# Patient Record
Sex: MALE | Race: WHITE | Employment: PART TIME | URBAN - METROPOLITAN AREA
[De-identification: names, ages, dates, MRNs, and addresses within clinical notes are randomized per-mention and may not be internally consistent; named-entity substitution may affect disease eponyms.]

---

## 2017-03-13 RX ORDER — SILDENAFIL 100 MG/1
100 TABLET, FILM COATED ORAL PRN
Qty: 3 TABLET | Refills: 6 | Status: SHIPPED | OUTPATIENT
Start: 2017-03-13 | End: 2017-09-26 | Stop reason: SDUPTHER

## 2017-09-08 ENCOUNTER — TELEPHONE (OUTPATIENT)
Dept: UROLOGY | Age: 67
End: 2017-09-08

## 2017-09-08 DIAGNOSIS — N40.1 BENIGN NON-NODULAR PROSTATIC HYPERPLASIA WITH LOWER URINARY TRACT SYMPTOMS: Primary | ICD-10-CM

## 2017-09-08 RX ORDER — SILDENAFIL 100 MG/1
100 TABLET, FILM COATED ORAL PRN
Qty: 6 TABLET | Refills: 6 | Status: ON HOLD | OUTPATIENT
Start: 2017-09-08 | End: 2019-02-01 | Stop reason: HOSPADM

## 2017-09-21 DIAGNOSIS — N40.1 BENIGN NON-NODULAR PROSTATIC HYPERPLASIA WITH LOWER URINARY TRACT SYMPTOMS: ICD-10-CM

## 2017-09-21 LAB — PROSTATE SPECIFIC ANTIGEN: 0.78 NG/ML (ref 0–5.4)

## 2017-09-26 ENCOUNTER — OFFICE VISIT (OUTPATIENT)
Dept: UROLOGY | Age: 67
End: 2017-09-26

## 2017-09-26 VITALS
BODY MASS INDEX: 30.35 KG/M2 | HEART RATE: 62 BPM | SYSTOLIC BLOOD PRESSURE: 128 MMHG | HEIGHT: 70 IN | WEIGHT: 212 LBS | DIASTOLIC BLOOD PRESSURE: 78 MMHG

## 2017-09-26 DIAGNOSIS — N13.8 BPH WITH OBSTRUCTION/LOWER URINARY TRACT SYMPTOMS: ICD-10-CM

## 2017-09-26 DIAGNOSIS — N40.1 BPH WITH OBSTRUCTION/LOWER URINARY TRACT SYMPTOMS: ICD-10-CM

## 2017-09-26 DIAGNOSIS — N52.9 ERECTILE DYSFUNCTION, UNSPECIFIED ERECTILE DYSFUNCTION TYPE: Primary | ICD-10-CM

## 2017-09-26 PROCEDURE — G8417 CALC BMI ABV UP PARAM F/U: HCPCS | Performed by: UROLOGY

## 2017-09-26 PROCEDURE — 1123F ACP DISCUSS/DSCN MKR DOCD: CPT | Performed by: UROLOGY

## 2017-09-26 PROCEDURE — 99213 OFFICE O/P EST LOW 20 MIN: CPT | Performed by: UROLOGY

## 2017-09-26 PROCEDURE — 4040F PNEUMOC VAC/ADMIN/RCVD: CPT | Performed by: UROLOGY

## 2017-09-26 PROCEDURE — 4004F PT TOBACCO SCREEN RCVD TLK: CPT | Performed by: UROLOGY

## 2017-09-26 PROCEDURE — G8427 DOCREV CUR MEDS BY ELIG CLIN: HCPCS | Performed by: UROLOGY

## 2017-09-26 PROCEDURE — 3017F COLORECTAL CA SCREEN DOC REV: CPT | Performed by: UROLOGY

## 2017-09-26 RX ORDER — PRAVASTATIN SODIUM 40 MG
40 TABLET ORAL DAILY
Status: ON HOLD | COMMUNITY
End: 2019-02-01 | Stop reason: HOSPADM

## 2017-09-26 ASSESSMENT — ENCOUNTER SYMPTOMS: SHORTNESS OF BREATH: 0

## 2018-03-23 ENCOUNTER — OFFICE VISIT (OUTPATIENT)
Dept: UROLOGY | Age: 68
End: 2018-03-23
Payer: MEDICARE

## 2018-03-23 VITALS
WEIGHT: 218 LBS | SYSTOLIC BLOOD PRESSURE: 110 MMHG | DIASTOLIC BLOOD PRESSURE: 70 MMHG | BODY MASS INDEX: 31.21 KG/M2 | HEART RATE: 61 BPM | HEIGHT: 70 IN

## 2018-03-23 DIAGNOSIS — N52.9 ERECTILE DYSFUNCTION, UNSPECIFIED ERECTILE DYSFUNCTION TYPE: ICD-10-CM

## 2018-03-23 DIAGNOSIS — N48.89 PENILE IRRITATION: Primary | ICD-10-CM

## 2018-03-23 LAB
BILIRUBIN, POC: NORMAL
BLOOD URINE, POC: NORMAL
CLARITY, POC: CLEAR
COLOR, POC: YELLOW
GLUCOSE URINE, POC: NORMAL
KETONES, POC: NORMAL
LEUKOCYTE EST, POC: NORMAL
NITRITE, POC: NORMAL
PH, POC: 5
PROTEIN, POC: NORMAL
SPECIFIC GRAVITY, POC: 1.02
UROBILINOGEN, POC: 0.2

## 2018-03-23 PROCEDURE — 81003 URINALYSIS AUTO W/O SCOPE: CPT | Performed by: UROLOGY

## 2018-03-23 PROCEDURE — 99213 OFFICE O/P EST LOW 20 MIN: CPT | Performed by: UROLOGY

## 2018-03-23 ASSESSMENT — PATIENT HEALTH QUESTIONNAIRE - PHQ9
1. LITTLE INTEREST OR PLEASURE IN DOING THINGS: 0
SUM OF ALL RESPONSES TO PHQ QUESTIONS 1-9: 1
2. FEELING DOWN, DEPRESSED OR HOPELESS: 1
SUM OF ALL RESPONSES TO PHQ9 QUESTIONS 1 & 2: 1

## 2018-03-23 NOTE — PROGRESS NOTES
Subjective:      Patient ID: Hanna Adair is a 76 y.o. male. HPI  This is a 77 yo male with h/o HTN, CAD/MI x 2/Stents, DM, Depression/Anxiety, ED/Viagra 100 mg and BPH w/LUTs back in follow-up. Since last seen on 9/26/17, he feels that the Viagra is not working well and is only getting a partial erectiion. Trial of Cialis was not helpful. He used Levitra yrs ago and this seemed to help but he has trouble with the cost. He tried a KRANTHI and this did not work well. He is interested in IPP. He also reports a 1-2 day h/o penile meatal itching that resolved with the use of topical hydrogen peroxide and increased fluids. He has no hematuria or dysuria or pain.        Past Medical History:   Diagnosis Date    Erectile dysfunction     Hyperlipidemia     Hypertension      Past Surgical History:   Procedure Laterality Date    COLONOSCOPY      CORONARY ANGIOPLASTY WITH STENT PLACEMENT      4 total    THROAT SURGERY      esophagocolostomy    TONSILLECTOMY      UPPER GASTROINTESTINAL ENDOSCOPY       Social History     Social History    Marital status:      Spouse name: N/A    Number of children: N/A    Years of education: N/A     Social History Main Topics    Smoking status: Current Every Day Smoker     Types: Cigarettes    Smokeless tobacco: Never Used    Alcohol use No    Drug use: No    Sexual activity: Not Asked     Other Topics Concern    None     Social History Narrative    None     Family History   Problem Relation Age of Onset    Heart Attack Father      Current Outpatient Prescriptions   Medication Sig Dispense Refill    VENLAFAXINE HCL ER PO Take by mouth      pravastatin (PRAVACHOL) 40 MG tablet Take 40 mg by mouth daily      sildenafil (VIAGRA) 100 MG tablet Take 1 tablet by mouth as needed for Erectile Dysfunction 6 tablet 6    Cholecalciferol (VITAMIN D) 2000 UNITS CAPS capsule Take by mouth daily      ramipril (ALTACE) 5 MG capsule       metFORMIN (GLUCOPHAGE) 500 MG tablet      

## 2019-01-29 ENCOUNTER — HOSPITAL ENCOUNTER (EMERGENCY)
Age: 69
Discharge: ANOTHER HOSPITAL | End: 2019-01-29
Attending: EMERGENCY MEDICINE
Payer: MEDICARE

## 2019-01-29 ENCOUNTER — HOSPITAL ENCOUNTER (INPATIENT)
Age: 69
LOS: 4 days | Discharge: HOME | DRG: 871 | End: 2019-02-02
Attending: INTERNAL MEDICINE | Admitting: INTERNAL MEDICINE
Payer: MEDICARE

## 2019-01-29 ENCOUNTER — APPOINTMENT (OUTPATIENT)
Dept: GENERAL RADIOLOGY | Age: 69
End: 2019-01-29
Payer: MEDICARE

## 2019-01-29 VITALS
DIASTOLIC BLOOD PRESSURE: 50 MMHG | OXYGEN SATURATION: 95 % | HEART RATE: 83 BPM | HEIGHT: 70 IN | SYSTOLIC BLOOD PRESSURE: 81 MMHG | WEIGHT: 218 LBS | RESPIRATION RATE: 14 BRPM | TEMPERATURE: 97.7 F | BODY MASS INDEX: 31.21 KG/M2

## 2019-01-29 DIAGNOSIS — A41.9 SEPTICEMIA (HCC): ICD-10-CM

## 2019-01-29 DIAGNOSIS — J18.9 PNEUMONIA DUE TO ORGANISM: Primary | ICD-10-CM

## 2019-01-29 DIAGNOSIS — J18.9 PNEUMONIA OF RIGHT UPPER LOBE DUE TO INFECTIOUS ORGANISM: ICD-10-CM

## 2019-01-29 DIAGNOSIS — R09.1 PLEURISY: Primary | ICD-10-CM

## 2019-01-29 DIAGNOSIS — R06.09 DOE (DYSPNEA ON EXERTION): ICD-10-CM

## 2019-01-29 LAB
ALBUMIN SERPL-MCNC: 3.9 G/DL (ref 3.9–4.9)
ALP BLD-CCNC: 68 U/L (ref 35–104)
ALT SERPL-CCNC: 19 U/L (ref 0–41)
ANION GAP SERPL CALCULATED.3IONS-SCNC: 20 MEQ/L (ref 7–13)
AST SERPL-CCNC: 16 U/L (ref 0–40)
BANDED NEUTROPHILS RELATIVE PERCENT: 36 %
BASOPHILS ABSOLUTE: 0 K/UL (ref 0–0.2)
BASOPHILS RELATIVE PERCENT: 0 %
BILIRUB SERPL-MCNC: 1.2 MG/DL (ref 0–1.2)
BUN BLDV-MCNC: 34 MG/DL (ref 8–23)
CALCIUM SERPL-MCNC: 9.6 MG/DL (ref 8.6–10.2)
CHLORIDE BLD-SCNC: 92 MEQ/L (ref 98–107)
CO2: 21 MEQ/L (ref 22–29)
CREAT SERPL-MCNC: 1.93 MG/DL (ref 0.7–1.2)
EOSINOPHILS ABSOLUTE: 0 K/UL (ref 0–0.7)
EOSINOPHILS RELATIVE PERCENT: 0 %
GFR AFRICAN AMERICAN: 42
GFR NON-AFRICAN AMERICAN: 34.7
GLOBULIN: 3.8 G/DL (ref 2.3–3.5)
GLUCOSE BLD-MCNC: 203 MG/DL (ref 74–109)
HCT VFR BLD CALC: 48 % (ref 42–52)
HEMOGLOBIN: 16.6 G/DL (ref 14–18)
LACTIC ACID: 4.4 MMOL/L (ref 0.5–2.2)
LYMPHOCYTES ABSOLUTE: 1.5 K/UL (ref 1–4.8)
LYMPHOCYTES RELATIVE PERCENT: 6 %
MCH RBC QN AUTO: 30.4 PG (ref 27–31.3)
MCHC RBC AUTO-ENTMCNC: 34.7 % (ref 33–37)
MCV RBC AUTO: 87.7 FL (ref 80–100)
METAMYELOCYTES RELATIVE PERCENT: 2 %
MONOCYTES ABSOLUTE: 0 K/UL (ref 0.2–0.8)
MONOCYTES RELATIVE PERCENT: 0 %
NEUTROPHILS ABSOLUTE: 24.1 K/UL (ref 1.4–6.5)
NEUTROPHILS RELATIVE PERCENT: 56 %
PDW BLD-RTO: 14.9 % (ref 11.5–14.5)
PLATELET # BLD: 145 K/UL (ref 130–400)
PLATELET SLIDE REVIEW: ADEQUATE
POTASSIUM SERPL-SCNC: 4.9 MEQ/L (ref 3.5–5.1)
RAPID INFLUENZA  B AGN: NEGATIVE
RAPID INFLUENZA A AGN: NEGATIVE
RBC # BLD: 5.47 M/UL (ref 4.7–6.1)
RBC # BLD: NORMAL 10*6/UL
SODIUM BLD-SCNC: 133 MEQ/L (ref 132–144)
TOTAL PROTEIN: 7.7 G/DL (ref 6.4–8.1)
TOXIC GRANULATION: ABNORMAL
WBC # BLD: 25.6 K/UL (ref 4.8–10.8)

## 2019-01-29 PROCEDURE — 99284 EMERGENCY DEPT VISIT MOD MDM: CPT

## 2019-01-29 PROCEDURE — 2580000003 HC RX 258: Performed by: EMERGENCY MEDICINE

## 2019-01-29 PROCEDURE — 83605 ASSAY OF LACTIC ACID: CPT

## 2019-01-29 PROCEDURE — 87804 INFLUENZA ASSAY W/OPTIC: CPT

## 2019-01-29 PROCEDURE — 36415 COLL VENOUS BLD VENIPUNCTURE: CPT

## 2019-01-29 PROCEDURE — 96375 TX/PRO/DX INJ NEW DRUG ADDON: CPT

## 2019-01-29 PROCEDURE — 6360000002 HC RX W HCPCS: Performed by: EMERGENCY MEDICINE

## 2019-01-29 PROCEDURE — 71046 X-RAY EXAM CHEST 2 VIEWS: CPT

## 2019-01-29 PROCEDURE — 87040 BLOOD CULTURE FOR BACTERIA: CPT

## 2019-01-29 PROCEDURE — 80053 COMPREHEN METABOLIC PANEL: CPT

## 2019-01-29 PROCEDURE — 2580000003 HC RX 258: Performed by: PHYSICIAN ASSISTANT

## 2019-01-29 PROCEDURE — 85025 COMPLETE CBC W/AUTO DIFF WBC: CPT

## 2019-01-29 PROCEDURE — 1210000000 HC MED SURG R&B

## 2019-01-29 PROCEDURE — 96368 THER/DIAG CONCURRENT INF: CPT

## 2019-01-29 PROCEDURE — 96365 THER/PROPH/DIAG IV INF INIT: CPT

## 2019-01-29 RX ORDER — 0.9 % SODIUM CHLORIDE 0.9 %
30 INTRAVENOUS SOLUTION INTRAVENOUS ONCE
Status: COMPLETED | OUTPATIENT
Start: 2019-01-29 | End: 2019-01-29

## 2019-01-29 RX ORDER — SODIUM CHLORIDE 9 MG/ML
INJECTION, SOLUTION INTRAVENOUS CONTINUOUS
Status: DISCONTINUED | OUTPATIENT
Start: 2019-01-29 | End: 2019-01-31

## 2019-01-29 RX ORDER — SODIUM CHLORIDE 0.9 % (FLUSH) 0.9 %
10 SYRINGE (ML) INJECTION PRN
Status: DISCONTINUED | OUTPATIENT
Start: 2019-01-29 | End: 2019-02-02 | Stop reason: HOSPADM

## 2019-01-29 RX ORDER — MORPHINE SULFATE 4 MG/ML
4 INJECTION, SOLUTION INTRAMUSCULAR; INTRAVENOUS
Status: DISCONTINUED | OUTPATIENT
Start: 2019-01-29 | End: 2019-01-29 | Stop reason: HOSPADM

## 2019-01-29 RX ORDER — LIDOCAINE 4 G/G
1 PATCH TOPICAL DAILY
Status: DISCONTINUED | OUTPATIENT
Start: 2019-01-30 | End: 2019-01-30

## 2019-01-29 RX ORDER — SODIUM CHLORIDE 0.9 % (FLUSH) 0.9 %
10 SYRINGE (ML) INJECTION EVERY 12 HOURS SCHEDULED
Status: DISCONTINUED | OUTPATIENT
Start: 2019-01-29 | End: 2019-02-02 | Stop reason: HOSPADM

## 2019-01-29 RX ORDER — IPRATROPIUM BROMIDE AND ALBUTEROL SULFATE 2.5; .5 MG/3ML; MG/3ML
1 SOLUTION RESPIRATORY (INHALATION)
Status: DISCONTINUED | OUTPATIENT
Start: 2019-01-30 | End: 2019-01-30

## 2019-01-29 RX ORDER — 0.9 % SODIUM CHLORIDE 0.9 %
500 INTRAVENOUS SOLUTION INTRAVENOUS ONCE
Status: COMPLETED | OUTPATIENT
Start: 2019-01-29 | End: 2019-01-29

## 2019-01-29 RX ORDER — LOVASTATIN 20 MG/1
20 TABLET ORAL NIGHTLY
COMMUNITY

## 2019-01-29 RX ORDER — METOPROLOL SUCCINATE 50 MG/1
50 TABLET, EXTENDED RELEASE ORAL NIGHTLY
Status: DISCONTINUED | OUTPATIENT
Start: 2019-01-29 | End: 2019-02-02 | Stop reason: HOSPADM

## 2019-01-29 RX ORDER — ASPIRIN 81 MG/1
81 TABLET, CHEWABLE ORAL DAILY
Status: DISCONTINUED | OUTPATIENT
Start: 2019-01-30 | End: 2019-02-02 | Stop reason: HOSPADM

## 2019-01-29 RX ORDER — ONDANSETRON 2 MG/ML
4 INJECTION INTRAMUSCULAR; INTRAVENOUS ONCE
Status: COMPLETED | OUTPATIENT
Start: 2019-01-29 | End: 2019-01-29

## 2019-01-29 RX ADMIN — DEXTROSE MONOHYDRATE 1 G: 50 INJECTION, SOLUTION INTRAVENOUS at 19:20

## 2019-01-29 RX ADMIN — SODIUM CHLORIDE: 9 INJECTION, SOLUTION INTRAVENOUS at 23:07

## 2019-01-29 RX ADMIN — ONDANSETRON 4 MG: 2 INJECTION INTRAMUSCULAR; INTRAVENOUS at 18:53

## 2019-01-29 RX ADMIN — SODIUM CHLORIDE 500 ML: 9 INJECTION, SOLUTION INTRAVENOUS at 18:53

## 2019-01-29 RX ADMIN — AZITHROMYCIN MONOHYDRATE 500 MG: 500 INJECTION, POWDER, LYOPHILIZED, FOR SOLUTION INTRAVENOUS at 19:20

## 2019-01-29 RX ADMIN — MORPHINE SULFATE 4 MG: 4 INJECTION INTRAVENOUS at 18:53

## 2019-01-29 RX ADMIN — Medication 10 ML: at 23:07

## 2019-01-29 RX ADMIN — SODIUM CHLORIDE 2967 ML: 9 INJECTION, SOLUTION INTRAVENOUS at 20:14

## 2019-01-29 ASSESSMENT — PAIN DESCRIPTION - ORIENTATION
ORIENTATION: RIGHT
ORIENTATION: RIGHT

## 2019-01-29 ASSESSMENT — PAIN SCALES - GENERAL
PAINLEVEL_OUTOF10: 1
PAINLEVEL_OUTOF10: 10
PAINLEVEL_OUTOF10: 6

## 2019-01-29 ASSESSMENT — ENCOUNTER SYMPTOMS
ABDOMINAL PAIN: 0
EYES NEGATIVE: 1
VOMITING: 0
TROUBLE SWALLOWING: 0
WHEEZING: 0
RHINORRHEA: 0
ALLERGIC/IMMUNOLOGIC NEGATIVE: 1
COUGH: 1
SHORTNESS OF BREATH: 0
NAUSEA: 0

## 2019-01-29 ASSESSMENT — PAIN DESCRIPTION - FREQUENCY
FREQUENCY: INTERMITTENT
FREQUENCY: INTERMITTENT

## 2019-01-29 ASSESSMENT — PAIN DESCRIPTION - PAIN TYPE: TYPE: ACUTE PAIN

## 2019-01-29 ASSESSMENT — PAIN DESCRIPTION - LOCATION: LOCATION: RIB CAGE

## 2019-01-29 ASSESSMENT — PAIN DESCRIPTION - ONSET: ONSET: SUDDEN

## 2019-01-29 ASSESSMENT — PAIN DESCRIPTION - DESCRIPTORS
DESCRIPTORS: SHARP
DESCRIPTORS: ACHING

## 2019-01-30 LAB
ANION GAP SERPL CALCULATED.3IONS-SCNC: 14 MEQ/L (ref 7–13)
BASOPHILS ABSOLUTE: 0 K/UL (ref 0–0.2)
BASOPHILS RELATIVE PERCENT: 0.2 %
BUN BLDV-MCNC: 38 MG/DL (ref 8–23)
CALCIUM SERPL-MCNC: 8.3 MG/DL (ref 8.6–10.2)
CHLORIDE BLD-SCNC: 104 MEQ/L (ref 98–107)
CO2: 19 MEQ/L (ref 22–29)
CREAT SERPL-MCNC: 1.32 MG/DL (ref 0.7–1.2)
EOSINOPHILS ABSOLUTE: 0 K/UL (ref 0–0.7)
EOSINOPHILS RELATIVE PERCENT: 0.1 %
GFR AFRICAN AMERICAN: >60
GFR NON-AFRICAN AMERICAN: 53.8
GLUCOSE BLD-MCNC: 128 MG/DL (ref 60–115)
GLUCOSE BLD-MCNC: 133 MG/DL (ref 60–115)
GLUCOSE BLD-MCNC: 140 MG/DL (ref 60–115)
GLUCOSE BLD-MCNC: 96 MG/DL (ref 60–115)
GLUCOSE BLD-MCNC: 97 MG/DL (ref 74–109)
GLUCOSE BLD-MCNC: 98 MG/DL (ref 60–115)
HCT VFR BLD CALC: 38.5 % (ref 42–52)
HEMOGLOBIN: 13.6 G/DL (ref 14–18)
LACTIC ACID, SEPSIS: 1.8 MMOL/L (ref 0.5–1.9)
LACTIC ACID, SEPSIS: 3.8 MMOL/L (ref 0.5–1.9)
LACTIC ACID: 2.5 MMOL/L (ref 0.5–2.2)
LYMPHOCYTES ABSOLUTE: 1.4 K/UL (ref 1–4.8)
LYMPHOCYTES RELATIVE PERCENT: 6.1 %
MCH RBC QN AUTO: 31.3 PG (ref 27–31.3)
MCHC RBC AUTO-ENTMCNC: 35.2 % (ref 33–37)
MCV RBC AUTO: 89 FL (ref 80–100)
MONOCYTES ABSOLUTE: 1.1 K/UL (ref 0.2–0.8)
MONOCYTES RELATIVE PERCENT: 5.1 %
NEUTROPHILS ABSOLUTE: 19.7 K/UL (ref 1.4–6.5)
NEUTROPHILS RELATIVE PERCENT: 88.5 %
PDW BLD-RTO: 15 % (ref 11.5–14.5)
PERFORMED ON: ABNORMAL
PERFORMED ON: NORMAL
PERFORMED ON: NORMAL
PLATELET # BLD: 115 K/UL (ref 130–400)
POTASSIUM REFLEX MAGNESIUM: 3.9 MEQ/L (ref 3.5–5.1)
RBC # BLD: 4.33 M/UL (ref 4.7–6.1)
SODIUM BLD-SCNC: 137 MEQ/L (ref 132–144)
WBC # BLD: 22.3 K/UL (ref 4.8–10.8)

## 2019-01-30 PROCEDURE — 94640 AIRWAY INHALATION TREATMENT: CPT

## 2019-01-30 PROCEDURE — 94667 MNPJ CHEST WALL 1ST: CPT

## 2019-01-30 PROCEDURE — 83605 ASSAY OF LACTIC ACID: CPT

## 2019-01-30 PROCEDURE — 94664 DEMO&/EVAL PT USE INHALER: CPT

## 2019-01-30 PROCEDURE — 6370000000 HC RX 637 (ALT 250 FOR IP): Performed by: INTERNAL MEDICINE

## 2019-01-30 PROCEDURE — 94761 N-INVAS EAR/PLS OXIMETRY MLT: CPT

## 2019-01-30 PROCEDURE — 1210000000 HC MED SURG R&B

## 2019-01-30 PROCEDURE — 6360000002 HC RX W HCPCS: Performed by: INTERNAL MEDICINE

## 2019-01-30 PROCEDURE — 2580000003 HC RX 258: Performed by: PHYSICIAN ASSISTANT

## 2019-01-30 PROCEDURE — 85025 COMPLETE CBC W/AUTO DIFF WBC: CPT

## 2019-01-30 PROCEDURE — 99222 1ST HOSP IP/OBS MODERATE 55: CPT | Performed by: INTERNAL MEDICINE

## 2019-01-30 PROCEDURE — 36415 COLL VENOUS BLD VENIPUNCTURE: CPT

## 2019-01-30 PROCEDURE — 80048 BASIC METABOLIC PNL TOTAL CA: CPT

## 2019-01-30 PROCEDURE — 6360000002 HC RX W HCPCS: Performed by: PHYSICIAN ASSISTANT

## 2019-01-30 RX ORDER — DEXTROSE MONOHYDRATE 25 G/50ML
12.5 INJECTION, SOLUTION INTRAVENOUS PRN
Status: DISCONTINUED | OUTPATIENT
Start: 2019-01-30 | End: 2019-02-02 | Stop reason: HOSPADM

## 2019-01-30 RX ORDER — KETOROLAC TROMETHAMINE 15 MG/ML
15 INJECTION, SOLUTION INTRAMUSCULAR; INTRAVENOUS EVERY 6 HOURS PRN
Status: DISCONTINUED | OUTPATIENT
Start: 2019-01-30 | End: 2019-02-02 | Stop reason: HOSPADM

## 2019-01-30 RX ORDER — ALBUTEROL SULFATE 2.5 MG/3ML
2.5 SOLUTION RESPIRATORY (INHALATION)
Status: DISCONTINUED | OUTPATIENT
Start: 2019-01-30 | End: 2019-02-02 | Stop reason: HOSPADM

## 2019-01-30 RX ORDER — IPRATROPIUM BROMIDE AND ALBUTEROL SULFATE 2.5; .5 MG/3ML; MG/3ML
1 SOLUTION RESPIRATORY (INHALATION) 4 TIMES DAILY
Status: DISCONTINUED | OUTPATIENT
Start: 2019-01-30 | End: 2019-01-31

## 2019-01-30 RX ORDER — DEXTROSE MONOHYDRATE 50 MG/ML
100 INJECTION, SOLUTION INTRAVENOUS PRN
Status: DISCONTINUED | OUTPATIENT
Start: 2019-01-30 | End: 2019-02-02 | Stop reason: HOSPADM

## 2019-01-30 RX ORDER — IPRATROPIUM BROMIDE AND ALBUTEROL SULFATE 2.5; .5 MG/3ML; MG/3ML
1 SOLUTION RESPIRATORY (INHALATION) EVERY 4 HOURS PRN
Status: DISCONTINUED | OUTPATIENT
Start: 2019-01-30 | End: 2019-01-30

## 2019-01-30 RX ORDER — LIDOCAINE 4 G/G
1 PATCH TOPICAL DAILY
Status: DISCONTINUED | OUTPATIENT
Start: 2019-01-30 | End: 2019-02-02 | Stop reason: HOSPADM

## 2019-01-30 RX ORDER — NICOTINE POLACRILEX 4 MG
15 LOZENGE BUCCAL PRN
Status: DISCONTINUED | OUTPATIENT
Start: 2019-01-30 | End: 2019-02-02 | Stop reason: HOSPADM

## 2019-01-30 RX ADMIN — HYDROMORPHONE HYDROCHLORIDE 1 MG: 1 INJECTION, SOLUTION INTRAMUSCULAR; INTRAVENOUS; SUBCUTANEOUS at 23:50

## 2019-01-30 RX ADMIN — IPRATROPIUM BROMIDE AND ALBUTEROL SULFATE 1 AMPULE: .5; 3 SOLUTION RESPIRATORY (INHALATION) at 21:48

## 2019-01-30 RX ADMIN — METOPROLOL SUCCINATE 50 MG: 50 TABLET, EXTENDED RELEASE ORAL at 19:48

## 2019-01-30 RX ADMIN — CEFTRIAXONE SODIUM 1 G: 1 INJECTION, POWDER, FOR SOLUTION INTRAMUSCULAR; INTRAVENOUS at 19:48

## 2019-01-30 RX ADMIN — HYDROMORPHONE HYDROCHLORIDE 1 MG: 1 INJECTION, SOLUTION INTRAMUSCULAR; INTRAVENOUS; SUBCUTANEOUS at 19:49

## 2019-01-30 RX ADMIN — Medication 10 ML: at 08:23

## 2019-01-30 RX ADMIN — Medication 10 ML: at 19:47

## 2019-01-30 RX ADMIN — KETOROLAC TROMETHAMINE 15 MG: 15 INJECTION, SOLUTION INTRAMUSCULAR; INTRAVENOUS at 12:48

## 2019-01-30 RX ADMIN — IPRATROPIUM BROMIDE AND ALBUTEROL SULFATE 1 AMPULE: .5; 3 SOLUTION RESPIRATORY (INHALATION) at 14:08

## 2019-01-30 RX ADMIN — ASPIRIN 81 MG 81 MG: 81 TABLET ORAL at 08:23

## 2019-01-30 RX ADMIN — SODIUM CHLORIDE: 9 INJECTION, SOLUTION INTRAVENOUS at 23:47

## 2019-01-30 RX ADMIN — ENOXAPARIN SODIUM 40 MG: 40 INJECTION SUBCUTANEOUS at 08:23

## 2019-01-30 RX ADMIN — AZITHROMYCIN MONOHYDRATE 500 MG: 500 INJECTION, POWDER, LYOPHILIZED, FOR SOLUTION INTRAVENOUS at 18:06

## 2019-01-30 RX ADMIN — SODIUM CHLORIDE: 9 INJECTION, SOLUTION INTRAVENOUS at 07:25

## 2019-01-30 RX ADMIN — SODIUM CHLORIDE: 9 INJECTION, SOLUTION INTRAVENOUS at 15:55

## 2019-01-30 ASSESSMENT — PAIN DESCRIPTION - LOCATION: LOCATION: RIB CAGE

## 2019-01-30 ASSESSMENT — PAIN SCALES - GENERAL
PAINLEVEL_OUTOF10: 6
PAINLEVEL_OUTOF10: 7
PAINLEVEL_OUTOF10: 5

## 2019-01-30 ASSESSMENT — PAIN DESCRIPTION - PAIN TYPE: TYPE: ACUTE PAIN

## 2019-01-30 ASSESSMENT — PAIN DESCRIPTION - ORIENTATION: ORIENTATION: RIGHT

## 2019-01-31 ENCOUNTER — APPOINTMENT (OUTPATIENT)
Dept: GENERAL RADIOLOGY | Age: 69
DRG: 871 | End: 2019-01-31
Attending: INTERNAL MEDICINE
Payer: MEDICARE

## 2019-01-31 LAB
ANION GAP SERPL CALCULATED.3IONS-SCNC: 15 MEQ/L (ref 7–13)
BASOPHILS ABSOLUTE: 0 K/UL (ref 0–0.2)
BASOPHILS RELATIVE PERCENT: 0.3 %
BUN BLDV-MCNC: 24 MG/DL (ref 8–23)
CALCIUM SERPL-MCNC: 8.2 MG/DL (ref 8.6–10.2)
CHLORIDE BLD-SCNC: 100 MEQ/L (ref 98–107)
CO2: 20 MEQ/L (ref 22–29)
CREAT SERPL-MCNC: 1.05 MG/DL (ref 0.7–1.2)
EOSINOPHILS ABSOLUTE: 0.1 K/UL (ref 0–0.7)
EOSINOPHILS RELATIVE PERCENT: 0.8 %
GFR AFRICAN AMERICAN: >60
GFR NON-AFRICAN AMERICAN: >60
GLUCOSE BLD-MCNC: 126 MG/DL (ref 60–115)
GLUCOSE BLD-MCNC: 132 MG/DL (ref 74–109)
GLUCOSE BLD-MCNC: 147 MG/DL (ref 60–115)
GLUCOSE BLD-MCNC: 224 MG/DL (ref 60–115)
GLUCOSE BLD-MCNC: 243 MG/DL (ref 60–115)
HCT VFR BLD CALC: 36 % (ref 42–52)
HEMOGLOBIN: 12.7 G/DL (ref 14–18)
LACTIC ACID: 4.7 MMOL/L (ref 0.5–2.2)
LYMPHOCYTES ABSOLUTE: 2 K/UL (ref 1–4.8)
LYMPHOCYTES RELATIVE PERCENT: 12.1 %
MCH RBC QN AUTO: 31.8 PG (ref 27–31.3)
MCHC RBC AUTO-ENTMCNC: 35.4 % (ref 33–37)
MCV RBC AUTO: 90 FL (ref 80–100)
MONOCYTES ABSOLUTE: 0.9 K/UL (ref 0.2–0.8)
MONOCYTES RELATIVE PERCENT: 5.1 %
NEUTROPHILS ABSOLUTE: 13.9 K/UL (ref 1.4–6.5)
NEUTROPHILS RELATIVE PERCENT: 81.7 %
PDW BLD-RTO: 15.4 % (ref 11.5–14.5)
PERFORMED ON: ABNORMAL
PLATELET # BLD: 126 K/UL (ref 130–400)
POTASSIUM REFLEX MAGNESIUM: 3.7 MEQ/L (ref 3.5–5.1)
RBC # BLD: 4 M/UL (ref 4.7–6.1)
SODIUM BLD-SCNC: 135 MEQ/L (ref 132–144)
WBC # BLD: 17 K/UL (ref 4.8–10.8)

## 2019-01-31 PROCEDURE — 2580000003 HC RX 258: Performed by: INTERNAL MEDICINE

## 2019-01-31 PROCEDURE — 6370000000 HC RX 637 (ALT 250 FOR IP): Performed by: INTERNAL MEDICINE

## 2019-01-31 PROCEDURE — 1210000000 HC MED SURG R&B

## 2019-01-31 PROCEDURE — 83605 ASSAY OF LACTIC ACID: CPT

## 2019-01-31 PROCEDURE — 6360000002 HC RX W HCPCS: Performed by: PHYSICIAN ASSISTANT

## 2019-01-31 PROCEDURE — 6360000002 HC RX W HCPCS: Performed by: INTERNAL MEDICINE

## 2019-01-31 PROCEDURE — 71046 X-RAY EXAM CHEST 2 VIEWS: CPT

## 2019-01-31 PROCEDURE — 94640 AIRWAY INHALATION TREATMENT: CPT

## 2019-01-31 PROCEDURE — 2580000003 HC RX 258: Performed by: PHYSICIAN ASSISTANT

## 2019-01-31 PROCEDURE — 85025 COMPLETE CBC W/AUTO DIFF WBC: CPT

## 2019-01-31 PROCEDURE — 99232 SBSQ HOSP IP/OBS MODERATE 35: CPT | Performed by: INTERNAL MEDICINE

## 2019-01-31 PROCEDURE — 36415 COLL VENOUS BLD VENIPUNCTURE: CPT

## 2019-01-31 PROCEDURE — 80048 BASIC METABOLIC PNL TOTAL CA: CPT

## 2019-01-31 RX ORDER — VENLAFAXINE HYDROCHLORIDE 150 MG/1
150 CAPSULE, EXTENDED RELEASE ORAL
Status: DISCONTINUED | OUTPATIENT
Start: 2019-01-31 | End: 2019-02-02 | Stop reason: HOSPADM

## 2019-01-31 RX ORDER — ALBUTEROL SULFATE 2.5 MG/3ML
2.5 SOLUTION RESPIRATORY (INHALATION) 3 TIMES DAILY
Status: DISCONTINUED | OUTPATIENT
Start: 2019-01-31 | End: 2019-02-02 | Stop reason: HOSPADM

## 2019-01-31 RX ORDER — SODIUM CHLORIDE 9 MG/ML
INJECTION, SOLUTION INTRAVENOUS CONTINUOUS
Status: DISCONTINUED | OUTPATIENT
Start: 2019-01-31 | End: 2019-02-01

## 2019-01-31 RX ORDER — OXYCODONE HYDROCHLORIDE AND ACETAMINOPHEN 5; 325 MG/1; MG/1
1 TABLET ORAL EVERY 4 HOURS PRN
Status: DISCONTINUED | OUTPATIENT
Start: 2019-01-31 | End: 2019-02-02 | Stop reason: HOSPADM

## 2019-01-31 RX ORDER — NAPROXEN 250 MG/1
500 TABLET ORAL DAILY
Status: DISCONTINUED | OUTPATIENT
Start: 2019-01-31 | End: 2019-02-02 | Stop reason: HOSPADM

## 2019-01-31 RX ADMIN — VENLAFAXINE HYDROCHLORIDE 150 MG: 150 CAPSULE, EXTENDED RELEASE ORAL at 10:48

## 2019-01-31 RX ADMIN — ASPIRIN 81 MG 81 MG: 81 TABLET ORAL at 09:20

## 2019-01-31 RX ADMIN — ENOXAPARIN SODIUM 40 MG: 40 INJECTION SUBCUTANEOUS at 09:20

## 2019-01-31 RX ADMIN — Medication 10 ML: at 20:58

## 2019-01-31 RX ADMIN — INSULIN LISPRO 2 UNITS: 100 INJECTION, SOLUTION INTRAVENOUS; SUBCUTANEOUS at 17:19

## 2019-01-31 RX ADMIN — METOPROLOL SUCCINATE 50 MG: 50 TABLET, EXTENDED RELEASE ORAL at 20:57

## 2019-01-31 RX ADMIN — OXYCODONE AND ACETAMINOPHEN 1 TABLET: 5; 325 TABLET ORAL at 21:46

## 2019-01-31 RX ADMIN — NAPROXEN 500 MG: 250 TABLET ORAL at 10:48

## 2019-01-31 RX ADMIN — CEFTRIAXONE SODIUM 1 G: 1 INJECTION, POWDER, FOR SOLUTION INTRAMUSCULAR; INTRAVENOUS at 20:59

## 2019-01-31 RX ADMIN — OXYCODONE AND ACETAMINOPHEN 1 TABLET: 5; 325 TABLET ORAL at 17:21

## 2019-01-31 RX ADMIN — ALBUTEROL SULFATE 2.5 MG: 2.5 SOLUTION RESPIRATORY (INHALATION) at 19:11

## 2019-01-31 RX ADMIN — OXYCODONE AND ACETAMINOPHEN 1 TABLET: 5; 325 TABLET ORAL at 10:51

## 2019-01-31 RX ADMIN — AZITHROMYCIN MONOHYDRATE 500 MG: 500 INJECTION, POWDER, LYOPHILIZED, FOR SOLUTION INTRAVENOUS at 18:50

## 2019-01-31 RX ADMIN — ALBUTEROL SULFATE 2.5 MG: 2.5 SOLUTION RESPIRATORY (INHALATION) at 10:49

## 2019-01-31 RX ADMIN — SODIUM CHLORIDE: 9 INJECTION, SOLUTION INTRAVENOUS at 12:27

## 2019-01-31 RX ADMIN — INSULIN LISPRO 4 UNITS: 100 INJECTION, SOLUTION INTRAVENOUS; SUBCUTANEOUS at 12:03

## 2019-01-31 RX ADMIN — IPRATROPIUM BROMIDE AND ALBUTEROL SULFATE 1 AMPULE: .5; 3 SOLUTION RESPIRATORY (INHALATION) at 07:06

## 2019-01-31 ASSESSMENT — PAIN SCALES - GENERAL
PAINLEVEL_OUTOF10: 6
PAINLEVEL_OUTOF10: 6
PAINLEVEL_OUTOF10: 7
PAINLEVEL_OUTOF10: 6
PAINLEVEL_OUTOF10: 0

## 2019-02-01 ENCOUNTER — APPOINTMENT (OUTPATIENT)
Dept: GENERAL RADIOLOGY | Age: 69
DRG: 871 | End: 2019-02-01
Attending: INTERNAL MEDICINE
Payer: MEDICARE

## 2019-02-01 LAB
BASOPHILS ABSOLUTE: 0 K/UL (ref 0–0.2)
BASOPHILS RELATIVE PERCENT: 0.5 %
EOSINOPHILS ABSOLUTE: 0.1 K/UL (ref 0–0.7)
EOSINOPHILS RELATIVE PERCENT: 1.3 %
GLUCOSE BLD-MCNC: 119 MG/DL (ref 60–115)
GLUCOSE BLD-MCNC: 147 MG/DL (ref 60–115)
GLUCOSE BLD-MCNC: 156 MG/DL (ref 60–115)
GLUCOSE BLD-MCNC: 203 MG/DL (ref 60–115)
HCT VFR BLD CALC: 36.5 % (ref 42–52)
HEMOGLOBIN: 12.8 G/DL (ref 14–18)
LACTIC ACID: 1.2 MMOL/L (ref 0.5–2.2)
LYMPHOCYTES ABSOLUTE: 1.5 K/UL (ref 1–4.8)
LYMPHOCYTES RELATIVE PERCENT: 15.8 %
MCH RBC QN AUTO: 31.2 PG (ref 27–31.3)
MCHC RBC AUTO-ENTMCNC: 35 % (ref 33–37)
MCV RBC AUTO: 89.1 FL (ref 80–100)
MONOCYTES ABSOLUTE: 0.6 K/UL (ref 0.2–0.8)
MONOCYTES RELATIVE PERCENT: 5.9 %
NEUTROPHILS ABSOLUTE: 7.5 K/UL (ref 1.4–6.5)
NEUTROPHILS RELATIVE PERCENT: 76.5 %
PDW BLD-RTO: 15.1 % (ref 11.5–14.5)
PERFORMED ON: ABNORMAL
PLATELET # BLD: 127 K/UL (ref 130–400)
RBC # BLD: 4.09 M/UL (ref 4.7–6.1)
WBC # BLD: 9.8 K/UL (ref 4.8–10.8)

## 2019-02-01 PROCEDURE — 94640 AIRWAY INHALATION TREATMENT: CPT

## 2019-02-01 PROCEDURE — 94668 MNPJ CHEST WALL SBSQ: CPT

## 2019-02-01 PROCEDURE — 85025 COMPLETE CBC W/AUTO DIFF WBC: CPT

## 2019-02-01 PROCEDURE — 6370000000 HC RX 637 (ALT 250 FOR IP): Performed by: INTERNAL MEDICINE

## 2019-02-01 PROCEDURE — 6360000002 HC RX W HCPCS: Performed by: INTERNAL MEDICINE

## 2019-02-01 PROCEDURE — 99232 SBSQ HOSP IP/OBS MODERATE 35: CPT | Performed by: INTERNAL MEDICINE

## 2019-02-01 PROCEDURE — 2580000003 HC RX 258: Performed by: PHYSICIAN ASSISTANT

## 2019-02-01 PROCEDURE — 71046 X-RAY EXAM CHEST 2 VIEWS: CPT

## 2019-02-01 PROCEDURE — 94760 N-INVAS EAR/PLS OXIMETRY 1: CPT

## 2019-02-01 PROCEDURE — 83605 ASSAY OF LACTIC ACID: CPT

## 2019-02-01 PROCEDURE — 1210000000 HC MED SURG R&B

## 2019-02-01 PROCEDURE — 6360000002 HC RX W HCPCS: Performed by: PHYSICIAN ASSISTANT

## 2019-02-01 PROCEDURE — 36415 COLL VENOUS BLD VENIPUNCTURE: CPT

## 2019-02-01 PROCEDURE — 2580000003 HC RX 258: Performed by: INTERNAL MEDICINE

## 2019-02-01 RX ORDER — AZITHROMYCIN 500 MG/1
500 TABLET, FILM COATED ORAL DAILY
Qty: 1 PACKET | Refills: 0 | Status: SHIPPED | OUTPATIENT
Start: 2019-02-01 | End: 2019-02-04

## 2019-02-01 RX ORDER — GUAIFENESIN 600 MG/1
600 TABLET, EXTENDED RELEASE ORAL 2 TIMES DAILY
Qty: 30 TABLET | Refills: 0 | Status: SHIPPED | OUTPATIENT
Start: 2019-02-01 | End: 2019-02-16

## 2019-02-01 RX ORDER — CEFDINIR 300 MG/1
300 CAPSULE ORAL 2 TIMES DAILY
Qty: 14 CAPSULE | Refills: 0 | Status: SHIPPED | OUTPATIENT
Start: 2019-02-01 | End: 2019-02-02 | Stop reason: HOSPADM

## 2019-02-01 RX ORDER — OXYCODONE HYDROCHLORIDE AND ACETAMINOPHEN 5; 325 MG/1; MG/1
1 TABLET ORAL 3 TIMES DAILY PRN
Qty: 10 TABLET | Refills: 0 | Status: SHIPPED | OUTPATIENT
Start: 2019-02-01 | End: 2019-02-04

## 2019-02-01 RX ADMIN — ASPIRIN 81 MG 81 MG: 81 TABLET ORAL at 07:42

## 2019-02-01 RX ADMIN — AZITHROMYCIN MONOHYDRATE 500 MG: 500 INJECTION, POWDER, LYOPHILIZED, FOR SOLUTION INTRAVENOUS at 17:47

## 2019-02-01 RX ADMIN — Medication 10 ML: at 07:43

## 2019-02-01 RX ADMIN — OXYCODONE AND ACETAMINOPHEN 1 TABLET: 5; 325 TABLET ORAL at 07:42

## 2019-02-01 RX ADMIN — ALBUTEROL SULFATE 2.5 MG: 2.5 SOLUTION RESPIRATORY (INHALATION) at 07:27

## 2019-02-01 RX ADMIN — ENOXAPARIN SODIUM 40 MG: 40 INJECTION SUBCUTANEOUS at 07:42

## 2019-02-01 RX ADMIN — Medication 10 ML: at 22:19

## 2019-02-01 RX ADMIN — ALBUTEROL SULFATE 2.5 MG: 2.5 SOLUTION RESPIRATORY (INHALATION) at 19:12

## 2019-02-01 RX ADMIN — OXYCODONE AND ACETAMINOPHEN 1 TABLET: 5; 325 TABLET ORAL at 01:42

## 2019-02-01 RX ADMIN — INSULIN LISPRO 4 UNITS: 100 INJECTION, SOLUTION INTRAVENOUS; SUBCUTANEOUS at 13:02

## 2019-02-01 RX ADMIN — OXYCODONE AND ACETAMINOPHEN 1 TABLET: 5; 325 TABLET ORAL at 22:09

## 2019-02-01 RX ADMIN — METOPROLOL SUCCINATE 50 MG: 50 TABLET, EXTENDED RELEASE ORAL at 22:09

## 2019-02-01 RX ADMIN — INSULIN LISPRO 2 UNITS: 100 INJECTION, SOLUTION INTRAVENOUS; SUBCUTANEOUS at 09:13

## 2019-02-01 RX ADMIN — VENLAFAXINE HYDROCHLORIDE 150 MG: 150 CAPSULE, EXTENDED RELEASE ORAL at 07:42

## 2019-02-01 RX ADMIN — SODIUM CHLORIDE: 9 INJECTION, SOLUTION INTRAVENOUS at 00:37

## 2019-02-01 RX ADMIN — OXYCODONE AND ACETAMINOPHEN 1 TABLET: 5; 325 TABLET ORAL at 17:40

## 2019-02-01 RX ADMIN — CEFTRIAXONE SODIUM 1 G: 1 INJECTION, POWDER, FOR SOLUTION INTRAMUSCULAR; INTRAVENOUS at 22:10

## 2019-02-01 RX ADMIN — ALBUTEROL SULFATE 2.5 MG: 2.5 SOLUTION RESPIRATORY (INHALATION) at 11:31

## 2019-02-01 RX ADMIN — NAPROXEN 500 MG: 250 TABLET ORAL at 07:42

## 2019-02-01 ASSESSMENT — PAIN SCALES - GENERAL
PAINLEVEL_OUTOF10: 7
PAINLEVEL_OUTOF10: 8
PAINLEVEL_OUTOF10: 5
PAINLEVEL_OUTOF10: 8
PAINLEVEL_OUTOF10: 5

## 2019-02-01 ASSESSMENT — PAIN DESCRIPTION - PAIN TYPE: TYPE: ACUTE PAIN

## 2019-02-01 ASSESSMENT — PAIN DESCRIPTION - LOCATION: LOCATION: RIB CAGE

## 2019-02-01 ASSESSMENT — PAIN DESCRIPTION - FREQUENCY: FREQUENCY: INTERMITTENT

## 2019-02-01 ASSESSMENT — PAIN DESCRIPTION - DESCRIPTORS: DESCRIPTORS: POUNDING

## 2019-02-02 VITALS
HEART RATE: 73 BPM | TEMPERATURE: 98.2 F | SYSTOLIC BLOOD PRESSURE: 132 MMHG | DIASTOLIC BLOOD PRESSURE: 80 MMHG | RESPIRATION RATE: 16 BRPM | HEIGHT: 70 IN | BODY MASS INDEX: 30.64 KG/M2 | OXYGEN SATURATION: 95 % | WEIGHT: 214 LBS

## 2019-02-02 PROBLEM — J18.9 PNEUMONIA DUE TO INFECTIOUS ORGANISM: Status: ACTIVE | Noted: 2019-02-02

## 2019-02-02 LAB
ANISOCYTOSIS: ABNORMAL
BANDED NEUTROPHILS RELATIVE PERCENT: 2 %
BASOPHILS ABSOLUTE: 0.1 K/UL (ref 0–0.2)
BASOPHILS RELATIVE PERCENT: 1 %
EOSINOPHILS ABSOLUTE: 0.3 K/UL (ref 0–0.7)
EOSINOPHILS RELATIVE PERCENT: 3 %
GLUCOSE BLD-MCNC: 136 MG/DL (ref 60–115)
GLUCOSE BLD-MCNC: 238 MG/DL (ref 60–115)
HCT VFR BLD CALC: 38.4 % (ref 42–52)
HEMOGLOBIN: 13.3 G/DL (ref 14–18)
LACTIC ACID: 1.6 MMOL/L (ref 0.5–2.2)
LYMPHOCYTES ABSOLUTE: 1.4 K/UL (ref 1–4.8)
LYMPHOCYTES RELATIVE PERCENT: 14 %
MCH RBC QN AUTO: 30.8 PG (ref 27–31.3)
MCHC RBC AUTO-ENTMCNC: 34.8 % (ref 33–37)
MCV RBC AUTO: 88.6 FL (ref 80–100)
MONOCYTES ABSOLUTE: 0.6 K/UL (ref 0.2–0.8)
MONOCYTES RELATIVE PERCENT: 5.6 %
NEUTROPHILS ABSOLUTE: 7.7 K/UL (ref 1.4–6.5)
NEUTROPHILS RELATIVE PERCENT: 74 %
PDW BLD-RTO: 15.1 % (ref 11.5–14.5)
PERFORMED ON: ABNORMAL
PERFORMED ON: ABNORMAL
PLATELET # BLD: 147 K/UL (ref 130–400)
PLATELET SLIDE REVIEW: ADEQUATE
PROMYELOCYTES PERCENT: 1 %
RBC # BLD: 4.33 M/UL (ref 4.7–6.1)
SLIDE REVIEW: ABNORMAL
WBC # BLD: 10 K/UL (ref 4.8–10.8)

## 2019-02-02 PROCEDURE — 85025 COMPLETE CBC W/AUTO DIFF WBC: CPT

## 2019-02-02 PROCEDURE — 94640 AIRWAY INHALATION TREATMENT: CPT

## 2019-02-02 PROCEDURE — 6370000000 HC RX 637 (ALT 250 FOR IP): Performed by: INTERNAL MEDICINE

## 2019-02-02 PROCEDURE — 99232 SBSQ HOSP IP/OBS MODERATE 35: CPT | Performed by: INTERNAL MEDICINE

## 2019-02-02 PROCEDURE — 6360000002 HC RX W HCPCS: Performed by: INTERNAL MEDICINE

## 2019-02-02 PROCEDURE — 6360000002 HC RX W HCPCS: Performed by: PHYSICIAN ASSISTANT

## 2019-02-02 PROCEDURE — 83605 ASSAY OF LACTIC ACID: CPT

## 2019-02-02 PROCEDURE — 36415 COLL VENOUS BLD VENIPUNCTURE: CPT

## 2019-02-02 RX ORDER — AZITHROMYCIN 250 MG/1
250 TABLET, FILM COATED ORAL DAILY
Status: DISCONTINUED | OUTPATIENT
Start: 2019-02-02 | End: 2019-02-02 | Stop reason: HOSPADM

## 2019-02-02 RX ORDER — CEFUROXIME AXETIL 500 MG/1
500 TABLET ORAL EVERY 12 HOURS SCHEDULED
Qty: 10 TABLET | Refills: 0 | Status: SHIPPED | OUTPATIENT
Start: 2019-02-02 | End: 2019-02-07

## 2019-02-02 RX ORDER — CEFUROXIME AXETIL 500 MG/1
500 TABLET ORAL EVERY 12 HOURS SCHEDULED
Qty: 10 TABLET | Refills: 0 | Status: SHIPPED | OUTPATIENT
Start: 2019-02-02 | End: 2019-02-02

## 2019-02-02 RX ORDER — CEFUROXIME AXETIL 250 MG/1
500 TABLET ORAL EVERY 12 HOURS SCHEDULED
Status: DISCONTINUED | OUTPATIENT
Start: 2019-02-02 | End: 2019-02-02 | Stop reason: HOSPADM

## 2019-02-02 RX ADMIN — ENOXAPARIN SODIUM 40 MG: 40 INJECTION SUBCUTANEOUS at 08:15

## 2019-02-02 RX ADMIN — OXYCODONE AND ACETAMINOPHEN 1 TABLET: 5; 325 TABLET ORAL at 08:15

## 2019-02-02 RX ADMIN — OXYCODONE AND ACETAMINOPHEN 1 TABLET: 5; 325 TABLET ORAL at 12:14

## 2019-02-02 RX ADMIN — AZITHROMYCIN 250 MG: 250 TABLET, FILM COATED ORAL at 12:12

## 2019-02-02 RX ADMIN — ALBUTEROL SULFATE 2.5 MG: 2.5 SOLUTION RESPIRATORY (INHALATION) at 06:55

## 2019-02-02 RX ADMIN — ASPIRIN 81 MG 81 MG: 81 TABLET ORAL at 08:14

## 2019-02-02 RX ADMIN — NAPROXEN 500 MG: 250 TABLET ORAL at 08:14

## 2019-02-02 RX ADMIN — ALBUTEROL SULFATE 2.5 MG: 2.5 SOLUTION RESPIRATORY (INHALATION) at 13:22

## 2019-02-02 RX ADMIN — INSULIN LISPRO 4 UNITS: 100 INJECTION, SOLUTION INTRAVENOUS; SUBCUTANEOUS at 12:12

## 2019-02-02 RX ADMIN — VENLAFAXINE HYDROCHLORIDE 150 MG: 150 CAPSULE, EXTENDED RELEASE ORAL at 08:14

## 2019-02-02 ASSESSMENT — PAIN SCALES - GENERAL
PAINLEVEL_OUTOF10: 7
PAINLEVEL_OUTOF10: 7

## 2019-02-03 LAB
BLOOD CULTURE, ROUTINE: NORMAL
CULTURE, BLOOD 2: NORMAL

## 2019-02-04 LAB
BLOOD CULTURE, ROUTINE: NORMAL
CULTURE, BLOOD 2: NORMAL

## 2019-02-15 ENCOUNTER — OFFICE VISIT (OUTPATIENT)
Dept: PULMONOLOGY | Age: 69
End: 2019-02-15
Payer: MEDICARE

## 2019-02-15 VITALS
DIASTOLIC BLOOD PRESSURE: 68 MMHG | SYSTOLIC BLOOD PRESSURE: 120 MMHG | WEIGHT: 220 LBS | OXYGEN SATURATION: 94 % | BODY MASS INDEX: 31.5 KG/M2 | HEART RATE: 83 BPM | TEMPERATURE: 97.7 F | RESPIRATION RATE: 16 BRPM | HEIGHT: 70 IN

## 2019-02-15 DIAGNOSIS — J18.9 PNEUMONIA OF RIGHT LUNG DUE TO INFECTIOUS ORGANISM, UNSPECIFIED PART OF LUNG: Primary | ICD-10-CM

## 2019-02-15 DIAGNOSIS — R07.1 CHEST PAIN ON BREATHING: ICD-10-CM

## 2019-02-15 DIAGNOSIS — Z72.0 TOBACCO ABUSE: ICD-10-CM

## 2019-02-15 PROCEDURE — 4040F PNEUMOC VAC/ADMIN/RCVD: CPT | Performed by: INTERNAL MEDICINE

## 2019-02-15 PROCEDURE — 3017F COLORECTAL CA SCREEN DOC REV: CPT | Performed by: INTERNAL MEDICINE

## 2019-02-15 PROCEDURE — G8417 CALC BMI ABV UP PARAM F/U: HCPCS | Performed by: INTERNAL MEDICINE

## 2019-02-15 PROCEDURE — 99214 OFFICE O/P EST MOD 30 MIN: CPT | Performed by: INTERNAL MEDICINE

## 2019-02-15 PROCEDURE — 1101F PT FALLS ASSESS-DOCD LE1/YR: CPT | Performed by: INTERNAL MEDICINE

## 2019-02-15 PROCEDURE — 1123F ACP DISCUSS/DSCN MKR DOCD: CPT | Performed by: INTERNAL MEDICINE

## 2019-02-15 PROCEDURE — G8484 FLU IMMUNIZE NO ADMIN: HCPCS | Performed by: INTERNAL MEDICINE

## 2019-02-15 PROCEDURE — 1111F DSCHRG MED/CURRENT MED MERGE: CPT | Performed by: INTERNAL MEDICINE

## 2019-02-15 PROCEDURE — 4004F PT TOBACCO SCREEN RCVD TLK: CPT | Performed by: INTERNAL MEDICINE

## 2019-02-15 PROCEDURE — G8427 DOCREV CUR MEDS BY ELIG CLIN: HCPCS | Performed by: INTERNAL MEDICINE

## 2019-02-15 RX ORDER — LEVOFLOXACIN 500 MG/1
TABLET, FILM COATED ORAL
COMMUNITY
Start: 2019-02-11 | End: 2019-03-11 | Stop reason: ALTCHOICE

## 2019-02-15 RX ORDER — VENLAFAXINE HYDROCHLORIDE 150 MG/1
CAPSULE, EXTENDED RELEASE ORAL
COMMUNITY
Start: 2019-02-14

## 2019-02-15 ASSESSMENT — ENCOUNTER SYMPTOMS
SORE THROAT: 0
VOICE CHANGE: 0
RHINORRHEA: 0
SHORTNESS OF BREATH: 1
VOMITING: 0
WHEEZING: 0
ABDOMINAL PAIN: 0
NAUSEA: 0
CHEST TIGHTNESS: 0
COUGH: 0
DIARRHEA: 0
EYE ITCHING: 0

## 2019-02-25 ENCOUNTER — HOSPITAL ENCOUNTER (OUTPATIENT)
Dept: CT IMAGING | Age: 69
Discharge: HOME | End: 2019-02-27
Payer: MEDICARE

## 2019-02-25 DIAGNOSIS — J18.9 PNEUMONIA OF RIGHT LUNG DUE TO INFECTIOUS ORGANISM, UNSPECIFIED PART OF LUNG: ICD-10-CM

## 2019-02-25 PROCEDURE — 6360000004 HC RX CONTRAST MEDICATION: Performed by: INTERNAL MEDICINE

## 2019-02-25 PROCEDURE — 71260 CT THORAX DX C+: CPT

## 2019-02-25 RX ADMIN — IOPAMIDOL 100 ML: 755 INJECTION, SOLUTION INTRAVENOUS at 10:41

## 2019-03-01 ENCOUNTER — OFFICE VISIT (OUTPATIENT)
Dept: PULMONOLOGY | Age: 69
End: 2019-03-01
Payer: MEDICARE

## 2019-03-01 ENCOUNTER — TELEPHONE (OUTPATIENT)
Dept: UROLOGY | Age: 69
End: 2019-03-01

## 2019-03-01 VITALS
OXYGEN SATURATION: 96 % | SYSTOLIC BLOOD PRESSURE: 120 MMHG | DIASTOLIC BLOOD PRESSURE: 70 MMHG | HEART RATE: 63 BPM | RESPIRATION RATE: 16 BRPM | BODY MASS INDEX: 31.5 KG/M2 | HEIGHT: 70 IN | TEMPERATURE: 96.3 F | WEIGHT: 220 LBS

## 2019-03-01 DIAGNOSIS — N40.1 BPH WITH OBSTRUCTION/LOWER URINARY TRACT SYMPTOMS: Primary | ICD-10-CM

## 2019-03-01 DIAGNOSIS — N13.8 BPH WITH OBSTRUCTION/LOWER URINARY TRACT SYMPTOMS: Primary | ICD-10-CM

## 2019-03-01 DIAGNOSIS — J18.9 PNEUMONIA OF RIGHT UPPER LOBE DUE TO INFECTIOUS ORGANISM: Primary | ICD-10-CM

## 2019-03-01 DIAGNOSIS — E66.9 OBESITY (BMI 30-39.9): ICD-10-CM

## 2019-03-01 DIAGNOSIS — R93.89 ABNORMAL CT OF THE CHEST: ICD-10-CM

## 2019-03-01 PROCEDURE — G8427 DOCREV CUR MEDS BY ELIG CLIN: HCPCS | Performed by: INTERNAL MEDICINE

## 2019-03-01 PROCEDURE — G8417 CALC BMI ABV UP PARAM F/U: HCPCS | Performed by: INTERNAL MEDICINE

## 2019-03-01 PROCEDURE — 1111F DSCHRG MED/CURRENT MED MERGE: CPT | Performed by: INTERNAL MEDICINE

## 2019-03-01 PROCEDURE — G8484 FLU IMMUNIZE NO ADMIN: HCPCS | Performed by: INTERNAL MEDICINE

## 2019-03-01 PROCEDURE — 99214 OFFICE O/P EST MOD 30 MIN: CPT | Performed by: INTERNAL MEDICINE

## 2019-03-01 PROCEDURE — 3017F COLORECTAL CA SCREEN DOC REV: CPT | Performed by: INTERNAL MEDICINE

## 2019-03-01 PROCEDURE — 1123F ACP DISCUSS/DSCN MKR DOCD: CPT | Performed by: INTERNAL MEDICINE

## 2019-03-01 PROCEDURE — 4004F PT TOBACCO SCREEN RCVD TLK: CPT | Performed by: INTERNAL MEDICINE

## 2019-03-01 PROCEDURE — 1101F PT FALLS ASSESS-DOCD LE1/YR: CPT | Performed by: INTERNAL MEDICINE

## 2019-03-01 PROCEDURE — 4040F PNEUMOC VAC/ADMIN/RCVD: CPT | Performed by: INTERNAL MEDICINE

## 2019-03-01 ASSESSMENT — ENCOUNTER SYMPTOMS
DIARRHEA: 0
RHINORRHEA: 0
VOICE CHANGE: 0
ABDOMINAL PAIN: 0
NAUSEA: 0
COUGH: 1
CHEST TIGHTNESS: 0
EYE ITCHING: 0
SHORTNESS OF BREATH: 0
VOMITING: 0
WHEEZING: 0
SORE THROAT: 0

## 2019-03-11 ENCOUNTER — OFFICE VISIT (OUTPATIENT)
Dept: UROLOGY | Age: 69
End: 2019-03-11
Payer: MEDICARE

## 2019-03-11 VITALS
BODY MASS INDEX: 31.07 KG/M2 | HEART RATE: 80 BPM | HEIGHT: 70 IN | DIASTOLIC BLOOD PRESSURE: 72 MMHG | WEIGHT: 217 LBS | SYSTOLIC BLOOD PRESSURE: 124 MMHG

## 2019-03-11 DIAGNOSIS — N28.1 RENAL CYST, ACQUIRED, LEFT: Primary | ICD-10-CM

## 2019-03-11 PROCEDURE — 4040F PNEUMOC VAC/ADMIN/RCVD: CPT | Performed by: UROLOGY

## 2019-03-11 PROCEDURE — 99213 OFFICE O/P EST LOW 20 MIN: CPT | Performed by: UROLOGY

## 2019-03-11 PROCEDURE — G8427 DOCREV CUR MEDS BY ELIG CLIN: HCPCS | Performed by: UROLOGY

## 2019-03-11 PROCEDURE — G8417 CALC BMI ABV UP PARAM F/U: HCPCS | Performed by: UROLOGY

## 2019-03-11 PROCEDURE — 3017F COLORECTAL CA SCREEN DOC REV: CPT | Performed by: UROLOGY

## 2019-03-11 PROCEDURE — 4004F PT TOBACCO SCREEN RCVD TLK: CPT | Performed by: UROLOGY

## 2019-03-11 PROCEDURE — 1123F ACP DISCUSS/DSCN MKR DOCD: CPT | Performed by: UROLOGY

## 2019-03-11 PROCEDURE — G8484 FLU IMMUNIZE NO ADMIN: HCPCS | Performed by: UROLOGY

## 2019-03-11 PROCEDURE — 1101F PT FALLS ASSESS-DOCD LE1/YR: CPT | Performed by: UROLOGY

## 2019-03-11 ASSESSMENT — ENCOUNTER SYMPTOMS
ABDOMINAL PAIN: 0
ABDOMINAL DISTENTION: 0

## 2019-03-19 ENCOUNTER — HOSPITAL ENCOUNTER (OUTPATIENT)
Dept: ULTRASOUND IMAGING | Age: 69
Discharge: HOME | End: 2019-03-21
Payer: MEDICARE

## 2019-03-19 DIAGNOSIS — N28.1 RENAL CYST, ACQUIRED, LEFT: ICD-10-CM

## 2019-03-19 PROCEDURE — 76775 US EXAM ABDO BACK WALL LIM: CPT

## 2019-03-26 ENCOUNTER — OFFICE VISIT (OUTPATIENT)
Dept: UROLOGY | Age: 69
End: 2019-03-26
Payer: MEDICARE

## 2019-03-26 VITALS
SYSTOLIC BLOOD PRESSURE: 134 MMHG | WEIGHT: 218 LBS | BODY MASS INDEX: 31.21 KG/M2 | HEART RATE: 68 BPM | HEIGHT: 70 IN | DIASTOLIC BLOOD PRESSURE: 88 MMHG

## 2019-03-26 DIAGNOSIS — N28.1 RENAL CYST, ACQUIRED, LEFT: Primary | ICD-10-CM

## 2019-03-26 DIAGNOSIS — N40.1 BPH WITH OBSTRUCTION/LOWER URINARY TRACT SYMPTOMS: ICD-10-CM

## 2019-03-26 DIAGNOSIS — N13.8 BPH WITH OBSTRUCTION/LOWER URINARY TRACT SYMPTOMS: ICD-10-CM

## 2019-03-26 PROCEDURE — 4004F PT TOBACCO SCREEN RCVD TLK: CPT | Performed by: UROLOGY

## 2019-03-26 PROCEDURE — 1101F PT FALLS ASSESS-DOCD LE1/YR: CPT | Performed by: UROLOGY

## 2019-03-26 PROCEDURE — G8484 FLU IMMUNIZE NO ADMIN: HCPCS | Performed by: UROLOGY

## 2019-03-26 PROCEDURE — 4040F PNEUMOC VAC/ADMIN/RCVD: CPT | Performed by: UROLOGY

## 2019-03-26 PROCEDURE — 3017F COLORECTAL CA SCREEN DOC REV: CPT | Performed by: UROLOGY

## 2019-03-26 PROCEDURE — 51741 ELECTRO-UROFLOWMETRY FIRST: CPT | Performed by: UROLOGY

## 2019-03-26 PROCEDURE — G8427 DOCREV CUR MEDS BY ELIG CLIN: HCPCS | Performed by: UROLOGY

## 2019-03-26 PROCEDURE — 1123F ACP DISCUSS/DSCN MKR DOCD: CPT | Performed by: UROLOGY

## 2019-03-26 PROCEDURE — 51798 US URINE CAPACITY MEASURE: CPT | Performed by: UROLOGY

## 2019-03-26 PROCEDURE — 99213 OFFICE O/P EST LOW 20 MIN: CPT | Performed by: UROLOGY

## 2019-03-26 PROCEDURE — G8417 CALC BMI ABV UP PARAM F/U: HCPCS | Performed by: UROLOGY

## 2019-03-26 ASSESSMENT — ENCOUNTER SYMPTOMS
SHORTNESS OF BREATH: 0
ABDOMINAL DISTENTION: 0
ABDOMINAL PAIN: 0

## 2019-04-16 ENCOUNTER — HOSPITAL ENCOUNTER (OUTPATIENT)
Dept: CT IMAGING | Age: 69
Discharge: HOME | End: 2019-04-18
Payer: MEDICARE

## 2019-04-16 DIAGNOSIS — R93.89 ABNORMAL CT OF THE CHEST: ICD-10-CM

## 2019-04-16 PROCEDURE — 71260 CT THORAX DX C+: CPT

## 2019-04-16 PROCEDURE — 6360000004 HC RX CONTRAST MEDICATION: Performed by: INTERNAL MEDICINE

## 2019-04-16 RX ADMIN — IOPAMIDOL 100 ML: 755 INJECTION, SOLUTION INTRAVENOUS at 09:34

## 2019-04-22 LAB — SEDIMENTATION RATE, ERYTHROCYTE: 7 MM/H (ref 0–15)

## 2019-05-02 ENCOUNTER — OFFICE VISIT (OUTPATIENT)
Dept: PULMONOLOGY | Age: 69
End: 2019-05-02
Payer: COMMERCIAL

## 2019-05-02 VITALS
WEIGHT: 218 LBS | OXYGEN SATURATION: 98 % | RESPIRATION RATE: 16 BRPM | HEIGHT: 70 IN | SYSTOLIC BLOOD PRESSURE: 126 MMHG | BODY MASS INDEX: 31.21 KG/M2 | TEMPERATURE: 98.6 F | DIASTOLIC BLOOD PRESSURE: 74 MMHG | HEART RATE: 95 BPM

## 2019-05-02 DIAGNOSIS — E66.9 OBESITY (BMI 30-39.9): ICD-10-CM

## 2019-05-02 DIAGNOSIS — R93.89 ABNORMAL CT OF THE CHEST: ICD-10-CM

## 2019-05-02 DIAGNOSIS — Z72.0 TOBACCO ABUSE: ICD-10-CM

## 2019-05-02 DIAGNOSIS — J18.9 PNEUMONIA OF RIGHT UPPER LOBE DUE TO INFECTIOUS ORGANISM: Primary | ICD-10-CM

## 2019-05-02 PROCEDURE — 4004F PT TOBACCO SCREEN RCVD TLK: CPT | Performed by: INTERNAL MEDICINE

## 2019-05-02 PROCEDURE — G8417 CALC BMI ABV UP PARAM F/U: HCPCS | Performed by: INTERNAL MEDICINE

## 2019-05-02 PROCEDURE — G8427 DOCREV CUR MEDS BY ELIG CLIN: HCPCS | Performed by: INTERNAL MEDICINE

## 2019-05-02 PROCEDURE — 4040F PNEUMOC VAC/ADMIN/RCVD: CPT | Performed by: INTERNAL MEDICINE

## 2019-05-02 PROCEDURE — 1123F ACP DISCUSS/DSCN MKR DOCD: CPT | Performed by: INTERNAL MEDICINE

## 2019-05-02 PROCEDURE — 99214 OFFICE O/P EST MOD 30 MIN: CPT | Performed by: INTERNAL MEDICINE

## 2019-05-02 PROCEDURE — 3017F COLORECTAL CA SCREEN DOC REV: CPT | Performed by: INTERNAL MEDICINE

## 2019-05-02 RX ORDER — CHLORTHALIDONE 25 MG/1
TABLET ORAL
Refills: 3 | COMMUNITY
Start: 2019-04-15

## 2019-05-02 NOTE — PROGRESS NOTES
Subjective:     Jayce Hamlin is a 71 y.o. male who complains today of:     Chief Complaint   Patient presents with    Follow-up     two month f/u for CT results. HPI  He had CT chest done for f/u for pneumonia. he is working , no new problems. He has occassional cough with yellow mucus. C/o shortness of breath with exertion mostly climbing stairs. Occasional Wheezing. No Hemoptysis. No Chest tightness. No Chest pain with radiation  or pleuritic pain. No Fever or chills. No Rhinorrhea and postnasal drip. Smoking 1 and 1/2 ppd.      Allergies:  Tetracyclines & related and Lipitor [atorvastatin]  Past Medical History:   Diagnosis Date    Erectile dysfunction     Hyperlipidemia     Hypertension      Past Surgical History:   Procedure Laterality Date    COLONOSCOPY      CORONARY ANGIOPLASTY WITH STENT PLACEMENT      4 total    THROAT SURGERY      esophagocolostomy    TONSILLECTOMY      UPPER GASTROINTESTINAL ENDOSCOPY       Family History   Problem Relation Age of Onset    Heart Attack Father      Social History     Socioeconomic History    Marital status:      Spouse name: Not on file    Number of children: Not on file    Years of education: Not on file    Highest education level: Not on file   Occupational History    Not on file   Social Needs    Financial resource strain: Not on file    Food insecurity:     Worry: Not on file     Inability: Not on file    Transportation needs:     Medical: Not on file     Non-medical: Not on file   Tobacco Use    Smoking status: Light Tobacco Smoker     Packs/day: 1.00     Years: 52.00     Pack years: 52.00     Types: Cigarettes     Start date: 2/15/1967    Smokeless tobacco: Never Used   Substance and Sexual Activity    Alcohol use: No    Drug use: No    Sexual activity: Yes     Partners: Female   Lifestyle    Physical activity:     Days per week: Not on file     Minutes per session: Not on file    Stress: Not on file Relationships    Social connections:     Talks on phone: Not on file     Gets together: Not on file     Attends Sikhism service: Not on file     Active member of club or organization: Not on file     Attends meetings of clubs or organizations: Not on file     Relationship status: Not on file    Intimate partner violence:     Fear of current or ex partner: Not on file     Emotionally abused: Not on file     Physically abused: Not on file     Forced sexual activity: Not on file   Other Topics Concern    Not on file   Social History Narrative    Not on file         Review of Systems   Constitutional: Negative for chills, diaphoresis, fatigue and fever. HENT: Negative for congestion, mouth sores, nosebleeds, postnasal drip, rhinorrhea, sneezing, sore throat and voice change. Eyes: Negative for itching and visual disturbance. Respiratory: Negative for cough, chest tightness, shortness of breath and wheezing. Cardiovascular: Negative. Negative for chest pain, palpitations and leg swelling. Gastrointestinal: Negative for abdominal pain, diarrhea, nausea and vomiting. Genitourinary: Negative for difficulty urinating and hematuria. Musculoskeletal: Negative for arthralgias, joint swelling and myalgias. Skin: Negative for rash. Allergic/Immunologic: Negative for environmental allergies. Neurological: Negative for dizziness, tremors, weakness and headaches. Psychiatric/Behavioral: Negative for behavioral problems and sleep disturbance. Objective:     Vitals:    05/02/19 1526   BP: 126/74   Pulse: 95   Resp: 16   Temp: 98.6 °F (37 °C)   TempSrc: Oral   SpO2: 98%   Weight: 218 lb (98.9 kg)   Height: 5' 10\" (1.778 m)         Physical Exam   Constitutional: He is oriented to person, place, and time. He appears well-developed and well-nourished. HENT:   Head: Normocephalic and atraumatic.    Nose: Nose normal.   Mouth/Throat: Oropharynx is clear and moist.   Eyes: Pupils are equal, round, and reactive to light. Conjunctivae and EOM are normal.   Neck: No JVD present. No tracheal deviation present. No thyromegaly present. Cardiovascular: Normal rate and regular rhythm. Exam reveals no gallop and no friction rub. No murmur heard. Pulmonary/Chest: Effort normal and breath sounds normal. No respiratory distress. He has no wheezes. He has no rales. He exhibits no tenderness. Abdominal: He exhibits no distension. Musculoskeletal: Normal range of motion. Lymphadenopathy:     He has no cervical adenopathy. Neurological: He is alert and oriented to person, place, and time. No cranial nerve deficit. Coordination abnormal.   Skin: Skin is warm and dry. No rash noted. Psychiatric: He has a normal mood and affect. His behavior is normal.       Current Outpatient Medications   Medication Sig Dispense Refill    chlorthalidone (HYGROTON) 25 MG tablet   3    VENTOLIN  (90 Base) MCG/ACT inhaler       venlafaxine (EFFEXOR XR) 150 MG extended release capsule       lovastatin (MEVACOR) 20 MG tablet Take 20 mg by mouth nightly      Cholecalciferol (VITAMIN D) 2000 UNITS CAPS capsule Take by mouth daily      ramipril (ALTACE) 5 MG capsule 5 mg daily       metFORMIN (GLUCOPHAGE) 500 MG tablet Take 1,000 mg by mouth 2 times daily       DEPO-TESTOSTERONE 200 MG/ML injection Inject 400 mg into the muscle once. Takes every 3 weeks, due in 1 week per patient. Namita Eagle metoprolol (TOPROL-XL) 50 MG XL tablet Take 50 mg by mouth nightly       aspirin 81 MG tablet Take 81 mg by mouth daily.  Naproxen Sodium (ALEVE) 220 MG CAPS Take 500 mg by mouth daily        No current facility-administered medications for this visit. Results for orders placed during the hospital encounter of 01/29/19   XR CHEST STANDARD (2 VW)    Narrative XR CHEST (2 VW): 2/1/2019    CLINICAL HISTORY:  Pneumonia . COMPARISON: 1/31/2019.     Upright PA and lateral radiographs of the chest were obtained. FINDINGS:    Infiltrate/consolidation predominantly within the inferior two thirds of the right upper lobe and mild infiltrate within the left lung bases not significantly changed. There are no other worsening infiltrates, significant pleural effusion, cardiomegaly, vascular congestion, pneumothorax, or displaced fractures identified. Impression RIGHT UPPER LOBE INFILTRATE/CONSOLIDATION AND MILD LEFT LOWER LOBE INFILTRATE, MOST LIKELY BRONCHOPNEUMONIA. FOLLOW-UP AFTER TREATMENT TO DOCUMENT RESOLUTION WILL BE IMPORTANT. XR CHEST STANDARD (2 VW)    Narrative EXAMINATION: XR CHEST (2 VW)     CLINICAL HISTORY:  follow-up right upper lobe pneumonia     COMPARISONS: None     FINDINGS:    Two views of the chest are submitted. The cardiac silhouette is of normal size configuration. The mediastinum is unremarkable. Pulmonary vascular unremarkable. Right sided trachea. Persistent, worsening right upper lobe infiltrate/consolidation with air bronchograms. Developing area of atelectasis, patchy groundglass infiltrate left lower lobe with trace left pleural effusion. .    No Pneumothoraces. Impression PERSISTENT, WORSENING RIGHT UPPER LOBE INFILTRATE/CONSOLIDATION. DEVELOPING AREA OF ATELECTASIS, PATCHY GROUNDGLASS INFILTRATE LEFT LOWER LOBE WITH TRACE LEFT PLEURAL EFFUSION     Results for orders placed during the hospital encounter of 04/16/19   CT CHEST W CONTRAST    Narrative CT OF THE CHEST WITH CONTRAST    HISTORY: Abnormal CT of the chest    COMPARISON: February 25, 2019    . TECHNIQUE: CT of the chest with contrast was performed and axial images were obtained from the thoracic inlet to the lung bases. 2-D and 3-D reconstructions were also obtained. Findings: The consolidation in the right upper lobe adjacent to the fissure shows partial resolution with no air bronchograms visualized. explained to the patient. Different ways to help him quit smoking were discussed today. He started smoking 1 pack per day. 4. Obesity (BMI 30-39. 9)  Patient patient is advised try to lose weight. obesity related risk explained to the patient ,  Current weight:  218 lb (98.9 kg) Lbs. BMI:  Body mass index is 31.28 kg/m². Return in about 4 months (around 9/2/2019) for Ct chest f/u, pft result.       Princess Sabrina MD

## 2019-05-05 ASSESSMENT — ENCOUNTER SYMPTOMS
EYE ITCHING: 0
RHINORRHEA: 0
SHORTNESS OF BREATH: 0
VOICE CHANGE: 0
DIARRHEA: 0
NAUSEA: 0
COUGH: 0
VOMITING: 0
CHEST TIGHTNESS: 0
SORE THROAT: 0
ABDOMINAL PAIN: 0
WHEEZING: 0

## 2019-09-19 ENCOUNTER — HOSPITAL ENCOUNTER (OUTPATIENT)
Dept: ULTRASOUND IMAGING | Age: 69
Discharge: HOME | End: 2019-09-21
Payer: MEDICARE

## 2019-09-19 DIAGNOSIS — N28.1 RENAL CYST, ACQUIRED, LEFT: ICD-10-CM

## 2019-09-19 PROCEDURE — 76775 US EXAM ABDO BACK WALL LIM: CPT

## 2019-11-11 ENCOUNTER — OFFICE VISIT (OUTPATIENT)
Dept: NEUROLOGY | Age: 69
End: 2019-11-11
Payer: MEDICARE

## 2019-11-11 VITALS
SYSTOLIC BLOOD PRESSURE: 118 MMHG | WEIGHT: 226 LBS | HEIGHT: 70 IN | HEART RATE: 72 BPM | BODY MASS INDEX: 32.35 KG/M2 | DIASTOLIC BLOOD PRESSURE: 69 MMHG

## 2019-11-11 DIAGNOSIS — G25.3 MYOCLONUS: Primary | ICD-10-CM

## 2019-11-11 DIAGNOSIS — G56.02 CARPAL TUNNEL SYNDROME OF LEFT WRIST: ICD-10-CM

## 2019-11-11 DIAGNOSIS — G47.31 PRIMARY CENTRAL SLEEP APNEA: ICD-10-CM

## 2019-11-11 PROCEDURE — 99205 OFFICE O/P NEW HI 60 MIN: CPT | Performed by: PSYCHIATRY & NEUROLOGY

## 2019-11-11 RX ORDER — LEVETIRACETAM 500 MG/1
500 TABLET ORAL NIGHTLY
Qty: 30 TABLET | Refills: 1 | Status: SHIPPED | OUTPATIENT
Start: 2019-11-11 | End: 2020-12-14

## 2019-11-11 RX ORDER — HYDROXYZINE HYDROCHLORIDE 25 MG/1
25 TABLET, FILM COATED ORAL NIGHTLY PRN
COMMUNITY
End: 2020-04-28

## 2019-11-11 ASSESSMENT — ENCOUNTER SYMPTOMS
VOMITING: 0
CHOKING: 0
NAUSEA: 0
TROUBLE SWALLOWING: 0
SHORTNESS OF BREATH: 0
BACK PAIN: 0
PHOTOPHOBIA: 0

## 2020-02-28 ENCOUNTER — HOSPITAL ENCOUNTER (OUTPATIENT)
Dept: MRI IMAGING | Age: 70
Discharge: HOME | End: 2020-03-01
Payer: MEDICARE

## 2020-02-28 PROCEDURE — 70551 MRI BRAIN STEM W/O DYE: CPT

## 2020-04-28 ENCOUNTER — VIRTUAL VISIT (OUTPATIENT)
Dept: NEUROLOGY | Age: 70
End: 2020-04-28
Payer: MEDICARE

## 2020-04-28 VITALS
WEIGHT: 220 LBS | DIASTOLIC BLOOD PRESSURE: 76 MMHG | HEART RATE: 72 BPM | TEMPERATURE: 97.3 F | SYSTOLIC BLOOD PRESSURE: 128 MMHG | BODY MASS INDEX: 31.57 KG/M2

## 2020-04-28 PROBLEM — J44.9 CHRONIC OBSTRUCTIVE PULMONARY DISEASE, UNSPECIFIED (HCC): Status: ACTIVE | Noted: 2018-03-21

## 2020-04-28 PROBLEM — E78.5 HYPERLIPIDEMIA, UNSPECIFIED: Status: ACTIVE | Noted: 2019-01-16

## 2020-04-28 PROBLEM — E11.8 DM (DIABETES MELLITUS) WITH COMPLICATIONS (HCC): Status: ACTIVE | Noted: 2019-03-05

## 2020-04-28 PROBLEM — E55.9 VITAMIN D DEFICIENCY: Status: ACTIVE | Noted: 2019-03-05

## 2020-04-28 PROBLEM — E34.9 ENDOCRINE DISORDER, UNSPECIFIED: Status: ACTIVE | Noted: 2019-01-16

## 2020-04-28 PROBLEM — R09.1 PLEURISY: Status: ACTIVE | Noted: 2019-02-08

## 2020-04-28 PROBLEM — Z13.29 ENCOUNTER FOR SCREENING FOR OTHER SUSPECTED ENDOCRINE DISORDER: Status: ACTIVE | Noted: 2018-03-21

## 2020-04-28 PROBLEM — N52.9 MALE ERECTILE DYSFUNCTION, UNSPECIFIED: Status: ACTIVE | Noted: 2018-06-01

## 2020-04-28 PROBLEM — J18.1 LOBAR PNEUMONIA, UNSPECIFIED ORGANISM (HCC): Status: ACTIVE | Noted: 2019-02-02

## 2020-04-28 PROBLEM — G44.59 OTHER COMPLICATED HEADACHE SYNDROME: Status: ACTIVE | Noted: 2020-04-28

## 2020-04-28 PROBLEM — I10 ESSENTIAL (PRIMARY) HYPERTENSION: Status: ACTIVE | Noted: 2018-03-21

## 2020-04-28 PROBLEM — G25.3 MYOCLONUS: Status: ACTIVE | Noted: 2020-04-28

## 2020-04-28 PROBLEM — G56.03 BILATERAL CARPAL TUNNEL SYNDROME: Status: ACTIVE | Noted: 2020-04-28

## 2020-04-28 PROBLEM — Z12.5 ENCOUNTER FOR SCREENING FOR MALIGNANT NEOPLASM OF PROSTATE: Status: ACTIVE | Noted: 2019-03-05

## 2020-04-28 PROBLEM — G47.31 PRIMARY CENTRAL SLEEP APNEA: Status: ACTIVE | Noted: 2020-04-28

## 2020-04-28 PROCEDURE — 99214 OFFICE O/P EST MOD 30 MIN: CPT | Performed by: PSYCHIATRY & NEUROLOGY

## 2020-04-28 RX ORDER — LANCETS 30 GAUGE
EACH MISCELLANEOUS
COMMUNITY
Start: 2020-03-23

## 2020-04-28 RX ORDER — MELOXICAM 7.5 MG/1
TABLET ORAL
COMMUNITY
Start: 2020-03-03

## 2020-04-28 RX ORDER — GLIPIZIDE 5 MG/1
TABLET, FILM COATED, EXTENDED RELEASE ORAL
COMMUNITY
Start: 2020-03-17 | End: 2020-12-14 | Stop reason: ALTCHOICE

## 2020-04-28 RX ORDER — BLOOD-GLUCOSE METER
EACH MISCELLANEOUS
COMMUNITY
Start: 2020-03-23

## 2020-04-28 RX ORDER — OMEPRAZOLE 40 MG/1
CAPSULE, DELAYED RELEASE ORAL
COMMUNITY
Start: 2020-03-16

## 2020-04-28 ASSESSMENT — ENCOUNTER SYMPTOMS
BACK PAIN: 0
VOMITING: 0
PHOTOPHOBIA: 0
SHORTNESS OF BREATH: 0
CHOKING: 0
NAUSEA: 0
TROUBLE SWALLOWING: 0

## 2020-04-28 NOTE — PROGRESS NOTES
HENT: Negative for ear pain, tinnitus and trouble swallowing. Eyes: Negative for photophobia and visual disturbance. Respiratory: Negative for choking and shortness of breath. Cardiovascular: Negative for chest pain and palpitations. Gastrointestinal: Negative for nausea and vomiting. Musculoskeletal: Negative for back pain, gait problem, joint swelling, myalgias, neck pain and neck stiffness. Neurological: Positive for numbness and headaches. Negative for dizziness, tremors, seizures, syncope, facial asymmetry, speech difficulty, weakness and light-headedness. Psychiatric/Behavioral: Negative for behavioral problems, confusion, hallucinations and sleep disturbance. Objective:   /76   Pulse 72   Temp 97.3 °F (36.3 °C)   Wt 220 lb (99.8 kg)   BMI 31.57 kg/m²     Physical Exam not done    Mri Brain Wo Contrast    Result Date: 2020  Patient MRN: [id number] : 1950 Age:  79 years Gender: Male Order Date: 2020 9:28 AM. Exam: MRI BRAIN WO CONTRAST Number of Views: 388 Indication:  Myoclonus Comparison: None. Findings: No evidence of restricted diffusion or acute/subacute cerebrovascular infarction/accident. Mild cerebral volume loss/atrophy. . Pituitary gland and sellar/suprasellar regions are unremarkable. No Chiari malformation. . Hippocampal formations are symmetric in signal intensity and morphology bilaterally. No intrinsic noncontrast T1 shortening. Mucosal disease left maxillary sinus. Optic globes and orbital contents unremarkable. The remainder of the mastoid air cells and paranasal sinuses unremarkable. Normal expected appearance of the visualized T2 flow voids. Abnormal T2 FLAIR hyperintense foci bilateral frontal and parietal lobe subcortical white matter, centrum semiovale and periventricular regions. Subcortical white matter abnormalities occipital lobes bilaterally. The visualized T2 flow voids. No intraparenchymal hemorrhage on susceptibility weighted imaging. truly feel that these are related to patient's underlying sleep apnea. We shortly treated this with the [address] and patient does not have any symptoms this was only at night. He discontinued the medication. We will wait to see if this further exacerbates. He now complains of headaches which are notable after sleeping and awakening in the morning or after daytime naps. These are truly associated sleep apnea as hypercarbia does occur. He has an old machine at home for CPAP and may try to use this or see Dr. Sandee Francisco of her new machine. His symptoms are truly all related to sleep apnea which we have discussed. He will keep an eye on the Keppra and myoclonus. I recommended that he pursue the EEG and the EMG. He had some numbness in the left arm which is recurrent and causing sleep issues as well. Able pursue this further and we will review in 4 months. Plan:      Orders Placed This Encounter   Procedures    EEG awake and drowsy     Standing Status:   Future     Standing Expiration Date:   6/27/2020     Order Specific Question:   Reason for exam:     Answer:   seizures    EMG     Standing Status:   Future     Standing Expiration Date:   4/28/2021     Order Specific Question:   Which body part? Answer:   upper/  left cts     No orders of the defined types were placed in this encounter. No follow-ups on file.       Sandra Diallo MD

## 2020-04-30 ENCOUNTER — TELEPHONE (OUTPATIENT)
Dept: NEUROLOGY | Age: 70
End: 2020-04-30

## 2020-05-28 PROBLEM — Z12.5 ENCOUNTER FOR SCREENING FOR MALIGNANT NEOPLASM OF PROSTATE: Status: RESOLVED | Noted: 2019-03-05 | Resolved: 2020-05-28

## 2020-05-28 PROBLEM — Z13.29 ENCOUNTER FOR SCREENING FOR OTHER SUSPECTED ENDOCRINE DISORDER: Status: RESOLVED | Noted: 2018-03-21 | Resolved: 2020-05-28

## 2020-08-19 ENCOUNTER — HOSPITAL ENCOUNTER (OUTPATIENT)
Dept: NEUROLOGY | Age: 70
Discharge: HOME | End: 2020-08-19
Payer: MEDICARE

## 2020-08-19 PROCEDURE — 95886 MUSC TEST DONE W/N TEST COMP: CPT | Performed by: PSYCHIATRY & NEUROLOGY

## 2020-08-19 PROCEDURE — 95913 NRV CNDJ TEST 13/> STUDIES: CPT | Performed by: PSYCHIATRY & NEUROLOGY

## 2020-08-19 PROCEDURE — 95886 MUSC TEST DONE W/N TEST COMP: CPT

## 2020-08-19 PROCEDURE — 95911 NRV CNDJ TEST 9-10 STUDIES: CPT

## 2020-08-19 NOTE — PROCEDURES
[address]                      [address], [address] [facility]                             ELECTROMYOGRAM REPORT    PATIENT NAME: Galilea Rascon                      :        1950  MED REC NO:   [id number]                            ROOM:  ACCOUNT NO:   [de-identified]                           ADMIT DATE: 2020  PROVIDER:     Armida Gibbons MD    DATE OF EM2020    Neurophysiological studies are requested for the patient's underlying  numbness of his hands. MOTOR NERVE CONDUCTION STUDIES:  Motor nerve conduction study of the  right median nerve shows mildly prolonged latencies, low amplitudes, and  normal conduction velocity. Motor nerve conduction study of the left  median nerve shows prolonged latencies, normal amplitudes, and decrease  conduction velocity. Motor nerve conduction study of the right ulnar  nerve shows normal peak latencies, normal amplitudes, and decreased  conduction velocity seen throughout. Left arm motor nerve conduction  study shows normal amplitudes, latencies, and decreased conduction  velocity seen throughout. F-responses are normal.    SENSORY NERVE CONDUCTION STUDIES:  Sensory nerve conduction study of the  right median nerve recorded in digit 2 shows prolonged latencies, low  amplitudes, and decreased conduction velocity. Further mid palm  stimulation was obtained to confirm findings, shows prolonged latencies,  low amplitudes, and decreased conduction velocity. The left median  digital examination shows prolonged latencies, low amplitudes, and  decreased conduction velocity. The left median mid palm stimulation  shows significantly prolonged latencies, normal amplitudes, and  decreased conduction velocity. The right ulnar digital examination  shows normal peak latencies, normal amplitudes, and normal conduction  velocity. The left ulnar digital examination shows normal amplitudes,  latencies, and conduction velocity. The right ulnar mixed orthodromic  study shows normal peak latencies, normal amplitudes, and normal  conduction velocity. The left ulnar mixed orthodromic study shows  prolonged peak latencies, normal amplitudes, and decreased velocity. Radial sensory nerve conduction studies are normal.  Needle electrode  examination of the above sampled muscles are normal except for decreased  activated units seen in the abductor pollicis brevis muscles bilaterally  _____ isolated decreased units in the extensor digitorum brevis muscle. This is on the left. IMPRESSION:  1. 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 2.  Moderate left median nerve neuropathy of the wrist suggesting carpal  tunnel syndrome. 3.  Nonlocalized bilateral ulnar neuropathies, which are mild. 4.  Isolated C7 radicular changes notable on the left. The patient  reports that he has lost other muscles in the hands, though I am not  finding any denervation. If clinically indicated, a cervical spine MRI  may be considered after a clinical examination. This test is personally performed and interpreted by me. Multiple  sensory nerve conduction studies are evaluated to rule out an artifact  or temperature differences.         Leyda Abbott MD    D: 08/19/2020 10:57:28       T: 08/19/2020 11:32:43     [email or web address]  Job#: [id number]     Doc#: [id number]    CC:

## 2020-09-01 ENCOUNTER — OFFICE VISIT (OUTPATIENT)
Dept: NEUROLOGY | Age: 70
End: 2020-09-01
Payer: MEDICARE

## 2020-09-01 VITALS — DIASTOLIC BLOOD PRESSURE: 66 MMHG | SYSTOLIC BLOOD PRESSURE: 104 MMHG

## 2020-09-01 PROBLEM — M54.12 CERVICAL RADICULOPATHY: Status: ACTIVE | Noted: 2020-09-01

## 2020-09-01 PROBLEM — R63.4 WEIGHT LOSS: Status: ACTIVE | Noted: 2020-09-01

## 2020-09-01 PROBLEM — M54.2 CERVICALGIA: Status: ACTIVE | Noted: 2020-09-01

## 2020-09-01 PROCEDURE — G8427 DOCREV CUR MEDS BY ELIG CLIN: HCPCS | Performed by: PSYCHIATRY & NEUROLOGY

## 2020-09-01 PROCEDURE — 3017F COLORECTAL CA SCREEN DOC REV: CPT | Performed by: PSYCHIATRY & NEUROLOGY

## 2020-09-01 PROCEDURE — 99214 OFFICE O/P EST MOD 30 MIN: CPT | Performed by: PSYCHIATRY & NEUROLOGY

## 2020-09-01 PROCEDURE — 4040F PNEUMOC VAC/ADMIN/RCVD: CPT | Performed by: PSYCHIATRY & NEUROLOGY

## 2020-09-01 PROCEDURE — 4004F PT TOBACCO SCREEN RCVD TLK: CPT | Performed by: PSYCHIATRY & NEUROLOGY

## 2020-09-01 PROCEDURE — G8417 CALC BMI ABV UP PARAM F/U: HCPCS | Performed by: PSYCHIATRY & NEUROLOGY

## 2020-09-01 PROCEDURE — 1123F ACP DISCUSS/DSCN MKR DOCD: CPT | Performed by: PSYCHIATRY & NEUROLOGY

## 2020-09-01 RX ORDER — IPRATROPIUM BROMIDE 21 UG/1
SPRAY, METERED NASAL
COMMUNITY
Start: 2020-06-15

## 2020-09-01 ASSESSMENT — ENCOUNTER SYMPTOMS
CHOKING: 0
COLOR CHANGE: 0
NAUSEA: 0
SHORTNESS OF BREATH: 0
VOMITING: 0
TROUBLE SWALLOWING: 0
BACK PAIN: 0
PHOTOPHOBIA: 0

## 2020-09-01 NOTE — PROGRESS NOTES
Subjective:      Patient ID: Umair Trejo is a 79 y.o. male who presents today for:  Chief Complaint   Patient presents with    Follow-up     Pt states taht he is doing pretty well. he states that he is at times still getting muscle fatigue and he gets alot of muscle twitching on his chest around his heart area. Muscle cramping is happening and it mostly happens in the inner thigh, stomach, hands, and some other random spots he states. HPI 77-year-old right-handed gentleman with a history of typical symptoms which quite do not fit into any type and temporal category patient has numbness in the left arm and tingling. Patient is not using his CPAP machine. Myoclonic jerks or hypnic myoclonus we tried him on Keppra and did not help him and he discontinued medication. We are awaiting to see if there is any further exacerbations. Some of this may related to underlying sleep issues and sleep apnea with hypercarbia is not using his CPAP machine. Further recommended an EMG and an EEG. MG shows mild to moderate right. No neuropathy there is a syncopal tunnel syndrome patient also has nonlocalized ulnar neuropathies with a session of isolated C7 radicular findings he had at further recommended an MRI of the cervical spine and there for the examination today. His EEG has not been done. 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 Pt  is no longer on Keppra.     Past Medical History:   Diagnosis Date    Erectile dysfunction     Hyperlipidemia     Hypertension      Past Surgical History:   Procedure Laterality Date    COLONOSCOPY      CORONARY ANGIOPLASTY WITH STENT PLACEMENT      4 total    PENILE PROSTHESIS N/A     THROAT SURGERY      esophagocolostomy    TONSILLECTOMY      UPPER GASTROINTESTINAL ENDOSCOPY       Social History     Socioeconomic History    Marital status:      Spouse name: Not on file  Number of children: Not on file    Years of education: Not on file    Highest education level: Not on file   Occupational History    Not on file   Social Needs    Financial resource strain: Not on file    Food insecurity     Worry: Not on file     Inability: Not on file    Transportation needs     Medical: Not on file     Non-medical: Not on file   Tobacco Use    Smoking status: Light Tobacco Smoker     Packs/day: 1.00     Years: 52.00     Pack years: 52.00     Types: Cigarettes     Start date: 2/15/1967    Smokeless tobacco: Never Used   Substance and Sexual Activity    Alcohol use: No    Drug use: No    Sexual activity: Yes     Partners: Female   Lifestyle    Physical activity     Days per week: Not on file     Minutes per session: Not on file    Stress: Not on file   Relationships    Social connections     Talks on phone: Not on file     Gets together: Not on file     Attends Mormonism service: Not on file     Active member of club or organization: Not on file     Attends meetings of clubs or organizations: Not on file     Relationship status: Not on file    Intimate partner violence     Fear of current or ex partner: Not on file     Emotionally abused: Not on file     Physically abused: Not on file     Forced sexual activity: Not on file   Other Topics Concern    Not on file   Social History Narrative    Not on file     Family History   Problem Relation Age of Onset    Heart Attack Father      Allergies   Allergen Reactions    Tetracyclines & Related Other (See Comments)     Water blister on tongue and penis    Lipitor [Atorvastatin] Other (See Comments)     Cramping all over the body.  Sore shoulders       Current Outpatient Medications   Medication Sig Dispense Refill    meloxicam (MOBIC) 7.5 MG tablet       glipiZIDE (GLUCOTROL XL) 5 MG extended release tablet TAKE 1 & 1 2 (ONE & ONE HALF) TABLETS BY MOUTH TWICE DAILY      omeprazole (PRILOSEC) 40 MG delayed release capsule TAKE 1 CAPSULE BY MOUTH ONCE DAILY      Lancets (ONETOUCH DELICA PLUS [id number]) MISC USE TO CHECK GLUCOSE TWICE DAILY      Blood Glucose Monitoring Suppl (ONE TOUCH ULTRA 2) w/Device KIT USE TO CHECK GLUCOSE TWICE DAILY      ONE TOUCH ULTRA TEST strip USE 1 STRIP TO CHECK GLUCOSE TWICE DAILY      levETIRAcetam (KEPPRA) 500 MG tablet Take 1 tablet by mouth nightly 30 tablet 1    chlorthalidone (HYGROTON) 25 MG tablet   3    VENTOLIN  (90 Base) MCG/ACT inhaler       venlafaxine (EFFEXOR XR) 150 MG extended release capsule       lovastatin (MEVACOR) 20 MG tablet Take 20 mg by mouth nightly      ramipril (ALTACE) 5 MG capsule 5 mg daily       metFORMIN (GLUCOPHAGE) 500 MG tablet Take 1,000 mg by mouth 2 times daily       DEPO-TESTOSTERONE 200 MG/ML injection Inject 400 mg into the muscle once. Takes every 3 weeks, due in 1 week per patient. Monster Samuels metoprolol (TOPROL-XL) 50 MG XL tablet Take 50 mg by mouth nightly       aspirin 81 MG tablet Take 81 mg by mouth daily.  ipratropium (ATROVENT) 0.03 % nasal spray USE 1 SPRAY(S) IN EACH NOSTRIL TWICE DAILY       No current facility-administered medications for this visit. Review of Systems   Constitutional: Negative for fever. HENT: Negative for ear pain, tinnitus and trouble swallowing. Eyes: Negative for photophobia and visual disturbance. Respiratory: Negative for choking and shortness of breath. Cardiovascular: Negative for chest pain and palpitations. Gastrointestinal: Negative for nausea and vomiting. Musculoskeletal: Negative for back pain, gait problem, joint swelling, myalgias, neck pain and neck stiffness. Skin: Negative for color change. Allergic/Immunologic: Negative for food allergies. Neurological: Negative for dizziness, tremors, seizures, syncope, facial asymmetry, speech difficulty, weakness, light-headedness, numbness and headaches.    Psychiatric/Behavioral: Negative for behavioral problems, confusion, hallucinations and sleep disturbance. Objective:   /66 (Site: Left Upper Arm, Position: Sitting, Cuff Size: Large Adult)     Physical Exam  Vitals signs reviewed. Eyes:      Pupils: Pupils are equal, round, and reactive to light. Neck:      Musculoskeletal: Normal range of motion. Cardiovascular:      Rate and Rhythm: Normal rate and regular rhythm. Heart sounds: No murmur. Pulmonary:      Effort: Pulmonary effort is normal.      Breath sounds: Normal breath sounds. Abdominal:      General: Bowel sounds are normal.   Musculoskeletal: Normal range of motion. Skin:     General: Skin is warm. Neurological:      Mental Status: He is alert and oriented to person, place, and time. Cranial Nerves: No cranial nerve deficit. Sensory: No sensory deficit. Motor: No abnormal muscle tone. Coordination: Coordination normal.      Deep Tendon Reflexes: Reflexes are normal and symmetric. Babinski sign absent on the right side. Babinski sign absent on the left side. Psychiatric:         Mood and Affect: Mood normal.       Patient has wasting of the biceps muscle and triceps muscle on the left arm which we had noted during his EMG nuclear stress study and there appears to be some C7 radicular changes as well    No results found.     Lab Results   Component Value Date    WBC 10.0 02/02/2019    RBC 4.33 02/02/2019    RBC 4.97 11/30/2011    HGB 13.3 02/02/2019    HCT 38.4 02/02/2019    MCV 88.6 02/02/2019    MCH 30.8 02/02/2019    MCHC 34.8 02/02/2019    RDW 15.1 02/02/2019     02/02/2019    MPV 8.1 12/27/2012     Lab Results   Component Value Date     01/31/2019    K 3.7 01/31/2019     01/31/2019    CO2 20 01/31/2019    BUN 24 01/31/2019    CREATININE 1.05 01/31/2019    GFRAA >60.0 01/31/2019    LABGLOM >60.0 01/31/2019    GLUCOSE 132 01/31/2019    GLUCOSE 178 11/30/2011    PROT 7.7 01/29/2019    LABALBU 3.9 01/29/2019    LABALBU 4.5 11/30/2011    CALCIUM 8.2 01/31/2019    BILITOT 1.2 01/29/2019    ALKPHOS 68 01/29/2019    AST 16 01/29/2019    ALT 19 01/29/2019     Lab Results   Component Value Date    PROTIME 11.0 12/27/2012    INR 1.1 12/27/2012     No results found for: TSH, [id number], FOLATE, FERRITIN, IRON, TIBC, PTRFSAT, TSH, FREET4  No results found for: TRIG, HDL, LDLCALC, LDLDIRECT, LABVLDL  No results found for: LABAMPH, BARBSCNU, LABBENZ, CANNAB, COCAINESCRN, LABMETH, OPIATESCREENURINE, PHENCYCLIDINESCREENURINE, PPXUR, ETOH  No results found for: LITHIUM, DILFRTOT, VALPROATE    Assessment:       Diagnosis Orders   1. Myoclonus     2. Cervicalgia  MRI CERVICAL SPINE WO CONTRAST   3. Weight loss  TSH with Reflex    Vitamin B12 & Folate    CBC Auto Differential    Hepatic Function Panel   4. Bilateral carpal tunnel syndrome     5. Primary central sleep apnea     6. Cervical radiculopathy     Myoclonus with possibility of segmental myoclonus or myoclonus associated with underlying sleep apnea syndrome. We tried him on Keppra to which he had not responded and he discontinued this. We had not seen him in between as this was a virtual visit though we did obtain an EMG nerve conduction study which shows mild to moderate bilateral median nerve neuropathies at the wrists and carpal tunnel syndrome with also nonlocalized ulnar neuropathies. Of note patient has C7 radicular changes with loss of muscle mass in the left arm including the biceps muscle. This warrants a cervical spine evaluation and I recommend a cervical spine MRI. Patient also is losing weight will arrange for his thyroid function test and other blood test to make sure he is not developed any other medical issues with this. The myoclonus has not been bothersome as it used to and therefore we would not recommend any intervention for now this could be segmental myoclonus from the spine as well. We will follow him after the MRI of the cervical spine.       Plan:      Orders Placed This Encounter Procedures    MRI CERVICAL SPINE WO CONTRAST     Standing Status:   Future     Standing Expiration Date:   9/1/2021     Order Specific Question:   Reason for exam:     Answer:   abnormal emg    TSH with Reflex     Standing Status:   Future     Standing Expiration Date:   9/1/2021    Vitamin B12 & Folate     Standing Status:   Future     Standing Expiration Date:   9/1/2021    CBC Auto Differential     Standing Status:   Future     Standing Expiration Date:   9/1/2021    Hepatic Function Panel     Standing Status:   Future     Standing Expiration Date:   9/1/2021     No orders of the defined types were placed in this encounter. No follow-ups on file.       Pearl Mckenzie MD

## 2020-12-14 ENCOUNTER — OFFICE VISIT (OUTPATIENT)
Dept: NEUROLOGY | Age: 70
End: 2020-12-14
Payer: MEDICARE

## 2020-12-14 VITALS
BODY MASS INDEX: 30.72 KG/M2 | SYSTOLIC BLOOD PRESSURE: 129 MMHG | HEART RATE: 67 BPM | DIASTOLIC BLOOD PRESSURE: 75 MMHG | WEIGHT: 214.1 LBS

## 2020-12-14 PROCEDURE — G8427 DOCREV CUR MEDS BY ELIG CLIN: HCPCS | Performed by: PSYCHIATRY & NEUROLOGY

## 2020-12-14 PROCEDURE — 99214 OFFICE O/P EST MOD 30 MIN: CPT | Performed by: PSYCHIATRY & NEUROLOGY

## 2020-12-14 PROCEDURE — 4040F PNEUMOC VAC/ADMIN/RCVD: CPT | Performed by: PSYCHIATRY & NEUROLOGY

## 2020-12-14 PROCEDURE — 4004F PT TOBACCO SCREEN RCVD TLK: CPT | Performed by: PSYCHIATRY & NEUROLOGY

## 2020-12-14 PROCEDURE — G8484 FLU IMMUNIZE NO ADMIN: HCPCS | Performed by: PSYCHIATRY & NEUROLOGY

## 2020-12-14 PROCEDURE — 3017F COLORECTAL CA SCREEN DOC REV: CPT | Performed by: PSYCHIATRY & NEUROLOGY

## 2020-12-14 PROCEDURE — G8417 CALC BMI ABV UP PARAM F/U: HCPCS | Performed by: PSYCHIATRY & NEUROLOGY

## 2020-12-14 PROCEDURE — 1123F ACP DISCUSS/DSCN MKR DOCD: CPT | Performed by: PSYCHIATRY & NEUROLOGY

## 2020-12-14 ASSESSMENT — ENCOUNTER SYMPTOMS
VOMITING: 0
TROUBLE SWALLOWING: 0
COLOR CHANGE: 0
SHORTNESS OF BREATH: 0
CHOKING: 0
BACK PAIN: 0
PHOTOPHOBIA: 0
NAUSEA: 0

## 2020-12-14 NOTE — PROGRESS NOTES
Subjective:      Patient ID: Neda Ahumada is a 79 y.o. male who presents today for:  Chief Complaint   Patient presents with    Follow-up     Patient states things are about the same, he feels pretty good.  Other     He states he will be getting an MRI done of his cervical spine on wednsday. HPI 70-year-old right-handed gentleman with a known history of rectal radiculopathy with segmental myoclonus in carpal tunnel syndrome. His last virtual appointment patient was complaining of numbness in the left arm and tingling. Patient also has sleep apnea is on CPAP machine and has myoclonus. Patient has been on Keppra for the myoclonus which is helped. He had an EMG is history of mild to moderate right median nerve neuropathy with recent syncopal tunnel syndrome as well as on the left. He had some C7 radiculopathy and therefore had recommended an MRI of the cervical spine which is pending. The patient is not taking Keppra only took 1 prescription is myoclonus is not as bad for now.   Has any symptoms except for numbness in his hands  Past Medical History:   Diagnosis Date    Erectile dysfunction     Hyperlipidemia     Hypertension      Past Surgical History:   Procedure Laterality Date    COLONOSCOPY      CORONARY ANGIOPLASTY WITH STENT PLACEMENT      4 total    PENILE PROSTHESIS N/A     THROAT SURGERY      esophagocolostomy    TONSILLECTOMY      UPPER GASTROINTESTINAL ENDOSCOPY       Social History     Socioeconomic History    Marital status:      Spouse name: Not on file    Number of children: Not on file    Years of education: Not on file    Highest education level: Not on file   Occupational History    Not on file   Social Needs    Financial resource strain: Not on file    Food insecurity     Worry: Not on file     Inability: Not on file    Transportation needs     Medical: Not on file     Non-medical: Not on file   Tobacco Use    Smoking status: Light Tobacco Smoker Packs/day: 1.00     Years: 52.00     Pack years: 52.00     Types: Cigarettes     Start date: 2/15/1967    Smokeless tobacco: Never Used   Substance and Sexual Activity    Alcohol use: No    Drug use: No    Sexual activity: Yes     Partners: Female   Lifestyle    Physical activity     Days per week: Not on file     Minutes per session: Not on file    Stress: Not on file   Relationships    Social connections     Talks on phone: Not on file     Gets together: Not on file     Attends Gnosticism service: Not on file     Active member of club or organization: Not on file     Attends meetings of clubs or organizations: Not on file     Relationship status: Not on file    Intimate partner violence     Fear of current or ex partner: Not on file     Emotionally abused: Not on file     Physically abused: Not on file     Forced sexual activity: Not on file   Other Topics Concern    Not on file   Social History Narrative    Not on file     Family History   Problem Relation Age of Onset    Heart Attack Father      Allergies   Allergen Reactions    Tetracyclines & Related Other (See Comments)     Water blister on tongue and penis    Lipitor [Atorvastatin] Other (See Comments)     Cramping all over the body.  Sore shoulders       Current Outpatient Medications   Medication Sig Dispense Refill    vitamin D (CHOLECALCIFEROL) 25 MCG (1000 UT) TABS tablet Take 1,000 Units by mouth 2 times daily      empagliflozin (JARDIANCE) 10 MG tablet Take 10 mg by mouth daily      ipratropium (ATROVENT) 0.03 % nasal spray USE 1 SPRAY(S) IN EACH NOSTRIL TWICE DAILY      meloxicam (MOBIC) 7.5 MG tablet       omeprazole (PRILOSEC) 40 MG delayed release capsule TAKE 1 CAPSULE BY MOUTH ONCE DAILY      Lancets (ONETOUCH DELICA PLUS [id number]) MISC USE TO CHECK GLUCOSE TWICE DAILY      Blood Glucose Monitoring Suppl (ONE TOUCH ULTRA 2) w/Device KIT USE TO CHECK GLUCOSE TWICE DAILY  ONE TOUCH ULTRA TEST strip USE 1 STRIP TO CHECK GLUCOSE TWICE DAILY      chlorthalidone (HYGROTON) 25 MG tablet   3    VENTOLIN  (90 Base) MCG/ACT inhaler       lovastatin (MEVACOR) 20 MG tablet Take 20 mg by mouth nightly      ramipril (ALTACE) 5 MG capsule 5 mg daily       metFORMIN (GLUCOPHAGE) 500 MG tablet Take 1,000 mg by mouth 2 times daily       DEPO-TESTOSTERONE 200 MG/ML injection Inject 400 mg into the muscle once. Takes every 3 weeks, due in 1 week per patient. Raynold Glad metoprolol (TOPROL-XL) 50 MG XL tablet Take 50 mg by mouth nightly       aspirin 81 MG tablet Take 81 mg by mouth daily.  venlafaxine (EFFEXOR XR) 150 MG extended release capsule        No current facility-administered medications for this visit. Review of Systems   Constitutional: Negative for fever. HENT: Negative for ear pain, tinnitus and trouble swallowing. Eyes: Negative for photophobia and visual disturbance. Respiratory: Negative for choking and shortness of breath. Cardiovascular: Negative for chest pain and palpitations. Gastrointestinal: Negative for nausea and vomiting. Musculoskeletal: Negative for back pain, gait problem, joint swelling, myalgias, neck pain and neck stiffness. Skin: Negative for color change. Allergic/Immunologic: Negative for food allergies. Neurological: Positive for numbness. Negative for dizziness, tremors, seizures, syncope, facial asymmetry, speech difficulty, weakness, light-headedness and headaches. Psychiatric/Behavioral: Negative for behavioral problems, confusion, hallucinations and sleep disturbance. Objective:   /75 (Site: Right Upper Arm, Position: Sitting, Cuff Size: Medium Adult)   Pulse 67   Wt 214 lb 1.6 oz (97.1 kg)   BMI 30.72 kg/m²     Physical Exam  Vitals signs reviewed. Eyes:      Pupils: Pupils are equal, round, and reactive to light. Neck:      Musculoskeletal: Normal range of motion.    Cardiovascular: Rate and Rhythm: Normal rate and regular rhythm. Heart sounds: No murmur. Pulmonary:      Effort: Pulmonary effort is normal.      Breath sounds: Normal breath sounds. Abdominal:      General: Bowel sounds are normal.   Musculoskeletal: Normal range of motion. Skin:     General: Skin is warm. Neurological:      Mental Status: He is alert and oriented to person, place, and time. Cranial Nerves: No cranial nerve deficit. Sensory: No sensory deficit. Motor: No abnormal muscle tone. Coordination: Coordination normal.      Deep Tendon Reflexes: Reflexes are normal and symmetric. Babinski sign absent on the right side. Babinski sign absent on the left side. Psychiatric:         Mood and Affect: Mood normal.       Pt  is very hyperreflexic  No results found.     Lab Results   Component Value Date    WBC 10.0 02/02/2019    RBC 4.33 02/02/2019    RBC 4.97 11/30/2011    HGB 13.3 02/02/2019    HCT 38.4 02/02/2019    MCV 88.6 02/02/2019    MCH 30.8 02/02/2019    MCHC 34.8 02/02/2019    RDW 15.1 02/02/2019     02/02/2019    MPV 8.1 12/27/2012     Lab Results   Component Value Date     01/31/2019    K 3.7 01/31/2019     01/31/2019    CO2 20 01/31/2019    BUN 24 01/31/2019    CREATININE 1.05 01/31/2019    GFRAA >60.0 01/31/2019    LABGLOM >60.0 01/31/2019    GLUCOSE 132 01/31/2019    GLUCOSE 178 11/30/2011    PROT 7.7 01/29/2019    LABALBU 3.9 01/29/2019    LABALBU 4.5 11/30/2011    CALCIUM 8.2 01/31/2019    BILITOT 1.2 01/29/2019    ALKPHOS 68 01/29/2019    AST 16 01/29/2019    ALT 19 01/29/2019     Lab Results   Component Value Date    PROTIME 11.0 12/27/2012    INR 1.1 12/27/2012     No results found for: TSH, [id number], FOLATE, FERRITIN, IRON, TIBC, PTRFSAT, TSH, FREET4  No results found for: TRIG, HDL, LDLCALC, LDLDIRECT, LABVLDL  No results found for: LABAMPH, BARBSCNU, LABBENZ, CANNAB, COCAINESCRN, LABMETH, OPIATESCREENURINE, PHENCYCLIDINESCREENURINE, PPXUR, ETOH No results found for: LITHIUM, DILFRTOT, VALPROATE    Assessment:       Diagnosis Orders   1. Cervical radiculopathy     2. Bilateral carpal tunnel syndrome     3. Myoclonus     4. Cervicalgia     79-year-old right-handed gentleman with a history of numbness and tingling the left arm. Patient also had some jerking with this and we are evaluating for myoclonus. Patient had occasional jerking we tried him on Keppra and he took 1 prescription is feeling better. He was seen here 14 years ago for evaluation of aneurysm which he did not have. Since laceration patient was seen for an EMG and suggestive of some cervical radiculopathy we recommend MRI of the cervical spine which is pending for next week. His upper extremity symptoms are from carpal tunnel syndrome he has mild to moderate carpal tunnel symptoms bilaterally. Patient had myoclonic jerks which responded to Keppra is not having more jerks he has sleep apnea which may be related to the jerking. This may be segmental nocturnal myoclonus. MRI of the cervical spine is for the same as well. Findings were discussed and we will follow him after the MRI. Plan:      No orders of the defined types were placed in this encounter. No orders of the defined types were placed in this encounter. Return in about 4 months (around 4/14/2021).       Kiesha Aquino MD

## 2020-12-15 ENCOUNTER — OFFICE VISIT (OUTPATIENT)
Dept: UROLOGY | Age: 70
End: 2020-12-15
Payer: MEDICARE

## 2020-12-15 VITALS
BODY MASS INDEX: 30.06 KG/M2 | HEART RATE: 70 BPM | HEIGHT: 70 IN | SYSTOLIC BLOOD PRESSURE: 128 MMHG | DIASTOLIC BLOOD PRESSURE: 70 MMHG | WEIGHT: 210 LBS

## 2020-12-15 PROCEDURE — 99213 OFFICE O/P EST LOW 20 MIN: CPT | Performed by: UROLOGY

## 2020-12-15 PROCEDURE — 3017F COLORECTAL CA SCREEN DOC REV: CPT | Performed by: UROLOGY

## 2020-12-15 PROCEDURE — G8484 FLU IMMUNIZE NO ADMIN: HCPCS | Performed by: UROLOGY

## 2020-12-15 PROCEDURE — 4040F PNEUMOC VAC/ADMIN/RCVD: CPT | Performed by: UROLOGY

## 2020-12-15 PROCEDURE — 1123F ACP DISCUSS/DSCN MKR DOCD: CPT | Performed by: UROLOGY

## 2020-12-15 PROCEDURE — 4004F PT TOBACCO SCREEN RCVD TLK: CPT | Performed by: UROLOGY

## 2020-12-15 PROCEDURE — G8427 DOCREV CUR MEDS BY ELIG CLIN: HCPCS | Performed by: UROLOGY

## 2020-12-15 PROCEDURE — 81003 URINALYSIS AUTO W/O SCOPE: CPT | Performed by: UROLOGY

## 2020-12-15 PROCEDURE — G8417 CALC BMI ABV UP PARAM F/U: HCPCS | Performed by: UROLOGY

## 2020-12-15 ASSESSMENT — ENCOUNTER SYMPTOMS: ABDOMINAL PAIN: 0

## 2020-12-15 NOTE — PROGRESS NOTES
Subjective:      Patient ID: Maegan Wyatt is a 79 y.o. male. HPI  This is a 72 yo male with h/o HTN, CAD/MI x 2/Stents, DM, and depression,  ED/IPP and BPH w/LUTs back in follow-up. He was last seen on 3/26/29 and now has more frequency and urgency with NF 3-4. He has no hematuria or dysuria or pain. He has a good flow and feels the bladder empties. He does report that his BS have been under poor control recently. He tried Flomax in past and did not feel it helped with his symptoms. He has no other new complaints but wonders if he could have Herpes and will see his PCP about this issue.        Past Medical History:   Diagnosis Date    Erectile dysfunction     Hyperlipidemia     Hypertension      Past Surgical History:   Procedure Laterality Date    COLONOSCOPY      CORONARY ANGIOPLASTY WITH STENT PLACEMENT      4 total    PENILE PROSTHESIS N/A     THROAT SURGERY      esophagocolostomy    TONSILLECTOMY      UPPER GASTROINTESTINAL ENDOSCOPY       Social History     Socioeconomic History    Marital status:      Spouse name: None    Number of children: None    Years of education: None    Highest education level: None   Occupational History    None   Social Needs    Financial resource strain: None    Food insecurity     Worry: None     Inability: None    Transportation needs     Medical: None     Non-medical: None   Tobacco Use    Smoking status: Light Tobacco Smoker     Packs/day: 1.00     Years: 52.00     Pack years: 52.00     Types: Cigarettes     Start date: 2/15/1967    Smokeless tobacco: Never Used   Substance and Sexual Activity    Alcohol use: No    Drug use: No    Sexual activity: Yes     Partners: Female   Lifestyle    Physical activity     Days per week: None     Minutes per session: None    Stress: None   Relationships    Social connections     Talks on phone: None     Gets together: None     Attends Spiritism service: None     Active member of club or organization: None Gastrointestinal: Negative for abdominal pain. Genitourinary: Positive for frequency and urgency. Negative for dysuria, enuresis, flank pain and hematuria. Objective:   Physical Exam  Constitutional:       Appearance: Normal appearance. Neurological:      Mental Status: He is alert. Psychiatric:         Mood and Affect: Mood normal.       12/15/2020  3:33 PM - Isiah Greene, CMA    Component Collected Lab   Color, UA 12/15/2020  3:32 PM Unknown   yellow    Clarity, UA 12/15/2020  3:32 PM Unknown   clear    Glucose, UA POC 12/15/2020  3:32 PM Unknown   >=1000 mg/dL    Bilirubin, UA 12/15/2020  3:32 PM Unknown   neg    Ketones, UA 12/15/2020  3:32 PM Unknown   neg    Spec Grav, UA 12/15/2020  3:32 PM Unknown   1.020    Blood, UA POC 12/15/2020  3:32 PM Unknown   neg    pH, UA 12/15/2020  3:32 PM Unknown   5.0    Protein, UA POC 12/15/2020  3:32 PM Unknown   neg    Urobilinogen, UA 12/15/2020  3:32 PM Unknown   0.2    Leukocytes, UA 12/15/2020  3:32 PM Unknown   neg    Nitrite, UA 12/15/2020  3:32 PM Unknown   neg    Lab and Collection    POCT Urinalysis No Micro (Auto) - 12/15/2020      US RETROPERITONEAL LIMITED  Status: Final result   Order Providers    Authorizing Encounter Kush Camacho MD [address] [facility] [address] MD          Signed by    Signed Date/Time Phone Pager   Jeffery rGayson  [phone]    Reading Providers    Read Date Phone Pager   Jeffery Grayson  [phone]    All Reviewers List    Keyshawn Nichole MD on 9/19/2019 14:34   Radiation Dose Estimates    No radiation information found for this patient   Narrative   EXAMINATION: ULTRASOUND RETROPERITONEAL LIMITED (KIDNEYS)       CLINICAL HISTORY: N28.1 Renal cyst, acquired, left ICD10       COMPARISONS:  3/19/2019       TECHNIQUE: Transabdominal sector gray scale sonography.  Sonographic imaging was performed by a registered sonographer and the images are submitted for interpretation.       FINDINGS:       The right kidney measures 4.3 x 6.1 x 11.5cm. The left kidney measures 5.7 x 5.9 x 11.9cm.         There is no hydronephrosis. Juris Herminia is a right renal upper pole 1.2 cm cyst. There is left renal midpole 2.6 cm cyst with a single thin septation.       There is no significant change from prior exam.            Impression   BILATERAL RENAL CYSTS.             PSA   Date Value Ref Range Status   09/21/2017 0.78 0.00 - 5.40 ng/mL Final     Diagnostic Psa   Date Value Ref Range Status   10/13/2014 0.75 0.00 - 5.40 ng/mL Final       Assessment: This is a 72 yo male with h/o HTN, CAD/MI x 2/Stents, DM, Depression/Anxiety, ED/IPP and BPH w/LUTs, .ainly IVS, and may be related to glycosuria and I recommend he sees his PCP about this issue. I recommend a PSA and then U/S and Flow/PVR as he may benefit from an anti-cholinergic trial.         Plan:      1.  F/U 1-3 weeks for Flow/PVR, PSA and Renal U/S        Bill Juarez MD

## 2020-12-16 ENCOUNTER — HOSPITAL ENCOUNTER (OUTPATIENT)
Dept: MRI IMAGING | Age: 70
Discharge: HOME | End: 2020-12-18
Payer: MEDICARE

## 2020-12-16 PROCEDURE — 72141 MRI NECK SPINE W/O DYE: CPT

## 2020-12-25 DIAGNOSIS — M54.12 CERVICAL RADICULOPATHY: Primary | ICD-10-CM

## 2020-12-28 ENCOUNTER — HOSPITAL ENCOUNTER (OUTPATIENT)
Dept: ULTRASOUND IMAGING | Age: 70
Discharge: HOME | End: 2020-12-30
Payer: MEDICARE

## 2020-12-28 PROCEDURE — 76770 US EXAM ABDO BACK WALL COMP: CPT

## 2020-12-29 ENCOUNTER — TELEPHONE (OUTPATIENT)
Dept: NEUROLOGY | Age: 70
End: 2020-12-29

## 2020-12-29 NOTE — TELEPHONE ENCOUNTER
Called patient's home phone and cell phone with no answer.   Message left advising him to call office to go over results

## 2020-12-29 NOTE — TELEPHONE ENCOUNTER
----- Message from Garo Cunningham MD sent at 12/25/2020  2:44 PM EST -----  Patient cervical spine MRI was done recently after I had seen him in the office and has moderate cervical canal stenosis.   I referred him to neurosurgery Dr. Bowen Muñoz

## 2020-12-30 ENCOUNTER — HOSPITAL ENCOUNTER (OUTPATIENT)
Dept: LAB | Age: 70
Discharge: HOME | End: 2020-12-30
Payer: MEDICARE

## 2020-12-30 PROCEDURE — 36415 COLL VENOUS BLD VENIPUNCTURE: CPT

## 2020-12-30 PROCEDURE — 84153 ASSAY OF PSA TOTAL: CPT

## 2020-12-31 LAB — PROSTATE SPECIFIC ANTIGEN: 2.15 NG/ML (ref 0–6.22)

## 2021-01-04 ENCOUNTER — OFFICE VISIT (OUTPATIENT)
Dept: UROLOGY | Age: 71
End: 2021-01-04
Payer: MEDICARE

## 2021-01-04 VITALS
SYSTOLIC BLOOD PRESSURE: 138 MMHG | DIASTOLIC BLOOD PRESSURE: 80 MMHG | BODY MASS INDEX: 30.06 KG/M2 | HEART RATE: 81 BPM | HEIGHT: 70 IN | WEIGHT: 210 LBS

## 2021-01-04 DIAGNOSIS — N13.8 BPH WITH OBSTRUCTION/LOWER URINARY TRACT SYMPTOMS: ICD-10-CM

## 2021-01-04 DIAGNOSIS — N40.1 BPH WITH OBSTRUCTION/LOWER URINARY TRACT SYMPTOMS: ICD-10-CM

## 2021-01-04 DIAGNOSIS — N28.1 RENAL CYST, ACQUIRED, LEFT: Primary | ICD-10-CM

## 2021-01-04 PROCEDURE — G8427 DOCREV CUR MEDS BY ELIG CLIN: HCPCS | Performed by: UROLOGY

## 2021-01-04 PROCEDURE — 4004F PT TOBACCO SCREEN RCVD TLK: CPT | Performed by: UROLOGY

## 2021-01-04 PROCEDURE — G8484 FLU IMMUNIZE NO ADMIN: HCPCS | Performed by: UROLOGY

## 2021-01-04 PROCEDURE — 99213 OFFICE O/P EST LOW 20 MIN: CPT | Performed by: UROLOGY

## 2021-01-04 PROCEDURE — 51741 ELECTRO-UROFLOWMETRY FIRST: CPT | Performed by: UROLOGY

## 2021-01-04 PROCEDURE — 1123F ACP DISCUSS/DSCN MKR DOCD: CPT | Performed by: UROLOGY

## 2021-01-04 PROCEDURE — G8417 CALC BMI ABV UP PARAM F/U: HCPCS | Performed by: UROLOGY

## 2021-01-04 PROCEDURE — 3017F COLORECTAL CA SCREEN DOC REV: CPT | Performed by: UROLOGY

## 2021-01-04 PROCEDURE — 51798 US URINE CAPACITY MEASURE: CPT | Performed by: UROLOGY

## 2021-01-04 PROCEDURE — 4040F PNEUMOC VAC/ADMIN/RCVD: CPT | Performed by: UROLOGY

## 2021-01-04 ASSESSMENT — ENCOUNTER SYMPTOMS
ABDOMINAL DISTENTION: 0
ABDOMINAL PAIN: 0

## 2021-01-04 NOTE — PROGRESS NOTES
Subjective:      Patient ID: Giuliana Gil is a 79 y.o. male. HPI  This is a 72 yo male with h/o HTN, CAD/MI x 2/Stents, DM, Depression and Anxiety, ED/IPP and BPH w/LUTs back in follow-up. Since last seen on 12/15/20, he still has some frequency and urgency and feels it is related to starting his new DM medication Jardiance. He has no hematuria or dysuria or pain and a good flow. I reviewed the interval PSA and Renal U/S results in detail today.          Past Medical History:   Diagnosis Date    Erectile dysfunction     Hyperlipidemia     Hypertension      Past Surgical History:   Procedure Laterality Date    COLONOSCOPY      CORONARY ANGIOPLASTY WITH STENT PLACEMENT      4 total    PENILE PROSTHESIS N/A     THROAT SURGERY      esophagocolostomy    TONSILLECTOMY      UPPER GASTROINTESTINAL ENDOSCOPY       Social History     Socioeconomic History    Marital status:      Spouse name: None    Number of children: None    Years of education: None    Highest education level: None   Occupational History    None   Social Needs    Financial resource strain: None    Food insecurity     Worry: None     Inability: None    Transportation needs     Medical: None     Non-medical: None   Tobacco Use    Smoking status: Light Tobacco Smoker     Packs/day: 1.00     Years: 52.00     Pack years: 52.00     Types: Cigarettes     Start date: 2/15/1967    Smokeless tobacco: Never Used   Substance and Sexual Activity    Alcohol use: No    Drug use: No    Sexual activity: Yes     Partners: Female   Lifestyle    Physical activity     Days per week: None     Minutes per session: None    Stress: None   Relationships    Social connections     Talks on phone: None     Gets together: None     Attends Holiness service: None     Active member of club or organization: None     Attends meetings of clubs or organizations: None     Relationship status: None    Intimate partner violence Fear of current or ex partner: None     Emotionally abused: None     Physically abused: None     Forced sexual activity: None   Other Topics Concern    None   Social History Narrative    None     Family History   Problem Relation Age of Onset    Heart Attack Father      Current Outpatient Medications   Medication Sig Dispense Refill    vitamin D (CHOLECALCIFEROL) 25 MCG (1000 UT) TABS tablet Take 1,000 Units by mouth 2 times daily      empagliflozin (JARDIANCE) 10 MG tablet Take 10 mg by mouth daily      ipratropium (ATROVENT) 0.03 % nasal spray USE 1 SPRAY(S) IN EACH NOSTRIL TWICE DAILY      meloxicam (MOBIC) 7.5 MG tablet       omeprazole (PRILOSEC) 40 MG delayed release capsule TAKE 1 CAPSULE BY MOUTH ONCE DAILY      Lancets (ONETOUCH DELICA PLUS [id number]) MISC USE TO CHECK GLUCOSE TWICE DAILY      Blood Glucose Monitoring Suppl (ONE TOUCH ULTRA 2) w/Device KIT USE TO CHECK GLUCOSE TWICE DAILY      ONE TOUCH ULTRA TEST strip USE 1 STRIP TO CHECK GLUCOSE TWICE DAILY      chlorthalidone (HYGROTON) 25 MG tablet   3    VENTOLIN  (90 Base) MCG/ACT inhaler       venlafaxine (EFFEXOR XR) 150 MG extended release capsule       lovastatin (MEVACOR) 20 MG tablet Take 20 mg by mouth nightly      ramipril (ALTACE) 5 MG capsule 5 mg daily       metFORMIN (GLUCOPHAGE) 500 MG tablet Take 1,000 mg by mouth 2 times daily       DEPO-TESTOSTERONE 200 MG/ML injection Inject 400 mg into the muscle once. Takes every 3 weeks, due in 1 week per patient. Valdene Presser metoprolol (TOPROL-XL) 50 MG XL tablet Take 50 mg by mouth nightly       aspirin 81 MG tablet Take 81 mg by mouth daily. No current facility-administered medications for this visit. Tetracyclines & related and Lipitor [atorvastatin]  reviewed    Review of Systems   Constitutional: Negative for unexpected weight change. Gastrointestinal: Negative for abdominal distention and abdominal pain. The bladder wall shows no focal parenchymal adenitis. Prevoid volume 157 mL   Postvoid residual 23 mL           Impression   1. SIMPLE RENAL CYST SUPERIOR POLE RIGHT KIDNEY. UNCHANGED. 2. RENAL CYST WITH THIN SEPTATION LEFT KIDNEY. UNCHANGED. CONTINUED FOLLOW-UP WITH REPEAT ULTRASOUND IN 6-12 MONTHS             PSA   Date Value Ref Range Status   12/30/2020 2.15 0.00 - 6.22 ng/mL Final   09/21/2017 0.78 0.00 - 5.40 ng/mL Final     Diagnostic Psa   Date Value Ref Range Status   10/13/2014 0.75 0.00 - 5.40 ng/mL Final     Uroflow: 21 ml/sec, vol 264 cc  post void residual by U/S: 55 cc    Assessment: This is a 72 yo male with h/o HTN, CAD/MI x 2/Stents, DM, Depression and Anxiety, ED/IPP and BPH w/LUTs mainly frequency that he feels is related to use of new DM medication and will discuss changing this with his PCP soon. He has an excellent Flow and is emptying the bladder well. He has a normal PSA and Renal U/S shows a likely benign renal cyst with thin septation. This will be followed for any change. He is not currently interested in an Anti-cholinergic trial which is reasonable. Plan:      1.  F/U 1 yr for PSA and Renal U/S prior        Ayaka Juarez MD

## 2021-01-19 PROBLEM — F17.200 SMOKER: Status: ACTIVE | Noted: 2021-01-19

## 2021-01-19 PROBLEM — M47.812 CERVICAL SPONDYLOSIS WITHOUT MYELOPATHY: Status: ACTIVE | Noted: 2021-01-19

## 2021-04-12 ENCOUNTER — OFFICE VISIT (OUTPATIENT)
Dept: NEUROLOGY | Age: 71
End: 2021-04-12
Payer: MEDICARE

## 2021-04-12 VITALS
HEART RATE: 70 BPM | DIASTOLIC BLOOD PRESSURE: 70 MMHG | WEIGHT: 222 LBS | BODY MASS INDEX: 31.85 KG/M2 | SYSTOLIC BLOOD PRESSURE: 130 MMHG

## 2021-04-12 DIAGNOSIS — G25.3 MYOCLONUS: ICD-10-CM

## 2021-04-12 DIAGNOSIS — G47.31 PRIMARY CENTRAL SLEEP APNEA: ICD-10-CM

## 2021-04-12 DIAGNOSIS — M54.12 CERVICAL RADICULOPATHY: Primary | ICD-10-CM

## 2021-04-12 DIAGNOSIS — G56.03 BILATERAL CARPAL TUNNEL SYNDROME: ICD-10-CM

## 2021-04-12 DIAGNOSIS — G47.10 HYPERSOMNOLENCE: ICD-10-CM

## 2021-04-12 PROCEDURE — G8417 CALC BMI ABV UP PARAM F/U: HCPCS | Performed by: PSYCHIATRY & NEUROLOGY

## 2021-04-12 PROCEDURE — G8427 DOCREV CUR MEDS BY ELIG CLIN: HCPCS | Performed by: PSYCHIATRY & NEUROLOGY

## 2021-04-12 PROCEDURE — 99214 OFFICE O/P EST MOD 30 MIN: CPT | Performed by: PSYCHIATRY & NEUROLOGY

## 2021-04-12 PROCEDURE — 4004F PT TOBACCO SCREEN RCVD TLK: CPT | Performed by: PSYCHIATRY & NEUROLOGY

## 2021-04-12 PROCEDURE — 3017F COLORECTAL CA SCREEN DOC REV: CPT | Performed by: PSYCHIATRY & NEUROLOGY

## 2021-04-12 PROCEDURE — 4040F PNEUMOC VAC/ADMIN/RCVD: CPT | Performed by: PSYCHIATRY & NEUROLOGY

## 2021-04-12 PROCEDURE — 1123F ACP DISCUSS/DSCN MKR DOCD: CPT | Performed by: PSYCHIATRY & NEUROLOGY

## 2021-04-12 RX ORDER — TESTOSTERONE CYPIONATE 200 MG/ML
INJECTION INTRAMUSCULAR
COMMUNITY
Start: 2021-02-21 | End: 2022-01-27

## 2021-04-12 RX ORDER — TIZANIDINE 4 MG/1
4 TABLET ORAL NIGHTLY PRN
Qty: 30 TABLET | Refills: 0 | Status: SHIPPED | OUTPATIENT
Start: 2021-04-12

## 2021-04-12 ASSESSMENT — ENCOUNTER SYMPTOMS
NAUSEA: 0
CHOKING: 0
TROUBLE SWALLOWING: 0
BACK PAIN: 0
COLOR CHANGE: 0
PHOTOPHOBIA: 0
VOMITING: 0
SHORTNESS OF BREATH: 0

## 2021-04-12 NOTE — PROGRESS NOTES
Subjective:      Patient ID: Caterina Killian is a 70 y.o. male who presents today for:  Chief Complaint   Patient presents with    Follow-up     Pt states that the tinggiling in the left side of the head ahs been back now for a few months it is not all the time that it is occuring. If he lays down on the right side it will start tinggeling all the way down the neck. He seen Dr. Beck Santoro and he is not going to have surgery due to it not being needed tabitha states problem can be treated with steroid or injections. HPI 75-year-old right-handed female history of bilateral carpal tunnel syndrome cervical radiculopathy. Patient had some numbness in the left arm and tingling. He has sleep apnea and uses CPAP machine he has myoclonus quite responsive to Keppra. 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 Beck Santoro  in and no surgical interventions recommended a trial of traction was recommended with possibility of injections in the future  She reports that his myoclonus was very minimal and therefore he actually discontinued the [address]. 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 Patient does awaken drowsy in the mornings and may harbor underlying narcolepsy from the sleep apnea.     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)      tiZANidine (ZANAFLEX) 4 MG tablet Take 1 tablet by mouth nightly as needed (neck pain) 30 tablet 0    vitamin D (CHOLECALCIFEROL) 25 MCG (1000 UT) TABS tablet Take 1,000 Units by mouth 2 times daily      ipratropium (ATROVENT) 0.03 % nasal spray USE 1 SPRAY(S) IN EACH NOSTRIL TWICE DAILY      meloxicam (MOBIC) 7.5 MG tablet       omeprazole (PRILOSEC) 40 MG delayed release capsule TAKE 1 CAPSULE BY MOUTH ONCE DAILY      Lancets (ONETOUCH DELICA PLUS [id number]) MISC USE TO CHECK GLUCOSE TWICE DAILY      Blood Glucose Monitoring Suppl (ONE TOUCH ULTRA 2) w/Device KIT USE TO CHECK GLUCOSE TWICE DAILY      ONE TOUCH ULTRA TEST strip USE 1 STRIP TO CHECK GLUCOSE TWICE DAILY      chlorthalidone (HYGROTON) 25 MG tablet   3    VENTOLIN  (90 Base) MCG/ACT inhaler       venlafaxine (EFFEXOR XR) 150 MG extended release capsule       lovastatin (MEVACOR) 20 MG tablet Take 20 mg by mouth nightly      ramipril (ALTACE) 5 MG capsule 5 mg daily       metFORMIN (GLUCOPHAGE) 500 MG tablet Take 1,000 mg by mouth 2 times daily       DEPO-TESTOSTERONE 200 MG/ML injection Inject 400 mg into the muscle once. Takes every 3 weeks, due in 1 week per patient. Lizamaeleanor Vargas metoprolol (TOPROL-XL) 50 MG XL tablet Take 50 mg by mouth nightly       aspirin 81 MG tablet Take 81 mg by mouth daily.  empagliflozin (JARDIANCE) 10 MG tablet Take 10 mg by mouth daily       No current facility-administered medications for this visit. Review of Systems   Constitutional: Negative for fever. HENT: Negative for ear pain, tinnitus and trouble swallowing. Eyes: Negative for photophobia and visual disturbance. Respiratory: Negative for choking and shortness of breath. Cardiovascular: Negative for chest pain and palpitations. Gastrointestinal: Negative for nausea and vomiting. Musculoskeletal: Positive for neck pain and neck stiffness.  Negative for back pain, gait problem, joint swelling and myalgias. Skin: Negative for color change. Allergic/Immunologic: Negative for food allergies. Neurological: Positive for numbness. Negative for dizziness, tremors, seizures, syncope, facial asymmetry, speech difficulty, weakness, light-headedness and headaches. Psychiatric/Behavioral: Negative for behavioral problems, confusion, hallucinations and sleep disturbance. Objective:   /70 (Site: Right Upper Arm, Position: Sitting, Cuff Size: Medium Adult)   Pulse 70   Wt 222 lb (100.7 kg)   BMI 31.85 kg/m²     Physical Exam  Vitals signs reviewed. Eyes:      Pupils: Pupils are equal, round, and reactive to light. Neck:      Musculoskeletal: Normal range of motion. Cardiovascular:      Rate and Rhythm: Normal rate and regular rhythm. Heart sounds: No murmur. Pulmonary:      Effort: Pulmonary effort is normal.      Breath sounds: Normal breath sounds. Abdominal:      General: Bowel sounds are normal.   Musculoskeletal: Normal range of motion. Skin:     General: Skin is warm. Neurological:      Mental Status: He is alert and oriented to person, place, and time. Cranial Nerves: No cranial nerve deficit. Sensory: No sensory deficit. Motor: No abnormal muscle tone. Coordination: Coordination normal.      Deep Tendon Reflexes: Reflexes are normal and symmetric. Babinski sign absent on the right side. Babinski sign absent on the left side. Psychiatric:         Mood and Affect: Mood normal.       Patient has restriction of neck movement in extension flexion lateral rotation but no myelopathic signs are noted  No results found.     Lab Results   Component Value Date    WBC 10.0 02/02/2019    RBC 4.33 02/02/2019    RBC 4.97 11/30/2011    HGB 13.3 02/02/2019    HCT 38.4 02/02/2019    MCV 88.6 02/02/2019    MCH 30.8 02/02/2019    MCHC 34.8 02/02/2019    RDW 15.1 02/02/2019     02/02/2019    MPV 8.1 12/27/2012     Lab Zanaflex for now and watch him and reexamine him in a few months. Again we have discussed the findings and the expectations. Neurosurgical evaluations were noted      Plan:      No orders of the defined types were placed in this encounter. Orders Placed This Encounter   Medications    tiZANidine (ZANAFLEX) 4 MG tablet     Sig: Take 1 tablet by mouth nightly as needed (neck pain)     Dispense:  30 tablet     Refill:  0       No follow-ups on file.       Gaby Gonzales MD

## 2022-01-04 DIAGNOSIS — N28.1 ACQUIRED CYST OF KIDNEY: Primary | ICD-10-CM

## 2022-01-12 ENCOUNTER — HOSPITAL ENCOUNTER (OUTPATIENT)
Dept: ULTRASOUND IMAGING | Age: 72
Discharge: HOME | End: 2022-01-14
Payer: MEDICARE

## 2022-01-12 ENCOUNTER — HOSPITAL ENCOUNTER (OUTPATIENT)
Dept: LAB | Age: 72
Discharge: HOME | End: 2022-01-12
Payer: MEDICARE

## 2022-01-12 DIAGNOSIS — N28.1 ACQUIRED CYST OF KIDNEY: ICD-10-CM

## 2022-01-12 PROCEDURE — 76775 US EXAM ABDO BACK WALL LIM: CPT

## 2022-01-25 ENCOUNTER — HOSPITAL ENCOUNTER (OUTPATIENT)
Dept: LAB | Age: 72
Discharge: HOME | End: 2022-01-25
Payer: MEDICARE

## 2022-01-25 DIAGNOSIS — N40.1 BPH WITH OBSTRUCTION/LOWER URINARY TRACT SYMPTOMS: ICD-10-CM

## 2022-01-25 DIAGNOSIS — N40.1 BPH WITH OBSTRUCTION/LOWER URINARY TRACT SYMPTOMS: Primary | ICD-10-CM

## 2022-01-25 DIAGNOSIS — N13.8 BPH WITH OBSTRUCTION/LOWER URINARY TRACT SYMPTOMS: Primary | ICD-10-CM

## 2022-01-25 DIAGNOSIS — N13.8 BPH WITH OBSTRUCTION/LOWER URINARY TRACT SYMPTOMS: ICD-10-CM

## 2022-01-25 LAB — PROSTATE SPECIFIC ANTIGEN: 0.62 NG/ML (ref 0–4)

## 2022-01-25 PROCEDURE — 36415 COLL VENOUS BLD VENIPUNCTURE: CPT

## 2022-01-25 PROCEDURE — 84153 ASSAY OF PSA TOTAL: CPT

## 2022-01-27 ENCOUNTER — OFFICE VISIT (OUTPATIENT)
Dept: UROLOGY | Age: 72
End: 2022-01-27
Payer: MEDICARE

## 2022-01-27 VITALS
SYSTOLIC BLOOD PRESSURE: 132 MMHG | BODY MASS INDEX: 30.35 KG/M2 | HEART RATE: 66 BPM | HEIGHT: 70 IN | DIASTOLIC BLOOD PRESSURE: 84 MMHG | WEIGHT: 212 LBS

## 2022-01-27 DIAGNOSIS — N40.1 BPH WITH OBSTRUCTION/LOWER URINARY TRACT SYMPTOMS: Primary | ICD-10-CM

## 2022-01-27 DIAGNOSIS — N13.8 BPH WITH OBSTRUCTION/LOWER URINARY TRACT SYMPTOMS: Primary | ICD-10-CM

## 2022-01-27 PROCEDURE — G8417 CALC BMI ABV UP PARAM F/U: HCPCS | Performed by: UROLOGY

## 2022-01-27 PROCEDURE — 99213 OFFICE O/P EST LOW 20 MIN: CPT | Performed by: UROLOGY

## 2022-01-27 PROCEDURE — G8484 FLU IMMUNIZE NO ADMIN: HCPCS | Performed by: UROLOGY

## 2022-01-27 PROCEDURE — 4040F PNEUMOC VAC/ADMIN/RCVD: CPT | Performed by: UROLOGY

## 2022-01-27 PROCEDURE — G8427 DOCREV CUR MEDS BY ELIG CLIN: HCPCS | Performed by: UROLOGY

## 2022-01-27 PROCEDURE — 1123F ACP DISCUSS/DSCN MKR DOCD: CPT | Performed by: UROLOGY

## 2022-01-27 PROCEDURE — 3017F COLORECTAL CA SCREEN DOC REV: CPT | Performed by: UROLOGY

## 2022-01-27 PROCEDURE — 4004F PT TOBACCO SCREEN RCVD TLK: CPT | Performed by: UROLOGY

## 2022-01-27 RX ORDER — INSULIN GLARGINE 100 [IU]/ML
INJECTION, SOLUTION SUBCUTANEOUS
COMMUNITY
Start: 2022-01-10

## 2022-01-27 ASSESSMENT — ENCOUNTER SYMPTOMS
SHORTNESS OF BREATH: 0
ABDOMINAL DISTENTION: 0
ABDOMINAL PAIN: 0

## 2022-01-27 NOTE — PROGRESS NOTES
Subjective:      Patient ID: Lyndsey Rebolledo is a 70 y.o. male    HPI  This is a 74 yo male with h/o HTN, CAD/MI x 2/Stents, DM, Depression and Anxiety, ED/IPP and BPH w/LUTs back in follow-up. Since last seen on 1/4/21, he still has some frequency and urgency but these have improved. He gets infrequent and mild UI. He has no hematuria or dysuria or pain and a good flow. I reviewed the interval PSA and Renal U/S results in detail today. he has no new medical or surgical problems. Past Medical History:   Diagnosis Date    Erectile dysfunction     Hyperlipidemia     Hypertension      Past Surgical History:   Procedure Laterality Date    COLONOSCOPY      CORONARY ANGIOPLASTY WITH STENT PLACEMENT      4 total    PENILE PROSTHESIS N/A     THROAT SURGERY      esophagocolostomy    TONSILLECTOMY      UPPER GASTROINTESTINAL ENDOSCOPY       Social History     Socioeconomic History    Marital status:      Spouse name: None    Number of children: None    Years of education: None    Highest education level: None   Occupational History    None   Tobacco Use    Smoking status: Current Every Day Smoker     Packs/day: 1.00     Years: 52.00     Pack years: 52.00     Types: Cigarettes     Start date: 2/15/1967    Smokeless tobacco: Never Used   Substance and Sexual Activity    Alcohol use: No    Drug use: No    Sexual activity: Yes     Partners: Female   Other Topics Concern    None   Social History Narrative    None     Social Determinants of Health     Financial Resource Strain:     Difficulty of Paying Living Expenses: Not on file   Food Insecurity:     Worried About Running Out of Food in the Last Year: Not on file    Heidi of Food in the Last Year: Not on file   Transportation Needs:     Lack of Transportation (Medical): Not on file    Lack of Transportation (Non-Medical):  Not on file   Physical Activity:     Days of Exercise per Week: Not on file    Minutes of Exercise per Session: Not on file   Stress:     Feeling of Stress : Not on file   Social Connections:     Frequency of Communication with Friends and Family: Not on file    Frequency of Social Gatherings with Friends and Family: Not on file    Attends Restorationism Services: Not on file    Active Member of Clubs or Organizations: Not on file    Attends Club or Organization Meetings: Not on file    Marital Status: Not on file   Intimate Partner Violence:     Fear of Current or Ex-Partner: Not on file    Emotionally Abused: Not on file    Physically Abused: Not on file    Sexually Abused: Not on file   Housing Stability:     Unable to Pay for Housing in the Last Year: Not on file    Number of Places Lived in the Last Year: Not on file    Unstable Housing in the Last Year: Not on file     Family History   Problem Relation Age of Onset    Heart Attack Father      Current Outpatient Medications   Medication Sig Dispense Refill    LANTUS SOLOSTAR 100 UNIT/ML injection pen INJECT 25 UNITS SUBCUTANEOUSLY AT BEDTIME      tiZANidine (ZANAFLEX) 4 MG tablet Take 1 tablet by mouth nightly as needed (neck pain) 30 tablet 0    vitamin D (CHOLECALCIFEROL) 25 MCG (1000 UT) TABS tablet Take 1,000 Units by mouth 2 times daily      ipratropium (ATROVENT) 0.03 % nasal spray USE 1 SPRAY(S) IN EACH NOSTRIL TWICE DAILY      meloxicam (MOBIC) 7.5 MG tablet       omeprazole (PRILOSEC) 40 MG delayed release capsule TAKE 1 CAPSULE BY MOUTH ONCE DAILY      Lancets (ONETOUCH DELICA PLUS [id number]) MISC USE TO CHECK GLUCOSE TWICE DAILY      Blood Glucose Monitoring Suppl (ONE TOUCH ULTRA 2) w/Device KIT USE TO CHECK GLUCOSE TWICE DAILY      ONE TOUCH ULTRA TEST strip USE 1 STRIP TO CHECK GLUCOSE TWICE DAILY      chlorthalidone (HYGROTON) 25 MG tablet   3    VENTOLIN  (90 Base) MCG/ACT inhaler       venlafaxine (EFFEXOR XR) 150 MG extended release capsule       lovastatin (MEVACOR) 20 MG tablet Take 20 mg by mouth nightly      ramipril (ALTACE) 5 MG capsule 5 mg daily       metFORMIN (GLUCOPHAGE) 500 MG tablet Take 1,000 mg by mouth 2 times daily       metoprolol (TOPROL-XL) 50 MG XL tablet Take 50 mg by mouth nightly       aspirin 81 MG tablet Take 81 mg by mouth daily. No current facility-administered medications for this visit. Tetracyclines & related and Lipitor [atorvastatin]  reviewed      Review of Systems   Constitutional: Negative for unexpected weight change. Respiratory: Negative for shortness of breath. Cardiovascular: Negative for chest pain. Gastrointestinal: Negative for abdominal distention and abdominal pain. Genitourinary: Negative for dysuria, flank pain and hematuria. Objective:   Physical Exam  Abdominal:      General: There is no distension. Palpations: Abdomen is soft. Tenderness: There is no abdominal tenderness. There is no guarding. Neurological:      Mental Status: He is alert.    Psychiatric:         Mood and Affect: Mood normal.            US RETROPERITONEAL LIMITED    Status: Final result     Order Providers    Authorizing Encounter Billing   Hank Fabian MD [facility] ULTRAS[facility] ROOM 1 Linnette Bangura DO   Replaced: US RETROPERITONEAL COMPLETE            Signed by    Signed Date/Time Phone Pager   Ivana Barr 1/12/2022 12:40 [phone]      Reading Providers    Read Date Phone Pager   Mable Goldberg Jan 12, 2022 12:40 [phone]      All Reviewers List    Hank Fabian MD on 1/12/2022 13:11     US RETROPERITONEAL LIMITED: Patient Communication    Add Comments  Add Notifications        Routing History    Priority Sent On From To Message Type    1/12/2022 12:43 PM Harsha, Chpo Incoming Radiant Results From Noah Cotton MD Results     Radiation Dose Estimates    No radiation information found for this patient  Narrative       COMPARISON: December 28, 2020       HISTORY: N28.1 Acquired cyst of kidney ICD10       TECHNIQUE: US RETROPERITONEAL LIMITED       FINDINGS:   The right kidney measures  11.5 x 3.9 x 5.2 cm. The left kidney measures 10.9 x 5.9 x 5.5 cm.  The corticomedullary junctions are preserved. In the superior pole of the right kidney there is a 1.1 x 1.0 x 0.9 cm cystic structure. A cystic structure is    seen in the midpole of the left kidney that contains a septation. It measures 2.2 x 2.1 x 2.6 cm. No renal calculi, perinephric fluid or hydronephrosis is seen.               Impression   The examination is stable. PSA   Date Value Ref Range Status   01/25/2022 0.62 0.00 - 4.00 ng/mL Final   12/30/2020 2.15 0.00 - 6.22 ng/mL Final   09/21/2017 0.78 0.00 - 5.40 ng/mL Final     Diagnostic Psa   Date Value Ref Range Status   10/13/2014 0.75 0.00 - 5.40 ng/mL Final         Assessment: This is a 72 yo male with h/o HTN, CAD/MI x 2/Stents, DM, Depression and Anxiety, ED/IPP and BPH w/LUTs mainly frequency/urgency that remains not that bothersome and improved since last seen. He is not interested in a BPH medication trial which was discussed. He has a normal PSA and Renal U/S shows a likely benign renal cyst with thin septation that remains stable. This will be followed for any change. Plan:      1.  F/U 1 yr for PSA and Renal U/S (pt choice)        Padmini Gore MD

## 2022-12-09 ENCOUNTER — HOSPITAL ENCOUNTER (OUTPATIENT)
Dept: ULTRASOUND IMAGING | Age: 72
End: 2022-12-09
Payer: MEDICARE

## 2022-12-09 DIAGNOSIS — N40.1 BPH WITH OBSTRUCTION/LOWER URINARY TRACT SYMPTOMS: ICD-10-CM

## 2022-12-09 DIAGNOSIS — N13.8 BPH WITH OBSTRUCTION/LOWER URINARY TRACT SYMPTOMS: ICD-10-CM

## 2022-12-09 PROCEDURE — 76770 US EXAM ABDO BACK WALL COMP: CPT

## 2023-01-17 LAB
LV EF: 63 %
LVEF MODALITY: NORMAL

## 2023-01-26 ENCOUNTER — HOSPITAL ENCOUNTER (OUTPATIENT)
Dept: LAB | Age: 73
Discharge: HOME | End: 2023-01-26
Payer: MEDICARE

## 2023-01-26 DIAGNOSIS — N13.8 BPH WITH OBSTRUCTION/LOWER URINARY TRACT SYMPTOMS: ICD-10-CM

## 2023-01-26 DIAGNOSIS — N40.1 BPH WITH OBSTRUCTION/LOWER URINARY TRACT SYMPTOMS: ICD-10-CM

## 2023-01-26 LAB — PROSTATE SPECIFIC ANTIGEN: 0.61 NG/ML (ref 0–4)

## 2023-01-26 PROCEDURE — 84153 ASSAY OF PSA TOTAL: CPT

## 2023-01-26 PROCEDURE — 36415 COLL VENOUS BLD VENIPUNCTURE: CPT

## 2023-02-01 ENCOUNTER — OFFICE VISIT (OUTPATIENT)
Dept: UROLOGY | Age: 73
End: 2023-02-01
Payer: MEDICARE

## 2023-02-01 VITALS
HEART RATE: 65 BPM | BODY MASS INDEX: 30.21 KG/M2 | DIASTOLIC BLOOD PRESSURE: 86 MMHG | WEIGHT: 211 LBS | HEIGHT: 70 IN | SYSTOLIC BLOOD PRESSURE: 138 MMHG

## 2023-02-01 DIAGNOSIS — N40.1 BPH WITH OBSTRUCTION/LOWER URINARY TRACT SYMPTOMS: Primary | ICD-10-CM

## 2023-02-01 DIAGNOSIS — N13.8 BPH WITH OBSTRUCTION/LOWER URINARY TRACT SYMPTOMS: Primary | ICD-10-CM

## 2023-02-01 DIAGNOSIS — N28.1 RENAL CYSTS, ACQUIRED, BILATERAL: ICD-10-CM

## 2023-02-01 PROCEDURE — G8427 DOCREV CUR MEDS BY ELIG CLIN: HCPCS | Performed by: UROLOGY

## 2023-02-01 PROCEDURE — 99213 OFFICE O/P EST LOW 20 MIN: CPT | Performed by: UROLOGY

## 2023-02-01 PROCEDURE — 1123F ACP DISCUSS/DSCN MKR DOCD: CPT | Performed by: UROLOGY

## 2023-02-01 PROCEDURE — G8484 FLU IMMUNIZE NO ADMIN: HCPCS | Performed by: UROLOGY

## 2023-02-01 PROCEDURE — 3079F DIAST BP 80-89 MM HG: CPT | Performed by: UROLOGY

## 2023-02-01 PROCEDURE — 3075F SYST BP GE 130 - 139MM HG: CPT | Performed by: UROLOGY

## 2023-02-01 PROCEDURE — 3017F COLORECTAL CA SCREEN DOC REV: CPT | Performed by: UROLOGY

## 2023-02-01 PROCEDURE — 4004F PT TOBACCO SCREEN RCVD TLK: CPT | Performed by: UROLOGY

## 2023-02-01 PROCEDURE — G8417 CALC BMI ABV UP PARAM F/U: HCPCS | Performed by: UROLOGY

## 2023-02-01 ASSESSMENT — ENCOUNTER SYMPTOMS
ABDOMINAL PAIN: 0
ABDOMINAL DISTENTION: 0

## 2023-02-01 NOTE — PROGRESS NOTES
Subjective:      Patient ID: Niels Lantigua is a 67 y.o. male    HPI This is a 68 yo male with h/o HTN, CAD/MI x 2/Stents, DM, Depression and Anxiety, ED/IPP and BPH w/LUTs back in follow-up. Since last seen on 1/27/22, he still has occasional frequency and urgency He has no hematuria or dysuria or pain and a good flow. I reviewed the interval PSA and Renal U/S results in detail today. He has no new medical or surgical problems.      Past Medical History:   Diagnosis Date    Erectile dysfunction     Hyperlipidemia     Hypertension      Past Surgical History:   Procedure Laterality Date    COLONOSCOPY      CORONARY ANGIOPLASTY WITH STENT PLACEMENT      4 total    PENILE PROSTHESIS IMPLANT N/A     THROAT SURGERY      esophagocolostomy    TONSILLECTOMY      UPPER GASTROINTESTINAL ENDOSCOPY       Social History     Socioeconomic History    Marital status:    Tobacco Use    Smoking status: Every Day     Packs/day: 1.00     Years: 52.00     Pack years: 52.00     Types: Cigarettes     Start date: 2/15/1967    Smokeless tobacco: Never   Substance and Sexual Activity    Alcohol use: No    Drug use: No    Sexual activity: Yes     Partners: Female     Family History   Problem Relation Age of Onset    Heart Attack Father      Current Outpatient Medications   Medication Sig Dispense Refill    LANTUS SOLOSTAR 100 UNIT/ML injection pen INJECT 25 UNITS SUBCUTANEOUSLY AT BEDTIME      tiZANidine (ZANAFLEX) 4 MG tablet Take 1 tablet by mouth nightly as needed (neck pain) 30 tablet 0    vitamin D (CHOLECALCIFEROL) 25 MCG (1000 UT) TABS tablet Take 1,000 Units by mouth 2 times daily      ipratropium (ATROVENT) 0.03 % nasal spray USE 1 SPRAY(S) IN EACH NOSTRIL TWICE DAILY      meloxicam (MOBIC) 7.5 MG tablet       omeprazole (PRILOSEC) 40 MG delayed release capsule TAKE 1 CAPSULE BY MOUTH ONCE DAILY      Lancets (ONETOUCH DELICA PLUS [id number]) MISC USE TO CHECK GLUCOSE TWICE DAILY      Blood Glucose Monitoring Suppl (ONE TOUCH ULTRA 2) w/Device KIT USE TO CHECK GLUCOSE TWICE DAILY      ONE TOUCH ULTRA TEST strip USE 1 STRIP TO CHECK GLUCOSE TWICE DAILY      chlorthalidone (HYGROTON) 25 MG tablet   3    VENTOLIN  (90 Base) MCG/ACT inhaler       venlafaxine (EFFEXOR XR) 150 MG extended release capsule       lovastatin (MEVACOR) 20 MG tablet Take 20 mg by mouth nightly      ramipril (ALTACE) 5 MG capsule 5 mg daily       metFORMIN (GLUCOPHAGE) 500 MG tablet Take 1,000 mg by mouth 2 times daily       metoprolol (TOPROL-XL) 50 MG XL tablet Take 50 mg by mouth nightly       aspirin 81 MG tablet Take 81 mg by mouth daily. No current facility-administered medications for this visit. Tetracyclines & related and Lipitor [atorvastatin]  reviewed      Review of Systems   Constitutional:  Negative for unexpected weight change. Gastrointestinal:  Negative for abdominal distention and abdominal pain. Genitourinary:  Negative for difficulty urinating, dysuria, enuresis, flank pain and hematuria. Objective:   Physical Exam  Constitutional:       Appearance: Normal appearance. Genitourinary:     Prostate: Enlarged. Not tender and no nodules present. Rectum: No external hemorrhoid. Neurological:      Mental Status: He is alert.         US RETROPERITONEAL COMPLETE [[id number]]  Status: Final result     Order Providers    Authorizing Encounter Billing   Consuelo Schuler MD [address] [place] [address] Harriet Gutierrez MD            Signed by    Signed Date/Time Phone Pager   Griselda Drought 12/09/2022 10:05 [phone]      Reading Providers    Read Date Phone Pager   Griselda Drought [place] Dec 9, 2022 10:05 [phone]      All Reviewers List    Consuelo Schuler MD on 12/9/2022 11:38       US RETROPERITONEAL COMPLETE: Patient Communication     Add Comments   Add Notifications        Routing History    Priority Sent On From To Message Type    12/9/2022 10:07 AM Harsha, Alexia Incoming Radiant Results From [employer] Paulina Anna MD Results     Radiation Dose Estimates    No radiation information found for this patient  Narrative   EXAMINATION:   RETROPERITONEAL ULTRASOUND OF THE KIDNEYS AND URINARY BLADDER       12/9/2022       COMPARISON:   None       HISTORY:   ORDERING SYSTEM PROVIDED HISTORY: BPH with obstruction/lower urinary tract   symptoms, BPH with obstruction/lower urinary tract symptoms   TECHNOLOGIST PROVIDED HISTORY:   This procedure can be scheduled via [employer]. Access your [employer] account by   visiting [email or web address]. What reading provider will be dictating this exam?->CRC       FINDINGS:       Kidneys:       Right kidney measures 11.1 x 5.0 x 6.1 cm. Left kidney measures 11.2 x 4.2 x   5.3 cm. Color flow without anomaly bilaterally. 1.1 x 0.8 x 1.1 cm cyst,   right upper pole cortex. 2.5 x 2.3 x 2.5 cm cyst, left mid pole cortex,   having thin central septation. Kidneys demonstrate normal cortical echogenicity. No evidence of   hydronephrosis or intrarenal stones. Bladder:       Urinary bladder measures 5.0 x 5.4 x 4.1 cm, is smoothly marginated, and well   defined. Right and left ureteral jets demonstrated on color flow. Prevoid   urinary bladder volume 77.5 mL, postvoid volume 20.8 mL. Impression   Simple right renal cyst.       Bosniak type 2 left renal cyst.       20.8 mL postvoid residual urinary bladder. PSA   Date Value Ref Range Status   01/26/2023 0.61 0.00 - 4.00 ng/mL Final   01/25/2022 0.62 0.00 - 4.00 ng/mL Final   12/30/2020 2.15 0.00 - 6.22 ng/mL Final   09/21/2017 0.78 0.00 - 5.40 ng/mL Final     [facility]   Date Value Ref Range Status   10/13/2014 0.75 0.00 - 5.40 ng/mL Final       Assessment:       This is a 66 yo male with h/o HTN, CAD/MI x 2/Stents, DM, Depression and Anxiety, ED/IPP and BPH w/LUTs with occasional frequency/urgency that remains not that bothersome and he remains not interested in a BPH medication trial. He has a normal PSA and Renal U/S shows bilateral benign renal cysts and one with a thin septation that remains stable (Bosniak 2). I recommend no further F/U for these cysts and he agrees.         Plan:      F/U 1 yr for PSA        Paulina Anna MD

## 2023-04-18 LAB
ALANINE AMINOTRANSFERASE (SGPT) (U/L) IN SER/PLAS: 26 U/L (ref 10–52)
ALBUMIN (G/DL) IN SER/PLAS: 4.3 G/DL (ref 3.4–5)
ALKALINE PHOSPHATASE (U/L) IN SER/PLAS: 60 U/L (ref 33–136)
ANION GAP IN SER/PLAS: 11 MMOL/L (ref 10–20)
ASPARTATE AMINOTRANSFERASE (SGOT) (U/L) IN SER/PLAS: 19 U/L (ref 9–39)
BILIRUBIN TOTAL (MG/DL) IN SER/PLAS: 0.5 MG/DL (ref 0–1.2)
CALCIUM (MG/DL) IN SER/PLAS: 9.6 MG/DL (ref 8.6–10.3)
CARBON DIOXIDE, TOTAL (MMOL/L) IN SER/PLAS: 28 MMOL/L (ref 21–32)
CHLORIDE (MMOL/L) IN SER/PLAS: 98 MMOL/L (ref 98–107)
CHOLESTEROL (MG/DL) IN SER/PLAS: 202 MG/DL (ref 0–199)
CHOLESTEROL IN HDL (MG/DL) IN SER/PLAS: 29.5 MG/DL
CHOLESTEROL/HDL RATIO: 6.8
CHOLESTEROL/HDL RATIO: 6.8
CHOLESTEROL: 202 MG/DL (ref 0–199)
CREATININE (MG/DL) IN SER/PLAS: 1.03 MG/DL (ref 0.5–1.3)
GFR MALE: 77 ML/MIN/1.73M2
GLUCOSE (MG/DL) IN SER/PLAS: 150 MG/DL (ref 74–99)
HDLC SERPL-MCNC: 29.5 MG/DL
LDL CHOLESTEROL: 118 MG/DL (ref 0–99)
LDL: 118 MG/DL (ref 0–99)
NON HDL CHOLESTEROL: 173 MG/DL
NON-HDL CHOLESTEROL: 173 MG/DL
POTASSIUM (MMOL/L) IN SER/PLAS: 4.2 MMOL/L (ref 3.5–5.3)
PROTEIN TOTAL: 7.1 G/DL (ref 6.4–8.2)
SODIUM (MMOL/L) IN SER/PLAS: 133 MMOL/L (ref 136–145)
TRIGL SERPL-MCNC: 273 MG/DL (ref 0–149)
TRIGLYCERIDE (MG/DL) IN SER/PLAS: 273 MG/DL (ref 0–149)
UREA NITROGEN (MG/DL) IN SER/PLAS: 23 MG/DL (ref 6–23)
VLDL: 55 MG/DL (ref 0–40)
VLDLC SERPL CALC-MCNC: 55 MG/DL (ref 0–40)

## 2023-08-10 LAB
ALANINE AMINOTRANSFERASE (SGPT) (U/L) IN SER/PLAS: 20 U/L (ref 10–52)
ALBUMIN (G/DL) IN SER/PLAS: 4.4 G/DL (ref 3.4–5)
ALKALINE PHOSPHATASE (U/L) IN SER/PLAS: 65 U/L (ref 33–136)
ANION GAP IN SER/PLAS: 12 MMOL/L (ref 10–20)
ASPARTATE AMINOTRANSFERASE (SGOT) (U/L) IN SER/PLAS: 16 U/L (ref 9–39)
BILIRUBIN TOTAL (MG/DL) IN SER/PLAS: 0.4 MG/DL (ref 0–1.2)
CALCIDIOL (25 OH VITAMIN D3) (NG/ML) IN SER/PLAS: 31 NG/ML
CALCIUM (MG/DL) IN SER/PLAS: 9.9 MG/DL (ref 8.6–10.3)
CARBON DIOXIDE, TOTAL (MMOL/L) IN SER/PLAS: 28 MMOL/L (ref 21–32)
CHLORIDE (MMOL/L) IN SER/PLAS: 100 MMOL/L (ref 98–107)
CHOLESTEROL (MG/DL) IN SER/PLAS: 166 MG/DL (ref 0–199)
CHOLESTEROL IN HDL (MG/DL) IN SER/PLAS: 26.9 MG/DL
CHOLESTEROL/HDL RATIO: 6.2
CREATININE (MG/DL) IN SER/PLAS: 1 MG/DL (ref 0.5–1.3)
ERYTHROCYTE DISTRIBUTION WIDTH (RATIO) BY AUTOMATED COUNT: 13.1 % (ref 11.5–14.5)
ERYTHROCYTE MEAN CORPUSCULAR HEMOGLOBIN CONCENTRATION (G/DL) BY AUTOMATED: 33.2 G/DL (ref 32–36)
ERYTHROCYTE MEAN CORPUSCULAR VOLUME (FL) BY AUTOMATED COUNT: 86 FL (ref 80–100)
ERYTHROCYTES (10*6/UL) IN BLOOD BY AUTOMATED COUNT: 4.89 X10E12/L (ref 4.5–5.9)
ESTIMATED AVERAGE GLUCOSE FOR HBA1C: 206 MG/DL
GFR MALE: 79 ML/MIN/1.73M2
GLUCOSE (MG/DL) IN SER/PLAS: 157 MG/DL (ref 74–99)
HEMATOCRIT (%) IN BLOOD BY AUTOMATED COUNT: 42.2 % (ref 41–52)
HEMOGLOBIN (G/DL) IN BLOOD: 14 G/DL (ref 13.5–17.5)
HEMOGLOBIN A1C/HEMOGLOBIN TOTAL IN BLOOD: 8.8 %
LDL: 73 MG/DL (ref 0–99)
LEUKOCYTES (10*3/UL) IN BLOOD BY AUTOMATED COUNT: 7.4 X10E9/L (ref 4.4–11.3)
NON HDL CHOLESTEROL: 139 MG/DL
PLATELETS (10*3/UL) IN BLOOD AUTOMATED COUNT: 204 X10E9/L (ref 150–450)
POTASSIUM (MMOL/L) IN SER/PLAS: 4.1 MMOL/L (ref 3.5–5.3)
PROSTATE SPECIFIC ANTIGEN,SCREEN: 0.69 NG/ML (ref 0–4)
PROTEIN TOTAL: 7.6 G/DL (ref 6.4–8.2)
SODIUM (MMOL/L) IN SER/PLAS: 136 MMOL/L (ref 136–145)
THYROTROPIN (MIU/L) IN SER/PLAS BY DETECTION LIMIT <= 0.05 MIU/L: 2.38 MIU/L (ref 0.44–3.98)
TRIGLYCERIDE (MG/DL) IN SER/PLAS: 332 MG/DL (ref 0–149)
UREA NITROGEN (MG/DL) IN SER/PLAS: 13 MG/DL (ref 6–23)
VLDL: 66 MG/DL (ref 0–40)

## 2023-08-24 LAB
C REACTIVE PROTEIN (MG/L) IN SER/PLAS BY HIGH SENSIT: 5.4 MG/L
C-REACTIVE PROTEIN, HIGH SENSITIVITY: 5.4 MG/L

## 2023-10-08 ENCOUNTER — HOSPITAL ENCOUNTER (OUTPATIENT)
Facility: HOSPITAL | Age: 73
Setting detail: OBSERVATION
Discharge: HOME | End: 2023-10-10
Attending: EMERGENCY MEDICINE | Admitting: INTERNAL MEDICINE
Payer: MEDICARE

## 2023-10-08 ENCOUNTER — APPOINTMENT (OUTPATIENT)
Dept: CARDIOLOGY | Facility: HOSPITAL | Age: 73
End: 2023-10-08
Payer: MEDICARE

## 2023-10-08 ENCOUNTER — APPOINTMENT (OUTPATIENT)
Dept: RADIOLOGY | Facility: HOSPITAL | Age: 73
End: 2023-10-08
Payer: MEDICARE

## 2023-10-08 DIAGNOSIS — J41.0 SIMPLE CHRONIC BRONCHITIS (MULTI): ICD-10-CM

## 2023-10-08 DIAGNOSIS — R00.1 BRADYCARDIA: ICD-10-CM

## 2023-10-08 DIAGNOSIS — I44.1 2ND DEGREE AV BLOCK: ICD-10-CM

## 2023-10-08 DIAGNOSIS — J18.9 PNEUMONIA OF BOTH LUNGS DUE TO INFECTIOUS ORGANISM, UNSPECIFIED PART OF LUNG: Primary | ICD-10-CM

## 2023-10-08 LAB
ALBUMIN SERPL BCP-MCNC: 3.8 G/DL (ref 3.4–5)
ALP SERPL-CCNC: 65 U/L (ref 33–136)
ALT SERPL W P-5'-P-CCNC: 21 U/L (ref 10–52)
ANION GAP SERPL CALC-SCNC: 14 MMOL/L (ref 10–20)
AST SERPL W P-5'-P-CCNC: 16 U/L (ref 9–39)
BASOPHILS # BLD AUTO: 0.04 X10*3/UL (ref 0–0.1)
BASOPHILS NFR BLD AUTO: 0.5 %
BILIRUB SERPL-MCNC: 0.5 MG/DL (ref 0–1.2)
BUN SERPL-MCNC: 26 MG/DL (ref 6–23)
CALCIUM SERPL-MCNC: 8.9 MG/DL (ref 8.6–10.3)
CARDIAC TROPONIN I PNL SERPL HS: 9 NG/L (ref 0–20)
CHLORIDE SERPL-SCNC: 99 MMOL/L (ref 98–107)
CO2 SERPL-SCNC: 22 MMOL/L (ref 21–32)
CREAT SERPL-MCNC: 1.22 MG/DL (ref 0.5–1.3)
D DIMER PPP FEU-MCNC: 450 NG/ML FEU
EOSINOPHIL # BLD AUTO: 0.23 X10*3/UL (ref 0–0.4)
EOSINOPHIL NFR BLD AUTO: 2.7 %
ERYTHROCYTE [DISTWIDTH] IN BLOOD BY AUTOMATED COUNT: 13.1 % (ref 11.5–14.5)
FLUAV RNA RESP QL NAA+PROBE: NOT DETECTED
FLUBV RNA RESP QL NAA+PROBE: NOT DETECTED
GFR SERPL CREATININE-BSD FRML MDRD: 63 ML/MIN/1.73M*2
GLUCOSE BLD MANUAL STRIP-MCNC: 376 MG/DL (ref 74–99)
GLUCOSE SERPL-MCNC: 167 MG/DL (ref 74–99)
HCT VFR BLD AUTO: 37.4 % (ref 41–52)
HGB BLD-MCNC: 13.1 G/DL (ref 13.5–17.5)
IMM GRANULOCYTES # BLD AUTO: 0.04 X10*3/UL (ref 0–0.5)
IMM GRANULOCYTES NFR BLD AUTO: 0.5 % (ref 0–0.9)
LACTATE SERPL-SCNC: 2 MMOL/L (ref 0.4–2)
LYMPHOCYTES # BLD AUTO: 3.16 X10*3/UL (ref 0.8–3)
LYMPHOCYTES NFR BLD AUTO: 36.5 %
MCH RBC QN AUTO: 28.8 PG (ref 26–34)
MCHC RBC AUTO-ENTMCNC: 35 G/DL (ref 32–36)
MCV RBC AUTO: 82 FL (ref 80–100)
MONOCYTES # BLD AUTO: 0.68 X10*3/UL (ref 0.05–0.8)
MONOCYTES NFR BLD AUTO: 7.9 %
NEUTROPHILS # BLD AUTO: 4.51 X10*3/UL (ref 1.6–5.5)
NEUTROPHILS NFR BLD AUTO: 51.9 %
NRBC BLD-RTO: 0 /100 WBCS (ref 0–0)
PLATELET # BLD AUTO: 164 X10*3/UL (ref 150–450)
PMV BLD AUTO: 9.5 FL (ref 7.5–11.5)
POTASSIUM SERPL-SCNC: 4.1 MMOL/L (ref 3.5–5.3)
PROT SERPL-MCNC: 7.1 G/DL (ref 6.4–8.2)
RBC # BLD AUTO: 4.55 X10*6/UL (ref 4.5–5.9)
SARS-COV-2 RNA RESP QL NAA+PROBE: NOT DETECTED
SODIUM SERPL-SCNC: 131 MMOL/L (ref 136–145)
WBC # BLD AUTO: 8.7 X10*3/UL (ref 4.4–11.3)

## 2023-10-08 PROCEDURE — 2500000002 HC RX 250 W HCPCS SELF ADMINISTERED DRUGS (ALT 637 FOR MEDICARE OP, ALT 636 FOR OP/ED): Performed by: INTERNAL MEDICINE

## 2023-10-08 PROCEDURE — 80053 COMPREHEN METABOLIC PANEL: CPT | Performed by: PHYSICIAN ASSISTANT

## 2023-10-08 PROCEDURE — 2500000001 HC RX 250 WO HCPCS SELF ADMINISTERED DRUGS (ALT 637 FOR MEDICARE OP): Performed by: INTERNAL MEDICINE

## 2023-10-08 PROCEDURE — 93005 ELECTROCARDIOGRAM TRACING: CPT

## 2023-10-08 PROCEDURE — 83605 ASSAY OF LACTIC ACID: CPT | Performed by: PHYSICIAN ASSISTANT

## 2023-10-08 PROCEDURE — 36415 COLL VENOUS BLD VENIPUNCTURE: CPT | Performed by: PHYSICIAN ASSISTANT

## 2023-10-08 PROCEDURE — 85379 FIBRIN DEGRADATION QUANT: CPT | Performed by: PHYSICIAN ASSISTANT

## 2023-10-08 PROCEDURE — 71046 X-RAY EXAM CHEST 2 VIEWS: CPT | Mod: FY

## 2023-10-08 PROCEDURE — 87636 SARSCOV2 & INF A&B AMP PRB: CPT | Performed by: PHYSICIAN ASSISTANT

## 2023-10-08 PROCEDURE — 71046 X-RAY EXAM CHEST 2 VIEWS: CPT | Performed by: RADIOLOGY

## 2023-10-08 PROCEDURE — 85025 COMPLETE CBC W/AUTO DIFF WBC: CPT | Performed by: PHYSICIAN ASSISTANT

## 2023-10-08 PROCEDURE — G0378 HOSPITAL OBSERVATION PER HR: HCPCS

## 2023-10-08 PROCEDURE — 99285 EMERGENCY DEPT VISIT HI MDM: CPT | Performed by: EMERGENCY MEDICINE

## 2023-10-08 PROCEDURE — 96368 THER/DIAG CONCURRENT INF: CPT | Mod: 59

## 2023-10-08 PROCEDURE — 84484 ASSAY OF TROPONIN QUANT: CPT | Performed by: PHYSICIAN ASSISTANT

## 2023-10-08 PROCEDURE — 82947 ASSAY GLUCOSE BLOOD QUANT: CPT | Mod: 59

## 2023-10-08 PROCEDURE — 2500000004 HC RX 250 GENERAL PHARMACY W/ HCPCS (ALT 636 FOR OP/ED): Performed by: PHYSICIAN ASSISTANT

## 2023-10-08 PROCEDURE — 96365 THER/PROPH/DIAG IV INF INIT: CPT | Mod: 59

## 2023-10-08 RX ORDER — METFORMIN HYDROCHLORIDE 500 MG/1
1000 TABLET ORAL
Status: DISCONTINUED | OUTPATIENT
Start: 2023-10-09 | End: 2023-10-10 | Stop reason: HOSPADM

## 2023-10-08 RX ORDER — CEFTRIAXONE 1 G/50ML
1 INJECTION, SOLUTION INTRAVENOUS EVERY 24 HOURS
Status: DISCONTINUED | OUTPATIENT
Start: 2023-10-09 | End: 2023-10-10 | Stop reason: HOSPADM

## 2023-10-08 RX ORDER — INSULIN LISPRO 100 [IU]/ML
0-5 INJECTION, SOLUTION INTRAVENOUS; SUBCUTANEOUS
Status: DISCONTINUED | OUTPATIENT
Start: 2023-10-09 | End: 2023-10-09 | Stop reason: SDUPTHER

## 2023-10-08 RX ORDER — AMLODIPINE BESYLATE 5 MG/1
5 TABLET ORAL DAILY
Status: DISCONTINUED | OUTPATIENT
Start: 2023-10-09 | End: 2023-10-09

## 2023-10-08 RX ORDER — LISINOPRIL 10 MG/1
10 TABLET ORAL DAILY
Status: DISCONTINUED | OUTPATIENT
Start: 2023-10-09 | End: 2023-10-10 | Stop reason: HOSPADM

## 2023-10-08 RX ORDER — ASPIRIN 81 MG/1
81 TABLET ORAL DAILY
COMMUNITY

## 2023-10-08 RX ORDER — RAMIPRIL 5 MG/1
5 CAPSULE ORAL 2 TIMES DAILY
COMMUNITY
End: 2023-11-07 | Stop reason: SDUPTHER

## 2023-10-08 RX ORDER — METOPROLOL SUCCINATE 50 MG/1
50 TABLET, EXTENDED RELEASE ORAL DAILY
Status: DISCONTINUED | OUTPATIENT
Start: 2023-10-09 | End: 2023-10-09

## 2023-10-08 RX ORDER — IPRATROPIUM BROMIDE 21 UG/1
2 SPRAY, METERED NASAL 2 TIMES DAILY
COMMUNITY
End: 2023-10-18

## 2023-10-08 RX ORDER — INSULIN GLARGINE 100 [IU]/ML
30 INJECTION, SOLUTION SUBCUTANEOUS NIGHTLY
Status: DISCONTINUED | OUTPATIENT
Start: 2023-10-08 | End: 2023-10-09

## 2023-10-08 RX ORDER — HYDROCODONE BITARTRATE AND HOMATROPINE METHYLBROMIDE ORAL SOLUTION 5; 1.5 MG/5ML; MG/5ML
5 LIQUID ORAL EVERY 4 HOURS PRN
Status: DISCONTINUED | OUTPATIENT
Start: 2023-10-08 | End: 2023-10-10 | Stop reason: HOSPADM

## 2023-10-08 RX ORDER — CEFTRIAXONE 1 G/50ML
1 INJECTION, SOLUTION INTRAVENOUS ONCE
Status: COMPLETED | OUTPATIENT
Start: 2023-10-08 | End: 2023-10-08

## 2023-10-08 RX ORDER — CEFTRIAXONE 1 G/50ML
1 INJECTION, SOLUTION INTRAVENOUS ONCE
Status: DISCONTINUED | OUTPATIENT
Start: 2023-10-08 | End: 2023-10-08

## 2023-10-08 RX ORDER — CHLORTHALIDONE 25 MG/1
25 TABLET ORAL
COMMUNITY
End: 2023-11-07 | Stop reason: SDUPTHER

## 2023-10-08 RX ORDER — POLYETHYLENE GLYCOL 3350 17 G/17G
17 POWDER, FOR SOLUTION ORAL DAILY PRN
Status: DISCONTINUED | OUTPATIENT
Start: 2023-10-08 | End: 2023-10-10 | Stop reason: HOSPADM

## 2023-10-08 RX ORDER — ALUMINUM HYDROXIDE, MAGNESIUM HYDROXIDE, AND SIMETHICONE 1200; 120; 1200 MG/30ML; MG/30ML; MG/30ML
30 SUSPENSION ORAL 4 TIMES DAILY PRN
Status: DISCONTINUED | OUTPATIENT
Start: 2023-10-08 | End: 2023-10-10 | Stop reason: HOSPADM

## 2023-10-08 RX ORDER — HYDROXYZINE HYDROCHLORIDE 25 MG/1
25 TABLET, FILM COATED ORAL 2 TIMES DAILY
Status: DISCONTINUED | OUTPATIENT
Start: 2023-10-08 | End: 2023-10-10 | Stop reason: HOSPADM

## 2023-10-08 RX ORDER — ALBUTEROL SULFATE 0.83 MG/ML
2.5 SOLUTION RESPIRATORY (INHALATION) EVERY 6 HOURS PRN
Status: DISCONTINUED | OUTPATIENT
Start: 2023-10-08 | End: 2023-10-10 | Stop reason: HOSPADM

## 2023-10-08 RX ORDER — DEXTROSE MONOHYDRATE 100 MG/ML
0.3 INJECTION, SOLUTION INTRAVENOUS ONCE AS NEEDED
Status: DISCONTINUED | OUTPATIENT
Start: 2023-10-08 | End: 2023-10-10 | Stop reason: HOSPADM

## 2023-10-08 RX ORDER — ACETAMINOPHEN 325 MG/1
650 TABLET ORAL EVERY 4 HOURS PRN
Status: DISCONTINUED | OUTPATIENT
Start: 2023-10-08 | End: 2023-10-10 | Stop reason: HOSPADM

## 2023-10-08 RX ORDER — PANTOPRAZOLE SODIUM 40 MG/1
40 TABLET, DELAYED RELEASE ORAL
Status: DISCONTINUED | OUTPATIENT
Start: 2023-10-09 | End: 2023-10-10 | Stop reason: HOSPADM

## 2023-10-08 RX ORDER — METOPROLOL SUCCINATE 50 MG/1
50 TABLET, EXTENDED RELEASE ORAL NIGHTLY
COMMUNITY
End: 2023-10-13 | Stop reason: WASHOUT

## 2023-10-08 RX ORDER — NAPROXEN SODIUM 220 MG/1
81 TABLET, FILM COATED ORAL DAILY
Status: DISCONTINUED | OUTPATIENT
Start: 2023-10-09 | End: 2023-10-10 | Stop reason: HOSPADM

## 2023-10-08 RX ORDER — CHLORTHALIDONE 25 MG/1
25 TABLET ORAL DAILY
Status: DISCONTINUED | OUTPATIENT
Start: 2023-10-09 | End: 2023-10-10 | Stop reason: HOSPADM

## 2023-10-08 RX ORDER — HYDROXYZINE HYDROCHLORIDE 25 MG/1
25 TABLET, FILM COATED ORAL 2 TIMES DAILY
COMMUNITY

## 2023-10-08 RX ORDER — OXYCODONE HYDROCHLORIDE 5 MG/1
TABLET ORAL
COMMUNITY
End: 2023-10-13 | Stop reason: WASHOUT

## 2023-10-08 RX ORDER — EZETIMIBE 10 MG/1
10 TABLET ORAL DAILY
COMMUNITY
End: 2023-11-07 | Stop reason: SDUPTHER

## 2023-10-08 RX ORDER — VENLAFAXINE HYDROCHLORIDE 150 MG/1
150 CAPSULE, EXTENDED RELEASE ORAL
Status: DISCONTINUED | OUTPATIENT
Start: 2023-10-09 | End: 2023-10-10 | Stop reason: HOSPADM

## 2023-10-08 RX ORDER — METFORMIN HYDROCHLORIDE EXTENDED-RELEASE TABLETS 500 MG/1
1000 TABLET, FILM COATED, EXTENDED RELEASE ORAL
COMMUNITY

## 2023-10-08 RX ORDER — IBUPROFEN 400 MG/1
400 TABLET ORAL EVERY 6 HOURS PRN
Status: DISCONTINUED | OUTPATIENT
Start: 2023-10-08 | End: 2023-10-10 | Stop reason: HOSPADM

## 2023-10-08 RX ORDER — NITROGLYCERIN 0.4 MG/1
0.4 TABLET SUBLINGUAL EVERY 5 MIN PRN
Status: DISCONTINUED | OUTPATIENT
Start: 2023-10-08 | End: 2023-10-10 | Stop reason: HOSPADM

## 2023-10-08 RX ORDER — OMEPRAZOLE 40 MG/1
40 CAPSULE, DELAYED RELEASE ORAL
COMMUNITY

## 2023-10-08 RX ORDER — DEXTROSE 50 % IN WATER (D50W) INTRAVENOUS SYRINGE
25
Status: DISCONTINUED | OUTPATIENT
Start: 2023-10-08 | End: 2023-10-10 | Stop reason: HOSPADM

## 2023-10-08 RX ADMIN — CEFTRIAXONE SODIUM 1 G: 1 INJECTION, SOLUTION INTRAVENOUS at 17:54

## 2023-10-08 RX ADMIN — INSULIN GLARGINE 30 UNITS: 100 INJECTION, SOLUTION SUBCUTANEOUS at 21:07

## 2023-10-08 RX ADMIN — ACETAMINOPHEN 650 MG: 325 TABLET ORAL at 23:30

## 2023-10-08 RX ADMIN — HYDROXYZINE HYDROCHLORIDE 25 MG: 25 TABLET ORAL at 23:30

## 2023-10-08 RX ADMIN — AZITHROMYCIN 500 MG: 500 INJECTION, POWDER, LYOPHILIZED, FOR SOLUTION INTRAVENOUS at 17:25

## 2023-10-08 SDOH — SOCIAL STABILITY: SOCIAL INSECURITY

## 2023-10-08 SDOH — HEALTH STABILITY: MENTAL HEALTH

## 2023-10-08 SDOH — ECONOMIC STABILITY: INCOME INSECURITY: IN THE LAST 12 MONTHS, WAS THERE A TIME WHEN YOU WERE NOT ABLE TO PAY THE MORTGAGE OR RENT ON TIME?: NO

## 2023-10-08 SDOH — SOCIAL STABILITY: SOCIAL NETWORK

## 2023-10-08 SDOH — SOCIAL STABILITY: SOCIAL INSECURITY: WITHIN THE LAST YEAR, HAVE YOU BEEN AFRAID OF YOUR PARTNER OR EX-PARTNER?: NO

## 2023-10-08 SDOH — SOCIAL STABILITY: SOCIAL INSECURITY
WITHIN THE LAST YEAR, HAVE YOU BEEN KICKED, HIT, SLAPPED, OR OTHERWISE PHYSICALLY HURT BY YOUR PARTNER OR EX-PARTNER?: NO

## 2023-10-08 SDOH — ECONOMIC STABILITY: TRANSPORTATION INSECURITY
IN THE PAST 12 MONTHS, HAS LACK OF TRANSPORTATION KEPT YOU FROM MEETINGS, WORK, OR FROM GETTING THINGS NEEDED FOR DAILY LIVING?: NO

## 2023-10-08 SDOH — SOCIAL STABILITY: SOCIAL NETWORK
DO YOU BELONG TO ANY CLUBS OR ORGANIZATIONS SUCH AS CHURCH GROUPS UNIONS, FRATERNAL OR ATHLETIC GROUPS, OR SCHOOL GROUPS?: YES

## 2023-10-08 SDOH — ECONOMIC STABILITY: FOOD INSECURITY: WITHIN THE PAST 12 MONTHS, YOU WORRIED THAT YOUR FOOD WOULD RUN OUT BEFORE YOU GOT MONEY TO BUY MORE.: NEVER TRUE

## 2023-10-08 SDOH — SOCIAL STABILITY: SOCIAL INSECURITY
WITHIN THE LAST YEAR, HAVE TO BEEN RAPED OR FORCED TO HAVE ANY KIND OF SEXUAL ACTIVITY BY YOUR PARTNER OR EX-PARTNER?: NO

## 2023-10-08 SDOH — SOCIAL STABILITY: SOCIAL INSECURITY: ABUSE: ADULT

## 2023-10-08 SDOH — HEALTH STABILITY: PHYSICAL HEALTH

## 2023-10-08 SDOH — SOCIAL STABILITY: SOCIAL INSECURITY: ARE THERE ANY APPARENT SIGNS OF INJURIES/BEHAVIORS THAT COULD BE RELATED TO ABUSE/NEGLECT?: NO

## 2023-10-08 SDOH — SOCIAL STABILITY: SOCIAL NETWORK: ARE YOU MARRIED, WIDOWED, DIVORCED, SEPARATED, NEVER MARRIED, OR LIVING WITH A PARTNER?: MARRIED

## 2023-10-08 SDOH — SOCIAL STABILITY: SOCIAL INSECURITY: ARE YOU OR HAVE YOU BEEN THREATENED OR ABUSED PHYSICALLY, EMOTIONALLY, OR SEXUALLY BY ANYONE?: NO

## 2023-10-08 SDOH — ECONOMIC STABILITY: FOOD INSECURITY: WITHIN THE PAST 12 MONTHS, THE FOOD YOU BOUGHT JUST DIDN'T LAST AND YOU DIDN'T HAVE MONEY TO GET MORE.: NEVER TRUE

## 2023-10-08 SDOH — SOCIAL STABILITY: SOCIAL INSECURITY: DOES ANYONE TRY TO KEEP YOU FROM HAVING/CONTACTING OTHER FRIENDS OR DOING THINGS OUTSIDE YOUR HOME?: NO

## 2023-10-08 SDOH — SOCIAL STABILITY: SOCIAL INSECURITY: HAS ANYONE EVER THREATENED TO HURT YOUR FAMILY OR YOUR PETS?: NO

## 2023-10-08 SDOH — ECONOMIC STABILITY: INCOME INSECURITY: HOW HARD IS IT FOR YOU TO PAY FOR THE VERY BASICS LIKE FOOD, HOUSING, MEDICAL CARE, AND HEATING?: SOMEWHAT HARD

## 2023-10-08 SDOH — SOCIAL STABILITY: SOCIAL INSECURITY: WITHIN THE LAST YEAR, HAVE YOU BEEN HUMILIATED OR EMOTIONALLY ABUSED IN OTHER WAYS BY YOUR PARTNER OR EX-PARTNER?: NO

## 2023-10-08 SDOH — ECONOMIC STABILITY: INCOME INSECURITY: IN THE PAST 12 MONTHS, HAS THE ELECTRIC, GAS, OIL, OR WATER COMPANY THREATENED TO SHUT OFF SERVICE IN YOUR HOME?: NO

## 2023-10-08 SDOH — SOCIAL STABILITY: SOCIAL INSECURITY: DO YOU FEEL UNSAFE GOING BACK TO THE PLACE WHERE YOU ARE LIVING?: NO

## 2023-10-08 SDOH — SOCIAL STABILITY: SOCIAL INSECURITY: HAVE YOU HAD THOUGHTS OF HARMING ANYONE ELSE?: NO

## 2023-10-08 SDOH — ECONOMIC STABILITY: HOUSING INSECURITY
IN THE LAST 12 MONTHS, WAS THERE A TIME WHEN YOU DID NOT HAVE A STEADY PLACE TO SLEEP OR SLEPT IN A SHELTER (INCLUDING NOW)?: NO

## 2023-10-08 SDOH — ECONOMIC STABILITY: HOUSING INSECURITY: IN THE LAST 12 MONTHS, HOW MANY PLACES HAVE YOU LIVED?: 1

## 2023-10-08 SDOH — SOCIAL STABILITY: SOCIAL INSECURITY: DO YOU FEEL ANYONE HAS EXPLOITED OR TAKEN ADVANTAGE OF YOU FINANCIALLY OR OF YOUR PERSONAL PROPERTY?: NO

## 2023-10-08 SDOH — ECONOMIC STABILITY: TRANSPORTATION INSECURITY
IN THE PAST 12 MONTHS, HAS THE LACK OF TRANSPORTATION KEPT YOU FROM MEDICAL APPOINTMENTS OR FROM GETTING MEDICATIONS?: NO

## 2023-10-08 ASSESSMENT — LIFESTYLE VARIABLES
AUDIT-C TOTAL SCORE: 0
SKIP TO QUESTIONS 9-10: 1
HAVE YOU EVER FELT YOU SHOULD CUT DOWN ON YOUR DRINKING: NO
HOW MANY STANDARD DRINKS CONTAINING ALCOHOL DO YOU HAVE ON A TYPICAL DAY: PATIENT DOES NOT DRINK
EVER HAD A DRINK FIRST THING IN THE MORNING TO STEADY YOUR NERVES TO GET RID OF A HANGOVER: NO
HOW OFTEN DO YOU HAVE 6 OR MORE DRINKS ON ONE OCCASION: NEVER
AUDIT-C TOTAL SCORE: 0
SUBSTANCE_ABUSE_PAST_12_MONTHS: NO
HOW OFTEN DO YOU HAVE A DRINK CONTAINING ALCOHOL: NEVER
PRESCIPTION_ABUSE_PAST_12_MONTHS: NO
HAVE PEOPLE ANNOYED YOU BY CRITICIZING YOUR DRINKING: NO
EVER FELT BAD OR GUILTY ABOUT YOUR DRINKING: NO

## 2023-10-08 ASSESSMENT — ACTIVITIES OF DAILY LIVING (ADL)
ADEQUATE_TO_COMPLETE_ADL: YES
HEARING - LEFT EAR: FUNCTIONAL
GROOMING: INDEPENDENT
BATHING: INDEPENDENT
TOILETING: INDEPENDENT
WALKS IN HOME: INDEPENDENT
PATIENT'S MEMORY ADEQUATE TO SAFELY COMPLETE DAILY ACTIVITIES?: YES
FEEDING YOURSELF: INDEPENDENT
JUDGMENT_ADEQUATE_SAFELY_COMPLETE_DAILY_ACTIVITIES: YES
HEARING - RIGHT EAR: FUNCTIONAL
DRESSING YOURSELF: INDEPENDENT

## 2023-10-08 ASSESSMENT — COGNITIVE AND FUNCTIONAL STATUS - GENERAL
PATIENT BASELINE BEDBOUND: NO
MOBILITY SCORE: 24
DAILY ACTIVITIY SCORE: 24

## 2023-10-08 ASSESSMENT — COLUMBIA-SUICIDE SEVERITY RATING SCALE - C-SSRS
6. HAVE YOU EVER DONE ANYTHING, STARTED TO DO ANYTHING, OR PREPARED TO DO ANYTHING TO END YOUR LIFE?: NO
1. IN THE PAST MONTH, HAVE YOU WISHED YOU WERE DEAD OR WISHED YOU COULD GO TO SLEEP AND NOT WAKE UP?: NO
2. HAVE YOU ACTUALLY HAD ANY THOUGHTS OF KILLING YOURSELF?: NO

## 2023-10-08 ASSESSMENT — PAIN SCALES - GENERAL
PAINLEVEL_OUTOF10: 7
PAINLEVEL_OUTOF10: 8
PAINLEVEL_OUTOF10: 8

## 2023-10-08 ASSESSMENT — PAIN DESCRIPTION - PAIN TYPE: TYPE: ACUTE PAIN

## 2023-10-08 ASSESSMENT — PATIENT HEALTH QUESTIONNAIRE - PHQ9
2. FEELING DOWN, DEPRESSED OR HOPELESS: NOT AT ALL
SUM OF ALL RESPONSES TO PHQ9 QUESTIONS 1 & 2: 0
1. LITTLE INTEREST OR PLEASURE IN DOING THINGS: NOT AT ALL

## 2023-10-08 ASSESSMENT — PAIN - FUNCTIONAL ASSESSMENT
PAIN_FUNCTIONAL_ASSESSMENT: 0-10
PAIN_FUNCTIONAL_ASSESSMENT: 0-10

## 2023-10-08 ASSESSMENT — PAIN DESCRIPTION - DESCRIPTORS: DESCRIPTORS: ACHING

## 2023-10-08 ASSESSMENT — PAIN DESCRIPTION - PROGRESSION: CLINICAL_PROGRESSION: GRADUALLY IMPROVING

## 2023-10-08 ASSESSMENT — PAIN DESCRIPTION - ORIENTATION: ORIENTATION: RIGHT;LEFT

## 2023-10-08 ASSESSMENT — PAIN DESCRIPTION - ONSET: ONSET: PROGRESSIVE

## 2023-10-08 ASSESSMENT — PAIN DESCRIPTION - LOCATION: LOCATION: RIB CAGE

## 2023-10-08 ASSESSMENT — PAIN DESCRIPTION - FREQUENCY: FREQUENCY: INTERMITTENT

## 2023-10-08 NOTE — ED PROVIDER NOTES
HPI   Chief Complaint   Patient presents with    Cough       73-year-old male with a history of hypertension, diabetes, CAD, COPD currently on baby aspirin presenting to the ED today with a cough for the past 1 week.  Patient states that he flew to [place] 1 week ago for a gambling tournament.  While he was there he developed a dry cough.  The cough has been persistent over the past week.  He states he feels a little short of breath and now his ribs are hurting from coughing.  He also has had runny nose and sinus congestion.  Initially he also had a headache but the symptoms resolved.  He denies any current headache and denies dizziness, fevers or neck pain.  He has sinus congestion and runny nose as well as a dry cough but denies sore throat or ear pain.  He states the front of his ribs on the lower portion hurts on both sides of his chest and he feels a little short of breath.  He denies any chest pressure, back pain and denies hemoptysis.  He denies any nausea or vomiting or abdominal pain and states his appetite has been normal.  He denies diarrhea or constipation, dark or bloody stools.  He denies leg pain or swelling or history of DVT or PE.  He is a current everyday smoker.  No further complaints at this time.      History provided by:  Patient                      No data recorded                Patient History   Past Medical History:   Diagnosis Date    Acute myocardial infarction, unspecified (CMS/formerly [facility]) 01/24/2018    Heart attack    Acute myocardial infarction, unspecified (CMS/formerly [facility]) 01/24/2018    Heart attack    Chronic obstructive pulmonary disease, unspecified (CMS/formerly [facility]) 01/24/2018    COPD, mild    Chronic obstructive pulmonary disease, unspecified (CMS/formerly [facility]) 01/24/2018    COPD, mild    Coronary angioplasty status     Post PTCA    Encounter for immunization 11/04/2022    Encounter for immunization    Encounter for screening for malignant neoplasm of colon 08/25/2016    Encounter for colonoscopy due to  history of colonic polyp    Encounter for screening for malignant neoplasm of skin 04/14/2022    Skin cancer screening    Nicotine dependence, cigarettes, uncomplicated 09/12/2022    Cigarette smoker    Obesity, unspecified 05/25/2022    Class 1 obesity with body mass index (BMI) of 31.0 to 31.9 in adult    Old myocardial infarction 01/24/2018    History of myocardial infarction    Other acute postprocedural pain     Post-operative pain    Pain in left knee     Left knee pain, unspecified chronicity    Pain in right hip 11/04/2022    Hip pain, bilateral    Personal history of other diseases of the digestive system 01/24/2018    History of gastroesophageal reflux (GERD)    Personal history of other diseases of the musculoskeletal system and connective tissue 08/25/2016    History of low back pain    Personal history of other diseases of the respiratory system     History of sinusitis    Personal history of other endocrine, nutritional and metabolic disease 08/25/2016    History of hyperlipidemia    Personal history of other mental and behavioral disorders 04/14/2022    History of depression    Personal history of other specified conditions 12/08/2016    History of diarrhea    Personal history of other specified conditions     History of prolonged Q-T interval on ECG    Personal history of other specified conditions 06/20/2018    History of nocturia    Separation of muscle (nontraumatic), other site 05/14/2018    Rectus diastasis    Sprain of medial collateral ligament of left knee, initial encounter 08/28/2017    Sprain of medial collateral ligament of left knee, initial encounter    Sprain of medial collateral ligament of left knee, subsequent encounter 10/12/2017    Sprain of medial collateral ligament of left knee, subsequent encounter    Type 2 diabetes mellitus without complications (CMS/[facility]) 01/24/2018    Diabetes mellitus    Type 2 diabetes mellitus without complications (CMS/[facility]) 01/24/2018    Diabetes  mellitus    Umbilical hernia without obstruction or gangrene 05/14/2018    Umbilical hernia without obstruction and without gangrene     Past Surgical History:   Procedure Laterality Date    COLONOSCOPY  10/06/2016    Colonoscopy    CORONARY ANGIOPLASTY WITH STENT PLACEMENT  08/25/2016    Cath Stent Placement    OTHER SURGICAL HISTORY  05/25/2022    Oral surgery    TONSILLECTOMY  08/25/2016    Tonsillectomy     No family history on file.  Social History     Tobacco Use    Smoking status: Not on file    Smokeless tobacco: Not on file   Substance Use Topics    Alcohol use: Not on file    Drug use: Not on file       Physical Exam   ED Triage Vitals [10/08/23 1229]   Temp Heart Rate Resp BP   36 °C (96.8 °F) 75 18 109/75      SpO2 Temp Source Heart Rate Source Patient Position   98 % Oral -- --      BP Location FiO2 (%)     -- --       Physical Exam  Constitutional:       General: He is not in acute distress.     Appearance: Normal appearance.   HENT:      Mouth/Throat:      Mouth: Mucous membranes are moist.      Pharynx: No posterior oropharyngeal erythema.   Eyes:      Extraocular Movements: Extraocular movements intact.      Conjunctiva/sclera: Conjunctivae normal.      Pupils: Pupils are equal, round, and reactive to light.   Cardiovascular:      Rate and Rhythm: Normal rate and regular rhythm.   Pulmonary:      Effort: Pulmonary effort is normal.      Comments: Breath sounds diminished bilaterally with mild diffuse expiratory wheeze.  No rhonchi or rales.  Musculoskeletal:      Cervical back: Normal range of motion and neck supple.      Right lower leg: No edema.      Left lower leg: No edema.      Comments: Normal gait and strength tone, chest wall nontender, atraumatic.  No calf tenderness or swelling bilaterally.   Skin:     General: Skin is warm and dry.   Neurological:      Mental Status: He is alert and oriented to person, place, and time. Mental status is at baseline.         ED Course & MDM   Diagnoses as  of 10/08/23 1801   Pneumonia of both lungs due to infectious organism, unspecified part of lung   2nd degree AV block       Medical Decision Making  73-year-old male with history of HTN, DM, CAD, COPD, current everyday smoker presenting to the ED today with a dry cough for the past 1 week.  His symptoms initially also included headache, sinus congestion and runny nose.  He traveled to [place] 1 week ago by plane.  He no longer has headache, still endorses sinus congestion and a dry cough.  Now his ribs feel sore from coughing and he is mildly short of breath.  He has no further associated complaints and he arrives afebrile with stable vital signs.  He is resting comfortably on exam, speaking in full sentences without signs of distress.  On my exam heart RRR, breath sounds are diminished bilaterally with very mild diffuse expiratory wheeze but no rhonchi or rales.  He has no peripheral edema or calf tenderness and his exam is otherwise within normal limits.  COVID and flu test are ordered as well as EKG, chest x-ray and laboratory studies.    ECG per my interpretation is sinus bradycardia at 51 bpm with second-degree AV block which does appear new for the patient.  On old EKGs I am able to see the patient has had a first-degree AV block, he does have a Mobitz 1.  We will place him on a cardiac monitor.  D-dimer is negative for 15.  Troponin is 9.  CBC with stable H&H and no leukocytosis.  CMP with glucose of 167, sodium of 131 and BUN of 26.  COVID and flu test are negative and labs at this time are otherwise within normal limits.  Lactic is 3.0.  X-ray today shows patchy bibasilar infiltrates.    I did discuss the case with my attending, IV Rocephin and Zithromax were ordered for the patient and he is feeling improved.  He is not hypoxic but will receive treatment for his pneumonia today as he is endorsing a cough for the past 1 week.  Due to the patient's new second-degree AV block, bradycardia and his blood  pressures being on the lower end we do feel he would benefit from admission at this time and I discussed the case with Dr. Reese who accepts the patient at this time.    Diagnosis: Pneumonia  Second-degree AV block      Labs Reviewed   CBC WITH AUTO DIFFERENTIAL - Abnormal       Result Value    WBC 8.7      nRBC 0.0      RBC 4.55      Hemoglobin 13.1 (*)     Hematocrit 37.4 (*)     MCV 82      MCH 28.8      MCHC 35.0      RDW 13.1      Platelets 164      MPV 9.5      Neutrophils % 51.9      Immature Granulocytes %, Automated 0.5      Lymphocytes % 36.5      Monocytes % 7.9      Eosinophils % 2.7      Basophils % 0.5      Neutrophils Absolute 4.51      Immature Granulocytes Absolute, Automated 0.04      Lymphocytes Absolute 3.16 (*)     Monocytes Absolute 0.68      Eosinophils Absolute 0.23      Basophils Absolute 0.04     COMPREHENSIVE METABOLIC PANEL - Abnormal    Glucose 167 (*)     Sodium 131 (*)     Potassium 4.1      Chloride 99      Bicarbonate 22      Anion Gap 14      Urea Nitrogen 26 (*)     Creatinine 1.22      eGFR 63      Calcium 8.9      Albumin 3.8      Alkaline Phosphatase 65      Total Protein 7.1      AST 16      Bilirubin, Total 0.5      ALT 21     INFLUENZA A AND B PCR - Normal    Flu A Result Not Detected      Flu B Result Not Detected      Narrative:     This assay is an in vitro diagnostic multiplex nucleic acid amplification test for the detection and discrimination of Influenza A & B from nasopharyngeal specimens, and has been validated for use at [facility]. Negative results do not preclude Influenza A/B infections, and should not be used as the sole basis for diagnosis, treatment, or other management decisions. If Influenza A/B and RSV PCR results are negative, testing for Parainfluenza virus, Adenovirus and Metapneumovirus is routinely performed for [facility] pediatric oncology and intensive care inpatients, and is available on other patients by placing an add-on request.    SARS-COV-2 PCR, SYMPTOMATIC - Normal    Coronavirus 2019, PCR Not Detected      Narrative:     This assay has received FDA Emergency Use Authorization (EUA) and is only authorized for the duration of time that circumstances exist to justify the authorization of the emergency use of in vitro diagnostic tests for the detection of SARS-CoV-2 virus and/or diagnosis of COVID-19 infection under section 564(b)(1) of the Act, 21 U.S.C. 360bbb-3(b)(1). This assay is an in vitro diagnostic nucleic acid amplification test for the qualitative detection of SARS-CoV-2 from nasopharyngeal specimens and has been validated for use at [facility]. Negative results do not preclude COVID-19 infections and should not be used as the sole basis for diagnosis, treatment, or other management decisions.     LACTATE - Normal    Lactate 2.0      Narrative:     Venipuncture immediately after or during the administration of Metamizole may lead to falsely low results. Testing should be performed immediately  prior to Metamizole dosing.   TROPONIN I, HIGH SENSITIVITY - Normal    Troponin I, High Sensitivity 9      Narrative:     Less than 99th percentile of normal range cutoff-  Female and children under 18 years old <14 ng/L; Male <21 ng/L: Negative  Repeat testing should be performed if clinically indicated.     Female and children under 18 years old 14-50 ng/L; Male 21-50 ng/L:  Consistent with possible cardiac damage and possible increased clinical   risk. Serial measurements may help to assess extent of myocardial damage.     >50 ng/L: Consistent with cardiac damage, increased clinical risk and  myocardial infarction. Serial measurements may help assess extent of   myocardial damage.      NOTE: Children less than 1 year old may have higher baseline troponin   levels and results should be interpreted in conjunction with the overall   clinical context.     NOTE: Troponin I testing is performed using a different    testing methodology at [facility] than at other   [facility]. Direct result comparisons should only   be made within the same method.   D-DIMER, VTE EXCLUSION - Normal    D-Dimer, Quantitative VTE Exclusion 450      Narrative:     The VTE Exclusion D-Dimer assay is reported in ng/mL Fibrinogen Equivalent Units (FEU).    Per 's instructions for use, a value of less than 500 ng/mL (FEU) may help to exclude DVT or PE in outpatients when the assay is used with a clinical pretest probability assessment.([facility] must utilize and document eCalc 'Wells Score Deep Vein Thrombosis Risk' for DVT exclusion only. Emergency Department should utilize  Guidelines for Emergency Department Use of the VTE Exclusion D-Dimer and Clinical Pretest probability assessment model for DVT or PE exclusion.)       XR chest 2 views   Final Result   Patchy bibasilar infiltrates.             Signed by: Dyllan James 10/8/2023 1:12 PM   Dictation workstation:   [id number]          Procedure  Procedures     Franchesca Gross PA-C  10/08/23 1604

## 2023-10-09 PROBLEM — Z79.4 TYPE 2 DIABETES MELLITUS WITH HYPOGLYCEMIA, WITH LONG-TERM CURRENT USE OF INSULIN (MULTI): Status: ACTIVE | Noted: 2023-10-09

## 2023-10-09 PROBLEM — J44.9 CHRONIC OBSTRUCTIVE PULMONARY DISEASE, UNSPECIFIED (MULTI): Status: ACTIVE | Noted: 2018-03-21

## 2023-10-09 PROBLEM — F17.200 CURRENT EVERY DAY SMOKER: Status: ACTIVE | Noted: 2023-10-09

## 2023-10-09 PROBLEM — I10 PRIMARY HYPERTENSION: Status: ACTIVE | Noted: 2023-10-09

## 2023-10-09 PROBLEM — E11.69 HYPERLIPIDEMIA ASSOCIATED WITH TYPE 2 DIABETES MELLITUS (MULTI): Status: ACTIVE | Noted: 2023-10-09

## 2023-10-09 PROBLEM — E11.649 TYPE 2 DIABETES MELLITUS WITH HYPOGLYCEMIA, WITH LONG-TERM CURRENT USE OF INSULIN (MULTI): Status: ACTIVE | Noted: 2023-10-09

## 2023-10-09 PROBLEM — R09.1 PLEURITIS: Status: ACTIVE | Noted: 2023-10-09

## 2023-10-09 PROBLEM — E78.5 HYPERLIPIDEMIA ASSOCIATED WITH TYPE 2 DIABETES MELLITUS (MULTI): Status: ACTIVE | Noted: 2023-10-09

## 2023-10-09 PROBLEM — K21.9 GERD (GASTROESOPHAGEAL REFLUX DISEASE): Status: ACTIVE | Noted: 2023-10-09

## 2023-10-09 LAB
GLUCOSE BLD MANUAL STRIP-MCNC: 275 MG/DL (ref 74–99)
GLUCOSE BLD MANUAL STRIP-MCNC: 286 MG/DL (ref 74–99)
GLUCOSE BLD MANUAL STRIP-MCNC: 332 MG/DL (ref 74–99)
GLUCOSE BLD MANUAL STRIP-MCNC: 446 MG/DL (ref 74–99)

## 2023-10-09 PROCEDURE — 82947 ASSAY GLUCOSE BLOOD QUANT: CPT

## 2023-10-09 PROCEDURE — 96376 TX/PRO/DX INJ SAME DRUG ADON: CPT

## 2023-10-09 PROCEDURE — 99223 1ST HOSP IP/OBS HIGH 75: CPT | Performed by: INTERNAL MEDICINE

## 2023-10-09 PROCEDURE — 82947 ASSAY GLUCOSE BLOOD QUANT: CPT | Mod: 91

## 2023-10-09 PROCEDURE — 96372 THER/PROPH/DIAG INJ SC/IM: CPT | Performed by: INTERNAL MEDICINE

## 2023-10-09 PROCEDURE — 96366 THER/PROPH/DIAG IV INF ADDON: CPT

## 2023-10-09 PROCEDURE — G0378 HOSPITAL OBSERVATION PER HR: HCPCS

## 2023-10-09 PROCEDURE — 2500000002 HC RX 250 W HCPCS SELF ADMINISTERED DRUGS (ALT 637 FOR MEDICARE OP, ALT 636 FOR OP/ED): Performed by: INTERNAL MEDICINE

## 2023-10-09 PROCEDURE — 2500000004 HC RX 250 GENERAL PHARMACY W/ HCPCS (ALT 636 FOR OP/ED): Performed by: INTERNAL MEDICINE

## 2023-10-09 PROCEDURE — 2500000001 HC RX 250 WO HCPCS SELF ADMINISTERED DRUGS (ALT 637 FOR MEDICARE OP): Performed by: INTERNAL MEDICINE

## 2023-10-09 PROCEDURE — 96375 TX/PRO/DX INJ NEW DRUG ADDON: CPT

## 2023-10-09 RX ORDER — INSULIN GLARGINE 100 [IU]/ML
35 INJECTION, SOLUTION SUBCUTANEOUS 2 TIMES DAILY
Status: DISCONTINUED | OUTPATIENT
Start: 2023-10-09 | End: 2023-10-09

## 2023-10-09 RX ORDER — ACETAMINOPHEN 160 MG/5ML
650 SOLUTION ORAL EVERY 4 HOURS PRN
Status: CANCELLED | OUTPATIENT
Start: 2023-10-09

## 2023-10-09 RX ORDER — INSULIN GLARGINE 100 [IU]/ML
32 INJECTION, SOLUTION SUBCUTANEOUS EVERY MORNING
Status: DISCONTINUED | OUTPATIENT
Start: 2023-10-10 | End: 2023-10-10 | Stop reason: HOSPADM

## 2023-10-09 RX ORDER — ACETAMINOPHEN 650 MG/1
650 SUPPOSITORY RECTAL EVERY 4 HOURS PRN
Status: CANCELLED | OUTPATIENT
Start: 2023-10-09

## 2023-10-09 RX ORDER — INSULIN GLARGINE 100 [IU]/ML
35 INJECTION, SOLUTION SUBCUTANEOUS NIGHTLY
Status: DISCONTINUED | OUTPATIENT
Start: 2023-10-09 | End: 2023-10-10 | Stop reason: HOSPADM

## 2023-10-09 RX ORDER — ONDANSETRON 4 MG/1
4 TABLET, FILM COATED ORAL
Status: CANCELLED | OUTPATIENT
Start: 2023-10-09

## 2023-10-09 RX ORDER — AMLODIPINE BESYLATE 5 MG/1
10 TABLET ORAL DAILY
Status: DISCONTINUED | OUTPATIENT
Start: 2023-10-10 | End: 2023-10-10 | Stop reason: HOSPADM

## 2023-10-09 RX ORDER — ACETAMINOPHEN 325 MG/1
650 TABLET ORAL EVERY 4 HOURS PRN
Status: CANCELLED | OUTPATIENT
Start: 2023-10-09

## 2023-10-09 RX ORDER — KETOROLAC TROMETHAMINE 30 MG/ML
15 INJECTION, SOLUTION INTRAMUSCULAR; INTRAVENOUS EVERY 8 HOURS PRN
Status: DISCONTINUED | OUTPATIENT
Start: 2023-10-09 | End: 2023-10-10 | Stop reason: HOSPADM

## 2023-10-09 RX ADMIN — KETOROLAC TROMETHAMINE 15 MG: 30 INJECTION, SOLUTION INTRAMUSCULAR at 12:45

## 2023-10-09 RX ADMIN — METHYLPREDNISOLONE SODIUM SUCCINATE 40 MG: 40 INJECTION, POWDER, FOR SOLUTION INTRAMUSCULAR; INTRAVENOUS at 12:45

## 2023-10-09 RX ADMIN — VENLAFAXINE HYDROCHLORIDE 150 MG: 150 CAPSULE, EXTENDED RELEASE ORAL at 08:22

## 2023-10-09 RX ADMIN — ASPIRIN 81 MG: 81 TABLET, CHEWABLE ORAL at 08:20

## 2023-10-09 RX ADMIN — CEFTRIAXONE SODIUM 1 G: 1 INJECTION, SOLUTION INTRAVENOUS at 17:32

## 2023-10-09 RX ADMIN — INSULIN LISPRO 2 UNITS: 100 INJECTION, SOLUTION INTRAVENOUS; SUBCUTANEOUS at 11:16

## 2023-10-09 RX ADMIN — LISINOPRIL 10 MG: 10 TABLET ORAL at 08:22

## 2023-10-09 RX ADMIN — AZITHROMYCIN MONOHYDRATE 500 MG: 500 INJECTION, POWDER, LYOPHILIZED, FOR SOLUTION INTRAVENOUS at 18:27

## 2023-10-09 RX ADMIN — HYDROXYZINE HYDROCHLORIDE 25 MG: 25 TABLET ORAL at 08:22

## 2023-10-09 RX ADMIN — HYDROXYZINE HYDROCHLORIDE 25 MG: 25 TABLET ORAL at 20:15

## 2023-10-09 RX ADMIN — CHLORTHALIDONE 25 MG: 25 TABLET ORAL at 08:22

## 2023-10-09 RX ADMIN — METFORMIN HYDROCHLORIDE 1000 MG: 500 TABLET, FILM COATED ORAL at 17:22

## 2023-10-09 RX ADMIN — PANTOPRAZOLE SODIUM 40 MG: 40 TABLET, DELAYED RELEASE ORAL at 06:39

## 2023-10-09 RX ADMIN — METFORMIN HYDROCHLORIDE 1000 MG: 500 TABLET, FILM COATED ORAL at 08:20

## 2023-10-09 RX ADMIN — HYDROCODONE BITARTRATE AND HOMATROPINE METHYLBROMIDE 5 ML: 5; 1.5 SOLUTION ORAL at 08:20

## 2023-10-09 RX ADMIN — INSULIN HUMAN 16 UNITS: 100 INJECTION, SOLUTION PARENTERAL at 17:22

## 2023-10-09 RX ADMIN — INSULIN GLARGINE 35 UNITS: 100 INJECTION, SOLUTION SUBCUTANEOUS at 20:17

## 2023-10-09 RX ADMIN — INSULIN LISPRO 3 UNITS: 100 INJECTION, SOLUTION INTRAVENOUS; SUBCUTANEOUS at 06:39

## 2023-10-09 RX ADMIN — METHYLPREDNISOLONE SODIUM SUCCINATE 40 MG: 40 INJECTION, POWDER, FOR SOLUTION INTRAMUSCULAR; INTRAVENOUS at 17:22

## 2023-10-09 RX ADMIN — AMLODIPINE BESYLATE 5 MG: 5 TABLET ORAL at 08:22

## 2023-10-09 ASSESSMENT — PAIN - FUNCTIONAL ASSESSMENT
PAIN_FUNCTIONAL_ASSESSMENT: 0-10

## 2023-10-09 ASSESSMENT — PAIN SCALES - GENERAL
PAINLEVEL_OUTOF10: 7
PAINLEVEL_OUTOF10: 2
PAINLEVEL_OUTOF10: 0 - NO PAIN

## 2023-10-09 NOTE — CONSULTS
Inpatient consult to Cardiology  Consult performed by: CELIA Holden-CNP  Consult ordered by: Shanika Reese MD  Reason for consult: new Mobitz type II Wenke block on EKG      Cardiology Consult Note      Date:   10/9/2023  Patient name:  Feliciano Worthington  Date of admission:  10/8/2023 12:34 PM  MRN:   [id number]  YOB: 1950  Time of Consult:  10:51 AM    Consulting Cardiologist: CELIA Forbes, CNP  Primary Cardiologist:  Dr. Roberto Haskins     Referring Provider: Dr. Reese    CARDIOLOGIST NOTE/CONSULT  Patient seen and examined and plan personally developed.  Agree with entries as per Ysabel Bright RN, cnp,     73-year-old male well-known to me with known coronary disease, hypertension, hyperlipidemia and continued tobacco abuse.  Recently traveling developed pneumonia as described below.  With antibiotic therapy is noted a bit more productive cough but overall feels better.  He has had no angina symptoms.  No claudication.  No lower extremity edema.    Exam: Lungs still show expiratory rhonchi he is moving air well at this time no active wheezing.  Cardiac exam is a regular rhythm and rate soft S4 no S3 abdominal exam is soft extremities show no edema    ECG: Without acute changes however there is evidence of type I second-degree AV block.  There is no evidence of type II second-degree AV block on any ECG or telemetry since he has been hospitalized.    Impression  1.  Pneumonia per the medicine service    2.  Type I second-degree AV block: Discontinue beta-blocker at this time.  He has normal LV function does not absolutely need beta-blocker it had been utilized in part however for remote angina.  Therefore we will discontinue the beta-blocker in favor of increasing amlodipine.    3.  Coronary artery disease: No evidence of ischemia or angina thus far    4.  Disposition: As long as he does not develop angina without beta-blocker and heart block does not progress which Is most  unlikely could probably discharge in next 24 to 48 hours and needs outpatient follow-up arranged with me as well as outpatient 24-hour Holter monitor prior to that office visit    Roberto Haskins MD            Admission Diagnosis:     Community acquired pneumonia of both lower lobes      History of Present Illness:      Feliciano Worthington is a 73 y.o.  male patient who is being at the request of Dr. Reese for inpatient consultation of arrhythmia. He was admitted on 10/8/2023.  Presmariam presented to [facility] emergency department on 10/8/2023 with chief complaint of coughing and fatigue that started approximately a week ago.  Patient states that he was out of town gambling in [place] and developed a cough which has been persistent over the week with little sputum production.  He denies fevers or chills.  He denies anginal chest pain.  He states that due to his cough his ribs are hurting and that he is somewhat short of breath due to his cough.  States that he has not not even been able to smoke for the last several days as he is not able to take a drag of his cigarette.  He is noted to be a longtime smoker.  He is diagnosed with pneumonia and placed on IV Rocephin and Zithromax.  Electrocardiogram was performed on 10/8/2023 that showed sinus bradycardia with second-degree type I block which is new for patient.  Previous EKG showed sinus rhythm with first-degree AV block, therefore patient was placed on telemetry unit and consult with [facility] heart cardiology has been placed for further evaluation of arrhythmia.  Patient follows with Dr. Haskins for cardiology care.    Patient denies anginal symptoms or use of sublingual nitroglycerin.  Has not had any nausea, vomiting, constipation or diarrhea.  States that he has been very fatigued and lying in bed since he got home from [place] middle of last week due to poor energy due to his cough.  He has had no presyncope or  syncope.  Does describe some orthostasis that when he stands up or pivots too quickly feels transiently lightheaded.  He has had not had any lower extremity edema.  Does admit that he occasionally forgets to take his medications every day but overall feels that he is mostly compliant.    Patient has past medical history of coronary artery disease.  He has remote coronary stents to the circumflex artery in 2007 and right coronary artery in 2011.  December 2022 myocardial perfusion imaging showed no ischemia with preserved LV systolic function.  Other past medical history  Hyperlipidemia  Diabetes  Hypertension  Obesity  Tobacco dependence  COPD  Depression  Chronic kidney disease        Allergies:     Allergies   Allergen Reactions    Tetracyclines Hives, Other, Unknown, Rash and Shortness of breath     Water blisters in mouth and genital region.    Water blister on tongue and penis    Atorvastatin Unknown     Cramping all over the body. Sore shoulders    Empagliflozin Unknown    Pravastatin Unknown    Rosuvastatin Unknown    Simvastatin Unknown    Statins-Hmg-Coa Reductase Inhibitors Unknown         Past Medical History:     Past Medical History:   Diagnosis Date    Acute myocardial infarction, unspecified (CMS/[facility]) 01/24/2018    Heart attack    Acute myocardial infarction, unspecified (CMS/[facility]) 01/24/2018    Heart attack    Chronic obstructive pulmonary disease, unspecified (CMS/[facility]) 01/24/2018    COPD, mild    Chronic obstructive pulmonary disease, unspecified (CMS/HCC) 01/24/2018    COPD, mild    Coronary angioplasty status     Post PTCA    Encounter for immunization 11/04/2022    Encounter for immunization    Encounter for screening for malignant neoplasm of colon 08/25/2016    Encounter for colonoscopy due to history of colonic polyp    Encounter for screening for malignant neoplasm of skin 04/14/2022    Skin cancer screening    Nicotine dependence, cigarettes, uncomplicated 09/12/2022    Cigarette smoker     Obesity, unspecified 05/25/2022    Class 1 obesity with body mass index (BMI) of 31.0 to 31.9 in adult    Old myocardial infarction 01/24/2018    History of myocardial infarction    Other acute postprocedural pain     Post-operative pain    Pain in left knee     Left knee pain, unspecified chronicity    Pain in right hip 11/04/2022    Hip pain, bilateral    Personal history of other diseases of the digestive system 01/24/2018    History of gastroesophageal reflux (GERD)    Personal history of other diseases of the musculoskeletal system and connective tissue 08/25/2016    History of low back pain    Personal history of other diseases of the respiratory system     History of sinusitis    Personal history of other endocrine, nutritional and metabolic disease 08/25/2016    History of hyperlipidemia    Personal history of other mental and behavioral disorders 04/14/2022    History of depression    Personal history of other specified conditions 12/08/2016    History of diarrhea    Personal history of other specified conditions     History of prolonged Q-T interval on ECG    Personal history of other specified conditions 06/20/2018    History of nocturia    Separation of muscle (nontraumatic), other site 05/14/2018    Rectus diastasis    Sprain of medial collateral ligament of left knee, initial encounter 08/28/2017    Sprain of medial collateral ligament of left knee, initial encounter    Sprain of medial collateral ligament of left knee, subsequent encounter 10/12/2017    Sprain of medial collateral ligament of left knee, subsequent encounter    Type 2 diabetes mellitus without complications (CMS/[facility]) 01/24/2018    Diabetes mellitus    Type 2 diabetes mellitus without complications (CMS/[facility]) 01/24/2018    Diabetes mellitus    Umbilical hernia without obstruction or gangrene 05/14/2018    Umbilical hernia without obstruction and without gangrene       Past Surgical History:     Past Surgical History:   Procedure  Laterality Date    COLONOSCOPY  10/06/2016    Colonoscopy    CORONARY ANGIOPLASTY WITH STENT PLACEMENT  2016    Cath Stent Placement    OTHER SURGICAL HISTORY  2022    Oral surgery    TONSILLECTOMY  2016    Tonsillectomy       Family History:     Positive for coronary artery disease    Social History:     Social History     Tobacco Use    Smoking status: Former     Packs/day: 1.50     Years: 50.00     Additional pack years: 0.00     Total pack years: 75.00     Types: Cigarettes     Quit date: 10/2/2023     Years since quittin.0    Smokeless tobacco: Never   Substance Use Topics    Drug use: Never   Patient states has not consumed alcohol or drugs for over 30 years    CURRENT MEDICATIONS    amLODIPine, 5 mg, oral, Daily  aspirin, 81 mg, oral, Daily  azithromycin, 500 mg, intravenous, q24h  cefTRIAXone, 1 g, intravenous, q24h  chlorthalidone, 25 mg, oral, Daily  hydrOXYzine HCL, 25 mg, oral, BID  insulin glargine, 30 Units, subcutaneous, Nightly  insulin lispro, 0-5 Units, subcutaneous, TID with meals  lisinopril, 10 mg, oral, Daily  metFORMIN, 1,000 mg, oral, BID with meals  metoprolol succinate XL, 50 mg, oral, Daily  pantoprazole, 40 mg, oral, Daily before breakfast  venlafaxine XR, 150 mg, oral, Daily with breakfast         Current Outpatient Medications   Medication Instructions    acetaminophen (TYLENOL 8 HOUR) 650 mg, oral, Every 8 hours PRN, Do not crush, chew, or split.    aspirin 81 mg, oral, Daily    chlorthalidone (HYGROTON) 25 mg, oral, Every 48 hours, Every  other day     ezetimibe (ZETIA) 10 mg, oral, Daily    hydrOXYzine HCL (ATARAX) 25 mg, oral, 2 times daily    ipratropium (Atrovent) 21 mcg (0.03 %) nasal spray 2 sprays, Each Nostril, 2 times daily    lovastatin (ALTOPREV) 40 mg, oral, Nightly, Do not crush, chew, or split.    metFORMIN (OSM) (FORTAMET) 500 mg, oral, 2 times daily with meals, Do not crush, chew, or split.    metoprolol succinate XL (TOPROL-XL) 50 mg, oral,  "Nightly, Do not crush or chew.    omeprazole (PRILOSEC) 40 mg, oral, Daily before breakfast, Do not crush or chew.    oxyCODONE (Roxicodone) 5 mg immediate release tablet oral    ramipril (ALTACE) 5 mg, oral, 2 times daily        Review of Systems:      12 point review of systems was obtained in detail and is negative other than that detailed above.    Vital Signs:     Vitals:    10/08/23 2128 10/08/23 2207 10/09/23 0236 10/09/23 0818   BP: 126/64 126/64 107/54 109/61   BP Location: Right arm Right arm  Right arm   Patient Position: Lying Lying  Lying   Pulse: 50 50 (!) 48 57   Resp: 18 18  18   Temp: 36.1 °C (97 °F) 36.1 °C (97 °F) 36.1 °C (97 °F) 36.7 °C (98.1 °F)   TempSrc: Temporal Temporal  Temporal   SpO2: 96% 96% 93% 95%   Weight: 97.4 kg (214 lb 11.7 oz)      Height: 1.778 m (5' 10\")          Intake/Output Summary (Last 24 hours) at 10/9/2023 1051  Last data filed at 10/9/2023 0834  Gross per 24 hour   Intake 400 ml   Output --   Net 400 ml       Wt Readings from Last 4 Encounters:   10/08/23 97.4 kg (214 lb 11.7 oz)   04/19/23 96.2 kg (212 lb)   02/08/23 98 kg (216 lb)   02/03/23 101 kg (222 lb)       Physical Examination:     GENERAL APPEARANCE: Well developed, well nourished, in no acute distress.  CHEST: Symmetric and non-tender.  INTEGUMENT: Skin warm and dry, without gross excoriationis or lesions.  HEENT: No gross abnormalities of conjunctiva, teeth, gums, oral mucosa  NECK: Supple, no JVD, no bruit. Thyroid not palpable. Carotid upstrokes normal.  NEURO/PSHCY: Alert and oriented x3; appropriate behavior and responses and responses, grossly normal cerebellar function with normal balance and coordination  LUNGS: Clear to auscultation bilaterally; normal respiratory effort.  HEART: Bradycardic rate and rhythm regular with no evident murmur; no gallop appreciated. There are no rubs, clicks or heaves. PMI nondisplaced.  ABDOMEN: Soft, nontender, no palpable hepatosplenomegaly, no mases, no bruits. " "Abdominal aorta not noted to be enlarged.  MUSCULOSKELETAL: Normal range of motion   EXTREMITIES: Warm with good color, no clubbing or cyanois. There is no edema noted.  PERIPHERAL VASCULAR: 2+ radial pulses bilateral.  Feet warm to touch bilateral.      Lab:     CBC:   Lab Results   Component Value Date    WBC 8.7 10/08/2023    RBC 4.55 10/08/2023    HGB 13.1 (L) 10/08/2023    HCT 37.4 (L) 10/08/2023     10/08/2023        CMP:    Lab Results   Component Value Date     (L) 10/08/2023    K 4.1 10/08/2023    CL 99 10/08/2023    CO2 22 10/08/2023    BUN 26 (H) 10/08/2023    CREATININE 1.22 10/08/2023    GLUCOSE 167 (H) 10/08/2023    CALCIUM 8.9 10/08/2023       Magnesium:    No results found for: \"MG\"    Lipid Profile:    No results found for: \"CHLPL\", \"TRIG\", \"HDL\", \"LDLCALC\", \"LDLDIRECT\"    TSH:    No results found for: \"TSH\"    BNP:   No results found for: \"BNP\"     PT/INR:    No results found for: \"PROTIME\", \"INR\"    HgBA1c:    Lab Results   Component Value Date    HGBA1C 8.8 (A) 08/10/2023       BMP:  Lab Results   Component Value Date     (L) 10/08/2023    K 4.1 10/08/2023    CL 99 10/08/2023    CO2 22 10/08/2023    BUN 26 (H) 10/08/2023    CREATININE 1.22 10/08/2023       CBC:  Lab Results   Component Value Date    WBC 8.7 10/08/2023    RBC 4.55 10/08/2023    HGB 13.1 (L) 10/08/2023    HCT 37.4 (L) 10/08/2023    MCV 82 10/08/2023    MCH 28.8 10/08/2023    MCHC 35.0 10/08/2023    RDW 13.1 10/08/2023     10/08/2023    MPV 9.5 10/08/2023       Cardiac Enzymes:    Lab Results   Component Value Date    TROPHS 9 10/08/2023       Hepatic Function Panel:    Lab Results   Component Value Date    ALKPHOS 65 10/08/2023    ALT 21 10/08/2023    AST 16 10/08/2023    PROT 7.1 10/08/2023    BILITOT 0.5 10/08/2023       Diagnostic Studies:     XR chest 2 views    Result Date: 10/8/2023  Interpreted By:  Dyllan James, STUDY: XR CHEST 2 VIEWS;  10/8/2023 1:10 pm   INDICATION: Signs/Symptoms:cough.   " COMPARISON: 07/27/2021   ACCESSION NUMBER(S): [id number]   ORDERING CLINICIAN: JULI BAIRES   FINDINGS: Patchy bibasilar opacities. No apparent pleural effusion. Unchanged appearance of the cardiomediastinal silhouette. Aortic atherosclerosis with tortuosity. No pulmonary vascular congestion.       Patchy bibasilar infiltrates.     Signed by: Dyllan James 10/8/2023 1:12 PM Dictation workstation:   [id number]      Radiology:     XR chest 2 views   Final Result   Patchy bibasilar infiltrates.             Signed by: Dyllan James 10/8/2023 1:12 PM   Dictation workstation:   [id number]          Problem List:     Patient Active Problem List   Diagnosis    Community acquired pneumonia of both lower lobes     Assessment:   Sinus bradycardia with second-degree type I heart block, Toprol-XL dose held this morning  Community-acquired pneumonia being managed by hospitalist  Coronary artery disease with remote stenting, no current angina  COPD with chronic tobacco use, no smoking for several days prior to admission        Plan:     Patient was seen and examined in conjunction with Dr. Haskins  Continue to monitor on telemetry  Ysabel Bright CNP    Electronically signed by KHADIJAH Holden, on 10/9/2023 at 10:51 AM

## 2023-10-09 NOTE — H&P
CHIEF COMPLAINT:  Cough and chest pain    HISTORY OF PRESENT ILLNESS:  This is a 73-year-old gentleman with history of hypertension, hyperlipidemia, poorly controlled diabetes, coronary artery disease, COPD who continues to smoke came to the emergency department with a history of dry cough.  Patient is back from a gambling trip in [place] where he was exposed to a lot of smoking, dust etc.  Sometimes he has very harsh cough that brings chest pain.  His cough sounds very chesty but he is not in shortness of breath.  Patient denies any blood in cough.  He denies any fever or chills.  He does not have calf swelling or tenderness.  In the emergency department his vital signs are stable.  Lab result did not show any leukocytosis.  EKG was normal sinus rhythm blood sugar was high.  Patient had a chest x-ray did show patchy bibasilar infiltrates.  Patient was given IV ceftriaxone and azithromycin.  He was sent to the floor for further management.    REVIEW OF SYSTEMS:  Denies fever or chills.  No dizziness, syncope, or seizures.  No headaches. No shortness of breath.  No nausea, vomiting, or diarrhea.  No rash or abnormal bleeding.  Denies ankle swelling, muscle aches.  No depression or anxiety symptoms.  Remainder of review of systems is negative and also as per HPI.     PHYSICAL EXAM :   HEENT:  Atraumatic, PERRL.  Conjunctivae clear.   Moist nasal mucous membranes. oropharynx non erythematous,   Neck:  Supple without thyromegaly or lymphadenopathy.  Lungs:  Clear to auscultation without rales, rhonchi, or rub.  Heart:  RRR, S1, S2, without M.  Abdomen:  Soft, non tender, no organ enlargement, bruit. Bowel sounds present . No CVA tenderness.  Extremities:  No edema. No calf swelling or tenderness.    Skin:  No rash, ecchymosis or erythema.    Past Medical History:   Diagnosis Date    Acute myocardial infarction, unspecified (CMS/HCC) 01/24/2018    Heart attack    Acute myocardial infarction, unspecified (CMS/HCC)  01/24/2018    Heart attack    Chronic obstructive pulmonary disease, unspecified (CMS/HCC) 01/24/2018    COPD, mild    Chronic obstructive pulmonary disease, unspecified (CMS/[facility]) 01/24/2018    COPD, mild    Coronary angioplasty status     Post PTCA    Encounter for immunization 11/04/2022    Encounter for immunization    Encounter for screening for malignant neoplasm of colon 08/25/2016    Encounter for colonoscopy due to history of colonic polyp    Encounter for screening for malignant neoplasm of skin 04/14/2022    Skin cancer screening    Nicotine dependence, cigarettes, uncomplicated 09/12/2022    Cigarette smoker    Obesity, unspecified 05/25/2022    Class 1 obesity with body mass index (BMI) of 31.0 to 31.9 in adult    Old myocardial infarction 01/24/2018    History of myocardial infarction    Other acute postprocedural pain     Post-operative pain    Pain in left knee     Left knee pain, unspecified chronicity    Pain in right hip 11/04/2022    Hip pain, bilateral    Personal history of other diseases of the digestive system 01/24/2018    History of gastroesophageal reflux (GERD)    Personal history of other diseases of the musculoskeletal system and connective tissue 08/25/2016    History of low back pain    Personal history of other diseases of the respiratory system     History of sinusitis    Personal history of other endocrine, nutritional and metabolic disease 08/25/2016    History of hyperlipidemia    Personal history of other mental and behavioral disorders 04/14/2022    History of depression    Personal history of other specified conditions 12/08/2016    History of diarrhea    Personal history of other specified conditions     History of prolonged Q-T interval on ECG    Personal history of other specified conditions 06/20/2018    History of nocturia    Separation of muscle (nontraumatic), other site 05/14/2018    Rectus diastasis    Sprain of medial collateral ligament of left knee, initial  encounter 2017    Sprain of medial collateral ligament of left knee, initial encounter    Sprain of medial collateral ligament of left knee, subsequent encounter 10/12/2017    Sprain of medial collateral ligament of left knee, subsequent encounter    Type 2 diabetes mellitus without complications (CMS/[facility]) 2018    Diabetes mellitus    Type 2 diabetes mellitus without complications (CMS/[facility]) 2018    Diabetes mellitus    Umbilical hernia without obstruction or gangrene 2018    Umbilical hernia without obstruction and without gangrene     Social History     Socioeconomic History    Marital status:      Spouse name: Not on file    Number of children: Not on file    Years of education: Not on file    Highest education level: Not on file   Occupational History    Not on file   Tobacco Use    Smoking status: Former     Packs/day: 1.50     Years: 50.00     Additional pack years: 0.00     Total pack years: 75.00     Types: Cigarettes     Quit date: 10/2/2023     Years since quittin.0    Smokeless tobacco: Never   Substance and Sexual Activity    Alcohol use: Not on file    Drug use: Never    Sexual activity: Not on file   Other Topics Concern    Not on file   Social History Narrative    Not on file     Social Determinants of Health     Financial Resource Strain: Medium Risk (10/8/2023)    Overall Financial Resource Strain (CARDIA)     Difficulty of Paying Living Expenses: Somewhat hard   Food Insecurity: No Food Insecurity (10/8/2023)    Hunger Vital Sign     Worried About Running Out of Food in the Last Year: Never true     Ran Out of Food in the Last Year: Never true   Transportation Needs: No Transportation Needs (10/8/2023)    PRAPARE - Transportation     Lack of Transportation (Medical): No     Lack of Transportation (Non-Medical): No   Physical Activity: Not on file   Stress: Not on file   Social Connections: Unknown (10/8/2023)    Social Connection and Isolation Panel [NHANES]      Frequency of Communication with Friends and Family: Not on file     Frequency of Social Gatherings with Friends and Family: Not on file     Attends Orthodoxy Services: Not on file     Active Member of Clubs or Organizations: Yes     Attends Club or Organization Meetings: Not on file     Marital Status:    Intimate Partner Violence: Not At Risk (10/8/2023)    Humiliation, Afraid, Rape, and Kick questionnaire     Fear of Current or Ex-Partner: No     Emotionally Abused: No     Physically Abused: No     Sexually Abused: No   Housing Stability: Low Risk  (10/8/2023)    Housing Stability Vital Sign     Unable to Pay for Housing in the Last Year: No     Number of Places Lived in the Last Year: 1     Unstable Housing in the Last Year: No           Current Facility-Administered Medications:     acetaminophen (Tylenol) tablet 650 mg, 650 mg, oral, q4h PRN, Shanika Reese MD, 650 mg at 10/08/23 2330    albuterol 2.5 mg /3 mL (0.083 %) nebulizer solution 2.5 mg, 2.5 mg, nebulization, q6h PRN, Shanika Reese MD    alum-mag hydroxide-simeth (Mylanta) 200-200-20 mg/5 mL oral suspension 30 mL, 30 mL, oral, 4x daily PRN, Shanika Reese MD    amLODIPine (Norvasc) tablet 5 mg, 5 mg, oral, Daily, Shanika Reese MD, 5 mg at 10/09/23 0822    aspirin chewable tablet 81 mg, 81 mg, oral, Daily, Shanika Reese MD, 81 mg at 10/09/23 0820    azithromycin (Zithromax) in dextrose 5 % in water (D5W) 250 mL  mg, 500 mg, intravenous, q24h, Shanika Reese MD    cefTRIAXone (Rocephin) IVPB 1 g, 1 g, intravenous, q24h, Shanika Reese MD    chlorthalidone (Hygroton) tablet 25 mg, 25 mg, oral, Daily, Shanika Reese MD, 25 mg at 10/09/23 0822    dextrose 10 % in water (D10W) infusion, 0.3 g/kg/hr, intravenous, Once PRN, Shanika Reese MD    dextrose 50 % injection 25 g, 25 g, intravenous, q15 min PRN, Shanika Reese MD    glucagon (Glucagen) injection 1 mg, 1 mg, intramuscular, q15 min PRN, Shanika Reese MD    hydrocodone-homatropine (Hycodan) 5-1.5 mg/5 mL syrup 5 mL, 5  mL, oral, q4h PRN, Shanika Reese MD, 5 mL at 10/09/23 0820    hydrOXYzine HCL (Atarax) tablet 25 mg, 25 mg, oral, BID, Shanika Reese MD, 25 mg at 10/09/23 0822    ibuprofen tablet 400 mg, 400 mg, oral, q6h PRN, Shanika Reese MD    [START ON 10/10/2023] insulin glargine (Lantus) injection 32 Units, 32 Units, subcutaneous, q AM, Shanika Reese MD    insulin glargine (Lantus) injection 35 Units, 35 Units, subcutaneous, Nightly, Shanika Reese MD    insulin regular (HumuLIN R) injection 0-20 Units, 0-20 Units, subcutaneous, TID with meals, Genaro Keith MD    ketorolac (Toradol) injection 15 mg, 15 mg, intravenous, q8h PRN, Genaro Keith MD, 15 mg at 10/09/23 1245    lisinopril tablet 10 mg, 10 mg, oral, Daily, Shanika Reese MD, 10 mg at 10/09/23 0822    metFORMIN (Glucophage) tablet 1,000 mg, 1,000 mg, oral, BID with meals, Shanika Reese MD, 1,000 mg at 10/09/23 0820    methylPREDNISolone sod succinate (PF) (SOLU-Medrol) 40 mg/mL injection 40 mg, 40 mg, intravenous, q6h, Genaro Keith MD, 40 mg at 10/09/23 1245    metoprolol succinate XL (Toprol-XL) 24 hr tablet 50 mg, 50 mg, oral, Daily, Shanika Reese MD    nitroglycerin (Nitrostat) SL tablet 0.4 mg, 0.4 mg, sublingual, q5 min PRN, Shanika Reese MD    pantoprazole (ProtoNix) EC tablet 40 mg, 40 mg, oral, Daily before breakfast, Shanika Reese MD, 40 mg at 10/09/23 0639    polyethylene glycol (Glycolax, Miralax) packet 17 g, 17 g, oral, Daily PRN, Shanika Reese MD    venlafaxine XR (Effexor-XR) 24 hr capsule 150 mg, 150 mg, oral, Daily with breakfast, Shanika Reese MD, 150 mg at 10/09/23 0822       ASSESSMENT : HOSPITAL PROBLEM LIST:   1.  Community-acquired pneumonia  2.  Pleuritis  3.  Diabetes mellitus  4.  Hypertension  5.  Hyperlipidemia  6.  Gastroesophageal reflux disorder    PLAN:  Patient be admitted to medical telemetry for close monitoring of cardiorespiratory and neuro status.  I will start him on IV ceftriaxone and azithromycin.  Aggressive pulmonary toileting.  If need  aerosolized neb treatment.  For pleuritic chest pain will be using Toradol.  Home medications will be resumed.  His diabetes will be managed with Lantus, basal insulin along with sliding scale correcting regular insulin.  Patient also will be given IV steroid for few dosage.  DVT prophylaxis be done with heparin.  GI prophylaxis be done with PPI.  CODE STATUS full.  Total time spent on this admission encounter was about 47 minutes.

## 2023-10-10 VITALS
TEMPERATURE: 97.5 F | RESPIRATION RATE: 18 BRPM | HEIGHT: 70 IN | SYSTOLIC BLOOD PRESSURE: 123 MMHG | OXYGEN SATURATION: 93 % | HEART RATE: 76 BPM | WEIGHT: 214.73 LBS | BODY MASS INDEX: 30.74 KG/M2 | DIASTOLIC BLOOD PRESSURE: 65 MMHG

## 2023-10-10 LAB
GLUCOSE BLD MANUAL STRIP-MCNC: 296 MG/DL (ref 74–99)
GLUCOSE BLD MANUAL STRIP-MCNC: 329 MG/DL (ref 74–99)
GLUCOSE BLD MANUAL STRIP-MCNC: 355 MG/DL (ref 74–99)

## 2023-10-10 PROCEDURE — 2500000002 HC RX 250 W HCPCS SELF ADMINISTERED DRUGS (ALT 637 FOR MEDICARE OP, ALT 636 FOR OP/ED): Performed by: INTERNAL MEDICINE

## 2023-10-10 PROCEDURE — 2500000004 HC RX 250 GENERAL PHARMACY W/ HCPCS (ALT 636 FOR OP/ED): Performed by: INTERNAL MEDICINE

## 2023-10-10 PROCEDURE — 96372 THER/PROPH/DIAG INJ SC/IM: CPT | Performed by: INTERNAL MEDICINE

## 2023-10-10 PROCEDURE — 96366 THER/PROPH/DIAG IV INF ADDON: CPT

## 2023-10-10 PROCEDURE — G0378 HOSPITAL OBSERVATION PER HR: HCPCS

## 2023-10-10 PROCEDURE — 96376 TX/PRO/DX INJ SAME DRUG ADON: CPT

## 2023-10-10 PROCEDURE — 99238 HOSP IP/OBS DSCHRG MGMT 30/<: CPT | Performed by: INTERNAL MEDICINE

## 2023-10-10 PROCEDURE — 2500000001 HC RX 250 WO HCPCS SELF ADMINISTERED DRUGS (ALT 637 FOR MEDICARE OP): Performed by: INTERNAL MEDICINE

## 2023-10-10 PROCEDURE — 82947 ASSAY GLUCOSE BLOOD QUANT: CPT

## 2023-10-10 PROCEDURE — 99232 SBSQ HOSP IP/OBS MODERATE 35: CPT | Performed by: INTERNAL MEDICINE

## 2023-10-10 RX ORDER — METHYLPREDNISOLONE 4 MG/1
TABLET ORAL
Qty: 21 TABLET | Refills: 0 | Status: SHIPPED | OUTPATIENT
Start: 2023-10-10 | End: 2023-10-17

## 2023-10-10 RX ORDER — AMOXICILLIN AND CLAVULANATE POTASSIUM 875; 125 MG/1; MG/1
875 TABLET, FILM COATED ORAL 2 TIMES DAILY
Qty: 14 TABLET | Refills: 0 | Status: SHIPPED | OUTPATIENT
Start: 2023-10-10 | End: 2023-10-17

## 2023-10-10 RX ADMIN — HYDROCODONE BITARTRATE AND HOMATROPINE METHYLBROMIDE 5 ML: 5; 1.5 SOLUTION ORAL at 14:51

## 2023-10-10 RX ADMIN — ASPIRIN 81 MG: 81 TABLET, CHEWABLE ORAL at 08:10

## 2023-10-10 RX ADMIN — AZITHROMYCIN MONOHYDRATE 500 MG: 500 INJECTION, POWDER, LYOPHILIZED, FOR SOLUTION INTRAVENOUS at 12:40

## 2023-10-10 RX ADMIN — CEFTRIAXONE SODIUM 1 G: 1 INJECTION, SOLUTION INTRAVENOUS at 11:58

## 2023-10-10 RX ADMIN — INSULIN HUMAN 16 UNITS: 100 INJECTION, SOLUTION PARENTERAL at 12:01

## 2023-10-10 RX ADMIN — CHLORTHALIDONE 25 MG: 25 TABLET ORAL at 08:10

## 2023-10-10 RX ADMIN — METHYLPREDNISOLONE SODIUM SUCCINATE 40 MG: 40 INJECTION, POWDER, FOR SOLUTION INTRAMUSCULAR; INTRAVENOUS at 01:10

## 2023-10-10 RX ADMIN — AMLODIPINE BESYLATE 10 MG: 5 TABLET ORAL at 08:10

## 2023-10-10 RX ADMIN — INSULIN GLARGINE 32 UNITS: 100 INJECTION, SOLUTION SUBCUTANEOUS at 08:19

## 2023-10-10 RX ADMIN — METHYLPREDNISOLONE SODIUM SUCCINATE 40 MG: 40 INJECTION, POWDER, FOR SOLUTION INTRAMUSCULAR; INTRAVENOUS at 12:03

## 2023-10-10 RX ADMIN — METFORMIN HYDROCHLORIDE 1000 MG: 500 TABLET, FILM COATED ORAL at 08:10

## 2023-10-10 RX ADMIN — HYDROXYZINE HYDROCHLORIDE 25 MG: 25 TABLET ORAL at 08:10

## 2023-10-10 RX ADMIN — PANTOPRAZOLE SODIUM 40 MG: 40 TABLET, DELAYED RELEASE ORAL at 06:11

## 2023-10-10 RX ADMIN — VENLAFAXINE HYDROCHLORIDE 150 MG: 150 CAPSULE, EXTENDED RELEASE ORAL at 08:10

## 2023-10-10 RX ADMIN — METHYLPREDNISOLONE SODIUM SUCCINATE 40 MG: 40 INJECTION, POWDER, FOR SOLUTION INTRAMUSCULAR; INTRAVENOUS at 06:11

## 2023-10-10 RX ADMIN — LISINOPRIL 10 MG: 10 TABLET ORAL at 08:11

## 2023-10-10 RX ADMIN — KETOROLAC TROMETHAMINE 15 MG: 30 INJECTION, SOLUTION INTRAMUSCULAR at 14:50

## 2023-10-10 RX ADMIN — HYDROCODONE BITARTRATE AND HOMATROPINE METHYLBROMIDE 5 ML: 5; 1.5 SOLUTION ORAL at 08:10

## 2023-10-10 RX ADMIN — INSULIN HUMAN 12 UNITS: 100 INJECTION, SOLUTION PARENTERAL at 17:21

## 2023-10-10 RX ADMIN — INSULIN HUMAN 20 UNITS: 100 INJECTION, SOLUTION PARENTERAL at 07:36

## 2023-10-10 ASSESSMENT — COGNITIVE AND FUNCTIONAL STATUS - GENERAL
MOBILITY SCORE: 24
DAILY ACTIVITIY SCORE: 24
DAILY ACTIVITIY SCORE: 24
MOBILITY SCORE: 24

## 2023-10-10 ASSESSMENT — PAIN SCALES - GENERAL
PAINLEVEL_OUTOF10: 0 - NO PAIN
PAINLEVEL_OUTOF10: 8

## 2023-10-10 ASSESSMENT — PAIN SCALES - PAIN ASSESSMENT IN ADVANCED DEMENTIA (PAINAD)
BODYLANGUAGE: RELAXED
BREATHING: NORMAL
FACIALEXPRESSION: SMILING OR INEXPRESSIVE
CONSOLABILITY: NO NEED TO CONSOLE
TOTALSCORE: 0

## 2023-10-10 ASSESSMENT — PAIN SCALES - WONG BAKER: WONGBAKER_NUMERICALRESPONSE: HURTS WHOLE LOT

## 2023-10-10 ASSESSMENT — PAIN - FUNCTIONAL ASSESSMENT: PAIN_FUNCTIONAL_ASSESSMENT: 0-10

## 2023-10-10 NOTE — PROGRESS NOTES
Cardiology Progress Note  Patient: Feliciano Worthington  Unit/Bed: 918/918-A  YOB: 1950  MRN: [id number]  Acct: [id number]   Admitting Diagnosis: 2nd degree AV block [I44.1]  Pneumonia of both lungs due to infectious organism, unspecified part of lung [J18.9]  Community acquired pneumonia of both lower lobes [J18.9]  Date:  10/8/2023  Hospital Day: 0  Attending: Genaro Keith MD   Rounding RORY/Cardiologist:  Fredrick Davidson MD, Dr. Fredrick Davidson    Primary Cardiologist: Dr. Roberto Haskins       Complaint:  Chief Complaint   Patient presents with    Cough        SUBJECTIVE    Patient feeling better today.  He is able to take deeper breaths.  Has occasional cough at times productive.  No nausea, vomiting, chest pain, palpitations, lightheadedness or dizziness.  Review of telemetry shows improved heart rate after being off of beta-blocker for 24 hours with heart rates 50s to 80s.       VITALS   Visit Vitals  /70 (BP Location: Left arm, Patient Position: Sitting)   Pulse 81   Temp 36.4 °C (97.5 °F) (Temporal)   Resp 18        I/O:    Intake/Output Summary (Last 24 hours) at 10/10/2023 1517  Last data filed at 10/10/2023 1340  Gross per 24 hour   Intake 1261 ml   Output --   Net 1261 ml        Allergies:  Allergies   Allergen Reactions    Tetracyclines Hives, Other, Unknown, Rash and Shortness of breath     Water blisters in mouth and genital region.    Water blister on tongue and penis    Atorvastatin Unknown     Cramping all over the body. Sore shoulders    Empagliflozin Unknown    Pravastatin Unknown    Rosuvastatin Unknown    Simvastatin Unknown    Statins-Hmg-Coa Reductase Inhibitors Unknown        PHYSICAL EXAM   Physical Exam  Neurological: Alert and oriented x3, moves all extremities equally, speech clear  Constitutional:well-developed male lying flat in bed in no acute distress, pleasant and cooperative  Cardiac: regular rate and rhythm, no murmur or rub  Chest/lungs: respirations even  and nonlabored, breath sounds diminished throughout, no rales or wheeze Abdomen: soft, nontender, positive bowel sounds  Extremities: no lower extremity edema    LABS   CBC:   Results from last 7 days   Lab Units 10/08/23  1415   WBC AUTO x10*3/uL 8.7   RBC AUTO x10*6/uL 4.55   HEMOGLOBIN g/dL 13.1*   HEMATOCRIT % 37.4*   MCV fL 82   MCH pg 28.8   MCHC g/dL 35.0   RDW % 13.1   PLATELETS AUTO x10*3/uL 164   MPV fL 9.5     CMP:    Results from last 7 days   Lab Units 10/08/23  1415   SODIUM mmol/L 131*   POTASSIUM mmol/L 4.1   CHLORIDE mmol/L 99   CO2 mmol/L 22   BUN mg/dL 26*   CREATININE mg/dL 1.22   GLUCOSE mg/dL 167*   PROTEIN TOTAL g/dL 7.1   CALCIUM mg/dL 8.9   BILIRUBIN TOTAL mg/dL 0.5   ALK PHOS U/L 65   AST U/L 16   ALT U/L 21     BMP:    Results from last 7 days   Lab Units 10/08/23  1415   SODIUM mmol/L 131*   POTASSIUM mmol/L 4.1   CHLORIDE mmol/L 99   CO2 mmol/L 22   BUN mg/dL 26*   CREATININE mg/dL 1.22   CALCIUM mg/dL 8.9   GLUCOSE mg/dL 167*     Magnesium:    Troponin:    Results from last 7 days   Lab Units 10/08/23  1415   TROPHS ng/L 9     BNP:     Lipid Panel:           Current Facility-Administered Medications:     acetaminophen (Tylenol) tablet 650 mg, 650 mg, oral, q4h PRN, Shanika Reese MD, 650 mg at 10/08/23 2330    albuterol 2.5 mg /3 mL (0.083 %) nebulizer solution 2.5 mg, 2.5 mg, nebulization, q6h PRN, Shanika Reese MD    alum-mag hydroxide-simeth (Mylanta) 200-200-20 mg/5 mL oral suspension 30 mL, 30 mL, oral, 4x daily PRN, Shanika Reese MD    amLODIPine (Norvasc) tablet 10 mg, 10 mg, oral, Daily, Roberto Haskins MD, 10 mg at 10/10/23 0810    aspirin chewable tablet 81 mg, 81 mg, oral, Daily, Shanika Reese MD, 81 mg at 10/10/23 0810    azithromycin (Zithromax) in dextrose 5 % in water (D5W) 250 mL  mg, 500 mg, intravenous, q24h, Shanika Reese MD, Stopped at 10/10/23 1340    cefTRIAXone (Rocephin) IVPB 1 g, 1 g, intravenous, q24h, Shanika Reese MD, Stopped at 10/10/23 1228    chlorthalidone  (Hygroton) tablet 25 mg, 25 mg, oral, Daily, Shanika Reese MD, 25 mg at 10/10/23 0810    dextrose 10 % in water (D10W) infusion, 0.3 g/kg/hr, intravenous, Once PRN, Shanika Reese MD    dextrose 50 % injection 25 g, 25 g, intravenous, q15 min PRN, Shanika Reese MD    glucagon (Glucagen) injection 1 mg, 1 mg, intramuscular, q15 min PRN, Shanika Reese MD    hydrocodone-homatropine (Hycodan) 5-1.5 mg/5 mL syrup 5 mL, 5 mL, oral, q4h PRN, Shanika Reese MD, 5 mL at 10/10/23 1451    hydrOXYzine HCL (Atarax) tablet 25 mg, 25 mg, oral, BID, Shanika Reese MD, 25 mg at 10/10/23 0810    ibuprofen tablet 400 mg, 400 mg, oral, q6h PRN, Shanika Reese MD    insulin glargine (Lantus) injection 32 Units, 32 Units, subcutaneous, q AM, Shanika Reese MD, 32 Units at 10/10/23 0819    insulin glargine (Lantus) injection 35 Units, 35 Units, subcutaneous, Nightly, Shanika Reese MD, 35 Units at 10/09/23 2017    insulin regular (HumuLIN R) injection 0-20 Units, 0-20 Units, subcutaneous, TID with meals, Genaro Keith MD, 16 Units at 10/10/23 1201    ketorolac (Toradol) injection 15 mg, 15 mg, intravenous, q8h PRN, Genaro Keith MD, 15 mg at 10/10/23 1450    lisinopril tablet 10 mg, 10 mg, oral, Daily, Shanika Reese MD, 10 mg at 10/10/23 0811    metFORMIN (Glucophage) tablet 1,000 mg, 1,000 mg, oral, BID with meals, Shanika Reese MD, 1,000 mg at 10/10/23 0810    methylPREDNISolone sod succinate (PF) (SOLU-Medrol) 40 mg/mL injection 40 mg, 40 mg, intravenous, q6h, Genaro Keith MD, 40 mg at 10/10/23 1203    nitroglycerin (Nitrostat) SL tablet 0.4 mg, 0.4 mg, sublingual, q5 min PRN, Shanika Reese MD    pantoprazole (ProtoNix) EC tablet 40 mg, 40 mg, oral, Daily before breakfast, Shanika Reese MD, 40 mg at 10/10/23 0611    polyethylene glycol (Glycolax, Miralax) packet 17 g, 17 g, oral, Daily PRN, Shanika Reese MD    venlafaxine XR (Effexor-XR) 24 hr capsule 150 mg, 150 mg, oral, Daily with breakfast, Shanika Reese MD, 150 mg at 10/10/23 0810    XR chest 2  views    Result Date: 10/8/2023  Interpreted By:  Dyllan James, STUDY: XR CHEST 2 VIEWS;  10/8/2023 1:10 pm   INDICATION: Signs/Symptoms:cough.   COMPARISON: 07/27/2021   ACCESSION NUMBER(S): [id number]   ORDERING CLINICIAN: JULI BAIRES   FINDINGS: Patchy bibasilar opacities. No apparent pleural effusion. Unchanged appearance of the cardiomediastinal silhouette. Aortic atherosclerosis with tortuosity. No pulmonary vascular congestion.       Patchy bibasilar infiltrates.     Signed by: Dyllan James 10/8/2023 1:12 PM Dictation workstation:   [id number]       Telemetry Monitoring: Sinus bradycardia to sinus rhythm heart rates 50s to 80s    Assessment       Plan:  10-9-2023 per Dr Haskins cardiology consult recommendations:  Patient seen and examined and plan personally developed.  Agree with entries as per Ysabel Bright RN, cnp,      73-year-old male well-known to me with known coronary disease, hypertension, hyperlipidemia and continued tobacco abuse.  Recently traveling developed pneumonia as described below.  With antibiotic therapy is noted a bit more productive cough but overall feels better.  He has had no angina symptoms.  No claudication.  No lower extremity edema.     Exam: Lungs still show expiratory rhonchi he is moving air well at this time no active wheezing.  Cardiac exam is a regular rhythm and rate soft S4 no S3 abdominal exam is soft extremities show no edema     ECG: Without acute changes however there is evidence of type I second-degree AV block.  There is no evidence of type II second-degree AV block on any ECG or telemetry since he has been hospitalized.     Impression  1.  Pneumonia per the medicine service     2.  Type I second-degree AV block: Discontinue beta-blocker at this time.  He has normal LV function does not absolutely need beta-blocker it had been utilized in part however for remote angina.  Therefore we will discontinue the beta-blocker in favor of increasing amlodipine.     3.   Coronary artery disease: No evidence of ischemia or angina thus far     4.  Disposition: As long as he does not develop angina without beta-blocker and heart block does not progress which Is most unlikely could probably discharge in next 24 to 48 hours and needs outpatient follow-up arranged with me as well as outpatient 24-hour Holter monitor prior to that office visit     Roberto Haskins MD    10/10/2023:  Impression as noted above.  Plan: Cardiac status stable.  No chest pain.  Heart rates improved off beta-blocker.  Patient feels that his breathing has improved since admission.  Cardiology plan of care was discussed with patient.  He is to remain off his metoprolol/Toprol-XL, have 24-hour Holter monitor and follow-up with Dr. Haskins in the near future, office was instructed to arrange appointments.  Patient may be discharged today from cardiac perspective.  Ysabel Bright CNP       Electronically signed by Fredrick Davidson MD on 10/10/2023 at 3:17 PM    I have personally interviewed and examined the patient.   I have personally and independently reviewed labs and diagnostic testing.  I have personally verified the elements of the history and physical listed above and changes, if any, are noted.   I have personally reviewed the assessment and plan as documented by WILLIAM Estrella CNP and concur.  He feels better and wants to go home.    He is without any specific cardiovascular complaints.   He does not have any angina pectoris or CHF symptoms.  His examination is remarkable for regular cardiac rhythm in the 50's.  Scattered ronchi.   His blood pressures are well controlled.    Toprol XL was discontinued.     He will followup with Dr. Haskins as scheduled.  Outpatient holter monitor scheduled.   OK to discharge home.

## 2023-10-10 NOTE — CARE PLAN
The patient's goals for the shift include      The clinical goals for the shift include Pt will report relief of pain with the use of PRN pain medications by end of shift

## 2023-10-11 ENCOUNTER — PATIENT OUTREACH (OUTPATIENT)
Dept: PRIMARY CARE | Facility: CLINIC | Age: 73
End: 2023-10-11
Payer: MEDICARE

## 2023-10-11 DIAGNOSIS — I44.1 2ND DEGREE AV BLOCK: ICD-10-CM

## 2023-10-11 DIAGNOSIS — J18.9 PNEUMONIA OF BOTH LUNGS DUE TO INFECTIOUS ORGANISM, UNSPECIFIED PART OF LUNG: ICD-10-CM

## 2023-10-11 NOTE — PROGRESS NOTES
Discharge Facility:  [facility]    Discharge Diagnosis:  2nd degree AV block [I44.1]  Pneumonia of both lungs due to infectious organism, unspecified part of lung [J18.9]    Admission Date:10/8/23  Discharge Date: 10/10/23    PCP Appointment Date:  10/13/2023 9:15 AM    PRIMARY CARE HOSP DISCHARGE   Contreras Ledbetter MD     Specialist Appointment Date:   CV Ultrasound Vascular  Thursday Oct 12, 2023 8:45 AM  [facility]    HOLTER  Monday Oct 23, 2023 10:15 AM  [facility]  [phone]    Cardiology Established Patient Visit with Roberto Haskins MD  Tuesday Nov 7, 2023 8:15 AM  [facility]  [phone]    Hospital Encounter and Summary: Linked  See discharge assessment below for further details  I introduced myself and the TCM program to Feliciano Worthington. I gave my contact information for any further questions.  Engagement  Call Start Time: 0910 (10/11/2023  9:16 AM)    Medications  Medications reviewed with patient/caregiver?: Yes (10/11/2023  9:16 AM)  Is the patient having any side effects they believe may be caused by any medication additions or changes?: No (10/11/2023  9:16 AM)  Does the patient have all medications ordered at discharge?: Yes (10/11/2023  9:16 AM)  Prescription Comments: START taking these medications : amoxicillin-pot clavulanate 875-125 mg tablet;   Take 1 tablet (875 mg) by mouth 2 times a day for 7 days.- methylPREDNISolone 4 mg tablets; Take   as directed on package. STOP- beta blocker (10/11/2023  9:16 AM)  Is the patient taking all medications as directed (includes completed medication regime)?: Yes (10/11/2023  9:16 AM)  Care Management Interventions: Provided patient education (10/11/2023  9:16 AM)    Appointments  Does the patient have a primary care provider?: Yes (Contreras Ledbetter MD) (10/11/2023  9:16 AM)  Care Management Interventions: Verified appointment date/time/provider (made appt  for patient 10/13/23 - patient aware) (10/11/2023  9:16 AM)  Has the patient kept scheduled appointments due by today?: Yes (10/11/2023  9:16 AM)  Care Management Interventions: (S) Advised to schedule with specialist (pt calling Dr Haskins office to clearify. He thought he was to have holter monitor put on today or tomorrow but discharge papers and EPIC have scheduled for 10/23/23) (10/11/2023  9:16 AM)    Self Management  Has home health visited the patient within 72 hours of discharge?: Not applicable (10/11/2023  9:16 AM)    Patient Teaching  Does the patient have access to their discharge instructions?: Yes (10/11/2023  9:16 AM)  Care Management Interventions: Reviewed instructions with patient; Educated on MyChart (10/11/2023  9:16 AM)  What is the patient's perception of their health status since discharge?: Improving (10/11/2023  9:16 AM)  Is the patient/caregiver able to teach back the hierarchy of who to call/visit for symptoms/problems? PCP, Specialist, Home Health nurse, Urgent Care, ED, 911: Yes (10/11/2023  9:16 AM)    Wrap Up  Call End Time: 0921 (10/11/2023  9:16 AM)

## 2023-10-11 NOTE — DISCHARGE SUMMARY
Discharge Diagnosis  Community acquired pneumonia of both lower lobes    Issues Requiring Follow-Up  Patient has a follow-up appointment with primary care physician in 7 days from the day of discharge.  Patient will also go to cardiology for Holter monitor.        Hospital Course   Patient was admitted for cough, congestion he was diagnosed with community-acquired pneumonia.  He is a heavy smoker.  He was treated under telemetry monitoring.  Patient was given IV ceftriaxone and Rocephin.  He was also receiving IV Solu-Medrol.  In the emergency department he was found to have type I heart block.  For which she was evaluated by cardiology.  He did not need any procedure.  He will have a Holter monitor through his cardiology office.    On the day of discharge he was stable.  Patient finished 3 doses of IV antibiotics.  On discharge she was given Augmentin and also Medrol Dosepak.  Patient will have follow-up appointment with primary care physician in 7 days from the day of discharge.  Pertinent physical exam    Pertinent Physical Exam At Time of Discharge  Physical Exam  Vitals and nursing note reviewed.   Constitutional:       Appearance: Normal appearance.   HENT:      Head: Normocephalic.      Right Ear: Tympanic membrane normal.      Left Ear: Tympanic membrane normal.      Nose: Nose normal.      Mouth/Throat:      Mouth: Mucous membranes are moist.   Cardiovascular:      Rate and Rhythm: Normal rate and regular rhythm.      Pulses: Normal pulses.      Heart sounds: No murmur heard.  Pulmonary:      Effort: No respiratory distress.      Breath sounds: Normal breath sounds.   Abdominal:      Palpations: Abdomen is soft.   Musculoskeletal:      Cervical back: Neck supple.      Right lower leg: No edema.      Left lower leg: No edema.   Skin:     General: Skin is warm.      Findings: No rash.   Neurological:      General: No focal deficit present.      Mental Status: He is alert and oriented to person, place, and  time.   Psychiatric:         Mood and Affect: Mood normal.         Home Medications     Medication List      START taking these medications     amoxicillin-pot clavulanate 875-125 mg tablet; Commonly known as:   Augmentin; Take 1 tablet (875 mg) by mouth 2 times a day for 7 days.   methylPREDNISolone 4 mg tablets; Commonly known as: Medrol Dospak; Take   as directed on package.     CONTINUE taking these medications     acetaminophen 650 mg ER tablet; Commonly known as: Tylenol 8 HOUR   aspirin 81 mg EC tablet   chlorthalidone 25 mg tablet; Commonly known as: Hygroton   ezetimibe 10 mg tablet; Commonly known as: Zetia   hydrOXYzine HCL 25 mg tablet; Commonly known as: Atarax   ipratropium 21 mcg (0.03 %) nasal spray; Commonly known as: Atrovent   lovastatin 40 mg 24 hr tablet; Commonly known as: Altoprev   metFORMIN (OSM) 500 mg 24 hr tablet; Commonly known as: Fortamet   metoprolol succinate XL 50 mg 24 hr tablet; Commonly known as: Toprol-XL   omeprazole 40 mg DR capsule; Commonly known as: PriLOSEC   oxyCODONE 5 mg immediate release tablet; Commonly known as: Roxicodone   ramipril 5 mg capsule; Commonly known as: Altace       Outpatient Follow-Up  Future Appointments   Date Time Provider Department Center   10/12/2023  8:45 AM MARBIN [id number] ECHO/VASC 3 [id number]   10/23/2023 10:15 AM MARBIN [id number] ECG/HOLTER RESOURCE [id number] [facility]   11/7/2023  8:15 AM Roberto Haskins MD [id number] [facility]   11/20/2023 12:15 PM Contreras Ledbetter MD [id number] [place]   2/14/2024  8:30 AM Roberto Haskins MD [id number] [facility]       Genaro Keith MD

## 2023-10-13 ENCOUNTER — OFFICE VISIT (OUTPATIENT)
Dept: PRIMARY CARE | Facility: CLINIC | Age: 73
End: 2023-10-13
Payer: MEDICARE

## 2023-10-13 VITALS
HEIGHT: 70 IN | HEART RATE: 85 BPM | OXYGEN SATURATION: 98 % | SYSTOLIC BLOOD PRESSURE: 134 MMHG | DIASTOLIC BLOOD PRESSURE: 84 MMHG | WEIGHT: 205.6 LBS | BODY MASS INDEX: 29.43 KG/M2 | TEMPERATURE: 97.6 F

## 2023-10-13 DIAGNOSIS — E11.69 HYPERLIPIDEMIA ASSOCIATED WITH TYPE 2 DIABETES MELLITUS (MULTI): ICD-10-CM

## 2023-10-13 DIAGNOSIS — R91.8 MULTIPLE LUNG NODULES ON CT: ICD-10-CM

## 2023-10-13 DIAGNOSIS — E78.5 HYPERLIPIDEMIA ASSOCIATED WITH TYPE 2 DIABETES MELLITUS (MULTI): ICD-10-CM

## 2023-10-13 DIAGNOSIS — I10 PRIMARY HYPERTENSION: Primary | ICD-10-CM

## 2023-10-13 DIAGNOSIS — E11.9 TYPE 2 DIABETES MELLITUS WITHOUT COMPLICATION, WITHOUT LONG-TERM CURRENT USE OF INSULIN (MULTI): ICD-10-CM

## 2023-10-13 DIAGNOSIS — E10.51: ICD-10-CM

## 2023-10-13 DIAGNOSIS — Z09 HOSPITAL DISCHARGE FOLLOW-UP: ICD-10-CM

## 2023-10-13 PROBLEM — H26.9 CATARACT, BILATERAL: Status: ACTIVE | Noted: 2023-10-13

## 2023-10-13 PROBLEM — E66.811 CLASS 1 OBESITY WITH BODY MASS INDEX (BMI) OF 30.0 TO 30.9 IN ADULT: Status: RESOLVED | Noted: 2023-10-13 | Resolved: 2023-10-13

## 2023-10-13 PROBLEM — E55.9 VITAMIN D DEFICIENCY: Status: ACTIVE | Noted: 2019-03-05

## 2023-10-13 PROBLEM — M19.012 ARTHRITIS OF LEFT ACROMIOCLAVICULAR JOINT: Status: ACTIVE | Noted: 2023-10-13

## 2023-10-13 PROBLEM — Z91.119 NONCOMPLIANCE WITH DIETARY RESTRICTION: Status: ACTIVE | Noted: 2023-10-13

## 2023-10-13 PROBLEM — E66.9 CLASS 1 OBESITY WITH BODY MASS INDEX (BMI) OF 30.0 TO 30.9 IN ADULT: Status: RESOLVED | Noted: 2023-10-13 | Resolved: 2023-10-13

## 2023-10-13 PROBLEM — F17.200 SMOKER: Status: ACTIVE | Noted: 2021-01-19

## 2023-10-13 PROBLEM — M48.02 SPINAL STENOSIS OF CERVICAL REGION: Status: ACTIVE | Noted: 2023-10-13

## 2023-10-13 PROBLEM — H25.13 NUCLEAR SCLEROTIC CATARACT OF BOTH EYES: Status: ACTIVE | Noted: 2023-10-13

## 2023-10-13 PROBLEM — M47.892 OTHER SPONDYLOSIS, CERVICAL REGION: Status: ACTIVE | Noted: 2023-10-13

## 2023-10-13 PROBLEM — N52.9 ERECTILE DYSFUNCTION: Status: ACTIVE | Noted: 2018-06-01

## 2023-10-13 PROBLEM — E34.9 TESTOSTERONE DEFICIENCY: Status: ACTIVE | Noted: 2023-10-13

## 2023-10-13 PROBLEM — E66.9 CLASS 1 OBESITY WITH BODY MASS INDEX (BMI) OF 30.0 TO 30.9 IN ADULT: Status: ACTIVE | Noted: 2023-10-13

## 2023-10-13 PROBLEM — M16.9 DEGENERATIVE JOINT DISEASE (DJD) OF HIP: Status: ACTIVE | Noted: 2023-10-13

## 2023-10-13 PROBLEM — H18.413 ARCUS SENILIS, BILATERAL: Status: ACTIVE | Noted: 2023-10-13

## 2023-10-13 PROBLEM — K44.9 HIATAL HERNIA WITH GERD: Status: ACTIVE | Noted: 2023-10-13

## 2023-10-13 PROBLEM — G47.30 SLEEP APNEA: Status: ACTIVE | Noted: 2023-10-13

## 2023-10-13 PROBLEM — F32.5 DEPRESSION, MAJOR, IN REMISSION (CMS-HCC): Status: ACTIVE | Noted: 2023-10-13

## 2023-10-13 PROBLEM — Z79.4 TYPE 2 DIABETES MELLITUS WITH HYPOGLYCEMIA, WITH LONG-TERM CURRENT USE OF INSULIN (MULTI): Status: RESOLVED | Noted: 2023-10-09 | Resolved: 2023-10-13

## 2023-10-13 PROBLEM — E66.811 CLASS 1 OBESITY WITH BODY MASS INDEX (BMI) OF 30.0 TO 30.9 IN ADULT: Status: ACTIVE | Noted: 2023-10-13

## 2023-10-13 PROBLEM — I25.119 ATHEROSCLEROTIC HEART DISEASE OF NATIVE CORONARY ARTERY WITH ANGINA PECTORIS (CMS-HCC): Status: ACTIVE | Noted: 2023-10-13

## 2023-10-13 PROBLEM — M16.0 OSTEOARTHRITIS OF HIPS, BILATERAL: Status: ACTIVE | Noted: 2023-10-13

## 2023-10-13 PROBLEM — I87.2 CHRONIC VENOUS STASIS DERMATITIS: Status: ACTIVE | Noted: 2023-10-13

## 2023-10-13 PROBLEM — E11.649 TYPE 2 DIABETES MELLITUS WITH HYPOGLYCEMIA, WITH LONG-TERM CURRENT USE OF INSULIN (MULTI): Status: RESOLVED | Noted: 2023-10-09 | Resolved: 2023-10-13

## 2023-10-13 PROBLEM — R09.89 BRUIT OF RIGHT CAROTID ARTERY: Status: ACTIVE | Noted: 2023-10-13

## 2023-10-13 PROBLEM — K42.9 PERIUMBILICAL HERNIA: Status: ACTIVE | Noted: 2023-10-13

## 2023-10-13 PROBLEM — R09.1 PLEURITIS: Status: RESOLVED | Noted: 2023-10-09 | Resolved: 2023-10-13

## 2023-10-13 PROBLEM — H52.203 ASTIGMATISM OF BOTH EYES: Status: ACTIVE | Noted: 2023-10-13

## 2023-10-13 PROBLEM — K21.9 HIATAL HERNIA WITH GERD: Status: ACTIVE | Noted: 2023-10-13

## 2023-10-13 PROCEDURE — 3075F SYST BP GE 130 - 139MM HG: CPT | Performed by: INTERNAL MEDICINE

## 2023-10-13 PROCEDURE — 4010F ACE/ARB THERAPY RXD/TAKEN: CPT | Performed by: INTERNAL MEDICINE

## 2023-10-13 PROCEDURE — 3079F DIAST BP 80-89 MM HG: CPT | Performed by: INTERNAL MEDICINE

## 2023-10-13 PROCEDURE — 3052F HG A1C>EQUAL 8.0%<EQUAL 9.0%: CPT | Performed by: INTERNAL MEDICINE

## 2023-10-13 PROCEDURE — 99496 TRANSJ CARE MGMT HIGH F2F 7D: CPT | Performed by: INTERNAL MEDICINE

## 2023-10-13 PROCEDURE — 1159F MED LIST DOCD IN RCRD: CPT | Performed by: INTERNAL MEDICINE

## 2023-10-13 PROCEDURE — 1160F RVW MEDS BY RX/DR IN RCRD: CPT | Performed by: INTERNAL MEDICINE

## 2023-10-13 PROCEDURE — 1125F AMNT PAIN NOTED PAIN PRSNT: CPT | Performed by: INTERNAL MEDICINE

## 2023-10-13 RX ORDER — INSULIN GLARGINE 100 [IU]/ML
32 INJECTION, SOLUTION SUBCUTANEOUS EVERY MORNING
COMMUNITY

## 2023-10-13 RX ORDER — INSULIN GLARGINE 100 [IU]/ML
35 INJECTION, SOLUTION SUBCUTANEOUS NIGHTLY
COMMUNITY
Start: 2022-01-10 | End: 2023-10-31 | Stop reason: WASHOUT

## 2023-10-13 ASSESSMENT — PATIENT HEALTH QUESTIONNAIRE - PHQ9
2. FEELING DOWN, DEPRESSED OR HOPELESS: NOT AT ALL
1. LITTLE INTEREST OR PLEASURE IN DOING THINGS: NOT AT ALL
SUM OF ALL RESPONSES TO PHQ9 QUESTIONS 1 AND 2: 0

## 2023-10-13 ASSESSMENT — ENCOUNTER SYMPTOMS
LOSS OF SENSATION IN FEET: 0
OCCASIONAL FEELINGS OF UNSTEADINESS: 1
DEPRESSION: 0

## 2023-10-13 NOTE — PATIENT INSTRUCTIONS
How can I help Feliciano do this?  ---------------------------------------------  -BE PATIENT WITH Feliciano, remember it may take 10 times before they start to like new food. So, start with small bites and just keep trying.  -Serve at least one vegetable or fruit at every meal. Even try two. Remember, portions do not have to be as big as you think.  -Encourage eating fruits and vegetables instead of drinking them..it's a better way to get fiber and vitamins..so limit the amount of juice to 1/2 cup per day for children 1-6 years and one cup per day for children 7-18 years of age. Try using 1/2 part water and 1/2 part juice.    Spend less than two hours per day watching television and other screen media. Screen media includes video games, movies and computer use for entertainment.    How can I help Feliciano do this?  -Turn off the TV at dinner. Dinner is the best time to hang out with your kids and just talk, learn about their day, and tell them about your day. Your kids have a lot to learn from you and dinner is a great time to share.

## 2023-10-13 NOTE — PROGRESS NOTES
"Subjective   Patient ID: Feliciano Worthington is a 73 y.o. male who presents for Hospital Follow-up ([facility], 10/8-10/10/2023 acquired acute pneumonia of both lower lobes 2nd degree AV block. Patient has a Cardio follow-up Dr. Haskins in November. ) and Weight Loss (Patient advises that he is losing weight unintentionally. Patient advises that its been going on for the past couple months. Patient was at 214 today's weight 205.6).    HPI   73-year-old male with a past medical history of diabetes hypertension hyperlipidemia coronary artery disease COPD interstitial lung disease due to his smoking he quit smoking he was in the hospital recently with pneumonia here for discharge follow-up feeling much better  Review of Systems  REVIEW OF SYSTEMS:  General:  Denies significant weight changes, fever, chills or weakness.  SKIN: Denies any rash or change in moles.  HEENT:  No vision or hearing changes. No headache. No vertigo, No tinnitus.   GI:  No loss of appetite. No change in bowel habit. No abdominal pain. No blood in stool.  GUR: No dysuria. No hematoma, No fever. No incontinence.  Respiratory:  No cough or shortness of breath.  CNS:  No memory or mood changes. No gait disturbance. No focal weakness. No tremors. No tingling or number of extremities.   ENDO: No cold intolerance. No fatigue.    Objective   /84 (BP Location: Right arm, Patient Position: Sitting, BP Cuff Size: Adult)   Pulse 85   Temp 36.4 °C (97.6 °F)   Ht 1.778 m (5' 10\")   Wt 93.3 kg (205 lb 9.6 oz)   SpO2 98% Comment: RA  BMI 29.50 kg/m²     Physical Exam  PHYSICAL EXAM LONG:  Vitals:  Per [employer].  General Appearance:  Normal-built, well-nourished  with no apparent distress.  Skin:  Normal turgor.  No rash.  Head:  Normocephalic, atraumatic.  Eyes:  Pupils are equal, round, and reactive to light and accommodation.  Extraocular movements are intact.  No pallor of conjunctivae.  Mouth has moist oral mucosa.  Pharynx appears normal.  No " erythema.  Nose:  Nasal mucosa normal.  Turbinates are within normal limits.  Ears:  Bilateral auditory ear canals are normal.  Bilateral tympanic membranes are normal and visible.  Neck:  Supple.  No JVD.  No carotid bruit.  No thyromegaly. No cervical lymphadenopathy.   Chest:  Bilaterally good air entry and bilaterally clear to auscultation.  No wheezing.  No crackles.  Heart:  Regular rate and rhythm.  S1, S2 positive.  No murmur.  Abdomen:  Soft and nontender.  Bowel sounds are positive.  No organomegaly.  Extremities:  Bilaterally no pedal pitting edema.  Bilaterally 2+ dorsalis pedis pulses.  Neuro Exam:  Cranial nerves from II to XII intact.  No facial droop.  Tongue at midline.  Facial sensation intact to light touch and pain sensation.  Motor strength 5/5 in upper and lower extremities.  Sensation is grossly intact to light touch and pain sensation.  Deep tendon reflexes are bilaterally symmetric in upper and lower extremities and within normal limits, 2+.  No cerebellar signs.  Finger-to-nose intact.      Hospital records reviewed A1c 8.8 chest x-ray shows bilateral basilar atelectasis or infiltrate  Assessment/Plan        Hospital discharge follow-up for pneumonia stable interstitial lung disease stable quit smoking COPD stable    Diabetes hyperlipidemia stable strongly advised lifestyle modification diet exercise lose weight I will see him in December with repeat blood work    Coronary artery disease peripheral vascular disease stable follow-up with cardiology    Multiple lung nodules we will do annual low-dose CT which is stable now

## 2023-10-17 ENCOUNTER — APPOINTMENT (OUTPATIENT)
Dept: RADIOLOGY | Facility: HOSPITAL | Age: 73
End: 2023-10-17
Payer: MEDICARE

## 2023-10-17 ENCOUNTER — HOSPITAL ENCOUNTER (EMERGENCY)
Facility: HOSPITAL | Age: 73
Discharge: HOME | End: 2023-10-18
Attending: EMERGENCY MEDICINE
Payer: MEDICARE

## 2023-10-17 VITALS
RESPIRATION RATE: 22 BRPM | SYSTOLIC BLOOD PRESSURE: 126 MMHG | OXYGEN SATURATION: 98 % | TEMPERATURE: 95.9 F | HEART RATE: 77 BPM | BODY MASS INDEX: 28.49 KG/M2 | DIASTOLIC BLOOD PRESSURE: 66 MMHG | WEIGHT: 199 LBS | HEIGHT: 70 IN

## 2023-10-17 DIAGNOSIS — R06.02 SHORTNESS OF BREATH: ICD-10-CM

## 2023-10-17 DIAGNOSIS — E78.5 HYPERLIPIDEMIA ASSOCIATED WITH TYPE 2 DIABETES MELLITUS (MULTI): ICD-10-CM

## 2023-10-17 DIAGNOSIS — J44.9 CHRONIC OBSTRUCTIVE PULMONARY DISEASE, UNSPECIFIED COPD TYPE (MULTI): ICD-10-CM

## 2023-10-17 DIAGNOSIS — M94.0 COSTOCHONDRITIS: ICD-10-CM

## 2023-10-17 DIAGNOSIS — R91.8 LUNG NODULES: ICD-10-CM

## 2023-10-17 DIAGNOSIS — E11.69 HYPERLIPIDEMIA ASSOCIATED WITH TYPE 2 DIABETES MELLITUS (MULTI): ICD-10-CM

## 2023-10-17 DIAGNOSIS — E11.9 TYPE 2 DIABETES MELLITUS WITHOUT COMPLICATION, WITHOUT LONG-TERM CURRENT USE OF INSULIN (MULTI): ICD-10-CM

## 2023-10-17 DIAGNOSIS — E87.1 HYPONATREMIA: ICD-10-CM

## 2023-10-17 DIAGNOSIS — R07.9 CHEST PAIN, UNSPECIFIED TYPE: Primary | ICD-10-CM

## 2023-10-17 LAB
ALBUMIN SERPL BCP-MCNC: 3.9 G/DL (ref 3.4–5)
ALP SERPL-CCNC: 66 U/L (ref 33–136)
ALT SERPL W P-5'-P-CCNC: 23 U/L (ref 10–52)
ANION GAP SERPL CALC-SCNC: 15 MMOL/L (ref 10–20)
AST SERPL W P-5'-P-CCNC: 18 U/L (ref 9–39)
BASOPHILS # BLD AUTO: 0.06 X10*3/UL (ref 0–0.1)
BASOPHILS NFR BLD AUTO: 0.5 %
BILIRUB SERPL-MCNC: 0.6 MG/DL (ref 0–1.2)
BNP SERPL-MCNC: 10 PG/ML (ref 0–99)
BUN SERPL-MCNC: 29 MG/DL (ref 6–23)
CALCIUM SERPL-MCNC: 9.2 MG/DL (ref 8.6–10.3)
CARDIAC TROPONIN I PNL SERPL HS: 11 NG/L (ref 0–20)
CARDIAC TROPONIN I PNL SERPL HS: 12 NG/L (ref 0–20)
CHLORIDE SERPL-SCNC: 91 MMOL/L (ref 98–107)
CO2 SERPL-SCNC: 25 MMOL/L (ref 21–32)
CREAT SERPL-MCNC: 1.16 MG/DL (ref 0.5–1.3)
EOSINOPHIL # BLD AUTO: 0.17 X10*3/UL (ref 0–0.4)
EOSINOPHIL NFR BLD AUTO: 1.3 %
ERYTHROCYTE [DISTWIDTH] IN BLOOD BY AUTOMATED COUNT: 12.9 % (ref 11.5–14.5)
GFR SERPL CREATININE-BSD FRML MDRD: 67 ML/MIN/1.73M*2
GLUCOSE SERPL-MCNC: 281 MG/DL (ref 74–99)
HCT VFR BLD AUTO: 41.7 % (ref 41–52)
HGB BLD-MCNC: 14.8 G/DL (ref 13.5–17.5)
IMM GRANULOCYTES # BLD AUTO: 0.16 X10*3/UL (ref 0–0.5)
IMM GRANULOCYTES NFR BLD AUTO: 1.2 % (ref 0–0.9)
LIPASE SERPL-CCNC: 10 U/L (ref 9–82)
LYMPHOCYTES # BLD AUTO: 2.95 X10*3/UL (ref 0.8–3)
LYMPHOCYTES NFR BLD AUTO: 22.3 %
MAGNESIUM SERPL-MCNC: 1.59 MG/DL (ref 1.6–2.4)
MCH RBC QN AUTO: 28.7 PG (ref 26–34)
MCHC RBC AUTO-ENTMCNC: 35.5 G/DL (ref 32–36)
MCV RBC AUTO: 81 FL (ref 80–100)
MONOCYTES # BLD AUTO: 0.9 X10*3/UL (ref 0.05–0.8)
MONOCYTES NFR BLD AUTO: 6.8 %
NEUTROPHILS # BLD AUTO: 8.98 X10*3/UL (ref 1.6–5.5)
NEUTROPHILS NFR BLD AUTO: 67.9 %
NRBC BLD-RTO: 0 /100 WBCS (ref 0–0)
PLATELET # BLD AUTO: 213 X10*3/UL (ref 150–450)
PMV BLD AUTO: 9.5 FL (ref 7.5–11.5)
POTASSIUM SERPL-SCNC: 3.8 MMOL/L (ref 3.5–5.3)
PROT SERPL-MCNC: 7.2 G/DL (ref 6.4–8.2)
RBC # BLD AUTO: 5.16 X10*6/UL (ref 4.5–5.9)
SARS-COV-2 RNA RESP QL NAA+PROBE: NOT DETECTED
SODIUM SERPL-SCNC: 127 MMOL/L (ref 136–145)
WBC # BLD AUTO: 13.2 X10*3/UL (ref 4.4–11.3)

## 2023-10-17 PROCEDURE — 36415 COLL VENOUS BLD VENIPUNCTURE: CPT | Performed by: EMERGENCY MEDICINE

## 2023-10-17 PROCEDURE — 71275 CT ANGIOGRAPHY CHEST: CPT | Mod: MG

## 2023-10-17 PROCEDURE — 2500000002 HC RX 250 W HCPCS SELF ADMINISTERED DRUGS (ALT 637 FOR MEDICARE OP, ALT 636 FOR OP/ED): Performed by: EMERGENCY MEDICINE

## 2023-10-17 PROCEDURE — 71045 X-RAY EXAM CHEST 1 VIEW: CPT | Performed by: RADIOLOGY

## 2023-10-17 PROCEDURE — 83690 ASSAY OF LIPASE: CPT | Performed by: EMERGENCY MEDICINE

## 2023-10-17 PROCEDURE — 85025 COMPLETE CBC W/AUTO DIFF WBC: CPT | Performed by: EMERGENCY MEDICINE

## 2023-10-17 PROCEDURE — 84484 ASSAY OF TROPONIN QUANT: CPT | Performed by: EMERGENCY MEDICINE

## 2023-10-17 PROCEDURE — 99284 EMERGENCY DEPT VISIT MOD MDM: CPT | Mod: CS,25 | Performed by: EMERGENCY MEDICINE

## 2023-10-17 PROCEDURE — 96374 THER/PROPH/DIAG INJ IV PUSH: CPT | Mod: XU | Performed by: EMERGENCY MEDICINE

## 2023-10-17 PROCEDURE — 87635 SARS-COV-2 COVID-19 AMP PRB: CPT | Performed by: EMERGENCY MEDICINE

## 2023-10-17 PROCEDURE — 83735 ASSAY OF MAGNESIUM: CPT | Performed by: EMERGENCY MEDICINE

## 2023-10-17 PROCEDURE — 83880 ASSAY OF NATRIURETIC PEPTIDE: CPT | Performed by: EMERGENCY MEDICINE

## 2023-10-17 PROCEDURE — 84484 ASSAY OF TROPONIN QUANT: CPT | Mod: 91 | Performed by: EMERGENCY MEDICINE

## 2023-10-17 PROCEDURE — 94640 AIRWAY INHALATION TREATMENT: CPT

## 2023-10-17 PROCEDURE — 71045 X-RAY EXAM CHEST 1 VIEW: CPT

## 2023-10-17 PROCEDURE — 96375 TX/PRO/DX INJ NEW DRUG ADDON: CPT | Mod: XU | Performed by: EMERGENCY MEDICINE

## 2023-10-17 PROCEDURE — 2550000001 HC RX 255 CONTRASTS: Performed by: EMERGENCY MEDICINE

## 2023-10-17 PROCEDURE — 2500000004 HC RX 250 GENERAL PHARMACY W/ HCPCS (ALT 636 FOR OP/ED): Performed by: EMERGENCY MEDICINE

## 2023-10-17 PROCEDURE — 80053 COMPREHEN METABOLIC PANEL: CPT | Performed by: EMERGENCY MEDICINE

## 2023-10-17 PROCEDURE — 71275 CT ANGIOGRAPHY CHEST: CPT | Performed by: RADIOLOGY

## 2023-10-17 RX ORDER — IPRATROPIUM BROMIDE AND ALBUTEROL SULFATE 2.5; .5 MG/3ML; MG/3ML
SOLUTION RESPIRATORY (INHALATION)
Status: DISCONTINUED
Start: 2023-10-17 | End: 2023-10-18 | Stop reason: HOSPADM

## 2023-10-17 RX ORDER — LIDOCAINE 50 MG/G
1 PATCH TOPICAL DAILY
Qty: 5 PATCH | Refills: 0 | Status: SHIPPED | OUTPATIENT
Start: 2023-10-17 | End: 2024-05-03 | Stop reason: ALTCHOICE

## 2023-10-17 RX ORDER — IPRATROPIUM BROMIDE AND ALBUTEROL SULFATE 2.5; .5 MG/3ML; MG/3ML
3 SOLUTION RESPIRATORY (INHALATION)
Status: DISCONTINUED | OUTPATIENT
Start: 2023-10-17 | End: 2023-10-17

## 2023-10-17 RX ORDER — ONDANSETRON HYDROCHLORIDE 2 MG/ML
4 INJECTION, SOLUTION INTRAVENOUS ONCE
Status: COMPLETED | OUTPATIENT
Start: 2023-10-17 | End: 2023-10-17

## 2023-10-17 RX ORDER — IPRATROPIUM BROMIDE AND ALBUTEROL SULFATE 2.5; .5 MG/3ML; MG/3ML
3 SOLUTION RESPIRATORY (INHALATION) ONCE
Status: COMPLETED | OUTPATIENT
Start: 2023-10-17 | End: 2023-10-17

## 2023-10-17 RX ORDER — KETOROLAC TROMETHAMINE 30 MG/ML
15 INJECTION, SOLUTION INTRAMUSCULAR; INTRAVENOUS ONCE
Status: COMPLETED | OUTPATIENT
Start: 2023-10-17 | End: 2023-10-17

## 2023-10-17 RX ORDER — MORPHINE SULFATE 2 MG/ML
2 INJECTION, SOLUTION INTRAMUSCULAR; INTRAVENOUS ONCE
Status: COMPLETED | OUTPATIENT
Start: 2023-10-17 | End: 2023-10-17

## 2023-10-17 RX ORDER — NAPROXEN SODIUM 550 MG/1
550 TABLET ORAL
Qty: 14 TABLET | Refills: 0 | Status: SHIPPED | OUTPATIENT
Start: 2023-10-17 | End: 2023-10-24

## 2023-10-17 RX ADMIN — IPRATROPIUM BROMIDE AND ALBUTEROL SULFATE 3 ML: 2.5; .5 SOLUTION RESPIRATORY (INHALATION) at 18:02

## 2023-10-17 RX ADMIN — KETOROLAC TROMETHAMINE 15 MG: 30 INJECTION, SOLUTION INTRAMUSCULAR; INTRAVENOUS at 18:30

## 2023-10-17 RX ADMIN — SODIUM CHLORIDE 500 ML: 9 INJECTION, SOLUTION INTRAVENOUS at 22:00

## 2023-10-17 RX ADMIN — MORPHINE SULFATE 2 MG: 2 INJECTION, SOLUTION INTRAMUSCULAR; INTRAVENOUS at 22:20

## 2023-10-17 RX ADMIN — IOHEXOL 75 ML: 350 INJECTION, SOLUTION INTRAVENOUS at 19:48

## 2023-10-17 RX ADMIN — ONDANSETRON 4 MG: 2 INJECTION INTRAMUSCULAR; INTRAVENOUS at 18:30

## 2023-10-17 ASSESSMENT — LIFESTYLE VARIABLES
REASON UNABLE TO ASSESS: NO
HAVE YOU EVER FELT YOU SHOULD CUT DOWN ON YOUR DRINKING: NO
EVER HAD A DRINK FIRST THING IN THE MORNING TO STEADY YOUR NERVES TO GET RID OF A HANGOVER: NO
HAVE PEOPLE ANNOYED YOU BY CRITICIZING YOUR DRINKING: NO
EVER FELT BAD OR GUILTY ABOUT YOUR DRINKING: NO

## 2023-10-17 ASSESSMENT — PAIN - FUNCTIONAL ASSESSMENT: PAIN_FUNCTIONAL_ASSESSMENT: 0-10

## 2023-10-17 ASSESSMENT — PAIN SCALES - GENERAL: PAINLEVEL_OUTOF10: 6

## 2023-10-17 ASSESSMENT — PAIN DESCRIPTION - PAIN TYPE: TYPE: ACUTE PAIN

## 2023-10-17 NOTE — ED PROVIDER NOTES
73-year-old male presents emergency department with chief complaint of lower chest discomfort that he describes as pleurisy, shortness of breath, and productive cough.  Patient reports that he was seen here a week or so ago and prescribed Augmentin as well as methylprednisone and he would have completed both of these medications today.  He states that his symptoms are not improving and therefore he does come here for further evaluation.  He denies fevers.  He reports that the cough is productive in nature and causes him chest discomfort.  He reports persistent shortness of breath.  He denies abdominal pain.  Admits to nausea but no vomiting.  No dysuria, diarrhea, constipation, black or bloody stools. Chart review shows significant past medical diabetes CAD, COPD, hypertension, hyperlipidemia, depression, GERD, osteoarthritis, peripheral vascular disease, tobacco abuse, and sleep apnea.  Patient was admitted from 10/8/2023 until 10/10/2023 for community-acquired pneumonia.      History provided by:  Patient   used: No           ------------------------------------------------------------------------------------------------------------------------------------------    VS: As documented in the triage note and EMR flowsheet from this visit were reviewed.    Review of Systems  Constitutional: no fever, chills reports malaise  Eyes: no redness, discharge, pain  HENT: no sore throat, nose bleeds, admits to congestion  Cardiovascular: Reports diffuse lower chest pain worse with coughing and deep breath no leg edema, palpitations  Respiratory: Admits to shortness of breath  Productive cough no wheezing  GI: no nausea, diarrhea, pain, vomiting, constipation, BRBPR, melena  : no dysuria, frequency, hematuria  Musculoskeletal: no neck pain, stiffness,  no joint deformity, swelling reports chest wall pain  Skin: no rash, erythema, wounds  Neurological: no headache, dizziness, lightheadedness, weakness,  numbness, or tingling  Psychiatric: no suicidal thoughts, confusion, agitation  Metabolic: no polyuria or polydipsia  Hematologic: no increased bleeding or bruising  Immunology: No immunocompromise state    [facility]  Nursing notes reviewed and confirmed by me.  Chart review performed including medications, allergies, and medical, surgical, and family history  Visit Vitals  /66 (BP Location: Left arm, Patient Position: Lying)   Pulse 77   Temp 35.5 °C (95.9 °F)   Resp 22     Physical Exam   Past Medical History:   Diagnosis Date    Acute myocardial infarction, unspecified (CMS/[facility]) 01/24/2018    Heart attack    Acute myocardial infarction, unspecified (CMS/[facility]) 01/24/2018    Heart attack    Chronic obstructive pulmonary disease, unspecified (CMS/[facility]) 01/24/2018    COPD, mild    Chronic obstructive pulmonary disease, unspecified (CMS/[facility]) 01/24/2018    COPD, mild    Coronary angioplasty status     Post PTCA    Encounter for immunization 11/04/2022    Encounter for immunization    Encounter for screening for malignant neoplasm of colon 08/25/2016    Encounter for colonoscopy due to history of colonic polyp    Encounter for screening for malignant neoplasm of skin 04/14/2022    Skin cancer screening    Nicotine dependence, cigarettes, uncomplicated 09/12/2022    Cigarette smoker    Obesity, unspecified 05/25/2022    Class 1 obesity with body mass index (BMI) of 31.0 to 31.9 in adult    Old myocardial infarction 01/24/2018    History of myocardial infarction    Other acute postprocedural pain     Post-operative pain    Pain in left knee     Left knee pain, unspecified chronicity    Pain in right hip 11/04/2022    Hip pain, bilateral    Personal history of other diseases of the digestive system 01/24/2018    History of gastroesophageal reflux (GERD)    Personal history of other diseases of the musculoskeletal system and connective tissue 08/25/2016    History of low back pain    Personal history of other diseases of  the respiratory system     History of sinusitis    Personal history of other endocrine, nutritional and metabolic disease 2016    History of hyperlipidemia    Personal history of other mental and behavioral disorders 2022    History of depression    Personal history of other specified conditions 2016    History of diarrhea    Personal history of other specified conditions     History of prolonged Q-T interval on ECG    Personal history of other specified conditions 2018    History of nocturia    Separation of muscle (nontraumatic), other site 2018    Rectus diastasis    Sprain of medial collateral ligament of left knee, initial encounter 2017    Sprain of medial collateral ligament of left knee, initial encounter    Sprain of medial collateral ligament of left knee, subsequent encounter 10/12/2017    Sprain of medial collateral ligament of left knee, subsequent encounter    Type 2 diabetes mellitus without complications (CMS/[facility]) 2018    Diabetes mellitus    Type 2 diabetes mellitus without complications (CMS/[facility]) 2018    Diabetes mellitus    Umbilical hernia without obstruction or gangrene 2018    Umbilical hernia without obstruction and without gangrene      Past Surgical History:   Procedure Laterality Date    COLONOSCOPY  10/06/2016    Colonoscopy    CORONARY ANGIOPLASTY WITH STENT PLACEMENT  2016    Cath Stent Placement    OTHER SURGICAL HISTORY  2022    Oral surgery    TONSILLECTOMY  2016    Tonsillectomy      Social History     Socioeconomic History    Marital status:      Spouse name: None    Number of children: None    Years of education: None    Highest education level: None   Occupational History    None   Tobacco Use    Smoking status: Former     Packs/day: 1.50     Years: 50.00     Additional pack years: 0.00     Total pack years: 75.00     Types: Cigarettes     Quit date: 10/2/2023     Years since quittin.0    Smokeless  tobacco: Never   Substance and Sexual Activity    Alcohol use: Not Currently    Drug use: Never    Sexual activity: None   Other Topics Concern    None   Social History Narrative    None     Social Determinants of Health     Financial Resource Strain: Medium Risk (10/8/2023)    Overall Financial Resource Strain (CARDIA)     Difficulty of Paying Living Expenses: Somewhat hard   Food Insecurity: No Food Insecurity (10/8/2023)    Hunger Vital Sign     Worried About Running Out of Food in the Last Year: Never true     Ran Out of Food in the Last Year: Never true   Transportation Needs: No Transportation Needs (10/8/2023)    PRAPARE - Transportation     Lack of Transportation (Medical): No     Lack of Transportation (Non-Medical): No   Physical Activity: Not on file   Stress: Not on file   Social Connections: Unknown (10/8/2023)    Social Connection and Isolation Panel [NHANES]     Frequency of Communication with Friends and Family: Not on file     Frequency of Social Gatherings with Friends and Family: Not on file     Attends Rastafarian Services: Not on file     Active Member of Clubs or Organizations: Yes     Attends Club or Organization Meetings: Not on file     Marital Status:    Intimate Partner Violence: Not At Risk (10/8/2023)    Humiliation, Afraid, Rape, and Kick questionnaire     Fear of Current or Ex-Partner: No     Emotionally Abused: No     Physically Abused: No     Sexually Abused: No   Housing Stability: Low Risk  (10/8/2023)    Housing Stability Vital Sign     Unable to Pay for Housing in the Last Year: No     Number of Places Lived in the Last Year: 1     Unstable Housing in the Last Year: No      ------------------------------------------------------------------------------------------------------------------------------------------  XR chest 1 view   Final Result   No evidence for acute cardiopulmonary process.                  Signed by: Shania Wilburn 10/17/2023 8:36 PM   Dictation workstation:    [id number]      CT angio chest for pulmonary embolism   Final Result   1. No significant interval changes since previous exam COPD changes   and pleural base honeycombing suggestive of fibrosis.        2. Diffuse circumferential mucosal wall thickening of the esophageal   wall, nonspecific in etiology. Findings may be related to reflux,   however, other inflammatory, infectious or infiltrative process can   not be excluded. Correlation with history of systemic disease such as   SLE recommended.        3. Stable multiple nonspecific subcentimeter lung nodules as above   particularly within the right upper lobe. Six-month follow-up CT   recommended.        4. No consolidations, sizeable effusions or pneumothorax.        5. Fatty infiltration of the liver. No focal liver lesions. Recommend   correlation with liver function tests.        6. Low-attenuation lesion involving the left kidney. Correlation with   ultrasound findings recommended.        7.  Right lower lobe mucous plugs and diffuse bronchiectasis.        8. Additional detailed findings as above.        MACRO:   None.             Signed by: Shania Wilburn 10/17/2023 8:33 PM   Dictation workstation:   [id number]         Labs Reviewed   MAGNESIUM - Abnormal       Result Value    Magnesium 1.59 (*)    COMPREHENSIVE METABOLIC PANEL - Abnormal    Glucose 281 (*)     Sodium 127 (*)     Potassium 3.8      Chloride 91 (*)     Bicarbonate 25      Anion Gap 15      Urea Nitrogen 29 (*)     Creatinine 1.16      eGFR 67      Calcium 9.2      Albumin 3.9      Alkaline Phosphatase 66      Total Protein 7.2      AST 18      Bilirubin, Total 0.6      ALT 23     CBC WITH AUTO DIFFERENTIAL - Abnormal    WBC 13.2 (*)     nRBC 0.0      RBC 5.16      Hemoglobin 14.8      Hematocrit 41.7      MCV 81      MCH 28.7      MCHC 35.5      RDW 12.9      Platelets 213      MPV 9.5      Neutrophils % 67.9      Immature Granulocytes %, Automated 1.2 (*)     Lymphocytes % 22.3       Monocytes % 6.8      Eosinophils % 1.3      Basophils % 0.5      Neutrophils Absolute 8.98 (*)     Immature Granulocytes Absolute, Automated 0.16      Lymphocytes Absolute 2.95      Monocytes Absolute 0.90 (*)     Eosinophils Absolute 0.17      Basophils Absolute 0.06     B-TYPE NATRIURETIC PEPTIDE - Normal    BNP 10      Narrative:        <100 pg/mL - Heart failure unlikely  100-299 pg/mL - Intermediate probability of acute heart                  failure exacerbation. Correlate with clinical                  context and patient history.    >=300 pg/mL - Heart Failure likely. Correlate with clinical                  context and patient history.    BNP testing is performed using different testing methodology at [facility] than at [facility]. Direct result comparisons should only be made within the same method.      LIPASE - Normal    Lipase 10      Narrative:     Venipuncture immediately after or during the administration of Metamizole may lead to falsely low results. Testing should be performed immediately prior to Metamizole dosing.   SARS-COV-2 PCR, SYMPTOMATIC - Normal    Coronavirus 2019, PCR Not Detected      Narrative:     This assay has received FDA Emergency Use Authorization (EUA) and is only authorized for the duration of time that circumstances exist to justify the authorization of the emergency use of in vitro diagnostic tests for the detection of SARS-CoV-2 virus and/or diagnosis of COVID-19 infection under section 564(b)(1) of the Act, 21 U.S.C. 360bbb-3(b)(1). This assay is an in vitro diagnostic nucleic acid amplification test for the qualitative detection of SARS-CoV-2 from nasopharyngeal specimens and has been validated for use at [facility]. Negative results do not preclude COVID-19 infections and should not be used as the sole basis for diagnosis, treatment, or other management decisions.     SERIAL TROPONIN-INITIAL - Normal    Troponin I, High  Sensitivity 11      Narrative:     Less than 99th percentile of normal range cutoff-  Female and children under 18 years old <14 ng/L; Male <21 ng/L: Negative  Repeat testing should be performed if clinically indicated.     Female and children under 18 years old 14-50 ng/L; Male 21-50 ng/L:  Consistent with possible cardiac damage and possible increased clinical   risk. Serial measurements may help to assess extent of myocardial damage.     >50 ng/L: Consistent with cardiac damage, increased clinical risk and  myocardial infarction. Serial measurements may help assess extent of   myocardial damage.      NOTE: Children less than 1 year old may have higher baseline troponin   levels and results should be interpreted in conjunction with the overall   clinical context.     NOTE: Troponin I testing is performed using a different   testing methodology at [facility] than at other   [facility]. Direct result comparisons should only   be made within the same method.   SERIAL TROPONIN, 1 HOUR - Normal    Troponin I, High Sensitivity 12      Narrative:     Less than 99th percentile of normal range cutoff-  Female and children under 18 years old <14 ng/L; Male <21 ng/L: Negative  Repeat testing should be performed if clinically indicated.     Female and children under 18 years old 14-50 ng/L; Male 21-50 ng/L:  Consistent with possible cardiac damage and possible increased clinical   risk. Serial measurements may help to assess extent of myocardial damage.     >50 ng/L: Consistent with cardiac damage, increased clinical risk and  myocardial infarction. Serial measurements may help assess extent of   myocardial damage.      NOTE: Children less than 1 year old may have higher baseline troponin   levels and results should be interpreted in conjunction with the overall   clinical context.     NOTE: Troponin I testing is performed using a different   testing methodology at [facility] than at other    system hospitals. Direct result comparisons should only   be made within the same method.   TROPONIN SERIES- (INITIAL, 1 HR)    Narrative:     The following orders were created for panel order Troponin I Series, High Sensitivity (0, 1 HR).  Procedure                               Abnormality         Status                     ---------                               -----------         ------                     Troponin I, High Sensiti...[[id number]]  Normal              Final result               Troponin, High Sensitivi...[[id number]]  Normal              Final result                 Please view results for these tests on the individual orders.        Medical Decision Making  EKG interpreted by ED physician: Sinus rhythm with first-degree AV block rate of 86.  NH prolonged at 278 consistent with first-degree AV block.  QRS and QTc within normal range.  No significant ST elevations or depressions.  Q waves present in inferior leads may represent previous infarct.  Poor R wave progression.  Left axis deviation.    Repeat EKG interpreted by ED physician: Sinus rhythm with first-degree AV block rate of 80.  NH prolonged at 338.  QRS and QTc within normal range.  No significant ST elevations or depressions.  No significant Q waves.  Good R wave progression.  Left axis deviation.    73-year-old male presents to emergency department with chief complaints of chest discomfort worse with deep breaths and coughing.  He also reports shortness of breath.  Chart review and discussion with the patient shows he is recently completed treatment for community-acquired pneumonia along with COPD.  Patient was admitted to the hospital with similar symptoms and he confirms the symptoms are similar.  On my exam he is afebrile and nontoxic.  He is not in any respiratory distress and is adequate oxygen saturation on room air.  Patient is given aerosol treatments and states this is not significantly changed his symptoms.  He does not have  any significant wheezing and I have low suspicion for acute exacerbation of his COPD at this time.  COVID testing is negative.  Lipase within normal range I do not suspect pancreatitis.  CBC shows slight leukocytosis and no significant anemia.  I suspect this is due to his recent steroids and patient findings concerning for sepsis.  Cardiac enzymes x2 and EKG showed no acute PCI.  Patient does have heart score of 4 and I discussed this with him and offered him admission if he does not feel he can obtain close outpatient follow-up with cardiology.  Patient does not wish to be admitted as he has had recent cardiac evaluation on his last admission and he has follow-up with his cardiologist this coming week.  Magnesium is just slightly low though nonspecific.  BNP is not significantly elevated patient does not have findings of fluid overload.  CMP shows hyperglycemia and hyponatremia.  Patient given gentle IV fluid bolus.  Advised patient of his hyponatremia.  Chest x-ray did not show acute cardiopulmonary process such as pneumonia, pleural effusion, or pulmonary edema CT PE study obtained given patient's persistent chest pain and recent hospitalization does not show pulmonary embolus.  It does shows findings consistent with fibrosis/COPD nonspecific nodule which have been present on previous imaging patient also appreciated to have some mucous plugging and bronchiectasis.  I advised patient on these chronic findings.  Patient was ambulated in the department to maintain adequate oxygen saturation on room air.  He states that he felt well ambulating and is comfortable going home.  He was treated symptomatically with Zofran, Toradol, and morphine.  On reevaluation he does report improvement of his chest pain.  He was given Anaprox and Lidoderm patches for further relief at home.  Advised follow-up with cardiology and we discussed reasons to return to the ED.       Diagnoses as of 10/17/23 6885   Shortness of breath    Hyponatremia   Chest pain, unspecified type   Costochondritis   Lung nodules      1. Chest pain, unspecified type  naproxen sodium (Anaprox) 550 mg tablet    lidocaine (Lidoderm) 5 % patch      2. Shortness of breath        3. Hyponatremia        4. Costochondritis  naproxen sodium (Anaprox) 550 mg tablet    lidocaine (Lidoderm) 5 % patch      5. Lung nodules           Procedures     This note was dictated using dragon software and may contain errors related to dictation interpretation errors.      Isidoro Way, DO  10/17/23 0801

## 2023-10-18 DIAGNOSIS — E11.9 TYPE 2 DIABETES MELLITUS WITHOUT COMPLICATION, WITHOUT LONG-TERM CURRENT USE OF INSULIN (MULTI): Primary | ICD-10-CM

## 2023-10-18 RX ORDER — BLOOD GLUCOSE CONTROL HIGH,LOW
1 EACH MISCELLANEOUS DAILY PRN
Qty: 1 EACH | Refills: 1 | Status: SHIPPED | OUTPATIENT
Start: 2023-10-18

## 2023-10-18 RX ORDER — IPRATROPIUM BROMIDE 21 UG/1
2 SPRAY, METERED NASAL 2 TIMES DAILY
Qty: 40 ML | Refills: 1 | Status: SHIPPED | OUTPATIENT
Start: 2023-10-18 | End: 2024-05-07 | Stop reason: ALTCHOICE

## 2023-10-18 RX ORDER — BLOOD SUGAR DIAGNOSTIC
STRIP MISCELLANEOUS
Qty: 200 STRIP | Refills: 0 | Status: SHIPPED | OUTPATIENT
Start: 2023-10-18

## 2023-10-18 RX ORDER — LANCETS
EACH MISCELLANEOUS
Qty: 200 EACH | Refills: 0 | Status: SHIPPED | OUTPATIENT
Start: 2023-10-18

## 2023-10-18 RX ORDER — ISOPROPYL ALCOHOL 70 ML/100ML
SWAB TOPICAL
Qty: 200 EACH | Refills: 1 | Status: SHIPPED | OUTPATIENT
Start: 2023-10-18

## 2023-10-18 RX ORDER — DEXTROSE 4 G
TABLET,CHEWABLE ORAL
Qty: 1 EACH | Refills: 0 | Status: SHIPPED | OUTPATIENT
Start: 2023-10-18

## 2023-10-18 NOTE — DISCHARGE INSTRUCTIONS
Follow up with your primary care doctor and cardiology. Return to the ER if symptoms worsen or change. We discussed possible admission for further work up of your pain verse close outpatient follow up with cardiology, but you have chosen to follow up closely outpatient. If you change your mind at anytime you can return to the ER for further evaluation. Take medications as prescribed.

## 2023-10-23 ENCOUNTER — ANCILLARY PROCEDURE (OUTPATIENT)
Dept: CARDIOLOGY | Facility: CLINIC | Age: 73
End: 2023-10-23
Payer: MEDICARE

## 2023-10-23 DIAGNOSIS — I44.1 2ND DEGREE AV BLOCK: ICD-10-CM

## 2023-10-23 DIAGNOSIS — R00.1 BRADYCARDIA: ICD-10-CM

## 2023-10-23 PROCEDURE — 93224 XTRNL ECG REC UP TO 48 HRS: CPT | Performed by: INTERNAL MEDICINE

## 2023-10-24 ENCOUNTER — TELEPHONE (OUTPATIENT)
Dept: PRIMARY CARE | Facility: CLINIC | Age: 73
End: 2023-10-24

## 2023-10-24 ENCOUNTER — OFFICE VISIT (OUTPATIENT)
Dept: PRIMARY CARE | Facility: CLINIC | Age: 73
End: 2023-10-24
Payer: MEDICARE

## 2023-10-24 ENCOUNTER — HOSPITAL ENCOUNTER (OUTPATIENT)
Dept: VASCULAR MEDICINE | Facility: HOSPITAL | Age: 73
Discharge: HOME | End: 2023-10-24
Payer: MEDICARE

## 2023-10-24 VITALS
OXYGEN SATURATION: 100 % | SYSTOLIC BLOOD PRESSURE: 112 MMHG | BODY MASS INDEX: 30.09 KG/M2 | DIASTOLIC BLOOD PRESSURE: 64 MMHG | WEIGHT: 210.2 LBS | TEMPERATURE: 97.6 F | HEART RATE: 81 BPM | HEIGHT: 70 IN

## 2023-10-24 DIAGNOSIS — M79.602 LEFT ARM PAIN: Primary | ICD-10-CM

## 2023-10-24 DIAGNOSIS — I82.621 ACUTE DEEP VEIN THROMBOSIS (DVT) OF BRACHIAL VEIN OF RIGHT UPPER EXTREMITY (MULTI): ICD-10-CM

## 2023-10-24 DIAGNOSIS — M79.601 PAIN IN RIGHT ARM: ICD-10-CM

## 2023-10-24 DIAGNOSIS — M79.602 LEFT ARM PAIN: ICD-10-CM

## 2023-10-24 DIAGNOSIS — I82.611 SUPERFICIAL VENOUS THROMBOSIS OF ARM, RIGHT: ICD-10-CM

## 2023-10-24 PROBLEM — Z09 HOSPITAL DISCHARGE FOLLOW-UP: Status: RESOLVED | Noted: 2023-10-13 | Resolved: 2023-10-24

## 2023-10-24 PROCEDURE — 3052F HG A1C>EQUAL 8.0%<EQUAL 9.0%: CPT | Performed by: INTERNAL MEDICINE

## 2023-10-24 PROCEDURE — 3078F DIAST BP <80 MM HG: CPT | Performed by: INTERNAL MEDICINE

## 2023-10-24 PROCEDURE — 1125F AMNT PAIN NOTED PAIN PRSNT: CPT | Performed by: INTERNAL MEDICINE

## 2023-10-24 PROCEDURE — 4010F ACE/ARB THERAPY RXD/TAKEN: CPT | Performed by: INTERNAL MEDICINE

## 2023-10-24 PROCEDURE — 3008F BODY MASS INDEX DOCD: CPT | Performed by: INTERNAL MEDICINE

## 2023-10-24 PROCEDURE — 1160F RVW MEDS BY RX/DR IN RCRD: CPT | Performed by: INTERNAL MEDICINE

## 2023-10-24 PROCEDURE — 3074F SYST BP LT 130 MM HG: CPT | Performed by: INTERNAL MEDICINE

## 2023-10-24 PROCEDURE — 99214 OFFICE O/P EST MOD 30 MIN: CPT | Performed by: INTERNAL MEDICINE

## 2023-10-24 PROCEDURE — 1159F MED LIST DOCD IN RCRD: CPT | Performed by: INTERNAL MEDICINE

## 2023-10-24 PROCEDURE — 93971 EXTREMITY STUDY: CPT

## 2023-10-24 PROCEDURE — 93971 EXTREMITY STUDY: CPT | Performed by: INTERNAL MEDICINE

## 2023-10-24 ASSESSMENT — ENCOUNTER SYMPTOMS
OCCASIONAL FEELINGS OF UNSTEADINESS: 1
DEPRESSION: 0
LOSS OF SENSATION IN FEET: 0

## 2023-10-24 ASSESSMENT — PATIENT HEALTH QUESTIONNAIRE - PHQ9
2. FEELING DOWN, DEPRESSED OR HOPELESS: NOT AT ALL
SUM OF ALL RESPONSES TO PHQ9 QUESTIONS 1 AND 2: 0
1. LITTLE INTEREST OR PLEASURE IN DOING THINGS: NOT AT ALL

## 2023-10-24 NOTE — TELEPHONE ENCOUNTER
Rec'd a phone call from  ultrasound advising that patient has a acute DVT of the brachial vein and superficial vein thrombosis of the Basilic  vein.    Per Dr. Lieberman start patient on Eliquis 5mg BID and follow up on Friday in office.     Rx will be sent to local pharmacy in chart.

## 2023-10-24 NOTE — PROGRESS NOTES
"Subjective   Patient ID: Feliciano Worthington is a 73 y.o. male who presents for Thrombophilia (Patient is in office today for a possible blood clot in his R arm where his IV site was. Patient advises that he had an IV placed about a week ago and was removed same day. Patient states that the area is painful and there is a knot in the area where the IV was placed.).    HPI   73-year-old male with a past medical history of multiple medical problems complaining of right elbow pain and tenderness he thinks he has blood clot but he did not have any IV at that  Review of Systems  Pain and swelling of the right forearm  Objective   /64 (BP Location: Left arm, Patient Position: Sitting, BP Cuff Size: Large adult)   Pulse 81   Temp 36.4 °C (97.6 °F)   Ht 1.778 m (5' 10\")   Wt 95.3 kg (210 lb 3.2 oz)   SpO2 100% Comment: RA  BMI 30.16 kg/m²     Physical Exam  OBJECTIVE:  Vital Signs:  Per TouchWorks.  Alert, oriented x3, not in distress.  Neck:  Supple.  No JVD.  Respiratory System:  Diminished breath sounds.  No wheezing and no rales.  Cardiovascular:  S1 and S2 positive.  No murmur.  Regular rate and rhythm.  Abdomen:  Soft.  Bowel sounds positive.  Liver and spleen not palpable.  Extremities:  Peripheral pulses present.  No edema.  Right elbow there is a vein mobility with tenderness and felt little swollen  Assessment/Plan        Right upper extremity pain and swelling to rule out DVT I ordered stat venous duplex I will call him with the results  "

## 2023-10-26 ENCOUNTER — PATIENT OUTREACH (OUTPATIENT)
Dept: PRIMARY CARE | Facility: CLINIC | Age: 73
End: 2023-10-26
Payer: MEDICARE

## 2023-10-26 NOTE — PROGRESS NOTES
Unable to reach patient for call back after patient's follow up appointment with PCP on 10/13/23, 10/24/23 and encouraged to keep appt for tomorrow to follow up on DVT .   LVM with call back number for patient to call if needed to assist with any questions or concerns patient may have.

## 2023-10-27 ENCOUNTER — TELEPHONE (OUTPATIENT)
Dept: PRIMARY CARE | Facility: CLINIC | Age: 73
End: 2023-10-27

## 2023-10-27 ENCOUNTER — APPOINTMENT (OUTPATIENT)
Dept: PRIMARY CARE | Facility: CLINIC | Age: 73
End: 2023-10-27
Payer: MEDICARE

## 2023-10-30 ENCOUNTER — CLINICAL SUPPORT (OUTPATIENT)
Dept: CARDIOLOGY | Facility: CLINIC | Age: 73
End: 2023-10-30
Payer: MEDICARE

## 2023-10-30 DIAGNOSIS — R09.89 OTHER SPECIFIED SYMPTOMS AND SIGNS INVOLVING THE CIRCULATORY AND RESPIRATORY SYSTEMS: ICD-10-CM

## 2023-10-30 PROCEDURE — 93880 EXTRACRANIAL BILAT STUDY: CPT

## 2023-10-30 PROCEDURE — 93880 EXTRACRANIAL BILAT STUDY: CPT | Performed by: INTERNAL MEDICINE

## 2023-10-31 ENCOUNTER — OFFICE VISIT (OUTPATIENT)
Dept: PRIMARY CARE | Facility: CLINIC | Age: 73
End: 2023-10-31
Payer: MEDICARE

## 2023-10-31 VITALS
WEIGHT: 216 LBS | HEART RATE: 84 BPM | HEIGHT: 70 IN | TEMPERATURE: 97.5 F | BODY MASS INDEX: 30.92 KG/M2 | DIASTOLIC BLOOD PRESSURE: 84 MMHG | SYSTOLIC BLOOD PRESSURE: 116 MMHG

## 2023-10-31 DIAGNOSIS — Z23 ENCOUNTER FOR IMMUNIZATION: Primary | ICD-10-CM

## 2023-10-31 DIAGNOSIS — I82.621 ACUTE EMBOLISM AND THROMBOSIS OF DEEP VEIN OF RIGHT UPPER EXTREMITY (MULTI): ICD-10-CM

## 2023-10-31 PROCEDURE — 3079F DIAST BP 80-89 MM HG: CPT | Performed by: INTERNAL MEDICINE

## 2023-10-31 PROCEDURE — G0008 ADMIN INFLUENZA VIRUS VAC: HCPCS | Performed by: INTERNAL MEDICINE

## 2023-10-31 PROCEDURE — 99213 OFFICE O/P EST LOW 20 MIN: CPT | Performed by: INTERNAL MEDICINE

## 2023-10-31 PROCEDURE — 1160F RVW MEDS BY RX/DR IN RCRD: CPT | Performed by: INTERNAL MEDICINE

## 2023-10-31 PROCEDURE — 4010F ACE/ARB THERAPY RXD/TAKEN: CPT | Performed by: INTERNAL MEDICINE

## 2023-10-31 PROCEDURE — 1159F MED LIST DOCD IN RCRD: CPT | Performed by: INTERNAL MEDICINE

## 2023-10-31 PROCEDURE — 1125F AMNT PAIN NOTED PAIN PRSNT: CPT | Performed by: INTERNAL MEDICINE

## 2023-10-31 PROCEDURE — 3052F HG A1C>EQUAL 8.0%<EQUAL 9.0%: CPT | Performed by: INTERNAL MEDICINE

## 2023-10-31 PROCEDURE — 90662 IIV NO PRSV INCREASED AG IM: CPT | Performed by: INTERNAL MEDICINE

## 2023-10-31 PROCEDURE — 3008F BODY MASS INDEX DOCD: CPT | Performed by: INTERNAL MEDICINE

## 2023-10-31 PROCEDURE — 3074F SYST BP LT 130 MM HG: CPT | Performed by: INTERNAL MEDICINE

## 2023-10-31 ASSESSMENT — ENCOUNTER SYMPTOMS
OCCASIONAL FEELINGS OF UNSTEADINESS: 1
DEPRESSION: 0
LOSS OF SENSATION IN FEET: 0

## 2023-10-31 ASSESSMENT — PATIENT HEALTH QUESTIONNAIRE - PHQ9
SUM OF ALL RESPONSES TO PHQ9 QUESTIONS 1 AND 2: 0
1. LITTLE INTEREST OR PLEASURE IN DOING THINGS: NOT AT ALL
2. FEELING DOWN, DEPRESSED OR HOPELESS: NOT AT ALL

## 2023-10-31 NOTE — PROGRESS NOTES
"Subjective   Patient ID: Feliciano Worthington is a 73 y.o. male who presents for Med Refill (Med mgmt).    HPI   73-year-old male with a past medical history of multiple medical problem was recently in the hospital had IV placed in the right cubital fossa after that he was complaining of pain and swelling in lumps and I ordered ultrasound showed DVT of right cubital fossa started him on blood thinner here for follow-up feels better but does still have a lot of pain  Review of Systems  REVIEW OF SYSTEMS:  General:  Denies significant weight changes, fever, chills or weakness.  SKIN: Denies any rash or change in moles.  HEENT:  No vision or hearing changes. No headache. No vertigo, No tinnitus.   GI:  No loss of appetite. No change in bowel habit. No abdominal pain. No blood in stool.  GUR: No dysuria. No hematoma, No fever. No incontinence.  Respiratory:  No cough or shortness of breath.  CNS:  No memory or mood changes. No gait disturbance. No focal weakness. No tremors. No tingling or number of extremities.   ENDO: No cold intolerance. No fatigue.    Objective   /84 (BP Location: Right arm, Patient Position: Sitting, BP Cuff Size: Adult)   Pulse 84   Temp 36.4 °C (97.5 °F) (Tympanic)   Ht 1.778 m (5' 10\")   Wt 98 kg (216 lb)   BMI 30.99 kg/m²     Physical Exam  OBJECTIVE:  Vital Signs:  Per TouchWorks.  Alert, oriented x3, not in distress.  Neck:  Supple.  No JVD.  Respiratory System:  Diminished breath sounds.  No wheezing and no rales.  Cardiovascular:  S1 and S2 positive.  No murmur.  Regular rate and rhythm.  Abdomen:  Soft.  Bowel sounds positive.  Liver and spleen not palpable.  Extremities:  Peripheral pulses present.  No edema.  Right cubital fossa the blood clots looks smaller      Assessment/Plan        Right cubital fossa DVT and DVT advised to continue blood thinner for a month I will see him back in 1 month I will repeat ultrasound most likely disease due to IV placement    We gave him flu shot " today

## 2023-11-06 ENCOUNTER — TELEMEDICINE (OUTPATIENT)
Dept: PHARMACY | Facility: HOSPITAL | Age: 73
End: 2023-11-06
Payer: MEDICARE

## 2023-11-06 DIAGNOSIS — E11.9 TYPE 2 DIABETES MELLITUS WITHOUT COMPLICATION, WITHOUT LONG-TERM CURRENT USE OF INSULIN (MULTI): ICD-10-CM

## 2023-11-06 RX ORDER — VENLAFAXINE HYDROCHLORIDE 150 MG/1
150 CAPSULE, EXTENDED RELEASE ORAL DAILY
COMMUNITY

## 2023-11-06 RX ORDER — AMLODIPINE BESYLATE 5 MG/1
5 TABLET ORAL DAILY
COMMUNITY
End: 2023-11-07 | Stop reason: SDUPTHER

## 2023-11-06 NOTE — PROGRESS NOTES
Pharmacy Post-Discharge Visit  Feliciano Worthington is a 73 y.o. male was referred to Clinical Pharmacy Team to complete a post-discharge medication optimization and monitoring visit.  The patient was referred for their Diabetes.    Admission Date: 10/8/23  Discharge Date: 10/10/23    Referring Provider: Contreras Ledbetter MD    Subjective   Allergies   Allergen Reactions    Tetracyclines Hives, Other, Unknown, Rash and Shortness of breath     Water blisters in mouth and genital region.    Water blister on tongue and penis    Atorvastatin Unknown     Cramping all over the body. Sore shoulders    Empagliflozin Unknown    Pravastatin Unknown    Rosuvastatin Unknown    Simvastatin Unknown    Statins-Hmg-Coa Reductase Inhibitors Unknown       [id number] - [place], [place] - [address]  [address]  [facility] [address]  Phone: [phone] Fax: [phone]    [facility] - [facility] [address]  [address] [address]  Phone: [phone] Fax: [phone]      Social History     Social History Narrative    Not on file        Notable Medication changes following discharge:  Start: N/A  Stop: Metoprolol  Change: N/A    Diabetes  He presents for his initial diabetic visit. He has type 2 diabetes mellitus. Current diabetic treatment includes oral agent (monotherapy) and insulin injections (Patient is on Metformin and Lantus). His overall blood glucose range is >200 mg/dl. An ACE inhibitor/angiotensin II receptor blocker is being taken.         Review of Systems    Medication System Management:  Affordability/Accessibility: Eliquis is expensive but patient does not qualify for [facility]  Adherence/Organization: None   Adverse Effects: None    Objective     There were no vitals taken for this visit.     LAB  Lab Results   Component Value Date    BILITOT 0.6 10/17/2023    CALCIUM 9.2 10/17/2023    CO2 25 10/17/2023    CL 91 (L) 10/17/2023    CREATININE 1.16  10/17/2023    GLUCOSE 281 (H) 10/17/2023    ALKPHOS 66 10/17/2023    K 3.8 10/17/2023    PROT 7.2 10/17/2023     (L) 10/17/2023    AST 18 10/17/2023    ALT 23 10/17/2023    BUN 29 (H) 10/17/2023    ANIONGAP 15 10/17/2023    MG 1.59 (L) 10/17/2023    ALBUMIN 3.9 10/17/2023    LIPASE 10 10/17/2023    GFRMALE 79 08/10/2023     Lab Results   Component Value Date    TRIG 332 (H) 08/10/2023    CHOL 166 08/10/2023    HDL 26.9 (A) 08/10/2023     Lab Results   Component Value Date    HGBA1C 8.8 (A) 08/10/2023         Current Outpatient Medications on File Prior to Visit   Medication Sig Dispense Refill    acetaminophen (Tylenol 8 HOUR) 650 mg ER tablet Take 1 tablet (650 mg) by mouth every 8 hours if needed for mild pain (1 - 3). Do not crush, chew, or split.      amLODIPine (Norvasc) 5 mg tablet Take 1 tablet (5 mg) by mouth once daily.      apixaban (Eliquis) 5 mg tablet Take 1 tablet (5 mg) by mouth 2 times a day. 180 tablet 0    aspirin 81 mg EC tablet Take 1 tablet (81 mg) by mouth once daily.      chlorthalidone (Hygroton) 25 mg tablet Take 1 tablet (25 mg) by mouth every other day. Every  other day      ezetimibe (Zetia) 10 mg tablet Take 1 tablet (10 mg) by mouth once daily.      hydrOXYzine HCL (Atarax) 25 mg tablet Take 1 tablet (25 mg) by mouth 2 times a day.      insulin glargine (Lantus U-100 Insulin) 100 unit/mL injection Inject 32 Units under the skin once daily in the morning. Take as directed per insulin instructions. Along with the 35 units in the PM      ipratropium (Atrovent) 21 mcg (0.03 %) nasal spray Administer 2 sprays into each nostril 2 times a day. (Patient taking differently: Administer 2 sprays into each nostril once daily.) 40 mL 1    lidocaine (Lidoderm) 5 % patch Place 1 patch over 12 hours on the skin once daily. Remove & discard patch within 12 hours or as directed by MD. (Patient taking differently: Place 1 patch on the skin once daily as needed. Remove & discard patch within 12 hours  or as directed by MD.) 5 patch 0    lovastatin (Altoprev) 40 mg 24 hr tablet Take 1 tablet (40 mg) by mouth once daily at bedtime. Do not crush, chew, or split.      metFORMIN, OSM, (Fortamet) 500 mg 24 hr tablet Take 2 tablets (1,000 mg) by mouth 2 times a day with meals. Do not crush, chew, or split.      omeprazole (PriLOSEC) 40 mg DR capsule Take 1 capsule (40 mg) by mouth once daily in the morning. Take before meals. Do not crush or chew.      ramipril (Altace) 5 mg capsule Take 1 capsule (5 mg) by mouth 2 times a day.      venlafaxine XR (Effexor-XR) 150 mg 24 hr capsule Take 1 capsule (150 mg) by mouth once daily. Do not crush or chew.      alcohol swabs (BD Alcohol Swabs) pads, medicated Use twice daily while checking sugar. 200 each 1    blood glucose control high,low (Accu-Chek Guide L1-L2 Ctrl Sol) solution 1 bottle once daily as needed (use when need to un controls on glucose meter). 1 each 1    blood sugar diagnostic (Accu-Chek Guide test strips) strip Test twice daily 200 strip 0    blood-glucose meter (Accu-Chek Guide Glucose Meter) misc Test twice daily 1 each 0    lancets (Accu-Chek Softclix Lancets) misc Test twice daily 200 each 0    [DISCONTINUED] Lantus Solostar U-100 Insulin 100 unit/mL (3 mL) pen Inject 35 Units under the skin once daily at bedtime.       No current facility-administered medications on file prior to visit.      HISTORICAL PHARMACOTHERAPY  -Jardiance - stopped because patient did not feel good on the medication.      DRUG INTERATIONS  - None    Assessment/Plan   Problem List Items Addressed This Visit       Type 2 diabetes mellitus without complication, without long-term current use of insulin (CMS/[facility])     Patient is currently uncontrolled with A1C of 8.8%.  Patient is taking Metformin 500 mg BID and Lantus 32  units in the morning and 35 units in the evening.  Patient only test blood sugars when he feels they are getting low.  Recently his blood sugar levels are around 250  but has had reading as low as 96.  Discussed getting patient set up with a CGM such as a freestyle naheed 2 and patient was agreeable.  At this time patient would like to wait until PCP appointment on 11/20 to update A1C and make any changes to Diabetes medication.       PLAN:   - Continue Lantus and Metformin as prescribed   - Continue to test blood sugar.  [facility] will send prescription to DME company for Freestyle Naheed sensors to be mailed to patient.    - Due to patient's intolerance to Statin therapy and uncontrolled cholesterol levels, patient might benefit from Repatha injection (copay would be $150 for a months supply).  Patient will discuss with Cardiologist at upcoming appointment.        Follow up: 3 weeks after PCP appointment per patient request.              Continue all meds under the continuation of care with the referring provider and clinical pharmacy team.    Makenzie Medina, PharmD     Verbal consent to manage patient's drug therapy was obtained from the patient. They were informed they may decline to participate or withdraw from participation in pharmacy services at any time.

## 2023-11-06 NOTE — ASSESSMENT & PLAN NOTE
Patient is currently uncontrolled with A1C of 8.8%.  Patient is taking Metformin 500 mg BID and Lantus 32  units in the morning and 35 units in the evening.  Patient only test blood sugars when he feels they are getting low.  Recently his blood sugar levels are around 250 but has had reading as low as 96.  Discussed getting patient set up with a CGM such as a freestyle naheed 2 and patient was agreeable.  At this time patient would like to wait until PCP appointment on 11/20 to update A1C and make any changes to Diabetes medication.       PLAN:   - Continue Lantus and Metformin as prescribed   - Continue to test blood sugar.  [facility] will send prescription to DME company for Freestyle Naheed sensors to be mailed to patient.    - Due to patient's intolerance to Statin therapy and uncontrolled cholesterol levels, patient might benefit from Repatha injection (copay would be $150 for a months supply).  Patient will discuss with Cardiologist at upcoming appointment.        Follow up: 3 weeks after PCP appointment per patient request.

## 2023-11-07 ENCOUNTER — OFFICE VISIT (OUTPATIENT)
Dept: CARDIOLOGY | Facility: CLINIC | Age: 73
End: 2023-11-07
Payer: MEDICARE

## 2023-11-07 VITALS
WEIGHT: 209 LBS | SYSTOLIC BLOOD PRESSURE: 115 MMHG | HEART RATE: 81 BPM | DIASTOLIC BLOOD PRESSURE: 65 MMHG | BODY MASS INDEX: 29.92 KG/M2 | HEIGHT: 70 IN

## 2023-11-07 DIAGNOSIS — I25.10 CORONARY ARTERY DISEASE INVOLVING NATIVE CORONARY ARTERY OF NATIVE HEART WITHOUT ANGINA PECTORIS: Primary | ICD-10-CM

## 2023-11-07 DIAGNOSIS — I10 PRIMARY HYPERTENSION: ICD-10-CM

## 2023-11-07 DIAGNOSIS — E78.2 MIXED HYPERLIPIDEMIA: ICD-10-CM

## 2023-11-07 DIAGNOSIS — I44.1 MOBITZ TYPE 1 SECOND DEGREE AV BLOCK: ICD-10-CM

## 2023-11-07 DIAGNOSIS — I82.621 ACUTE DEEP VEIN THROMBOSIS (DVT) OF BRACHIAL VEIN OF RIGHT UPPER EXTREMITY (MULTI): ICD-10-CM

## 2023-11-07 DIAGNOSIS — R06.02 SHORTNESS OF BREATH: ICD-10-CM

## 2023-11-07 DIAGNOSIS — E87.1 HYPONATREMIA: ICD-10-CM

## 2023-11-07 PROBLEM — E87.0 HYPERNATREMIA: Status: ACTIVE | Noted: 2023-11-07

## 2023-11-07 PROCEDURE — 4004F PT TOBACCO SCREEN RCVD TLK: CPT | Performed by: INTERNAL MEDICINE

## 2023-11-07 PROCEDURE — 1159F MED LIST DOCD IN RCRD: CPT | Performed by: INTERNAL MEDICINE

## 2023-11-07 PROCEDURE — 1125F AMNT PAIN NOTED PAIN PRSNT: CPT | Performed by: INTERNAL MEDICINE

## 2023-11-07 PROCEDURE — 4010F ACE/ARB THERAPY RXD/TAKEN: CPT | Performed by: INTERNAL MEDICINE

## 2023-11-07 PROCEDURE — 3078F DIAST BP <80 MM HG: CPT | Performed by: INTERNAL MEDICINE

## 2023-11-07 PROCEDURE — 99215 OFFICE O/P EST HI 40 MIN: CPT | Performed by: INTERNAL MEDICINE

## 2023-11-07 PROCEDURE — 1160F RVW MEDS BY RX/DR IN RCRD: CPT | Performed by: INTERNAL MEDICINE

## 2023-11-07 PROCEDURE — 3008F BODY MASS INDEX DOCD: CPT | Performed by: INTERNAL MEDICINE

## 2023-11-07 PROCEDURE — 3052F HG A1C>EQUAL 8.0%<EQUAL 9.0%: CPT | Performed by: INTERNAL MEDICINE

## 2023-11-07 PROCEDURE — 3074F SYST BP LT 130 MM HG: CPT | Performed by: INTERNAL MEDICINE

## 2023-11-07 RX ORDER — CHLORTHALIDONE 25 MG/1
25 TABLET ORAL
Qty: 45 TABLET | Refills: 2 | Status: SHIPPED | OUTPATIENT
Start: 2023-11-07 | End: 2024-05-07 | Stop reason: DRUGHIGH

## 2023-11-07 RX ORDER — EZETIMIBE 10 MG/1
10 TABLET ORAL DAILY
Qty: 90 TABLET | Refills: 2 | Status: SHIPPED | OUTPATIENT
Start: 2023-11-07

## 2023-11-07 RX ORDER — RAMIPRIL 5 MG/1
5 CAPSULE ORAL 2 TIMES DAILY
Qty: 180 CAPSULE | Refills: 2 | Status: SHIPPED | OUTPATIENT
Start: 2023-11-07

## 2023-11-07 RX ORDER — AMLODIPINE BESYLATE 5 MG/1
5 TABLET ORAL DAILY
Qty: 90 TABLET | Refills: 2 | Status: SHIPPED | OUTPATIENT
Start: 2023-11-07

## 2023-11-07 RX ORDER — ALIROCUMAB 75 MG/ML
INJECTION, SOLUTION SUBCUTANEOUS
Qty: 6 PEN | Refills: 2 | Status: SHIPPED | OUTPATIENT
Start: 2023-11-07 | End: 2023-11-20 | Stop reason: WASHOUT

## 2023-11-07 RX ORDER — VENLAFAXINE HYDROCHLORIDE 150 MG/1
150 CAPSULE, EXTENDED RELEASE ORAL DAILY
Qty: 90 CAPSULE | Refills: 2 | Status: CANCELLED | OUTPATIENT
Start: 2023-11-07

## 2023-11-07 NOTE — PATIENT INSTRUCTIONS
BMP to be done 2 weeks  Lipid to be done 3 months  Start Praluent 75 mg 1 injection every 2 weeks  Stop smoking

## 2023-11-07 NOTE — PROGRESS NOTES
Patient:  Feliciano Worthington  YOB: 1950  MRN: [id number]       Impression/Plan:       Coronary artery disease involving native coronary artery of native heart without angina pectoris-no angina stress test within the last 18 months no inducible ischemia.  Continue current cardiac medications    Shortness of breath-at this time primarily COPD continue to decrease tobacco abuse which he has been trying to do.  Would benefit from handicap parking given his limitations from dyspnea  -     Follow Up In Cardiology; Future  -     Disability Placard    Hyponatremia-may well have been related to his pneumonia in the hospital.  Does need to be repeated as an outpatient.  If remains problematic may need to switch off of thiazide diuretic    Acute deep vein thrombosis (DVT) of brachial vein of right upper extremity (CMS/HCC)  -     Likely related to venous access in the hospital agree with short course of full anticoagulation    Mixed hyperlipidemia  -     ezetimibe (Zetia) 10 mg tablet; Take 1 tablet (10 mg) by mouth once daily.  -     lovastatin (Altoprev) 40 mg 24 hr tablet; Take 1 tablet (40 mg) by mouth once daily at bedtime. Do not crush, chew, or split.  -     alirocumab (Praluent Pen) 75 mg/mL pen injector; Inject once subcutaneously every 2 weeks  -     Lipid Panel; Future    Primary hypertension-controlled without adverse effect of current medical regimen  -     amLODIPine (Norvasc) 5 mg tablet; Take 1 tablet (5 mg) by mouth once daily.  -     ramipril (Altace) 5 mg capsule; Take 1 capsule (5 mg) by mouth 2 times a day.  -     chlorthalidone (Hygroton) 25 mg tablet; Take 1 tablet (25 mg) by mouth every other day. Every  other day  -     Basic Metabolic Panel; Future  -     Follow Up In Cardiology; Future    Mobitz type 1 second degree AV block-no evidence of advanced block.  Somewhat interestingly as heart rate increases frequency of type I secondary block decreases.  Nonetheless with occasional pause and  would avoid negative chronotropic agents.  No indication for pacemaker support at this time.      Chief Complaint/Active Symptoms:       Feliciano Worthington is a 73 y.o. male who presents with known coronary disease having had circumflex stent 2007 and RCA stent 2011, hyperlipidemia, diabetes and hypertension. In December 2022 myocardial perfusion imaging showed no ischemia with preserved LVEF.    I had seen him most recently 8/23/2023 at which time he was feeling well except for an occasional pulse or shortness of breath related to his COPD and tobacco abuse.  Blood pressure was well controlled.    He was hospitalized at [facility] 10/8/2023 through 10/10/2023 for community-acquired pneumonia.  Treated with IV antibiotics and Solu-Medrol.  Also noted to have type I second-degree AV block.  Beta-blocker was discontinued at that time.  Amlodipine increased for blood pressure control.  No evidence of coronary disease progression at that time.  BNP had been normal troponins had been normal.    10/10/2023 Holter monitor first-degree AV block and type I second-degree AV block with less evidence of secondary block his heart rate increased.  No VT or A-fib or SVT.  Average heart rate 80.  1.7-second pause was longest in the setting of type I second-degree block.    10/17/2023 in emergency department with shortness of breath CT scan of the chest esophageal thickening nonspecific.  COPD changes.  Multiple nodules with 6 months follow-up recommended.  Bronchiectasis.  Laboratory studies with sodium 127 glucose 281 creatinine 1.16 normal liver function slight white count elevation of 13 normal hemoglobin BNP 10 negative for COVID.  Troponins negative felt to have perhaps some pleurisy discharged home.    On 10/24/2023 had venous duplex at [facility] for right upper extremity swelling and discomfort.  He was found to have a right brachial DVT and right basilic superficial venous thrombus.    Anticoagulant begun by primary  care    Still with shortness of breath related to his COPD and tobacco abuse.  About the same as previously it was much worse when he had pneumonia of course.  Still difficult for him to walk any significant distance secondary to his dyspnea which is primarily COPD but also related to vascular disease and arthritis.    He has had no dizziness, lightheadedness or syncope.    Chart and prior hospitalizations reviewed in detail               Review of Systems: Unremarkable except as noted above    Meds     Current Outpatient Medications   Medication Instructions    acetaminophen (TYLENOL 8 HOUR) 650 mg, oral, Every 8 hours PRN, Do not crush, chew, or split.    alcohol swabs (BD Alcohol Swabs) pads, medicated Use twice daily while checking sugar.    alirocumab (Praluent Pen) 75 mg/mL pen injector Inject once subcutaneously every 2 weeks    amLODIPine (NORVASC) 5 mg, oral, Daily    apixaban (ELIQUIS) 5 mg, oral, 2 times daily    aspirin 81 mg, oral, Daily    blood glucose control high,low (Accu-Chek Guide L1-L2 Ctrl Sol) solution 1 bottle, miscellaneous, Daily PRN    blood sugar diagnostic (Accu-Chek Guide test strips) strip Test twice daily    blood-glucose meter (Accu-Chek Guide Glucose Meter) misc Test twice daily    chlorthalidone (HYGROTON) 25 mg, oral, Every 48 hours, Every  other day     ezetimibe (ZETIA) 10 mg, oral, Daily    hydrOXYzine HCL (ATARAX) 25 mg, oral, 2 times daily    insulin glargine (LANTUS U-100 INSULIN) 32 Units, subcutaneous, Every morning, Take as directed per insulin instructions. Along with the 35 units in the PM    ipratropium (Atrovent) 21 mcg (0.03 %) nasal spray 2 sprays, Each Nostril, 2 times daily    lancets (Accu-Chek Softclix Lancets) misc Test twice daily    lidocaine (Lidoderm) 5 % patch 1 patch, transdermal, Daily, Remove & discard patch within 12 hours or as directed by MD.    lovastatin (ALTOPREV) 40 mg, oral, Nightly, Do not crush, chew, or split.    metFORMIN (OSM) (FORTAMET)  1,000 mg, oral, 2 times daily with meals, Do not crush, chew, or split.    omeprazole (PRILOSEC) 40 mg, oral, Daily before breakfast, Do not crush or chew.    ramipril (ALTACE) 5 mg, oral, 2 times daily    venlafaxine XR (EFFEXOR-XR) 150 mg, oral, Daily, Do not crush or chew.        Allergies     Allergies   Allergen Reactions    Tetracyclines Hives, Other, Unknown, Rash and Shortness of breath     Water blisters in mouth and genital region.    Water blister on tongue and penis    Atorvastatin Unknown     Cramping all over the body. Sore shoulders    Empagliflozin Unknown    Pravastatin Unknown    Rosuvastatin Unknown    Simvastatin Unknown    Statins-Hmg-Coa Reductase Inhibitors Unknown         Annotated Problems     Specialty Problems          Cardiology Problems    Smoker    Current every day smoker    Hyperlipidemia associated with type 2 diabetes mellitus (CMS/[facility])    Primary hypertension    Bruit of right carotid artery    Chronic venous stasis dermatitis    Acute embolism and thrombosis of deep vein of right upper extremity (CMS/HCC)    Acute deep vein thrombosis (DVT) of brachial vein of right upper extremity (CMS/[facility])    Mixed hyperlipidemia    CAD (coronary artery disease)      · 12/27/2022 Lexiscan perfusion: Normal perfusion EF 63%  2007 CX stent   7/21/11 PCI mid-distal RCA w/TREVOR             Problem List     Patient Active Problem List    Diagnosis Date Noted    CAD (coronary artery disease) 11/08/2023    Mixed hyperlipidemia 11/07/2023    Hypernatremia 11/07/2023    Shortness of breath 11/07/2023    Acute deep vein thrombosis (DVT) of brachial vein of right upper extremity (CMS/HCC) 11/07/2023    Acute embolism and thrombosis of deep vein of right upper extremity (CMS/HCC) 10/31/2023    Encounter for immunization 10/31/2023    Arcus senilis, bilateral 10/13/2023    Astigmatism of both eyes 10/13/2023    Cataract, bilateral 10/13/2023    Chronic venous stasis dermatitis 10/13/2023    Depression, major,  "in remission (CMS/[facility]) 10/13/2023    Hiatal hernia with GERD 10/13/2023    Multiple lung nodules on CT 10/13/2023    Nuclear sclerotic cataract of both eyes 10/13/2023    Osteoarthritis of hips, bilateral 10/13/2023    Other spondylosis, cervical region 10/13/2023    Periumbilical hernia 10/13/2023    Testosterone deficiency 10/13/2023    Sleep apnea 10/13/2023    Noncompliance with dietary restriction 10/13/2023    Arthritis of left acromioclavicular joint 10/13/2023    Bruit of right carotid artery 10/13/2023    Degenerative joint disease (DJD) of hip 10/13/2023    Peripheral vascular disease in type 1 diabetes mellitus (CMS/[facility]) 10/13/2023    Spinal stenosis of cervical region 10/13/2023    Type 2 diabetes mellitus without complication, without long-term current use of insulin (CMS/HCC) 10/13/2023    BMI 29.0-29.9,adult 10/13/2023    Primary hypertension 10/09/2023    Current every day smoker 10/09/2023    GERD (gastroesophageal reflux disease) 10/09/2023    Hyperlipidemia associated with type 2 diabetes mellitus (CMS/[facility]) 10/09/2023    Community acquired pneumonia of both lower lobes 10/08/2023    Smoker 01/19/2021    Vitamin D deficiency 03/05/2019    Erectile dysfunction 06/01/2018    Chronic obstructive pulmonary disease, unspecified (CMS/[facility]) 03/21/2018       Objective:     Vitals:    11/07/23 0832   BP: 115/65   BP Location: Right arm   Patient Position: Sitting   Pulse: 81   Weight: 94.8 kg (209 lb)   Height: 1.778 m (5' 10\")      Wt Readings from Last 4 Encounters:   11/07/23 94.8 kg (209 lb)   10/31/23 98 kg (216 lb)   10/24/23 95.3 kg (210 lb 3.2 oz)   10/17/23 90.3 kg (199 lb)           LAB:     Lab Results   Component Value Date    WBC 13.2 (H) 10/17/2023    HGB 14.8 10/17/2023    HCT 41.7 10/17/2023     10/17/2023    CHOL 166 08/10/2023    TRIG 332 (H) 08/10/2023    HDL 26.9 (A) 08/10/2023    ALT 23 10/17/2023    AST 18 10/17/2023     (L) 10/17/2023    K 3.8 10/17/2023    CL 91 (L) " 10/17/2023    CREATININE 1.16 10/17/2023    BUN 29 (H) 10/17/2023    CO2 25 10/17/2023    TSH 2.38 08/10/2023    HGBA1C 8.8 (A) 08/10/2023       Diagnostic Studies:     Upper extremity venous duplex right    Result Date: 10/25/2023             [place] [address] Phone [phone], Fax [phone]  Vascular Lab Report  VASC US UPPER EXTREMITY VENOUS DUPLEX RIGHT Patient Name:      JAIME Cormier Physician:  [id number] Shahnaz Hudson MD Study Date:        10/24/2023           Ordering Provider:  [id number] CONTRERAS LEDBETTER MRN/PID:           [id number]             Fellow: Accession#:        [id number]         Technologist:       Bartolo Hill RVT Date of Birth/Age: 1950 / 73 years Technologist 2: Gender:            M                    Encounter#:         [id number] Admission Status:  Outpatient           Location Performed: [facility]  Diagnosis/ICD: Pain in right arm-M79.601 Indication:    Limb pain.  **CRITICAL RESULT** Critical Result: Right brachial DVT and right basilic SVT Notification called to Contreras Ledbetter MD on 10/24/2023 at 1:44:46 PM by [place] Hill RVT.  CONCLUSIONS: Right Upper Venous: There is acute occlusive deep vein thrombosis visualized in the brachial and acute occlusive superficial venous thrombosis visualized in the basilic veins. Negative for acute thrombus in the remaining visualized vessels. Double brachial system is noted. Left Upper Venous: The subclavian vein demonstrates a normal spontaneous and phasic flow.  Imaging & Doppler Findings:  Right               Compressible         Thrombus                Flow Internal Jugular        Yes                None           Spontaneous/Phasic Subclavian              Yes                None           Spontaneous/Phasic Subclavian  Proximal     Yes                None           Spontaneous/Phasic Subclavian Mid          Yes                None           Spontaneous/Phasic Subclavian Distal       Yes                None           Spontaneous/Phasic Axillary                Yes                None           Spontaneous/Phasic Brachial                 No          Acute occlusive Cephalic                Yes                None Basilic                  No      None and Acute occlusive  Left              Flow Subclavian Spontaneous/Phasic  [id number] Shahnaz Hudson MD Electronically signed by [id number] Shahnaz Hudson MD on 10/25/2023 at 9:20:53 AM  ** Final **         Radiology:     No orders to display       Physical Exam     General Appearance: alert and oriented to person, place and time, in no acute distress  Cardiovascular: normal rate, regular rhythm, normal S1 and S2, no murmurs, rubs, clicks, or gallops,  no JVD  Pulmonary/Chest: clear to auscultation bilaterally- no wheezes, rales or rhonchi, normal air movement, no respiratory distress occasional rhonchi clears with cough  Abdomen: soft, non-tender, non-distended, normal bowel sounds, no masses   Extremities: no cyanosis, clubbing or edema  Skin: warm and dry, no rash or erythema  Eyes: EOMI  Neck: supple and non-tender without mass, no thyromegaly   Neurological: alert, oriented, normal speech, no focal findings or movement disorder noted  Vascular: 2+ pulses does have carotid bruit.            Scribe Attestation  By signing my name below, I, Haley Morales LPN , Scribe   attest that this documentation has been prepared under the direction and in the presence of Roberto Haskins MD.

## 2023-11-08 PROBLEM — I25.119 ATHEROSCLEROTIC HEART DISEASE OF NATIVE CORONARY ARTERY WITH ANGINA PECTORIS (CMS-HCC): Status: RESOLVED | Noted: 2023-10-13 | Resolved: 2023-11-08

## 2023-11-08 PROBLEM — I25.10 CORONARY ARTERY DISEASE INVOLVING NATIVE CORONARY ARTERY OF NATIVE HEART WITHOUT ANGINA PECTORIS: Status: RESOLVED | Noted: 2023-11-07 | Resolved: 2023-11-08

## 2023-11-08 PROBLEM — I25.10 CAD (CORONARY ARTERY DISEASE): Status: ACTIVE | Noted: 2023-11-08

## 2023-11-08 PROBLEM — I44.1 MOBITZ TYPE 1 SECOND DEGREE AV BLOCK: Status: ACTIVE | Noted: 2023-11-08

## 2023-11-10 NOTE — PROGRESS NOTES
CHECK GLUCOSE TWICE DAILY      ONE TOUCH ULTRA TEST strip USE 1 STRIP TO CHECK GLUCOSE TWICE DAILY      chlorthalidone (HYGROTON) 25 MG tablet   3    VENTOLIN  (90 Base) MCG/ACT inhaler       venlafaxine (EFFEXOR XR) 150 MG extended release capsule       lovastatin (MEVACOR) 20 MG tablet Take 20 mg by mouth nightly      ramipril (ALTACE) 5 MG capsule 5 mg daily       metFORMIN (GLUCOPHAGE) 500 MG tablet Take 1,000 mg by mouth 2 times daily       metoprolol (TOPROL-XL) 50 MG XL tablet Take 50 mg by mouth nightly       aspirin 81 MG tablet Take 81 mg by mouth daily. No current facility-administered medications on file prior to visit. Social History     Tobacco Use    Smoking status: Every Day     Packs/day: 1.00     Years: 52.00     Additional pack years: 0.00     Total pack years: 52.00     Types: Cigarettes     Start date: 2/15/1967    Smokeless tobacco: Never   Substance Use Topics    Alcohol use: No    Drug use: No       ALLERGIES  Allergies   Allergen Reactions    Tetracyclines & Related Other (See Comments)     Water blister on tongue and penis    Lipitor [Atorvastatin] Other (See Comments)     Cramping all over the body. Sore shoulders         REVIEW OF SYSTEMS:  Review of Systems   Constitutional:  Negative for fever. HENT:  Negative for hearing loss. Eyes:  Negative for pain. Respiratory:  Positive for shortness of breath. Gastrointestinal:  Negative for nausea. Endocrine: Negative for heat intolerance. Genitourinary:  Negative for difficulty urinating. Musculoskeletal:  Positive for back pain and neck pain. Skin:  Negative for rash. Allergic/Immunologic: Negative for immunocompromised state. Neurological:  Negative for headaches. Hematological:  Bruises/bleeds easily. Psychiatric/Behavioral:  Negative for sleep disturbance. I have personally reviewed the PMH, PSH, family history, home medications, social history, allergies, ROS.     Physical Exam:

## 2023-11-12 ENCOUNTER — HOSPITAL ENCOUNTER (OUTPATIENT)
Dept: CARDIOLOGY | Facility: HOSPITAL | Age: 73
Discharge: HOME | End: 2023-11-12
Payer: MEDICARE

## 2023-11-12 PROCEDURE — 93005 ELECTROCARDIOGRAM TRACING: CPT

## 2023-11-16 ENCOUNTER — OFFICE VISIT (OUTPATIENT)
Dept: NEUROSURGERY | Age: 73
End: 2023-11-16

## 2023-11-16 VITALS — WEIGHT: 206 LBS | HEIGHT: 70 IN | RESPIRATION RATE: 16 BRPM | TEMPERATURE: 97.6 F | BODY MASS INDEX: 29.49 KG/M2

## 2023-11-16 DIAGNOSIS — J41.0 SIMPLE CHRONIC BRONCHITIS (HCC): ICD-10-CM

## 2023-11-16 DIAGNOSIS — E11.9 DIABETES MELLITUS TYPE 2, INSULIN DEPENDENT (HCC): ICD-10-CM

## 2023-11-16 DIAGNOSIS — E11.8 DM (DIABETES MELLITUS) WITH COMPLICATIONS (HCC): ICD-10-CM

## 2023-11-16 DIAGNOSIS — F17.200 SMOKER: ICD-10-CM

## 2023-11-16 DIAGNOSIS — R26.89 BALANCE DISORDER: ICD-10-CM

## 2023-11-16 DIAGNOSIS — M47.812 SPONDYLOSIS OF CERVICAL REGION WITHOUT MYELOPATHY OR RADICULOPATHY: Primary | ICD-10-CM

## 2023-11-16 DIAGNOSIS — Z79.01 CHRONIC ANTICOAGULATION: ICD-10-CM

## 2023-11-16 DIAGNOSIS — Z79.4 DIABETES MELLITUS TYPE 2, INSULIN DEPENDENT (HCC): ICD-10-CM

## 2023-11-16 DIAGNOSIS — M47.816 LUMBAR SPONDYLOSIS: ICD-10-CM

## 2023-11-16 RX ORDER — GABAPENTIN 300 MG/1
300 CAPSULE ORAL 3 TIMES DAILY
Qty: 90 CAPSULE | Refills: 5 | Status: SHIPPED | OUTPATIENT
Start: 2023-11-16 | End: 2024-05-14

## 2023-11-16 ASSESSMENT — ENCOUNTER SYMPTOMS
NAUSEA: 0
EYE PAIN: 0
SHORTNESS OF BREATH: 1
BACK PAIN: 1

## 2023-11-20 ENCOUNTER — OFFICE VISIT (OUTPATIENT)
Dept: PRIMARY CARE | Facility: CLINIC | Age: 73
End: 2023-11-20
Payer: MEDICARE

## 2023-11-20 VITALS
TEMPERATURE: 98.7 F | SYSTOLIC BLOOD PRESSURE: 136 MMHG | DIASTOLIC BLOOD PRESSURE: 80 MMHG | OXYGEN SATURATION: 100 % | WEIGHT: 210.8 LBS | BODY MASS INDEX: 30.18 KG/M2 | HEIGHT: 70 IN | HEART RATE: 86 BPM

## 2023-11-20 DIAGNOSIS — I82.621 ACUTE EMBOLISM AND THROMBOSIS OF DEEP VEIN OF RIGHT UPPER EXTREMITY (MULTI): ICD-10-CM

## 2023-11-20 DIAGNOSIS — I82.611 SUPERFICIAL VENOUS THROMBOSIS OF ARM, RIGHT: ICD-10-CM

## 2023-11-20 DIAGNOSIS — I10 PRIMARY HYPERTENSION: Primary | ICD-10-CM

## 2023-11-20 DIAGNOSIS — I82.621 ACUTE DEEP VEIN THROMBOSIS (DVT) OF BRACHIAL VEIN OF RIGHT UPPER EXTREMITY (MULTI): ICD-10-CM

## 2023-11-20 DIAGNOSIS — E11.9 TYPE 2 DIABETES MELLITUS WITHOUT COMPLICATION, WITHOUT LONG-TERM CURRENT USE OF INSULIN (MULTI): ICD-10-CM

## 2023-11-20 PROCEDURE — 3008F BODY MASS INDEX DOCD: CPT | Performed by: INTERNAL MEDICINE

## 2023-11-20 PROCEDURE — 1125F AMNT PAIN NOTED PAIN PRSNT: CPT | Performed by: INTERNAL MEDICINE

## 2023-11-20 PROCEDURE — 99213 OFFICE O/P EST LOW 20 MIN: CPT | Performed by: INTERNAL MEDICINE

## 2023-11-20 PROCEDURE — 3075F SYST BP GE 130 - 139MM HG: CPT | Performed by: INTERNAL MEDICINE

## 2023-11-20 PROCEDURE — 3079F DIAST BP 80-89 MM HG: CPT | Performed by: INTERNAL MEDICINE

## 2023-11-20 PROCEDURE — 3052F HG A1C>EQUAL 8.0%<EQUAL 9.0%: CPT | Performed by: INTERNAL MEDICINE

## 2023-11-20 PROCEDURE — 1159F MED LIST DOCD IN RCRD: CPT | Performed by: INTERNAL MEDICINE

## 2023-11-20 PROCEDURE — 1160F RVW MEDS BY RX/DR IN RCRD: CPT | Performed by: INTERNAL MEDICINE

## 2023-11-20 PROCEDURE — 4010F ACE/ARB THERAPY RXD/TAKEN: CPT | Performed by: INTERNAL MEDICINE

## 2023-11-20 PROCEDURE — 4004F PT TOBACCO SCREEN RCVD TLK: CPT | Performed by: INTERNAL MEDICINE

## 2023-11-20 RX ORDER — GABAPENTIN 300 MG/1
300 CAPSULE ORAL DAILY
COMMUNITY
Start: 2023-11-16 | End: 2024-02-13 | Stop reason: SDUPTHER

## 2023-11-20 ASSESSMENT — ENCOUNTER SYMPTOMS
LOSS OF SENSATION IN FEET: 0
OCCASIONAL FEELINGS OF UNSTEADINESS: 1
DEPRESSION: 0

## 2023-11-20 NOTE — PROGRESS NOTES
"Subjective   Patient ID: Feliciano Worthington is a 73 y.o. male who presents for Follow-up (Patient is in office today for a 3 month follow-up) and Fall (Patient advises that Dr. Haskins told him not to fall, but last week he fell twice while mowing the lawn. Patient advises that he has bruising on the L side of his back. ).    HPI   73-year-old male with a past medical history of diabetes hypertension hyperlipidemia coronary artery disease had DVT of the right cubital fossa started on blood thinner here for follow-up feels much better less swelling less pain he is on blood thinner only for a month now  Review of Systems  REVIEW OF SYSTEMS:  General:  Denies significant weight changes, fever, chills or weakness.  SKIN: Denies any rash or change in moles.  HEENT:  No vision or hearing changes. No headache. No vertigo, No tinnitus.   GI:  No loss of appetite. No change in bowel habit. No abdominal pain. No blood in stool.  GUR: No dysuria. No hematoma, No fever. No incontinence.  Respiratory:  No cough or shortness of breath.  CNS:  No memory or mood changes. No gait disturbance. No focal weakness. No tremors. No tingling or number of extremities.   ENDO: No cold intolerance. No fatigue.    Objective   /80 (BP Location: Right arm, Patient Position: Sitting, BP Cuff Size: Adult)   Pulse 86   Temp 37.1 °C (98.7 °F) (Temporal)   Ht 1.778 m (5' 10\")   Wt 95.6 kg (210 lb 12.8 oz)   SpO2 100% Comment: RA  BMI 30.25 kg/m²     Physical Exam  OBJECTIVE:  Vital Signs:  Per TouchWorks.  Alert, oriented x3, not in distress.  Neck:  Supple.  No JVD.  Respiratory System:  Diminished breath sounds.  No wheezing and no rales.  Cardiovascular:  S1 and S2 positive.  No murmur.  Regular rate and rhythm.  Abdomen:  Soft.  Bowel sounds positive.  Liver and spleen not palpable.  Extremities:  Peripheral pulses present.  No edema.  Right elbow less swelling less tenderness  Assessment/Plan        Right cubital fossa acute DVT advised " to continue blood thinner for another month I will see him back in 1 month probably I will order ultrasound that time advised to continue medication refill the medications    Diabetes hyperlipidemia stable strongly advised lifestyle moderate diet continue medications    Hypertension stable advised DASH diet continue medications

## 2023-11-28 ENCOUNTER — PATIENT OUTREACH (OUTPATIENT)
Dept: PRIMARY CARE | Facility: CLINIC | Age: 73
End: 2023-11-28
Payer: MEDICARE

## 2023-11-29 ENCOUNTER — HOSPITAL ENCOUNTER (OUTPATIENT)
Dept: PHYSICAL THERAPY | Age: 73
Setting detail: THERAPIES SERIES
Discharge: HOME | End: 2023-11-29
Attending: NEUROLOGICAL SURGERY
Payer: MEDICARE

## 2023-11-29 PROCEDURE — 97162 PT EVAL MOD COMPLEX 30 MIN: CPT

## 2023-11-29 ASSESSMENT — PAIN SCALES - GENERAL: PAINLEVEL_OUTOF10: 3

## 2023-11-29 ASSESSMENT — PAIN DESCRIPTION - DESCRIPTORS: DESCRIPTORS: ACHING

## 2023-11-29 ASSESSMENT — PAIN DESCRIPTION - LOCATION: LOCATION: NECK;BACK

## 2023-11-29 ASSESSMENT — PAIN DESCRIPTION - ORIENTATION: ORIENTATION: RIGHT;LEFT;LOWER

## 2023-11-29 ASSESSMENT — PAIN DESCRIPTION - PAIN TYPE: TYPE: CHRONIC PAIN

## 2023-11-29 NOTE — THERAPY EVALUATION
[id number] [facility]     Ph: [phone]  Fax: [phone]    [x] Certification  [] Recertification [x]  Plan of Care  [] Progress Note [] Discharge      Referring Provider: Reyna Ramos MD    From:  Montgomery March, PT,DPT    Patient: Ash España (52 y.o. male) : 1950 Date: 2023   Medical Diagnosis: Spondylosis of cervical region without myelopathy or radiculopathy [M47.812]  DM (diabetes mellitus) with complications ([facility]) [K46.8]  Diabetes mellitus type 2, insulin dependent ([facility]) [E11.9, Z79.4]  Chronic anticoagulation [Z79.01]  Balance disorder [R26.89]  Simple chronic bronchitis ([facility]) [J41.0]  Lumbar spondylosis [M47.816]    Treatment Diagnosis: low back pain, cervical pain, reduced strength, reduced ROM, falls risk    Plan of Care/Certification Expiration Date:     Progress Report Period from: 2023  to 2023    Visits to Date: 1 No Show:   Cancelled Appts:      OBJECTIVE:   Long Term Goals - Time Frame for Long Term Goals : 5 weeks  Goals Current/ Discharge status Status   Long Term Goal 1: patient will improve cervical ROM by >/=10 degs for increased scanning tasks AROM Cervical Spine   Cervical flexion: 55  Cervical extension: 35  Cervical right lateral: 32  Cervical left lateral: 25  Cervical right rotation: 60  Cervical left rotation: 59  New   Long Term Goal 2: patient will improve hip extension and abduction to 5/5 for increased standing tolerance L UE Strength Comment: 5/5  Strength LLE  L Hip Flexion: 5/5  L Hip Extension: 3+/5  L Hip ABduction: 4/5  L Knee Flexion: 5/5  L Knee Extension: 5/5  L Ankle Dorsiflexion: 5/5  R UE Strength Comment: 5/5  R UE Strength Comment: 5/5  Strength RLE  R Hip Flexion: 5/5  R Hip Extension: 4-/5  R Hip ABduction: 3+/5  R Knee Flexion: 5/5  R Knee Extension: 5/5  R Ankle Dorsiflexion: 5/5  New   Long Term Goal 3: patient will improve DGI to

## 2023-11-29 NOTE — PROGRESS NOTES
[facility]  PHYSICAL THERAPY EVALUATION    Physical Therapy: Initial Evaluation    Patient: Ariadna Levine (14 y.o.     male)   Examination Date: 2023   :  1950 ;    ConfirmedPrince Jones MRN: [id number]  CSN: [id number]   Insurance: Payor: [employer] / Plan: Manuel Mckee / Product Type: *No Product type* /   Insurance ID: [id number] - (Medicare Managed) Secondary Insurance (if applicable):    Referring Physician: Kenneth Leigh MD       Visits to Date/Visits Approved:     No Show/Cancelled Appts:   /       Medical Diagnosis: Spondylosis of cervical region without myelopathy or radiculopathy [M47.812]  DM (diabetes mellitus) with complications ([address]) [U09.9]  Diabetes mellitus type 2, insulin dependent ([address]) [E11.9, Z79.4]  Chronic anticoagulation [Z79.01]  Balance disorder [R26.89]  Simple chronic bronchitis ([address]) [J41.0]  Lumbar spondylosis [M47.816]        Treatment Diagnosis: low back pain, cervical pain, reduced strength, reduced ROM, falls risk     PERTINENT MEDICAL HISTORY   Patient Assessed for Rehabilitation Services: Yes       Medical History: Chart Reviewed: Yes   Past Medical History:   Diagnosis Date    Erectile dysfunction     Hyperlipidemia     Hypertension      Surgical History:   Past Surgical History:   Procedure Laterality Date    COLONOSCOPY      CORONARY ANGIOPLASTY WITH STENT PLACEMENT      4 total    PENILE PROSTHESIS N/A     THROAT SURGERY      esophagocolostomy    TONSILLECTOMY      UPPER GASTROINTESTINAL ENDOSCOPY         Medications:   Current Outpatient Medications:     gabapentin (NEURONTIN) 300 MG capsule, Take 1 capsule by mouth 3 times daily for 180 days.  Intended supply: 30 days, Disp: 90 capsule, Rfl: 5    LANTUS SOLOSTAR 100 UNIT/ML injection pen, INJECT 25 UNITS SUBCUTANEOUSLY AT BEDTIME, Disp: , Rfl:     tiZANidine (ZANAFLEX) 4 MG tablet, Take 1 tablet by mouth nightly as needed (neck pain), Disp:

## 2023-12-04 ENCOUNTER — APPOINTMENT (OUTPATIENT)
Dept: PRIMARY CARE | Facility: CLINIC | Age: 73
End: 2023-12-04
Payer: MEDICARE

## 2023-12-06 ENCOUNTER — APPOINTMENT (OUTPATIENT)
Dept: PRIMARY CARE | Facility: CLINIC | Age: 73
End: 2023-12-06
Payer: MEDICARE

## 2023-12-06 ENCOUNTER — HOSPITAL ENCOUNTER (OUTPATIENT)
Dept: PHYSICAL THERAPY | Age: 73
Setting detail: THERAPIES SERIES
Discharge: HOME | End: 2023-12-06
Attending: NEUROLOGICAL SURGERY
Payer: MEDICARE

## 2023-12-06 PROCEDURE — 97110 THERAPEUTIC EXERCISES: CPT

## 2023-12-06 PROCEDURE — 97112 NEUROMUSCULAR REEDUCATION: CPT

## 2023-12-06 ASSESSMENT — PAIN DESCRIPTION - LOCATION: LOCATION: BACK

## 2023-12-06 ASSESSMENT — PAIN DESCRIPTION - DESCRIPTORS: DESCRIPTORS: ACHING

## 2023-12-06 ASSESSMENT — PAIN SCALES - GENERAL: PAINLEVEL_OUTOF10: 2

## 2023-12-06 NOTE — PROGRESS NOTES
Decreased body mechanics, Decreased tolerance to work activity, Decreased strength  Assessment: Initiated stretching, strengthening and balance activities to improve strength, ROM and stability. Patient with increased Sukhjinder cramping while performing SLR Activities. Decreased ROM with hip extension on Rt vs Lt. Slight deviations while completing DGI tasks such as path deviation and decreased overall gait speed. No pain reported at end of session. Treatment Diagnosis: low back pain, cervical pain, reduced strength, reduced ROM, falls risk  Therapy Prognosis: Good          Post-Pain Assessment:       Pain Rating (0-10 pain scale):  0 /10   Location and pain description same as pre-treatment unless indicated. Action: [x] NA   [] Perform HEP  [] Meds as prescribed  [] Modalities as prescribed   [] Call Physician     GOALS   Patient Goal(s): Patient Goals : pain relief      Long Term Goals Completed by 5 weeks Goal Status   LTG 1 patient will improve cervical ROM by >/=10 degs for increased scanning tasks In progress   LTG 2 patient will improve hip extension and abduction to 5/5 for increased standing tolerance In progress   LTG 3 patient will improve DGI to 24/24 for increased walking balance and stability In progress   LTG 4 patient will be able to complete dual tasks during ambulation without presence of instability In progress   LTG 5 patient will improve ROXY and NDI by >/=8 points to improve subjective function and QOL In progress       Plan:  Frequency/Duration:  Plan  Plan Frequency: 2  Plan weeks: 5  Current Treatment Recommendations: Strengthening, ROM, Balance training, Functional mobility training, Neuromuscular re-education, Manual, Pain management, Return to work related activity, Home exercise program, Safety education & training, Patient/Caregiver education & training, Modalities  Modalities: Heat/Cold, E-stim - unattended  Pt to continue current HEP.   See objective section for any therapeutic exercise

## 2023-12-13 ENCOUNTER — HOSPITAL ENCOUNTER (OUTPATIENT)
Dept: PHYSICAL THERAPY | Age: 73
Setting detail: THERAPIES SERIES
Discharge: HOME | End: 2023-12-13
Attending: NEUROLOGICAL SURGERY
Payer: MEDICARE

## 2023-12-13 PROCEDURE — 97110 THERAPEUTIC EXERCISES: CPT

## 2023-12-13 PROCEDURE — 97112 NEUROMUSCULAR REEDUCATION: CPT

## 2023-12-13 NOTE — PROGRESS NOTES
[facility]  Outpatient Physical Therapy   Treatment Note        Date: 2023  Patient: Ngoc Keys  : 1950   Confirmed: Yes  MRN: [id number]  Referring Provider: Cheyenne Torres MD      Medical Diagnosis: Spondylosis of cervical region without myelopathy or radiculopathy [M47.812]  DM (diabetes mellitus) with complications ([address]) [Y85.3]  Diabetes mellitus type 2, insulin dependent ([address]) [E11.9, Z79.4]  Chronic anticoagulation [Z79.01]  Balance disorder [R26.89]  Simple chronic bronchitis ([address]) [J41.0]  Lumbar spondylosis [M47.816]      Treatment Diagnosis: low back pain, cervical pain, reduced strength, reduced ROM, falls risk    Visit Information:  Insurance: Payor: [employer] / Plan: David Matta / Product Type: *No Product type* /   PT Visit Information  PT Insurance Information: [employer] medicare  Total # of Visits Approved: 10  Total # of Visits to Date: 2  No Show: 0  Canceled Appointment: 0  Progress Note Counter: 2/10 ( 10 visits 23 to 23)    Subjective Information:  Subjective: Pt reproted not too bad today. Last appointment he left feeling great, however about an hour later felt significantly sore for two days. HEP Compliance:  [x] Good [] Fair [] Poor [] Reports not doing due to:               Pain Screening  Patient Currently in Pain: Denies    Treatment:  Exercises:  Exercises  Exercise 1: 3way SLR x10 Sukhjinder  Exercise 2: bridges 3'' x 10  Exercise 4: step-ups 6in F/L x 10  Exercise 5: UT/Levator 3/30sec ea Sukhjinder  Exercise 6: Barrel 3/30sec  Exercise 7: towel extension/rotation 20sec x3 ea  Exercise 8: rows/lats GTB x 15  Exercise 10: Gait: March and holds, tandem, Retro, DGI tasks  Exercise 12: Pball DKTC 5'' x15  Exercise 20: HEP: Hamstring str, UT/Levator, cerv rotation       *Indicates exercise, modality, or manual techniques to be initiated when appropriate      Assessment:    Body Structures, Functions, Activity Limitations Requiring Skilled Therapeutic

## 2023-12-18 ENCOUNTER — APPOINTMENT (OUTPATIENT)
Dept: PRIMARY CARE | Facility: CLINIC | Age: 73
End: 2023-12-18
Payer: MEDICARE

## 2023-12-20 ENCOUNTER — OFFICE VISIT (OUTPATIENT)
Dept: PRIMARY CARE | Facility: CLINIC | Age: 73
End: 2023-12-20
Payer: MEDICARE

## 2023-12-20 ENCOUNTER — APPOINTMENT (OUTPATIENT)
Dept: PHYSICAL THERAPY | Age: 73
End: 2023-12-20
Attending: NEUROLOGICAL SURGERY
Payer: MEDICARE

## 2023-12-20 ENCOUNTER — HOSPITAL ENCOUNTER (OUTPATIENT)
Dept: RADIOLOGY | Facility: HOSPITAL | Age: 73
Discharge: HOME | End: 2023-12-20
Payer: MEDICARE

## 2023-12-20 VITALS
SYSTOLIC BLOOD PRESSURE: 118 MMHG | HEART RATE: 74 BPM | DIASTOLIC BLOOD PRESSURE: 68 MMHG | BODY MASS INDEX: 29.92 KG/M2 | WEIGHT: 209 LBS | TEMPERATURE: 97.4 F | HEIGHT: 70 IN

## 2023-12-20 DIAGNOSIS — I10 PRIMARY HYPERTENSION: ICD-10-CM

## 2023-12-20 DIAGNOSIS — E11.65 TYPE 2 DIABETES MELLITUS WITH HYPERGLYCEMIA, WITH LONG-TERM CURRENT USE OF INSULIN (MULTI): ICD-10-CM

## 2023-12-20 DIAGNOSIS — I82.621 ACUTE DEEP VEIN THROMBOSIS (DVT) OF OTHER VEIN OF RIGHT UPPER EXTREMITY (MULTI): Primary | ICD-10-CM

## 2023-12-20 DIAGNOSIS — Z79.4 TYPE 2 DIABETES MELLITUS WITH HYPERGLYCEMIA, WITH LONG-TERM CURRENT USE OF INSULIN (MULTI): ICD-10-CM

## 2023-12-20 DIAGNOSIS — E11.9 TYPE 2 DIABETES MELLITUS WITHOUT COMPLICATION, WITHOUT LONG-TERM CURRENT USE OF INSULIN (MULTI): ICD-10-CM

## 2023-12-20 DIAGNOSIS — I82.621 ACUTE DEEP VEIN THROMBOSIS (DVT) OF OTHER VEIN OF RIGHT UPPER EXTREMITY (MULTI): ICD-10-CM

## 2023-12-20 DIAGNOSIS — F32.5 DEPRESSION, MAJOR, IN REMISSION (CMS-HCC): ICD-10-CM

## 2023-12-20 PROBLEM — R06.02 SHORTNESS OF BREATH: Status: RESOLVED | Noted: 2023-11-07 | Resolved: 2023-12-20

## 2023-12-20 PROBLEM — J18.9 COMMUNITY ACQUIRED PNEUMONIA OF BOTH LOWER LOBES: Status: RESOLVED | Noted: 2023-10-08 | Resolved: 2023-12-20

## 2023-12-20 PROBLEM — E78.2 MIXED HYPERLIPIDEMIA: Status: RESOLVED | Noted: 2023-11-07 | Resolved: 2023-12-20

## 2023-12-20 PROCEDURE — 3008F BODY MASS INDEX DOCD: CPT | Performed by: INTERNAL MEDICINE

## 2023-12-20 PROCEDURE — 3074F SYST BP LT 130 MM HG: CPT | Performed by: INTERNAL MEDICINE

## 2023-12-20 PROCEDURE — 1160F RVW MEDS BY RX/DR IN RCRD: CPT | Performed by: INTERNAL MEDICINE

## 2023-12-20 PROCEDURE — 99214 OFFICE O/P EST MOD 30 MIN: CPT | Performed by: INTERNAL MEDICINE

## 2023-12-20 PROCEDURE — 1125F AMNT PAIN NOTED PAIN PRSNT: CPT | Performed by: INTERNAL MEDICINE

## 2023-12-20 PROCEDURE — 93971 EXTREMITY STUDY: CPT

## 2023-12-20 PROCEDURE — 3052F HG A1C>EQUAL 8.0%<EQUAL 9.0%: CPT | Performed by: INTERNAL MEDICINE

## 2023-12-20 PROCEDURE — 1159F MED LIST DOCD IN RCRD: CPT | Performed by: INTERNAL MEDICINE

## 2023-12-20 PROCEDURE — 3078F DIAST BP <80 MM HG: CPT | Performed by: INTERNAL MEDICINE

## 2023-12-20 PROCEDURE — 4004F PT TOBACCO SCREEN RCVD TLK: CPT | Performed by: INTERNAL MEDICINE

## 2023-12-20 PROCEDURE — 4010F ACE/ARB THERAPY RXD/TAKEN: CPT | Performed by: INTERNAL MEDICINE

## 2023-12-20 PROCEDURE — 93971 EXTREMITY STUDY: CPT | Performed by: RADIOLOGY

## 2023-12-20 RX ORDER — AMOXICILLIN 875 MG/1
875 TABLET, FILM COATED ORAL 2 TIMES DAILY
COMMUNITY
End: 2024-05-03 | Stop reason: WASHOUT

## 2023-12-20 ASSESSMENT — PATIENT HEALTH QUESTIONNAIRE - PHQ9
1. LITTLE INTEREST OR PLEASURE IN DOING THINGS: NOT AT ALL
SUM OF ALL RESPONSES TO PHQ9 QUESTIONS 1 AND 2: 0
SUM OF ALL RESPONSES TO PHQ9 QUESTIONS 1 AND 2: 0
2. FEELING DOWN, DEPRESSED OR HOPELESS: NOT AT ALL
1. LITTLE INTEREST OR PLEASURE IN DOING THINGS: NOT AT ALL
2. FEELING DOWN, DEPRESSED OR HOPELESS: NOT AT ALL

## 2023-12-20 NOTE — PROGRESS NOTES
"Subjective   Patient ID: Feliciano Worthington is a 73 y.o. male who presents for Follow-up (Patient in office today for 6 week follow up).    HPI   73-year-old male with a past medical history of diabetes hypertension hepatoma depression right forearm DVT on blood thinner for 6 weeks now and I ordered a repeat ultrasound to determine the duration os anticoagulation. Patient is waiting for dental extraction,wanted to know how long he has to hold eliquis.  Review of Systems  REVIEW OF SYSTEMS:  General:  Denies significant weight changes, fever, chills or weakness.  SKIN: Denies any rash or change in moles.  HEENT:  No vision or hearing changes. No headache. No vertigo, No tinnitus.   GI:  No loss of appetite. No change in bowel habit. No abdominal pain. No blood in stool.  GUR: No dysuria. No hematoma, No fever. No incontinence.  Respiratory:  No cough or shortness of breath.  CNS:  No memory or mood changes. No gait disturbance. No focal weakness. No tremors. No tingling or number of extremities.   ENDO: No cold intolerance. No fatigue.  Still has little swelling  Objective   /68 (BP Location: Left arm, Patient Position: Sitting, BP Cuff Size: Adult)   Pulse 74   Temp 36.3 °C (97.4 °F) (Temporal)   Ht 1.778 m (5' 10\")   Wt 94.8 kg (209 lb)   BMI 29.99 kg/m²     Physical Exam  PHYSICAL EXAM LONG:  Vitals:  Per TouchWorks.  General Appearance:  Normal-built, well-nourished  with no apparent distress.  Skin:  Normal turgor.  No rash.  Head:  Normocephalic, atraumatic.  Eyes:  Pupils are equal, round, and reactive to light and accommodation.  Extraocular movements are intact.  No pallor of conjunctivae.  Mouth has moist oral mucosa.  Pharynx appears normal.  No erythema.  Nose:  Nasal mucosa normal.  Turbinates are within normal limits.  Ears:  Bilateral auditory ear canals are normal.  Bilateral tympanic membranes are normal and visible.  Neck:  Supple.  No JVD.  No carotid bruit.  No thyromegaly. No cervical " lymphadenopathy.   Chest:  Bilaterally good air entry and bilaterally clear to auscultation.  No wheezing.  No crackles.  Heart:  Regular rate and rhythm.  S1, S2 positive.  No murmur.  Abdomen:  Soft and nontender.  Bowel sounds are positive.  No organomegaly.  Extremities:  Bilaterally no pedal pitting edema.  Bilaterally 2+ dorsalis pedis pulses.  Neuro Exam:  Cranial nerves from II to XII intact.  No facial droop.  Tongue at midline.  Facial sensation intact to light touch and pain sensation.  Motor strength 5/5 in upper and lower extremities.  Sensation is grossly intact to light touch and pain sensation.  Deep tendon reflexes are bilaterally symmetric in upper and lower extremities and within normal limits, 2+.  No cerebellar signs.  Finger-to-nose intact.        Assessment/Plan        Right forearm DVT repeated ultrasound to determine the duration of treatment    Diabetes stable strongly advised lifestyle Modicon diet exercise continue medications I will see him back in 1 week    Awaiting dental extraction,,advised to hold eliquis for 48 hrs before procedure and restart 12 -24 hrs after procedure if no complication with bleeding.

## 2023-12-21 ENCOUNTER — TELEPHONE (OUTPATIENT)
Dept: PRIMARY CARE | Facility: CLINIC | Age: 73
End: 2023-12-21
Payer: MEDICARE

## 2023-12-21 NOTE — TELEPHONE ENCOUNTER
Can you make an addendum to his OV note from 12/20 and add those directions so we can fax those to his dentists office.

## 2023-12-21 NOTE — TELEPHONE ENCOUNTER
Patient called office advising that his dentist faxed over paperwork to be completed. Patient is having some teeth extracted next week and they need to know if patient can hold his Eliquis and for how long.     Can you make an addendum to his OV note from 12/20/2023 with those instructions so we can fax those instructions to them?

## 2023-12-22 ENCOUNTER — HOSPITAL ENCOUNTER (OUTPATIENT)
Dept: PHYSICAL THERAPY | Age: 73
Setting detail: THERAPIES SERIES
Discharge: HOME | End: 2023-12-22
Attending: NEUROLOGICAL SURGERY
Payer: MEDICARE

## 2023-12-22 NOTE — PROGRESS NOTES
Therapy                            Cancellation/No-show Note    Date: 2023  Patient: Cheryl Jason (39 y.o. male)  : 1950  MRN:  [id number]  Referring Physician: Carola Gonzales MD    Medical Diagnosis: Spondylosis of cervical region without myelopathy or radiculopathy [M47.812]  DM (diabetes mellitus) with complications ([address]) [A95.0]  Diabetes mellitus type 2, insulin dependent ([address]) [E11.9, Z79.4]  Chronic anticoagulation [Z79.01]  Balance disorder [R26.89]  Simple chronic bronchitis ([address]) [J41.0]  Lumbar spondylosis [M47.816]      Visit Information:  Insurance: Payor: Aaron Bottom / Plan: Andres Vazquez / Product Type: *No Product type* /   Visits to Date: 2   No Show/Cancelled Appts: 1 / 0      For today's appointment patient:  []  Cancelled  []  Rescheduled appointment  [x]  No-show   [x]  Called pt to remind of next appointment     Reason given by patient:  []  Patient ill  []  Conflicting appointment  []  No transportation    []  Conflict with work  []  No reason given  []  Other:      [] Pt has future appointments scheduled, no follow up needed  [] Pt requests to be on hold. Reason:   If > 2 weeks please discuss with therapist.  [x] Therapist to call pt for follow up     Comments:   Called patient and LVM regarding NS and to call back to get on the schedule due to no more scheduled appointments.      Signature: Electronically signed by Lea Triplett PT on 23 at 8:48 AM EST

## 2023-12-22 NOTE — TELEPHONE ENCOUNTER
Patient informed of directions for Eliquis and verbalized understanding. I attempted to call the dental office to inform them that Dr. Ledbetter would be unable to sign and that we will fax over the dictated office note with the Eliquis directions but the office is closed for the holidays.   I faxed over the office note and the paperwork to the dental office.

## 2023-12-28 ENCOUNTER — OFFICE VISIT (OUTPATIENT)
Dept: PRIMARY CARE | Facility: CLINIC | Age: 73
End: 2023-12-28
Payer: MEDICARE

## 2023-12-28 VITALS
BODY MASS INDEX: 29.92 KG/M2 | WEIGHT: 209 LBS | TEMPERATURE: 97.6 F | HEART RATE: 84 BPM | HEIGHT: 70 IN | SYSTOLIC BLOOD PRESSURE: 108 MMHG | DIASTOLIC BLOOD PRESSURE: 70 MMHG

## 2023-12-28 DIAGNOSIS — I10 PRIMARY HYPERTENSION: Primary | ICD-10-CM

## 2023-12-28 DIAGNOSIS — E11.9 TYPE 2 DIABETES MELLITUS WITHOUT COMPLICATION, WITHOUT LONG-TERM CURRENT USE OF INSULIN (MULTI): ICD-10-CM

## 2023-12-28 PROCEDURE — 3078F DIAST BP <80 MM HG: CPT | Performed by: INTERNAL MEDICINE

## 2023-12-28 PROCEDURE — 4004F PT TOBACCO SCREEN RCVD TLK: CPT | Performed by: INTERNAL MEDICINE

## 2023-12-28 PROCEDURE — 1159F MED LIST DOCD IN RCRD: CPT | Performed by: INTERNAL MEDICINE

## 2023-12-28 PROCEDURE — 1125F AMNT PAIN NOTED PAIN PRSNT: CPT | Performed by: INTERNAL MEDICINE

## 2023-12-28 PROCEDURE — 3074F SYST BP LT 130 MM HG: CPT | Performed by: INTERNAL MEDICINE

## 2023-12-28 PROCEDURE — 4010F ACE/ARB THERAPY RXD/TAKEN: CPT | Performed by: INTERNAL MEDICINE

## 2023-12-28 PROCEDURE — 1160F RVW MEDS BY RX/DR IN RCRD: CPT | Performed by: INTERNAL MEDICINE

## 2023-12-28 PROCEDURE — 3008F BODY MASS INDEX DOCD: CPT | Performed by: INTERNAL MEDICINE

## 2023-12-28 PROCEDURE — 3052F HG A1C>EQUAL 8.0%<EQUAL 9.0%: CPT | Performed by: INTERNAL MEDICINE

## 2023-12-28 PROCEDURE — 99214 OFFICE O/P EST MOD 30 MIN: CPT | Performed by: INTERNAL MEDICINE

## 2023-12-28 NOTE — PROGRESS NOTES
"Subjective   Patient ID: Feliciano Worthington is a 73 y.o. male who presents for 1 week follow up.    HPI   73-year-old male with a past medical history of depression diabetes COPD right upper extremity DVT had a repeat ultrasound done here for follow-up  Review of Systems  REVIEW OF SYSTEMS:  General:  Denies significant weight changes, fever, chills or weakness.  SKIN: Denies any rash or change in moles.  HEENT:  No vision or hearing changes. No headache. No vertigo, No tinnitus.   GI:  No loss of appetite. No change in bowel habit. No abdominal pain. No blood in stool.  GUR: No dysuria. No hematoma, No fever. No incontinence.  Respiratory:  No cough or shortness of breath.  CNS:  No memory or mood changes. No gait disturbance. No focal weakness. No tremors. No tingling or number of extremities.   ENDO: No cold intolerance. No fatigue.    Objective   /70 (BP Location: Left arm, Patient Position: Sitting, BP Cuff Size: Adult)   Pulse 84   Temp 36.4 °C (97.6 °F) (Temporal)   Ht 1.778 m (5' 10\")   Wt 94.8 kg (209 lb)   BMI 29.99 kg/m²     Physical Exam  PHYSICAL EXAM LONG:  Vitals:  Per TouchWorks.  General Appearance:  Normal-built, well-nourished  with no apparent distress.  Skin:  Normal turgor.  No rash.  Head:  Normocephalic, atraumatic.  Eyes:  Pupils are equal, round, and reactive to light and accommodation.  Extraocular movements are intact.  No pallor of conjunctivae.  Mouth has moist oral mucosa.  Pharynx appears normal.  No erythema.  Nose:  Nasal mucosa normal.  Turbinates are within normal limits.  Ears:  Bilateral auditory ear canals are normal.  Bilateral tympanic membranes are normal and visible.  Neck:  Supple.  No JVD.  No carotid bruit.  No thyromegaly. No cervical lymphadenopathy.   Chest:  Bilaterally good air entry and bilaterally clear to auscultation.  No wheezing.  No crackles.  Heart:  Regular rate and rhythm.  S1, S2 positive.  No murmur.  Abdomen:  Soft and nontender.  Bowel sounds " are positive.  No organomegaly.  Extremities:  Bilaterally no pedal pitting edema.  Bilaterally 2+ dorsalis pedis pulses.  Neuro Exam:  Cranial nerves from II to XII intact.  No facial droop.  Tongue at midline.  Facial sensation intact to light touch and pain sensation.  Motor strength 5/5 in upper and lower extremities.  Sensation is grossly intact to light touch and pain sensation.  Deep tendon reflexes are bilaterally symmetric in upper and lower extremities and within normal limits, 2+.  No cerebellar signs.  Finger-to-nose intact.      Ultrasound reviewed he still have clot in the cubital vein  Assessment/Plan     Upper extremity DVT still present advised to continue Elik use for 6 months    Diabetes hyperemia stable advised continue same    Hypertension stable advised DASH diet continue same I will see him back in 3 months

## 2024-01-11 ENCOUNTER — PATIENT OUTREACH (OUTPATIENT)
Dept: PRIMARY CARE | Facility: CLINIC | Age: 74
End: 2024-01-11
Payer: MEDICARE

## 2024-01-11 NOTE — PROGRESS NOTES
CM called and spoke with pt to address any final questions or concerns regarding hospitalization.  Pt reports doing well and has no concerns at this time.

## 2024-01-12 ENCOUNTER — HOSPITAL ENCOUNTER (OUTPATIENT)
Dept: PHYSICAL THERAPY | Age: 74
Setting detail: THERAPIES SERIES
Discharge: HOME | End: 2024-01-12
Attending: NEUROLOGICAL SURGERY

## 2024-01-12 NOTE — PROGRESS NOTES
Therapy                            Cancellation/No-show Note    Date: 2024  Patient: Moshe Caba (73 y.o. male)  : 1950  MRN:  [id number]  Referring Physician: Evelin Chen MD    Medical Diagnosis: Spondylosis of cervical region without myelopathy or radiculopathy [M47.812]  DM (diabetes mellitus) with complications ([facility]) [E11.8]  Diabetes mellitus type 2, insulin dependent ([facility]) [E11.9, Z79.4]  Chronic anticoagulation [Z79.01]  Balance disorder [R26.89]  Simple chronic bronchitis ([facility]) [J41.0]  Lumbar spondylosis [M47.816]      Visit Information:  Insurance: Payor: [employer] MEDICARE / Plan: [employer] CHOICE-PPO MEDICARE / Product Type: *No Product type* /   Visits to Date: 2   No Show/Cancelled Appts:       For today's appointment patient:  []  Cancelled  []  Rescheduled appointment  [x]  No-show   [x]  Called pt to remind of next appointment     Reason given by patient:  []  Patient ill  []  Conflicting appointment  []  No transportation    []  Conflict with work  []  No reason given  []  Other:      [] Pt has future appointments scheduled, no follow up needed  [] Pt requests to be on hold.    Reason:   If > 2 weeks please discuss with therapist.  [x] Therapist to call pt for follow up     Comments:   LVM for patient regarding missed appointment, told patient he is to be on the schedule by  due to insurance or he will be discharged, has not been seen in clinic for about a month.     Signature: Electronically signed by Alena Galeano PT on 24 at 1:09 PM EST

## 2024-02-12 DIAGNOSIS — E10.51: ICD-10-CM

## 2024-02-13 DIAGNOSIS — M48.02 SPINAL STENOSIS OF CERVICAL REGION: Primary | ICD-10-CM

## 2024-02-13 RX ORDER — GABAPENTIN 300 MG/1
300 CAPSULE ORAL DAILY
Qty: 30 CAPSULE | Refills: 5 | Status: SHIPPED | OUTPATIENT
Start: 2024-02-13 | End: 2024-05-07 | Stop reason: ALTCHOICE

## 2024-02-14 ENCOUNTER — TELEPHONE (OUTPATIENT)
Dept: PRIMARY CARE | Facility: CLINIC | Age: 74
End: 2024-02-14

## 2024-02-14 ENCOUNTER — OFFICE VISIT (OUTPATIENT)
Dept: FAMILY MEDICINE CLINIC | Age: 74
End: 2024-02-14
Payer: MEDICARE

## 2024-02-14 VITALS
HEIGHT: 70 IN | TEMPERATURE: 97.2 F | SYSTOLIC BLOOD PRESSURE: 136 MMHG | DIASTOLIC BLOOD PRESSURE: 70 MMHG | HEART RATE: 76 BPM | BODY MASS INDEX: 29.92 KG/M2 | WEIGHT: 209 LBS | OXYGEN SATURATION: 98 %

## 2024-02-14 DIAGNOSIS — Z12.5 SCREENING FOR PROSTATE CANCER: ICD-10-CM

## 2024-02-14 DIAGNOSIS — J41.0 SIMPLE CHRONIC BRONCHITIS (HCC): ICD-10-CM

## 2024-02-14 DIAGNOSIS — Z91.81 AT HIGH RISK FOR FALLS: ICD-10-CM

## 2024-02-14 DIAGNOSIS — Z79.01 CHRONIC ANTICOAGULATION: ICD-10-CM

## 2024-02-14 DIAGNOSIS — I82.721 CHRONIC DEEP VEIN THROMBOSIS (DVT) OF OTHER VEIN OF RIGHT UPPER EXTREMITY (HCC): ICD-10-CM

## 2024-02-14 DIAGNOSIS — Z79.4 DIABETES MELLITUS TYPE 2, INSULIN DEPENDENT (HCC): ICD-10-CM

## 2024-02-14 DIAGNOSIS — F32.5 DEPRESSION, MAJOR, IN REMISSION (HCC): ICD-10-CM

## 2024-02-14 DIAGNOSIS — E78.2 MIXED HYPERLIPIDEMIA: ICD-10-CM

## 2024-02-14 DIAGNOSIS — Z11.59 NEED FOR HEPATITIS C SCREENING TEST: ICD-10-CM

## 2024-02-14 DIAGNOSIS — I10 ESSENTIAL (PRIMARY) HYPERTENSION: Primary | ICD-10-CM

## 2024-02-14 DIAGNOSIS — J42 CHRONIC BRONCHITIS, UNSPECIFIED CHRONIC BRONCHITIS TYPE (HCC): ICD-10-CM

## 2024-02-14 DIAGNOSIS — E11.9 DIABETES MELLITUS TYPE 2, INSULIN DEPENDENT (HCC): ICD-10-CM

## 2024-02-14 DIAGNOSIS — E11.8 DM (DIABETES MELLITUS) WITH COMPLICATIONS (HCC): ICD-10-CM

## 2024-02-14 PROBLEM — I82.409 DEEP VENOUS THROMBOSIS (HCC): Status: ACTIVE | Noted: 2024-02-14

## 2024-02-14 PROBLEM — R09.1 PLEURISY: Status: RESOLVED | Noted: 2019-02-08 | Resolved: 2024-02-14

## 2024-02-14 PROBLEM — J44.9 CHRONIC OBSTRUCTIVE PULMONARY DISEASE, UNSPECIFIED (HCC): Status: RESOLVED | Noted: 2018-03-21 | Resolved: 2024-02-14

## 2024-02-14 PROBLEM — G25.3 MYOCLONUS: Status: RESOLVED | Noted: 2020-04-28 | Resolved: 2024-02-14

## 2024-02-14 PROBLEM — J18.1 LOBAR PNEUMONIA, UNSPECIFIED ORGANISM (HCC): Status: RESOLVED | Noted: 2019-02-02 | Resolved: 2024-02-14

## 2024-02-14 PROBLEM — E34.9 ENDOCRINE DISORDER, UNSPECIFIED: Status: RESOLVED | Noted: 2019-01-16 | Resolved: 2024-02-14

## 2024-02-14 PROBLEM — F17.200 SMOKER: Status: RESOLVED | Noted: 2021-01-19 | Resolved: 2024-02-14

## 2024-02-14 PROBLEM — F17.200 CURRENT EVERY DAY SMOKER: Status: ACTIVE | Noted: 2023-10-09

## 2024-02-14 PROBLEM — N52.9 MALE ERECTILE DYSFUNCTION, UNSPECIFIED: Status: RESOLVED | Noted: 2018-06-01 | Resolved: 2024-02-14

## 2024-02-14 PROBLEM — R26.89 BALANCE DISORDER: Status: RESOLVED | Noted: 2023-11-16 | Resolved: 2024-02-14

## 2024-02-14 PROBLEM — R63.4 WEIGHT LOSS: Status: RESOLVED | Noted: 2020-09-01 | Resolved: 2024-02-14

## 2024-02-14 PROBLEM — G47.10 HYPERSOMNOLENCE: Status: RESOLVED | Noted: 2021-04-12 | Resolved: 2024-02-14

## 2024-02-14 PROBLEM — I25.10 CORONARY ATHEROSCLEROSIS: Status: ACTIVE | Noted: 2023-11-08

## 2024-02-14 PROCEDURE — 99213 OFFICE O/P EST LOW 20 MIN: CPT | Performed by: INTERNAL MEDICINE

## 2024-02-14 PROCEDURE — G8417 CALC BMI ABV UP PARAM F/U: HCPCS | Performed by: INTERNAL MEDICINE

## 2024-02-14 PROCEDURE — 99497 ADVNCD CARE PLAN 30 MIN: CPT | Performed by: INTERNAL MEDICINE

## 2024-02-14 PROCEDURE — 3046F HEMOGLOBIN A1C LEVEL >9.0%: CPT | Performed by: INTERNAL MEDICINE

## 2024-02-14 PROCEDURE — G8484 FLU IMMUNIZE NO ADMIN: HCPCS | Performed by: INTERNAL MEDICINE

## 2024-02-14 PROCEDURE — 3023F SPIROM DOC REV: CPT | Performed by: INTERNAL MEDICINE

## 2024-02-14 PROCEDURE — G8427 DOCREV CUR MEDS BY ELIG CLIN: HCPCS | Performed by: INTERNAL MEDICINE

## 2024-02-14 PROCEDURE — 2022F DILAT RTA XM EVC RTNOPTHY: CPT | Performed by: INTERNAL MEDICINE

## 2024-02-14 PROCEDURE — 1123F ACP DISCUSS/DSCN MKR DOCD: CPT | Performed by: INTERNAL MEDICINE

## 2024-02-14 PROCEDURE — 3017F COLORECTAL CA SCREEN DOC REV: CPT | Performed by: INTERNAL MEDICINE

## 2024-02-14 PROCEDURE — 3078F DIAST BP <80 MM HG: CPT | Performed by: INTERNAL MEDICINE

## 2024-02-14 PROCEDURE — 4004F PT TOBACCO SCREEN RCVD TLK: CPT | Performed by: INTERNAL MEDICINE

## 2024-02-14 PROCEDURE — 3075F SYST BP GE 130 - 139MM HG: CPT | Performed by: INTERNAL MEDICINE

## 2024-02-14 RX ORDER — GABAPENTIN 300 MG/1
300 CAPSULE ORAL DAILY
Qty: 30 CAPSULE | Refills: 0 | OUTPATIENT
Start: 2024-02-14

## 2024-02-14 RX ORDER — APIXABAN 5 MG/1
5 TABLET, FILM COATED ORAL 2 TIMES DAILY
COMMUNITY

## 2024-02-14 RX ORDER — EZETIMIBE 10 MG/1
10 TABLET ORAL DAILY
COMMUNITY
Start: 2023-11-07

## 2024-02-14 RX ORDER — AMLODIPINE BESYLATE 5 MG/1
5 TABLET ORAL DAILY
COMMUNITY
Start: 2023-11-22

## 2024-02-14 RX ORDER — HYDROCODONE BITARTRATE AND HOMATROPINE METHYLBROMIDE ORAL SOLUTION 5; 1.5 MG/5ML; MG/5ML
5 LIQUID ORAL
COMMUNITY
Start: 2023-10-08

## 2024-02-14 RX ORDER — LOVASTATIN 40 MG/1
40 TABLET ORAL NIGHTLY
COMMUNITY
Start: 2023-12-07

## 2024-02-14 RX ORDER — PANTOPRAZOLE SODIUM 40 MG/1
40 TABLET, DELAYED RELEASE ORAL
COMMUNITY
Start: 2023-10-09

## 2024-02-14 NOTE — ACP (ADVANCE CARE PLANNING)
Advance Care Planning     General Advance Care Planning (ACP) Conversation    Date of Conversation: 2/14/2024  Conducted with: Patient with Decision Making Capacity    Healthcare Decision Maker:    Primary Decision Maker (Active): RosalesDaylin - [phone]  Click here to complete Healthcare Decision Makers including selection of the Healthcare Decision Maker Relationship (ie \"Primary\").  Today we discussed Healthcare Decision Makers. The patient is considering options.    Content/Action Overview:  Has NO ACP documents-Information provided  Reviewed DNR/DNI and patient elects Full Code (Attempt Resuscitation)        Length of Voluntary ACP Conversation in minutes:  16 minutes    Brodie Morris MD

## 2024-02-14 NOTE — TELEPHONE ENCOUNTER
Benjy from [facility] pharmacy in [place] calling to clarify Rx for Gabapentin 300 mg. Capsule daily, stated that they received a electronic request last night for refill, but wanted to advise that patient is getting Gabapentin 300 mg. TID from another physician Dr. Evonne Parker...  Pharmacy will need to know if you are taking over Rx?  or cancel Rx? Benjy was not sure if you were aware of patient getting this Rx from another provider and with different instruction.   Please advise.   [facility] pharmacy [phone]

## 2024-02-14 NOTE — TELEPHONE ENCOUNTER
Call placed to pharmacy and left detailed message with DR. OTIS OTTO MD's response, advised to call office if any further questions.

## 2024-02-14 NOTE — PROGRESS NOTES
status is at baseline.   Psychiatric:         Mood and Affect: Mood normal.         Behavior: Behavior normal.         Thought Content: Thought content normal.         Judgment: Judgment normal.       Visual inspection:  Deformity/amputation: absent  Skin lesions/pre-ulcerative calluses: absent  Edema: right- negative, left- negative    Sensory exam:  Monofilament sensation: normal  (minimum of 5 random plantar locations tested, avoiding callused areas - > 1 area with absence of sensation is + for neuropathy)    Plus at least one of the following:  Pulses: normal,   Pinprick: Intact  Proprioception: Intact  Vibration (128 Hz): Intact           Allergies   Allergen Reactions    Tetracyclines & Related Other (See Comments)     Water blister on tongue and penis    Empagliflozin Other (See Comments)    Lipitor [Atorvastatin] Other (See Comments)     Cramping all over the body. Sore shoulders       Current Outpatient Medications:     amLODIPine (NORVASC) 5 MG tablet, Take 1 tablet by mouth daily, Disp: , Rfl:     ELIQUIS 5 MG TABS tablet, Take 1 tablet by mouth 2 times daily, Disp: , Rfl:     ezetimibe (ZETIA) 10 MG tablet, Take 1 tablet by mouth daily, Disp: , Rfl:     HYDROcodone homatropine (HYCODAN) 5-1.5 MG/5ML solution, Take 5 mLs by mouth., Disp: , Rfl:     pantoprazole (PROTONIX) 40 MG tablet, Take 1 tablet by mouth, Disp: , Rfl:     lovastatin (MEVACOR) 40 MG tablet, Take 1 tablet by mouth nightly, Disp: , Rfl:     gabapentin (NEURONTIN) 300 MG capsule, Take 1 capsule by mouth 3 times daily for 180 days. Intended supply: 30 days, Disp: 90 capsule, Rfl: 5    LANTUS SOLOSTAR 100 UNIT/ML injection pen, INJECT 25 UNITS SUBCUTANEOUSLY AT BEDTIME, Disp: , Rfl:     vitamin D (CHOLECALCIFEROL) 25 MCG (1000 UT) TABS tablet, Take 1 tablet by mouth 2 times daily, Disp: , Rfl:     ipratropium (ATROVENT) 0.03 % nasal spray, USE 1 SPRAY(S) IN EACH NOSTRIL TWICE DAILY, Disp: , Rfl:     omeprazole (PRILOSEC) 40 MG delayed

## 2024-02-15 NOTE — ACP (ADVANCE CARE PLANNING)
Advance Care Planning     General Advance Care Planning (ACP) Conversation    Date of Conversation: 2/14/2024  Conducted with: Patient with Decision Making Capacity    Healthcare Decision Maker:    Primary Decision Maker (Active): RosalesDaylin - [phone]  Click here to complete Healthcare Decision Makers including selection of the Healthcare Decision Maker Relationship (ie \"Primary\").  Today we discussed Healthcare Decision Makers. The patient is considering options.    Content/Action Overview:  Has NO ACP documents-Information provided  Reviewed DNR/DNI and patient elects Full Code (Attempt Resuscitation)        Length of Voluntary ACP Conversation in minutes:  16 minutes    Brodie Morris MD

## 2024-03-06 RX ORDER — VENLAFAXINE HYDROCHLORIDE 150 MG/1
150 CAPSULE, EXTENDED RELEASE ORAL DAILY
Qty: 30 CAPSULE | Refills: 1 | Status: SHIPPED | OUTPATIENT
Start: 2024-03-06

## 2024-03-11 ENCOUNTER — HOSPITAL ENCOUNTER (OUTPATIENT)
Dept: ULTRASOUND IMAGING | Age: 74
Discharge: HOME | End: 2024-03-13
Attending: INTERNAL MEDICINE
Payer: MEDICARE

## 2024-03-11 DIAGNOSIS — I82.721 CHRONIC DEEP VEIN THROMBOSIS (DVT) OF OTHER VEIN OF RIGHT UPPER EXTREMITY (HCC): ICD-10-CM

## 2024-03-11 PROCEDURE — 93971 EXTREMITY STUDY: CPT

## 2024-03-15 PROBLEM — Z11.59 NEED FOR HEPATITIS C SCREENING TEST: Status: RESOLVED | Noted: 2024-02-14 | Resolved: 2024-03-15

## 2024-03-15 PROBLEM — Z12.5 SCREENING FOR PROSTATE CANCER: Status: RESOLVED | Noted: 2024-02-14 | Resolved: 2024-03-15

## 2024-03-29 ENCOUNTER — HOSPITAL ENCOUNTER (OUTPATIENT)
Dept: LAB | Age: 74
Discharge: HOME | End: 2024-03-29
Payer: MEDICARE

## 2024-03-29 ENCOUNTER — OFFICE VISIT (OUTPATIENT)
Dept: FAMILY MEDICINE CLINIC | Age: 74
End: 2024-03-29
Payer: MEDICARE

## 2024-03-29 VITALS
HEIGHT: 70 IN | SYSTOLIC BLOOD PRESSURE: 114 MMHG | BODY MASS INDEX: 29.29 KG/M2 | HEART RATE: 74 BPM | OXYGEN SATURATION: 94 % | DIASTOLIC BLOOD PRESSURE: 68 MMHG | WEIGHT: 204.6 LBS | RESPIRATION RATE: 14 BRPM

## 2024-03-29 DIAGNOSIS — I10 ESSENTIAL (PRIMARY) HYPERTENSION: ICD-10-CM

## 2024-03-29 DIAGNOSIS — E11.8 DM (DIABETES MELLITUS) WITH COMPLICATIONS (HCC): Chronic | ICD-10-CM

## 2024-03-29 DIAGNOSIS — Z12.5 SCREENING FOR PROSTATE CANCER: ICD-10-CM

## 2024-03-29 DIAGNOSIS — Z87.891 PERSONAL HISTORY OF TOBACCO USE: Chronic | ICD-10-CM

## 2024-03-29 DIAGNOSIS — Z11.59 NEED FOR HEPATITIS C SCREENING TEST: ICD-10-CM

## 2024-03-29 DIAGNOSIS — Z79.4 DIABETES MELLITUS TYPE 2, INSULIN DEPENDENT (HCC): ICD-10-CM

## 2024-03-29 DIAGNOSIS — E11.9 DIABETES MELLITUS TYPE 2, INSULIN DEPENDENT (HCC): ICD-10-CM

## 2024-03-29 DIAGNOSIS — I10 ESSENTIAL (PRIMARY) HYPERTENSION: Chronic | ICD-10-CM

## 2024-03-29 DIAGNOSIS — Z00.00 MEDICARE ANNUAL WELLNESS VISIT, SUBSEQUENT: ICD-10-CM

## 2024-03-29 DIAGNOSIS — R63.4 WEIGHT LOSS: ICD-10-CM

## 2024-03-29 DIAGNOSIS — F32.5 DEPRESSION, MAJOR, IN REMISSION (HCC): Chronic | ICD-10-CM

## 2024-03-29 DIAGNOSIS — J41.0 SIMPLE CHRONIC BRONCHITIS (HCC): Chronic | ICD-10-CM

## 2024-03-29 DIAGNOSIS — R49.9 CHANGE IN VOICE: Primary | ICD-10-CM

## 2024-03-29 DIAGNOSIS — E78.2 MIXED HYPERLIPIDEMIA: ICD-10-CM

## 2024-03-29 PROBLEM — A41.9 SEPSIS (HCC): Status: RESOLVED | Noted: 2019-01-29 | Resolved: 2024-03-29

## 2024-03-29 PROBLEM — G44.59 OTHER COMPLICATED HEADACHE SYNDROME: Status: RESOLVED | Noted: 2020-04-28 | Resolved: 2024-03-29

## 2024-03-29 PROBLEM — Z79.01 CHRONIC ANTICOAGULATION: Status: RESOLVED | Noted: 2023-11-16 | Resolved: 2024-03-29

## 2024-03-29 PROBLEM — I82.409 DEEP VENOUS THROMBOSIS (HCC): Status: RESOLVED | Noted: 2024-02-14 | Resolved: 2024-03-29

## 2024-03-29 PROBLEM — M54.12 CERVICAL RADICULOPATHY: Status: RESOLVED | Noted: 2020-09-01 | Resolved: 2024-03-29

## 2024-03-29 PROBLEM — M54.2 CERVICALGIA: Status: RESOLVED | Noted: 2020-09-01 | Resolved: 2024-03-29

## 2024-03-29 LAB
ALBUMIN SERPL-MCNC: 4.1 G/DL (ref 3.5–4.6)
ALP SERPL-CCNC: 80 U/L (ref 35–104)
ALT SERPL-CCNC: 22 U/L (ref 0–41)
ANION GAP SERPL CALCULATED.3IONS-SCNC: 12 MEQ/L (ref 9–15)
AST SERPL-CCNC: 20 U/L (ref 0–40)
BASOPHILS # BLD: 0.1 K/UL (ref 0–0.2)
BASOPHILS NFR BLD: 0.7 %
BILIRUB SERPL-MCNC: 0.3 MG/DL (ref 0.2–0.7)
BUN SERPL-MCNC: 18 MG/DL (ref 8–23)
CALCIUM SERPL-MCNC: 9.4 MG/DL (ref 8.5–9.9)
CHLORIDE SERPL-SCNC: 96 MEQ/L (ref 95–107)
CHOLEST SERPL-MCNC: 168 MG/DL (ref 0–199)
CO2 SERPL-SCNC: 24 MEQ/L (ref 20–31)
CREAT SERPL-MCNC: 0.87 MG/DL (ref 0.7–1.2)
CREAT UR-MCNC: 207.7 MG/DL
EOSINOPHIL # BLD: 0.1 K/UL (ref 0–0.7)
EOSINOPHIL NFR BLD: 1.6 %
ERYTHROCYTE [DISTWIDTH] IN BLOOD BY AUTOMATED COUNT: 12.9 % (ref 11.5–14.5)
GLOBULIN SER CALC-MCNC: 3.2 G/DL (ref 2.3–3.5)
GLUCOSE FASTING: 127 MG/DL (ref 70–99)
HCT VFR BLD AUTO: 36.8 % (ref 42–52)
HDLC SERPL-MCNC: 27 MG/DL (ref 40–59)
HEPATITIS C ANTIBODY: NONREACTIVE
HGB BLD-MCNC: 12.6 G/DL (ref 14–18)
LDL CHOLESTEROL CALCULATED: 96 MG/DL (ref 0–129)
LYMPHOCYTES # BLD: 2.3 K/UL (ref 1–4.8)
LYMPHOCYTES NFR BLD: 28 %
MCH RBC QN AUTO: 28.1 PG (ref 27–31.3)
MCHC RBC AUTO-ENTMCNC: 34.2 % (ref 33–37)
MCV RBC AUTO: 82.1 FL (ref 79–92.2)
MICROALBUMIN UR-MCNC: 12.8 MG/DL
MICROALBUMIN/CREAT UR-RTO: 61.6 MG/G (ref 0–30)
MONOCYTES # BLD: 0.5 K/UL (ref 0.2–0.8)
MONOCYTES NFR BLD: 6.3 %
NEUTROPHILS # BLD: 5.1 K/UL (ref 1.4–6.5)
NEUTS SEG NFR BLD: 62.9 %
PLATELET # BLD AUTO: 214 K/UL (ref 130–400)
POTASSIUM SERPL-SCNC: 4 MEQ/L (ref 3.4–4.9)
PROT SERPL-MCNC: 7.3 G/DL (ref 6.3–8)
PSA SERPL-MCNC: 0.79 NG/ML (ref 0–4)
RBC # BLD AUTO: 4.48 M/UL (ref 4.7–6.1)
SODIUM SERPL-SCNC: 132 MEQ/L (ref 135–144)
TRIGLYCERIDE, FASTING: 224 MG/DL (ref 0–150)
TSH REFLEX: 1.91 UIU/ML (ref 0.44–3.86)
WBC # BLD AUTO: 8.1 K/UL (ref 4.8–10.8)

## 2024-03-29 PROCEDURE — 36415 COLL VENOUS BLD VENIPUNCTURE: CPT

## 2024-03-29 PROCEDURE — 3074F SYST BP LT 130 MM HG: CPT | Performed by: INTERNAL MEDICINE

## 2024-03-29 PROCEDURE — 82570 ASSAY OF URINE CREATININE: CPT

## 2024-03-29 PROCEDURE — 3017F COLORECTAL CA SCREEN DOC REV: CPT | Performed by: INTERNAL MEDICINE

## 2024-03-29 PROCEDURE — 80061 LIPID PANEL: CPT

## 2024-03-29 PROCEDURE — 80053 COMPREHEN METABOLIC PANEL: CPT

## 2024-03-29 PROCEDURE — G8484 FLU IMMUNIZE NO ADMIN: HCPCS | Performed by: INTERNAL MEDICINE

## 2024-03-29 PROCEDURE — 1123F ACP DISCUSS/DSCN MKR DOCD: CPT | Performed by: INTERNAL MEDICINE

## 2024-03-29 PROCEDURE — G0439 PPPS, SUBSEQ VISIT: HCPCS | Performed by: INTERNAL MEDICINE

## 2024-03-29 PROCEDURE — 84443 ASSAY THYROID STIM HORMONE: CPT

## 2024-03-29 PROCEDURE — 3078F DIAST BP <80 MM HG: CPT | Performed by: INTERNAL MEDICINE

## 2024-03-29 PROCEDURE — 86803 HEPATITIS C AB TEST: CPT

## 2024-03-29 PROCEDURE — 85025 COMPLETE CBC W/AUTO DIFF WBC: CPT

## 2024-03-29 PROCEDURE — 84153 ASSAY OF PSA TOTAL: CPT

## 2024-03-29 PROCEDURE — 82043 UR ALBUMIN QUANTITATIVE: CPT

## 2024-03-29 PROCEDURE — 3046F HEMOGLOBIN A1C LEVEL >9.0%: CPT | Performed by: INTERNAL MEDICINE

## 2024-03-29 ASSESSMENT — PATIENT HEALTH QUESTIONNAIRE - PHQ9
9. THOUGHTS THAT YOU WOULD BE BETTER OFF DEAD, OR OF HURTING YOURSELF: NOT AT ALL
SUM OF ALL RESPONSES TO PHQ QUESTIONS 1-9: 4
2. FEELING DOWN, DEPRESSED OR HOPELESS: NOT AT ALL
SUM OF ALL RESPONSES TO PHQ QUESTIONS 1-9: 4
SUM OF ALL RESPONSES TO PHQ9 QUESTIONS 1 & 2: 0
6. FEELING BAD ABOUT YOURSELF - OR THAT YOU ARE A FAILURE OR HAVE LET YOURSELF OR YOUR FAMILY DOWN: SEVERAL DAYS
3. TROUBLE FALLING OR STAYING ASLEEP: SEVERAL DAYS
7. TROUBLE CONCENTRATING ON THINGS, SUCH AS READING THE NEWSPAPER OR WATCHING TELEVISION: NOT AT ALL
SUM OF ALL RESPONSES TO PHQ QUESTIONS 1-9: 4
4. FEELING TIRED OR HAVING LITTLE ENERGY: SEVERAL DAYS
SUM OF ALL RESPONSES TO PHQ QUESTIONS 1-9: 4
8. MOVING OR SPEAKING SO SLOWLY THAT OTHER PEOPLE COULD HAVE NOTICED. OR THE OPPOSITE, BEING SO FIGETY OR RESTLESS THAT YOU HAVE BEEN MOVING AROUND A LOT MORE THAN USUAL: NOT AT ALL
1. LITTLE INTEREST OR PLEASURE IN DOING THINGS: NOT AT ALL
5. POOR APPETITE OR OVEREATING: SEVERAL DAYS

## 2024-03-29 ASSESSMENT — LIFESTYLE VARIABLES
HOW OFTEN DO YOU HAVE A DRINK CONTAINING ALCOHOL: NEVER
HOW MANY STANDARD DRINKS CONTAINING ALCOHOL DO YOU HAVE ON A TYPICAL DAY: PATIENT DOES NOT DRINK

## 2024-03-29 NOTE — PATIENT INSTRUCTIONS
medicine as well as get counseling or join a cessation program.  Some of the changes you feel when you first quit are uncomfortable. Your body will miss the nicotine at first, and you may feel short-tempered and grumpy. You may have trouble sleeping or concentrating. Medicine can help you deal with these symptoms. You may struggle with changing your habits and rituals. And the urge to use tobacco may continue for a time.  This may be a lot to deal with, but keep at it. You will feel better.  Follow-up care is a key part of your treatment and safety. Be sure to make and go to all appointments, and call your doctor if you are having problems. It's also a good idea to know your test results and keep a list of the medicines you take.  How can you care for yourself at home?  Get support. You have a better chance of quitting if you have help and support.  Ask your family, friends, and coworkers for support.  Join a support group, such as Nicotine Anonymous, for people who are trying to quit using tobacco.  Consider signing up for a tobacco cessation program, such as the American [employer] Association's Freedom from Smoking program.  Get text messaging support. Go to the website at www.smokefree.gov to sign up for the SmokefreeTXT program.  After you quit, do not use tobacco again, not even once. Get rid of all tobacco products and anything that reminds you of tobacco, like ashtrays, spit cups, and lighters. If you smoke, clean your house and your clothes to get rid of the smell.  Learn how to live without tobacco. Think about ways you can avoid those things that make you reach for tobacco.  Avoid situations that put you at greatest risk. For some people, it's hard to have a drink with friends or a coffee break with coworkers without using tobacco.  Change your daily routine. Take a different route to work, or eat a meal in a different place.  Try to cut down on stress. Calm yourself or release tension by doing an activity you

## 2024-03-29 NOTE — ACP (ADVANCE CARE PLANNING)
Advance Care Planning     General Advance Care Planning (ACP) Conversation    Date of Conversation: 3/29/2024  Conducted with: Patient with Decision Making Capacity    Healthcare Decision Maker:    Primary Decision Maker (Active): RosalesDaylin - [phone]  Click here to complete Healthcare Decision Makers including selection of the Healthcare Decision Maker Relationship (ie \"Primary\").  Today we discussed Healthcare Decision Makers. The patient is considering options.    Content/Action Overview:  Has NO ACP documents-Information provided  Reviewed DNR/DNI and patient elects Full Code (Attempt Resuscitation)        Length of Voluntary ACP Conversation in minutes:  16 minutes    Brodie Morris MD

## 2024-03-29 NOTE — PROGRESS NOTES
over-diagnosis, false positive rate, and total radiation exposure.  The patient currently exhibits no signs or symptoms suggestive of lung cancer.  Discussed with patient the importance of compliance with yearly annual lung cancer screenings and willingness to undergo diagnosis and treatment if screening scan is positive.  In addition, the patient was counseled regarding the importance of remaining smoke free and/or total smoking cessation.    Also reviewed the following if the patient has Medicare that as of February 10, 2022, Medicare only covers LDCT screening in patients aged 50-77 with at least a 20 pack-year smoking history who currently smoke or have quit in the last 15 years

## 2024-04-28 PROBLEM — Z00.00 MEDICARE ANNUAL WELLNESS VISIT, SUBSEQUENT: Status: RESOLVED | Noted: 2024-03-29 | Resolved: 2024-04-28

## 2024-05-03 ENCOUNTER — OFFICE VISIT (OUTPATIENT)
Dept: PRIMARY CARE | Facility: CLINIC | Age: 74
End: 2024-05-03
Payer: MEDICARE

## 2024-05-03 VITALS
HEIGHT: 70 IN | TEMPERATURE: 97.8 F | SYSTOLIC BLOOD PRESSURE: 124 MMHG | BODY MASS INDEX: 29.22 KG/M2 | DIASTOLIC BLOOD PRESSURE: 84 MMHG | WEIGHT: 204.13 LBS | OXYGEN SATURATION: 98 % | HEART RATE: 79 BPM

## 2024-05-03 DIAGNOSIS — J44.9 CHRONIC OBSTRUCTIVE PULMONARY DISEASE, UNSPECIFIED COPD TYPE (MULTI): ICD-10-CM

## 2024-05-03 DIAGNOSIS — E11.69 HYPERLIPIDEMIA ASSOCIATED WITH TYPE 2 DIABETES MELLITUS (MULTI): ICD-10-CM

## 2024-05-03 DIAGNOSIS — F17.200 CURRENT EVERY DAY SMOKER: ICD-10-CM

## 2024-05-03 DIAGNOSIS — F32.5 DEPRESSION, MAJOR, IN REMISSION (CMS-HCC): ICD-10-CM

## 2024-05-03 DIAGNOSIS — I10 PRIMARY HYPERTENSION: ICD-10-CM

## 2024-05-03 DIAGNOSIS — R91.8 MULTIPLE LUNG NODULES ON CT: ICD-10-CM

## 2024-05-03 DIAGNOSIS — E11.9 TYPE 2 DIABETES MELLITUS WITHOUT COMPLICATION, WITHOUT LONG-TERM CURRENT USE OF INSULIN (MULTI): Primary | ICD-10-CM

## 2024-05-03 DIAGNOSIS — F17.210 NICOTINE DEPENDENCE, CIGARETTES, UNCOMPLICATED: ICD-10-CM

## 2024-05-03 DIAGNOSIS — K21.9 GASTROESOPHAGEAL REFLUX DISEASE WITHOUT ESOPHAGITIS: ICD-10-CM

## 2024-05-03 DIAGNOSIS — E78.5 HYPERLIPIDEMIA ASSOCIATED WITH TYPE 2 DIABETES MELLITUS (MULTI): ICD-10-CM

## 2024-05-03 DIAGNOSIS — I25.10 CORONARY ARTERY DISEASE, UNSPECIFIED VESSEL OR LESION TYPE, UNSPECIFIED WHETHER ANGINA PRESENT, UNSPECIFIED WHETHER NATIVE OR TRANSPLANTED HEART: ICD-10-CM

## 2024-05-03 PROCEDURE — 3008F BODY MASS INDEX DOCD: CPT | Performed by: FAMILY MEDICINE

## 2024-05-03 PROCEDURE — 4010F ACE/ARB THERAPY RXD/TAKEN: CPT | Performed by: FAMILY MEDICINE

## 2024-05-03 PROCEDURE — 1124F ACP DISCUSS-NO DSCNMKR DOCD: CPT | Performed by: FAMILY MEDICINE

## 2024-05-03 PROCEDURE — 3079F DIAST BP 80-89 MM HG: CPT | Performed by: FAMILY MEDICINE

## 2024-05-03 PROCEDURE — 1159F MED LIST DOCD IN RCRD: CPT | Performed by: FAMILY MEDICINE

## 2024-05-03 PROCEDURE — 99214 OFFICE O/P EST MOD 30 MIN: CPT | Performed by: FAMILY MEDICINE

## 2024-05-03 PROCEDURE — 3074F SYST BP LT 130 MM HG: CPT | Performed by: FAMILY MEDICINE

## 2024-05-03 RX ORDER — LOVASTATIN 40 MG/1
40 TABLET ORAL NIGHTLY
COMMUNITY
Start: 2024-05-01

## 2024-05-03 ASSESSMENT — PATIENT HEALTH QUESTIONNAIRE - PHQ9
1. LITTLE INTEREST OR PLEASURE IN DOING THINGS: NOT AT ALL
2. FEELING DOWN, DEPRESSED OR HOPELESS: NOT AT ALL
SUM OF ALL RESPONSES TO PHQ9 QUESTIONS 1 AND 2: 0

## 2024-05-03 NOTE — PROGRESS NOTES
"Subjective   Chief complaint: eFliciano Worthington is a 74 y.o. male who presents for Med Management (Patient in office today for med management. Former [facility] pt. ).    HPI:  HPI  Chronic disease management.  Previous primary care physician notes are reviewed.  Active problems are reviewed.  Breathing is stable.  No worsening of cough sputum.  He does admit though that he has continues to smoke.  Is trying to quit.  He has history of coronary disease.  No anginal chest pain.  He does have a history of lung nodules on CT.  Low-dose screening CT was performed last April recommended 1 year surveillance.  He had a CT scan for pulmonary embolism CT angio which I reviewed in November.  Also recommended 6-month surveillance.  Order provided.  Medications reconciled.  For now we will continue medical therapy reinforced last modifications physical activity as tolerated healthy medication compliance encouraged.  Laboratory assessment is ordered.  Will plan on a 3-month follow-up with repeat labs prior to that as well.  Adjust medical therapy as needed in the meantime.  Follow-up sooner if necessary.  Follow-up results of CT scan when available.  Objective   /84 (BP Location: Left arm, Patient Position: Sitting, BP Cuff Size: Adult)   Pulse 79   Temp 36.6 °C (97.8 °F) (Temporal)   Ht 1.778 m (5' 10\")   Wt 92.6 kg (204 lb 2 oz)   SpO2 98%   BMI 29.29 kg/m²   Physical Exam  Vitals and nursing note reviewed.   Constitutional:       Appearance: Normal appearance.   HENT:      Head: Normocephalic and atraumatic.   Eyes:      Extraocular Movements: Extraocular movements intact.      Conjunctiva/sclera: Conjunctivae normal.      Pupils: Pupils are equal, round, and reactive to light.   Cardiovascular:      Rate and Rhythm: Normal rate and regular rhythm.      Heart sounds: Normal heart sounds.   Pulmonary:      Effort: Pulmonary effort is normal.      Breath sounds: Normal breath sounds.   Abdominal:      General: Abdomen is " flat. Bowel sounds are normal.      Palpations: Abdomen is soft.   Musculoskeletal:         General: Normal range of motion.   Skin:     General: Skin is warm and dry.   Neurological:      General: No focal deficit present.      Mental Status: He is alert and oriented to person, place, and time. Mental status is at baseline.   Psychiatric:         Mood and Affect: Mood normal.         Behavior: Behavior normal.       Review of Systems   I have reviewed and reconciled the medication list with the patient today.   Current Outpatient Medications:     acetaminophen (Tylenol 8 HOUR) 650 mg ER tablet, Take 1 tablet (650 mg) by mouth every 8 hours if needed for mild pain (1 - 3). Do not crush, chew, or split., Disp: , Rfl:     alcohol swabs (BD Alcohol Swabs) pads, medicated, Use twice daily while checking sugar., Disp: 200 each, Rfl: 1    amLODIPine (Norvasc) 5 mg tablet, Take 1 tablet (5 mg) by mouth once daily., Disp: 90 tablet, Rfl: 2    aspirin 81 mg EC tablet, Take 1 tablet (81 mg) by mouth once daily., Disp: , Rfl:     blood glucose control high,low (Accu-Chek Guide L1-L2 Ctrl Sol) solution, 1 bottle once daily as needed (use when need to un controls on glucose meter)., Disp: 1 each, Rfl: 1    blood sugar diagnostic (Accu-Chek Guide test strips) strip, Test twice daily, Disp: 200 strip, Rfl: 0    blood-glucose meter (Accu-Chek Guide Glucose Meter) misc, Test twice daily, Disp: 1 each, Rfl: 0    chlorthalidone (Hygroton) 25 mg tablet, Take 1 tablet (25 mg) by mouth every other day. Every  other day, Disp: 45 tablet, Rfl: 2    ezetimibe (Zetia) 10 mg tablet, Take 1 tablet (10 mg) by mouth once daily., Disp: 90 tablet, Rfl: 2    hydrOXYzine HCL (Atarax) 25 mg tablet, Take 1 tablet (25 mg) by mouth 2 times a day., Disp: , Rfl:     insulin glargine (Lantus U-100 Insulin) 100 unit/mL injection, Inject 32 Units under the skin once daily in the morning. Take as directed per insulin instructions. Along with the 35 units in  the PM, Disp: , Rfl:     ipratropium (Atrovent) 21 mcg (0.03 %) nasal spray, Administer 2 sprays into each nostril 2 times a day. (Patient taking differently: Administer 2 sprays into each nostril once daily.), Disp: 40 mL, Rfl: 1    lancets (Accu-Chek Softclix Lancets) misc, Test twice daily, Disp: 200 each, Rfl: 0    lovastatin (Mevacor) 40 mg tablet, Take 1 tablet (40 mg) by mouth once daily at bedtime., Disp: , Rfl:     metFORMIN, OSM, (Fortamet) 500 mg 24 hr tablet, Take 2 tablets (1,000 mg) by mouth 2 times a day with meals. Do not crush, chew, or split., Disp: , Rfl:     omeprazole (PriLOSEC) 40 mg DR capsule, Take 1 capsule (40 mg) by mouth once daily in the morning. Take before meals. Do not crush or chew., Disp: , Rfl:     ramipril (Altace) 5 mg capsule, Take 1 capsule (5 mg) by mouth 2 times a day., Disp: 180 capsule, Rfl: 2    venlafaxine XR (Effexor-XR) 150 mg 24 hr capsule, Take 1 capsule (150 mg) by mouth once daily. Do not crush or chew., Disp: , Rfl:      Imaging:  No results found.     Labs reviewed:    Lab Results   Component Value Date    WBC 13.2 (H) 10/17/2023    HGB 14.8 10/17/2023    HCT 41.7 10/17/2023     10/17/2023    CHOL 166 08/10/2023    TRIG 332 (H) 08/10/2023    HDL 26.9 (A) 08/10/2023    ALT 23 10/17/2023    AST 18 10/17/2023     (L) 10/17/2023    K 3.8 10/17/2023    CL 91 (L) 10/17/2023    CREATININE 1.16 10/17/2023    BUN 29 (H) 10/17/2023    CO2 25 10/17/2023    TSH 2.38 08/10/2023    HGBA1C 8.8 (A) 08/10/2023       Assessment/Plan   Problem List Items Addressed This Visit             ICD-10-CM    Primary hypertension I10    Relevant Orders    Comprehensive metabolic panel    Chronic obstructive pulmonary disease, unspecified (Multi) J44.9    Current every day smoker F17.200    GERD (gastroesophageal reflux disease) K21.9    Hyperlipidemia associated with type 2 diabetes mellitus (Multi) E11.69, E78.5    Relevant Orders    Comprehensive metabolic panel    Lipid panel     Depression, major, 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    Multiple lung nodules on CT R91.8    Relevant Orders    CT lung screening low dose    Type 2 diabetes mellitus without complication, without long-term current use of insulin (Multi) - Primary E11.9    Relevant Orders    Comprehensive metabolic panel    Lipid panel    Hemoglobin A1c    Albumin, urine, random    Comprehensive metabolic panel    Lipid panel    Hemoglobin A1c    Albumin, urine, random    CAD (coronary artery disease) I25.10    Relevant Orders    Comprehensive metabolic panel     Other Visit Diagnoses         Codes    Nicotine dependence, cigarettes, uncomplicated     F17.210    Relevant Orders    CT lung screening low dose            Reinforced lifestyle modifications  Continue current medications as listed  Physical activity as tolerated and healthy diet encouraged  Maintain a healthy weight  Follow up in   3MO

## 2024-05-07 ENCOUNTER — OFFICE VISIT (OUTPATIENT)
Dept: CARDIOLOGY | Facility: CLINIC | Age: 74
End: 2024-05-07
Payer: MEDICARE

## 2024-05-07 VITALS
SYSTOLIC BLOOD PRESSURE: 112 MMHG | HEIGHT: 70 IN | BODY MASS INDEX: 29.2 KG/M2 | HEART RATE: 70 BPM | DIASTOLIC BLOOD PRESSURE: 70 MMHG | WEIGHT: 204 LBS

## 2024-05-07 DIAGNOSIS — I10 PRIMARY HYPERTENSION: ICD-10-CM

## 2024-05-07 DIAGNOSIS — E78.2 MIXED HYPERLIPIDEMIA: ICD-10-CM

## 2024-05-07 DIAGNOSIS — E87.1 HYPONATREMIA: ICD-10-CM

## 2024-05-07 DIAGNOSIS — Z79.899 MEDICATION DOSE CHANGED: ICD-10-CM

## 2024-05-07 DIAGNOSIS — I25.10 CORONARY ARTERY DISEASE INVOLVING NATIVE CORONARY ARTERY OF NATIVE HEART WITHOUT ANGINA PECTORIS: ICD-10-CM

## 2024-05-07 PROBLEM — E78.5 HYPERLIPIDEMIA ASSOCIATED WITH TYPE 2 DIABETES MELLITUS (MULTI): Status: RESOLVED | Noted: 2023-10-09 | Resolved: 2024-05-07

## 2024-05-07 PROBLEM — E10.51: Status: RESOLVED | Noted: 2023-10-13 | Resolved: 2024-05-07

## 2024-05-07 PROBLEM — I82.621 ACUTE DEEP VEIN THROMBOSIS (DVT) OF RIGHT UPPER EXTREMITY (MULTI): Status: RESOLVED | Noted: 2023-12-20 | Resolved: 2024-05-07

## 2024-05-07 PROBLEM — E87.0 HYPERNATREMIA: Status: RESOLVED | Noted: 2023-11-07 | Resolved: 2024-05-07

## 2024-05-07 PROBLEM — I82.621 ACUTE DEEP VEIN THROMBOSIS (DVT) OF BRACHIAL VEIN OF RIGHT UPPER EXTREMITY (MULTI): Status: RESOLVED | Noted: 2023-11-07 | Resolved: 2024-05-07

## 2024-05-07 PROBLEM — E11.69 HYPERLIPIDEMIA ASSOCIATED WITH TYPE 2 DIABETES MELLITUS (MULTI): Status: RESOLVED | Noted: 2023-10-09 | Resolved: 2024-05-07

## 2024-05-07 PROBLEM — I82.621: Status: RESOLVED | Noted: 2023-10-31 | Resolved: 2024-05-07

## 2024-05-07 PROBLEM — E11.9 TYPE 2 DIABETES MELLITUS WITHOUT COMPLICATION, WITHOUT LONG-TERM CURRENT USE OF INSULIN (MULTI): Status: RESOLVED | Noted: 2023-10-13 | Resolved: 2024-05-07

## 2024-05-07 PROCEDURE — 3008F BODY MASS INDEX DOCD: CPT | Performed by: INTERNAL MEDICINE

## 2024-05-07 PROCEDURE — 99214 OFFICE O/P EST MOD 30 MIN: CPT | Performed by: INTERNAL MEDICINE

## 2024-05-07 PROCEDURE — 3074F SYST BP LT 130 MM HG: CPT | Performed by: INTERNAL MEDICINE

## 2024-05-07 PROCEDURE — 1159F MED LIST DOCD IN RCRD: CPT | Performed by: INTERNAL MEDICINE

## 2024-05-07 PROCEDURE — 4010F ACE/ARB THERAPY RXD/TAKEN: CPT | Performed by: INTERNAL MEDICINE

## 2024-05-07 PROCEDURE — 3078F DIAST BP <80 MM HG: CPT | Performed by: INTERNAL MEDICINE

## 2024-05-07 RX ORDER — CHLORTHALIDONE 25 MG/1
25 TABLET ORAL DAILY
Qty: 24 TABLET | Refills: 1 | Status: SHIPPED | OUTPATIENT
Start: 2024-05-07 | End: 2024-05-07 | Stop reason: ALTCHOICE

## 2024-05-07 NOTE — PATIENT INSTRUCTIONS
Decrease Chlorthalidone to 1 tablet on Monday and Thursday's only    Non-fasting lab to be done 4 weeks after decreasing Chlorthalidone dose.     -Please bring all medicines, vitamins, and herbal supplements with you in original bottles to every appointment!!!!    -Prescriptions will not be filled unless you are compliant with your follow up appointments or have a follow up appointment scheduled as per instruction of your physician. Refills should be requested at the time of your visit.

## 2024-05-07 NOTE — PROGRESS NOTES
Patient:  Feliciano Worthington  YOB: 1950  MRN: [id number]       Impression/Plan:     Diagnoses and all orders for this visit:  Coronary artery disease involving native coronary artery of native heart without angina pectoris  -    High functional capacity no angina perfusion study just over a year ago unremarkable continue current medical regimen    Primary hypertension  Hyponatremia  -    He has had hyponatremia off and on usually associated with medication chlorthalidone was well-tolerated but I believe contributing to hyponatremia and as nearly 130 will reduce chlorthalidone to just take twice a week Monday and Thursday.  -     chlorthalidone (Hygroton) 25 mg tablet; Take 1 tablet (25 mg) by mouth once daily.      Mixed hyperlipidemia  -    Suboptimal control with lovastatin and Zetia but he says his diet has been very poor of late and he intends to correct it.  When he does watch his diet cholesterol clearly is quite well-controlled on this combination.    Medication dose changed  -     Basic Metabolic Panel; Future      Chief Complaint/Active Symptoms:       Feliciano Worthington is a 74 y.o. male who presents with coronary disease having had circumflex stent 2007 and RCA stent 2011, hyperlipidemia, diabetes and hypertension. In December 2022 myocardial perfusion imaging showed no ischemia with preserved LVEF.  Carotid duplws 10/2023  <50% bilateral stenosis        Last here 11/7/2023 some shortness of breath felt related to his COPD and tobacco abuse.  Had some hyponatremia when was hospitalized with pneumonia.  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  Does have history of Mobitz type I second-degree block.  No symptoms at that time            3/29/2024 Lustral 168 triglycerides 226 4 HDL 27 LDL 96 hemoglobin 12.6 sodium 132 potassium 4 BUN of 18 creatinine 0.87 liver function studies normal thyroid normal    He denies angina, dyspnea,  palpitation, edema, lightheadedness or syncope.  He has had no symptoms of claudication or neurologic deterioration.  There have been no hospitalizations or emergency room visits since last office visit.    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  He is doing less as his arthritis is troublesome to him.  He is had no cardiovascular symptoms.  Continues to smoke cigarettes does not think he will quit he has not increased his intake.  As can be seen blood pressure is relatively on the low side and recently with hyponatremia on laboratory testing.    He has prior upper extremity thrombosis related to IV during time of pneumonia has resolved.    Review of Systems: Unremarkable except as noted above    Meds     Current Outpatient Medications   Medication Instructions    ACETAMINOPHEN ORAL 650 mg, oral, Every 8 hours PRN, Do not crush, chew, or split.    alcohol swabs (BD Alcohol Swabs) pads, medicated Use twice daily while checking sugar.    amLODIPine (NORVASC) 5 mg, oral, Daily    aspirin 81 mg, oral, Daily    blood glucose control high,low (Accu-Chek Guide L1-L2 Ctrl Sol) solution 1 bottle, miscellaneous, Daily PRN    blood sugar diagnostic (Accu-Chek Guide test strips) strip Test twice daily    blood-glucose meter (Accu-Chek Guide Glucose Meter) misc Test twice daily    ezetimibe (ZETIA) 10 mg, oral, Daily    hydrOXYzine HCL (ATARAX) 25 mg, oral, 2 times daily    insulin glargine (LANTUS U-100 INSULIN) 32 Units, subcutaneous, Every morning, Take as directed per insulin instructions. Along with the 35 units in the PM    lancets (Accu-Chek Softclix Lancets) misc Test twice daily    lovastatin (MEVACOR) 40 mg, oral, Nightly    metFORMIN (OSM) (FORTAMET) 1,000 mg, oral, 2 times daily with meals, Do not crush, chew, or split.    omeprazole (PRILOSEC) 40 mg, oral, Daily before breakfast, Do not crush or chew.    ramipril (ALTACE) 5 mg, oral, 2 times daily    venlafaxine XR  (EFFEXOR-XR) 150 mg, oral, Daily, Do not crush or chew.        Allergies     Allergies   Allergen Reactions    Tetracyclines Hives, Other, Unknown, Rash and Shortness of breath     Water blisters in mouth and genital region.    Water blister on tongue and penis    Atorvastatin Unknown     Cramping all over the body. Sore shoulders    Empagliflozin Unknown    Pravastatin Unknown    Rosuvastatin Unknown    Simvastatin Unknown    Statins-Hmg-Coa Reductase Inhibitors Unknown         Annotated Problems     Specialty Problems          Cardiology Problems    Smoker    Current every day smoker    Primary hypertension    Bruit of right carotid artery     OCT 2023  Duplex <50% bilaterally         Chronic venous stasis dermatitis    Mixed hyperlipidemia    CAD (coronary artery disease)      · 12/27/2022 Lexiscan perfusion: Normal perfusion EF 63%  2007 CX stent   7/21/11 PCI mid-distal RCA w/TREVOR         Mobitz type 1 second degree AV block        Problem List     Patient Active Problem List    Diagnosis Date Noted    Hyponatremia 05/07/2024    Type 2 diabetes mellitus with hyperglycemia, with long-term current use of insulin (Multi) 12/20/2023    CAD (coronary artery disease) 11/08/2023    Mobitz type 1 second degree AV block 11/08/2023    Mixed hyperlipidemia 11/07/2023    Encounter for immunization 10/31/2023    Arcus senilis, bilateral 10/13/2023    Astigmatism of both eyes 10/13/2023    Cataract, bilateral 10/13/2023    Chronic venous stasis dermatitis 10/13/2023    Depression, major, in remission (CMS-HCC) 10/13/2023    Hiatal hernia with GERD 10/13/2023    Multiple lung nodules on CT 10/13/2023    Nuclear sclerotic cataract of both eyes 10/13/2023    Osteoarthritis of hips, bilateral 10/13/2023    Other spondylosis, cervical region 10/13/2023    Periumbilical hernia 10/13/2023    Testosterone deficiency 10/13/2023    Sleep apnea 10/13/2023    Noncompliance with dietary restriction 10/13/2023    Arthritis of left  "acromioclavicular joint 10/13/2023    Bruit of right carotid artery 10/13/2023    Degenerative joint disease (DJD) of hip 10/13/2023    Spinal stenosis of cervical region 10/13/2023    BMI 29.0-29.9,adult 10/13/2023    Primary hypertension 10/09/2023    Current every day smoker 10/09/2023    GERD (gastroesophageal reflux disease) 10/09/2023    Smoker 01/19/2021    Vitamin D deficiency 03/05/2019    Erectile dysfunction 06/01/2018    Chronic obstructive pulmonary disease, unspecified (Multi) 03/21/2018       Objective:     Vitals:    05/07/24 1016   BP: 112/70   BP Location: Right arm   Patient Position: Sitting   Pulse: 70   Weight: 92.5 kg (204 lb)   Height: 1.778 m (5' 10\")      Wt Readings from Last 4 Encounters:   05/07/24 92.5 kg (204 lb)   05/03/24 92.6 kg (204 lb 2 oz)   12/28/23 94.8 kg (209 lb)   12/20/23 94.8 kg (209 lb)           LAB:     Lab Results   Component Value Date    WBC 13.2 (H) 10/17/2023    HGB 14.8 10/17/2023    HCT 41.7 10/17/2023     10/17/2023    CHOL 166 08/10/2023    TRIG 332 (H) 08/10/2023    HDL 26.9 (A) 08/10/2023    ALT 23 10/17/2023    AST 18 10/17/2023     (L) 10/17/2023    K 3.8 10/17/2023    CL 91 (L) 10/17/2023    CREATININE 1.16 10/17/2023    BUN 29 (H) 10/17/2023    CO2 25 10/17/2023    TSH 2.38 08/10/2023    HGBA1C 8.8 (A) 08/10/2023       Diagnostic Studies:     Vascular US Upper Extremity Venous Duplex Right    Result Date: 12/20/2023  Interpreted By:  Tobin Machado, STUDY: [facility] US UPPER EXTREMITY VENOUS DUPLEX RIGHT;  12/20/2023 7:03 pm   INDICATION: Signs/Symptoms:right upper extremity DVT.   COMPARISON: 10/24/2023   ACCESSION NUMBER(S): [id number]   ORDERING CLINICIAN: THEA TRIVEDI   TECHNIQUE: Vascular ultrasound of the  right upper extremity was performed. Evaluation was performed with grayscale, color, and spectral Doppler. When possible, compression views of the evaluated veins was also performed.   FINDINGS: Evaluation of the visualized " portions of the  right internal jugular, innominate, subclavian, axillary, and brachial cephalic, and basilic veins was performed.   The right internal jugular vein, subclavian vein, axillary, brachial and cephalic vein are patent. There is persistent thrombus in the distal right basilic vein.       Interval resolution of previously seen thrombus in the right brachial vein with persistent thrombus in the distal right basilic vein.   MACRO: None   Signed by: Tobin Machado 12/20/2023 7:39 PM Dictation workstation:   [id number]        Radiology:     No orders to display       Physical Exam     General Appearance: alert and oriented to person, place and time, in no acute distress  Cardiovascular: normal rate, regular rhythm, normal S1 and S2, no murmurs, rubs, clicks, or gallops,  no JVD  Pulmonary/Chest: clear to auscultation bilaterally- no wheezes, rales or rhonchi, normal air movement, no respiratory distress  Abdomen: soft, non-tender, non-distended, normal bowel sounds, no masses   Extremities: no cyanosis, clubbing or edema osteoarthritic changes in hands  Skin: warm and dry, no rash or erythema  Eyes: EOMI  Neck: supple and non-tender without mass, no thyromegaly   Neurological: alert, oriented, normal speech, no focal findings or movement disorder noted  Vascular: Pulses 2+              Scribe Attestation  By signing my name below, I, Roberto Haskins MD , Scribe   attest that this documentation has been prepared under the direction and in the presence of Roberto Haskins MD.

## 2024-05-08 DIAGNOSIS — I10 PRIMARY HYPERTENSION: ICD-10-CM

## 2024-05-08 RX ORDER — CHLORTHALIDONE 25 MG/1
TABLET ORAL
Qty: 30 TABLET | Refills: 3 | Status: SHIPPED | OUTPATIENT
Start: 2024-05-08

## 2024-05-08 NOTE — TELEPHONE ENCOUNTER
----- Message from Roberto Haskins MD sent at 5/7/2024 10:12 PM EDT -----  On Mr. Gilliam I had said we were going to reduce the chlorthalidone to Monday and Thursday only.  Unfortunately I see what I signed says chlorthalidone is to be daily that is clearly not the case I had asked it to be decreased to Monday and Thursday only.  Please correct the med list to be sure Mr. Cadena knows its to be just Monday and Thursday.  BMP still to be done in 4 weeks.

## 2024-05-13 ENCOUNTER — OFFICE VISIT (OUTPATIENT)
Dept: FAMILY MEDICINE CLINIC | Age: 74
End: 2024-05-13

## 2024-05-13 VITALS
OXYGEN SATURATION: 99 % | SYSTOLIC BLOOD PRESSURE: 132 MMHG | BODY MASS INDEX: 30.05 KG/M2 | WEIGHT: 209.4 LBS | TEMPERATURE: 97.1 F | DIASTOLIC BLOOD PRESSURE: 76 MMHG | HEART RATE: 74 BPM | RESPIRATION RATE: 16 BRPM

## 2024-05-13 DIAGNOSIS — S61.012A LACERATION OF LEFT THUMB WITHOUT FOREIGN BODY WITHOUT DAMAGE TO NAIL, INITIAL ENCOUNTER: Primary | ICD-10-CM

## 2024-05-13 ASSESSMENT — ENCOUNTER SYMPTOMS: RESPIRATORY NEGATIVE: 1

## 2024-05-13 NOTE — PROGRESS NOTES
2024    Moshe Caba (:  1950) is a 74 y.o. male, Established patient, here for evaluation of the following chief complaint(s):  OTHER (Cut left thumb yesterday on  garden hose. )      Vitals:    24 1556   BP: 132/76   Pulse: 74   Resp: 16   Temp: 97.1 °F (36.2 °C)   SpO2: 99%     Laceration Repair Procedure Note    Indication: Laceration    Procedure: The patient was placed in the appropriate position and anesthesia around the laceration was obtained by infiltration using 1% Lidocaine without epinephrine. The area was then cleansed with betadine and draped in a sterile fashion, irrigated with normal saline, and explored with no foreign bodies discovered and no tendon injury noted. The laceration was closed with 5-0 Ethilon using interrupted sutures. There were no additional lacerations requiring repair. The wound area was then dressed with bacitracin, gauze, and Coban.      Total repaired wound length: 1 cm.     Other Items: Suture count: 3    The patient tolerated the procedure well.    Complications: None    SUBJECTIVE/OBJECTIVE:    Pt states he was using a sharp knife to cut a hose and the knife slipped and cut his left thumb. It happened yesterday afternoon. He states he got the bleeding to mostly stop but sometimes if he moves his thumb it bleeds again. He is going out of town tomorrow morning and wants to make sure it is going to be ok.     Laceration   The incident occurred 12 to 24 hours ago. The laceration is located on the Left hand. The laceration is 1 cm in size. The laceration mechanism was a dirty knife. The patient is experiencing no pain. He reports no foreign bodies present. His tetanus status is UTD.       Review of Systems   Constitutional: Negative.    Respiratory: Negative.     Cardiovascular: Negative.    Musculoskeletal: Negative.    Skin:  Positive for wound.   Neurological: Negative.    Psychiatric/Behavioral: Negative.     All other systems reviewed and are

## 2024-05-26 NOTE — TELEPHONE ENCOUNTER
Comments:     Last Office Visit (last PCP visit):   3/29/2024    Next Visit Date:  No future appointments.      Rx requested:  Requested Prescriptions     Pending Prescriptions Disp Refills    venlafaxine (EFFEXOR XR) 150 MG extended release capsule [Pharmacy Med Name: Venlafaxine HCl  MG Oral Capsule Extended Release 24 Hour] 30 capsule 0     Sig: Take 1 capsule by mouth once daily

## 2024-05-28 ENCOUNTER — HOSPITAL ENCOUNTER (OUTPATIENT)
Dept: RADIOLOGY | Facility: CLINIC | Age: 74
Discharge: HOME | End: 2024-05-28
Payer: MEDICARE

## 2024-05-28 DIAGNOSIS — F17.210 NICOTINE DEPENDENCE, CIGARETTES, UNCOMPLICATED: ICD-10-CM

## 2024-05-28 DIAGNOSIS — R91.8 MULTIPLE LUNG NODULES ON CT: ICD-10-CM

## 2024-05-28 PROCEDURE — 71271 CT THORAX LUNG CANCER SCR C-: CPT

## 2024-05-28 RX ORDER — VENLAFAXINE HYDROCHLORIDE 150 MG/1
150 CAPSULE, EXTENDED RELEASE ORAL DAILY
Qty: 30 CAPSULE | Refills: 0 | Status: SHIPPED | OUTPATIENT
Start: 2024-05-28

## 2024-07-29 RX ORDER — VENLAFAXINE HYDROCHLORIDE 150 MG/1
150 CAPSULE, EXTENDED RELEASE ORAL DAILY
Qty: 30 CAPSULE | Refills: 0 | Status: SHIPPED | OUTPATIENT
Start: 2024-07-29

## 2024-07-29 NOTE — TELEPHONE ENCOUNTER
Comments:     Last Office Visit (last PCP visit):   3/29/2024    Next Visit Date:  No future appointments.    **If hasn't been seen in over a year OR hasn't followed up according to last diabetes/ADHD visit, make appointment for patient before sending refill to provider.    Rx requested:  Requested Prescriptions     Pending Prescriptions Disp Refills    venlafaxine (EFFEXOR XR) 150 MG extended release capsule [Pharmacy Med Name: Venlafaxine HCl  MG Oral Capsule Extended Release 24 Hour] 30 capsule 0     Sig: Take 1 capsule by mouth once daily

## 2024-08-21 ENCOUNTER — APPOINTMENT (OUTPATIENT)
Dept: PRIMARY CARE | Facility: CLINIC | Age: 74
End: 2024-08-21
Payer: MEDICARE

## 2024-09-03 ENCOUNTER — APPOINTMENT (OUTPATIENT)
Dept: PRIMARY CARE | Facility: CLINIC | Age: 74
End: 2024-09-03
Payer: MEDICARE

## 2024-09-03 VITALS
DIASTOLIC BLOOD PRESSURE: 94 MMHG | OXYGEN SATURATION: 98 % | HEIGHT: 70 IN | SYSTOLIC BLOOD PRESSURE: 130 MMHG | HEART RATE: 77 BPM | BODY MASS INDEX: 27.75 KG/M2 | WEIGHT: 193.8 LBS | TEMPERATURE: 97.8 F

## 2024-09-03 DIAGNOSIS — E11.65 TYPE 2 DIABETES MELLITUS WITH HYPERGLYCEMIA, WITH LONG-TERM CURRENT USE OF INSULIN (MULTI): Primary | ICD-10-CM

## 2024-09-03 DIAGNOSIS — Z13.89 SCREENING FOR BLOOD OR PROTEIN IN URINE: ICD-10-CM

## 2024-09-03 DIAGNOSIS — E78.2 MIXED HYPERLIPIDEMIA: ICD-10-CM

## 2024-09-03 DIAGNOSIS — E58 INADEQUATE INTAKE OF CALCIUM AND VITAMIN D: ICD-10-CM

## 2024-09-03 DIAGNOSIS — Z12.5 SCREENING FOR PROSTATE CANCER: ICD-10-CM

## 2024-09-03 DIAGNOSIS — E55.9 INADEQUATE INTAKE OF CALCIUM AND VITAMIN D: ICD-10-CM

## 2024-09-03 DIAGNOSIS — I25.10 CORONARY ARTERY DISEASE, UNSPECIFIED VESSEL OR LESION TYPE, UNSPECIFIED WHETHER ANGINA PRESENT, UNSPECIFIED WHETHER NATIVE OR TRANSPLANTED HEART: ICD-10-CM

## 2024-09-03 DIAGNOSIS — K21.9 GASTROESOPHAGEAL REFLUX DISEASE WITHOUT ESOPHAGITIS: ICD-10-CM

## 2024-09-03 DIAGNOSIS — Z13.29 SCREENING FOR THYROID DISORDER: ICD-10-CM

## 2024-09-03 DIAGNOSIS — Z79.4 TYPE 2 DIABETES MELLITUS WITH HYPERGLYCEMIA, WITH LONG-TERM CURRENT USE OF INSULIN (MULTI): Primary | ICD-10-CM

## 2024-09-03 DIAGNOSIS — F17.200 CURRENT EVERY DAY SMOKER: ICD-10-CM

## 2024-09-03 LAB
ESTIMATED AVERAGE GLUCOSE: NORMAL
HBA1C MFR BLD: 11.3 %
POC HEMOGLOBIN A1C: 11.3 % (ref 4.2–6.5)

## 2024-09-03 PROCEDURE — 1159F MED LIST DOCD IN RCRD: CPT | Performed by: INTERNAL MEDICINE

## 2024-09-03 PROCEDURE — 3008F BODY MASS INDEX DOCD: CPT | Performed by: INTERNAL MEDICINE

## 2024-09-03 PROCEDURE — 83036 HEMOGLOBIN GLYCOSYLATED A1C: CPT | Performed by: INTERNAL MEDICINE

## 2024-09-03 PROCEDURE — 99214 OFFICE O/P EST MOD 30 MIN: CPT | Performed by: INTERNAL MEDICINE

## 2024-09-03 PROCEDURE — 3080F DIAST BP >= 90 MM HG: CPT | Performed by: INTERNAL MEDICINE

## 2024-09-03 PROCEDURE — 4010F ACE/ARB THERAPY RXD/TAKEN: CPT | Performed by: INTERNAL MEDICINE

## 2024-09-03 PROCEDURE — 3075F SYST BP GE 130 - 139MM HG: CPT | Performed by: INTERNAL MEDICINE

## 2024-09-03 PROCEDURE — 99406 BEHAV CHNG SMOKING 3-10 MIN: CPT | Performed by: INTERNAL MEDICINE

## 2024-09-03 ASSESSMENT — ENCOUNTER SYMPTOMS
DYSURIA: 0
ABDOMINAL PAIN: 0
COUGH: 0
MYALGIAS: 0
SORE THROAT: 0
LIGHT-HEADEDNESS: 0
ACTIVITY CHANGE: 0
DEPRESSION: 0

## 2024-09-03 NOTE — PROGRESS NOTES
"Feliciano Worthington, pleasant 74 y.o. male was seen today:   Chief Complaint   Patient presents with    Nausea     Patient is here today experiencing nausea. Patient states is been going on for \"many months\"     Shoulder Pain     Patient is here today experiencing shoulder pain. Patient states its mainly the right shoulder that hurts, states that it constantly hurts and that the only time  it doesn't is when they take their arthritis medication.       VISIT JAREN:  Feliciano is here to discuss about weight loss.  He is having unintentional weight loss.  He and course me that he is appetite is better.  He is eating drinking well.  However he is not very compliant with his insulin dosage.  He has a uncontrolled diabetes.  Today in the office we checked his A1c and it is 11.3%.  Patient also complained about neck nerve impingement which is giving him shoulder pain.  He will be seeing a neurologist in October regarding that.  Patient did not have his and blood work.  He denies any black tarry stool.  We checked his weight since last year he lost about 5 pounds.  He is a heavy smoker.  His lung has crackles and coarse breath sounds.  We discussed about smoking cessation.  Chronic hypoxia as well as hyperglycemia might be a cause of his tissue loss and weight loss.  I do not see any malignant conditions at this time.  However would like to do annual blood work for him and go from there.  Patient agreed with the plan.      MEDICATIONS:   Current Outpatient Medications   Medication Instructions    ACETAMINOPHEN ORAL 650 mg, oral, Every 8 hours PRN, Do not crush, chew, or split.    alcohol swabs (BD Alcohol Swabs) pads, medicated Use twice daily while checking sugar.    amLODIPine (NORVASC) 5 mg, oral, Daily    aspirin 81 mg, oral, Daily    blood glucose control high,low (Accu-Chek Guide L1-L2 Ctrl Sol) solution 1 bottle, miscellaneous, Daily PRN    blood sugar diagnostic (Accu-Chek Guide test strips) strip Test twice daily    " blood-glucose meter (Accu-Chek Guide Glucose Meter) misc Test twice daily    chlorthalidone (Hygroton) 25 mg tablet TAKE 1 TABLET EVERY MONDAY AND THURSDAY    ezetimibe (ZETIA) 10 mg, oral, Daily    hydrOXYzine HCL (ATARAX) 25 mg, oral, 2 times daily    insulin glargine (LANTUS U-100 INSULIN) 32 Units, subcutaneous, Every morning, Take as directed per insulin instructions. Along with the 35 units in the PM    lancets (Accu-Chek Softclix Lancets) misc Test twice daily    lovastatin (MEVACOR) 40 mg, oral, Nightly    metFORMIN (OSM) (FORTAMET) 1,000 mg, oral, 2 times daily (morning and late afternoon), Do not crush, chew, or split.    nitroglycerin (NITROSTAT) 0.4 mg, sublingual, Every 5 min PRN, DO NOT EXCEED A TOTAL OF 3 DOSES IN 15 MINUTES    omeprazole (PRILOSEC) 40 mg, oral, Daily before breakfast, Do not crush or chew.    ramipril (ALTACE) 5 mg, oral, 2 times daily    venlafaxine XR (EFFEXOR-XR) 150 mg, oral, Daily, Do not crush or chew.         TODAY'S VISIT  DX:     1. Type 2 diabetes mellitus with hyperglycemia, with long-term current use of insulin (Multi)  Comprehensive Metabolic Panel    A1c today in the office is 11.3%.      2. Screening for thyroid disorder  Triiodothyronine, Free    TSH with reflex to Free T4 if abnormal      3. Inadequate intake of calcium and vitamin D  Vitamin D 25-Hydroxy,Total (for eval of Vitamin D levels)      4. Screening for blood or protein in urine  Urinalysis with Reflex Microscopic      5. Screening for prostate cancer  Prostate Specific Antigen      6. Mixed hyperlipidemia  Lipid Panel      7. Gastroesophageal reflux disease without esophagitis  Patient to continue with omeprazole      8. Current every day smoker  Offered nicotine alternatives/Subtitues. No stop date yet. Will reinforce again in next visit.      9. Coronary artery disease, unspecified vessel or lesion type, unspecified whether angina present, unspecified whether native or transplanted heart  CBC and Auto  "Differential           MEDICAL DECISION MAKING:  - The current and active medical co morbidities have been considered.   - Recent lab work and relevant imaging studies have been reviewed.    - Relevant correspondence/notes from other specialty consultants were reviewed.    -Therapy and treatment as per above plan  - Next Follow up in 4 weeks with PCP    Review of Systems   Constitutional:  Negative for activity change.   HENT:  Negative for congestion and sore throat.    Respiratory:  Negative for cough.    Cardiovascular:  Negative for chest pain.   Gastrointestinal:  Negative for abdominal pain.   Endocrine: Negative for polyuria.   Genitourinary:  Negative for dysuria.   Musculoskeletal:  Negative for myalgias.   Skin:  Negative for rash.   Neurological:  Negative for light-headedness.   Psychiatric/Behavioral:  Negative for behavioral problems.      Visit Vitals  BP (!) 130/94 (BP Location: Left arm, Patient Position: Sitting, BP Cuff Size: Adult)   Pulse 77   Temp 36.6 °C (97.8 °F) (Temporal)   Ht 1.778 m (5' 10\")   Wt 87.9 kg (193 lb 12.8 oz)   SpO2 98% Comment: RA   BMI 27.81 kg/m²   Smoking Status Every Day   BSA 2.08 m²         Wt Readings from Last 10 Encounters:   09/03/24 87.9 kg (193 lb 12.8 oz)   05/07/24 92.5 kg (204 lb)   05/03/24 92.6 kg (204 lb 2 oz)   12/28/23 94.8 kg (209 lb)   12/20/23 94.8 kg (209 lb)   11/20/23 95.6 kg (210 lb 12.8 oz)   11/07/23 94.8 kg (209 lb)   10/31/23 98 kg (216 lb)   10/24/23 95.3 kg (210 lb 3.2 oz)   10/17/23 90.3 kg (199 lb)     Physical Exam  Vitals and nursing note reviewed.   Constitutional:       Appearance: Normal appearance.   HENT:      Head: Normocephalic.      Right Ear: Tympanic membrane normal.      Left Ear: Tympanic membrane normal.      Nose: Nose normal.      Mouth/Throat:      Mouth: Mucous membranes are moist.   Cardiovascular:      Rate and Rhythm: Normal rate and regular rhythm.      Pulses: Normal pulses.      Heart sounds: No murmur " "heard.  Pulmonary:      Effort: No respiratory distress.      Breath sounds: Normal breath sounds.   Abdominal:      Palpations: Abdomen is soft.   Musculoskeletal:      Cervical back: Neck supple.      Right lower leg: No edema.      Left lower leg: No edema.   Skin:     General: Skin is warm.      Findings: No rash.   Neurological:      General: No focal deficit present.      Mental Status: He is alert and oriented to person, place, and time.   Psychiatric:         Mood and Affect: Mood normal.        RECENT LABS:  Lab Results   Component Value Date    WBC 13.2 (H) 10/17/2023    HGB 14.8 10/17/2023    HCT 41.7 10/17/2023     10/17/2023    CHOL 166 08/10/2023    TRIG 332 (H) 08/10/2023    HDL 26.9 (A) 08/10/2023    ALT 23 10/17/2023    AST 18 10/17/2023     (L) 10/17/2023    K 3.8 10/17/2023    CL 91 (L) 10/17/2023    CREATININE 1.16 10/17/2023    BUN 29 (H) 10/17/2023    CO2 25 10/17/2023    TSH 2.38 08/10/2023    HGBA1C 11.3 (A) 09/03/2024     Lab Results   Component Value Date    GLUCOSE 281 (H) 10/17/2023    CALCIUM 9.2 10/17/2023     (L) 10/17/2023    K 3.8 10/17/2023    CO2 25 10/17/2023    CL 91 (L) 10/17/2023    BUN 29 (H) 10/17/2023    CREATININE 1.16 10/17/2023      No results found for: \"LDLCALC\"  Lab Results   Component Value Date    HGBA1C 11.3 (A) 09/03/2024    HGBA1C 8.8 (A) 08/10/2023    HGBA1C 10.2 (A) 01/27/2023     Lab Results   Component Value Date    CREATININE 1.16 10/17/2023       IMAGING:  === 10/17/23 ===  XR CHEST 1 VIEW  No evidence for acute cardiopulmonary process.      === 05/28/24 ===  CT LUNG SCREENING LOW DOSE  1.  Few small bilateral noncalcified pulmonary nodules measuring up  to 5 mm, likely benign. Continued screening with low-dose noncontrast  chest CT in 12 months (from current date) is recommended.  2. Severe coronary artery calcifications and correlate with coronary  artery disease risk factors.  3. Small hiatal hernia with distal esophageal thickening and " retained  fluid. Correlate any concern for reflux or dysmotility.  4. Mild thoracic aortic dilatation. Aortic surveillance can be  performed low-dose chest CT.

## 2024-09-09 ENCOUNTER — APPOINTMENT (OUTPATIENT)
Dept: CARDIOLOGY | Facility: CLINIC | Age: 74
End: 2024-09-09
Payer: MEDICARE

## 2024-09-23 ENCOUNTER — APPOINTMENT (OUTPATIENT)
Dept: PRIMARY CARE | Facility: CLINIC | Age: 74
End: 2024-09-23
Payer: MEDICARE

## 2024-09-25 ENCOUNTER — TELEPHONE (OUTPATIENT)
Dept: CARDIOLOGY | Facility: CLINIC | Age: 74
End: 2024-09-25
Payer: MEDICARE

## 2024-09-25 NOTE — TELEPHONE ENCOUNTER
Patient called office was notified regarding need for appointment.  Patient was transferred to scheduling.  Eda Giang CMA

## 2024-09-25 NOTE — TELEPHONE ENCOUNTER
Received rx request via fax from [facility] for Pravastatin 40 mg 1 tablet at bedtime.  Chart reviewed.  Patient missed his appointment 9/9/24 and needs to reschedule for refills.  Called patient left voice mail instructing patient to call the office.  Eda Giang, [facility]

## 2024-09-26 NOTE — TELEPHONE ENCOUNTER
Called patient in follow up as there is no pending appointment scheduled.  Left voice mail instructing patient to call the office.  Eda Giang CMA

## 2024-10-02 ENCOUNTER — TELEPHONE (OUTPATIENT)
Dept: PRIMARY CARE | Facility: CLINIC | Age: 74
End: 2024-10-02
Payer: MEDICARE

## 2024-10-02 NOTE — TELEPHONE ENCOUNTER
Patient called requesting to have all their medications refilled as they are out of their meds. I called patient and verified which medications are prescribed by their cardiologist. As for the medications that were previously prescribed by Dr. Lieberman. I advised that they make a pending OV appointment for medication refills and to see if their PCP will be okay with taking on the medications  previously prescribed. I transferred patient to the  to schedule pending OV.

## 2024-10-03 ENCOUNTER — TELEPHONE (OUTPATIENT)
Dept: CARDIOLOGY | Facility: CLINIC | Age: 74
End: 2024-10-03
Payer: MEDICARE

## 2024-10-03 DIAGNOSIS — K21.9 GASTROESOPHAGEAL REFLUX DISEASE WITHOUT ESOPHAGITIS: ICD-10-CM

## 2024-10-03 DIAGNOSIS — F32.5 DEPRESSION, MAJOR, IN REMISSION (CMS-HCC): ICD-10-CM

## 2024-10-03 DIAGNOSIS — Z79.4 TYPE 2 DIABETES MELLITUS WITH HYPERGLYCEMIA, WITH LONG-TERM CURRENT USE OF INSULIN: ICD-10-CM

## 2024-10-03 DIAGNOSIS — J44.9 CHRONIC OBSTRUCTIVE PULMONARY DISEASE, UNSPECIFIED COPD TYPE (MULTI): ICD-10-CM

## 2024-10-03 DIAGNOSIS — E11.65 TYPE 2 DIABETES MELLITUS WITH HYPERGLYCEMIA, WITH LONG-TERM CURRENT USE OF INSULIN: ICD-10-CM

## 2024-10-03 DIAGNOSIS — I25.10 CORONARY ARTERY DISEASE INVOLVING NATIVE CORONARY ARTERY OF NATIVE HEART WITHOUT ANGINA PECTORIS: ICD-10-CM

## 2024-10-03 DIAGNOSIS — I10 PRIMARY HYPERTENSION: ICD-10-CM

## 2024-10-03 DIAGNOSIS — E78.2 MIXED HYPERLIPIDEMIA: ICD-10-CM

## 2024-10-03 RX ORDER — IPRATROPIUM BROMIDE 21 UG/1
2 SPRAY, METERED NASAL EVERY 12 HOURS
Qty: 30 ML | Refills: 1 | Status: SHIPPED | OUTPATIENT
Start: 2024-10-03

## 2024-10-03 RX ORDER — OMEPRAZOLE 40 MG/1
40 CAPSULE, DELAYED RELEASE ORAL
Qty: 90 CAPSULE | Refills: 1 | Status: SHIPPED | OUTPATIENT
Start: 2024-10-03

## 2024-10-03 RX ORDER — METFORMIN HYDROCHLORIDE EXTENDED-RELEASE TABLETS 500 MG/1
1000 TABLET, FILM COATED, EXTENDED RELEASE ORAL
Qty: 180 TABLET | Refills: 1 | Status: SHIPPED | OUTPATIENT
Start: 2024-10-03 | End: 2024-10-03 | Stop reason: ALTCHOICE

## 2024-10-03 RX ORDER — AMLODIPINE BESYLATE 5 MG/1
5 TABLET ORAL DAILY
Qty: 14 TABLET | Refills: 0 | Status: SHIPPED | OUTPATIENT
Start: 2024-10-03

## 2024-10-03 RX ORDER — VENLAFAXINE HYDROCHLORIDE 150 MG/1
150 CAPSULE, EXTENDED RELEASE ORAL DAILY
Qty: 90 CAPSULE | Refills: 1 | Status: SHIPPED | OUTPATIENT
Start: 2024-10-03

## 2024-10-03 RX ORDER — IPRATROPIUM BROMIDE 21 UG/1
2 SPRAY, METERED NASAL EVERY 12 HOURS
COMMUNITY
End: 2024-10-03 | Stop reason: SDUPTHER

## 2024-10-03 RX ORDER — METFORMIN HYDROCHLORIDE 500 MG/1
1000 TABLET, EXTENDED RELEASE ORAL
Qty: 180 TABLET | Refills: 1 | Status: SHIPPED | OUTPATIENT
Start: 2024-10-03 | End: 2024-10-04 | Stop reason: SDUPTHER

## 2024-10-03 RX ORDER — EZETIMIBE 10 MG/1
10 TABLET ORAL DAILY
Qty: 14 TABLET | Refills: 0 | Status: SHIPPED | OUTPATIENT
Start: 2024-10-03

## 2024-10-03 RX ORDER — HYDROXYZINE HYDROCHLORIDE 25 MG/1
25 TABLET, FILM COATED ORAL 2 TIMES DAILY
Qty: 180 TABLET | Refills: 1 | Status: SHIPPED | OUTPATIENT
Start: 2024-10-03

## 2024-10-03 NOTE — TELEPHONE ENCOUNTER
Received call from patient's wife stating patient is completely out of Amlodipine 5 mg daily and Ezetimibe 10 mg daily.  She is aware patient needs appointment and has cancelled a few.  She states she scheduled patient with CELIA Kate CNP for 10/15/24.  Patient's wife informed can only provide a 2 week supply which will get him to his appointment.  Wife verbalized understanding instructing rx to go to [facility] in [facility].  Order placed and sent to Dr. Roberto Haskins, F.A.C.C. for authorization.  Eda Giang, CMA

## 2024-10-03 NOTE — TELEPHONE ENCOUNTER
The patient's metformin was denied for  a Prior Auth.. They will cover Metformin 500 er . I have pended the new metformin to be sign if it is okay for the patient to take

## 2024-10-03 NOTE — TELEPHONE ENCOUNTER
Last OV 09/2024  Pending 10/2024    Medications previously being filled by Dr. Ledbetter. Last fill date was 08/2023

## 2024-10-04 ENCOUNTER — OFFICE VISIT (OUTPATIENT)
Dept: PRIMARY CARE | Facility: CLINIC | Age: 74
End: 2024-10-04
Payer: MEDICARE

## 2024-10-04 VITALS
TEMPERATURE: 97.2 F | DIASTOLIC BLOOD PRESSURE: 82 MMHG | SYSTOLIC BLOOD PRESSURE: 126 MMHG | BODY MASS INDEX: 28.35 KG/M2 | HEART RATE: 74 BPM | HEIGHT: 70 IN | WEIGHT: 198 LBS

## 2024-10-04 DIAGNOSIS — E11.65 TYPE 2 DIABETES MELLITUS WITH HYPERGLYCEMIA, WITH LONG-TERM CURRENT USE OF INSULIN: ICD-10-CM

## 2024-10-04 DIAGNOSIS — Z00.00 HEALTHCARE MAINTENANCE: ICD-10-CM

## 2024-10-04 DIAGNOSIS — Z79.4 TYPE 2 DIABETES MELLITUS WITH HYPERGLYCEMIA, WITH LONG-TERM CURRENT USE OF INSULIN: ICD-10-CM

## 2024-10-04 RX ORDER — METFORMIN HYDROCHLORIDE 500 MG/1
1000 TABLET, EXTENDED RELEASE ORAL
Qty: 120 TABLET | Refills: 2 | Status: SHIPPED | OUTPATIENT
Start: 2024-10-04 | End: 2025-11-08

## 2024-10-04 ASSESSMENT — ENCOUNTER SYMPTOMS
LOSS OF SENSATION IN FEET: 0
DEPRESSION: 0
OCCASIONAL FEELINGS OF UNSTEADINESS: 1

## 2024-10-04 NOTE — PROGRESS NOTES
"    Chronic coughSubjective   Patient ID: Feliciano Worthington is a 74 y.o. male who presents for Med Refill.    HPI   74-year-old male with past medical history of type 2 diabetes mellitus, COPD, depression, he is 3 right upper extremity DVT presented today for medication refill and flu vaccine.  Patient is a smoker he endorses been trying to cut down on smoking.  Otherwise he is feeling well and no symptoms currently  He has been off his medications except for insulin for some time  Review of Systems  ROS is negative for chest pain, shortness of breath nausea, vomiting, lightheadedness, change in bowel habit  Objective   /82 (BP Location: Left arm, Patient Position: Sitting, BP Cuff Size: Adult)   Pulse 74   Temp 36.2 °C (97.2 °F) (Temporal)   Ht 1.778 m (5' 10\")   Wt 89.8 kg (198 lb)   BMI 28.41 kg/m²     Physical Exam  Constitutional:       Appearance: Normal appearance.   HENT:      Head: Normocephalic and atraumatic.      Mouth/Throat:      Mouth: Mucous membranes are moist.   Cardiovascular:      Rate and Rhythm: Normal rate and regular rhythm.      Heart sounds: No murmur heard.     No gallop.   Pulmonary:      Effort: Pulmonary effort is normal.      Comments: Right lower lung zone  Abdominal:      General: Bowel sounds are normal.      Palpations: Abdomen is soft.      Tenderness: There is no abdominal tenderness. There is no right CVA tenderness, left CVA tenderness or guarding.   Musculoskeletal:         General: Normal range of motion.      Cervical back: Normal range of motion.   Skin:     General: Skin is warm.   Neurological:      General: No focal deficit present.      Mental Status: He is alert and oriented to person, place, and time.   Psychiatric:         Mood and Affect: Mood normal.         Behavior: Behavior normal.         Thought Content: Thought content normal.         Judgment: Judgment normal.           Assessment/Plan          "

## 2024-10-08 ENCOUNTER — APPOINTMENT (OUTPATIENT)
Dept: CARDIOLOGY | Facility: CLINIC | Age: 74
End: 2024-10-08
Payer: MEDICARE

## 2024-10-14 ENCOUNTER — APPOINTMENT (OUTPATIENT)
Dept: PRIMARY CARE | Facility: CLINIC | Age: 74
End: 2024-10-14
Payer: MEDICARE

## 2024-10-15 ENCOUNTER — APPOINTMENT (OUTPATIENT)
Dept: CARDIOLOGY | Facility: CLINIC | Age: 74
End: 2024-10-15
Payer: MEDICARE

## 2024-10-15 VITALS
SYSTOLIC BLOOD PRESSURE: 136 MMHG | DIASTOLIC BLOOD PRESSURE: 78 MMHG | BODY MASS INDEX: 28.49 KG/M2 | HEIGHT: 70 IN | HEART RATE: 87 BPM | WEIGHT: 199 LBS

## 2024-10-15 DIAGNOSIS — E87.1 HYPONATREMIA: ICD-10-CM

## 2024-10-15 DIAGNOSIS — I25.10 CORONARY ARTERY DISEASE INVOLVING NATIVE CORONARY ARTERY OF NATIVE HEART WITHOUT ANGINA PECTORIS: ICD-10-CM

## 2024-10-15 DIAGNOSIS — E78.2 MIXED HYPERLIPIDEMIA: ICD-10-CM

## 2024-10-15 DIAGNOSIS — I10 PRIMARY HYPERTENSION: ICD-10-CM

## 2024-10-15 PROCEDURE — 3008F BODY MASS INDEX DOCD: CPT | Performed by: NURSE PRACTITIONER

## 2024-10-15 PROCEDURE — 3078F DIAST BP <80 MM HG: CPT | Performed by: NURSE PRACTITIONER

## 2024-10-15 PROCEDURE — 3075F SYST BP GE 130 - 139MM HG: CPT | Performed by: NURSE PRACTITIONER

## 2024-10-15 PROCEDURE — 99214 OFFICE O/P EST MOD 30 MIN: CPT | Performed by: NURSE PRACTITIONER

## 2024-10-15 PROCEDURE — 1123F ACP DISCUSS/DSCN MKR DOCD: CPT | Performed by: NURSE PRACTITIONER

## 2024-10-15 PROCEDURE — 1159F MED LIST DOCD IN RCRD: CPT | Performed by: NURSE PRACTITIONER

## 2024-10-15 PROCEDURE — 1160F RVW MEDS BY RX/DR IN RCRD: CPT | Performed by: NURSE PRACTITIONER

## 2024-10-15 PROCEDURE — 4010F ACE/ARB THERAPY RXD/TAKEN: CPT | Performed by: NURSE PRACTITIONER

## 2024-10-15 RX ORDER — AMLODIPINE BESYLATE 5 MG/1
5 TABLET ORAL DAILY
Qty: 90 TABLET | Refills: 1 | Status: SHIPPED | OUTPATIENT
Start: 2024-10-15

## 2024-10-15 RX ORDER — EZETIMIBE 10 MG/1
10 TABLET ORAL DAILY
Qty: 90 TABLET | Refills: 1 | Status: SHIPPED | OUTPATIENT
Start: 2024-10-15

## 2024-10-15 NOTE — PATIENT INSTRUCTIONS
Please make an appointment with Dr. Bernal your primary care provider soon.    Please have labs drawn today

## 2024-10-16 NOTE — PROGRESS NOTES
Feliciano Worthington is a 74 y.o. male that presents to the office today for cardiac follow-up.  He follows with his  primary cardiologist Dr. Haskins and was added to my schedule today.      Per Dr. Haskins's office notes he has a past medical history of coronary disease having had circumflex stent 2007 and RCA stent 2011, hyperlipidemia, diabetes and hypertension. In December 2022 myocardial perfusion imaging showed no ischemia with preserved LVEF.  Carotid duplws 10/2023  <50% bilateral stenosis      Seen in office 11/7/2023 some shortness of breath felt related to his COPD and tobacco abuse.  Had some hyponatremia when was hospitalized with pneumonia.  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  Does have history of Mobitz type I second-degree block.  No symptoms at that time             Due to his hyponatremia at last office visit rejoice 5/7/2024 is chlorthalidone 25 mg was decreased to twice a week Monday and Thursdays.    Overall he states he is doing fair.  He does report that he is not always compliant with his medications not his bowel he forgets to take them more so he just does not feel like taking them.  Though he does admit he feels as though his memory is slipping.  He states he is eating and drinking well though he states his food choices are probably poor food choices.    Assessment/Plan  Hypertension; stable  Hyperlipidemia; states he has not been taking his lovastatin due to bilateral shoulder pain but has continued with Zetia.  Will obtain lipid panel  Hyponatremia; no recent labs  Coronary artery disease; denies anginal symptoms  Memory loss; he has been encouraged to follow with primary care Dr. Walters soon in regards to this  Obtain labs including: CMP, CBC, lipid panel, magnesium.  Further recommendations pending lab results  Follow-up in the office with Dr. Haskins in 6 months or sooner      Patient Active Problem List   Diagnosis     Primary hypertension    Chronic obstructive pulmonary disease, unspecified    Current every day smoker    GERD (gastroesophageal reflux disease)    Arcus senilis, bilateral    Astigmatism of both eyes    Cataract, bilateral    Chronic venous stasis dermatitis    Depression, major, in remission (CMS-HCC)    Erectile dysfunction    Hiatal hernia with GERD    Multiple lung nodules on CT    Nuclear sclerotic cataract of both eyes    Osteoarthritis of hips, bilateral    Other spondylosis, cervical region    Periumbilical hernia    Testosterone deficiency    Smoker    Sleep apnea    Vitamin D deficiency    Noncompliance with dietary restriction    Arthritis of left acromioclavicular joint    Bruit of right carotid artery    Degenerative joint disease (DJD) of hip    Spinal stenosis of cervical region    BMI 29.0-29.9,adult    Encounter for immunization    Mixed hyperlipidemia    CAD (coronary artery disease)    Mobitz type 1 second degree AV block    Type 2 diabetes mellitus with hyperglycemia, with long-term current use of insulin    Hyponatremia       Social History     Tobacco Use    Smoking status: Every Day     Current packs/day: 1.50     Average packs/day: 1.5 packs/day for 50.0 years (75.0 ttl pk-yrs)     Types: Cigarettes    Smokeless tobacco: Never   Vaping Use    Vaping status: Never Used   Substance Use Topics    Alcohol use: Not Currently    Drug use: Never       Past Medical History:   Diagnosis Date    Acute myocardial infarction, unspecified 01/24/2018    Heart attack    Acute myocardial infarction, unspecified 01/24/2018    Heart attack    Chronic obstructive pulmonary disease, unspecified 01/24/2018    COPD, mild    Chronic obstructive pulmonary disease, unspecified 01/24/2018    COPD, mild    Coronary angioplasty status     Post PTCA    Encounter for immunization 11/04/2022    Encounter for immunization    Encounter for screening for malignant neoplasm of colon 08/25/2016    Encounter for colonoscopy due  to history of colonic polyp    Encounter for screening for malignant neoplasm of skin 04/14/2022    Skin cancer screening    Nicotine dependence, cigarettes, uncomplicated 09/12/2022    Cigarette smoker    Obesity, unspecified 05/25/2022    Class 1 obesity with body mass index (BMI) of 31.0 to 31.9 in adult    Old myocardial infarction 01/24/2018    History of myocardial infarction    Other acute postprocedural pain     Post-operative pain    Pain in left knee     Left knee pain, unspecified chronicity    Pain in right hip 11/04/2022    Hip pain, bilateral    Personal history of other diseases of the digestive system 01/24/2018    History of gastroesophageal reflux (GERD)    Personal history of other diseases of the musculoskeletal system and connective tissue 08/25/2016    History of low back pain    Personal history of other diseases of the respiratory system     History of sinusitis    Personal history of other endocrine, nutritional and metabolic disease 08/25/2016    History of hyperlipidemia    Personal history of other mental and behavioral disorders 04/14/2022    History of depression    Personal history of other specified conditions 12/08/2016    History of diarrhea    Personal history of other specified conditions     History of prolonged Q-T interval on ECG    Personal history of other specified conditions 06/20/2018    History of nocturia    Separation of muscle (nontraumatic), other site 05/14/2018    Rectus diastasis    Sprain of medial collateral ligament of left knee, initial encounter 08/28/2017    Sprain of medial collateral ligament of left knee, initial encounter    Sprain of medial collateral ligament of left knee, subsequent encounter 10/12/2017    Sprain of medial collateral ligament of left knee, subsequent encounter    Type 2 diabetes mellitus without complications (Multi) 01/24/2018    Diabetes mellitus    Type 2 diabetes mellitus without complications (Multi) 01/24/2018    Diabetes  mellitus    Umbilical hernia without obstruction or gangrene 05/14/2018    Umbilical hernia without obstruction and without gangrene         Current Outpatient Medications:     ACETAMINOPHEN ORAL, Take 650 mg by mouth every 8 hours if needed for mild pain (1 - 3). Do not crush, chew, or split., Disp: , Rfl:     alcohol swabs (BD Alcohol Swabs) pads, medicated, Use twice daily while checking sugar., Disp: 200 each, Rfl: 1    aspirin 81 mg EC tablet, Take 1 tablet (81 mg) by mouth once daily., Disp: , Rfl:     blood glucose control high,low (Accu-Chek Guide L1-L2 Ctrl Sol) solution, 1 bottle once daily as needed (use when need to un controls on glucose meter)., Disp: 1 each, Rfl: 1    blood sugar diagnostic (Accu-Chek Guide test strips) strip, Test twice daily, Disp: 200 strip, Rfl: 0    blood-glucose meter (Accu-Chek Guide Glucose Meter) misc, Test twice daily, Disp: 1 each, Rfl: 0    chlorthalidone (Hygroton) 25 mg tablet, TAKE 1 TABLET EVERY MONDAY AND THURSDAY, Disp: 30 tablet, Rfl: 3    hydrOXYzine HCL (Atarax) 25 mg tablet, Take 1 tablet (25 mg) by mouth 2 times a day., Disp: 180 tablet, Rfl: 1    insulin glargine (Lantus U-100 Insulin) 100 unit/mL injection, Inject 32 Units under the skin once daily in the morning. Take as directed per insulin instructions. Along with the 35 units in the PM, Disp: , Rfl:     ipratropium (Atrovent) 21 mcg (0.03 %) nasal spray, Administer 2 sprays into each nostril every 12 hours., Disp: 30 mL, Rfl: 1    lancets (Accu-Chek Softclix Lancets) misc, Test twice daily, Disp: 200 each, Rfl: 0    metFORMIN XR (Glucophage-XR) 500 mg 24 hr tablet, Take 2 tablets (1,000 mg) by mouth 2 times daily (morning and late afternoon). Do not crush, chew, or split., Disp: 120 tablet, Rfl: 2    nitroglycerin (Nitrostat) 0.4 mg SL tablet, DISSOLVE ONE TABLET UNDER THE TONGUE EVERY 5 MINUTES AS NEEDED FOR CHEST PAIN.  DO NOT EXCEED A TOTAL OF 3 DOSES IN 15 MINUTES, Disp: 25 tablet, Rfl: 0    omeprazole  "(PriLOSEC) 40 mg DR capsule, Take 1 capsule (40 mg) by mouth once daily in the morning. Take before meals. Do not crush or chew., Disp: 90 capsule, Rfl: 1    ramipril (Altace) 5 mg capsule, TAKE 1 CAPSULE TWICE DAILY, Disp: 180 capsule, Rfl: 3    venlafaxine XR (Effexor-XR) 150 mg 24 hr capsule, Take 1 capsule (150 mg) by mouth once daily. Do not crush or chew., Disp: 90 capsule, Rfl: 1    amLODIPine (Norvasc) 5 mg tablet, Take 1 tablet (5 mg) by mouth once daily., Disp: 90 tablet, Rfl: 1    ezetimibe (Zetia) 10 mg tablet, Take 1 tablet (10 mg) by mouth once daily., Disp: 90 tablet, Rfl: 1    Tetracyclines, Atorvastatin, Empagliflozin, Pravastatin, Rosuvastatin, Simvastatin, and Statins-hmg-coa reductase inhibitors    Family History   Problem Relation Name Age of Onset    Coronary artery disease Mother      Diabetes Father      Coronary artery disease Father      Other (acute myocardial infarction) Father         Past Surgical History:   Procedure Laterality Date    COLONOSCOPY  10/06/2016    Colonoscopy    CORONARY ANGIOPLASTY WITH STENT PLACEMENT  08/25/2016    Cath Stent Placement    OTHER SURGICAL HISTORY  05/25/2022    Oral surgery    TONSILLECTOMY  08/25/2016    Tonsillectomy          Review of systems  Constitutional: No weight loss, fever, chills, weakness or fatigue  HEENT: No visual loss, blurred vision, double vision or yellow sclerae  Skin: No rash or itching  Cardiovascular: No chest pain, pressure or discomfort, No palpitations or edema.  Respiratory: No shortness of breath, cough or sputum  Gastrointestinal: No nausea, vomiting or diarrhea. No bloody or dark tarry stools.  Neurological: No headache, lightheadedness, dizziness, syncope.   Musculoskeletal: Chronic  joint pain or stiffness.  Hematologic: No anemia, bleeding or bruising.    /78 (BP Location: Left arm, Patient Position: Sitting)   Pulse 87   Ht 1.778 m (5' 10\")   Wt 90.3 kg (199 lb)   BMI 28.55 kg/m²     Patient Active Problem " List   Diagnosis    Primary hypertension    Chronic obstructive pulmonary disease, unspecified    Current every day smoker    GERD (gastroesophageal reflux disease)    Arcus senilis, bilateral    Astigmatism of both eyes    Cataract, bilateral    Chronic venous stasis dermatitis    Depression, major, in remission (CMS-HCC)    Erectile dysfunction    Hiatal hernia with GERD    Multiple lung nodules on CT    Nuclear sclerotic cataract of both eyes    Osteoarthritis of hips, bilateral    Other spondylosis, cervical region    Periumbilical hernia    Testosterone deficiency    Smoker    Sleep apnea    Vitamin D deficiency    Noncompliance with dietary restriction    Arthritis of left acromioclavicular joint    Bruit of right carotid artery    Degenerative joint disease (DJD) of hip    Spinal stenosis of cervical region    BMI 29.0-29.9,adult    Encounter for immunization    Mixed hyperlipidemia    CAD (coronary artery disease)    Mobitz type 1 second degree AV block    Type 2 diabetes mellitus with hyperglycemia, with long-term current use of insulin    Hyponatremia         Physical Exam  Constitutional: Well developed, awake/alert x 3, no distress.  Head/Neck: No JVD, No bruits  Respiratory/Thorax: patent airways, CTAB, normal breath sounds with good expansion.  Cardiovascular: Regular rate and rhythm, no murmurs, normal S1 and S2,   Gastrointestinal: Non distended, soft, non-tender, no rebound tenderness or guarding.  Extremities: No cyanosis, edema.    Neurological: Alert and oriented x 3. Moves extremities spontaneous with purpose.  Psychological: Appropriate mood and behavior  Skin: Warm and Dry. No lesions or rashes.         Please excuse any errors in grammar or translation related to dictation, voice recognition software was used to prepare this document.

## 2024-12-26 ENCOUNTER — APPOINTMENT (OUTPATIENT)
Dept: PRIMARY CARE | Facility: CLINIC | Age: 74
End: 2024-12-26
Payer: MEDICARE

## 2024-12-26 ENCOUNTER — LAB (OUTPATIENT)
Dept: LAB | Facility: LAB | Age: 74
End: 2024-12-26
Payer: MEDICARE

## 2024-12-26 DIAGNOSIS — I25.10 CORONARY ARTERY DISEASE INVOLVING NATIVE CORONARY ARTERY OF NATIVE HEART WITHOUT ANGINA PECTORIS: ICD-10-CM

## 2024-12-26 DIAGNOSIS — I25.10 CORONARY ARTERY DISEASE, UNSPECIFIED VESSEL OR LESION TYPE, UNSPECIFIED WHETHER ANGINA PRESENT, UNSPECIFIED WHETHER NATIVE OR TRANSPLANTED HEART: ICD-10-CM

## 2024-12-26 DIAGNOSIS — E11.9 TYPE 2 DIABETES MELLITUS WITHOUT COMPLICATION, WITHOUT LONG-TERM CURRENT USE OF INSULIN (MULTI): ICD-10-CM

## 2024-12-26 DIAGNOSIS — E78.5 HYPERLIPIDEMIA ASSOCIATED WITH TYPE 2 DIABETES MELLITUS (MULTI): ICD-10-CM

## 2024-12-26 DIAGNOSIS — Z79.899 MEDICATION DOSE CHANGED: ICD-10-CM

## 2024-12-26 DIAGNOSIS — I10 PRIMARY HYPERTENSION: ICD-10-CM

## 2024-12-26 DIAGNOSIS — E11.69 HYPERLIPIDEMIA ASSOCIATED WITH TYPE 2 DIABETES MELLITUS (MULTI): ICD-10-CM

## 2024-12-26 DIAGNOSIS — Z13.89 SCREENING FOR BLOOD OR PROTEIN IN URINE: ICD-10-CM

## 2024-12-26 LAB
ALBUMIN SERPL BCP-MCNC: 4.2 G/DL (ref 3.4–5)
ALP SERPL-CCNC: 133 U/L (ref 33–136)
ALT SERPL W P-5'-P-CCNC: 16 U/L (ref 10–52)
ANION GAP SERPL CALC-SCNC: 16 MMOL/L (ref 10–20)
APPEARANCE UR: CLEAR
AST SERPL W P-5'-P-CCNC: 16 U/L (ref 9–39)
BILIRUB SERPL-MCNC: 0.7 MG/DL (ref 0–1.2)
BILIRUB UR STRIP.AUTO-MCNC: NEGATIVE MG/DL
BUN SERPL-MCNC: 16 MG/DL (ref 6–23)
CALCIUM SERPL-MCNC: 9.4 MG/DL (ref 8.6–10.3)
CHLORIDE SERPL-SCNC: 93 MMOL/L (ref 98–107)
CHOLEST SERPL-MCNC: 319 MG/DL (ref 0–199)
CHOLESTEROL/HDL RATIO: 9.7
CO2 SERPL-SCNC: 27 MMOL/L (ref 21–32)
COLOR UR: ABNORMAL
CREAT SERPL-MCNC: 0.97 MG/DL (ref 0.5–1.3)
CREAT UR-MCNC: 63.7 MG/DL (ref 20–370)
EGFRCR SERPLBLD CKD-EPI 2021: 82 ML/MIN/1.73M*2
EST. AVERAGE GLUCOSE BLD GHB EST-MCNC: 355 MG/DL
GLUCOSE SERPL-MCNC: 426 MG/DL (ref 74–99)
GLUCOSE UR STRIP.AUTO-MCNC: ABNORMAL MG/DL
HBA1C MFR BLD: 14 %
HDLC SERPL-MCNC: 32.8 MG/DL
KETONES UR STRIP.AUTO-MCNC: ABNORMAL MG/DL
LDLC SERPL CALC-MCNC: ABNORMAL MG/DL
LEUKOCYTE ESTERASE UR QL STRIP.AUTO: NEGATIVE
MAGNESIUM SERPL-MCNC: 1.88 MG/DL (ref 1.6–2.4)
MICROALBUMIN UR-MCNC: 347.7 MG/L
MICROALBUMIN/CREAT UR: 545.8 UG/MG CREAT
MUCOUS THREADS #/AREA URNS AUTO: NORMAL /LPF
NITRITE UR QL STRIP.AUTO: NEGATIVE
NON HDL CHOLESTEROL: 286 MG/DL (ref 0–149)
PH UR STRIP.AUTO: 5.5 [PH]
POTASSIUM SERPL-SCNC: 4.4 MMOL/L (ref 3.5–5.3)
PROT SERPL-MCNC: 7.3 G/DL (ref 6.4–8.2)
PROT UR STRIP.AUTO-MCNC: ABNORMAL MG/DL
RBC # UR STRIP.AUTO: NEGATIVE /UL
RBC #/AREA URNS AUTO: NORMAL /HPF
SODIUM SERPL-SCNC: 132 MMOL/L (ref 136–145)
SP GR UR STRIP.AUTO: 1.04
TRIGL SERPL-MCNC: 468 MG/DL (ref 0–149)
UROBILINOGEN UR STRIP.AUTO-MCNC: NORMAL MG/DL
VLDL: ABNORMAL
WBC #/AREA URNS AUTO: NORMAL /HPF

## 2024-12-26 PROCEDURE — 82570 ASSAY OF URINE CREATININE: CPT

## 2024-12-26 PROCEDURE — 83735 ASSAY OF MAGNESIUM: CPT

## 2024-12-26 PROCEDURE — 83036 HEMOGLOBIN GLYCOSYLATED A1C: CPT

## 2024-12-26 PROCEDURE — 81001 URINALYSIS AUTO W/SCOPE: CPT

## 2024-12-26 PROCEDURE — 80053 COMPREHEN METABOLIC PANEL: CPT

## 2024-12-26 PROCEDURE — 80061 LIPID PANEL: CPT

## 2024-12-26 PROCEDURE — 82043 UR ALBUMIN QUANTITATIVE: CPT

## 2025-01-29 ENCOUNTER — APPOINTMENT (OUTPATIENT)
Dept: PRIMARY CARE | Facility: CLINIC | Age: 75
End: 2025-01-29
Payer: MEDICARE

## 2025-01-29 VITALS
TEMPERATURE: 98.4 F | HEART RATE: 75 BPM | BODY MASS INDEX: 26.97 KG/M2 | DIASTOLIC BLOOD PRESSURE: 80 MMHG | WEIGHT: 188.4 LBS | OXYGEN SATURATION: 93 % | SYSTOLIC BLOOD PRESSURE: 148 MMHG | HEIGHT: 70 IN

## 2025-01-29 DIAGNOSIS — J44.9 CHRONIC OBSTRUCTIVE PULMONARY DISEASE, UNSPECIFIED COPD TYPE (MULTI): ICD-10-CM

## 2025-01-29 DIAGNOSIS — K21.9 GASTROESOPHAGEAL REFLUX DISEASE WITHOUT ESOPHAGITIS: ICD-10-CM

## 2025-01-29 DIAGNOSIS — F32.5 DEPRESSION, MAJOR, IN REMISSION (CMS-HCC): ICD-10-CM

## 2025-01-29 DIAGNOSIS — Z00.00 ENCOUNTER FOR ANNUAL WELLNESS VISIT (AWV) IN MEDICARE PATIENT: ICD-10-CM

## 2025-01-29 DIAGNOSIS — E11.65 TYPE 2 DIABETES MELLITUS WITH HYPERGLYCEMIA, WITH LONG-TERM CURRENT USE OF INSULIN: ICD-10-CM

## 2025-01-29 DIAGNOSIS — Z12.5 PROSTATE CANCER SCREENING: ICD-10-CM

## 2025-01-29 DIAGNOSIS — Z79.4 TYPE 2 DIABETES MELLITUS WITH HYPERGLYCEMIA, WITH LONG-TERM CURRENT USE OF INSULIN: ICD-10-CM

## 2025-01-29 DIAGNOSIS — K21.9 HIATAL HERNIA WITH GERD: ICD-10-CM

## 2025-01-29 DIAGNOSIS — Z12.11 COLON CANCER SCREENING: ICD-10-CM

## 2025-01-29 DIAGNOSIS — E78.2 MIXED HYPERLIPIDEMIA: ICD-10-CM

## 2025-01-29 DIAGNOSIS — Z00.00 ROUTINE GENERAL MEDICAL EXAMINATION AT HEALTH CARE FACILITY: Primary | ICD-10-CM

## 2025-01-29 DIAGNOSIS — I10 PRIMARY HYPERTENSION: ICD-10-CM

## 2025-01-29 DIAGNOSIS — K44.9 HIATAL HERNIA WITH GERD: ICD-10-CM

## 2025-01-29 DIAGNOSIS — I25.10 CORONARY ARTERY DISEASE, UNSPECIFIED VESSEL OR LESION TYPE, UNSPECIFIED WHETHER ANGINA PRESENT, UNSPECIFIED WHETHER NATIVE OR TRANSPLANTED HEART: ICD-10-CM

## 2025-01-29 PROCEDURE — 99397 PER PM REEVAL EST PAT 65+ YR: CPT | Performed by: FAMILY MEDICINE

## 2025-01-29 PROCEDURE — 1159F MED LIST DOCD IN RCRD: CPT | Performed by: FAMILY MEDICINE

## 2025-01-29 PROCEDURE — 3077F SYST BP >= 140 MM HG: CPT | Performed by: FAMILY MEDICINE

## 2025-01-29 PROCEDURE — G0439 PPPS, SUBSEQ VISIT: HCPCS | Performed by: FAMILY MEDICINE

## 2025-01-29 PROCEDURE — 99214 OFFICE O/P EST MOD 30 MIN: CPT | Performed by: FAMILY MEDICINE

## 2025-01-29 PROCEDURE — 3079F DIAST BP 80-89 MM HG: CPT | Performed by: FAMILY MEDICINE

## 2025-01-29 PROCEDURE — 1124F ACP DISCUSS-NO DSCNMKR DOCD: CPT | Performed by: FAMILY MEDICINE

## 2025-01-29 PROCEDURE — 4010F ACE/ARB THERAPY RXD/TAKEN: CPT | Performed by: FAMILY MEDICINE

## 2025-01-29 PROCEDURE — 99497 ADVNCD CARE PLAN 30 MIN: CPT | Performed by: FAMILY MEDICINE

## 2025-01-29 PROCEDURE — 3008F BODY MASS INDEX DOCD: CPT | Performed by: FAMILY MEDICINE

## 2025-01-29 PROCEDURE — G0444 DEPRESSION SCREEN ANNUAL: HCPCS | Performed by: FAMILY MEDICINE

## 2025-01-29 RX ORDER — GLIPIZIDE 10 MG/1
10 TABLET ORAL
Qty: 60 TABLET | Refills: 11 | Status: SHIPPED | OUTPATIENT
Start: 2025-01-29 | End: 2026-01-29

## 2025-01-29 RX ORDER — NITROGLYCERIN 0.4 MG/1
0.4 TABLET SUBLINGUAL EVERY 5 MIN PRN
Qty: 25 TABLET | Refills: 2 | Status: SHIPPED | OUTPATIENT
Start: 2025-01-29

## 2025-01-29 RX ORDER — SERTRALINE HYDROCHLORIDE 25 MG/1
25 TABLET, FILM COATED ORAL DAILY
Qty: 30 TABLET | Refills: 1 | Status: SHIPPED | OUTPATIENT
Start: 2025-01-29 | End: 2025-03-30

## 2025-01-29 ASSESSMENT — PATIENT HEALTH QUESTIONNAIRE - PHQ9
1. LITTLE INTEREST OR PLEASURE IN DOING THINGS: NOT AT ALL
10. IF YOU CHECKED OFF ANY PROBLEMS, HOW DIFFICULT HAVE THESE PROBLEMS MADE IT FOR YOU TO DO YOUR WORK, TAKE CARE OF THINGS AT HOME, OR GET ALONG WITH OTHER PEOPLE: NOT DIFFICULT AT ALL
SUM OF ALL RESPONSES TO PHQ9 QUESTIONS 1 AND 2: 1
2. FEELING DOWN, DEPRESSED OR HOPELESS: SEVERAL DAYS

## 2025-01-29 ASSESSMENT — ENCOUNTER SYMPTOMS
DEPRESSION: 1
LOSS OF SENSATION IN FEET: 1
OCCASIONAL FEELINGS OF UNSTEADINESS: 1

## 2025-01-29 NOTE — ASSESSMENT & PLAN NOTE
Orders:    Comprehensive metabolic panel; Future    Lipid panel; Future    Hemoglobin A1c; Future    Albumin-Creatinine Ratio, Urine Random; Future

## 2025-01-29 NOTE — PROGRESS NOTES
Subjective   Reason for Visit: Feliciano Worthington is an 74 y.o. male here for a Medicare Wellness visit.        Psa 3/24 normal  Repeat ordered  Vaccinations reviewed.  Due for Prevnar 20.  We are out of that he is lilli come back for nurses visit.  Otherwise vaccinations are reviewed and up-to-date    depression Screening  Depression screening completed using the PHQ-2 questions, with results documented in the chart/encounter (~5min).  (See Rooming Screening section for documentation, and/or progress note for additional information)    Cardiac Risk Assessment  The ASCVD Risk score (Bandar RODRIGUEZ, et al., 2019) failed to calculate for the following reasons:    Risk score cannot be calculated because patient has a medical history suggesting prior/existing ASCVD     Cardiovascular risk was discussed and, if needed, lifestyle modifications recommended, including nutritional choices, exercise, and elimination of habits contributing to risk. We agreed on a plan to reduce the current cardiovascular risk based on above discussion as needed.     Aspirin use/disuse was discussed and documented in the Problem List of the medical record (under Cardiac Risk Counseling) after reviewing the updated guidelines below:  Consider low dose Aspirin ( mg) use if the benefit for cardiovascular disease prevention outweighs risk for bleeding complications.   Discussed that in general, low dose ASA should be considered:  In patients WITHOUT prior MI/stroke/PAD (primary prevention):   a. Age <60: Use if 10-year cardiovascular disease risk >20%, with discussion of risks and benefits with patient  b. Age 60-<70: Use if 10-year cardiovascular disease risk >20% and low bleeding (e.g., gastrointestinal) risk, with discussion of risks and benefits with patient  c. Age >=70: Do not use    In patients WITH prior MI/stroke/PAD (secondary prevention):   Generally use unless extremely high bleeding (e.g., gastrointenstinal) risk, with discussion of risks  and benefits with patient    Advance Directives Discussion  Advanced Care Planning (including a Living Will, Healthcare POA, as well as specific end of life choices and/or directives), was discussed with the patient and/or surrogate, voluntarily, and details of that discussion documented in the Problem List (under Advanced Directives Discussion) of the medical record.    (~16 min spent discussing above)     During the course of the visit the patient was educated and counseled about age appropriate screening and preventive services.   Completed preventive screenings were documented in the chart (see Routine Health Maintenance in Problem List) and orders were placed for outstanding screenings/procedures as documented in the Assessment and Plan.    Patient Instructions (the written plan) was given to the patient at check out.     Reviewed all medications by prescribing practitioner or clinical pharmacist (such as prescriptions, OTCs, herbal therapies and supplements) and documented in the medical record.    HPI    Patient Care Team:  Hussain Bernal MD as PCP - General (Family Medicine)  Contreras Ledbetter MD as PCP - [employer] PCP  Roberto Haskins MD as Consulting Physician (Cardiology)     Review of Systems    General medical follow-up.  Active problems noted.  History of diabetes.  Labs reviewed.  Diabetes is markedly uncontrolled.  A1c is increased to 14.  He has been increasing his dose of Lantus.  Occasionally takes 50 units.  However he does not take his metformin.  Has nausea vomiting.  I will stop his metformin.  Put him on glipizide.  Increase his Lantus.  Consult endocrinology for uncontrolled diabetes.  He does admit to urinary frequency discussed this is likely secondary to his hyperglycemia.  Also his cholesterol to become uncontrolled.  Increase significantly.  He has a supply of lovastatin at home.  Previously caused shoulder pain but he is lilli try it again and see how he  "does.  His coronary disease is relatively stable no angina.  He has recent diagnosis of COPD.  Inhalation therapy.  His other active problems are noted.  Short-term follow-up.  Labs prior to follow-up.  Continue current medical therapy otherwise reinforced lifestyle modifications physical activity as tolerated healthy diet medication compliance encouraged  Objective   Vitals:  /80   Pulse 75   Temp 36.9 °C (98.4 °F) (Temporal)   Ht 1.778 m (5' 10\")   Wt 85.5 kg (188 lb 6.4 oz)   SpO2 93% Comment: RA  BMI 27.03 kg/m²       Physical Exam  Vitals and nursing note reviewed.   Constitutional:       General: He is not in acute distress.     Appearance: He is not ill-appearing or toxic-appearing.   HENT:      Head: Normocephalic and atraumatic.      Mouth/Throat:      Pharynx: No oropharyngeal exudate or posterior oropharyngeal erythema.   Eyes:      Extraocular Movements: Extraocular movements intact.      Conjunctiva/sclera: Conjunctivae normal.      Pupils: Pupils are equal, round, and reactive to light.   Cardiovascular:      Rate and Rhythm: Normal rate and regular rhythm.      Pulses: Normal pulses.      Heart sounds: Normal heart sounds. No murmur heard.  Pulmonary:      Effort: Pulmonary effort is normal.      Breath sounds: Normal breath sounds. No wheezing, rhonchi or rales.   Abdominal:      General: Abdomen is flat. Bowel sounds are normal. There is no distension.      Palpations: Abdomen is soft. There is no mass.      Tenderness: There is no abdominal tenderness.      Hernia: No hernia is present.   Musculoskeletal:         General: No swelling or deformity. Normal range of motion.      Cervical back: Normal range of motion and neck supple.   Skin:     General: Skin is warm and dry.      Capillary Refill: Capillary refill takes less than 2 seconds.      Coloration: Skin is not jaundiced.      Findings: No erythema or rash.   Neurological:      General: No focal deficit present.      Mental Status: " He is oriented to person, place, and time. Mental status is at baseline.   Psychiatric:         Mood and Affect: Mood normal.         Behavior: Behavior normal.         Thought Content: Thought content normal.         Judgment: Judgment normal.         Assessment & Plan  Coronary artery disease, unspecified vessel or lesion type, unspecified whether angina present, unspecified whether native or transplanted heart    Orders:    nitroglycerin (Nitrostat) 0.4 mg SL tablet; Place 1 tablet (0.4 mg) under the tongue every 5 minutes if needed for chest pain. DO NOT EXCEED A TOTAL OF 3 DOSES IN 15 MINUTES    Routine general medical examination at [facility]    Orders:    1 Year Follow Up In Primary Care - Wellness Exam; Future    Encounter for annual wellness visit (AWV) in Medicare patient         Primary hypertension    Orders:    Comprehensive metabolic panel; Future    Lipid panel; Future    Hemoglobin A1c; Future    Albumin-Creatinine Ratio, Urine Random; Future    Depression, major, in remission (CMS-HCC)    Orders:    sertraline (Zoloft) 25 mg tablet; Take 1 tablet (25 mg) by mouth once daily.    Hiatal hernia with GERD         Chronic obstructive pulmonary disease, unspecified COPD type (Multi)         Gastroesophageal reflux disease without esophagitis         Type 2 diabetes mellitus with hyperglycemia, with long-term current use of insulin    Orders:    Referral to Endocrinology; Future    glipiZIDE (Glucotrol) 10 mg tablet; Take 1 tablet (10 mg) by mouth 2 times a day before meals.    Comprehensive metabolic panel; Future    Lipid panel; Future    Hemoglobin A1c; Future    Albumin-Creatinine Ratio, Urine Random; Future    Mixed hyperlipidemia    Orders:    Comprehensive metabolic panel; Future    Lipid panel; Future    Hemoglobin A1c; Future    Albumin-Creatinine Ratio, Urine Random; Future    Prostate cancer screening    Orders:    PSA; Future    Colon cancer screening    Orders:    Colonoscopy  Screening; Average Risk Patient; Future

## 2025-01-29 NOTE — ASSESSMENT & PLAN NOTE
Orders:    Referral to Endocrinology; Future    glipiZIDE (Glucotrol) 10 mg tablet; Take 1 tablet (10 mg) by mouth 2 times a day before meals.    Comprehensive metabolic panel; Future    Lipid panel; Future    Hemoglobin A1c; Future    Albumin-Creatinine Ratio, Urine Random; Future

## 2025-01-29 NOTE — ASSESSMENT & PLAN NOTE
Orders:    nitroglycerin (Nitrostat) 0.4 mg SL tablet; Place 1 tablet (0.4 mg) under the tongue every 5 minutes if needed for chest pain. DO NOT EXCEED A TOTAL OF 3 DOSES IN 15 MINUTES

## 2025-01-29 NOTE — ASSESSMENT & PLAN NOTE
Orders:    Comprehensive metabolic panel; Future    Lipid panel; Future    Hemoglobin A1c; Future    Albumin-Creatinine Ratio, Urine Random; Future     OVER THE COUNTER RECOMMENDATIONS/SUGGESTIONS (IF NO CONTRAINDICATIONS).     ·         Make sure to stay well hydrated.     ·         Use Nasal Saline to mechanically move any post nasal drip from your eustachian tube or from the back of your throat.     ·         Use warm saltwater gargles to ease your throat pain. Warm saltwater gargles as needed for sore throat-  1/2 tsp salt to 1 cup warm water, gargle as desired. Warm fluids tend to relieve a sore throat.     .         Throat lozenges, Chloraseptic spray or other over the counter treatments are ok to use as well. Use as directed.     ·         Use an antihistamine such as Claritin, Zyrtec or Allegra to dry you out.     ·         Use pseudoephedrine (behind the counter) to decongest. Pseudoephedrine  30 mg up to 240 mg /day. It can raise your blood pressure and give you palpitations.     ·         Use Mucinex (guaifenesin) to break up mucous up to 2400mg/day to loosen any mucous.     ·         The Mucinex DM pill has a cough suppressant that can be sedating. It can be used at night to stop the tickle at the back of your throat.     ·         You can use Mucinex D (it has guaifenesin and a high dose of pseudoephedrine) in the mornings to help decongest.     ·         Use Afrin (oxymetazoline) in each nare for no longer than 3 days, as it is addictive. It can also dry out your mucous membranes and cause elevated blood pressure. This is especially useful if you are flying.     ·         Use Flonase 1-2 sprays/nostril per day. It is a local acting steroid nasal spray, if you develop a bloody nose, stop using the medication immediately.     ·         Sometimes Nyquil at night is beneficial to help you get some rest, however it is sedating, and it does have an antihistamine, and Tylenol.     ·         Honey is a natural cough suppressant that can be used.     ·         Tylenol up to 4,000 mg a day is safe for short periods and can be used for body aches, pain, and  fever. However, in high doses and prolonged use it can cause liver irritation.     ·         Ibuprofen is a non-steroidal anti-inflammatory that can be used for body aches, pain, and fever. However, it can also cause stomach irritation if overused.

## 2025-02-05 ENCOUNTER — HOSPITAL ENCOUNTER (INPATIENT)
Facility: HOSPITAL | Age: 75
End: 2025-02-05
Attending: STUDENT IN AN ORGANIZED HEALTH CARE EDUCATION/TRAINING PROGRAM | Admitting: STUDENT IN AN ORGANIZED HEALTH CARE EDUCATION/TRAINING PROGRAM
Payer: MEDICARE

## 2025-02-05 ENCOUNTER — APPOINTMENT (OUTPATIENT)
Dept: CARDIOLOGY | Facility: HOSPITAL | Age: 75
End: 2025-02-05
Payer: MEDICARE

## 2025-02-05 ENCOUNTER — APPOINTMENT (OUTPATIENT)
Dept: RADIOLOGY | Facility: HOSPITAL | Age: 75
End: 2025-02-05
Payer: MEDICARE

## 2025-02-05 DIAGNOSIS — I21.4 NSTEMI (NON-ST ELEVATED MYOCARDIAL INFARCTION) (MULTI): ICD-10-CM

## 2025-02-05 DIAGNOSIS — Z01.818 ENCOUNTER FOR OTHER PREPROCEDURAL EXAMINATION: ICD-10-CM

## 2025-02-05 DIAGNOSIS — R07.9 CHEST PAIN, UNSPECIFIED TYPE: Primary | ICD-10-CM

## 2025-02-05 DIAGNOSIS — R60.1 GENERALIZED EDEMA: ICD-10-CM

## 2025-02-05 DIAGNOSIS — I79.8 OTHER DISORDERS OF ARTERIES, ARTERIOLES AND CAPILLARIES IN DISEASES CLASSIFIED ELSEWHERE: ICD-10-CM

## 2025-02-05 LAB
ALBUMIN SERPL BCP-MCNC: 4 G/DL (ref 3.4–5)
ALP SERPL-CCNC: 110 U/L (ref 33–136)
ALT SERPL W P-5'-P-CCNC: 12 U/L (ref 10–52)
ANION GAP SERPL CALC-SCNC: 17 MMOL/L (ref 10–20)
AST SERPL W P-5'-P-CCNC: 15 U/L (ref 9–39)
BASOPHILS # BLD AUTO: 0.05 X10*3/UL (ref 0–0.1)
BASOPHILS NFR BLD AUTO: 0.5 %
BILIRUB SERPL-MCNC: 0.6 MG/DL (ref 0–1.2)
BUN SERPL-MCNC: 16 MG/DL (ref 6–23)
CALCIUM SERPL-MCNC: 9.3 MG/DL (ref 8.6–10.3)
CARDIAC TROPONIN I PNL SERPL HS: 299 NG/L (ref 0–20)
CARDIAC TROPONIN I PNL SERPL HS: 353 NG/L (ref 0–20)
CHLORIDE SERPL-SCNC: 93 MMOL/L (ref 98–107)
CO2 SERPL-SCNC: 23 MMOL/L (ref 21–32)
CREAT SERPL-MCNC: 0.99 MG/DL (ref 0.5–1.3)
EGFRCR SERPLBLD CKD-EPI 2021: 80 ML/MIN/1.73M*2
EOSINOPHIL # BLD AUTO: 0.08 X10*3/UL (ref 0–0.4)
EOSINOPHIL NFR BLD AUTO: 0.8 %
ERYTHROCYTE [DISTWIDTH] IN BLOOD BY AUTOMATED COUNT: 13 % (ref 11.5–14.5)
GLUCOSE SERPL-MCNC: 438 MG/DL (ref 74–99)
HCT VFR BLD AUTO: 45.4 % (ref 41–52)
HGB BLD-MCNC: 16 G/DL (ref 13.5–17.5)
HOLD SPECIMEN: NORMAL
IMM GRANULOCYTES # BLD AUTO: 0.03 X10*3/UL (ref 0–0.5)
IMM GRANULOCYTES NFR BLD AUTO: 0.3 % (ref 0–0.9)
LYMPHOCYTES # BLD AUTO: 2.17 X10*3/UL (ref 0.8–3)
LYMPHOCYTES NFR BLD AUTO: 22.1 %
MAGNESIUM SERPL-MCNC: 1.92 MG/DL (ref 1.6–2.4)
MCH RBC QN AUTO: 29.1 PG (ref 26–34)
MCHC RBC AUTO-ENTMCNC: 35.2 G/DL (ref 32–36)
MCV RBC AUTO: 83 FL (ref 80–100)
MONOCYTES # BLD AUTO: 0.73 X10*3/UL (ref 0.05–0.8)
MONOCYTES NFR BLD AUTO: 7.4 %
NEUTROPHILS # BLD AUTO: 6.76 X10*3/UL (ref 1.6–5.5)
NEUTROPHILS NFR BLD AUTO: 68.9 %
NRBC BLD-RTO: 0 /100 WBCS (ref 0–0)
PLATELET # BLD AUTO: 176 X10*3/UL (ref 150–450)
POTASSIUM SERPL-SCNC: 4.4 MMOL/L (ref 3.5–5.3)
PROT SERPL-MCNC: 7.3 G/DL (ref 6.4–8.2)
RBC # BLD AUTO: 5.5 X10*6/UL (ref 4.5–5.9)
SODIUM SERPL-SCNC: 129 MMOL/L (ref 136–145)
WBC # BLD AUTO: 9.8 X10*3/UL (ref 4.4–11.3)

## 2025-02-05 PROCEDURE — 80061 LIPID PANEL: CPT | Performed by: STUDENT IN AN ORGANIZED HEALTH CARE EDUCATION/TRAINING PROGRAM

## 2025-02-05 PROCEDURE — 93005 ELECTROCARDIOGRAM TRACING: CPT

## 2025-02-05 PROCEDURE — 84484 ASSAY OF TROPONIN QUANT: CPT | Performed by: STUDENT IN AN ORGANIZED HEALTH CARE EDUCATION/TRAINING PROGRAM

## 2025-02-05 PROCEDURE — 2500000004 HC RX 250 GENERAL PHARMACY W/ HCPCS (ALT 636 FOR OP/ED): Performed by: STUDENT IN AN ORGANIZED HEALTH CARE EDUCATION/TRAINING PROGRAM

## 2025-02-05 PROCEDURE — 93010 ELECTROCARDIOGRAM REPORT: CPT | Performed by: INTERNAL MEDICINE

## 2025-02-05 PROCEDURE — 2500000005 HC RX 250 GENERAL PHARMACY W/O HCPCS: Performed by: STUDENT IN AN ORGANIZED HEALTH CARE EDUCATION/TRAINING PROGRAM

## 2025-02-05 PROCEDURE — 36415 COLL VENOUS BLD VENIPUNCTURE: CPT | Performed by: STUDENT IN AN ORGANIZED HEALTH CARE EDUCATION/TRAINING PROGRAM

## 2025-02-05 PROCEDURE — 96374 THER/PROPH/DIAG INJ IV PUSH: CPT

## 2025-02-05 PROCEDURE — 71045 X-RAY EXAM CHEST 1 VIEW: CPT | Performed by: RADIOLOGY

## 2025-02-05 PROCEDURE — 99291 CRITICAL CARE FIRST HOUR: CPT | Mod: 25 | Performed by: STUDENT IN AN ORGANIZED HEALTH CARE EDUCATION/TRAINING PROGRAM

## 2025-02-05 PROCEDURE — 71045 X-RAY EXAM CHEST 1 VIEW: CPT

## 2025-02-05 PROCEDURE — 80053 COMPREHEN METABOLIC PANEL: CPT | Performed by: STUDENT IN AN ORGANIZED HEALTH CARE EDUCATION/TRAINING PROGRAM

## 2025-02-05 PROCEDURE — 83735 ASSAY OF MAGNESIUM: CPT | Performed by: STUDENT IN AN ORGANIZED HEALTH CARE EDUCATION/TRAINING PROGRAM

## 2025-02-05 PROCEDURE — 85025 COMPLETE CBC W/AUTO DIFF WBC: CPT | Performed by: STUDENT IN AN ORGANIZED HEALTH CARE EDUCATION/TRAINING PROGRAM

## 2025-02-05 RX ORDER — ONDANSETRON HYDROCHLORIDE 2 MG/ML
4 INJECTION, SOLUTION INTRAVENOUS ONCE
Status: COMPLETED | OUTPATIENT
Start: 2025-02-05 | End: 2025-02-05

## 2025-02-05 RX ORDER — HEPARIN SODIUM 10000 [USP'U]/100ML
0-4000 INJECTION, SOLUTION INTRAVENOUS CONTINUOUS
Status: DISPENSED | OUTPATIENT
Start: 2025-02-05

## 2025-02-05 RX ADMIN — NITROGLYCERIN 0.5 INCH: 20 OINTMENT TOPICAL at 22:42

## 2025-02-05 RX ADMIN — HEPARIN SODIUM 1000 UNITS/HR: 10000 INJECTION, SOLUTION INTRAVENOUS at 22:42

## 2025-02-05 RX ADMIN — ONDANSETRON 4 MG: 2 INJECTION INTRAMUSCULAR; INTRAVENOUS at 22:57

## 2025-02-05 ASSESSMENT — PAIN DESCRIPTION - PAIN TYPE: TYPE: ACUTE PAIN

## 2025-02-05 ASSESSMENT — PAIN DESCRIPTION - LOCATION: LOCATION: CHEST

## 2025-02-05 ASSESSMENT — LIFESTYLE VARIABLES
TOTAL SCORE: 0
EVER FELT BAD OR GUILTY ABOUT YOUR DRINKING: NO
HAVE YOU EVER FELT YOU SHOULD CUT DOWN ON YOUR DRINKING: NO
HAVE PEOPLE ANNOYED YOU BY CRITICIZING YOUR DRINKING: NO
EVER HAD A DRINK FIRST THING IN THE MORNING TO STEADY YOUR NERVES TO GET RID OF A HANGOVER: NO

## 2025-02-05 ASSESSMENT — PAIN SCALES - GENERAL
PAINLEVEL_OUTOF10: 1
PAINLEVEL_OUTOF10: 1

## 2025-02-05 ASSESSMENT — PAIN - FUNCTIONAL ASSESSMENT: PAIN_FUNCTIONAL_ASSESSMENT: 0-10

## 2025-02-05 NOTE — Clinical Note
Discharge Instructions  Constipation  Constipation can cause severe cramping pain and your provider thinks this might be the cause of your abdominal pain (belly pain) today.  People usually recognize that they are constipated because they have difficulty having bowel movements, are not having bowel movements frequently enough, or are not having large enough bowel movements. Sometimes, especially in children or older people, you do not recognize that you are constipated until it becomes severe. The most common causes of constipation are a lack of exercise and not eating enough fruits, vegetables, and whole grains. Constipation can also be a side effect of medications, such as narcotics, or may be caused by a disease of the digestive system.    Generally, every Emergency Department visit should have a follow-up clinic visit with either a primary or a specialty clinic/provider. Please follow-up as instructed by your emergency provider today. Sometimes, chronic constipation requires further testing to determine the cause. If you are over 50 years old, you may need a colonoscopy if you have not had one before.     Return to the Emergency Department if:  Your abdominal pain worsens or does not improve after a bowel movement.  You become very weak.  You get a temperature above 102oF or as directed by your provider.  You have blood in your stools (bright red or black, tarry stools).  You keep vomiting (throwing up) or cannot drink liquids.  Your see blood when you vomit.  Your stomach gets bloated or bigger.  You have new symptoms or anything that worries you.    What can I do to help myself?  If your provider gave you a cathartic medication, like magnesium citrate or GoLytely  (polyethylene glycol), you can expect to have cramps and gas pains after taking it. You can expect to have a number of bowel movements and even diarrhea (loose or watery stools) in the course of clearing your bowels.  You will know your bowels  Patient Clipped and Prepped: right groin. Prepped with ChloraPrep, a minimum of 3 minute dry time, longer if needed, no pooling noted, patient draped in sterile fashion. have been cleaned out after you pass clear liquid. The cramps and gas should let up after you have emptied your bowels. You may want to wait until morning to take this type of medication so you aren t up in the night.   Sometimes instead of cathartics, we recommend laxatives like milk of magnesia to move your bowels more slowly, or an enema to help the bowels to move. Read and follow the package directions, or follow your provider s instructions.  Once you have become very constipated, it takes time for your bowels to return to normal and you need to be very careful to prevent becoming constipated again. Take a laxative if you do not move your bowels at least every two days.     Eat foods that have a lot of fiber. Good choices are fruits, vegetables, prune juice, apple juice, and high fiber cereal. Limit dairy products such as milk and cheese, since these can make constipation worse.   Drink plenty of water.   When you feel the need to go to the bathroom, go to the bathroom. Do not hold it.  Miralax , Metamucil , Colace , Senna or fiber supplements can be used daily.  Miralax  daily is often the best choice for children.  If you were given a prescription for medicine here today, be sure to read all of the information (including the package insert) that comes with your prescription.  This will include important information about the medicine, its side effects, and any warnings that you need to know about.  The pharmacist who fills the prescription can provide more information and answer questions you may have about the medicine.  If you have questions or concerns that the pharmacist cannot address, please call or return to the Emergency Department.   Remember that you can always come back to the Emergency Department if you are not able to see your regular provider in the amount of time listed above, if you get any new symptoms, or if there is anything that worries you.    Discharge  Instructions  Diverticulitis    Your provider has diagnosed you with diverticulitis.  Diverticulitis is inflammation of a diverticulum, which is a tiny sack-like structure that protrudes off the wall of the colon (large intestine).  These sacks are created over years of increased pressure in the colon (this is diverticulosis), usually as a result of a diet without enough fruits, vegetables, and whole grains. Diverticulosis doesn't cause symptoms.    When these sacks get inflamed, it is called divericulitis.  Diverticulitis causes abdominal (belly) pain, fever, nausea (sick to stomach), and vomiting (throwing up). Diverticulitis is usually treated at home.  However, sometimes diverticulitis needs treatment in the hospital and may even need surgery.      Generally, every Emergency Department visit should have a follow-up clinic visit with either a primary or a specialty clinic/provider. Please follow-up as instructed by your emergency provider today.    Return to the Emergency Department if:   You get an oral temperature above 102oF or as directed by your provider.  You have blood in your stools (bright red or black, tarry stools), or have blood in your vomit.  You keep vomiting or cannot drink liquids or cannot keep your medicine down.  You cannot have a bowel movement or you cannot pass gas.  Your stomach gets bloated or bigger.  You faint or become very weak.  Your pain is too bad to tolerate.  You have new symptoms or anything that worries you.    What can I do to help myself? How is diverticulitis treated?  Diverticulitis can be treated without antibiotics in many cases. In the past, diverticulitis was routinely treated with antibiotics.  However, research over the past decade has found that antibiotic use in most cases of diverticulitis does not improve healing because the process may be more inflammatory, rather than infectious.  Antibiotics also wipe out good, healthy gut bacteria, which we are increasingly  "aware are important to overall health.  Some patients with certain medical conditions, lab results, or duration of symptoms may still need antibiotics. If you were directed to take antibiotics, take them right away and be sure to finish the whole antibiotic prescription.  Regardless of whether or not you are treated with antibiotics, for the first day or two at home drink only clear liquids.  This lets your intestines rest.  If your pain has improved after one or two days, you may start eating mild foods. Soda crackers, toast, plain noodles, gelatin, applesauce and bananas are good first choices.  Avoid foods that have acid, are spicy, fatty or fibrous (such as meats, coarse grains, vegetables). You may start eating these foods again in about 3-4 days when you are better.  Once you are back to normal, eat a high fiber diet of fruits, vegetables and whole grains. Some people think you should avoid eating nuts, seeds, and corn, but there is no definite proof this makes any difference in whether you will get diverticulitis again.   Pain and fever can be treated with acetaminophen (Tylenol ), ibuprofen (Advil ) or you might have been prescribed a pain medication.  Hot packs or a heating pad may also feel good.    When Should I Follow Up After I Am Feeling Better?  Follow-up as directed by your provider during your visit.  If this was your first episode of diverticulitis, you should have a colonoscopy within 6-8 weeks because in a small number of patients, malignancy (cancer) can look like diverticulitis.   You may not need a colonoscopy if you recently had one. Talk to your primary care doctor to determine if you need a colonoscopy.    Probiotics: If you have been given an antibiotic, you may want to also take a probiotic pill or eat yogurt with live cultures. Probiotics have \"good bacteria\" to help your intestines stay healthy. Studies have shown that probiotics help prevent diarrhea and other intestine problems " (including C. diff infection) when you take antibiotics. You can buy these without a prescription in the pharmacy section of the store.    If you were given a prescription for medicine here today, be sure to read all of the information (including the package insert) that comes with your prescription.  This will include important information about the medicine, its side effects, and any warnings that you need to know about.  The pharmacist who fills the prescription can provide more information and answer questions you may have about the medicine.  If you have questions or concerns that the pharmacist cannot address, please call or return to the Emergency Department.     Remember that you can always come back to the Emergency Department if you are not able to see your regular provider in the amount of time listed above, if you get any new symptoms, or if there is anything that worries you.

## 2025-02-06 ENCOUNTER — APPOINTMENT (OUTPATIENT)
Dept: RADIOLOGY | Facility: HOSPITAL | Age: 75
End: 2025-02-06
Payer: MEDICARE

## 2025-02-06 PROBLEM — R13.12 OROPHARYNGEAL DYSPHAGIA: Status: ACTIVE | Noted: 2025-02-06

## 2025-02-06 PROBLEM — Z87.19 HISTORY OF CONSTIPATION: Status: ACTIVE | Noted: 2025-02-06

## 2025-02-06 PROBLEM — Z91.199 NONCOMPLIANCE WITH CPAP TREATMENT: Status: ACTIVE | Noted: 2025-02-06

## 2025-02-06 PROBLEM — I21.4 NSTEMI (NON-ST ELEVATED MYOCARDIAL INFARCTION) (MULTI): Status: ACTIVE | Noted: 2025-02-05

## 2025-02-06 PROBLEM — R07.9 CHEST PAIN, UNSPECIFIED TYPE: Status: ACTIVE | Noted: 2025-02-06

## 2025-02-06 PROBLEM — G47.33 OBSTRUCTIVE SLEEP APNEA: Status: ACTIVE | Noted: 2025-02-06

## 2025-02-06 PROBLEM — Z95.5 HX OF HEART ARTERY STENT: Status: ACTIVE | Noted: 2025-02-06

## 2025-02-06 PROBLEM — F33.41 RECURRENT MAJOR DEPRESSIVE DISORDER, IN PARTIAL REMISSION (CMS-HCC): Status: ACTIVE | Noted: 2025-02-06

## 2025-02-06 LAB
ALBUMIN SERPL BCP-MCNC: 4 G/DL (ref 3.4–5)
ANION GAP SERPL CALC-SCNC: 15 MMOL/L (ref 10–20)
ATRIAL RATE: 81 BPM
ATRIAL RATE: 82 BPM
BUN SERPL-MCNC: 15 MG/DL (ref 6–23)
CALCIUM SERPL-MCNC: 9.1 MG/DL (ref 8.6–10.3)
CARDIAC TROPONIN I PNL SERPL HS: 1858 NG/L (ref 0–20)
CARDIAC TROPONIN I PNL SERPL HS: 2450 NG/L (ref 0–20)
CHLORIDE SERPL-SCNC: 96 MMOL/L (ref 98–107)
CHOLEST SERPL-MCNC: 258 MG/DL (ref 0–199)
CHOLESTEROL/HDL RATIO: 10.4
CO2 SERPL-SCNC: 25 MMOL/L (ref 21–32)
CREAT SERPL-MCNC: 0.9 MG/DL (ref 0.5–1.3)
EGFRCR SERPLBLD CKD-EPI 2021: 90 ML/MIN/1.73M*2
ERYTHROCYTE [DISTWIDTH] IN BLOOD BY AUTOMATED COUNT: 13 % (ref 11.5–14.5)
EST. AVERAGE GLUCOSE BLD GHB EST-MCNC: 375 MG/DL
GLUCOSE BLD MANUAL STRIP-MCNC: 126 MG/DL (ref 74–99)
GLUCOSE BLD MANUAL STRIP-MCNC: 233 MG/DL (ref 74–99)
GLUCOSE BLD MANUAL STRIP-MCNC: 289 MG/DL (ref 74–99)
GLUCOSE BLD MANUAL STRIP-MCNC: 343 MG/DL (ref 74–99)
GLUCOSE BLD MANUAL STRIP-MCNC: 345 MG/DL (ref 74–99)
GLUCOSE SERPL-MCNC: 328 MG/DL (ref 74–99)
HBA1C MFR BLD: 14.7 %
HCT VFR BLD AUTO: 44.2 % (ref 41–52)
HDLC SERPL-MCNC: 24.7 MG/DL
HGB BLD-MCNC: 15.8 G/DL (ref 13.5–17.5)
LDLC SERPL CALC-MCNC: 172 MG/DL
MAGNESIUM SERPL-MCNC: 1.86 MG/DL (ref 1.6–2.4)
MCH RBC QN AUTO: 29.3 PG (ref 26–34)
MCHC RBC AUTO-ENTMCNC: 35.7 G/DL (ref 32–36)
MCV RBC AUTO: 82 FL (ref 80–100)
NON HDL CHOLESTEROL: 233 MG/DL (ref 0–149)
NRBC BLD-RTO: 0 /100 WBCS (ref 0–0)
P AXIS: 65 DEGREES
PHOSPHATE SERPL-MCNC: 3.3 MG/DL (ref 2.5–4.9)
PLATELET # BLD AUTO: 145 X10*3/UL (ref 150–450)
POTASSIUM SERPL-SCNC: 3.9 MMOL/L (ref 3.5–5.3)
Q ONSET: 209 MS
Q ONSET: 211 MS
QRS COUNT: 10 BEATS
QRS COUNT: 11 BEATS
QRS DURATION: 110 MS
QRS DURATION: 114 MS
QT INTERVAL: 416 MS
QT INTERVAL: 440 MS
QTC CALCULATION(BAZETT): 442 MS
QTC CALCULATION(BAZETT): 461 MS
QTC FREDERICIA: 434 MS
QTC FREDERICIA: 454 MS
R AXIS: 262 DEGREES
R AXIS: 265 DEGREES
RBC # BLD AUTO: 5.4 X10*6/UL (ref 4.5–5.9)
SODIUM SERPL-SCNC: 132 MMOL/L (ref 136–145)
T AXIS: 76 DEGREES
T AXIS: 88 DEGREES
T OFFSET: 417 MS
T OFFSET: 431 MS
TRIGL SERPL-MCNC: 309 MG/DL (ref 0–149)
UFH PPP CHRO-ACNC: 0.2 IU/ML
UFH PPP CHRO-ACNC: 0.2 IU/ML
UFH PPP CHRO-ACNC: 0.3 IU/ML
VENTRICULAR RATE: 66 BPM
VENTRICULAR RATE: 68 BPM
VLDL: 62 MG/DL (ref 0–40)
WBC # BLD AUTO: 8.3 X10*3/UL (ref 4.4–11.3)

## 2025-02-06 PROCEDURE — 82947 ASSAY GLUCOSE BLOOD QUANT: CPT

## 2025-02-06 PROCEDURE — 2500000004 HC RX 250 GENERAL PHARMACY W/ HCPCS (ALT 636 FOR OP/ED): Performed by: INTERNAL MEDICINE

## 2025-02-06 PROCEDURE — G0269 OCCLUSIVE DEVICE IN VEIN ART: HCPCS | Mod: 59 | Performed by: INTERNAL MEDICINE

## 2025-02-06 PROCEDURE — 7100000002 HC RECOVERY ROOM TIME - EACH INCREMENTAL 1 MINUTE: Performed by: INTERNAL MEDICINE

## 2025-02-06 PROCEDURE — B2151ZZ FLUOROSCOPY OF LEFT HEART USING LOW OSMOLAR CONTRAST: ICD-10-PCS | Performed by: INTERNAL MEDICINE

## 2025-02-06 PROCEDURE — 84484 ASSAY OF TROPONIN QUANT: CPT | Performed by: STUDENT IN AN ORGANIZED HEALTH CARE EDUCATION/TRAINING PROGRAM

## 2025-02-06 PROCEDURE — 83735 ASSAY OF MAGNESIUM: CPT | Performed by: STUDENT IN AN ORGANIZED HEALTH CARE EDUCATION/TRAINING PROGRAM

## 2025-02-06 PROCEDURE — 85027 COMPLETE CBC AUTOMATED: CPT | Performed by: STUDENT IN AN ORGANIZED HEALTH CARE EDUCATION/TRAINING PROGRAM

## 2025-02-06 PROCEDURE — 99153 MOD SED SAME PHYS/QHP EA: CPT | Performed by: INTERNAL MEDICINE

## 2025-02-06 PROCEDURE — 2500000001 HC RX 250 WO HCPCS SELF ADMINISTERED DRUGS (ALT 637 FOR MEDICARE OP): Performed by: STUDENT IN AN ORGANIZED HEALTH CARE EDUCATION/TRAINING PROGRAM

## 2025-02-06 PROCEDURE — 93970 EXTREMITY STUDY: CPT

## 2025-02-06 PROCEDURE — 71250 CT THORAX DX C-: CPT

## 2025-02-06 PROCEDURE — 2500000002 HC RX 250 W HCPCS SELF ADMINISTERED DRUGS (ALT 637 FOR MEDICARE OP, ALT 636 FOR OP/ED): Performed by: INTERNAL MEDICINE

## 2025-02-06 PROCEDURE — 71250 CT THORAX DX C-: CPT | Performed by: RADIOLOGY

## 2025-02-06 PROCEDURE — 99222 1ST HOSP IP/OBS MODERATE 55: CPT | Performed by: STUDENT IN AN ORGANIZED HEALTH CARE EDUCATION/TRAINING PROGRAM

## 2025-02-06 PROCEDURE — 93458 L HRT ARTERY/VENTRICLE ANGIO: CPT | Performed by: INTERNAL MEDICINE

## 2025-02-06 PROCEDURE — 2500000002 HC RX 250 W HCPCS SELF ADMINISTERED DRUGS (ALT 637 FOR MEDICARE OP, ALT 636 FOR OP/ED): Performed by: STUDENT IN AN ORGANIZED HEALTH CARE EDUCATION/TRAINING PROGRAM

## 2025-02-06 PROCEDURE — G0316 PR PROLONGED HOSPITAL INPATIENT OR OBSERVATION CARE EVALUATION AND MANAGEMENT SERVICE(S) BEYOND THE TOTAL TIME FOR THE PRIMARY SERVICE (WHEN THE PRIMARY SERVICE HAS BEEN SELECTED USING TIME: HCPCS | Performed by: INTERNAL MEDICINE

## 2025-02-06 PROCEDURE — 83036 HEMOGLOBIN GLYCOSYLATED A1C: CPT | Mod: ELYLAB | Performed by: INTERNAL MEDICINE

## 2025-02-06 PROCEDURE — 93880 EXTRACRANIAL BILAT STUDY: CPT | Performed by: RADIOLOGY

## 2025-02-06 PROCEDURE — 2500000001 HC RX 250 WO HCPCS SELF ADMINISTERED DRUGS (ALT 637 FOR MEDICARE OP): Performed by: NURSE PRACTITIONER

## 2025-02-06 PROCEDURE — 99223 1ST HOSP IP/OBS HIGH 75: CPT | Performed by: INTERNAL MEDICINE

## 2025-02-06 PROCEDURE — 2500000001 HC RX 250 WO HCPCS SELF ADMINISTERED DRUGS (ALT 637 FOR MEDICARE OP): Performed by: INTERNAL MEDICINE

## 2025-02-06 PROCEDURE — 85520 HEPARIN ASSAY: CPT | Performed by: STUDENT IN AN ORGANIZED HEALTH CARE EDUCATION/TRAINING PROGRAM

## 2025-02-06 PROCEDURE — 99233 SBSQ HOSP IP/OBS HIGH 50: CPT | Performed by: INTERNAL MEDICINE

## 2025-02-06 PROCEDURE — C1760 CLOSURE DEV, VASC: HCPCS | Performed by: INTERNAL MEDICINE

## 2025-02-06 PROCEDURE — 7100000009 HC PHASE TWO TIME - INITIAL BASE CHARGE: Performed by: INTERNAL MEDICINE

## 2025-02-06 PROCEDURE — 93880 EXTRACRANIAL BILAT STUDY: CPT

## 2025-02-06 PROCEDURE — 2780000003 HC OR 278 NO HCPCS: Performed by: INTERNAL MEDICINE

## 2025-02-06 PROCEDURE — 2720000007 HC OR 272 NO HCPCS: Performed by: INTERNAL MEDICINE

## 2025-02-06 PROCEDURE — 4A023N7 MEASUREMENT OF CARDIAC SAMPLING AND PRESSURE, LEFT HEART, PERCUTANEOUS APPROACH: ICD-10-PCS | Performed by: INTERNAL MEDICINE

## 2025-02-06 PROCEDURE — 80069 RENAL FUNCTION PANEL: CPT | Performed by: STUDENT IN AN ORGANIZED HEALTH CARE EDUCATION/TRAINING PROGRAM

## 2025-02-06 PROCEDURE — 99152 MOD SED SAME PHYS/QHP 5/>YRS: CPT | Performed by: INTERNAL MEDICINE

## 2025-02-06 PROCEDURE — 2500000002 HC RX 250 W HCPCS SELF ADMINISTERED DRUGS (ALT 637 FOR MEDICARE OP, ALT 636 FOR OP/ED): Performed by: NURSE PRACTITIONER

## 2025-02-06 PROCEDURE — 36415 COLL VENOUS BLD VENIPUNCTURE: CPT | Performed by: STUDENT IN AN ORGANIZED HEALTH CARE EDUCATION/TRAINING PROGRAM

## 2025-02-06 PROCEDURE — 7100000010 HC PHASE TWO TIME - EACH INCREMENTAL 1 MINUTE: Performed by: INTERNAL MEDICINE

## 2025-02-06 PROCEDURE — 2060000001 HC INTERMEDIATE ICU ROOM DAILY

## 2025-02-06 PROCEDURE — 99024 POSTOP FOLLOW-UP VISIT: CPT | Performed by: NURSE PRACTITIONER

## 2025-02-06 PROCEDURE — 96375 TX/PRO/DX INJ NEW DRUG ADDON: CPT | Mod: 59

## 2025-02-06 PROCEDURE — 99223 1ST HOSP IP/OBS HIGH 75: CPT | Performed by: NURSE PRACTITIONER

## 2025-02-06 PROCEDURE — 2550000001 HC RX 255 CONTRASTS: Performed by: INTERNAL MEDICINE

## 2025-02-06 PROCEDURE — B2111ZZ FLUOROSCOPY OF MULTIPLE CORONARY ARTERIES USING LOW OSMOLAR CONTRAST: ICD-10-PCS | Performed by: INTERNAL MEDICINE

## 2025-02-06 PROCEDURE — 7100000001 HC RECOVERY ROOM TIME - INITIAL BASE CHARGE: Performed by: INTERNAL MEDICINE

## 2025-02-06 PROCEDURE — 2500000004 HC RX 250 GENERAL PHARMACY W/ HCPCS (ALT 636 FOR OP/ED): Performed by: STUDENT IN AN ORGANIZED HEALTH CARE EDUCATION/TRAINING PROGRAM

## 2025-02-06 PROCEDURE — 85520 HEPARIN ASSAY: CPT | Performed by: INTERNAL MEDICINE

## 2025-02-06 PROCEDURE — 76882 US LMTD JT/FCL EVL NVASC XTR: CPT | Performed by: RADIOLOGY

## 2025-02-06 RX ORDER — LIDOCAINE HYDROCHLORIDE 20 MG/ML
INJECTION, SOLUTION INFILTRATION; PERINEURAL AS NEEDED
Status: DISCONTINUED | OUTPATIENT
Start: 2025-02-06 | End: 2025-02-06 | Stop reason: HOSPADM

## 2025-02-06 RX ORDER — ACETAMINOPHEN 650 MG/1
650 SUPPOSITORY RECTAL EVERY 4 HOURS PRN
Status: ACTIVE | OUTPATIENT
Start: 2025-02-06

## 2025-02-06 RX ORDER — INSULIN LISPRO 100 [IU]/ML
12 INJECTION, SOLUTION INTRAVENOUS; SUBCUTANEOUS ONCE
Status: COMPLETED | OUTPATIENT
Start: 2025-02-06 | End: 2025-02-06

## 2025-02-06 RX ORDER — EZETIMIBE 10 MG/1
10 TABLET ORAL NIGHTLY
Status: DISPENSED | OUTPATIENT
Start: 2025-02-06

## 2025-02-06 RX ORDER — ONDANSETRON HYDROCHLORIDE 2 MG/ML
4 INJECTION, SOLUTION INTRAVENOUS EVERY 4 HOURS PRN
Status: ACTIVE | OUTPATIENT
Start: 2025-02-06

## 2025-02-06 RX ORDER — HYDROXYZINE HYDROCHLORIDE 25 MG/1
25 TABLET, FILM COATED ORAL 2 TIMES DAILY
Status: DISPENSED | OUTPATIENT
Start: 2025-02-06

## 2025-02-06 RX ORDER — ALUMINUM HYDROXIDE, MAGNESIUM HYDROXIDE, AND SIMETHICONE 1200; 120; 1200 MG/30ML; MG/30ML; MG/30ML
30 SUSPENSION ORAL 4 TIMES DAILY PRN
Status: DISPENSED | OUTPATIENT
Start: 2025-02-06

## 2025-02-06 RX ORDER — INSULIN GLARGINE 100 [IU]/ML
32 INJECTION, SOLUTION SUBCUTANEOUS EVERY MORNING
Status: DISPENSED | OUTPATIENT
Start: 2025-02-06

## 2025-02-06 RX ORDER — INSULIN GLARGINE 100 [IU]/ML
35 INJECTION, SOLUTION SUBCUTANEOUS NIGHTLY
Status: DISPENSED | OUTPATIENT
Start: 2025-02-06

## 2025-02-06 RX ORDER — PANTOPRAZOLE SODIUM 40 MG/10ML
40 INJECTION, POWDER, LYOPHILIZED, FOR SOLUTION INTRAVENOUS ONCE
Status: COMPLETED | OUTPATIENT
Start: 2025-02-06 | End: 2025-02-06

## 2025-02-06 RX ORDER — ASPIRIN 325 MG
TABLET ORAL AS NEEDED
Status: DISCONTINUED | OUTPATIENT
Start: 2025-02-06 | End: 2025-02-06 | Stop reason: HOSPADM

## 2025-02-06 RX ORDER — PANTOPRAZOLE SODIUM 40 MG/1
40 TABLET, DELAYED RELEASE ORAL DAILY
Status: DISPENSED | OUTPATIENT
Start: 2025-02-06

## 2025-02-06 RX ORDER — SERTRALINE HYDROCHLORIDE 50 MG/1
25 TABLET, FILM COATED ORAL DAILY
Status: DISCONTINUED | OUTPATIENT
Start: 2025-02-06 | End: 2025-02-08

## 2025-02-06 RX ORDER — ONDANSETRON 4 MG/1
4 TABLET, FILM COATED ORAL EVERY 8 HOURS PRN
Status: ACTIVE | OUTPATIENT
Start: 2025-02-06

## 2025-02-06 RX ORDER — ADHESIVE BANDAGE
30 BANDAGE TOPICAL DAILY PRN
Status: DISPENSED | OUTPATIENT
Start: 2025-02-06

## 2025-02-06 RX ORDER — ACETAMINOPHEN 325 MG/1
650 TABLET ORAL EVERY 4 HOURS PRN
Status: DISPENSED | OUTPATIENT
Start: 2025-02-06

## 2025-02-06 RX ORDER — AMLODIPINE BESYLATE 5 MG/1
5 TABLET ORAL DAILY
Status: DISPENSED | OUTPATIENT
Start: 2025-02-06

## 2025-02-06 RX ORDER — VENLAFAXINE HYDROCHLORIDE 150 MG/1
150 CAPSULE, EXTENDED RELEASE ORAL
Status: DISPENSED | OUTPATIENT
Start: 2025-02-07

## 2025-02-06 RX ORDER — NITROGLYCERIN 0.4 MG/1
0.4 TABLET SUBLINGUAL EVERY 5 MIN PRN
Status: DISPENSED | OUTPATIENT
Start: 2025-02-06

## 2025-02-06 RX ORDER — IPRATROPIUM BROMIDE AND ALBUTEROL SULFATE 2.5; .5 MG/3ML; MG/3ML
3 SOLUTION RESPIRATORY (INHALATION) EVERY 4 HOURS PRN
Status: ACTIVE | OUTPATIENT
Start: 2025-02-06

## 2025-02-06 RX ORDER — NAPROXEN SODIUM 220 MG/1
81 TABLET, FILM COATED ORAL DAILY
Status: DISPENSED | OUTPATIENT
Start: 2025-02-07

## 2025-02-06 RX ORDER — ACETAMINOPHEN 500 MG
5 TABLET ORAL NIGHTLY PRN
Status: ACTIVE | OUTPATIENT
Start: 2025-02-06

## 2025-02-06 RX ORDER — POLYETHYLENE GLYCOL 3350 17 G/17G
17 POWDER, FOR SOLUTION ORAL DAILY
Status: DISPENSED | OUTPATIENT
Start: 2025-02-06

## 2025-02-06 RX ORDER — INSULIN LISPRO 100 [IU]/ML
0-15 INJECTION, SOLUTION INTRAVENOUS; SUBCUTANEOUS
Status: DISPENSED | OUTPATIENT
Start: 2025-02-06

## 2025-02-06 RX ORDER — ISOSORBIDE MONONITRATE 30 MG/1
30 TABLET, EXTENDED RELEASE ORAL DAILY
Status: DISPENSED | OUTPATIENT
Start: 2025-02-06

## 2025-02-06 RX ORDER — MIDAZOLAM HYDROCHLORIDE 1 MG/ML
INJECTION, SOLUTION INTRAMUSCULAR; INTRAVENOUS AS NEEDED
Status: DISCONTINUED | OUTPATIENT
Start: 2025-02-06 | End: 2025-02-06 | Stop reason: HOSPADM

## 2025-02-06 RX ORDER — LORAZEPAM 1 MG/1
1 TABLET ORAL ONCE
Status: COMPLETED | OUTPATIENT
Start: 2025-02-06 | End: 2025-02-06

## 2025-02-06 RX ORDER — FENTANYL CITRATE 50 UG/ML
INJECTION, SOLUTION INTRAMUSCULAR; INTRAVENOUS AS NEEDED
Status: DISCONTINUED | OUTPATIENT
Start: 2025-02-06 | End: 2025-02-06 | Stop reason: HOSPADM

## 2025-02-06 RX ORDER — ACETAMINOPHEN 160 MG/5ML
650 SOLUTION ORAL EVERY 4 HOURS PRN
Status: ACTIVE | OUTPATIENT
Start: 2025-02-06

## 2025-02-06 RX ORDER — LOVASTATIN 10 MG/1
40 TABLET ORAL NIGHTLY
Status: DISPENSED | OUTPATIENT
Start: 2025-02-06

## 2025-02-06 RX ADMIN — ISOSORBIDE MONONITRATE 30 MG: 30 TABLET, EXTENDED RELEASE ORAL at 12:13

## 2025-02-06 RX ADMIN — INSULIN LISPRO 6 UNITS: 100 INJECTION, SOLUTION INTRAVENOUS; SUBCUTANEOUS at 13:08

## 2025-02-06 RX ADMIN — ACETAMINOPHEN 650 MG: 325 TABLET ORAL at 04:32

## 2025-02-06 RX ADMIN — EZETIMIBE 10 MG: 10 TABLET ORAL at 20:18

## 2025-02-06 RX ADMIN — ACETAMINOPHEN 650 MG: 325 TABLET ORAL at 08:13

## 2025-02-06 RX ADMIN — INSULIN LISPRO 12 UNITS: 100 INJECTION, SOLUTION INTRAVENOUS; SUBCUTANEOUS at 17:56

## 2025-02-06 RX ADMIN — HYDROXYZINE HYDROCHLORIDE 25 MG: 25 TABLET ORAL at 20:18

## 2025-02-06 RX ADMIN — INSULIN GLARGINE 35 UNITS: 100 INJECTION, SOLUTION SUBCUTANEOUS at 20:16

## 2025-02-06 RX ADMIN — POLYETHYLENE GLYCOL 3350 17 G: 17 POWDER, FOR SOLUTION ORAL at 14:45

## 2025-02-06 RX ADMIN — AMLODIPINE BESYLATE 5 MG: 5 TABLET ORAL at 08:28

## 2025-02-06 RX ADMIN — ACETAMINOPHEN 650 MG: 325 TABLET ORAL at 12:15

## 2025-02-06 RX ADMIN — INSULIN GLARGINE 32 UNITS: 100 INJECTION, SOLUTION SUBCUTANEOUS at 08:13

## 2025-02-06 RX ADMIN — ACETAMINOPHEN 650 MG: 325 TABLET ORAL at 20:23

## 2025-02-06 RX ADMIN — SERTRALINE HYDROCHLORIDE 25 MG: 50 TABLET, FILM COATED ORAL at 14:45

## 2025-02-06 RX ADMIN — LORAZEPAM 1 MG: 1 TABLET ORAL at 03:20

## 2025-02-06 RX ADMIN — INSULIN LISPRO 12 UNITS: 100 INJECTION, SOLUTION INTRAVENOUS; SUBCUTANEOUS at 03:20

## 2025-02-06 RX ADMIN — INSULIN HUMAN 5 UNITS: 100 INJECTION, SOLUTION PARENTERAL at 00:31

## 2025-02-06 RX ADMIN — HYDROXYZINE HYDROCHLORIDE 25 MG: 25 TABLET ORAL at 08:13

## 2025-02-06 RX ADMIN — PANTOPRAZOLE SODIUM 40 MG: 40 INJECTION, POWDER, FOR SOLUTION INTRAVENOUS at 00:41

## 2025-02-06 RX ADMIN — LOVASTATIN 40 MG: 10 TABLET ORAL at 20:18

## 2025-02-06 SDOH — SOCIAL STABILITY: SOCIAL INSECURITY
WITHIN THE LAST YEAR, HAVE YOU BEEN RAPED OR FORCED TO HAVE ANY KIND OF SEXUAL ACTIVITY BY YOUR PARTNER OR EX-PARTNER?: NO

## 2025-02-06 SDOH — SOCIAL STABILITY: SOCIAL INSECURITY: DO YOU FEEL ANYONE HAS EXPLOITED OR TAKEN ADVANTAGE OF YOU FINANCIALLY OR OF YOUR PERSONAL PROPERTY?: NO

## 2025-02-06 SDOH — SOCIAL STABILITY: SOCIAL INSECURITY: WITHIN THE LAST YEAR, HAVE YOU BEEN AFRAID OF YOUR PARTNER OR EX-PARTNER?: NO

## 2025-02-06 SDOH — SOCIAL STABILITY: SOCIAL INSECURITY: HAVE YOU HAD THOUGHTS OF HARMING ANYONE ELSE?: NO

## 2025-02-06 SDOH — SOCIAL STABILITY: SOCIAL INSECURITY: ARE THERE ANY APPARENT SIGNS OF INJURIES/BEHAVIORS THAT COULD BE RELATED TO ABUSE/NEGLECT?: NO

## 2025-02-06 SDOH — ECONOMIC STABILITY: FOOD INSECURITY: WITHIN THE PAST 12 MONTHS, THE FOOD YOU BOUGHT JUST DIDN'T LAST AND YOU DIDN'T HAVE MONEY TO GET MORE.: NEVER TRUE

## 2025-02-06 SDOH — SOCIAL STABILITY: SOCIAL INSECURITY: ABUSE: ADULT

## 2025-02-06 SDOH — ECONOMIC STABILITY: INCOME INSECURITY: IN THE PAST 12 MONTHS HAS THE ELECTRIC, GAS, OIL, OR WATER COMPANY THREATENED TO SHUT OFF SERVICES IN YOUR HOME?: NO

## 2025-02-06 SDOH — SOCIAL STABILITY: SOCIAL INSECURITY: ARE YOU OR HAVE YOU BEEN THREATENED OR ABUSED PHYSICALLY, EMOTIONALLY, OR SEXUALLY BY ANYONE?: NO

## 2025-02-06 SDOH — ECONOMIC STABILITY: FOOD INSECURITY: WITHIN THE PAST 12 MONTHS, YOU WORRIED THAT YOUR FOOD WOULD RUN OUT BEFORE YOU GOT THE MONEY TO BUY MORE.: NEVER TRUE

## 2025-02-06 SDOH — SOCIAL STABILITY: SOCIAL INSECURITY: WITHIN THE LAST YEAR, HAVE YOU BEEN HUMILIATED OR EMOTIONALLY ABUSED IN OTHER WAYS BY YOUR PARTNER OR EX-PARTNER?: NO

## 2025-02-06 SDOH — SOCIAL STABILITY: SOCIAL INSECURITY: HAVE YOU HAD ANY THOUGHTS OF HARMING ANYONE ELSE?: NO

## 2025-02-06 SDOH — SOCIAL STABILITY: SOCIAL INSECURITY: DO YOU FEEL UNSAFE GOING BACK TO THE PLACE WHERE YOU ARE LIVING?: NO

## 2025-02-06 SDOH — SOCIAL STABILITY: SOCIAL INSECURITY: WERE YOU ABLE TO COMPLETE ALL THE BEHAVIORAL HEALTH SCREENINGS?: NO

## 2025-02-06 SDOH — SOCIAL STABILITY: SOCIAL INSECURITY: DOES ANYONE TRY TO KEEP YOU FROM HAVING/CONTACTING OTHER FRIENDS OR DOING THINGS OUTSIDE YOUR HOME?: NO

## 2025-02-06 SDOH — SOCIAL STABILITY: SOCIAL INSECURITY: HAS ANYONE EVER THREATENED TO HURT YOUR FAMILY OR YOUR PETS?: NO

## 2025-02-06 ASSESSMENT — PATIENT HEALTH QUESTIONNAIRE - PHQ9
SUM OF ALL RESPONSES TO PHQ9 QUESTIONS 1 & 2: 0
1. LITTLE INTEREST OR PLEASURE IN DOING THINGS: NOT AT ALL
2. FEELING DOWN, DEPRESSED OR HOPELESS: NOT AT ALL

## 2025-02-06 ASSESSMENT — COGNITIVE AND FUNCTIONAL STATUS - GENERAL
MOBILITY SCORE: 24
DAILY ACTIVITIY SCORE: 24
DAILY ACTIVITIY SCORE: 24
MOBILITY SCORE: 24
PATIENT BASELINE BEDBOUND: NO
DAILY ACTIVITIY SCORE: 24
MOBILITY SCORE: 24

## 2025-02-06 ASSESSMENT — ACTIVITIES OF DAILY LIVING (ADL)
GROOMING: INDEPENDENT
BATHING: INDEPENDENT
WALKS IN HOME: INDEPENDENT
ADEQUATE_TO_COMPLETE_ADL: YES
HEARING - LEFT EAR: FUNCTIONAL
TOILETING: INDEPENDENT
HEARING - RIGHT EAR: FUNCTIONAL
FEEDING YOURSELF: INDEPENDENT
LACK_OF_TRANSPORTATION: NO
PATIENT'S MEMORY ADEQUATE TO SAFELY COMPLETE DAILY ACTIVITIES?: YES
JUDGMENT_ADEQUATE_SAFELY_COMPLETE_DAILY_ACTIVITIES: YES
DRESSING YOURSELF: INDEPENDENT

## 2025-02-06 ASSESSMENT — ENCOUNTER SYMPTOMS
HEMATOLOGIC/LYMPHATIC NEGATIVE: 1
ALLERGIC/IMMUNOLOGIC NEGATIVE: 1
WHEEZING: 1
GASTROINTESTINAL NEGATIVE: 1
ARTHRALGIAS: 1
PSYCHIATRIC NEGATIVE: 1
FREQUENCY: 1
COUGH: 1
UNEXPECTED WEIGHT CHANGE: 1
FATIGUE: 1
EYES NEGATIVE: 1

## 2025-02-06 ASSESSMENT — PAIN SCALES - GENERAL
PAINLEVEL_OUTOF10: 0 - NO PAIN
PAINLEVEL_OUTOF10: 1
PAINLEVEL_OUTOF10: 0 - NO PAIN
PAINLEVEL_OUTOF10: 0 - NO PAIN
PAINLEVEL_OUTOF10: 3
PAINLEVEL_OUTOF10: 0 - NO PAIN
PAINLEVEL_OUTOF10: 3
PAINLEVEL_OUTOF10: 5 - MODERATE PAIN

## 2025-02-06 ASSESSMENT — LIFESTYLE VARIABLES
SKIP TO QUESTIONS 9-10: 1
HOW OFTEN DO YOU HAVE 6 OR MORE DRINKS ON ONE OCCASION: NEVER
HOW OFTEN DO YOU HAVE A DRINK CONTAINING ALCOHOL: NEVER
AUDIT-C TOTAL SCORE: 0
HOW MANY STANDARD DRINKS CONTAINING ALCOHOL DO YOU HAVE ON A TYPICAL DAY: PATIENT DOES NOT DRINK
AUDIT-C TOTAL SCORE: 0

## 2025-02-06 ASSESSMENT — PAIN DESCRIPTION - LOCATION
LOCATION: HEAD
LOCATION: SHOULDER

## 2025-02-06 ASSESSMENT — PAIN DESCRIPTION - ORIENTATION: ORIENTATION: RIGHT

## 2025-02-06 NOTE — SIGNIFICANT EVENT
Report called to 8 Pravin RN. Placing Pt in for transportation. Charting/care will be continued on unit.

## 2025-02-06 NOTE — CONSULTS
"Nutrition Initial Assessment:   Nutrition Assessment    Reason for Assessment: Admission nursing screening    Patient is a 74 y.o. male presenting with chest pain  past medical history of CAD (s/p PCI with RCA stent 2011), Mobitz type 1 AV block, HTN, HLD, IDDM-II, COPD, tobacco use disorder, GERD, depression & anxiety who presented to [facility] due to chest pain.        Nutrition History:  Energy Intake:  (no meal intakes, pt NPO this morning for heart cath)  Pain affecting nutrition status: N/A  Food and Nutrient History: RDN consult for MST score of 4. Met with pt who reports wt loss from 235 lbs to 185 lb over the past 1 year. Pt unsure cause of wt loss, reports loss to be unintentional. Pt denies changes in appetite, states he has been eating well although does not eat like he did when he was younger. Pt denies use of ONS at home. Pt states he has diabetes and heart problems and follows diet for each at home. Pt denies GI symptoms at this time, denies chewing or swallowing difficulty. Will monitor po intake and follow for the need for ONS  Vitamin/Herbal Supplement Use: none per home med list       Anthropometrics:  Height: 177.8 cm (5' 10\")   Weight: 83.9 kg (185 lb)   BMI (Calculated): 26.54  IBW/kg (Dietitian Calculated): 75.5 kg  Percent of IBW: 111 %       Weight History:   Wt Readings from Last 10 Encounters:   02/06/25 83.9 kg (185 lb)   01/29/25 85.5 kg (188 lb 6.4 oz)   10/15/24 90.3 kg (199 lb)   10/04/24 89.8 kg (198 lb)   09/03/24 87.9 kg (193 lb 12.8 oz)   05/07/24 92.5 kg (204 lb)   05/03/24 92.6 kg (204 lb 2 oz)   12/28/23 94.8 kg (209 lb)   12/20/23 94.8 kg (209 lb)   11/20/23 95.6 kg (210 lb 12.8 oz)      Weight Change %:  Weight History / % Weight Change: per chart review, pt with 19 lb, 9.3% wt loss in 9 months  Significant Weight Loss: No    Nutrition Focused Physical Exam Findings:    Subcutaneous Fat Loss:   Orbital Fat Pads: Well nourished (slightly bulging fat pads)  Buccal Fat Pads: " Well nourished (full, rounded cheeks)  Triceps: Well nourished (ample fat tissue)  Muscle Wasting:  Temporalis: Well nourished (well-defined muscle)  Pectoralis (Clavicular Region): Well nourished (clavicle not visible)  Deltoid/Trapezius: Well nourished (rounded appearance at arm, shoulder, neck)  Edema:  Edema: none  Physical Findings:  Skin: Negative    Nutrition Significant Labs:  BMP Trend:   Results from last 7 days   Lab Units 02/06/25  0309 02/05/25  2147   GLUCOSE mg/dL 328* 438*   CALCIUM mg/dL 9.1 9.3   SODIUM mmol/L 132* 129*   POTASSIUM mmol/L 3.9 4.4   CO2 mmol/L 25 23   CHLORIDE mmol/L 96* 93*   BUN mg/dL 15 16   CREATININE mg/dL 0.90 0.99    , A1C:  Lab Results   Component Value Date    HGBA1C 14.7 (H) 02/06/2025   , BG POCT trend:   Results from last 7 days   Lab Units 02/06/25  1232 02/06/25  0631 02/06/25  0152   POCT GLUCOSE mg/dL 233* 126* 345*        Nutrition Specific Medications:  Scheduled medications  insulin glargine, 32 Units, subcutaneous, q AM  insulin glargine, 35 Units, subcutaneous, Nightly  insulin lispro, 0-15 Units, subcutaneous, TID AC  lovastatin, 40 mg, oral, Nightly  pantoprazole, 40 mg, oral, Daily  polyethylene glycol, 17 g, oral, Daily    I/O:    ;      Dietary Orders (From admission, onward)       Start     Ordered    02/06/25 1321  Adult diet Cardiac; 70 gm fat; 2 - 3 grams Sodium  Diet effective now        Question Answer Comment   Diet type Cardiac    Fat restriction: 70 gm fat    Sodium restriction: 2 - 3 grams Sodium        02/06/25 1320    02/06/25 0214  May Participate in Room Service  ( ROOM SERVICE MAY PARTICIPATE)  Once        Question:  .  Answer:  Yes    02/06/25 0213                     Estimated Needs:   Total Energy Estimated Needs in 24 hours (kCal): 2100 kCal  Method for Estimating Needs: 25 kcal/kg ABW  Total Protein Estimated Needs in 24 Hours (g): 101 g  Method for Estimating 24 Hour Protein Needs: 1.2 g/kg ABW  Total Fluid Estimated Needs in 24 Hours  (mL): 2100 mL  Method for Estimating 24 Hour Fluid Needs: 1 ml/kcal or per MD  Patient on Order Fluid Restriction: No        Nutrition Diagnosis   Malnutrition Diagnosis  Patient has Malnutrition Diagnosis: No    Nutrition Diagnosis  Patient has Nutrition Diagnosis: Yes  Diagnosis Status (1): New  Nutrition Diagnosis 1: Unintended weight loss  Related to (1): unknown etiology  As Evidenced by (1): pt report of wt loss over the past year, 9% wt loss in the past 9 months       Nutrition Interventions/Recommendations   Nutrition prescription for oral nutrition    Nutrition Recommendations:  Individualized Nutrition Prescription Provided for : 75 g Carb Control, Cardiac diet. Monitor po intake and need for ONS    Nutrition Interventions/Goals:   Interventions: Meals and snacks  Meals and Snacks: Carbohydrate-modified diet, Fat-modified diet, General healthful diet, Mineral-modified diet  Goal: Consumes 3 meals per day      Education Documentation  No documentation found.    Pt denies needs at this time        Nutrition Monitoring and Evaluation   Food/Nutrient Related History Monitoring  Monitoring and Evaluation Plan: Intake / amount of food, Estimated Energy Intake  Estimated Energy Intake: Energy intake greater or equal to 75% of estimated energy needs  Intake / Amount of food: Consumes at least 75% or more of meals/snacks/supplements, Meets > 75% estimated energy needs    Anthropometric Measurements  Monitoring and Evaluation Plan: Body weight  Body Weight: Body weight - Maintain stable weight    Biochemical Data, Medical Tests and Procedures  Monitoring and Evaluation Plan: Electrolyte/renal panel, Glucose/endocrine profile  Electrolyte and Renal Panel: Electrolytes within normal limits  Glucose/Endocrine Profile: Glucose within normal limits ( mg/dL)              Time Spent (min): 40 minutes

## 2025-02-06 NOTE — CARE PLAN
The patient's goals for the shift include      The clinical goals for the shift include safety      Problem: Diabetes  Goal: Achieve decreasing blood glucose levels by end of shift  Outcome: Progressing  Goal: Increase stability of blood glucose readings by end of shift  Outcome: Progressing  Goal: Decrease in ketones present in urine by end of shift  Outcome: Progressing  Goal: Maintain electrolyte levels within acceptable range throughout shift  Outcome: Progressing  Goal: Maintain glucose levels >70mg/dl to <250mg/dl throughout shift  Outcome: Progressing  Goal: No changes in neurological exam by end of shift  Outcome: Progressing  Goal: Learn about and adhere to nutrition recommendations by end of shift  Outcome: Progressing  Goal: Vital signs within normal range for age by end of shift  Outcome: Progressing  Goal: Increase self care and/or family involovement by end of shift  Outcome: Progressing  Goal: Receive DSME education by end of shift  Outcome: Progressing     Problem: Pain - Adult  Goal: Verbalizes/displays adequate comfort level or baseline comfort level  Outcome: Progressing     Problem: Safety - Adult  Goal: Free from fall injury  Outcome: Progressing     Problem: Discharge Planning  Goal: Discharge to home or other facility with appropriate resources  Outcome: Progressing     Problem: Chronic Conditions and Co-morbidities  Goal: Patient's chronic conditions and co-morbidity symptoms are monitored and maintained or improved  Outcome: Progressing     Problem: Nutrition  Goal: Nutrient intake appropriate for maintaining nutritional needs  Outcome: Progressing

## 2025-02-06 NOTE — SIGNIFICANT EVENT
Went into room for 15 min rounds and Pt was lying on his side with right leg bent. Straightened Pt out and explained that he cannot roll or bend that leg. Right femoral site remains soft and stable with no hematoma or oozing. Trice Landa CNP in room speaking with Pt regarding results of procedure. Called wife's cell phone number with no answer and unable to leave message and home phone.

## 2025-02-06 NOTE — SIGNIFICANT EVENT
Pt transported to [address] [place] [address] via cart. Right groin site remains soft and stable with no hematoma or oozing.

## 2025-02-06 NOTE — PROGRESS NOTES
Feliciano Worthington is a 74 y.o. male on day 0 of admission presenting with Chest pain, unspecified type.      ASSESSMENT/PLAN   -Acute non-STEMI with triple-vessel CAD on cardiac catheterization this morning-CABG recommended  -History of coronary artery disease with prior stents  -Uncontrolled type 2 diabetes with hyperglycemia-last HbA1c 12/24 was 14, added repeat to this morning's labs.  PTA was on glipizide 10 mg twice daily & BID glargine-32 units in the morning and 35 at night, but he was very noncompliant.  Glargine ordered, Accu-Cheks with SSI, glipizide on hold.  -Noncompliance with medications PTA  -Hyponatremia-partially due to his high blood sugars, but prior history of sodiums down to 130 in the past, for which his chlorthalidone was decreased  -Hyperlipidemia-reported previously intolerant to statins other than lovastatin, could be candidate for a PSK 9.  In the interim, per request of Dr. Haskins I contacted pharmacy to obtain lovastatin for the patient.  Previously was on 40 mg at bedtime with ezetimibe.  But he was also noncompliant.  -History of Mobitz type I-on telemetry  -COPD-will order as needed nebulizers  -Continued tobacco abuse-he declines a nicotine patch  -Hypertension-was on amlodipine 5 mg daily, chlorthalidone on Monday and Thursday (decreased from daily on 5/2024 due to hyponatremia), ramipril 5 mg daily PTA  -Depression/anxiety-sertraline was added to his venlafaxine on 1/29-venlafaxine not continued on admission, will reorder as I do not want him to have any withdrawal symptoms  -Some difficulty swallowing dry solids-will order swallow eval (for prior to his probable CABG).    -History of GERD with prior esophageal ulcers and achalasia-on daily PPI  -Neck and shoulder pain-has known moderate canal stenosis C3-4 & foraminal stenosis at multiple levels most pronounced on the right at C4-5 on MRI 12/16/2020 & foraminal stenosis, previously saw Dr. Parker and had some injections in the  "past  -History obstructive sleep apnea not using CPAP    SUBJECTIVE/OBJECTIVE   02/06/25 (patient transferred to my service, his PCP is Dr. José Bernal)  -Patient is lying in bed, still mildly sedated from his cardiac catheterization this morning.  Otherwise in NAD.  -He is afebrile, vital signs stable.  -Chest is clear to auscultation  -Cardiac exam is a regular rhythm  -Refer to complete PE below for rest of exam  -Chemistry panel with a blood sugar of 328, sodium 132, potassium 3.9, chloride 96, bicarb 25.  -BUN 15 with a creatinine of 0.90.  -Most recent troponin up to 2450  -CBC with a WBC of 8.3, H/H is 15.8/44.2.  Platelet count 145 K  -Lipid panel with a cholesterol of 258, triglycerides 309, HDL 24, , non-HDL cholesterol 233  -Case discussed with Dr. Haskins-patient was previously intolerant of statins, but did tolerate lovastatin in the past.  Not on formulary here, he has asked the pharmacy to try and obtain.  Discussed with him possibility of a PSK 9-suggested we may try evolocumab (Repatha) as an inpatient if available, otherwise consider Leqvio which could be administered twice a year in an infusion center.  He also notes that the patient seems to be depressed, and he is concerned by some apparent short-term memory loss.  He has known the patient for many years.  -Patient had cardiac catheterization this morning, found to have triple-vessel CAD with recommendation for CABG.    ROS:    Chart summary:  PCP is Dr. José Bernal, cardiologist is Dr. Haskins, neurologist is Dr. DENISE Alcala (saw for myoclonic jerks)    Patient is a 74-year-old male with a known history of CAD s/p prior stents, DM, HTN, HLP, COPD still smoking, GERD/history of esophagitis, ELLY not on CPAP, DJD of the C-spine, BPH with LUTS, and prior nephrolithiasis.      He presented to the ER last night complaining of chest pain for 36 hours.  He tells me that it started late Tuesday afternoon, was \"different than before\", dull and in " "his left chest without radiation.  Before he came to the ER yesterday, he tried NTG x 4 with only some help.  In the ER his EKG showed Mobitz type I but no ST elevations.  His blood sugar was 438, sodium 129, potassium 4.4, chloride 93.  BUN 16 with a creatinine of 0.88.  LFTs normal.  Magnesium 1.92.  Initial troponin was elevated to 299, this morning is up to 2450.  Cholesterol 258, , triglycerides 309, HDL 24.  CBC with a WBC of 9.8, H/H of 16.0/45.4.  Platelet count 176 K.    ROS: He denies headaches or dizziness.  No episodes of syncope.  He denies recent prior chest pains or palpitations before yesterday.  He denies chronic cough, shortness of breath.  He has some problems intermittently swallowing, says that solids (especially bread) seems to stick in his upper throat.  He does have a history of \"narrowing of my esophagus\" (reported achalasia on EGD in 2009).  He has had constant nausea for several months with only occasional vomiting.  He does get some heartburn and is on omeprazole daily which helps \"sometimes\".  He had problems with constipation for several months and had to take enemas, but it has now straightened out.  He admits to being erratic in using his insulin, when he checks his blood sugars they often come back as \"high\".  He has had increased frequency of urination which he assumes is due to his high blood sugars, but no dysuria or hematuria.  He denies any fevers or chills, has occasional night sweats.  No pedal edema.  He has a long history of depression/anxiety, was on venlafaxine until recently and was just changed to Zoloft several days PTA.    Past Medical History:  -CAD with history of stents  -History of Mobitz type I  -Diabetes-uncontrolled, last HbA1c in December was 14.0  -Hypertension  -Hyperlipidemia, intolerant of statins in the past (did tolerate lovastatin in the past per Dr. Haskins)  -COPD  -Continued tobacco abuse  -Obstructive sleep apnea not using CPAP  -BPH with " LUTS  -Depression/anxiety  -History of GERD and esophagitis  -Cervical spondylosis  -History of myoclonic jerks    Past Surgical History:  -Remote tonsillectomy  -Oral surgery  -Cardiac catheterization with stent to circumflex in  ([facility] in [place])  -EGD 2009-spastic lower esophagus, severe gastritis (Dr. Gonzalez)  -Cardiac catheterization with stent to RCA   -Right and left heart catheterization 2013-patent previously placed stents, 90% mid RCA treated with TREVOR, 80% proximal circumflex, EF = 45%  -EGD and colonoscopy 2016-hiatal hernia, multiple esophageal ulcers, colon polyp  -Placement of inflatable penile prosthesis 2018    Family History:  -Father- age 71 with an MI, had HTN, DM  -Mother- age 87, had HTN & an MI   -Siblings-1  half brother and 4 half sisters-unknown health status  -Children-1    Social History:  -Tobacco-2 packs daily from the age of 17, down to 1.5 pack daily now.  Has quit a couple times in the past but always restarted.  -Alcohol-prior alcoholism, quit in   -Drugs-remote use of illicit drugs, quit   -Marital-he is  and lives with his wife  -Occupation-prior /[employer]    *These notes are being done using Dragon voice recognition technology and may include unintended errors with respect to translation of words, typographical errors or grammar errors which may not have been identified prior to finalization of the chart note.    CURRENT MEDICATIONS     Scheduled Medications:    Current Facility-Administered Medications:     acetaminophen (Tylenol) tablet 650 mg, 650 mg, oral, q4h PRN, 650 mg at 25 0813 **OR** acetaminophen (Tylenol) oral liquid 650 mg, 650 mg, nasogastric tube, q4h PRN **OR** acetaminophen (Tylenol) suppository 650 mg, 650 mg, rectal, q4h PRN, Iglesia Caputo DO    amLODIPine (Norvasc) tablet 5 mg, 5 mg, oral, Daily, Iglesia Caputo DO, 5 mg at 25 0828    [START ON 2025] aspirin chewable tablet 81  mg, 81 mg, oral, Daily, Iglesia Caputo DO    aspirin tablet, , , PRN, Wil Bronson MD, 81 mg at 02/06/25 0920    ezetimibe (Zetia) tablet 10 mg, 10 mg, oral, Nightly, Ysabel Bright, APRN-CNP    fentaNYL PF (Sublimaze) injection, , , PRN, Wil Bronson MD, 50 mcg at 02/06/25 0923    heparin 25,000 Units in dextrose 5% 250 mL (100 Units/mL) infusion (premix), 0-4,000 Units/hr, intravenous, Continuous, Iglesia Caputo DO, Last Rate: 10 mL/hr at 02/06/25 0329, 1,000 Units/hr at 02/06/25 0329    heparin bolus from bag 2,000-4,000 Units, 2,000-4,000 Units, intravenous, q4h PRN, Iglesia Caputo DO    hydrOXYzine HCL (Atarax) tablet 25 mg, 25 mg, oral, BID, Iglesia Caputo DO, 25 mg at 02/06/25 0813    insulin glargine (Lantus) injection 32 Units, 32 Units, subcutaneous, q AM, Iglesia Caputo DO, 32 Units at 02/06/25 0813    insulin glargine (Lantus) injection 35 Units, 35 Units, subcutaneous, Nightly, Iglesia Caputo DO    insulin lispro injection 0-15 Units, 0-15 Units, subcutaneous, TID AC, Iglesia Caputo DO    midazolam (Versed) injection, , , PRN, Wil Bronson MD, 1 mg at 02/06/25 0923    nitroglycerin (Nitrostat) SL tablet 0.4 mg, 0.4 mg, sublingual, q5 min PRN, Iglesia Caputo DO    pantoprazole (ProtoNix) EC tablet 40 mg, 40 mg, oral, Daily, Iglesia Caputo DO     PRN Medications:  PRN medications   Medication    acetaminophen    Or    acetaminophen    Or    acetaminophen    aspirin    fentaNYL PF    heparin    midazolam    nitroglycerin         IVs:  heparin, 0-4,000 Units/hr, Last Rate: 1,000 Units/hr (02/06/25 0329)         I&Os     Intake/Output last 3 Shifts:  I/O last 3 completed shifts:  In: 29.1 (0.3 mL/kg) [I.V.:29.1 (0.3 mL/kg)]  Out: - (0 mL/kg)   Weight: 83.9 kg     VITAL SIGNS     Vitals:    02/06/25 0733 02/06/25 0858 02/06/25 0921 02/06/25 0921   BP: 115/67 110/72 128/79    BP Location: Left arm      Patient Position: Lying      Pulse: 76 73 72    Resp: 16 16 14     Temp: 36.3 °C (97.3 °F) 36.1 °C (97 °F)     TempSrc: Temporal Temporal     SpO2: 97% 94% 100% 100%   Weight:       Height:           PHYSICAL EXAM   Physical exam:  -General appearance: Patient is lying in bed, mildly still sedated from his cardiac catheterization but otherwise deemed and coherent.  -Vital signs:  As above  -HEENT: PERRLA, lids/conjunctiva/sclera normal.  Mouth/pharynx-edentulous upper, nicotine stained lower with apparent caries  -Neck: No adenopathy  -Chest: Lungs are clear to auscultation  -Cardiac: Regular rhythm  -Abdomen: Soft, bowel sounds active.  Nontender to palpation.  No masses or organomegaly.  -Extremities: No edema  -Skin: No rash  -Neurologic:   Patient is mildly drowsy but oriented x3.   -Behavior/Emotional:  Appropriate, cooperative    LABS   Relevant Results:  Results from last 7 days   Lab Units 02/06/25  0631 02/06/25  0309 02/06/25  0152 02/05/25 2147   POCT GLUCOSE mg/dL 126*  --  345*  --    GLUCOSE mg/dL  --  328*  --  438*      HbA1c:    Lab Results   Component Value Date    HGBA1C 14.0 (H) 12/26/2024     CBC:   Lab Results   Component Value Date    WBC 8.3 02/06/2025    HGB 15.8 02/06/2025    HCT 44.2 02/06/2025    MCV 82 02/06/2025     (L) 02/06/2025       Results from last 7 days   Lab Units 02/06/25 0309 02/05/25 2147   WBC AUTO x10*3/uL 8.3 9.8   HEMOGLOBIN g/dL 15.8 16.0   HEMATOCRIT % 44.2 45.4   PLATELETS AUTO x10*3/uL 145* 176   NEUTROS PCT AUTO %  --  68.9   LYMPHS PCT AUTO %  --  22.1   MONOS PCT AUTO %  --  7.4   EOS PCT AUTO %  --  0.8     ESR:    Lab Results   Component Value Date    SEDRATE 1 03/24/2021     CMP:    Results from last 7 days   Lab Units 02/06/25  0309 02/05/25  2147   SODIUM mmol/L 132* 129*   POTASSIUM mmol/L 3.9 4.4   CHLORIDE mmol/L 96* 93*   CO2 mmol/L 25 23   BUN mg/dL 15 16   CREATININE mg/dL 0.90 0.99   CALCIUM mg/dL 9.1 9.3   PROTEIN TOTAL g/dL  --  7.3   BILIRUBIN TOTAL mg/dL  --  0.6   ALK PHOS U/L  --  110   ALT U/L  --   "12   AST U/L  --  15   GLUCOSE mg/dL 328* 438*     Magnesium:   Results from last 7 days   Lab Units 02/06/25  0309 02/05/25  2147   MAGNESIUM mg/dL 1.86 1.92     Troponin:    Results from last 7 days   Lab Units 02/06/25  0800 02/06/25  0309 02/05/25  2248 02/05/25  2147   TROPHS ng/L 2,450* 1,858* 353* 299*     INR:  No results found for: \"INR\", \"PROTIME\"  BNP:     Uric acid:  No results found for: \"URICACID\"  Lipid Panel:    Lab Results   Component Value Date    CHOL 258 (H) 02/05/2025    CHOL 319 (H) 12/26/2024     Lab Results   Component Value Date    HDL 24.7 02/05/2025    HDL 32.8 12/26/2024     Lab Results   Component Value Date    LDLCALC 172 (H) 02/05/2025    LDLCALC  12/26/2024      Comment:      The calculation of LDL and VLDL are inaccurate when the Triglycerides are greater than 400 mg/dL or when the patient is non-fasting. If LDL measurement is necessary contact the testing laboratory for an alternative LDL assay.                                  Near   Borderline      AGE      Desirable  Optimal    High     High     Very High     0-19 Y     0 - 109     ---    110-129   >/= 130     ----    20-24 Y     0 - 119     ---    120-159   >/= 160     ----      >24 Y     0 -  99   100-129  130-159   160-189     >/=190       Lab Results   Component Value Date    TRIG 309 (H) 02/05/2025    TRIG 468 (H) 12/26/2024     Cultures:  No results found for the last 90 days.    Urinalysis:    Lab Results   Component Value Date    COLORU Light-Yellow 12/26/2024    APPEARANCEU Clear 12/26/2024    SPECGRAVU 1.043 (N) 12/26/2024    NORMA 5.5 12/26/2024    PROTUR 30 (1+) (A) 12/26/2024    GLUCOSEU OVER (4+) (A) 12/26/2024    BLOODU NEGATIVE 12/26/2024    KETONESU TRACE (A) 12/26/2024    BILIRUBINU NEGATIVE 12/26/2024    UROBILINOGEN Normal 12/26/2024    NITRITEU NEGATIVE 12/26/2024    LEUKOCYTESU NEGATIVE 12/26/2024    WBCU NONE 12/26/2024    RBCU NONE 12/26/2024    MUCUSU FEW 12/26/2024         Results for orders placed or " performed during the hospital encounter of 02/05/25 (from the past 24 hours)   CBC and Auto Differential   Result Value Ref Range    WBC 9.8 4.4 - 11.3 x10*3/uL    nRBC 0.0 0.0 - 0.0 /100 WBCs    RBC 5.50 4.50 - 5.90 x10*6/uL    Hemoglobin 16.0 13.5 - 17.5 g/dL    Hematocrit 45.4 41.0 - 52.0 %    MCV 83 80 - 100 fL    MCH 29.1 26.0 - 34.0 pg    MCHC 35.2 32.0 - 36.0 g/dL    RDW 13.0 11.5 - 14.5 %    Platelets 176 150 - 450 x10*3/uL    Neutrophils % 68.9 40.0 - 80.0 %    Immature Granulocytes %, Automated 0.3 0.0 - 0.9 %    Lymphocytes % 22.1 13.0 - 44.0 %    Monocytes % 7.4 2.0 - 10.0 %    Eosinophils % 0.8 0.0 - 6.0 %    Basophils % 0.5 0.0 - 2.0 %    Neutrophils Absolute 6.76 (H) 1.60 - 5.50 x10*3/uL    Immature Granulocytes Absolute, Automated 0.03 0.00 - 0.50 x10*3/uL    Lymphocytes Absolute 2.17 0.80 - 3.00 x10*3/uL    Monocytes Absolute 0.73 0.05 - 0.80 x10*3/uL    Eosinophils Absolute 0.08 0.00 - 0.40 x10*3/uL    Basophils Absolute 0.05 0.00 - 0.10 x10*3/uL   Comprehensive Metabolic Panel   Result Value Ref Range    Glucose 438 (H) 74 - 99 mg/dL    Sodium 129 (L) 136 - 145 mmol/L    Potassium 4.4 3.5 - 5.3 mmol/L    Chloride 93 (L) 98 - 107 mmol/L    Bicarbonate 23 21 - 32 mmol/L    Anion Gap 17 10 - 20 mmol/L    Urea Nitrogen 16 6 - 23 mg/dL    Creatinine 0.99 0.50 - 1.30 mg/dL    eGFR 80 >60 mL/min/1.73m*2    Calcium 9.3 8.6 - 10.3 mg/dL    Albumin 4.0 3.4 - 5.0 g/dL    Alkaline Phosphatase 110 33 - 136 U/L    Total Protein 7.3 6.4 - 8.2 g/dL    AST 15 9 - 39 U/L    Bilirubin, Total 0.6 0.0 - 1.2 mg/dL    ALT 12 10 - 52 U/L   Magnesium   Result Value Ref Range    Magnesium 1.92 1.60 - 2.40 mg/dL   Troponin I, High Sensitivity, Initial   Result Value Ref Range    Troponin I, High Sensitivity 299 (HH) 0 - 20 ng/L   Light Blue Top   Result Value Ref Range    Extra Tube Hold for add-ons.    ECG 12 lead   Result Value Ref Range    Ventricular Rate 68 BPM    Atrial Rate 81 BPM    QRS Duration 114 ms    QT  Interval 416 ms    QTC Calculation(Bazett) 442 ms    P Axis 65 degrees    R Axis 265 degrees    T Axis 88 degrees    QRS Count 11 beats    Q Onset 209 ms    T Offset 417 ms    QTC Fredericia 434 ms   Troponin, High Sensitivity, 1 Hour   Result Value Ref Range    Troponin I, High Sensitivity 353 (HH) 0 - 20 ng/L   Lipid Panel   Result Value Ref Range    Cholesterol 258 (H) 0 - 199 mg/dL    HDL-Cholesterol 24.7 mg/dL    Cholesterol/HDL Ratio 10.4     LDL Calculated 172 (H) <=99 mg/dL    VLDL 62 (H) 0 - 40 mg/dL    Triglycerides 309 (H) 0 - 149 mg/dL    Non HDL Cholesterol 233 (H) 0 - 149 mg/dL   ECG 12 lead   Result Value Ref Range    Ventricular Rate 66 BPM    Atrial Rate 82 BPM    QRS Duration 110 ms    QT Interval 440 ms    QTC Calculation(Bazett) 461 ms    R Axis 262 degrees    T Axis 76 degrees    QRS Count 10 beats    Q Onset 211 ms    T Offset 431 ms    QTC Fredericia 454 ms   POCT GLUCOSE   Result Value Ref Range    POCT Glucose 345 (H) 74 - 99 mg/dL   CBC   Result Value Ref Range    WBC 8.3 4.4 - 11.3 x10*3/uL    nRBC 0.0 0.0 - 0.0 /100 WBCs    RBC 5.40 4.50 - 5.90 x10*6/uL    Hemoglobin 15.8 13.5 - 17.5 g/dL    Hematocrit 44.2 41.0 - 52.0 %    MCV 82 80 - 100 fL    MCH 29.3 26.0 - 34.0 pg    MCHC 35.7 32.0 - 36.0 g/dL    RDW 13.0 11.5 - 14.5 %    Platelets 145 (L) 150 - 450 x10*3/uL   Renal Function Panel   Result Value Ref Range    Glucose 328 (H) 74 - 99 mg/dL    Sodium 132 (L) 136 - 145 mmol/L    Potassium 3.9 3.5 - 5.3 mmol/L    Chloride 96 (L) 98 - 107 mmol/L    Bicarbonate 25 21 - 32 mmol/L    Anion Gap 15 10 - 20 mmol/L    Urea Nitrogen 15 6 - 23 mg/dL    Creatinine 0.90 0.50 - 1.30 mg/dL    eGFR 90 >60 mL/min/1.73m*2    Calcium 9.1 8.6 - 10.3 mg/dL    Phosphorus 3.3 2.5 - 4.9 mg/dL    Albumin 4.0 3.4 - 5.0 g/dL   Magnesium   Result Value Ref Range    Magnesium 1.86 1.60 - 2.40 mg/dL   Heparin Assay   Result Value Ref Range    Heparin Unfractionated 0.3 See Comment Below for Therapeutic Ranges IU/mL    Troponin I, High Sensitivity   Result Value Ref Range    Troponin I, High Sensitivity 1,858 (HH) 0 - 20 ng/L   POCT GLUCOSE   Result Value Ref Range    POCT Glucose 126 (H) 74 - 99 mg/dL   Heparin Assay, UFH   Result Value Ref Range    Heparin Unfractionated 0.2 See Comment Below for Therapeutic Ranges IU/mL   Troponin I, High Sensitivity   Result Value Ref Range    Troponin I, High Sensitivity 2,450 (HH) 0 - 20 ng/L     Results for orders placed or performed during the hospital encounter of 02/05/25 (from the past 24 hours)   CBC and Auto Differential   Result Value Ref Range    WBC 9.8 4.4 - 11.3 x10*3/uL    nRBC 0.0 0.0 - 0.0 /100 WBCs    RBC 5.50 4.50 - 5.90 x10*6/uL    Hemoglobin 16.0 13.5 - 17.5 g/dL    Hematocrit 45.4 41.0 - 52.0 %    MCV 83 80 - 100 fL    MCH 29.1 26.0 - 34.0 pg    MCHC 35.2 32.0 - 36.0 g/dL    RDW 13.0 11.5 - 14.5 %    Platelets 176 150 - 450 x10*3/uL    Neutrophils % 68.9 40.0 - 80.0 %    Immature Granulocytes %, Automated 0.3 0.0 - 0.9 %    Lymphocytes % 22.1 13.0 - 44.0 %    Monocytes % 7.4 2.0 - 10.0 %    Eosinophils % 0.8 0.0 - 6.0 %    Basophils % 0.5 0.0 - 2.0 %    Neutrophils Absolute 6.76 (H) 1.60 - 5.50 x10*3/uL    Immature Granulocytes Absolute, Automated 0.03 0.00 - 0.50 x10*3/uL    Lymphocytes Absolute 2.17 0.80 - 3.00 x10*3/uL    Monocytes Absolute 0.73 0.05 - 0.80 x10*3/uL    Eosinophils Absolute 0.08 0.00 - 0.40 x10*3/uL    Basophils Absolute 0.05 0.00 - 0.10 x10*3/uL   Comprehensive Metabolic Panel   Result Value Ref Range    Glucose 438 (H) 74 - 99 mg/dL    Sodium 129 (L) 136 - 145 mmol/L    Potassium 4.4 3.5 - 5.3 mmol/L    Chloride 93 (L) 98 - 107 mmol/L    Bicarbonate 23 21 - 32 mmol/L    Anion Gap 17 10 - 20 mmol/L    Urea Nitrogen 16 6 - 23 mg/dL    Creatinine 0.99 0.50 - 1.30 mg/dL    eGFR 80 >60 mL/min/1.73m*2    Calcium 9.3 8.6 - 10.3 mg/dL    Albumin 4.0 3.4 - 5.0 g/dL    Alkaline Phosphatase 110 33 - 136 U/L    Total Protein 7.3 6.4 - 8.2 g/dL    AST 15 9 - 39  U/L    Bilirubin, Total 0.6 0.0 - 1.2 mg/dL    ALT 12 10 - 52 U/L   Magnesium   Result Value Ref Range    Magnesium 1.92 1.60 - 2.40 mg/dL   Troponin I, High Sensitivity, Initial   Result Value Ref Range    Troponin I, High Sensitivity 299 (HH) 0 - 20 ng/L   Light Blue Top   Result Value Ref Range    Extra Tube Hold for add-ons.    ECG 12 lead   Result Value Ref Range    Ventricular Rate 68 BPM    Atrial Rate 81 BPM    QRS Duration 114 ms    QT Interval 416 ms    QTC Calculation(Bazett) 442 ms    P Axis 65 degrees    R Axis 265 degrees    T Axis 88 degrees    QRS Count 11 beats    Q Onset 209 ms    T Offset 417 ms    QTC Fredericia 434 ms   Troponin, High Sensitivity, 1 Hour   Result Value Ref Range    Troponin I, High Sensitivity 353 (HH) 0 - 20 ng/L   Lipid Panel   Result Value Ref Range    Cholesterol 258 (H) 0 - 199 mg/dL    HDL-Cholesterol 24.7 mg/dL    Cholesterol/HDL Ratio 10.4     LDL Calculated 172 (H) <=99 mg/dL    VLDL 62 (H) 0 - 40 mg/dL    Triglycerides 309 (H) 0 - 149 mg/dL    Non HDL Cholesterol 233 (H) 0 - 149 mg/dL   ECG 12 lead   Result Value Ref Range    Ventricular Rate 66 BPM    Atrial Rate 82 BPM    QRS Duration 110 ms    QT Interval 440 ms    QTC Calculation(Bazett) 461 ms    R Axis 262 degrees    T Axis 76 degrees    QRS Count 10 beats    Q Onset 211 ms    T Offset 431 ms    QTC Fredericia 454 ms   POCT GLUCOSE   Result Value Ref Range    POCT Glucose 345 (H) 74 - 99 mg/dL   CBC   Result Value Ref Range    WBC 8.3 4.4 - 11.3 x10*3/uL    nRBC 0.0 0.0 - 0.0 /100 WBCs    RBC 5.40 4.50 - 5.90 x10*6/uL    Hemoglobin 15.8 13.5 - 17.5 g/dL    Hematocrit 44.2 41.0 - 52.0 %    MCV 82 80 - 100 fL    MCH 29.3 26.0 - 34.0 pg    MCHC 35.7 32.0 - 36.0 g/dL    RDW 13.0 11.5 - 14.5 %    Platelets 145 (L) 150 - 450 x10*3/uL   Renal Function Panel   Result Value Ref Range    Glucose 328 (H) 74 - 99 mg/dL    Sodium 132 (L) 136 - 145 mmol/L    Potassium 3.9 3.5 - 5.3 mmol/L    Chloride 96 (L) 98 - 107 mmol/L     Bicarbonate 25 21 - 32 mmol/L    Anion Gap 15 10 - 20 mmol/L    Urea Nitrogen 15 6 - 23 mg/dL    Creatinine 0.90 0.50 - 1.30 mg/dL    eGFR 90 >60 mL/min/1.73m*2    Calcium 9.1 8.6 - 10.3 mg/dL    Phosphorus 3.3 2.5 - 4.9 mg/dL    Albumin 4.0 3.4 - 5.0 g/dL   Magnesium   Result Value Ref Range    Magnesium 1.86 1.60 - 2.40 mg/dL   Heparin Assay   Result Value Ref Range    Heparin Unfractionated 0.3 See Comment Below for Therapeutic Ranges IU/mL   Troponin I, High Sensitivity   Result Value Ref Range    Troponin I, High Sensitivity 1,858 (HH) 0 - 20 ng/L   POCT GLUCOSE   Result Value Ref Range    POCT Glucose 126 (H) 74 - 99 mg/dL   Heparin Assay, UFH   Result Value Ref Range    Heparin Unfractionated 0.2 See Comment Below for Therapeutic Ranges IU/mL   Troponin I, High Sensitivity   Result Value Ref Range    Troponin I, High Sensitivity 2,450 (HH) 0 - 20 ng/L       IMAGING     XR chest 1 view   Final Result   Mild bilateral opacities may correspond to chronic lung changes and   atelectasis versus pneumonia.        MACRO:   None        Signed by: Tobin Machado 2/5/2025 10:38 PM   Dictation workstation:   [id number]      Cardiac Catheterization Procedure    (Results Pending)      I spent 95 minutes in the professional and overall care of this patient.    Shanika Reese MD

## 2025-02-06 NOTE — SIGNIFICANT EVENT
Upon arrival to room focused assessment performed and WDL. Right femoral puncture soft and stable with no hematoma or oozing, educated Pt on current restrictions r/t puncture and he states understanding/denies needs at this time. Drowsy but arouses when spoken to. No current complains of dizziness/lightheadedness/pain. Looking for Pt's wife to bring back into recovery.

## 2025-02-06 NOTE — CONSULTS
Consults    Reason For Consult  Severe multivessel coronary artery disease    History Of Present Illness  Feliciano Worthington is a 74 y.o. male with history of anxiety and depression, hypertension, hyperlipidemia with tolerance to high intensity statins, COPD, bilateral lung nodules per CT of chest obtained in May 2024 noted to be stable in size, heavy daily tobacco use smoking 1-1/2 packs/day for over 50 years, prior alcohol use, long-term recovered, and poorly controlled type 2 diabetes mellitus with hemoglobin A1c of 14.7 this admission.  He presented to the ED with complaints of left-sided chest pain that was unrelieved by nitroglycerin.  Pain was associated with nausea and had occurred intermittently the previous 2 days.  Episodes of pain occurred at rest, previously relieved spontaneously.  ECG notable for second-degree Mobitz 1, no acute ischemic changes.  Initial troponin elevated at 299 with progressive elevation to 2450 today.  Underwent left heart catheterization with findings of severe multivessel coronary artery disease with a 70% stenosis of the distal left main, 80% stenosis of the proximal LAD, 90% stenosis of the proximal circumflex, 75% stenosis of the ramus and 80 to 90% stenosis of the mid distal RCA.  Left ventriculography showing normal systolic function with a left ventricular ejection fraction of 55%.  Cardiac surgery consulted for further evaluation.  At time of consultation, the patient is in no distress.  He remains in predominantly sinus rhythm with occasional second-degree type I AV block, normotensive with adequate saturations on room air.  He has had no further chest pain episodes since admission.  He is slightly withdrawn with blunted affect.  He is accompanied with his spouse who states that he has had poor compliance with his medical therapy prior to admission.  She also notes patient having recent difficulty with short-term memory and is also concerned about an unintentional 50 pound  weight loss over the last year.  Angiograms reviewed with Dr. Paniagua, will obtain routine preoperative testing including echocardiogram, carotid duplex, noncontrast CT of chest, PFTs, and bilateral lower extremity vein mapping.  Endocrinology will be consulted for preoperative management of poorly controlled diabetes.  Recommend resumption of IV heparin once reasonable to do so post cardiac cath. Surgical date to be determined after preop testing completed and reviewed.      Past Medical History  He has a past medical history of Acute myocardial infarction, unspecified (01/24/2018), Acute myocardial infarction, unspecified (01/24/2018), BMI 29.0-29.9,adult (10/13/2023), Chronic obstructive pulmonary disease, unspecified (01/24/2018), Chronic obstructive pulmonary disease, unspecified (01/24/2018), Coronary angioplasty status, Encounter for immunization (11/04/2022), Encounter for screening for malignant neoplasm of colon (08/25/2016), Encounter for screening for malignant neoplasm of skin (04/14/2022), Nicotine dependence, cigarettes, uncomplicated (09/12/2022), Obesity, unspecified (05/25/2022), Old myocardial infarction (01/24/2018), Other acute postprocedural pain, Pain in left knee, Pain in right hip (11/04/2022), Personal history of other diseases of the digestive system (01/24/2018), Personal history of other diseases of the musculoskeletal system and connective tissue (08/25/2016), Personal history of other diseases of the respiratory system, Personal history of other endocrine, nutritional and metabolic disease (08/25/2016), Personal history of other mental and behavioral disorders (04/14/2022), Personal history of other specified conditions (12/08/2016), Personal history of other specified conditions, Personal history of other specified conditions (06/20/2018), Separation of muscle (nontraumatic), other site (05/14/2018), Sprain of medial collateral ligament of left knee, initial encounter (08/28/2017),  Sprain of medial collateral ligament of left knee, subsequent encounter (10/12/2017), Type 2 diabetes mellitus without complications (Multi) (01/24/2018), Type 2 diabetes mellitus without complications (Multi) (01/24/2018), and Umbilical hernia without obstruction or gangrene (05/14/2018).    Surgical History  He has a past surgical history that includes Coronary angioplasty with stent (08/25/2016); Tonsillectomy (08/25/2016); Colonoscopy (10/06/2016); Other surgical history (05/25/2022); and Skin cancer excision (01/17/2025).     Social History  He reports that he has been smoking cigarettes. He started smoking about 57 years ago. He has a 75 pack-year smoking history. He has been exposed to tobacco smoke. He has never used smokeless tobacco. He reports that he does not currently use alcohol. He reports that he does not use drugs.    Family History  Family History   Problem Relation Name Age of Onset    Coronary artery disease Mother      Diabetes Father      Coronary artery disease Father      Other (acute myocardial infarction) Father          Allergies  Tetracyclines, Atorvastatin, Empagliflozin, Pravastatin, Rosuvastatin, Simvastatin, and Statins-hmg-coa reductase inhibitors    Review of Systems   Constitutional:  Positive for fatigue and unexpected weight change.   HENT: Negative.     Eyes: Negative.    Respiratory:  Positive for cough and wheezing.    Cardiovascular:  Positive for chest pain.   Gastrointestinal: Negative.    Endocrine: Positive for polyuria.   Genitourinary:  Positive for frequency.   Musculoskeletal:  Positive for arthralgias.   Skin: Negative.    Allergic/Immunologic: Negative.    Neurological:  Negative for syncope.   Hematological: Negative.    Psychiatric/Behavioral: Negative.          Physical Exam  HENT:      Head: Normocephalic.      Nose: Nose normal.      Mouth/Throat:      Mouth: Mucous membranes are moist.   Eyes:      Pupils: Pupils are equal, round, and reactive to light.  "  Cardiovascular:      Rate and Rhythm: Normal rate. Rhythm irregular.      Heart sounds: Normal heart sounds.   Pulmonary:      Effort: Pulmonary effort is normal.      Breath sounds: Wheezing present.   Abdominal:      General: Bowel sounds are normal.   Musculoskeletal:      Cervical back: Normal range of motion.      Right lower leg: No edema.      Left lower leg: No edema.   Skin:     General: Skin is warm and dry.   Neurological:      General: No focal deficit present.      Mental Status: He is alert and oriented to person, place, and time.   Psychiatric:         Mood and Affect: Mood normal.          Last Recorded Vitals  Blood pressure 108/56, pulse 68, temperature 35.7 °C (96.3 °F), resp. rate 17, height 1.778 m (5' 10\"), weight 83.9 kg (185 lb), SpO2 96%.    Relevant Results  Scheduled medications  amLODIPine, 5 mg, oral, Daily  [START ON 2/7/2025] aspirin, 81 mg, oral, Daily  ezetimibe, 10 mg, oral, Nightly  hydrOXYzine HCL, 25 mg, oral, BID  insulin glargine, 32 Units, subcutaneous, q AM  insulin glargine, 35 Units, subcutaneous, Nightly  insulin lispro, 0-15 Units, subcutaneous, TID AC  isosorbide mononitrate ER, 30 mg, oral, Daily  lovastatin, 40 mg, oral, Nightly  pantoprazole, 40 mg, oral, Daily  polyethylene glycol, 17 g, oral, Daily  sertraline, 25 mg, oral, Daily  [START ON 2/7/2025] venlafaxine XR, 150 mg, oral, Daily with breakfast      Continuous medications  [Held by provider] heparin, 0-4,000 Units/hr, Last Rate: 1,000 Units/hr (02/06/25 0329)      PRN medications  PRN medications: acetaminophen **OR** acetaminophen **OR** acetaminophen, heparin, ipratropium-albuteroL, nitroglycerin     Cardiac Catheterization Procedure    Result Date: 2/6/2025   [facility], Cath Lab        [address] [place] [address] Cardiovascular Catheterization Report Patient Name:      JAIME JAZZMINE FERNANDEZ      Performing Physician:  [id number] Wil                                                        "         Nan ESPINOZA Study Date:        2/6/2025             Verifying Physician:   [id number] Wil Montana MD MRN/PID:           [id number]             Cardiologist/Co-Scrub: Accession#:        [id number]         Ordering Provider:     [id number] DERIK ENAMORADO Date of Birth/Age: 1950 / 74 years Cardiologist: Gender:            M                    Fellow: Encounter#:        [id number]           Surgeon:  Study: Left Heart Cath  Indications: JAIME FERNANDEZ is a 75 year old male who presents with dyslipidemia, hypertension, prior myocardial infarction, diabetes, smoker, chronic pulmonary disease, coronary artery disease, prior percutaneous coronary intervention and a chest pain assessment of typical angina. NSTE - ACS.  Procedure Description: After infiltration with 2% Lidocaine, the right femoral artery was cannulated with a modified Seldinger technique. Subsequently a 5 Welsh sheath was placed in the right femoral artery. Selective coronary catheterization was performed using a 5 Fr catheter(s) exchanged over a guide wire to cannulate the coronary arteries. A JL 4 tip catheter was used for left coronary injections. A 3drc tip catheter was used for right coronary injections. Multiple injections of contrast were made into the left and right coronary arteries with angiograms recorded in multiple projections. Retrograde left heart catheterizion was accomplished with a 5 Fr. pigtail catheter. A single plane left ventriculogram was recorded in the 30 degree RAYMOND projection. The contrast dose was 20 ml injected at 10 ml/sec. The catheter was then withdrawn across the aortic valve under continuous pressure monitoring and removed. After completion of the procedure, femoral artery angiography was performed. This demonstrated a common femoral artery puncture appropriate for closure. A Vascade 5F  vascular closure Device was placed per protocol.  Coronary Angiography: The coronary circulation is right dominant.  Left Main Coronary Artery: The left main coronary artery is calcified. There is 70% stenosis in the distal left main coronary artery.  Left Anterior Descending Coronary Artery Distribution: There is 80% stenosis in the proximal left anterior descending artery.  Circumflex Coronary Artery Distribution: There is 90% stenosis in the the proximal Circumflex artery. There is 75% stenosis in the Prox Ramus Circumflex artery. There is 80% stenosis in the Mid Ramus Circumflex artery.  Right Coronary Artery Distribution: There is 80% stenosis in the the mid Right Coronary Artery. There is evidence of instent restenosis in this segment. The distal right coronary artery showed 90% stenosis.  Left Ventriculography: The estimated left ventricular ejection fraction is normal at 55%.  Hemo Personnel: +---------------------------+---------+ Name                       Duty      +---------------------------+---------+ Wil Bronson MD 1 +---------------------------+---------+  Hemodynamic Pressures:  +----+------------------+---------+-------------+-------------+------+---------+ Site    Date Time       Phase  Systolic mmHg  Diastolic    ED  Mean mmHg                         Name                    mmHg      mmHg           +----+------------------+---------+-------------+-------------+------+---------+   AO  2/6/2025 9:36:07 AIR REST          127           65             87                     AM                                                   +----+------------------+---------+-------------+-------------+------+---------+   AO  2/6/2025 9:39:06 AIR REST           91           61             65                     AM                                                   +----+------------------+---------+-------------+-------------+------+---------+    AO  2/6/2025 9:44:05 AIR REST          133           72             96                     AM                                                   +----+------------------+---------+-------------+-------------+------+---------+   LV  2/6/2025 9:47:36 AIR REST          115           -1     6                              AM                                                   +----+------------------+---------+-------------+-------------+------+---------+  Cardiac Cath Post Procedure Notes: Post Procedure Diagnosis: Triple vessel disease. Blood Loss:               Estimated blood loss during the procedure was 0 mls. Specimens Removed:        Number of specimen(s) removed: none. ____________________________________________________________________________________ CONCLUSIONS:  1. Distal Left Main: 70% stenosis.  2. Left Main Coronary Artery: This artery is calcified.  3. Proximal LAD Lesion: The percent stenosis is 80%.  4. Proximal CX Lesion: The percent stenosis is 90%.  5. Prox Ramus CX Lesion: The percent stenosis is 75%.  6. Mid Ramus CX Lesion: The percent stenosis is 80%.  7. Mid RCA Lesion: The percent stenosis is 80%.  8. Mid RCA Lesion: Instent Restenosis.  9. Distal RCA Lesion: The percent stenosis is 90%. 10. The Left Ventricular Ejection Fraction is 55%. 11. Mid CX Lesion: 100%. ICD 10 Codes: Non ST elevation (NSTEMI) myocardial infarction-I21.4  CPT Codes: Left Heart Cath (visualization of coronaries) and [id number]; Moderate Sedation Services initial 15 minutes patient >5 years-[id number]  [id number] Wil Montana MD Performing Physician Electronically signed by [id number] Wil Montana MD on 2/6/2025 at 10:19:09 AM  ** Final **     ECG 12 lead    Result Date: 2/6/2025  Sinus rhythm with 2nd degree AV block (Mobitz I) Right superior axis deviation Nonspecific ST abnormality Abnormal ECG When compared with ECG of 17-OCT-2023 18:49, No significant change was found Confirmed by Hugh  Shanika ([id number]) on 2/6/2025 9:38:21 AM    ECG 12 lead    Result Date: 2/6/2025  Sinus rhythm with 2nd degree AV block (Mobitz I) Right superior axis deviation Abnormal ECG When compared with ECG of 05-FEB-2025 21:41, (unconfirmed) No significant change was found    XR chest 1 view    Result Date: 2/5/2025  Interpreted By:  Tobin Machado, STUDY: XR CHEST 1 VIEW;  2/5/2025 10:17 pm   INDICATION: Signs/Symptoms:Chest Pain.   COMPARISON: None.   ACCESSION NUMBER(S): [id number]   ORDERING CLINICIAN: MARIELY ROCKWELL   FINDINGS: The cardiac silhouette is normal in size. There is mild bilateral opacities may correspond to chronic lung changes and atelectasis versus pneumonia. No segment pleural effusion. No pneumothorax.       Mild bilateral opacities may correspond to chronic lung changes and atelectasis versus pneumonia.   MACRO: None   Signed by: Tobin Machado 2/5/2025 10:38 PM Dictation workstation:   [id number]      Results for orders placed or performed during the hospital encounter of 02/05/25 (from the past 24 hours)   CBC and Auto Differential   Result Value Ref Range    WBC 9.8 4.4 - 11.3 x10*3/uL    nRBC 0.0 0.0 - 0.0 /100 WBCs    RBC 5.50 4.50 - 5.90 x10*6/uL    Hemoglobin 16.0 13.5 - 17.5 g/dL    Hematocrit 45.4 41.0 - 52.0 %    MCV 83 80 - 100 fL    MCH 29.1 26.0 - 34.0 pg    MCHC 35.2 32.0 - 36.0 g/dL    RDW 13.0 11.5 - 14.5 %    Platelets 176 150 - 450 x10*3/uL    Neutrophils % 68.9 40.0 - 80.0 %    Immature Granulocytes %, Automated 0.3 0.0 - 0.9 %    Lymphocytes % 22.1 13.0 - 44.0 %    Monocytes % 7.4 2.0 - 10.0 %    Eosinophils % 0.8 0.0 - 6.0 %    Basophils % 0.5 0.0 - 2.0 %    Neutrophils Absolute 6.76 (H) 1.60 - 5.50 x10*3/uL    Immature Granulocytes Absolute, Automated 0.03 0.00 - 0.50 x10*3/uL    Lymphocytes Absolute 2.17 0.80 - 3.00 x10*3/uL    Monocytes Absolute 0.73 0.05 - 0.80 x10*3/uL    Eosinophils Absolute 0.08 0.00 - 0.40 x10*3/uL    Basophils Absolute 0.05 0.00 - 0.10 x10*3/uL    Comprehensive Metabolic Panel   Result Value Ref Range    Glucose 438 (H) 74 - 99 mg/dL    Sodium 129 (L) 136 - 145 mmol/L    Potassium 4.4 3.5 - 5.3 mmol/L    Chloride 93 (L) 98 - 107 mmol/L    Bicarbonate 23 21 - 32 mmol/L    Anion Gap 17 10 - 20 mmol/L    Urea Nitrogen 16 6 - 23 mg/dL    Creatinine 0.99 0.50 - 1.30 mg/dL    eGFR 80 >60 mL/min/1.73m*2    Calcium 9.3 8.6 - 10.3 mg/dL    Albumin 4.0 3.4 - 5.0 g/dL    Alkaline Phosphatase 110 33 - 136 U/L    Total Protein 7.3 6.4 - 8.2 g/dL    AST 15 9 - 39 U/L    Bilirubin, Total 0.6 0.0 - 1.2 mg/dL    ALT 12 10 - 52 U/L   Magnesium   Result Value Ref Range    Magnesium 1.92 1.60 - 2.40 mg/dL   Troponin I, High Sensitivity, Initial   Result Value Ref Range    Troponin I, High Sensitivity 299 (HH) 0 - 20 ng/L   Light Blue Top   Result Value Ref Range    Extra Tube Hold for add-ons.    ECG 12 lead   Result Value Ref Range    Ventricular Rate 68 BPM    Atrial Rate 81 BPM    QRS Duration 114 ms    QT Interval 416 ms    QTC Calculation(Bazett) 442 ms    P Axis 65 degrees    R Axis 265 degrees    T Axis 88 degrees    QRS Count 11 beats    Q Onset 209 ms    T Offset 417 ms    QTC Fredericia 434 ms   Troponin, High Sensitivity, 1 Hour   Result Value Ref Range    Troponin I, High Sensitivity 353 (HH) 0 - 20 ng/L   Lipid Panel   Result Value Ref Range    Cholesterol 258 (H) 0 - 199 mg/dL    HDL-Cholesterol 24.7 mg/dL    Cholesterol/HDL Ratio 10.4     LDL Calculated 172 (H) <=99 mg/dL    VLDL 62 (H) 0 - 40 mg/dL    Triglycerides 309 (H) 0 - 149 mg/dL    Non HDL Cholesterol 233 (H) 0 - 149 mg/dL   ECG 12 lead   Result Value Ref Range    Ventricular Rate 66 BPM    Atrial Rate 82 BPM    QRS Duration 110 ms    QT Interval 440 ms    QTC Calculation(Bazett) 461 ms    R Axis 262 degrees    T Axis 76 degrees    QRS Count 10 beats    Q Onset 211 ms    T Offset 431 ms    QTC Fredericia 454 ms   POCT GLUCOSE   Result Value Ref Range    POCT Glucose 345 (H) 74 - 99 mg/dL   CBC   Result  Value Ref Range    WBC 8.3 4.4 - 11.3 x10*3/uL    nRBC 0.0 0.0 - 0.0 /100 WBCs    RBC 5.40 4.50 - 5.90 x10*6/uL    Hemoglobin 15.8 13.5 - 17.5 g/dL    Hematocrit 44.2 41.0 - 52.0 %    MCV 82 80 - 100 fL    MCH 29.3 26.0 - 34.0 pg    MCHC 35.7 32.0 - 36.0 g/dL    RDW 13.0 11.5 - 14.5 %    Platelets 145 (L) 150 - 450 x10*3/uL   Renal Function Panel   Result Value Ref Range    Glucose 328 (H) 74 - 99 mg/dL    Sodium 132 (L) 136 - 145 mmol/L    Potassium 3.9 3.5 - 5.3 mmol/L    Chloride 96 (L) 98 - 107 mmol/L    Bicarbonate 25 21 - 32 mmol/L    Anion Gap 15 10 - 20 mmol/L    Urea Nitrogen 15 6 - 23 mg/dL    Creatinine 0.90 0.50 - 1.30 mg/dL    eGFR 90 >60 mL/min/1.73m*2    Calcium 9.1 8.6 - 10.3 mg/dL    Phosphorus 3.3 2.5 - 4.9 mg/dL    Albumin 4.0 3.4 - 5.0 g/dL   Magnesium   Result Value Ref Range    Magnesium 1.86 1.60 - 2.40 mg/dL   Heparin Assay   Result Value Ref Range    Heparin Unfractionated 0.3 See Comment Below for Therapeutic Ranges IU/mL   Troponin I, High Sensitivity   Result Value Ref Range    Troponin I, High Sensitivity 1,858 (HH) 0 - 20 ng/L   Hemoglobin A1C   Result Value Ref Range    Hemoglobin A1C 14.7 (H) See comment %    Estimated Average Glucose 375 Not Established mg/dL   POCT GLUCOSE   Result Value Ref Range    POCT Glucose 126 (H) 74 - 99 mg/dL   Heparin Assay, UFH   Result Value Ref Range    Heparin Unfractionated 0.2 See Comment Below for Therapeutic Ranges IU/mL   Troponin I, High Sensitivity   Result Value Ref Range    Troponin I, High Sensitivity 2,450 (HH) 0 - 20 ng/L   POCT GLUCOSE   Result Value Ref Range    POCT Glucose 233 (H) 74 - 99 mg/dL   POCT GLUCOSE   Result Value Ref Range    POCT Glucose 343 (H) 74 - 99 mg/dL         Assessment/Plan     Coronary artery disease; NSTEMI  Admitted with left-sided chest pain symptoms unrelieved with nitroglycerin, subsequently ruled in for NSTEMI, coronary angiography showing severe multivessel coronary artery disease as noted above.  Findings  have been reviewed by Dr. Paniagua  -Obtain routine preoperative testing including echocardiogram, noncontrast CT of chest, PFTs, carotid duplex, bilateral lower extremity vein mapping  -Will also need endocrinology consult for optimization of blood glucose prior to surgery  -Surgery date to be determined after review of testing and endocrinology input  -Resume IV heparin this evening    Type 2 diabetes mellitus  Suboptimally controlled type 2 diabetes mellitus with questionable medication compliance prior to hospitalization.  A1c this admission 14.7  -Endocrinology consult  -Continue current regimen per primary medical service    COPD; chronic lung nodule; ongoing heavy tobacco use  Bilateral lung nodules per screening CT in May of last year, noted to be stable in size.  Patient has continued to smoke heavily 1 to 1-1/2 packs/day  -Obtain noncontrast CT of chest, PFTs  -Smoking cessation education    Unintentional weight loss  Patient reports unintentional weight loss of over 50 pounds over the course of a year, may be reflective of poor glycemic control, also concern for possibility of malignancy given known lung nodules and ongoing tobacco use.  -Await results of CT  -Check TSH if not already done  -Consider nutritional consult    I spent 60 minutes in the professional and overall care of this patient.

## 2025-02-06 NOTE — H&P (VIEW-ONLY)
Inpatient consult to Cardiology  Consult performed by: CELIA Holden-CNP  Consult ordered by: Iglesia Caputo DO  Reason for consult: NSTEMI      Cardiology Consult Note      Date:   2/6/2025  Patient name:  Feliciano Worthington  Date of admission:  2/5/2025  9:40 PM  MRN:   [id number]  YOB: 1950  Time of Consult:  7:34 AM    Consulting Cardiologist: CELIA Forbes, CNP  Primary Cardiologist:  Dr. Roberto Haskins       Admission Diagnosis:     Chest pain, unspecified type      History of Present Illness:      Feliciano Worthington is a 74 y.o.  male patient who is being at the request of Dr. Caputo for inpatient consultation of coronary artery disease. He was admitted on 2/5/2025.  Previous [facility] and [facility] records have been reviewed in detail.      Patient presented to [facility] emergency department on 2/5/2025 with chief complaint of chest pain which she has been experiencing both at rest and with activity for the last 2 days prior to admission.  States was taking nitroglycerin with some relief but not completely relief therefore called EMS.  He states that the pain is in his left upper chest and does not radiate.  He has been nauseated at times but no vomiting.  No recent fevers or chills.  Denies palpitations, lightheadedness, presyncope or syncope.  States did fall off the chair that he was standing on several weeks ago while trying to plug in a shop vac.  At time of cardiology consult complains of headache as well as lower neck pain and right shoulder pain which is chronic in nature and patient is asking for Tylenol.  He denies chest pain currently.  States he has chronic cough due to smoking which is unchanged.  Admits that he is forgetful and taking his medications. Noted to have elevated troponin. He received aspirin and was initiated on IV heparin and admitted to medical service.  Cardiology is consulted for evaluation of NSTEMI    Labs  and testing  CBC unremarkable, magnesium 1.92, glucose 438, sodium 129, potassium 4.4, BUN 16, creatinine 0.99, estimated GFR 80.  High-sensitivity troponin 299 with repeat 353, and this morning 1858.  Lipid profile cholesterol 258, HDL 24.7, , triglycerides 309.  EKG sinus rhythm with second-degree AV block, Mobitz 1  Chest x-ray mild bilateral opacities    Past Medical History  Coronary artery disease with remote stent to circumflex artery 2007 and RCA stent 2011  Hyperlipidemia  Diabetes  Hypertension  Carotid artery stenosis  Mobitz type I second-degree AV block  Hyponatremia  Memory loss  COPD    Previous Cardiac testing   1/17/2023 Lexiscan stress test  IMPRESSION:  Normal Lexiscan Myoview cardiac perfusion stress test.  No evidence of ischemia or myocardial infarction by perfusion imaging.  Normal left ventricular systolic function, ejection fraction 63%.  Abnormal resting electrocardiogram is noted.  This compared to a negative study of June 2021.  Clinical correlation is advised..    10/23/2023 Holter monitor  Impression  1.  Asymptomatic during this recording  2.  First-degree AV block and type I second-degree AV block the majority of the time with less dropped beats as rates increased over 80 bpm.  3.  Bradycardia during sleep at 35-40 bpm secondary to type I second-degree AV block  4.  No ventricular tachycardia, atrial fibrillation or SVT  5.  Average heart rate throughout 80 bpm with normal heart rate variability  6.  Rare episode of aberrant conduction  7.  Rare pauses longest being 1.7 seconds related to dropped beat in the setting of type I second-degree AV block       Allergies:     Allergies   Allergen Reactions    Tetracyclines Hives, Other, Unknown, Rash and Shortness of breath     Water blisters in mouth and genital region.    Water blister on tongue and penis    Atorvastatin Unknown     Cramping all over the body. Sore shoulders    Empagliflozin Unknown    Pravastatin Unknown     Rosuvastatin Unknown    Simvastatin Unknown    Statins-Hmg-Coa Reductase Inhibitors Unknown         Past Medical History:     Past Medical History:   Diagnosis Date    Acute myocardial infarction, unspecified 01/24/2018    Heart attack    Acute myocardial infarction, unspecified 01/24/2018    Heart attack    Chronic obstructive pulmonary disease, unspecified 01/24/2018    COPD, mild    Chronic obstructive pulmonary disease, unspecified 01/24/2018    COPD, mild    Coronary angioplasty status     Post PTCA    Encounter for immunization 11/04/2022    Encounter for immunization    Encounter for screening for malignant neoplasm of colon 08/25/2016    Encounter for colonoscopy due to history of colonic polyp    Encounter for screening for malignant neoplasm of skin 04/14/2022    Skin cancer screening    Nicotine dependence, cigarettes, uncomplicated 09/12/2022    Cigarette smoker    Obesity, unspecified 05/25/2022    Class 1 obesity with body mass index (BMI) of 31.0 to 31.9 in adult    Old myocardial infarction 01/24/2018    History of myocardial infarction    Other acute postprocedural pain     Post-operative pain    Pain in left knee     Left knee pain, unspecified chronicity    Pain in right hip 11/04/2022    Hip pain, bilateral    Personal history of other diseases of the digestive system 01/24/2018    History of gastroesophageal reflux (GERD)    Personal history of other diseases of the musculoskeletal system and connective tissue 08/25/2016    History of low back pain    Personal history of other diseases of the respiratory system     History of sinusitis    Personal history of other endocrine, nutritional and metabolic disease 08/25/2016    History of hyperlipidemia    Personal history of other mental and behavioral disorders 04/14/2022    History of depression    Personal history of other specified conditions 12/08/2016    History of diarrhea    Personal history of other specified conditions     History of  prolonged Q-T interval on ECG    Personal history of other specified conditions 06/20/2018    History of nocturia    Separation of muscle (nontraumatic), other site 05/14/2018    Rectus diastasis    Sprain of medial collateral ligament of left knee, initial encounter 08/28/2017    Sprain of medial collateral ligament of left knee, initial encounter    Sprain of medial collateral ligament of left knee, subsequent encounter 10/12/2017    Sprain of medial collateral ligament of left knee, subsequent encounter    Type 2 diabetes mellitus without complications (Multi) 01/24/2018    Diabetes mellitus    Type 2 diabetes mellitus without complications (Multi) 01/24/2018    Diabetes mellitus    Umbilical hernia without obstruction or gangrene 05/14/2018    Umbilical hernia without obstruction and without gangrene       Past Surgical History:     Past Surgical History:   Procedure Laterality Date    COLONOSCOPY  10/06/2016    Colonoscopy    CORONARY ANGIOPLASTY WITH STENT PLACEMENT  08/25/2016    Cath Stent Placement    OTHER SURGICAL HISTORY  05/25/2022    Oral surgery    SKIN CANCER EXCISION  01/17/2025    right forearm, right shoulder    TONSILLECTOMY  08/25/2016    Tonsillectomy       Family History:     Family History   Problem Relation Name Age of Onset    Coronary artery disease Mother      Diabetes Father      Coronary artery disease Father      Other (acute myocardial infarction) Father         Social History:     Social History     Tobacco Use    Smoking status: Every Day     Average packs/day: 1.5 packs/day for 50.0 years (75.0 ttl pk-yrs)     Types: Cigarettes     Start date: 1968     Passive exposure: Current    Smokeless tobacco: Never   Vaping Use    Vaping status: Never Used   Substance Use Topics    Alcohol use: Not Currently     Comment: [id number]    Drug use: Never       CURRENT INPATIENT MEDICATIONS    amLODIPine, 5 mg, oral, Daily  [START ON 2/7/2025] aspirin, 81 mg, oral, Daily  hydrOXYzine HCL, 25 mg,  oral, BID  insulin glargine, 32 Units, subcutaneous, q AM  insulin glargine, 35 Units, subcutaneous, Nightly  insulin lispro, 0-15 Units, subcutaneous, TID AC  pantoprazole, 40 mg, oral, Daily      heparin, 0-4,000 Units/hr, Last Rate: 1,000 Units/hr (02/06/25 0329)      Current Outpatient Medications   Medication Instructions    ACETAMINOPHEN ORAL 650 mg, Every 8 hours PRN    alcohol swabs (BD Alcohol Swabs) pads, medicated Use twice daily while checking sugar.    amLODIPine (NORVASC) 5 mg, oral, Daily    aspirin 81 mg, Daily    blood glucose control high,low (Accu-Chek Guide L1-L2 Ctrl Sol) solution 1 bottle, miscellaneous, Daily PRN    blood sugar diagnostic (Accu-Chek Guide test strips) strip Test twice daily    blood-glucose meter (Accu-Chek Guide Glucose Meter) misc Test twice daily    chlorthalidone (Hygroton) 25 mg tablet TAKE 1 TABLET EVERY MONDAY AND THURSDAY    ezetimibe (ZETIA) 10 mg, oral, Daily    glipiZIDE (GLUCOTROL) 10 mg, oral, 2 times daily before meals    hydrOXYzine HCL (ATARAX) 25 mg, oral, 2 times daily    insulin glargine (LANTUS U-100 INSULIN) 32 Units, Every morning    ipratropium (Atrovent) 21 mcg (0.03 %) nasal spray 2 sprays, Each Nostril, Every 12 hours    lancets (Accu-Chek Softclix Lancets) misc Test twice daily    nitroglycerin (NITROSTAT) 0.4 mg, sublingual, Every 5 min PRN, DO NOT EXCEED A TOTAL OF 3 DOSES IN 15 MINUTES    omeprazole (PRILOSEC) 40 mg, oral, Daily before breakfast, Do not crush or chew.    ramipril (ALTACE) 5 mg, oral, 2 times daily    sertraline (ZOLOFT) 25 mg, oral, Daily    venlafaxine XR (EFFEXOR-XR) 150 mg, oral, Daily, Do not crush or chew.        Review of Systems:      12 point review of systems was obtained in detail and is negative other than that detailed above.    Vital Signs:     Vitals:    02/06/25 0038 02/06/25 0149 02/06/25 0317 02/06/25 0733   BP: 142/89 142/88 134/78 115/67   BP Location:  Left arm Left arm    Patient Position:  Sitting Lying   "  Pulse: 68 77 73 76   Resp: 16 18 17    Temp:  36.4 °C (97.5 °F) 36.2 °C (97.2 °F) 36.3 °C (97.3 °F)   TempSrc:  Temporal Temporal    SpO2: 97% 97% 97% 97%   Weight:  83.9 kg (185 lb)     Height:  1.778 m (5' 10\")         Intake/Output Summary (Last 24 hours) at 2/6/2025 0734  Last data filed at 2/6/2025 0507  Gross per 24 hour   Intake 29.1 ml   Output --   Net 29.1 ml       Wt Readings from Last 4 Encounters:   02/06/25 83.9 kg (185 lb)   01/29/25 85.5 kg (188 lb 6.4 oz)   10/15/24 90.3 kg (199 lb)   10/04/24 89.8 kg (198 lb)       Physical Examination:     GENERAL APPEARANCE: Well developed, well nourished, in no acute distress.  CHEST: Symmetric and non-tender.  INTEGUMENT: Skin warm and dry, without gross excoriationis or lesions.  HEENT: No gross abnormalities of conjunctiva, teeth, gums, oral mucosa  NECK: Supple, no JVD, no bruit. Thyroid not palpable. Carotid upstrokes normal.  NEURO/PSHCY: Alert and oriented x3; appropriate behavior and responses. Moves all extremities equally.  LUNGS: Clear to auscultation bilaterally; normal respiratory effort.  HEART: Rate and rhythm regular with no evident murmur; no gallop appreciated. There are no rubs, clicks or heaves. PMI nondisplaced.  ABDOMEN: Soft, nontender, no palpable hepatosplenomegaly, no mases, no bruits.   MUSCULOSKELETAL: Normal range of motion.  EXTREMITIES: Warm with good color, no clubbing or cyanois. There is no edema noted.  PERIPHERAL VASCULAR: 2+ radial and posterior tibial pulse bilateral.     Lab:     CBC:   Results from last 7 days   Lab Units 02/06/25  0309 02/05/25  2147   WBC AUTO x10*3/uL 8.3 9.8   RBC AUTO x10*6/uL 5.40 5.50   HEMOGLOBIN g/dL 15.8 16.0   HEMATOCRIT % 44.2 45.4   MCV fL 82 83   MCH pg 29.3 29.1   MCHC g/dL 35.7 35.2   RDW % 13.0 13.0   PLATELETS AUTO x10*3/uL 145* 176     CMP:    Results from last 7 days   Lab Units 02/06/25  0309 02/05/25  2147   SODIUM mmol/L 132* 129*   POTASSIUM mmol/L 3.9 4.4   CHLORIDE mmol/L 96* " 93*   CO2 mmol/L 25 23   BUN mg/dL 15 16   CREATININE mg/dL 0.90 0.99   GLUCOSE mg/dL 328* 438*   PROTEIN TOTAL g/dL  --  7.3   CALCIUM mg/dL 9.1 9.3   BILIRUBIN TOTAL mg/dL  --  0.6   ALK PHOS U/L  --  110   AST U/L  --  15   ALT U/L  --  12     BMP:    Results from last 7 days   Lab Units 02/06/25  0309 02/05/25  2147   SODIUM mmol/L 132* 129*   POTASSIUM mmol/L 3.9 4.4   CHLORIDE mmol/L 96* 93*   CO2 mmol/L 25 23   BUN mg/dL 15 16   CREATININE mg/dL 0.90 0.99   CALCIUM mg/dL 9.1 9.3   GLUCOSE mg/dL 328* 438*     Magnesium:  Results from last 7 days   Lab Units 02/06/25  0309 02/05/25  2147   MAGNESIUM mg/dL 1.86 1.92     Troponin:    Results from last 7 days   Lab Units 02/06/25  0309 02/05/25  2248 02/05/25  2147   TROPHS ng/L 1,858* 353* 299*     BNP:     Lipid Panel:  Results from last 7 days   Lab Units 02/05/25  2248   HDL mg/dL 24.7   CHOLESTEROL/HDL RATIO  10.4   VLDL mg/dL 62*   TRIGLYCERIDES mg/dL 309*   NON HDL CHOL. mg/dL 233*        Diagnostic Studies:   @No results found for this or any previous visit.    ECG 12 lead    Result Date: 2/6/2025  Sinus rhythm with 2nd degree AV block (Mobitz I) Right superior axis deviation Abnormal ECG When compared with ECG of 05-FEB-2025 21:41, (unconfirmed) No significant change was found    ECG 12 lead    Result Date: 2/6/2025  Sinus rhythm with 2nd degree AV block (Mobitz I) Right superior axis deviation Nonspecific ST abnormality Abnormal ECG When compared with ECG of 17-OCT-2023 18:49, Previous ECG has undetermined rhythm, needs review    XR chest 1 view    Result Date: 2/5/2025  Interpreted By:  Tobin Machado, STUDY: XR CHEST 1 VIEW;  2/5/2025 10:17 pm   INDICATION: Signs/Symptoms:Chest Pain.   COMPARISON: None.   ACCESSION NUMBER(S): [id number]   ORDERING CLINICIAN: MARIELY ROCKWELL   FINDINGS: The cardiac silhouette is normal in size. There is mild bilateral opacities may correspond to chronic lung changes and atelectasis versus pneumonia. No segment pleural  effusion. No pneumothorax.       Mild bilateral opacities may correspond to chronic lung changes and atelectasis versus pneumonia.   MACRO: None   Signed by: Tobin Machado 2/5/2025 10:38 PM Dictation workstation:   [id number]      No echocardiogram results found for the past 14 days    Radiology:     XR chest 1 view   Final Result   Mild bilateral opacities may correspond to chronic lung changes and   atelectasis versus pneumonia.        MACRO:   None        Signed by: Tobin Machado 2/5/2025 10:38 PM   Dictation workstation:   [id number]          Problem List:     Patient Active Problem List   Diagnosis    Primary hypertension    Chronic obstructive pulmonary disease, unspecified    Current every day smoker    GERD (gastroesophageal reflux disease)    Arcus senilis, bilateral    Astigmatism of both eyes    Cataract, bilateral    Chronic venous stasis dermatitis    Depression, major, in remission (CMS-HCC)    Erectile dysfunction    Hiatal hernia with GERD    Multiple lung nodules on CT    Nuclear sclerotic cataract of both eyes    Osteoarthritis of hips, bilateral    Other spondylosis, cervical region    Periumbilical hernia    Testosterone deficiency    Smoker    Sleep apnea    Vitamin D deficiency    Noncompliance with dietary restriction    Arthritis of left acromioclavicular joint    Bruit of right carotid artery    Degenerative joint disease (DJD) of hip    Spinal stenosis of cervical region    BMI 29.0-29.9,adult    Encounter for immunization    Mixed hyperlipidemia    CAD (coronary artery disease)    Mobitz type 1 second degree AV block    Type 2 diabetes mellitus with hyperglycemia, with long-term current use of insulin    Hyponatremia    Chest pain, unspecified type       Assessment:   NSTEMI type I with history of coronary artery disease and remote stenting to the circumflex and right coronary arteries, last PCI 2011  Mobitz type I second-degree AV block  Hypertension  Hyperlipidemia   COPD with chronic  tobacco use  Diabetes mellitus  Hyponatremia, sodium stable on admission    Plan:     Continue telemetry  Continue IV heparin for now  Continue aspirin, amlodipine, Zetia  No AV caty blocking agents due to Mobitz type I second-degree AV block  Discussed cardiac catheterization/possible PCI with patient who agrees to proceed.  Discussed with him importance of smoking cessation and that if he requires stenting will be on dual antiplatelet therapy.  He has history of forgetfulness and taking his medications, discussed that it will be important for him to have compliance with dual antiplatelet therapy should stenting be necessary.  Further cardiology recommendation per Dr. Divine Bright, APRN-CNP  [facility]    CARDIOLOGIST CONSULT NOTE  I have personally seen and examined patient as well as personally formulated treatment plan.  I have reviewed this note as created by Ysabel Bright RN, CNP and agree with her findings and physical exam.    74-year-old man with known coronary disease with remote CX/RCA angioplasty 2011.  Had been well until about 2 to 3 days ago when developed classic and progressive angina.  Presented emergency department with no significant ST change but serial troponins increasing.  Improved with nitrates and heparin drip.  Currently asymptomatic.  Also describes some nausea possibly anginal equivalent.  No vomiting or diarrhea.  No bleeding issues.  Denies hemoptysis hematuria hematochezia or melena.  Continues to smoke cigarettes on a regular basis.  Importantly although he cannot tolerate most statins he was able to tolerate lovastatin and in fact when I saw him 8 months ago he was taking lovastatin.  He thinks he just forgot to take it thinking it was like the other statins he does not describe subsequent myalgia after I had seen him.  He has been having issues with short-term memory loss as well.    Cardiac history  RCA/CX angioplasty  prior to 2012.  Type I second-degree AV block  Diabetes  Hyperlipidemia  Hypertension  Carotid artery disease less than 50% stenosis at duplex 2023    Exam: Generally comfortable lungs are clear except for occasional rhonchi that clears with cough cardiac exam is a regular rhythm rate no murmur or gallop.  Abdomen is soft extremities show no edema slight carotid bruit particularly on the left.  Pulses are 1+.  He has no edema.  Neurologic mental status intact cranial nerves are intact strength is intact    ECG: Type I secondary AV block sinus rhythm minor nonseptic T abnormalities no acute ST change  Chest x-ray: Chronic changes no CHF    Impression  Acute non-ST elevation MI: Symptoms controlled for coronary angiography this morning  Risk/benefit: Discussed procedure with him.  Discussed potential risk of myocardial infarction, stroke, arterial injury, contrast reaction, renal failure he understands agrees to proceed.  He understands that findings could include diffuse disease not amenable to revascularization versus disease that could be approached via angioplasty which will be done at the same setting versus potential bypass graft surgery.  Lipidemia: He is indeed intolerant to multiple statins but not lovastatin.  He stopped it on his own because he forgot he does not tell me he had any myalgias on that agent and when I had seen him last in the office he was tolerating it well with good control of cholesterol would resume lovastatin if that causes symptoms he needs to be on inhibitor versus Leqvio.  Given his short-term memory Leqvio might be preferred if he cannot tolerate lovastatin  Hypertension: Controlled  Type I second-degree AV block: Monitoring thus far shows no advanced heart block avoid beta-blockers  Short-term memory loss: I discussed with him it is extremely important that he take his medicines particularly if he gets a stent and needs to be on dual antiplatelet therapy.  May need neurology  evaluation although his orientation understanding of his disease is quite good at this time.  Recommendations based on patient's clinical course    Roberto Haskins MD          Of note, this documentation is completed using the Dragon Dictation system (voice recognition software). There may be spelling and/or grammatical errors that were not corrected prior to final submission.      Electronically signed by KHADIJAH Holden, on 2/6/2025 at 7:34 AM

## 2025-02-06 NOTE — H&P
Medical Group History and Physical  ASSESSMENT & PLAN:   Feliciano Worthington is a 74 y.o. male admitted to [facility] for management of:    NSTEMI  Hx of CAD s/p PCI with TREVOR  Hyperglycemia 2/2 NSTEMI vs poor compliance    Plan:  - Currently chest pain free. Mild persistent chest pain started yesterday (>36 hours) and was minimally relieved by multiple doses of sublingual nitroglycerin at home  - hsTnT elevated (299 -> 353)  - Received  mg in ED -> continue ASA 81 mg  - Continue low intensity heparin infusion  - NPO  while pending cardiology evaluation  - Continuous telemetry  - Continue home Lantus (32 units in AM & 35 units PM), ISS. Hold home metformin     Chronic Medical Conditions  HTN, HLD, IDDM-II, COPD, tobacco use disorder, GERD, depression & anxiety   - Continue home medications, pending pharmacy review    VTE Prophylaxis: Low intensity heparin infusion    Disposition: Anticipate LOS > 2 midnights.    I spent a total of 55 minutes on the date of the service which included preparing to see the patient, face-to-face patient care, completing clinical documentation, obtaining and/or reviewing separately obtained history, performing a medically appropriate examination, counseling and educating the patient/family/caregiver, ordering medications, tests, or procedures, independently interpreting results (not separately reported), and communicating results to the patient/family/caregiver.     Iglesia Caputo, [facility]    HISTORY OF PRESENT ILLNESS:   Chief Complaint: Chest pain    History Of Present Illness:    Feliciano Worthington is a 74 y.o. male with a significant past medical history of CAD (s/p PCI with RCA stent 2011), Mobitz type 1 AV block, HTN, HLD, IDDM-II, COPD, tobacco use disorder, GERD, depression & anxiety who presented to [facility] due to chest pain. Patient states his chest pain began 1.5 days ago while at home watching TV. He describes chest pain as mild (3-4 out of 10 in severity),  "retrosternal, without radiation to arms/neck/jaw, unchanged with positional changes and unaffected by his breathing. He took sublingual nitroglycerin \"several times\" but it didn't make much of a difference per patient. Due to the chest pain continuing into today he felt it was best to come to hospital for evaluation. Patient currently reporting to be chest pain free. Denies headaches, vision changes, SOB, abdominal pain, N/V, weight changes, leg swelling.     Review of systems: 10 point review of systems is otherwise negative except as mentioned above.    PAST HISTORIES:     Past Medical History:  He has a past medical history of Acute myocardial infarction, unspecified (01/24/2018), Acute myocardial infarction, unspecified (01/24/2018), Chronic obstructive pulmonary disease, unspecified (01/24/2018), Chronic obstructive pulmonary disease, unspecified (01/24/2018), Coronary angioplasty status, Encounter for immunization (11/04/2022), Encounter for screening for malignant neoplasm of colon (08/25/2016), Encounter for screening for malignant neoplasm of skin (04/14/2022), Nicotine dependence, cigarettes, uncomplicated (09/12/2022), Obesity, unspecified (05/25/2022), Old myocardial infarction (01/24/2018), Other acute postprocedural pain, Pain in left knee, Pain in right hip (11/04/2022), Personal history of other diseases of the digestive system (01/24/2018), Personal history of other diseases of the musculoskeletal system and connective tissue (08/25/2016), Personal history of other diseases of the respiratory system, Personal history of other endocrine, nutritional and metabolic disease (08/25/2016), Personal history of other mental and behavioral disorders (04/14/2022), Personal history of other specified conditions (12/08/2016), Personal history of other specified conditions, Personal history of other specified conditions (06/20/2018), Separation of muscle (nontraumatic), other site (05/14/2018), Sprain of medial " "collateral ligament of left knee, initial encounter (08/28/2017), Sprain of medial collateral ligament of left knee, subsequent encounter (10/12/2017), Type 2 diabetes mellitus without complications (Multi) (01/24/2018), Type 2 diabetes mellitus without complications (Multi) (01/24/2018), and Umbilical hernia without obstruction or gangrene (05/14/2018).    Past Surgical History:  He has a past surgical history that includes Coronary angioplasty with stent (08/25/2016); Tonsillectomy (08/25/2016); Colonoscopy (10/06/2016); Other surgical history (05/25/2022); and Skin cancer excision (01/17/2025).      Social History:  He reports that he has been smoking cigarettes. He started smoking about 57 years ago. He has a 75 pack-year smoking history. He has been exposed to tobacco smoke. He has never used smokeless tobacco. He reports that he does not currently use alcohol. He reports that he does not use drugs.    Family History:  Family History   Problem Relation Name Age of Onset    Coronary artery disease Mother      Diabetes Father      Coronary artery disease Father      Other (acute myocardial infarction) Father          Allergies:  Tetracyclines, Atorvastatin, Empagliflozin, Pravastatin, Rosuvastatin, Simvastatin, and Statins-hmg-coa reductase inhibitors    OBJECTIVE:     Last Recorded Vitals:  Vitals:    02/05/25 2352 02/06/25 0038 02/06/25 0149 02/06/25 0317   BP: 165/89 142/89 142/88 134/78   BP Location:   Left arm Left arm   Patient Position:   Sitting Lying   Pulse: 64 68 77 73   Resp: 16 16 18 17   Temp:   36.4 °C (97.5 °F) 36.2 °C (97.2 °F)   TempSrc:   Temporal Temporal   SpO2: 97% 97% 97% 97%   Weight:   83.9 kg (185 lb)    Height:   1.778 m (5' 10\")      Last I/O:  No intake/output data recorded.    Physical Exam  Vitals and nursing note reviewed.   Constitutional:       General: He is not in acute distress.     Appearance: Normal appearance.   HENT:      Head: Normocephalic and atraumatic.      " Mouth/Throat:      Mouth: Mucous membranes are moist.   Eyes:      Extraocular Movements: Extraocular movements intact.      Conjunctiva/sclera: Conjunctivae normal.   Cardiovascular:      Rate and Rhythm: Normal rate and regular rhythm.      Pulses: Normal pulses.      Heart sounds: Normal heart sounds.   Pulmonary:      Effort: Pulmonary effort is normal.      Breath sounds: Normal breath sounds.   Abdominal:      General: Abdomen is flat. Bowel sounds are normal.      Palpations: Abdomen is soft.   Musculoskeletal:      Right lower leg: No edema.      Left lower leg: No edema.   Neurological:      General: No focal deficit present.      Mental Status: He is alert and oriented to person, place, and time.   Psychiatric:         Mood and Affect: Mood normal.         Behavior: Behavior normal.         Thought Content: Thought content normal.         Scheduled Medications  amLODIPine, 5 mg, oral, Daily  [START ON 2/7/2025] aspirin, 81 mg, oral, Daily  hydrOXYzine HCL, 25 mg, oral, BID  insulin glargine, 32 Units, subcutaneous, q AM  insulin glargine, 35 Units, subcutaneous, Nightly  insulin lispro, 0-15 Units, subcutaneous, TID AC  pantoprazole, 40 mg, oral, Daily      PRN Medications  PRN medications: heparin, nitroglycerin  Continuous Medications  heparin, 0-4,000 Units/hr, Last Rate: 1,000 Units/hr (02/06/25 8044)        Outpatient Medications:  Prior to Admission medications    Medication Sig Start Date End Date Taking? Authorizing Provider   ACETAMINOPHEN ORAL Take 650 mg by mouth every 8 hours if needed for mild pain (1 - 3). Do not crush, chew, or split.   Yes Historical Provider, MD   alcohol swabs (BD Alcohol Swabs) pads, medicated Use twice daily while checking sugar. 10/18/23  Yes Contreras Ledbetter MD   amLODIPine (Norvasc) 5 mg tablet Take 1 tablet (5 mg) by mouth once daily. 10/15/24  Yes Iesha Nava APRN-CNP   aspirin 81 mg EC tablet Take 1 tablet (81 mg) by mouth once daily.   Yes Historical  Provider, MD   blood-glucose meter (Accu-Chek Guide Glucose Meter) misc Test twice daily 10/18/23  Yes Contreras Ledbetter MD   chlorthalidone (Hygroton) 25 mg tablet TAKE 1 TABLET EVERY MONDAY AND THURSDAY 5/8/24  Yes Roberto Haskins MD   ezetimibe (Zetia) 10 mg tablet Take 1 tablet (10 mg) by mouth once daily. 10/15/24  Yes CELIA Monge-NGUYEN   glipiZIDE (Glucotrol) 10 mg tablet Take 1 tablet (10 mg) by mouth 2 times a day before meals. 1/29/25 1/29/26 Yes Hussain Bernal MD   hydrOXYzine HCL (Atarax) 25 mg tablet Take 1 tablet (25 mg) by mouth 2 times a day. 10/3/24  Yes Hussain Bernal MD   insulin glargine (Lantus U-100 Insulin) 100 unit/mL injection Inject 32 Units under the skin once daily in the morning. Take as directed per insulin instructions. Along with the 35 units in the PM   Yes Historical Provider, MD   ipratropium (Atrovent) 21 mcg (0.03 %) nasal spray Administer 2 sprays into each nostril every 12 hours. 10/3/24  Yes Hussain Bernal MD   lancets (Accu-Chek Softclix Lancets) misc Test twice daily 10/18/23  Yes Contreras Ledbetter MD   nitroglycerin (Nitrostat) 0.4 mg SL tablet Place 1 tablet (0.4 mg) under the tongue every 5 minutes if needed for chest pain. DO NOT EXCEED A TOTAL OF 3 DOSES IN 15 MINUTES 1/29/25  Yes Hussain Bernal MD   omeprazole (PriLOSEC) 40 mg DR capsule Take 1 capsule (40 mg) by mouth once daily in the morning. Take before meals. Do not crush or chew. 10/3/24  Yes Hussain Bernal MD   ramipril (Altace) 5 mg capsule TAKE 1 CAPSULE TWICE DAILY 6/19/24  Yes Roberto Haskins MD   sertraline (Zoloft) 25 mg tablet Take 1 tablet (25 mg) by mouth once daily. 1/29/25 3/30/25 Yes Hussain Bernal MD   venlafaxine XR (Effexor-XR) 150 mg 24 hr capsule Take 1 capsule (150 mg) by mouth once daily. Do not crush or chew. 10/3/24  Yes Hussain Bernal MD   blood glucose control high,low (Accu-Chek Guide L1-L2 Ctrl Sol) solution 1 bottle once daily  as needed (use when need to un controls on glucose meter). 10/18/23   Contreras Ledbetter MD   blood sugar diagnostic (Accu-Chek Guide test strips) strip Test twice daily 10/18/23   Contreras Ledbetter MD       LABS AND IMAGING:     Labs:  Results from last 7 days   Lab Units 02/06/25  0309 02/05/25  2147   WBC AUTO x10*3/uL 8.3 9.8   RBC AUTO x10*6/uL 5.40 5.50   HEMOGLOBIN g/dL 15.8 16.0   HEMATOCRIT % 44.2 45.4   MCV fL 82 83   MCH pg 29.3 29.1   MCHC g/dL 35.7 35.2   RDW % 13.0 13.0   PLATELETS AUTO x10*3/uL 145* 176     Results from last 7 days   Lab Units 02/06/25  0309 02/05/25  2147   SODIUM mmol/L 132* 129*   POTASSIUM mmol/L 3.9 4.4   CHLORIDE mmol/L 96* 93*   CO2 mmol/L 25 23   BUN mg/dL 15 16   CREATININE mg/dL 0.90 0.99   GLUCOSE mg/dL 328* 438*   PROTEIN TOTAL g/dL  --  7.3   CALCIUM mg/dL 9.1 9.3   BILIRUBIN TOTAL mg/dL  --  0.6   ALK PHOS U/L  --  110   AST U/L  --  15   ALT U/L  --  12     Results from last 7 days   Lab Units 02/06/25  0309 02/05/25  2147   MAGNESIUM mg/dL 1.86 1.92     Results from last 7 days   Lab Units 02/05/25  2248 02/05/25  2147   TROPHS ng/L 353* 299*       Imaging:  XR chest 1 view  Narrative: Interpreted By:  Tobin Machado,   STUDY:  XR CHEST 1 VIEW;  2/5/2025 10:17 pm      INDICATION:  Signs/Symptoms:Chest Pain.      COMPARISON:  None.      ACCESSION NUMBER(S):  [id number]      ORDERING CLINICIAN:  MARIELY ROCKWELL      FINDINGS:  The cardiac silhouette is normal in size. There is mild bilateral  opacities may correspond to chronic lung changes and atelectasis  versus pneumonia. No segment pleural effusion. No pneumothorax.      Impression: Mild bilateral opacities may correspond to chronic lung changes and  atelectasis versus pneumonia.      MACRO:  None      Signed by: Tobin Machado 2/5/2025 10:38 PM  Dictation workstation:   [id number]

## 2025-02-06 NOTE — SIGNIFICANT EVENT
Pt arrived on unit pre [facility]. Focused assessment performed and WDL. VS's obtained, pulses palpated, and IV's flushed. IV on the left was kinked, redressed and flushed, working appropriately. Report called to KARELY Francois in [facility]. Pt states that his wife is on her way to the hospital.

## 2025-02-06 NOTE — ED PROVIDER NOTES
HPI   Chief Complaint   Patient presents with    Chest Pain     CP x 36 hrs. Pt took 2-3 nitroglycerin yesterday and 4-5 today. Pt received 324 ASA in EMS          History provided by:  Patient  74-year-old male with a history of COPD, CAD with prior MI and stents presenting via EMS from home for evaluation of chest pain for the last 36 hours.  Patient reports he took 2-3 nitro yesterday and multiple nitro today without much relief in symptoms.  Given  mg by EMS prior to arrival.  Denies leg swelling.  No vomiting.        Patient History   Past Medical History:   Diagnosis Date    Acute myocardial infarction, unspecified 01/24/2018    Heart attack    Acute myocardial infarction, unspecified 01/24/2018    Heart attack    Chronic obstructive pulmonary disease, unspecified 01/24/2018    COPD, mild    Chronic obstructive pulmonary disease, unspecified 01/24/2018    COPD, mild    Coronary angioplasty status     Post PTCA    Encounter for immunization 11/04/2022    Encounter for immunization    Encounter for screening for malignant neoplasm of colon 08/25/2016    Encounter for colonoscopy due to history of colonic polyp    Encounter for screening for malignant neoplasm of skin 04/14/2022    Skin cancer screening    Nicotine dependence, cigarettes, uncomplicated 09/12/2022    Cigarette smoker    Obesity, unspecified 05/25/2022    Class 1 obesity with body mass index (BMI) of 31.0 to 31.9 in adult    Old myocardial infarction 01/24/2018    History of myocardial infarction    Other acute postprocedural pain     Post-operative pain    Pain in left knee     Left knee pain, unspecified chronicity    Pain in right hip 11/04/2022    Hip pain, bilateral    Personal history of other diseases of the digestive system 01/24/2018    History of gastroesophageal reflux (GERD)    Personal history of other diseases of the musculoskeletal system and connective tissue 08/25/2016    History of low back pain    Personal history of other  diseases of the respiratory system     History of sinusitis    Personal history of other endocrine, nutritional and metabolic disease 08/25/2016    History of hyperlipidemia    Personal history of other mental and behavioral disorders 04/14/2022    History of depression    Personal history of other specified conditions 12/08/2016    History of diarrhea    Personal history of other specified conditions     History of prolonged Q-T interval on ECG    Personal history of other specified conditions 06/20/2018    History of nocturia    Separation of muscle (nontraumatic), other site 05/14/2018    Rectus diastasis    Sprain of medial collateral ligament of left knee, initial encounter 08/28/2017    Sprain of medial collateral ligament of left knee, initial encounter    Sprain of medial collateral ligament of left knee, subsequent encounter 10/12/2017    Sprain of medial collateral ligament of left knee, subsequent encounter    Type 2 diabetes mellitus without complications (Multi) 01/24/2018    Diabetes mellitus    Type 2 diabetes mellitus without complications (Multi) 01/24/2018    Diabetes mellitus    Umbilical hernia without obstruction or gangrene 05/14/2018    Umbilical hernia without obstruction and without gangrene     Past Surgical History:   Procedure Laterality Date    COLONOSCOPY  10/06/2016    Colonoscopy    CORONARY ANGIOPLASTY WITH STENT PLACEMENT  08/25/2016    Cath Stent Placement    OTHER SURGICAL HISTORY  05/25/2022    Oral surgery    SKIN CANCER EXCISION  01/17/2025    right forearm, right shoulder    TONSILLECTOMY  08/25/2016    Tonsillectomy     Family History   Problem Relation Name Age of Onset    Coronary artery disease Mother      Diabetes Father      Coronary artery disease Father      Other (acute myocardial infarction) Father       Social History     Tobacco Use    Smoking status: Every Day     Average packs/day: 1.5 packs/day for 50.0 years (75.0 ttl pk-yrs)     Types: Cigarettes     Start  date: 1968     Passive exposure: Current    Smokeless tobacco: Never   Vaping Use    Vaping status: Never Used   Substance Use Topics    Alcohol use: Not Currently     Comment: [id number]    Drug use: Never       Physical Exam   ED Triage Vitals [02/05/25 2141]   Temperature Heart Rate Respirations BP   36.5 °C (97.7 °F) 73 17 157/76      Pulse Ox Temp Source Heart Rate Source Patient Position   98 % Temporal Monitor Lying      BP Location FiO2 (%)     Right arm --       Physical Exam  Vitals and nursing note reviewed.   Constitutional:       General: He is not in acute distress.  HENT:      Head: Atraumatic.      Mouth/Throat:      Mouth: Mucous membranes are moist.      Pharynx: Oropharynx is clear.   Eyes:      Extraocular Movements: Extraocular movements intact.      Conjunctiva/sclera: Conjunctivae normal.      Pupils: Pupils are equal, round, and reactive to light.   Cardiovascular:      Rate and Rhythm: Normal rate and regular rhythm.      Pulses: Normal pulses.   Pulmonary:      Effort: Pulmonary effort is normal. No respiratory distress.      Breath sounds: Normal breath sounds.   Abdominal:      General: There is no distension.      Palpations: Abdomen is soft.      Tenderness: There is no abdominal tenderness. There is no guarding or rebound.   Musculoskeletal:         General: No deformity.      Cervical back: Neck supple.      Right lower leg: No edema.      Left lower leg: No edema.   Skin:     General: Skin is warm and dry.   Neurological:      Mental Status: He is alert and oriented to person, place, and time. Mental status is at baseline.      Cranial Nerves: No cranial nerve deficit.      Sensory: No sensory deficit.      Motor: No weakness.   Psychiatric:         Mood and Affect: Mood normal.         Behavior: Behavior normal.           ED Course & MDM   Diagnoses as of 02/06/25 0033   Chest pain, unspecified type   NSTEMI (non-ST elevated myocardial infarction) (Multi)                 No data  recorded     [place] Coma Scale Score: 15 (02/05/25 2144 : Jaimee Garay RN)                     Labs Reviewed   CBC WITH AUTO DIFFERENTIAL - Abnormal       Result Value    WBC 9.8      nRBC 0.0      RBC 5.50      Hemoglobin 16.0      Hematocrit 45.4      MCV 83      MCH 29.1      MCHC 35.2      RDW 13.0      Platelets 176      Neutrophils % 68.9      Immature Granulocytes %, Automated 0.3      Lymphocytes % 22.1      Monocytes % 7.4      Eosinophils % 0.8      Basophils % 0.5      Neutrophils Absolute 6.76 (*)     Immature Granulocytes Absolute, Automated 0.03      Lymphocytes Absolute 2.17      Monocytes Absolute 0.73      Eosinophils Absolute 0.08      Basophils Absolute 0.05     COMPREHENSIVE METABOLIC PANEL - Abnormal    Glucose 438 (*)     Sodium 129 (*)     Potassium 4.4      Chloride 93 (*)     Bicarbonate 23      Anion Gap 17      Urea Nitrogen 16      Creatinine 0.99      eGFR 80      Calcium 9.3      Albumin 4.0      Alkaline Phosphatase 110      Total Protein 7.3      AST 15      Bilirubin, Total 0.6      ALT 12     SERIAL TROPONIN-INITIAL - Abnormal    Troponin I, High Sensitivity 299 (*)     Narrative:     Less than 99th percentile of normal range cutoff-  Female and children under 18 years old <14 ng/L; Male <21 ng/L: Negative  Repeat testing should be performed if clinically indicated.     Female and children under 18 years old 14-50 ng/L; Male 21-50 ng/L:  Consistent with possible cardiac damage and possible increased clinical   risk. Serial measurements may help to assess extent of myocardial damage.     >50 ng/L: Consistent with cardiac damage, increased clinical risk and  myocardial infarction. Serial measurements may help assess extent of   myocardial damage.      NOTE: Children less than 1 year old may have higher baseline troponin   levels and results should be interpreted in conjunction with the overall   clinical context.     NOTE: Troponin I testing is performed using a different    testing methodology at [facility] than at other   [facility]. Direct result comparisons should only   be made within the same method.   SERIAL TROPONIN, 1 HOUR - Abnormal    Troponin I, High Sensitivity 353 (*)     Narrative:     Less than 99th percentile of normal range cutoff-  Female and children under 18 years old <14 ng/L; Male <21 ng/L: Negative  Repeat testing should be performed if clinically indicated.     Female and children under 18 years old 14-50 ng/L; Male 21-50 ng/L:  Consistent with possible cardiac damage and possible increased clinical   risk. Serial measurements may help to assess extent of myocardial damage.     >50 ng/L: Consistent with cardiac damage, increased clinical risk and  myocardial infarction. Serial measurements may help assess extent of   myocardial damage.      NOTE: Children less than 1 year old may have higher baseline troponin   levels and results should be interpreted in conjunction with the overall   clinical context.     NOTE: Troponin I testing is performed using a different   testing methodology at [facility] than at [facility]. Direct result comparisons should only   be made within the same method.   MAGNESIUM - Normal    Magnesium 1.92     TROPONIN SERIES- (INITIAL, 1 HR)    Narrative:     The following orders were created for panel order Troponin I Series, High Sensitivity (0, 1 HR).  Procedure                               Abnormality         Status                     ---------                               -----------         ------                     Troponin I, High Sensiti...[[id number]]  Abnormal            Final result               Troponin, High Sensitivi...[[id number]]  Abnormal            Final result                 Please view results for these tests on the individual orders.     XR chest 1 view   Final Result   Mild bilateral opacities may correspond to chronic lung changes and   atelectasis versus pneumonia.         MACRO:   None        Signed by: Tobin  2/5/2025 10:38 PM   Dictation workstation:   [id number]              Medical Decision Making  Amount and/or Complexity of Data Reviewed  ECG/medicine tests: ordered and independent interpretation performed. Decision-making details documented in ED Course.     Details: Twelve-lead ECG obtained at 2141 by my interpretation demonstrates sinus rhythm with secondary AV block Mobitz 1, no STEMI.  Review of prior ECG shows previously established second-degree AV block Mobitz type I.    Repeat twelve-lead ECG obtained at 2252 by my interpretation demonstrates sinus rhythm with secondary AV block Mobitz type I, right axis deviation, rate of 66, no STEMI.    74-year-old male with a history of CAD presenting for evaluation of chest pain for the last 36 hours.  Has taken multiple nitro at home without relief.  Given ASA by EMS prior to arrival.  Hemodynamically stable, no acute distress on evaluation.  Lungs are clear.  No peripheral edema.  Workup obtained to rule out ACS or other causes of acute chest pain.  Nitropaste applied.    Chest x-ray with chronic bilateral lung changes likely atelectasis.  CBC without leukocytosis, normal H&H.  Metabolic panel with normal renal function, mild hyponatremia sodium 129.  Hyperglycemia without DKA.  Given subcutaneous insulin for treatment.  Initial troponin level elevated to 299.  Consistent with NSTEMI.  Patient started on heparin bolus and drip for treatment of NSTEMI.  Patient reports improvement in chest pain on reevaluation after Nitropaste.  Repeat troponin level mildly uptrending at 353.  Plan to admit for continued workup and management of NSTEMI.  Case discussed with hospitalist for admission.    Procedure  Critical Care    Performed by: Faisal Leung MD  Authorized by: Faisal Leung MD    Critical care provider statement:     Critical care time (minutes):  36    Critical care time was exclusive of:  Separately  billable procedures and treating other patients    Critical care was necessary to treat or prevent imminent or life-threatening deterioration of the following conditions:  Cardiac failure    Critical care was time spent personally by me on the following activities:  Ordering and performing treatments and interventions, ordering and review of laboratory studies, ordering and review of radiographic studies, re-evaluation of patient's condition, examination of patient, obtaining history from patient or surrogate and evaluation of patient's response to treatment    Care discussed with: admitting provider         Faisal Leung MD  02/06/25 0033

## 2025-02-06 NOTE — SIGNIFICANT EVENT
Pt's wife in not in lobby or on floor. Right femoral site soft and stable with no hematoma or oozing. Pt resting comfortably at this time.

## 2025-02-06 NOTE — CONSULTS
Inpatient consult to Cardiology  Consult performed by: CELIA Holden-CNP  Consult ordered by: Iglesia Caputo DO  Reason for consult: NSTEMI      Cardiology Consult Note      Date:   2/6/2025  Patient name:  Feliciano Worthington  Date of admission:  2/5/2025  9:40 PM  MRN:   [id number]  YOB: 1950  Time of Consult:  7:34 AM    Consulting Cardiologist: CELIA Forbes, CNP  Primary Cardiologist:  Dr. Roberto Haskins       Admission Diagnosis:     Chest pain, unspecified type      History of Present Illness:      Feliciano Worthington is a 74 y.o.  male patient who is being at the request of Dr. Caputo for inpatient consultation of coronary artery disease. He was admitted on 2/5/2025.  Previous [facility] and [facility] records have been reviewed in detail.      Patient presented to [facility] emergency department on 2/5/2025 with chief complaint of chest pain which she has been experiencing both at rest and with activity for the last 2 days prior to admission.  States was taking nitroglycerin with some relief but not completely relief therefore called EMS.  He states that the pain is in his left upper chest and does not radiate.  He has been nauseated at times but no vomiting.  No recent fevers or chills.  Denies palpitations, lightheadedness, presyncope or syncope.  States did fall off the chair that he was standing on several weeks ago while trying to plug in a shop vac.  At time of cardiology consult complains of headache as well as lower neck pain and right shoulder pain which is chronic in nature and patient is asking for Tylenol.  He denies chest pain currently.  States he has chronic cough due to smoking which is unchanged.  Admits that he is forgetful and taking his medications. Noted to have elevated troponin. He received aspirin and was initiated on IV heparin and admitted to medical service.  Cardiology is consulted for evaluation of NSTEMI    Labs  and testing  CBC unremarkable, magnesium 1.92, glucose 438, sodium 129, potassium 4.4, BUN 16, creatinine 0.99, estimated GFR 80.  High-sensitivity troponin 299 with repeat 353, and this morning 1858.  Lipid profile cholesterol 258, HDL 24.7, , triglycerides 309.  EKG sinus rhythm with second-degree AV block, Mobitz 1  Chest x-ray mild bilateral opacities    Past Medical History  Coronary artery disease with remote stent to circumflex artery 2007 and RCA stent 2011  Hyperlipidemia  Diabetes  Hypertension  Carotid artery stenosis  Mobitz type I second-degree AV block  Hyponatremia  Memory loss  COPD    Previous Cardiac testing   1/17/2023 Lexiscan stress test  IMPRESSION:  Normal Lexiscan Myoview cardiac perfusion stress test.  No evidence of ischemia or myocardial infarction by perfusion imaging.  Normal left ventricular systolic function, ejection fraction 63%.  Abnormal resting electrocardiogram is noted.  This compared to a negative study of June 2021.  Clinical correlation is advised..    10/23/2023 Holter monitor  Impression  1.  Asymptomatic during this recording  2.  First-degree AV block and type I second-degree AV block the majority of the time with less dropped beats as rates increased over 80 bpm.  3.  Bradycardia during sleep at 35-40 bpm secondary to type I second-degree AV block  4.  No ventricular tachycardia, atrial fibrillation or SVT  5.  Average heart rate throughout 80 bpm with normal heart rate variability  6.  Rare episode of aberrant conduction  7.  Rare pauses longest being 1.7 seconds related to dropped beat in the setting of type I second-degree AV block       Allergies:     Allergies   Allergen Reactions    Tetracyclines Hives, Other, Unknown, Rash and Shortness of breath     Water blisters in mouth and genital region.    Water blister on tongue and penis    Atorvastatin Unknown     Cramping all over the body. Sore shoulders    Empagliflozin Unknown    Pravastatin Unknown     Rosuvastatin Unknown    Simvastatin Unknown    Statins-Hmg-Coa Reductase Inhibitors Unknown         Past Medical History:     Past Medical History:   Diagnosis Date    Acute myocardial infarction, unspecified 01/24/2018    Heart attack    Acute myocardial infarction, unspecified 01/24/2018    Heart attack    Chronic obstructive pulmonary disease, unspecified 01/24/2018    COPD, mild    Chronic obstructive pulmonary disease, unspecified 01/24/2018    COPD, mild    Coronary angioplasty status     Post PTCA    Encounter for immunization 11/04/2022    Encounter for immunization    Encounter for screening for malignant neoplasm of colon 08/25/2016    Encounter for colonoscopy due to history of colonic polyp    Encounter for screening for malignant neoplasm of skin 04/14/2022    Skin cancer screening    Nicotine dependence, cigarettes, uncomplicated 09/12/2022    Cigarette smoker    Obesity, unspecified 05/25/2022    Class 1 obesity with body mass index (BMI) of 31.0 to 31.9 in adult    Old myocardial infarction 01/24/2018    History of myocardial infarction    Other acute postprocedural pain     Post-operative pain    Pain in left knee     Left knee pain, unspecified chronicity    Pain in right hip 11/04/2022    Hip pain, bilateral    Personal history of other diseases of the digestive system 01/24/2018    History of gastroesophageal reflux (GERD)    Personal history of other diseases of the musculoskeletal system and connective tissue 08/25/2016    History of low back pain    Personal history of other diseases of the respiratory system     History of sinusitis    Personal history of other endocrine, nutritional and metabolic disease 08/25/2016    History of hyperlipidemia    Personal history of other mental and behavioral disorders 04/14/2022    History of depression    Personal history of other specified conditions 12/08/2016    History of diarrhea    Personal history of other specified conditions     History of  prolonged Q-T interval on ECG    Personal history of other specified conditions 06/20/2018    History of nocturia    Separation of muscle (nontraumatic), other site 05/14/2018    Rectus diastasis    Sprain of medial collateral ligament of left knee, initial encounter 08/28/2017    Sprain of medial collateral ligament of left knee, initial encounter    Sprain of medial collateral ligament of left knee, subsequent encounter 10/12/2017    Sprain of medial collateral ligament of left knee, subsequent encounter    Type 2 diabetes mellitus without complications (Multi) 01/24/2018    Diabetes mellitus    Type 2 diabetes mellitus without complications (Multi) 01/24/2018    Diabetes mellitus    Umbilical hernia without obstruction or gangrene 05/14/2018    Umbilical hernia without obstruction and without gangrene       Past Surgical History:     Past Surgical History:   Procedure Laterality Date    COLONOSCOPY  10/06/2016    Colonoscopy    CORONARY ANGIOPLASTY WITH STENT PLACEMENT  08/25/2016    Cath Stent Placement    OTHER SURGICAL HISTORY  05/25/2022    Oral surgery    SKIN CANCER EXCISION  01/17/2025    right forearm, right shoulder    TONSILLECTOMY  08/25/2016    Tonsillectomy       Family History:     Family History   Problem Relation Name Age of Onset    Coronary artery disease Mother      Diabetes Father      Coronary artery disease Father      Other (acute myocardial infarction) Father         Social History:     Social History     Tobacco Use    Smoking status: Every Day     Average packs/day: 1.5 packs/day for 50.0 years (75.0 ttl pk-yrs)     Types: Cigarettes     Start date: 1968     Passive exposure: Current    Smokeless tobacco: Never   Vaping Use    Vaping status: Never Used   Substance Use Topics    Alcohol use: Not Currently     Comment: [id number]    Drug use: Never       CURRENT INPATIENT MEDICATIONS    amLODIPine, 5 mg, oral, Daily  [START ON 2/7/2025] aspirin, 81 mg, oral, Daily  hydrOXYzine HCL, 25 mg,  oral, BID  insulin glargine, 32 Units, subcutaneous, q AM  insulin glargine, 35 Units, subcutaneous, Nightly  insulin lispro, 0-15 Units, subcutaneous, TID AC  pantoprazole, 40 mg, oral, Daily      heparin, 0-4,000 Units/hr, Last Rate: 1,000 Units/hr (02/06/25 0329)      Current Outpatient Medications   Medication Instructions    ACETAMINOPHEN ORAL 650 mg, Every 8 hours PRN    alcohol swabs (BD Alcohol Swabs) pads, medicated Use twice daily while checking sugar.    amLODIPine (NORVASC) 5 mg, oral, Daily    aspirin 81 mg, Daily    blood glucose control high,low (Accu-Chek Guide L1-L2 Ctrl Sol) solution 1 bottle, miscellaneous, Daily PRN    blood sugar diagnostic (Accu-Chek Guide test strips) strip Test twice daily    blood-glucose meter (Accu-Chek Guide Glucose Meter) misc Test twice daily    chlorthalidone (Hygroton) 25 mg tablet TAKE 1 TABLET EVERY MONDAY AND THURSDAY    ezetimibe (ZETIA) 10 mg, oral, Daily    glipiZIDE (GLUCOTROL) 10 mg, oral, 2 times daily before meals    hydrOXYzine HCL (ATARAX) 25 mg, oral, 2 times daily    insulin glargine (LANTUS U-100 INSULIN) 32 Units, Every morning    ipratropium (Atrovent) 21 mcg (0.03 %) nasal spray 2 sprays, Each Nostril, Every 12 hours    lancets (Accu-Chek Softclix Lancets) misc Test twice daily    nitroglycerin (NITROSTAT) 0.4 mg, sublingual, Every 5 min PRN, DO NOT EXCEED A TOTAL OF 3 DOSES IN 15 MINUTES    omeprazole (PRILOSEC) 40 mg, oral, Daily before breakfast, Do not crush or chew.    ramipril (ALTACE) 5 mg, oral, 2 times daily    sertraline (ZOLOFT) 25 mg, oral, Daily    venlafaxine XR (EFFEXOR-XR) 150 mg, oral, Daily, Do not crush or chew.        Review of Systems:      12 point review of systems was obtained in detail and is negative other than that detailed above.    Vital Signs:     Vitals:    02/06/25 0038 02/06/25 0149 02/06/25 0317 02/06/25 0733   BP: 142/89 142/88 134/78 115/67   BP Location:  Left arm Left arm    Patient Position:  Sitting Lying   "  Pulse: 68 77 73 76   Resp: 16 18 17    Temp:  36.4 °C (97.5 °F) 36.2 °C (97.2 °F) 36.3 °C (97.3 °F)   TempSrc:  Temporal Temporal    SpO2: 97% 97% 97% 97%   Weight:  83.9 kg (185 lb)     Height:  1.778 m (5' 10\")         Intake/Output Summary (Last 24 hours) at 2/6/2025 0734  Last data filed at 2/6/2025 0507  Gross per 24 hour   Intake 29.1 ml   Output --   Net 29.1 ml       Wt Readings from Last 4 Encounters:   02/06/25 83.9 kg (185 lb)   01/29/25 85.5 kg (188 lb 6.4 oz)   10/15/24 90.3 kg (199 lb)   10/04/24 89.8 kg (198 lb)       Physical Examination:     GENERAL APPEARANCE: Well developed, well nourished, in no acute distress.  CHEST: Symmetric and non-tender.  INTEGUMENT: Skin warm and dry, without gross excoriationis or lesions.  HEENT: No gross abnormalities of conjunctiva, teeth, gums, oral mucosa  NECK: Supple, no JVD, no bruit. Thyroid not palpable. Carotid upstrokes normal.  NEURO/PSHCY: Alert and oriented x3; appropriate behavior and responses. Moves all extremities equally.  LUNGS: Clear to auscultation bilaterally; normal respiratory effort.  HEART: Rate and rhythm regular with no evident murmur; no gallop appreciated. There are no rubs, clicks or heaves. PMI nondisplaced.  ABDOMEN: Soft, nontender, no palpable hepatosplenomegaly, no mases, no bruits.   MUSCULOSKELETAL: Normal range of motion.  EXTREMITIES: Warm with good color, no clubbing or cyanois. There is no edema noted.  PERIPHERAL VASCULAR: 2+ radial and posterior tibial pulse bilateral.     Lab:     CBC:   Results from last 7 days   Lab Units 02/06/25  0309 02/05/25  2147   WBC AUTO x10*3/uL 8.3 9.8   RBC AUTO x10*6/uL 5.40 5.50   HEMOGLOBIN g/dL 15.8 16.0   HEMATOCRIT % 44.2 45.4   MCV fL 82 83   MCH pg 29.3 29.1   MCHC g/dL 35.7 35.2   RDW % 13.0 13.0   PLATELETS AUTO x10*3/uL 145* 176     CMP:    Results from last 7 days   Lab Units 02/06/25  0309 02/05/25  2147   SODIUM mmol/L 132* 129*   POTASSIUM mmol/L 3.9 4.4   CHLORIDE mmol/L 96* " 93*   CO2 mmol/L 25 23   BUN mg/dL 15 16   CREATININE mg/dL 0.90 0.99   GLUCOSE mg/dL 328* 438*   PROTEIN TOTAL g/dL  --  7.3   CALCIUM mg/dL 9.1 9.3   BILIRUBIN TOTAL mg/dL  --  0.6   ALK PHOS U/L  --  110   AST U/L  --  15   ALT U/L  --  12     BMP:    Results from last 7 days   Lab Units 02/06/25  0309 02/05/25  2147   SODIUM mmol/L 132* 129*   POTASSIUM mmol/L 3.9 4.4   CHLORIDE mmol/L 96* 93*   CO2 mmol/L 25 23   BUN mg/dL 15 16   CREATININE mg/dL 0.90 0.99   CALCIUM mg/dL 9.1 9.3   GLUCOSE mg/dL 328* 438*     Magnesium:  Results from last 7 days   Lab Units 02/06/25  0309 02/05/25  2147   MAGNESIUM mg/dL 1.86 1.92     Troponin:    Results from last 7 days   Lab Units 02/06/25  0309 02/05/25  2248 02/05/25  2147   TROPHS ng/L 1,858* 353* 299*     BNP:     Lipid Panel:  Results from last 7 days   Lab Units 02/05/25  2248   HDL mg/dL 24.7   CHOLESTEROL/HDL RATIO  10.4   VLDL mg/dL 62*   TRIGLYCERIDES mg/dL 309*   NON HDL CHOL. mg/dL 233*        Diagnostic Studies:   @No results found for this or any previous visit.    ECG 12 lead    Result Date: 2/6/2025  Sinus rhythm with 2nd degree AV block (Mobitz I) Right superior axis deviation Abnormal ECG When compared with ECG of 05-FEB-2025 21:41, (unconfirmed) No significant change was found    ECG 12 lead    Result Date: 2/6/2025  Sinus rhythm with 2nd degree AV block (Mobitz I) Right superior axis deviation Nonspecific ST abnormality Abnormal ECG When compared with ECG of 17-OCT-2023 18:49, Previous ECG has undetermined rhythm, needs review    XR chest 1 view    Result Date: 2/5/2025  Interpreted By:  Tobin Machado, STUDY: XR CHEST 1 VIEW;  2/5/2025 10:17 pm   INDICATION: Signs/Symptoms:Chest Pain.   COMPARISON: None.   ACCESSION NUMBER(S): [id number]   ORDERING CLINICIAN: MARIELY ROCKWELL   FINDINGS: The cardiac silhouette is normal in size. There is mild bilateral opacities may correspond to chronic lung changes and atelectasis versus pneumonia. No segment pleural  effusion. No pneumothorax.       Mild bilateral opacities may correspond to chronic lung changes and atelectasis versus pneumonia.   MACRO: None   Signed by: Tobin Machado 2/5/2025 10:38 PM Dictation workstation:   [id number]      No echocardiogram results found for the past 14 days    Radiology:     XR chest 1 view   Final Result   Mild bilateral opacities may correspond to chronic lung changes and   atelectasis versus pneumonia.        MACRO:   None        Signed by: Tobin Machado 2/5/2025 10:38 PM   Dictation workstation:   [id number]          Problem List:     Patient Active Problem List   Diagnosis    Primary hypertension    Chronic obstructive pulmonary disease, unspecified    Current every day smoker    GERD (gastroesophageal reflux disease)    Arcus senilis, bilateral    Astigmatism of both eyes    Cataract, bilateral    Chronic venous stasis dermatitis    Depression, major, in remission (CMS-HCC)    Erectile dysfunction    Hiatal hernia with GERD    Multiple lung nodules on CT    Nuclear sclerotic cataract of both eyes    Osteoarthritis of hips, bilateral    Other spondylosis, cervical region    Periumbilical hernia    Testosterone deficiency    Smoker    Sleep apnea    Vitamin D deficiency    Noncompliance with dietary restriction    Arthritis of left acromioclavicular joint    Bruit of right carotid artery    Degenerative joint disease (DJD) of hip    Spinal stenosis of cervical region    BMI 29.0-29.9,adult    Encounter for immunization    Mixed hyperlipidemia    CAD (coronary artery disease)    Mobitz type 1 second degree AV block    Type 2 diabetes mellitus with hyperglycemia, with long-term current use of insulin    Hyponatremia    Chest pain, unspecified type       Assessment:   NSTEMI type I with history of coronary artery disease and remote stenting to the circumflex and right coronary arteries, last PCI 2011  Mobitz type I second-degree AV block  Hypertension  Hyperlipidemia   COPD with chronic  tobacco use  Diabetes mellitus  Hyponatremia, sodium stable on admission    Plan:     Continue telemetry  Continue IV heparin for now  Continue aspirin, amlodipine, Zetia  No AV caty blocking agents due to Mobitz type I second-degree AV block  Discussed cardiac catheterization/possible PCI with patient who agrees to proceed.  Discussed with him importance of smoking cessation and that if he requires stenting will be on dual antiplatelet therapy.  He has history of forgetfulness and taking his medications, discussed that it will be important for him to have compliance with dual antiplatelet therapy should stenting be necessary.  Further cardiology recommendation per Dr. Divine Bright, APRN-CNP  [facility]    CARDIOLOGIST CONSULT NOTE  I have personally seen and examined patient as well as personally formulated treatment plan.  I have reviewed this note as created by Ysabel Bright RN, CNP and agree with her findings and physical exam.    74-year-old man with known coronary disease with remote CX/RCA angioplasty 2011.  Had been well until about 2 to 3 days ago when developed classic and progressive angina.  Presented emergency department with no significant ST change but serial troponins increasing.  Improved with nitrates and heparin drip.  Currently asymptomatic.  Also describes some nausea possibly anginal equivalent.  No vomiting or diarrhea.  No bleeding issues.  Denies hemoptysis hematuria hematochezia or melena.  Continues to smoke cigarettes on a regular basis.  Importantly although he cannot tolerate most statins he was able to tolerate lovastatin and in fact when I saw him 8 months ago he was taking lovastatin.  He thinks he just forgot to take it thinking it was like the other statins he does not describe subsequent myalgia after I had seen him.  He has been having issues with short-term memory loss as well.    Cardiac history  RCA/CX angioplasty  prior to 2012.  Type I second-degree AV block  Diabetes  Hyperlipidemia  Hypertension  Carotid artery disease less than 50% stenosis at duplex 2023    Exam: Generally comfortable lungs are clear except for occasional rhonchi that clears with cough cardiac exam is a regular rhythm rate no murmur or gallop.  Abdomen is soft extremities show no edema slight carotid bruit particularly on the left.  Pulses are 1+.  He has no edema.  Neurologic mental status intact cranial nerves are intact strength is intact    ECG: Type I secondary AV block sinus rhythm minor nonseptic T abnormalities no acute ST change  Chest x-ray: Chronic changes no CHF    Impression  Acute non-ST elevation MI: Symptoms controlled for coronary angiography this morning  Risk/benefit: Discussed procedure with him.  Discussed potential risk of myocardial infarction, stroke, arterial injury, contrast reaction, renal failure he understands agrees to proceed.  He understands that findings could include diffuse disease not amenable to revascularization versus disease that could be approached via angioplasty which will be done at the same setting versus potential bypass graft surgery.  Lipidemia: He is indeed intolerant to multiple statins but not lovastatin.  He stopped it on his own because he forgot he does not tell me he had any myalgias on that agent and when I had seen him last in the office he was tolerating it well with good control of cholesterol would resume lovastatin if that causes symptoms he needs to be on inhibitor versus Leqvio.  Given his short-term memory Leqvio might be preferred if he cannot tolerate lovastatin  Hypertension: Controlled  Type I second-degree AV block: Monitoring thus far shows no advanced heart block avoid beta-blockers  Short-term memory loss: I discussed with him it is extremely important that he take his medicines particularly if he gets a stent and needs to be on dual antiplatelet therapy.  May need neurology  evaluation although his orientation understanding of his disease is quite good at this time.  Further recommendations based on patient's clinical course  Tobacco abuse: Must stop smoking  Notation: [facility] does not have lovastatin here.  Will communicate to Pharmacy. to see if they can get it for him as he cannot tolerate anything    Roberto Haskins MD          Of note, this documentation is completed using the Dragon Dictation system (voice recognition software). There may be spelling and/or grammatical errors that were not corrected prior to final submission.      Electronically signed by KHADIJAH Holden, on 2/6/2025 at 7:34 AM

## 2025-02-06 NOTE — PROGRESS NOTES
Patient is stable status post C under the care of Dr. Bornson.  Discussed results of procedure with patient.  Pictures provided.  Findings of the LHC revealed 70% stenosis in the distal left main, 80% stenosis in the proximal LAD, 90% stenosis in the proximal circumflex artery, 75% stenosis in the proximal ramus, 80% stenosis in the mid ramus, 80% stenosis in the mid RCA, 90% stenosis in the distal RCA and an LVEF of 55%.  Please see procedural report for complete details.  Recommending consult to CT surgery for possible CABG.  Continue aspirin 81 mg daily.  Patient is intolerant to statin therapy due to myalgias.  Continue Zetia.  He was not initiated on beta-blocker therapy due to baseline type I second degree AV block.  Continue amlodipine.  Will defer decision to resume IV heparin to CT surgery team.  Recommend holding heparin at least 8 hours post procedurally prior to resumption with no bolus if necessary.  Continue to monitor closely on telemetry.  Patient will be transferred to the stepdown unit postrecovery.  Postprocedural activity, restrictions and potential complications discussed at length.  All questions answered.  Patient verbalized understanding.

## 2025-02-06 NOTE — SIGNIFICANT EVENT
HOB increased to 30 degrees, right femoral site soft and stable with no hematoma or oozing. Pt given turkey sandwich box, gingerale, and ice water.

## 2025-02-07 ENCOUNTER — APPOINTMENT (OUTPATIENT)
Dept: CARDIOLOGY | Facility: HOSPITAL | Age: 75
End: 2025-02-07
Payer: MEDICARE

## 2025-02-07 ENCOUNTER — APPOINTMENT (OUTPATIENT)
Dept: RESPIRATORY THERAPY | Facility: HOSPITAL | Age: 75
End: 2025-02-07
Payer: MEDICARE

## 2025-02-07 PROBLEM — Z91.199 NONCOMPLIANCE WITH DIABETES TREATMENT: Status: ACTIVE | Noted: 2025-02-07

## 2025-02-07 PROBLEM — R63.4 ABNORMAL WEIGHT LOSS: Status: ACTIVE | Noted: 2025-02-07

## 2025-02-07 LAB
ALBUMIN SERPL BCP-MCNC: 3.6 G/DL (ref 3.4–5)
ANION GAP BLDA CALCULATED.4IONS-SCNC: 9 MMO/L (ref 10–25)
ANION GAP SERPL CALC-SCNC: 13 MMOL/L (ref 10–20)
AORTIC VALVE MEAN GRADIENT: 6 MMHG
AORTIC VALVE PEAK VELOCITY: 1.59 M/S
ARTERIAL PATENCY WRIST A: POSITIVE
AV PEAK GRADIENT: 10 MMHG
AVA (PEAK VEL): 2 CM2
AVA (VTI): 1.98 CM2
BASE EXCESS BLDA CALC-SCNC: 1.1 MMOL/L (ref -2–3)
BODY TEMPERATURE: ABNORMAL
BUN SERPL-MCNC: 17 MG/DL (ref 6–23)
CA-I BLDA-SCNC: 1.18 MMOL/L (ref 1.1–1.33)
CALCIUM SERPL-MCNC: 8.9 MG/DL (ref 8.6–10.3)
CANCER AG19-9 SERPL-ACNC: <4 U/ML
CHLORIDE BLDA-SCNC: 104 MMOL/L (ref 98–107)
CHLORIDE SERPL-SCNC: 100 MMOL/L (ref 98–107)
CO2 SERPL-SCNC: 25 MMOL/L (ref 21–32)
CREAT SERPL-MCNC: 0.76 MG/DL (ref 0.5–1.3)
EGFRCR SERPLBLD CKD-EPI 2021: >90 ML/MIN/1.73M*2
EJECTION FRACTION APICAL 4 CHAMBER: 47.4
EJECTION FRACTION: 53 %
ERYTHROCYTE [DISTWIDTH] IN BLOOD BY AUTOMATED COUNT: 13.3 % (ref 11.5–14.5)
GLUCOSE BLD MANUAL STRIP-MCNC: 100 MG/DL (ref 74–99)
GLUCOSE BLD MANUAL STRIP-MCNC: 140 MG/DL (ref 74–99)
GLUCOSE BLD MANUAL STRIP-MCNC: 172 MG/DL (ref 74–99)
GLUCOSE BLD MANUAL STRIP-MCNC: 195 MG/DL (ref 74–99)
GLUCOSE BLD MANUAL STRIP-MCNC: 238 MG/DL (ref 74–99)
GLUCOSE BLDA-MCNC: 99 MG/DL (ref 74–99)
GLUCOSE SERPL-MCNC: 246 MG/DL (ref 74–99)
HCO3 BLDA-SCNC: 24 MMOL/L (ref 22–26)
HCT VFR BLD AUTO: 42.5 % (ref 41–52)
HCT VFR BLD EST: 49 % (ref 41–52)
HGB BLD-MCNC: 15 G/DL (ref 13.5–17.5)
HGB BLDA-MCNC: 16.3 G/DL (ref 13.5–17.5)
INHALED O2 CONCENTRATION: 21 %
LACTATE BLDA-SCNC: 1.7 MMOL/L (ref 0.4–2)
LEFT ATRIUM VOLUME AREA LENGTH INDEX BSA: 15.3 ML/M2
LEFT VENTRICLE INTERNAL DIMENSION DIASTOLE: 4.62 CM (ref 3.5–6)
LEFT VENTRICULAR OUTFLOW TRACT DIAMETER: 2.1 CM
LV EJECTION FRACTION BIPLANE: 47 %
MAGNESIUM SERPL-MCNC: 1.78 MG/DL (ref 1.6–2.4)
MCH RBC QN AUTO: 29.1 PG (ref 26–34)
MCHC RBC AUTO-ENTMCNC: 35.3 G/DL (ref 32–36)
MCV RBC AUTO: 83 FL (ref 80–100)
MGC ASCENT PFT - FEV1 - PRE: 2.95
MGC ASCENT PFT - FEV1 - PREDICTED: 2.92
MGC ASCENT PFT - FVC - PRE: 3.9
MGC ASCENT PFT - FVC - PREDICTED: 3.92
MITRAL VALVE E/A RATIO: 0.64
MITRAL VALVE E/E' RATIO: 7.9
NRBC BLD-RTO: 0 /100 WBCS (ref 0–0)
OXYHGB MFR BLDA: 96.4 % (ref 94–98)
PCO2 BLDA: 33 MM HG (ref 38–42)
PH BLDA: 7.47 PH (ref 7.38–7.42)
PHOSPHATE SERPL-MCNC: 3.6 MG/DL (ref 2.5–4.9)
PLATELET # BLD AUTO: 145 X10*3/UL (ref 150–450)
PO2 BLDA: 96 MM HG (ref 85–95)
POTASSIUM BLDA-SCNC: 3.8 MMOL/L (ref 3.5–5.3)
POTASSIUM SERPL-SCNC: 3.7 MMOL/L (ref 3.5–5.3)
RBC # BLD AUTO: 5.15 X10*6/UL (ref 4.5–5.9)
RIGHT VENTRICLE FREE WALL PEAK S': 10.1 CM/S
RIGHT VENTRICLE PEAK SYSTOLIC PRESSURE: 30.9 MMHG
SAO2 % BLDA: 98 % (ref 94–100)
SODIUM BLDA-SCNC: 133 MMOL/L (ref 136–145)
SODIUM SERPL-SCNC: 134 MMOL/L (ref 136–145)
SPECIMEN DRAWN FROM PATIENT: ABNORMAL
TRICUSPID ANNULAR PLANE SYSTOLIC EXCURSION: 2 CM
TSH SERPL-ACNC: 2.23 MIU/L (ref 0.44–3.98)
UFH PPP CHRO-ACNC: 0.4 IU/ML
UFH PPP CHRO-ACNC: 0.4 IU/ML
VIT B12 SERPL-MCNC: 411 PG/ML (ref 211–911)
WBC # BLD AUTO: 7.7 X10*3/UL (ref 4.4–11.3)

## 2025-02-07 PROCEDURE — 86301 IMMUNOASSAY TUMOR CA 19-9: CPT | Mod: ELYLAB | Performed by: INTERNAL MEDICINE

## 2025-02-07 PROCEDURE — 2500000001 HC RX 250 WO HCPCS SELF ADMINISTERED DRUGS (ALT 637 FOR MEDICARE OP): Performed by: INTERNAL MEDICINE

## 2025-02-07 PROCEDURE — 82607 VITAMIN B-12: CPT | Mod: ELYLAB | Performed by: INTERNAL MEDICINE

## 2025-02-07 PROCEDURE — 99232 SBSQ HOSP IP/OBS MODERATE 35: CPT | Performed by: NURSE PRACTITIONER

## 2025-02-07 PROCEDURE — 2500000001 HC RX 250 WO HCPCS SELF ADMINISTERED DRUGS (ALT 637 FOR MEDICARE OP): Performed by: NURSE PRACTITIONER

## 2025-02-07 PROCEDURE — 93306 TTE W/DOPPLER COMPLETE: CPT | Performed by: INTERNAL MEDICINE

## 2025-02-07 PROCEDURE — 36415 COLL VENOUS BLD VENIPUNCTURE: CPT | Performed by: INTERNAL MEDICINE

## 2025-02-07 PROCEDURE — 2500000004 HC RX 250 GENERAL PHARMACY W/ HCPCS (ALT 636 FOR OP/ED): Performed by: NURSE PRACTITIONER

## 2025-02-07 PROCEDURE — 99233 SBSQ HOSP IP/OBS HIGH 50: CPT | Performed by: INTERNAL MEDICINE

## 2025-02-07 PROCEDURE — 2500000002 HC RX 250 W HCPCS SELF ADMINISTERED DRUGS (ALT 637 FOR MEDICARE OP, ALT 636 FOR OP/ED): Performed by: INTERNAL MEDICINE

## 2025-02-07 PROCEDURE — 85027 COMPLETE CBC AUTOMATED: CPT | Performed by: NURSE PRACTITIONER

## 2025-02-07 PROCEDURE — 84100 ASSAY OF PHOSPHORUS: CPT | Performed by: NURSE PRACTITIONER

## 2025-02-07 PROCEDURE — 94726 PLETHYSMOGRAPHY LUNG VOLUMES: CPT

## 2025-02-07 PROCEDURE — C8929 TTE W OR WO FOL WCON,DOPPLER: HCPCS

## 2025-02-07 PROCEDURE — 83735 ASSAY OF MAGNESIUM: CPT | Performed by: NURSE PRACTITIONER

## 2025-02-07 PROCEDURE — 82947 ASSAY GLUCOSE BLOOD QUANT: CPT

## 2025-02-07 PROCEDURE — 85520 HEPARIN ASSAY: CPT | Performed by: INTERNAL MEDICINE

## 2025-02-07 PROCEDURE — 2500000004 HC RX 250 GENERAL PHARMACY W/ HCPCS (ALT 636 FOR OP/ED): Performed by: INTERNAL MEDICINE

## 2025-02-07 PROCEDURE — 84443 ASSAY THYROID STIM HORMONE: CPT | Performed by: NURSE PRACTITIONER

## 2025-02-07 PROCEDURE — 36600 WITHDRAWAL OF ARTERIAL BLOOD: CPT

## 2025-02-07 PROCEDURE — 2060000001 HC INTERMEDIATE ICU ROOM DAILY

## 2025-02-07 PROCEDURE — 2500000002 HC RX 250 W HCPCS SELF ADMINISTERED DRUGS (ALT 637 FOR MEDICARE OP, ALT 636 FOR OP/ED): Performed by: NURSE PRACTITIONER

## 2025-02-07 PROCEDURE — 99233 SBSQ HOSP IP/OBS HIGH 50: CPT | Performed by: NURSE PRACTITIONER

## 2025-02-07 PROCEDURE — 84132 ASSAY OF SERUM POTASSIUM: CPT | Performed by: NURSE PRACTITIONER

## 2025-02-07 PROCEDURE — 83605 ASSAY OF LACTIC ACID: CPT | Performed by: NURSE PRACTITIONER

## 2025-02-07 RX ORDER — DEXTROSE 50 % IN WATER (D50W) INTRAVENOUS SYRINGE
25
Status: ACTIVE | OUTPATIENT
Start: 2025-02-07

## 2025-02-07 RX ORDER — DEXTROSE 50 % IN WATER (D50W) INTRAVENOUS SYRINGE
12.5
Status: ACTIVE | OUTPATIENT
Start: 2025-02-07

## 2025-02-07 RX ORDER — INSULIN LISPRO 100 [IU]/ML
8 INJECTION, SOLUTION INTRAVENOUS; SUBCUTANEOUS
Status: DISCONTINUED | OUTPATIENT
Start: 2025-02-07 | End: 2025-02-09

## 2025-02-07 RX ORDER — LORAZEPAM 2 MG/ML
0.5 INJECTION INTRAMUSCULAR ONCE
Status: ACTIVE | OUTPATIENT
Start: 2025-02-07

## 2025-02-07 RX ADMIN — VENLAFAXINE HYDROCHLORIDE 150 MG: 150 CAPSULE, EXTENDED RELEASE ORAL at 09:39

## 2025-02-07 RX ADMIN — INSULIN LISPRO 3 UNITS: 100 INJECTION, SOLUTION INTRAVENOUS; SUBCUTANEOUS at 17:14

## 2025-02-07 RX ADMIN — POLYETHYLENE GLYCOL 3350 17 G: 17 POWDER, FOR SOLUTION ORAL at 09:39

## 2025-02-07 RX ADMIN — HEPARIN SODIUM 1200 UNITS/HR: 10000 INJECTION, SOLUTION INTRAVENOUS at 15:53

## 2025-02-07 RX ADMIN — AMLODIPINE BESYLATE 5 MG: 5 TABLET ORAL at 09:39

## 2025-02-07 RX ADMIN — HYDROXYZINE HYDROCHLORIDE 25 MG: 25 TABLET ORAL at 09:39

## 2025-02-07 RX ADMIN — INSULIN LISPRO 6 UNITS: 100 INJECTION, SOLUTION INTRAVENOUS; SUBCUTANEOUS at 08:02

## 2025-02-07 RX ADMIN — INSULIN LISPRO 8 UNITS: 100 INJECTION, SOLUTION INTRAVENOUS; SUBCUTANEOUS at 17:14

## 2025-02-07 RX ADMIN — ISOSORBIDE MONONITRATE 30 MG: 30 TABLET, EXTENDED RELEASE ORAL at 09:39

## 2025-02-07 RX ADMIN — LOVASTATIN 40 MG: 10 TABLET ORAL at 23:01

## 2025-02-07 RX ADMIN — SERTRALINE HYDROCHLORIDE 25 MG: 50 TABLET, FILM COATED ORAL at 09:39

## 2025-02-07 RX ADMIN — EZETIMIBE 10 MG: 10 TABLET ORAL at 21:59

## 2025-02-07 RX ADMIN — INSULIN LISPRO 8 UNITS: 100 INJECTION, SOLUTION INTRAVENOUS; SUBCUTANEOUS at 09:40

## 2025-02-07 RX ADMIN — ACETAMINOPHEN 650 MG: 325 TABLET ORAL at 23:00

## 2025-02-07 RX ADMIN — ASPIRIN 81 MG: 81 TABLET, CHEWABLE ORAL at 09:39

## 2025-02-07 RX ADMIN — HYDROXYZINE HYDROCHLORIDE 25 MG: 25 TABLET ORAL at 21:59

## 2025-02-07 RX ADMIN — PANTOPRAZOLE SODIUM 40 MG: 40 TABLET, DELAYED RELEASE ORAL at 05:37

## 2025-02-07 RX ADMIN — INSULIN GLARGINE 35 UNITS: 100 INJECTION, SOLUTION SUBCUTANEOUS at 21:59

## 2025-02-07 RX ADMIN — PERFLUTREN 2 ML OF DILUTION: 6.52 INJECTION, SUSPENSION INTRAVENOUS at 09:23

## 2025-02-07 RX ADMIN — INSULIN GLARGINE 32 UNITS: 100 INJECTION, SOLUTION SUBCUTANEOUS at 08:02

## 2025-02-07 ASSESSMENT — COGNITIVE AND FUNCTIONAL STATUS - GENERAL
DAILY ACTIVITIY SCORE: 24
MOBILITY SCORE: 24

## 2025-02-07 ASSESSMENT — PAIN SCALES - GENERAL: PAINLEVEL_OUTOF10: 0 - NO PAIN

## 2025-02-07 ASSESSMENT — PAIN - FUNCTIONAL ASSESSMENT: PAIN_FUNCTIONAL_ASSESSMENT: 0-10

## 2025-02-07 NOTE — CONSULTS
Inpatient consult to Endocrinology  Consult performed by: Cristina Wiseman MD  Consult ordered by: CELIA Gomes-CNP          Assessment/Plan      DIABETES UNCONTROLLED TYPE 2   CAD   Hba1c >14% - neds insulin   Doses to be adjusted up as glucose 200s   Will follow   Needs DM education    Reason For Consult  Uncontrolled dm and CAD    History Of Present Illness  Feliciano Worthington is a 74 y.o. male with  has a past medical history of Acute myocardial infarction, unspecified (01/24/2018), Acute myocardial infarction, unspecified (01/24/2018), BMI 29.0-29.9,adult (10/13/2023), Chronic obstructive pulmonary disease, unspecified (01/24/2018), Chronic obstructive pulmonary disease, unspecified (01/24/2018), Coronary angioplasty status, Encounter for immunization (11/04/2022), Encounter for screening for malignant neoplasm of colon (08/25/2016), Encounter for screening for malignant neoplasm of skin (04/14/2022), Nicotine dependence, cigarettes, uncomplicated (09/12/2022), Obesity, unspecified (05/25/2022), Old myocardial infarction (01/24/2018), Other acute postprocedural pain, Pain in left knee, Pain in right hip (11/04/2022), Personal history of other diseases of the digestive system (01/24/2018), Personal history of other diseases of the musculoskeletal system and connective tissue (08/25/2016), Personal history of other diseases of the respiratory system, Personal history of other endocrine, nutritional and metabolic disease (08/25/2016), Personal history of other mental and behavioral disorders (04/14/2022), Personal history of other specified conditions (12/08/2016), Personal history of other specified conditions, Personal history of other specified conditions (06/20/2018), Separation of muscle (nontraumatic), other site (05/14/2018), Sprain of medial collateral ligament of left knee, initial encounter (08/28/2017), Sprain of medial collateral ligament of left knee, subsequent encounter (10/12/2017), Type 2  diabetes mellitus without complications (Multi) (01/24/2018), Type 2 diabetes mellitus without complications (Multi) (01/24/2018), and Umbilical hernia without obstruction or gangrene (05/14/2018). presenting with CAD and uncontrolled diabetes Hba1c>14%.  Per admision  Feliciano Worthington is a 74 y.o.  male patient who is being at the request of Dr. Caputo for inpatient consultation of coronary artery disease. He was admitted on 2/5/2025.  Previous [facility] and [facility] records have been reviewed in detail.       Patient presented to [facility] emergency department on 2/5/2025 with chief complaint of chest pain which she has been experiencing both at rest and with activity for the last 2 days prior to admission.  States was taking nitroglycerin with some relief but not completely relief therefore called EMS.  He states that the pain is in his left upper chest and does not radiate.  He has been nauseated at times but no vomiting.  No recent fevers or chills.  Denies palpitations, lightheadedness, presyncope or syncope.  States did fall off the chair that he was standing on several weeks ago while trying to plug in a shop vac.  At time of cardiology consult complains of headache as well as lower neck pain and right shoulder pain which is chronic in nature and patient is asking for Tylenol.  He denies chest pain currently.  States he has chronic cough due to smoking which is unchanged.  Admits that he is forgetful and taking his medications. Noted to have elevated troponin. He received aspirin and was initiated on IV heparin and admitted to medical service.  Cardiology is consulted for evaluation of NSTEMI     Labs and testing  CBC unremarkable, magnesium 1.92, glucose 438, sodium 129, potassium 4.4, BUN 16, creatinine 0.99, estimated GFR 80.  High-sensitivity troponin 299 with repeat 353, and this morning 1858.  Lipid profile cholesterol 258, HDL 24.7, , triglycerides 309.  EKG  sinus rhythm with second-degree AV block, Mobitz 1  Chest x-ray mild bilateral opacities    FS glucose                FS glucose     Random glucose         438 328   246   Random glucose     Fasting glucose                Fasting glucose     Blood glucose                Blood glucose     POCT glucose          126 233 289 238   POCT glucose     Potassium    Potassium, Blood         4.4 3.9   3.7   Potassium, Blood     Medication Dosing      insulin glargine,hum.rec.anlog SUBQ (Units)           32 35  32  insulin glargine,hum.rec.anlog SUBQ (Units)     insulin lispro SUBQ (Units)          12 6 12  6  insulin lispro SUBQ (Units)     insulin regular, human INJ (Units)          5             Past Medical History  He has a past medical history of Acute myocardial infarction, unspecified (01/24/2018), Acute myocardial infarction, unspecified (01/24/2018), BMI 29.0-29.9,adult (10/13/2023), Chronic obstructive pulmonary disease, unspecified (01/24/2018), Chronic obstructive pulmonary disease, unspecified (01/24/2018), Coronary angioplasty status, Encounter for immunization (11/04/2022), Encounter for screening for malignant neoplasm of colon (08/25/2016), Encounter for screening for malignant neoplasm of skin (04/14/2022), Nicotine dependence, cigarettes, uncomplicated (09/12/2022), Obesity, unspecified (05/25/2022), Old myocardial infarction (01/24/2018), Other acute postprocedural pain, Pain in left knee, Pain in right hip (11/04/2022), Personal history of other diseases of the digestive system (01/24/2018), Personal history of other diseases of the musculoskeletal system and connective tissue (08/25/2016), Personal history of other diseases of the respiratory system, Personal history of other endocrine, nutritional and metabolic disease (08/25/2016), Personal history of other mental and behavioral disorders (04/14/2022), Personal history of other specified conditions (12/08/2016), Personal history of other specified  conditions, Personal history of other specified conditions (06/20/2018), Separation of muscle (nontraumatic), other site (05/14/2018), Sprain of medial collateral ligament of left knee, initial encounter (08/28/2017), Sprain of medial collateral ligament of left knee, subsequent encounter (10/12/2017), Type 2 diabetes mellitus without complications (Multi) (01/24/2018), Type 2 diabetes mellitus without complications (Multi) (01/24/2018), and Umbilical hernia without obstruction or gangrene (05/14/2018).    Surgical History  He has a past surgical history that includes Coronary angioplasty with stent (08/25/2016); Tonsillectomy (08/25/2016); Colonoscopy (10/06/2016); Other surgical history (05/25/2022); and Skin cancer excision (01/17/2025).     Social History  He reports that he has been smoking cigarettes. He started smoking about 57 years ago. He has a 75 pack-year smoking history. He has been exposed to tobacco smoke. He has never used smokeless tobacco. He reports that he does not currently use alcohol. He reports that he does not use drugs.    Family History  Family History   Problem Relation Name Age of Onset    Coronary artery disease Mother      Diabetes Father      Coronary artery disease Father      Other (acute myocardial infarction) Father          Allergies  Tetracyclines, Atorvastatin, Empagliflozin, Pravastatin, Rosuvastatin, Simvastatin, and Statins-hmg-coa reductase inhibitors    Review of Systems  Per hpi  Past Medical History:   Diagnosis Date    Acute myocardial infarction, unspecified 01/24/2018    Heart attack    Acute myocardial infarction, unspecified 01/24/2018    Heart attack    BMI 29.0-29.9,adult 10/13/2023    Chronic obstructive pulmonary disease, unspecified 01/24/2018    COPD, mild    Chronic obstructive pulmonary disease, unspecified 01/24/2018    COPD, mild    Coronary angioplasty status     Post PTCA    Encounter for immunization 11/04/2022    Encounter for immunization    Encounter  for screening for malignant neoplasm of colon 08/25/2016    Encounter for colonoscopy due to history of colonic polyp    Encounter for screening for malignant neoplasm of skin 04/14/2022    Skin cancer screening    Nicotine dependence, cigarettes, uncomplicated 09/12/2022    Cigarette smoker    Obesity, unspecified 05/25/2022    Class 1 obesity with body mass index (BMI) of 31.0 to 31.9 in adult    Old myocardial infarction 01/24/2018    History of myocardial infarction    Other acute postprocedural pain     Post-operative pain    Pain in left knee     Left knee pain, unspecified chronicity    Pain in right hip 11/04/2022    Hip pain, bilateral    Personal history of other diseases of the digestive system 01/24/2018    History of gastroesophageal reflux (GERD)    Personal history of other diseases of the musculoskeletal system and connective tissue 08/25/2016    History of low back pain    Personal history of other diseases of the respiratory system     History of sinusitis    Personal history of other endocrine, nutritional and metabolic disease 08/25/2016    History of hyperlipidemia    Personal history of other mental and behavioral disorders 04/14/2022    History of depression    Personal history of other specified conditions 12/08/2016    History of diarrhea    Personal history of other specified conditions     History of prolonged Q-T interval on ECG    Personal history of other specified conditions 06/20/2018    History of nocturia    Separation of muscle (nontraumatic), other site 05/14/2018    Rectus diastasis    Sprain of medial collateral ligament of left knee, initial encounter 08/28/2017    Sprain of medial collateral ligament of left knee, initial encounter    Sprain of medial collateral ligament of left knee, subsequent encounter 10/12/2017    Sprain of medial collateral ligament of left knee, subsequent encounter    Type 2 diabetes mellitus without complications (Multi) 01/24/2018    Diabetes  mellitus    Type 2 diabetes mellitus without complications (Multi) 01/24/2018    Diabetes mellitus    Umbilical hernia without obstruction or gangrene 05/14/2018    Umbilical hernia without obstruction and without gangrene       Past Surgical History:   Procedure Laterality Date    COLONOSCOPY  10/06/2016    Colonoscopy    CORONARY ANGIOPLASTY WITH STENT PLACEMENT  08/25/2016    Cath Stent Placement    OTHER SURGICAL HISTORY  05/25/2022    Oral surgery    SKIN CANCER EXCISION  01/17/2025    right forearm, right shoulder    TONSILLECTOMY  08/25/2016    Tonsillectomy       Social History     Socioeconomic History    Marital status:      Spouse name: Not on file    Number of children: Not on file    Years of education: Not on file    Highest education level: Not on file   Occupational History    Not on file   Tobacco Use    Smoking status: Every Day     Average packs/day: 1.5 packs/day for 50.0 years (75.0 ttl pk-yrs)     Types: Cigarettes     Start date: 1968     Passive exposure: Current    Smokeless tobacco: Never   Vaping Use    Vaping status: Never Used   Substance and Sexual Activity    Alcohol use: Not Currently     Comment: [id number]    Drug use: Never    Sexual activity: Defer   Other Topics Concern    Not on file   Social History Narrative    Not on file     Social Drivers of Health     Financial Resource Strain: Medium Risk (2/6/2025)    Overall Financial Resource Strain (CARDIA)     Difficulty of Paying Living Expenses: Somewhat hard   Food Insecurity: No Food Insecurity (2/6/2025)    Hunger Vital Sign     Worried About Running Out of Food in the Last Year: Never true     Ran Out of Food in the Last Year: Never true   Transportation Needs: No Transportation Needs (2/6/2025)    PRAPARE - Transportation     Lack of Transportation (Medical): No     Lack of Transportation (Non-Medical): No   Physical Activity: Sufficiently Active (3/29/2024)    [facility]  [facility]    Exercise Vital Sign     Days of Exercise per Week: 4 days     Minutes of Exercise per Session: 150+ min   Stress: Not on file   Social Connections: Unknown (10/8/2023)    Social Connection and Isolation Panel [NHANES]     Frequency of Communication with Friends and Family: Not on file     Frequency of Social Gatherings with Friends and Family: Not on file     Attends [employer] Services: Not on file     Active Member of Clubs or Organizations: Yes     Attends Club or Organization Meetings: Not on file     Marital Status:    Intimate Partner Violence: Not At Risk (2/6/2025)    Humiliation, Afraid, Rape, and Kick questionnaire     Fear of Current or Ex-Partner: No     Emotionally Abused: No     Physically Abused: No     Sexually Abused: No   Housing Stability: Low Risk  (2/6/2025)    Housing Stability Vital Sign     Unable to Pay for Housing in the Last Year: No     Number of Times Moved in the Last Year: 0     Homeless in the Last Year: No        Physical Exam   Awake  alert  ROS, PMH, FH/SH, surgical history and allergies have been reviewed.    Last Recorded Vitals  Blood pressure 133/76, pulse 77, temperature 36.3 °C (97.3 °F), temperature source Temporal, resp. rate 16, height 1.778 m (5' 10\"), weight 82 kg (180 lb 12.8 oz), SpO2 94%.    Relevant Results  Results from last 7 days   Lab Units 02/07/25  0642 02/07/25  0639 02/06/25 2010 02/06/25  1622 02/06/25  1232 02/06/25  0631 02/06/25  0309 02/06/25  0152 02/05/25  2147   POCT GLUCOSE mg/dL 238*  --  289* 343* 233* 126*  --    < >  --    GLUCOSE mg/dL  --  246*  --   --   --   --  328*  --  438*    < > = values in this interval not displayed.     Lab Results   Component Value Date    HGBA1C 14.7 (H) 02/06/2025          Yon Wiseman MD FACE  Office phone - [phone]  Fax - [phone]  Address: [address] [place] [id number]  Address: [address] [place], [place] [id number]  2/7/2025  8:11 AM  "

## 2025-02-07 NOTE — PROGRESS NOTES
"  Feliciano Worthington is a 74 y.o. male on day 1 of admission presenting with Chest pain, unspecified type.      ASSESSMENT/PLAN   -Acute non-STEMI with triple-vessel CAD on cardiac catheterization this morning-for CABG  -History of coronary artery disease with prior stents  -Uncontrolled type 2 diabetes with hyperglycemia-HbA1c at 14.7.  Blood sugars improving with resumption of his normal glargine regimen, SSI.  -Noncompliance with medications PTA  -Possible pancreatic lesion seen on CT done as part of preop evaluation-for MRCP this afternoon.  CA 19-9 added to this morning's labs.    -Pulmonary nodules-most appear chronic, 1 new one in the right lower lobe, \"possibly infectious/inflammatory in nature but should have follow-up to document resolution\".  -Hyponatremia-up to 134 with improved glucose.  His chlorthalidone is on hold  -Hyperlipidemia-lovastatin restarted last night.  -History of Mobitz type I-on telemetry  -COPD-nebulizers as needed  -Continued tobacco abuse-he declines a nicotine patch  -Hypertension-BPs controlled  -Depression/anxiety-sertraline was added to his venlafaxine on 1/29-venlafaxine not continued on admission, will reorder as I do not want him to have any withdrawal symptoms.  Discussed with patient and his wife-will request psychiatry consult to help with medication management, consider possible change of Zoloft to bupropion which may also help his nicotine addiction.  -Abnormal weight loss-likely due to uncontrolled diabetes, does have pancreatic lesion being evaluated  -Some difficulty swallowing dry solids-will order swallow eval (for prior to his probable CABG).    -History of GERD with prior esophageal ulcers and achalasia-on daily PPI  -Neck and shoulder pain-has known moderate canal stenosis C3-4 & foraminal stenosis at multiple levels most pronounced on the right at C4-5 on MRI 12/16/2020 & foraminal stenosis, previously saw Dr. Parker and had some injections in the past  -History " "obstructive sleep apnea not using CPAP-possibly improved due to his 50 pound weight loss in the last year    SUBJECTIVE/OBJECTIVE   02/07/25   -Wife is in the room with him.  He admits that he often did not take his insulin, but when he did \"I would take 55 units\", rather than the prescribed amounts.  -Has a long history of depression-long ago went to the [facility] but did not think they helped.  His wife notes that that was probably 20 years ago.  I discussed with him if he would like to see psychiatry while in the hospital, he agrees and his wife would appreciate it.  Wonder if he would do well with addition of bupropion which may also help with smoking cessation.  When we discussed possible suicide by medication noncompliance, he said he would not because he has his dogs and lavinia to take care of.  -I discussed with both of them the need to stop smoking-he admits that he would often use it as an excuse not to do things, saying \"wait till I have a cigarette\".  His wife also admits that she occasionally \"sneaks 1\" even though she quit smoking 2 years ago.  -He is afebrile, vital signs stable.  Satting 98% on room air  -His chest is clear to auscultation  -Cardiac exam is a regular rhythm  -Blood sugars in the 200s now that he is back on glargine.  238 this morning.  Hemoglobin A1c 14.7  -Chemistry panel with a blood sugar of 246, sodium 134, other electrolytes normal.  -BUN 17 with a creatinine 0.76.  -Magnesium 1.78.  -TSH 2.23.  -CBC with a WBC of 7.7, H/H 15.0/42.5.  Platelet count 145 K  -CT of the chest showed areas of fibrosis/scarring for 2 eminently along the periphery, not significantly changed from prior exams.  He does have several small nodular lesions, one was a new 1 in the right upper lobe 8 mm in size.  Also noted an indeterminate lesion involving the tail of the pancreas.  -Discussed with Dr. Wiseman, adding mealtime scheduled insulin with lunch along with SSI.  -Discussed with CT surgery-initial " plan was to try discharging to get his sugars and other medical problems under control, but significant concerns about whether this would happen if he was discharged due to his noncompliance.  The patient tells me this afternoon that it is scheduled for Monday.      2/6/2025 (patient transferred to my service, his PCP is Dr. José Bernal)  -Patient is lying in bed, still mildly sedated from his cardiac catheterization this morning.  Otherwise in NAD.  -He is afebrile, vital signs stable.  -Chest is clear to auscultation  -Cardiac exam is a regular rhythm  -Refer to complete PE below for rest of exam  -Chemistry panel with a blood sugar of 328, sodium 132, potassium 3.9, chloride 96, bicarb 25.  -BUN 15 with a creatinine of 0.90.  -Most recent troponin up to 2450  -CBC with a WBC of 8.3, H/H is 15.8/44.2.  Platelet count 145 K  -Lipid panel with a cholesterol of 258, triglycerides 309, HDL 24, , non-HDL cholesterol 233  -Case discussed with Dr. Haskins-patient was previously intolerant of statins, but did tolerate lovastatin in the past.  Not on formulary here, he has asked the pharmacy to try and obtain.  Discussed with him possibility of a PSK 9-suggested we may try evolocumab (Repatha) as an inpatient if available, otherwise consider Leqvio which could be administered twice a year in an infusion center.  He also notes that the patient seems to be depressed, and he is concerned by some apparent short-term memory loss.  He has known the patient for many years.  -Patient had cardiac catheterization this morning, found to have triple-vessel CAD with recommendation for CABG.    ROS:    Chart summary:  PCP is Dr. José Bernal, cardiologist is Dr. Haskins, neurologist is Dr. DENISE Alcala (saw for myoclonic jerks)    Patient is a 74-year-old male with a known history of CAD s/p prior stents, DM, HTN, HLP, COPD still smoking, GERD/history of esophagitis, ELLY not on CPAP, DJD of the C-spine, BPH with LUTS, and prior  "nephrolithiasis.      He presented to the ER last night complaining of chest pain for 36 hours.  He tells me that it started late Tuesday afternoon, was \"different than before\", dull and in his left chest without radiation.  Before he came to the ER yesterday, he tried NTG x 4 with only some help.  In the ER his EKG showed Mobitz type I but no ST elevations.  His blood sugar was 438, sodium 129, potassium 4.4, chloride 93.  BUN 16 with a creatinine of 0.88.  LFTs normal.  Magnesium 1.92.  Initial troponin was elevated to 299, this morning is up to 2450.  Cholesterol 258, , triglycerides 309, HDL 24.  CBC with a WBC of 9.8, H/H of 16.0/45.4.  Platelet count 176 K.    ROS: He denies headaches or dizziness.  No episodes of syncope.  He denies recent prior chest pains or palpitations before yesterday.  He denies chronic cough, shortness of breath.  He has some problems intermittently swallowing, says that solids (especially bread) seems to stick in his upper throat.  He does have a history of \"narrowing of my esophagus\" (reported achalasia on EGD in 2009).  He has had constant nausea for several months with only occasional vomiting.  He does get some heartburn and is on omeprazole daily which helps \"sometimes\".  He had problems with constipation for several months and had to take enemas, but it has now straightened out.  He admits to being erratic in using his insulin, when he checks his blood sugars they often come back as \"high\".  He has had increased frequency of urination which he assumes is due to his high blood sugars, but no dysuria or hematuria.  He denies any fevers or chills, has occasional night sweats.  No pedal edema.  He has a long history of depression/anxiety, was on venlafaxine until recently and was just changed to Zoloft several days PTA.    Past Medical History:  -CAD with history of stents  -History of Mobitz type I  -Diabetes-uncontrolled, last HbA1c in December was " 14.0  -Hypertension  -Hyperlipidemia, intolerant of statins in the past (did tolerate lovastatin in the past per Dr. Haskins)  -COPD  -Continued tobacco abuse  -Obstructive sleep apnea not using CPAP  -BPH with LUTS  -Depression/anxiety  -History of GERD and esophagitis  -Cervical spondylosis  -History of myoclonic jerks    Past Surgical History:  -Remote tonsillectomy  -Oral surgery  -Cardiac catheterization with stent to circumflex in  ([facility] in [place])  -EGD 2009-spastic lower esophagus, severe gastritis (Dr. Gonzalez)  -Cardiac catheterization with stent to RCA   -Right and left heart catheterization 2013-patent previously placed stents, 90% mid RCA treated with TREVOR, 80% proximal circumflex, EF = 45%  -EGD and colonoscopy 2016-hiatal hernia, multiple esophageal ulcers, colon polyp  -Placement of inflatable penile prosthesis 2018    Family History:  -Father- age 71 with an MI, had HTN, DM  -Mother- age 87, had HTN & an MI   -Siblings-1  half brother and 4 half sisters-unknown health status  -Children-1    Social History:  -Tobacco-2 packs daily from the age of 17, down to 1.5 pack daily now.  Has quit a couple times in the past but always restarted.  -Alcohol-prior alcoholism, quit in   -Drugs-remote use of illicit drugs, quit   -Marital-he is  and lives with his wife  -Occupation-prior /[employer]    *These notes are being done using Dragon voice recognition technology and may include unintended errors with respect to translation of words, typographical errors or grammar errors which may not have been identified prior to finalization of the chart note.    CURRENT MEDICATIONS     Scheduled Medications:    Current Facility-Administered Medications:     acetaminophen (Tylenol) tablet 650 mg, 650 mg, oral, q4h PRN, 650 mg at 25 1215 **OR** acetaminophen (Tylenol) oral liquid 650 mg, 650 mg, nasogastric tube, q4h PRN **OR** acetaminophen (Tylenol)  suppository 650 mg, 650 mg, rectal, q4h PRN, KHADIJAH Soni    acetaminophen (Tylenol) tablet 650 mg, 650 mg, oral, q4h PRN, Shanika Reese MD, 650 mg at 02/06/25 2023    alum-mag hydroxide-simeth (Mylanta) 200-200-20 mg/5 mL oral suspension 30 mL, 30 mL, oral, 4x daily PRN, Shanika Reese MD    amLODIPine (Norvasc) tablet 5 mg, 5 mg, oral, Daily, KHADIJAH Soni, 5 mg at 02/06/25 0828    aspirin chewable tablet 81 mg, 81 mg, oral, Daily, KHADIJAH Soni    dextrose 50 % injection 12.5 g, 12.5 g, intravenous, q15 min PRN, Cristina Wiseman MD    dextrose 50 % injection 25 g, 25 g, intravenous, q15 min PRN, Cristina Wiseman MD    ezetimibe (Zetia) tablet 10 mg, 10 mg, oral, Nightly, KHADIJAH Soni, 10 mg at 02/06/25 2018    glucagon (Glucagen) injection 1 mg, 1 mg, intramuscular, q15 min PRN, Cristina Wiseman MD    glucagon (Glucagen) injection 1 mg, 1 mg, intramuscular, q15 min PRN, Cristina Wiseman MD    heparin 25,000 Units in dextrose 5% 250 mL (100 Units/mL) infusion (premix), 0-4,000 Units/hr, intravenous, Continuous, KHADIJAH Gomes, Last Rate: 12 mL/hr at 02/06/25 2218, 1,200 Units/hr at 02/06/25 2218    heparin bolus from bag 2,000-4,000 Units, 2,000-4,000 Units, intravenous, q4h PRN, KHADIJAH Soni, 2,000 Units at 02/06/25 2218    hydrOXYzine HCL (Atarax) tablet 25 mg, 25 mg, oral, BID, KHADIJAH Soni, 25 mg at 02/06/25 2018    insulin glargine (Lantus) injection 32 Units, 32 Units, subcutaneous, q AM, CELIA Soni-CNP, 32 Units at 02/07/25 0802    insulin glargine (Lantus) injection 35 Units, 35 Units, subcutaneous, Nightly, KHADIJAH Soni, 35 Units at 02/06/25 2016    insulin lispro injection 0-15 Units, 0-15 Units, subcutaneous, TID AC, KHADIJAH Soni, 6 Units at 02/07/25 0802    insulin lispro injection 8 Units, 8 Units, subcutaneous, TID AC, Cristina Wiseman MD    ipratropium-albuteroL (Duo-Neb) 0.5-2.5 mg/3  mL nebulizer solution 3 mL, 3 mL, nebulization, q4h PRN, Shanika Reese MD    isosorbide mononitrate ER (Imdur) 24 hr tablet 30 mg, 30 mg, oral, Daily, KHADIJAH Soni, 30 mg at 02/06/25 1213    lovastatin (Mevacor) tablet 40 mg, 40 mg, oral, Nightly, Shanika Reese MD, 40 mg at 02/06/25 2018    magnesium hydroxide (Milk of Magnesia) 400 mg/5 mL suspension 30 mL, 30 mL, oral, Daily PRN, Shanika Reese MD    melatonin tablet 5 mg, 5 mg, oral, Nightly PRN, Shanika Reese MD    nitroglycerin (Nitrostat) SL tablet 0.4 mg, 0.4 mg, sublingual, q5 min PRN, KHADIJAH Soni    ondansetron (Zofran) injection 4 mg, 4 mg, intravenous, q4h PRN, Shanika Reese MD    ondansetron (Zofran) tablet 4 mg, 4 mg, oral, q8h PRN, Shanika Reese MD    pantoprazole (ProtoNix) EC tablet 40 mg, 40 mg, oral, Daily, KHADIJAH Soni, 40 mg at 02/07/25 0537    perflutren lipid microspheres (Definity) injection 2 mL of dilution, 2 mL of dilution, intravenous, Once in imaging, KHADIJAH Claros    polyethylene glycol (Glycolax, Miralax) packet 17 g, 17 g, oral, Daily, Shanika Reese MD, 17 g at 02/06/25 1445    sertraline (Zoloft) tablet 25 mg, 25 mg, oral, Daily, Shanika Reese MD, 25 mg at 02/06/25 1445    venlafaxine XR (Effexor-XR) 24 hr capsule 150 mg, 150 mg, oral, Daily with breakfast, Shanika Reese MD     PRN Medications:  PRN medications   Medication    acetaminophen    Or    acetaminophen    Or    acetaminophen    acetaminophen    alum-mag hydroxide-simeth    dextrose    dextrose    glucagon    glucagon    heparin    ipratropium-albuteroL    magnesium hydroxide    melatonin    nitroglycerin    ondansetron    ondansetron         IVs:  heparin, 0-4,000 Units/hr, Last Rate: 1,200 Units/hr (02/06/25 2218)         I&Os     Intake/Output last 3 Shifts:  I/O last 3 completed shifts:  In: 129.1 (1.6 mL/kg) [P.O.:100; I.V.:29.1 (0.4 mL/kg)]  Out: 0 (0 mL/kg)   Weight: 82 kg     VITAL SIGNS     Vitals:    02/06/25 2009 02/06/25 2351  02/07/25 0500 02/07/25 0700   BP: 120/73 112/60 133/76 144/83   BP Location: Right arm Right arm Left arm Right arm   Patient Position: Lying Lying Lying Sitting   Pulse: 78 77 77 79   Resp: 18 18 16 16   Temp: 36 °C (96.8 °F) 35.9 °C (96.6 °F) 36.3 °C (97.3 °F) 36.4 °C (97.5 °F)   TempSrc: Temporal Temporal Temporal Temporal   SpO2: 96% 95% 94% 98%   Weight:   82 kg (180 lb 12.8 oz)    Height:           PHYSICAL EXAM   Physical exam:  -General appearance: Patient is lying in bed, alert and in NAD.  Wife is in the room with him.  -Vital signs:  As above  -HEENT: No icterus  -Neck: No adenopathy  -Chest: Chest is clear  -Cardiac: Regular rhythm  -Abdomen: Bowel sounds active  -Extremities: No edema  -Skin: No rash  -Neurologic:   Patient is alert and oriented x 3.   -Behavior/Emotional:  Appropriate, cooperative-does note he has had depression for many years.    LABS   Relevant Results:     Results from last 72 hours   Lab Units 02/07/25  1128 02/07/25  0642 02/07/25  0639 02/06/25 2010 02/06/25  0631 02/06/25  0309 02/06/25  0152 02/05/25  2147   POCT GLUCOSE mg/dL 100* 238*  --  289*   < >  --    < >  --    GLUCOSE mg/dL  --   --  246*  --   --  328*  --  438*    < > = values in this interval not displayed.      HbA1c:    Lab Results   Component Value Date    HGBA1C 14.7 (H) 02/06/2025     CBC:   Lab Results   Component Value Date    WBC 7.7 02/07/2025    HGB 15.0 02/07/2025    HCT 42.5 02/07/2025    MCV 83 02/07/2025     (L) 02/07/2025       Results from last 7 days   Lab Units 02/07/25  0639 02/06/25  0309 02/05/25  2147   WBC AUTO x10*3/uL 7.7   < > 9.8   HEMOGLOBIN g/dL 15.0   < > 16.0   HEMATOCRIT % 42.5   < > 45.4   PLATELETS AUTO x10*3/uL 145*   < > 176   NEUTROS PCT AUTO %  --   --  68.9   LYMPHS PCT AUTO %  --   --  22.1   MONOS PCT AUTO %  --   --  7.4   EOS PCT AUTO %  --   --  0.8    < > = values in this interval not displayed.     ESR:    Lab Results   Component Value Date    SEDRATE 1  03/24/2021     CMP:    Results from last 7 days   Lab Units 02/07/25  0639 02/06/25  0309 02/05/25  2147   SODIUM mmol/L 134* 132* 129*   POTASSIUM mmol/L 3.7 3.9 4.4   CHLORIDE mmol/L 100 96* 93*   CO2 mmol/L 25 25 23   BUN mg/dL 17 15 16   CREATININE mg/dL 0.76 0.90 0.99   CALCIUM mg/dL 8.9 9.1 9.3   PROTEIN TOTAL g/dL  --   --  7.3   BILIRUBIN TOTAL mg/dL  --   --  0.6   ALK PHOS U/L  --   --  110   ALT U/L  --   --  12   AST U/L  --   --  15   GLUCOSE mg/dL 246* 328* 438*     Magnesium:   Results from last 7 days   Lab Units 02/07/25  0639 02/06/25  0309 02/05/25  2147   MAGNESIUM mg/dL 1.78 1.86 1.92     Troponin:    Results from last 7 days   Lab Units 02/06/25  0800 02/06/25  0309 02/05/25  2248 02/05/25  2147   TROPHS ng/L 2,450* 1,858* 353* 299*     Lipid Panel:    Lab Results   Component Value Date    CHOL 258 (H) 02/05/2025    CHOL 319 (H) 12/26/2024     Lab Results   Component Value Date    HDL 24.7 02/05/2025    HDL 32.8 12/26/2024     Lab Results   Component Value Date    LDLCALC 172 (H) 02/05/2025    LDLCALC  12/26/2024      Comment:      The calculation of LDL and VLDL are inaccurate when the Triglycerides are greater than 400 mg/dL or when the patient is non-fasting. If LDL measurement is necessary contact the testing laboratory for an alternative LDL assay.                                  Near   Borderline      AGE      Desirable  Optimal    High     High     Very High     0-19 Y     0 - 109     ---    110-129   >/= 130     ----    20-24 Y     0 - 119     ---    120-159   >/= 160     ----      >24 Y     0 -  99   100-129  130-159   160-189     >/=190       Lab Results   Component Value Date    TRIG 309 (H) 02/05/2025    TRIG 468 (H) 12/26/2024     Urinalysis:    Lab Results   Component Value Date    COLORU Light-Yellow 12/26/2024    APPEARANCEU Clear 12/26/2024    SPECGRAVU 1.043 (N) 12/26/2024    NORMA 5.5 12/26/2024    PROTUR 30 (1+) (A) 12/26/2024    GLUCOSEU OVER (4+) (A) 12/26/2024    BLOODU  NEGATIVE 12/26/2024    KETONESU TRACE (A) 12/26/2024    BILIRUBINU NEGATIVE 12/26/2024    UROBILINOGEN Normal 12/26/2024    NITRITEU NEGATIVE 12/26/2024    LEUKOCYTESU NEGATIVE 12/26/2024    WBCU NONE 12/26/2024    RBCU NONE 12/26/2024    MUCUSU FEW 12/26/2024         Results for orders placed or performed during the hospital encounter of 02/05/25 (from the past 24 hours)   POCT GLUCOSE   Result Value Ref Range    POCT Glucose 233 (H) 74 - 99 mg/dL   POCT GLUCOSE   Result Value Ref Range    POCT Glucose 343 (H) 74 - 99 mg/dL   POCT GLUCOSE   Result Value Ref Range    POCT Glucose 289 (H) 74 - 99 mg/dL   Heparin Assay   Result Value Ref Range    Heparin Unfractionated 0.2 See Comment Below for Therapeutic Ranges IU/mL   Heparin Assay   Result Value Ref Range    Heparin Unfractionated 0.4 See Comment Below for Therapeutic Ranges IU/mL   CBC   Result Value Ref Range    WBC 7.7 4.4 - 11.3 x10*3/uL    nRBC 0.0 0.0 - 0.0 /100 WBCs    RBC 5.15 4.50 - 5.90 x10*6/uL    Hemoglobin 15.0 13.5 - 17.5 g/dL    Hematocrit 42.5 41.0 - 52.0 %    MCV 83 80 - 100 fL    MCH 29.1 26.0 - 34.0 pg    MCHC 35.3 32.0 - 36.0 g/dL    RDW 13.3 11.5 - 14.5 %    Platelets 145 (L) 150 - 450 x10*3/uL   Renal Function Panel   Result Value Ref Range    Glucose 246 (H) 74 - 99 mg/dL    Sodium 134 (L) 136 - 145 mmol/L    Potassium 3.7 3.5 - 5.3 mmol/L    Chloride 100 98 - 107 mmol/L    Bicarbonate 25 21 - 32 mmol/L    Anion Gap 13 10 - 20 mmol/L    Urea Nitrogen 17 6 - 23 mg/dL    Creatinine 0.76 0.50 - 1.30 mg/dL    eGFR >90 >60 mL/min/1.73m*2    Calcium 8.9 8.6 - 10.3 mg/dL    Phosphorus 3.6 2.5 - 4.9 mg/dL    Albumin 3.6 3.4 - 5.0 g/dL   Magnesium   Result Value Ref Range    Magnesium 1.78 1.60 - 2.40 mg/dL   TSH with reflex to Free T4 if abnormal   Result Value Ref Range    Thyroid Stimulating Hormone 2.23 0.44 - 3.98 mIU/L   Heparin Assay   Result Value Ref Range    Heparin Unfractionated 0.4 See Comment Below for Therapeutic Ranges IU/mL   POCT  GLUCOSE   Result Value Ref Range    POCT Glucose 238 (H) 74 - 99 mg/dL     Results for orders placed or performed during the hospital encounter of 02/05/25 (from the past 24 hours)   POCT GLUCOSE   Result Value Ref Range    POCT Glucose 233 (H) 74 - 99 mg/dL   POCT GLUCOSE   Result Value Ref Range    POCT Glucose 343 (H) 74 - 99 mg/dL   POCT GLUCOSE   Result Value Ref Range    POCT Glucose 289 (H) 74 - 99 mg/dL   Heparin Assay   Result Value Ref Range    Heparin Unfractionated 0.2 See Comment Below for Therapeutic Ranges IU/mL   Heparin Assay   Result Value Ref Range    Heparin Unfractionated 0.4 See Comment Below for Therapeutic Ranges IU/mL   CBC   Result Value Ref Range    WBC 7.7 4.4 - 11.3 x10*3/uL    nRBC 0.0 0.0 - 0.0 /100 WBCs    RBC 5.15 4.50 - 5.90 x10*6/uL    Hemoglobin 15.0 13.5 - 17.5 g/dL    Hematocrit 42.5 41.0 - 52.0 %    MCV 83 80 - 100 fL    MCH 29.1 26.0 - 34.0 pg    MCHC 35.3 32.0 - 36.0 g/dL    RDW 13.3 11.5 - 14.5 %    Platelets 145 (L) 150 - 450 x10*3/uL   Renal Function Panel   Result Value Ref Range    Glucose 246 (H) 74 - 99 mg/dL    Sodium 134 (L) 136 - 145 mmol/L    Potassium 3.7 3.5 - 5.3 mmol/L    Chloride 100 98 - 107 mmol/L    Bicarbonate 25 21 - 32 mmol/L    Anion Gap 13 10 - 20 mmol/L    Urea Nitrogen 17 6 - 23 mg/dL    Creatinine 0.76 0.50 - 1.30 mg/dL    eGFR >90 >60 mL/min/1.73m*2    Calcium 8.9 8.6 - 10.3 mg/dL    Phosphorus 3.6 2.5 - 4.9 mg/dL    Albumin 3.6 3.4 - 5.0 g/dL   Magnesium   Result Value Ref Range    Magnesium 1.78 1.60 - 2.40 mg/dL   TSH with reflex to Free T4 if abnormal   Result Value Ref Range    Thyroid Stimulating Hormone 2.23 0.44 - 3.98 mIU/L   Heparin Assay   Result Value Ref Range    Heparin Unfractionated 0.4 See Comment Below for Therapeutic Ranges IU/mL   POCT GLUCOSE   Result Value Ref Range    POCT Glucose 238 (H) 74 - 99 mg/dL       IMAGING     Vascular US lower extremity vein mapping bilateral   Final Result   Lower extremity venous mapping as  detailed above.        MACRO:   None.        Signed by: Amarjit Ortiz 2/7/2025 7:25 AM   Dictation workstation:   [id number]      Carotid duplex bilateral   Final Result   Utilizing the peak systolic velocities, the estimated degree of   stenosis within the internal carotid arteries is less than 50%   bilaterally.        MACRO:   None.             Signed by: Amarjit Ortiz 2/7/2025 7:24 AM   Dictation workstation:   [id number]      Cardiac Catheterization Procedure   Final Result      XR chest 1 view   Final Result   Mild bilateral opacities may correspond to chronic lung changes and   atelectasis versus pneumonia.        MACRO:   None        Signed by: Tobin Machado 2/5/2025 10:38 PM   Dictation workstation:   [id number]      Transthoracic Echo Complete    (Results Pending)   CT chest wo IV contrast    (Results Pending)      I spent 55 minutes in the professional and overall care of this patient.    Shanika Reese MD

## 2025-02-07 NOTE — CARE PLAN
The patient's goals for the shift include  rest    The clinical goals for the shift include pt will remain HDS throughout shift        Problem: Diabetes  Goal: Decrease in ketones present in urine by end of shift  Outcome: Progressing  Goal: Maintain electrolyte levels within acceptable range throughout shift  Outcome: Progressing  Goal: No changes in neurological exam by end of shift  Outcome: Progressing  Goal: Learn about and adhere to nutrition recommendations by end of shift  Outcome: Progressing  Goal: Vital signs within normal range for age by end of shift  Outcome: Progressing  Goal: Increase self care and/or family involovement by end of shift  Outcome: Progressing  Goal: Receive DSME education by end of shift  Outcome: Progressing     Problem: Pain - Adult  Goal: Verbalizes/displays adequate comfort level or baseline comfort level  Outcome: Progressing     Problem: Safety - Adult  Goal: Free from fall injury  Outcome: Progressing     Problem: Discharge Planning  Goal: Discharge to home or other facility with appropriate resources  Outcome: Progressing     Problem: Chronic Conditions and Co-morbidities  Goal: Patient's chronic conditions and co-morbidity symptoms are monitored and maintained or improved  Outcome: Progressing     Problem: Nutrition  Goal: Nutrient intake appropriate for maintaining nutritional needs  Outcome: Progressing

## 2025-02-07 NOTE — PROGRESS NOTES
2/7 Attending MD advises at 3 pm that plan is for open heart surgery now on Monday.Discharge plan post -op TBD.

## 2025-02-07 NOTE — PROGRESS NOTES
Speech-Language Pathology                 Therapy Communication Note    Patient Name: Feliciano Worthington  MRN: [id number]  Department: [facility]  Room: 81/816-A  Today's Date: 2/7/2025     Discipline: Speech Language Pathology          Missed Visit Reason:  Attempted to complete Clinical swallow evaluation however pt currently NPO for scan of pancreas later this date. Will re-attempt at later time as pt available.     Missed Time: Attempt

## 2025-02-07 NOTE — PROGRESS NOTES
Rounding RORY/Cardiologist:  Julian Tidwell, APRN-CNP,   Primary Cardiologist: Dr. Roberto Haskins     Date:  2/7/2025  Patient:  Feliciano Worthington  YOB: 1950  MRN:  [id number]   Admit Date:  2/5/2025      SUBJECTIVE:    Feliciano Worthington was seen and examined today at bedside.     He denies any chest pain or shortness of breath.     LHC revealed 70% stenosis in the distal left main, 80% stenosis in the proximal LAD, 90% stenosis in the proximal circumflex artery, 75% stenosis in the proximal ramus, 80% stenosis in the mid ramus, 80% stenosis in the mid RCA, 90% stenosis in the distal RCA and an LVEF of 55%.     Cardiac surgery evaluation taking place        VITALS:     Vitals:    02/06/25 2009 02/06/25 2351 02/07/25 0500 02/07/25 0700   BP: 120/73 112/60 133/76 144/83   BP Location: Right arm Right arm Left arm Right arm   Patient Position: Lying Lying Lying Sitting   Pulse: 78 77 77 79   Resp: 18 18 16 16   Temp: 36 °C (96.8 °F) 35.9 °C (96.6 °F) 36.3 °C (97.3 °F) 36.4 °C (97.5 °F)   TempSrc: Temporal Temporal Temporal Temporal   SpO2: 96% 95% 94% 98%   Weight:   82 kg (180 lb 12.8 oz)    Height:           Intake/Output Summary (Last 24 hours) at 2/7/2025 0935  Last data filed at 2/6/2025 2351  Gross per 24 hour   Intake 100 ml   Output 0 ml   Net 100 ml       Wt Readings from Last 4 Encounters:   02/07/25 82 kg (180 lb 12.8 oz)   01/29/25 85.5 kg (188 lb 6.4 oz)   10/15/24 90.3 kg (199 lb)   10/04/24 89.8 kg (198 lb)       CURRENT HOSPITAL MEDICATIONS:   amLODIPine, 5 mg, oral, Daily  aspirin, 81 mg, oral, Daily  ezetimibe, 10 mg, oral, Nightly  hydrOXYzine HCL, 25 mg, oral, BID  insulin glargine, 32 Units, subcutaneous, q AM  insulin glargine, 35 Units, subcutaneous, Nightly  insulin lispro, 0-15 Units, subcutaneous, TID AC  insulin lispro, 8 Units, subcutaneous, TID AC  isosorbide mononitrate ER, 30 mg, oral, Daily  lovastatin, 40 mg, oral, Nightly  pantoprazole, 40 mg, oral,  Daily  polyethylene glycol, 17 g, oral, Daily  sertraline, 25 mg, oral, Daily  venlafaxine XR, 150 mg, oral, Daily with breakfast      heparin, 0-4,000 Units/hr, Last Rate: 1,200 Units/hr (02/06/25 7119)      Current Outpatient Medications   Medication Instructions    ACETAMINOPHEN ORAL 650 mg, Every 8 hours PRN    alcohol swabs (BD Alcohol Swabs) pads, medicated Use twice daily while checking sugar.    amLODIPine (NORVASC) 5 mg, oral, Daily    aspirin 81 mg, Daily    blood glucose control high,low (Accu-Chek Guide L1-L2 Ctrl Sol) solution 1 bottle, miscellaneous, Daily PRN    blood sugar diagnostic (Accu-Chek Guide test strips) strip Test twice daily    blood-glucose meter (Accu-Chek Guide Glucose Meter) misc Test twice daily    chlorthalidone (Hygroton) 25 mg tablet TAKE 1 TABLET EVERY MONDAY AND THURSDAY    ezetimibe (ZETIA) 10 mg, oral, Daily    glipiZIDE (GLUCOTROL) 10 mg, oral, 2 times daily before meals    hydrOXYzine HCL (ATARAX) 25 mg, oral, 2 times daily    insulin glargine (LANTUS U-100 INSULIN) 32 Units, Every morning    ipratropium (Atrovent) 21 mcg (0.03 %) nasal spray 2 sprays, Each Nostril, Every 12 hours    lancets (Accu-Chek Softclix Lancets) misc Test twice daily    nitroglycerin (NITROSTAT) 0.4 mg, sublingual, Every 5 min PRN, DO NOT EXCEED A TOTAL OF 3 DOSES IN 15 MINUTES    omeprazole (PRILOSEC) 40 mg, oral, Daily before breakfast, Do not crush or chew.    ramipril (ALTACE) 5 mg, oral, 2 times daily    sertraline (ZOLOFT) 25 mg, oral, Daily    venlafaxine XR (EFFEXOR-XR) 150 mg, oral, Daily, Do not crush or chew.        PHYSICAL EXAMINATION:     Physical Exam  HENT:      Head: Normocephalic.      Mouth/Throat:      Mouth: Mucous membranes are moist.   Eyes:      Pupils: Pupils are equal, round, and reactive to light.   Cardiovascular:      Rate and Rhythm: Normal rate and regular rhythm.   Pulmonary:      Effort: Pulmonary effort is normal.      Comments: Wheezing  Abdominal:      General:  Bowel sounds are normal.      Palpations: Abdomen is soft.   Musculoskeletal:         General: Normal range of motion.   Skin:     General: Skin is warm and dry.      Capillary Refill: Capillary refill takes less than 2 seconds.   Neurological:      Mental Status: He is alert and oriented to person, place, and time.   Psychiatric:         Mood and Affect: Mood normal.         LAB DATA:     CBC:   Results from last 7 days   Lab Units 02/07/25 0639 02/06/25 0309 02/05/25  2147   WBC AUTO x10*3/uL 7.7 8.3 9.8   RBC AUTO x10*6/uL 5.15 5.40 5.50   HEMOGLOBIN g/dL 15.0 15.8 16.0   HEMATOCRIT % 42.5 44.2 45.4   MCV fL 83 82 83   MCH pg 29.1 29.3 29.1   MCHC g/dL 35.3 35.7 35.2   RDW % 13.3 13.0 13.0   PLATELETS AUTO x10*3/uL 145* 145* 176     CMP:    Results from last 7 days   Lab Units 02/07/25 0639 02/06/25  0309 02/05/25  2147   SODIUM mmol/L 134* 132* 129*   POTASSIUM mmol/L 3.7 3.9 4.4   CHLORIDE mmol/L 100 96* 93*   CO2 mmol/L 25 25 23   BUN mg/dL 17 15 16   CREATININE mg/dL 0.76 0.90 0.99   GLUCOSE mg/dL 246* 328* 438*   PROTEIN TOTAL g/dL  --   --  7.3   CALCIUM mg/dL 8.9 9.1 9.3   BILIRUBIN TOTAL mg/dL  --   --  0.6   ALK PHOS U/L  --   --  110   AST U/L  --   --  15   ALT U/L  --   --  12     BMP:    Results from last 7 days   Lab Units 02/07/25 0639 02/06/25  0309 02/05/25  2147   SODIUM mmol/L 134* 132* 129*   POTASSIUM mmol/L 3.7 3.9 4.4   CHLORIDE mmol/L 100 96* 93*   CO2 mmol/L 25 25 23   BUN mg/dL 17 15 16   CREATININE mg/dL 0.76 0.90 0.99   CALCIUM mg/dL 8.9 9.1 9.3   GLUCOSE mg/dL 246* 328* 438*     Magnesium:  Results from last 7 days   Lab Units 02/07/25  0639 02/06/25  0309 02/05/25  2147   MAGNESIUM mg/dL 1.78 1.86 1.92     Troponin:    Results from last 7 days   Lab Units 02/06/25  0800 02/06/25  0309 02/05/25  2248   TROPHS ng/L 2,450* 1,858* 353*     BNP:     Lipid Panel:  Results from last 7 days   Lab Units 02/05/25  2248   HDL mg/dL 24.7   CHOLESTEROL/HDL RATIO  10.4   VLDL mg/dL 62*    TRIGLYCERIDES mg/dL 309*   NON HDL CHOL. mg/dL 233*        DIAGNOSTIC TESTING:   @No results found for this or any previous visit.    Echocardiogram:     Coronary Angiography:   Left Heart Cath, With LV 02/06/2025    [facility], Cath Lab  [address] [place] [id number]    Cardiovascular Catheterization Report    Patient Name:      JAIME FERNANDEZ      Performing Physician:  [id number] Montana MD  Study Date:        2/6/2025             Verifying Physician:   [id number] Montana MD  MRN/PID:           [id number]             Cardiologist/Co-Scrub:  Accession#:        [id number]         Ordering Provider:     [id number] DERIK ENAMORADO  Date of Birth/Age: 1950 / 74 years Cardiologist:  Gender:            M                    Fellow:  Encounter#:        [id number]           Surgeon:      Study: Left Heart Cath      Indications:  JAIME FERNANDEZ is a 75 year old male who presents with dyslipidemia, hypertension, prior myocardial infarction, diabetes, smoker, chronic pulmonary disease, coronary artery disease, prior percutaneous coronary intervention and a chest pain assessment of typical angina. NSTE - ACS.    Procedure Description:  After infiltration with 2% Lidocaine, the right femoral artery was cannulated with a modified Seldinger technique. Subsequently a 5 Azeri sheath was placed in the right femoral artery. Selective coronary catheterization was performed using a 5 Fr catheter(s) exchanged over a guide wire to cannulate the coronary arteries. A JL 4 tip catheter was used for left coronary injections. A 3drc tip catheter was used for right coronary injections.  Multiple injections of contrast were made into the left and right coronary arteries with angiograms recorded in multiple projections. Retrograde left heart catheterizion was accomplished with a 5 Fr. pigtail catheter. A single plane left ventriculogram was recorded in the 30 degree RAYMOND projection. The contrast  dose was 20 ml injected at 10 ml/sec. The catheter was then withdrawn across the aortic valve under continuous pressure monitoring and removed. After completion of the procedure, femoral artery angiography was performed. This demonstrated a common femoral artery puncture appropriate for closure. A Vascade 5F vascular closure Device was placed per protocol.    Coronary Angiography:  The coronary circulation is right dominant.    Left Main Coronary Artery:  The left main coronary artery is calcified. There is 70% stenosis in the distal left main coronary artery.    Left Anterior Descending Coronary Artery Distribution:  There is 80% stenosis in the proximal left anterior descending artery.    Circumflex Coronary Artery Distribution:  There is 90% stenosis in the the proximal Circumflex artery. There is 75% stenosis in the Prox Ramus Circumflex artery. There is 80% stenosis in the Mid Ramus Circumflex artery.    Right Coronary Artery Distribution:    There is 80% stenosis in the the mid Right Coronary Artery. There is evidence of instent restenosis in this segment. The distal right coronary artery showed 90% stenosis.      Left Ventriculography:  The estimated left ventricular ejection fraction is normal at 55%.    Hemo Personnel:  +---------------------------+---------+  Name                       Duty       +---------------------------+---------+  Wil Bronson MD 1  +---------------------------+---------+      Hemodynamic Pressures:    +----+------------------+---------+-------------+-------------+------+---------+  Site    Date Time       Phase  Systolic mmHg  Diastolic    ED  Mean mmHg                          Name                    mmHg      mmHg            +----+------------------+---------+-------------+-------------+------+---------+    AO  2/6/2025 9:36:07 AIR REST          127           06             87                      AM                                                     +----+------------------+---------+-------------+-------------+------+---------+    AO  2/6/2025 9:39:06 AIR REST           91           61             65                      AM                                                    +----+------------------+---------+-------------+-------------+------+---------+    AO  2/6/2025 9:44:05 AIR REST          133           72             96                      AM                                                    +----+------------------+---------+-------------+-------------+------+---------+    LV  2/6/2025 9:47:36 AIR REST          115           -1     6                               AM                                                    +----+------------------+---------+-------------+-------------+------+---------+      Cardiac Cath Post Procedure Notes:  Post Procedure Diagnosis: Triple vessel disease.  Blood Loss:               Estimated blood loss during the procedure was 0 mls.  Specimens Removed:        Number of specimen(s) removed: none.    ____________________________________________________________________________________  CONCLUSIONS:  1. Distal Left Main: 70% stenosis.  2. Left Main Coronary Artery: This artery is calcified.  3. Proximal LAD Lesion: The percent stenosis is 80%.  4. Proximal CX Lesion: The percent stenosis is 90%.  5. Prox Ramus CX Lesion: The percent stenosis is 75%.  6. Mid Ramus CX Lesion: The percent stenosis is 80%.  7. Mid RCA Lesion: The percent stenosis is 80%.  8. Mid RCA Lesion: Instent Restenosis.  9. Distal RCA Lesion: The percent stenosis is 90%.  10. The Left Ventricular Ejection Fraction is 55%.  11. Mid CX Lesion: 100%.    ICD 10 Codes:  Non ST elevation (NSTEMI) myocardial infarction-I21.4    CPT Codes:  Left Heart Cath (visualization of coronaries) and LV-62501; Moderate Sedation Services initial 15 minutes patient >5 years-[id number]    [id number] Wil  Nan ESPINOZA  Performing Physician  Electronically signed by [id number] Wil Montana MD on 2/6/2025 at 10:19:09  AM          ** Final **            RADIOLOGY:     Transthoracic Echo Complete         Vascular US lower extremity vein mapping bilateral   Final Result   Lower extremity venous mapping as detailed above.        MACRO:   None.        Signed by: Amarjit Ortiz 2/7/2025 7:25 AM   Dictation workstation:   [id number]      Carotid duplex bilateral   Final Result   Utilizing the peak systolic velocities, the estimated degree of   stenosis within the internal carotid arteries is less than 50%   bilaterally.        MACRO:   None.             Signed by: Amarjit Ortiz 2/7/2025 7:24 AM   Dictation workstation:   [id number]      CT chest wo IV contrast   Final Result   No calcified plaques along the ascending aorta. There is   atherosclerotic disease of the remaining portions of the aorta and   its branches. Indeterminate lesion involving the tail the pancreas.   MRI recommended for further evaluation. Interval development of an 8   mm right lower lobe nodule which may be infectious/inflammatory in   nature but will require follow-up to document resolution.        MACRO:   None.        Signed by: Amarjit Ortiz 2/7/2025 9:05 AM   Dictation workstation:   [id number]      Cardiac Catheterization Procedure   Final Result      XR chest 1 view   Final Result   Mild bilateral opacities may correspond to chronic lung changes and   atelectasis versus pneumonia.        MACRO:   None        Signed by: Tobin Machado 2/5/2025 10:38 PM   Dictation workstation:   [id number]          PROBLEM LIST     Patient Active Problem List   Diagnosis    Primary hypertension    Chronic obstructive pulmonary disease, unspecified    Current every day smoker    GERD (gastroesophageal reflux disease)    Arcus senilis, bilateral    Astigmatism of both eyes    Cataract, bilateral    Chronic venous stasis dermatitis    Depression, major, in  remission (CMS-HCC)    Erectile dysfunction    Hiatal hernia with GERD    Multiple lung nodules on CT    Nuclear sclerotic cataract of both eyes    Osteoarthritis of hips, bilateral    Other spondylosis, cervical region    Periumbilical hernia    Testosterone deficiency    Smoker    Sleep apnea    Vitamin D deficiency    Noncompliance with dietary restriction    Arthritis of left acromioclavicular joint    Bruit of right carotid artery    Degenerative joint disease (DJD) of hip    Spinal stenosis of cervical region    Encounter for immunization    Mixed hyperlipidemia    CAD (coronary artery disease)    Mobitz type 1 second degree AV block    Type 2 diabetes mellitus with hyperglycemia, with long-term current use of insulin    Hyponatremia    Chest pain, unspecified type    NSTEMI (non-ST elevated myocardial infarction) (Multi)    Noncompliance with CPAP treatment    Obstructive sleep apnea    Recurrent major depressive disorder, in partial remission (CMS-HCC)    Hx of heart artery stent    Oropharyngeal dysphagia    History of constipation    Noncompliance with diabetes treatment    Abnormal weight loss       ASSESSMENT:   NSTEMI type I with history of coronary artery disease and remote stenting to the circumflex and right coronary arteries, last PCI 2011  Severe multivessel coronary artery disease  Mobitz type I second-degree AV block  Hypertension  Hyperlipidemia   COPD with chronic tobacco use  Diabetes mellitus  Hyponatremia, sodium stable on admission        PLAN:   Monitor on telemetry.  Remains in normal sinus rhythm with first-degree AV block.  Rates are 70.  Supplemental O2, keep SpO2 greater than 92%  Monitor electrolytes, keep potassium greater than 4 and magnesium greater than 2  LHC revealed 70% stenosis in the distal left main, 80% stenosis in the proximal LAD, 90% stenosis in the proximal circumflex artery, 75% stenosis in the proximal ramus, 80% stenosis in the mid ramus, 80% stenosis in the mid  RCA, 90% stenosis in the distal RCA and an LVEF of 55%.   CABG evaluation taking place by cardiothoracic surgery team  Echocardiogram today  Continue heparin infusion for now, defer further recommendation to cardiothoracic team  Endocrinology following for type 2 diabetes mellitus with uncontrolled hemoglobin A1c  Nicotine patch for smoking cessation  Will continue to follow along with you        Julian Tidwell [facility]  Adult Gerontology Acute Care Nurse Practitioner  [facility]   [facility]  [phone]          Of note, this documentation is completed using the Dragon Dictation system (voice recognition software). There may be spelling and/or grammatical errors that were not corrected prior to final submission.    Please do not hesitate to call with questions.  Electronically signed by Julian Tidwell, CELIA-CNP, on 2/7/2025 at 9:35 AM

## 2025-02-07 NOTE — PROGRESS NOTES
Feliciano Worthington is a 74 y.o. male on day 1 of admission presenting with Chest pain, unspecified type.    Subjective   No acute overnight events.  Afebrile, maintaining sinus rhythm to second-degree type I AV block, normotensive with adequate saturations on room air.  No chest pain at rest or with activity.  Preoperative testing for cardiac surgery is in process.  Echocardiogram is pending, CT of the chest showing stability of previously noted bilateral lung nodules with new lesion in the right lower lobe measuring 8 mm in size undetermined if this is infectious or inflammatory, follow-up CT is recommended.  There is also notation of a left renal cyst and a 2.5 cm lesion in the tail of the pancreas that is unable to be adequately characterized due to lack of enhancement on CT.  MRCP with and without contrast has been ordered to further evaluate.  PFTs are pending.  Carotid duplex showing less than 50% stenosis bilaterally.  Vein mapping has been completed.  Evaluated by endocrinology, insulin dose is being adjusted, current glucose ranging 200s to 300s.  Awaiting final results of preoperative testing and recommendations from endocrinology to determine most appropriate timing of procedure.       Objective     Physical Exam  HENT:      Head: Normocephalic.      Nose: Nose normal.      Mouth/Throat:      Mouth: Mucous membranes are moist.   Eyes:      Pupils: Pupils are equal, round, and reactive to light.   Cardiovascular:      Rate and Rhythm: Normal rate. Rhythm irregular.      Comments: Diminished lower extremity pulses  Pulmonary:      Effort: Pulmonary effort is normal.      Breath sounds: Wheezing present.   Abdominal:      General: Bowel sounds are normal.   Genitourinary:     Comments: Voiding clear yellow  Musculoskeletal:      Cervical back: Normal range of motion.      Right lower leg: No edema.      Left lower leg: No edema.   Skin:     General: Skin is warm and dry.   Neurological:      General: No focal  "deficit present.      Mental Status: He is alert and oriented to person, place, and time.   Psychiatric:         Mood and Affect: Mood normal.         Last Recorded Vitals  Blood pressure 144/83, pulse 79, temperature 36.4 °C (97.5 °F), temperature source Temporal, resp. rate 16, height 1.778 m (5' 10\"), weight 82 kg (180 lb 12.8 oz), SpO2 98%.  Intake/Output last 3 Shifts:  I/O last 3 completed shifts:  In: 129.1 (1.6 mL/kg) [P.O.:100; I.V.:29.1 (0.4 mL/kg)]  Out: 0 (0 mL/kg)   Weight: 82 kg     Relevant Results  Scheduled medications  amLODIPine, 5 mg, oral, Daily  aspirin, 81 mg, oral, Daily  ezetimibe, 10 mg, oral, Nightly  hydrOXYzine HCL, 25 mg, oral, BID  insulin glargine, 32 Units, subcutaneous, q AM  insulin glargine, 35 Units, subcutaneous, Nightly  insulin lispro, 0-15 Units, subcutaneous, TID AC  insulin lispro, 8 Units, subcutaneous, TID AC  isosorbide mononitrate ER, 30 mg, oral, Daily  LORazepam, 0.5 mg, intravenous, Once  lovastatin, 40 mg, oral, Nightly  pantoprazole, 40 mg, oral, Daily  polyethylene glycol, 17 g, oral, Daily  sertraline, 25 mg, oral, Daily  venlafaxine XR, 150 mg, oral, Daily with breakfast      Continuous medications  heparin, 0-4,000 Units/hr, Last Rate: 1,200 Units/hr (02/06/25 2218)      PRN medications  PRN medications: acetaminophen **OR** acetaminophen **OR** acetaminophen, acetaminophen, alum-mag hydroxide-simeth, dextrose, dextrose, glucagon, glucagon, heparin, ipratropium-albuteroL, magnesium hydroxide, melatonin, nitroglycerin, ondansetron, ondansetron     Results for orders placed or performed during the hospital encounter of 02/05/25 (from the past 24 hours)   POCT GLUCOSE   Result Value Ref Range    POCT Glucose 233 (H) 74 - 99 mg/dL   POCT GLUCOSE   Result Value Ref Range    POCT Glucose 343 (H) 74 - 99 mg/dL   POCT GLUCOSE   Result Value Ref Range    POCT Glucose 289 (H) 74 - 99 mg/dL   Heparin Assay   Result Value Ref Range    Heparin Unfractionated 0.2 See Comment " Below for Therapeutic Ranges IU/mL   Heparin Assay   Result Value Ref Range    Heparin Unfractionated 0.4 See Comment Below for Therapeutic Ranges IU/mL   CBC   Result Value Ref Range    WBC 7.7 4.4 - 11.3 x10*3/uL    nRBC 0.0 0.0 - 0.0 /100 WBCs    RBC 5.15 4.50 - 5.90 x10*6/uL    Hemoglobin 15.0 13.5 - 17.5 g/dL    Hematocrit 42.5 41.0 - 52.0 %    MCV 83 80 - 100 fL    MCH 29.1 26.0 - 34.0 pg    MCHC 35.3 32.0 - 36.0 g/dL    RDW 13.3 11.5 - 14.5 %    Platelets 145 (L) 150 - 450 x10*3/uL   Renal Function Panel   Result Value Ref Range    Glucose 246 (H) 74 - 99 mg/dL    Sodium 134 (L) 136 - 145 mmol/L    Potassium 3.7 3.5 - 5.3 mmol/L    Chloride 100 98 - 107 mmol/L    Bicarbonate 25 21 - 32 mmol/L    Anion Gap 13 10 - 20 mmol/L    Urea Nitrogen 17 6 - 23 mg/dL    Creatinine 0.76 0.50 - 1.30 mg/dL    eGFR >90 >60 mL/min/1.73m*2    Calcium 8.9 8.6 - 10.3 mg/dL    Phosphorus 3.6 2.5 - 4.9 mg/dL    Albumin 3.6 3.4 - 5.0 g/dL   Magnesium   Result Value Ref Range    Magnesium 1.78 1.60 - 2.40 mg/dL   Vitamin B12   Result Value Ref Range    Vitamin B12 411 211 - 911 pg/mL   TSH with reflex to Free T4 if abnormal   Result Value Ref Range    Thyroid Stimulating Hormone 2.23 0.44 - 3.98 mIU/L   Heparin Assay   Result Value Ref Range    Heparin Unfractionated 0.4 See Comment Below for Therapeutic Ranges IU/mL   POCT GLUCOSE   Result Value Ref Range    POCT Glucose 238 (H) 74 - 99 mg/dL   Transthoracic Echo Complete   Result Value Ref Range    AV mn grad 6 mmHg    AV pk mary 1.59 m/s    LV Biplane EF 47 %    LVOT diam 2.10 cm    MV E/A ratio 0.64     Tricuspid annular plane systolic excursion 2.0 cm    MV avg E/e' ratio 7.90     LA vol index A/L 15.3 ml/m2    LV EF 53 %    RV free wall pk S' 10.10 cm/s    RVSP 30.9 mmHg    LVIDd 4.62 cm    Aortic Valve Area by Continuity of Peak Velocity 2.00 cm2    AV pk grad 10 mmHg    Aortic Valve Area by Continuity of VTI 1.98 cm2    LV A4C EF 47.4    BLOOD GAS ARTERIAL FULL PANEL   Result  Value Ref Range    POCT pH, Arterial 7.47 (H) 7.38 - 7.42 pH    POCT pCO2, Arterial 33 (L) 38 - 42 mm Hg    POCT pO2, Arterial 96 (H) 85 - 95 mm Hg    POCT SO2, Arterial 98 94 - 100 %    POCT Oxy Hemoglobin, Arterial 96.4 94.0 - 98.0 %    POCT Hematocrit Calculated, Arterial 49.0 41.0 - 52.0 %    POCT Sodium, Arterial 133 (L) 136 - 145 mmol/L    POCT Potassium, Arterial 3.8 3.5 - 5.3 mmol/L    POCT Chloride, Arterial 104 98 - 107 mmol/L    POCT Ionized Calcium, Arterial 1.18 1.10 - 1.33 mmol/L    POCT Glucose, Arterial 99 74 - 99 mg/dL    POCT Lactate, Arterial 1.7 0.4 - 2.0 mmol/L    POCT Base Excess, Arterial 1.1 -2.0 - 3.0 mmol/L    POCT HCO3 Calculated, Arterial 24.0 22.0 - 26.0 mmol/L    POCT Hemoglobin, Arterial 16.3 13.5 - 17.5 g/dL    POCT Anion Gap, Arterial 9 (L) 10 - 25 mmo/L    Patient Temperature      FiO2 21 %    Site of Arterial Puncture Radial Right     Jerardo's Test Positive       Transthoracic Echo Complete    Result Date: 2/7/2025          [place] [id number]  Tel [phone] Fax [phone] TRANSTHORACIC ECHOCARDIOGRAM REPORT Patient Name:       JAIME Cormier Physician:    [id number] Fredrick Davidson MD, [facility] Study Date:         2/7/2025            Ordering Provider:    [id number] MAXIMILIAN ROSA MRN/PID:            [id number]            Fellow: Accession#:         [id number]        Nurse: Date of Birth/Age:  1950 / 74      Sonographer:          Suzie DE LA CRUZ Gender Assigned at  M                   Additional Staff: Birth: Height:             177.80 cm           Admit Date:           2/5/2025 Weight:             81.65 kg            Admission Status:     Inpatient -                                                               Routine BSA / BMI:          2.00  m2 / 25.83     Department Location:  [facility]                     kg/m2                                     Echo Lab Blood Pressure: 133 /76 mmHg Study Type:    TRANSTHORACIC ECHO (TTE) COMPLETE Diagnosis/ICD: Encounter for other preprocedural examination-Z01.818; Non ST                elevation (NSTEMI) myocardial infarction-I21.4 Indication:    PRE CABG, CPT Codes:     Echo Complete w Full Doppler-[id number] Patient History: Smoker:            Current. Diabetes:          Yes Pertinent History: HTN, Hyperlipidemia, CAD and COPD. Study Detail: The following Echo studies were performed: 2D, M-Mode, Doppler and               color flow. Technically challenging study due to body habitus,               prominent lung artifact and poor acoustic windows. Definity used               as a contrast agent for endocardial border definition. Total               contrast used for this procedure was 2 mL via IV push. The patient               was awake.  PHYSICIAN INTERPRETATION: Left Ventricle: The left ventricular systolic function is low normal, with a visually estimated ejection fraction of 50-55%. There are no regional wall motion abnormalities. The left ventricular cavity size is normal. There is mild increased septal and mildly increased posterior left ventricular wall thickness. There is left ventricular concentric remodeling. Spectral Doppler shows a Grade I (impaired relaxation pattern) of left ventricular diastolic filling with normal left atrial filling pressure. Left Atrium: The left atrial size is normal. Left atrial volume index 18.9 mL/m2. Right Ventricle: The right ventricle is normal in size. There is normal right ventricular global systolic function. Right Atrium: The right atrial size is normal. Aortic Valve: The aortic valve is trileaflet. There is moderate aortic valve cusp calcification. The aortic valve dimensionless index is 0.57. There is mild aortic valve regurgitation. The peak instantaneous gradient  of the aortic valve is 10 mmHg. The mean gradient of the aortic valve is 6 mmHg. 1+ aortic regurgitation. Mitral Valve: The mitral valve is normal in structure. There is mild mitral annular calcification. There is trace mitral valve regurgitation. Trivial mitral regurgitation. Tricuspid Valve: The tricuspid valve is structurally normal. No evidence of tricuspid regurgitation. Trivial to 1+ tricuspid regurgitation with estimated RVSP 31 mmHg. Pulmonic Valve: The pulmonic valve is structurally normal. There is no indication of pulmonic valve regurgitation. Pericardium: Trivial pericardial effusion. There is a pericardial fat pad present. Aorta: The aortic root is normal. There is mild dilatation of the ascending aorta. There is mild dilatation of the aortic root. The aortic root measures 3.9 cm. The ascending aorta measures 4.0 cm. Systemic Veins: The inferior vena cava appears normal in size, with IVC inspiratory collapse greater than 50%. In comparison to the previous echocardiogram(s): No previous available for comparison.  CONCLUSIONS:  1. The left ventricular systolic function is low normal, with a visually estimated ejection fraction of 50-55%.  2. No regional wall motion abnormalities.  3. Spectral Doppler shows a Grade I (impaired relaxation pattern) of left ventricular diastolic filling with normal left atrial filling pressure.  4. Trivial mitral regurgitation.  5. Trivial to 1+ tricuspid regurgitation with estimated RVSP 31 mmHg.  6. There is moderate aortic valve cusp calcification.  7. Mild aortic valve regurgitation.  8. The aortic root measures 3.9 cm.     The ascending aorta measures 4.0 cm.  9. No previous available for comparison. QUANTITATIVE DATA SUMMARY:  2D MEASUREMENTS:             Normal Ranges: Ao Root d:       3.90 cm     (2.0-3.7cm) LAs:             3.30 cm     (2.7-4.0cm) IVSd:            1.08 cm     (0.6-1.1cm) LVPWd:           1.08 cm     (0.6-1.1cm) LVIDd:           4.62 cm      (3.9-5.9cm) LVIDs:           3.48 cm LV Mass Index:   89.1 g/m2 LVEDV Index:     47.39 ml/m2 LV % FS          24.7 %  LEFT ATRIUM:                  Normal Ranges: LA Vol A4C:        21.8 ml    (22+/-6mL/m2) LA Vol A2C:        37.7 ml LA Vol BP:         30.5 ml LA Vol Index A4C:  10.9ml/m2 LA Vol Index A2C:  18.9 ml/m2 LA Vol Index BP:   15.3 ml/m2 LA Area A4C:       10.5 cm2 LA Area A2C:       14.7 cm2 LA Major Axis A4C: 4.3 cm LA Major Axis A2C: 4.9 cm LA Volume Index:   14.5 ml/m2 LA Vol A4C:        22.0 ml LA Vol A2C:        35.0 ml LA Vol Index BSA:  14.3 ml/m2  RIGHT ATRIUM:                 Normal Ranges: RA Vol A4C:        22.4 ml    (8.3-19.5ml) RA Vol Index A4C:  11.2 ml/m2 RA Area A4C:       10.7 cm2 RA Major Axis A4C: 4.4 cm  AORTA MEASUREMENTS:         Normal Ranges: Asc Ao, d:          4.00 cm (2.1-3.4cm)  LV SYSTOLIC FUNCTION:                      Normal Ranges: EF-A4C View:    47 % (>=55%) EF-A2C View:    48 % EF-Biplane:     47 % EF-Visual:      53 % LV EF Reported: 53 %  LV DIASTOLIC FUNCTION:             Normal Ranges: MV Peak E:             0.61 m/s    (0.7-1.2 m/s) MV Peak A:             0.96 m/s    (0.42-0.7 m/s) E/A Ratio:             0.64        (1.0-2.2) MV e'                  0.079 m/s   (>8.0) MV lateral e'          0.08 m/s MV medial e'           0.08 m/s E/e' Ratio:            7.69        (<8.0) PulmV Sys Dion:         56.60 cm/s PulmV Carias Dion:        43.20 cm/s PulmV S/D Dion:         1.30 PulmV A Revs Dion:      29.10 cm/s PulmV A Revs Dur:      103.00 msec  MITRAL VALVE:          Normal Ranges: MV DT:        240 msec (150-240msec)  AORTIC VALVE:                      Normal Ranges: AoV Vmax:                1.59 m/s  (<=1.7m/s) AoV Peak PG:             10.1 mmHg (<20mmHg) AoV Mean P.0 mmHg  (1.7-11.5mmHg) LVOT Max Dion:            0.92 m/s  (<=1.1m/s) AoV VTI:                 30.20 cm  (18-25cm) LVOT VTI:                17.30 cm LVOT Diameter:           2.10 cm   (1.8-2.4cm)  AoV Area, VTI:           1.98 cm2  (2.5-5.5cm2) AoV Area,Vmax:           2.00 cm2  (2.5-4.5cm2) AoV Dimensionless Index: 0.57  AORTIC INSUFFICIENCY: AI Vmax:       3.77 m/s AI Half-time:  623 msec AI Decel Rate: 178.00 cm/s2  RIGHT VENTRICLE: RV Basal 3.50 cm RV Mid   2.98 cm RV Major 8.1 cm TAPSE:   19.7 mm RV s'    0.10 m/s  TRICUSPID VALVE/RVSP:          Normal Ranges: Peak TR Velocity:     2.64 m/s RV Syst Pressure:     31 mmHg  (< 30mmHg) IVC Diam:             1.13 cm  PULMONIC VALVE:          Normal Ranges: PV Accel Time:  129 msec (>120ms) PV Max Dion:     0.9 m/s  (0.6-0.9m/s) PV Max PG:      3.3 mmHg  PULMONARY VEINS: PulmV A Revs Dur: 103.00 msec PulmV A Revs Dion: 29.10 cm/s PulmV Carias Dion:   43.20 cm/s PulmV S/D Dion:    1.30 PulmV Sys Dion:    56.60 cm/s  [id number] Fredrick Davidson MD, [facility] Electronically signed on 2/7/2025 at 10:51:06 AM  ** Final **     CT chest wo IV contrast    Result Date: 2/7/2025  Interpreted By:  Amarjit Ortiz, STUDY: CT CHEST WO IV CONTRAST;  2/6/2025 4:39 pm   INDICATION: Signs/Symptoms:pre op cardiac surgery.     COMPARISON: 05/28/2024   ACCESSION NUMBER(S): [id number]   ORDERING CLINICIAN: MAXIMILIAN ROSA   TECHNIQUE: Helical data acquisition of the chest was obtained without the use of IV contrast. Images were reformatted in axial, coronal, and sagittal planes.   FINDINGS: POTENTIAL LIMITATIONS OF THE STUDY:   Lack of IV contrast   HEART AND VESSELS:   No calcified plaques are seen involving the ascending aorta. There are atherosclerotic calcifications of the aortic arch, descending thoracic aorta, and the branches of the aorta. Aorta is unchanged in course and caliber. Stable ectasia of the ascending aorta up to 3.9 cm.   The heart is unchanged in size.   No pericardial effusion is seen.   MEDIASTINUM AND MARIA INES, LOWER NECK AND AXILLA: The visualized thyroid gland is within normal limits.   No evidence of thoracic lymphadenopathy by CT criteria.   Esophagus appears within normal  limits as seen.   LUNGS AND AIRWAYS: The trachea and central airways are patent. No endobronchial lesion.   There are areas of fibrosis/scarring in the lungs, predominantly along the periphery. Appearance is not significantly changed when compared to the previous examination. No new areas of consolidation. No effusion. No pneumothorax. Stable partially calcified nodule along the major fissure on the right measuring approximately 8 mm. A few additional nodules are seen measuring 5 mm or less in size, for example, image 117 in the right upper lobe. There is a new nodular opacity in the medial aspect of the right lower lobe measuring approximately 8 mm in size. Although this may be infectious/inflammatory nature, follow-up is recommended to document resolution, image 146.   UPPER ABDOMEN: The visualized subdiaphragmatic structures demonstrate no acute abnormality. 2.2 cm cyst in the midpole of the left kidney. There is nonspecific prominence/thickening of the tail the pancreas with a 2.5 cm hypoattenuating lesion. This is not adequately characterized on this unenhanced CT. MRI is recommended for further evaluation.   CHEST WALL AND OSSEOUS STRUCTURES: Degenerative changes. No acute process.       No calcified plaques along the ascending aorta. There is atherosclerotic disease of the remaining portions of the aorta and its branches. Indeterminate lesion involving the tail the pancreas. MRI recommended for further evaluation. Interval development of an 8 mm right lower lobe nodule which may be infectious/inflammatory in nature but will require follow-up to document resolution.   MACRO: None.   Signed by: Amarjit Ortiz 2/7/2025 9:05 AM Dictation workstation:   [id number]    Vascular US lower extremity vein mapping bilateral    Result Date: 2/7/2025  Interpreted By:  Amarjit Ortiz, STUDY: VASC US LOWER EXTREMITY VEIN MAPPING BILATERAL;  2/6/2025 5:33 pm   INDICATION: Signs/Symptoms:pre op cardiac surgery.   ,I21.4 Non-ST  elevation (NSTEMI) myocardial infarction (Multi),R60.1 Generalized edema   COMPARISON: None.   ACCESSION NUMBER(S): [id number]   ORDERING CLINICIAN: MAXIMILIAN ROSA   TECHNIQUE: Real time duplex sonographic evaluation of the lower extremity venous systems was performed for venous mapping.   FINDINGS: Right Greater Saphenous Vein: Thigh- Proximal-0.3 x 0.2 cm Mid-0.2 x 0.2 cm Distal-0.3 x 0.2 cm   Calf-   Proximal-0.3 x 0.2 cm Mid-0.2 x 0.1 cm Distal-0.2 x 0.1 cm   Right Lesser Saphenous Vein: Proximal-0.3 x 0.2 cm Mid-0.2 x 0.1 cm Distal-0.2 x 0.1 cm     Left Greater Saphenous Vein: Thigh- Proximal-0.3 x 0.3 cm Mid-0.2 x 0.2 cm Distal-0.1 x 0.1 cm   Calf-   Proximal-0.1 x 0.1 cm Mid-0.2 x 0.1 cm Distal-0.3 x 0.1 cm   Left Lesser Saphenous Vein: Proximal-0.3 x 0.1 cm Mid-0.2 x 0.2 cm Distal-0.2 x 0.1 cm       Lower extremity venous mapping as detailed above.   MACRO: None.   Signed by: Amarjit Ortiz 2/7/2025 7:25 AM Dictation workstation:   [id number]    Carotid duplex bilateral    Result Date: 2/7/2025  Interpreted By:  Amarjit Ortiz, STUDY: [facility] CAROTID ARTERY DUPLEX BILATERAL;  2/6/2025 5:32 pm   INDICATION: Signs/Symptoms:pre op caridac surgery.   ,I21.4 Non-ST elevation (NSTEMI) myocardial infarction (Multi),I79.8 Other disorders of arteries, arterioles and capillaries in diseases classified elsewhere   COMPARISON: 10/30/2023   ACCESSION NUMBER(S): [id number]   ORDERING CLINICIAN: MAXIMILIAN ROSA   TECHNIQUE: Vascular ultrasound of the extracranial carotid system was performed bilaterally.  Gray scale, color Doppler and spectral Doppler waveform analysis was performed.   FINDINGS: There is atherosclerotic disease most conspicuous at the carotid bulbs.   RIGHT:   RIGHT SIDE PEAK SYSTOLIC VELOCITY TABLE: CCA  78 cm/s. ICA  126 cm/s. ECA  69 cm/s.     The ratio of the peak systolic velocity of the right ICA/CCA is  1.6.   RIGHT VERTEBRAL ARTERY: The right vertebral artery demonstrates proximal anterograde flow.    LEFT:   LEFT SIDE PEAK SYSTOLIC VELOCITY TABLE: CCA 50 cm/s. ICA 62 cm/s. ECA 64 cm/s.     The ratio of the peak systolic velocity of the left ICA/CCA is 1.1.   LEFT VERTEBRAL ARTERY: The left vertebral artery demonstrates proximal anterograde flow.       Utilizing the peak systolic velocities, the estimated degree of stenosis within the internal carotid arteries is less than 50% bilaterally.   MACRO: None.     Signed by: Amarjit Ortiz 2/7/2025 7:24 AM Dictation workstation:   [id number]    Cardiac Catheterization Procedure    Result Date: 2/6/2025   [facility], Cath Lab        [address] [place] [address] Cardiovascular Catheterization Report Patient Name:      JAIME FERNANDEZ      Performing Physician:  [id number] Montana MD Study Date:        2/6/2025             Verifying Physician:   Litzy Montana MD MRN/PID:           [id number]             Cardiologist/Co-Scrub: Accession#:        [id number]         Ordering Provider:     [id number] DERIK ENAMORADO Date of Birth/Age: 1950 / 74 years Cardiologist: Gender:            M                    Fellow: Encounter#:        [id number]           Surgeon:  Study: Left Heart Cath  Indications: JAIME FERNANDEZ is a 75 year old male who presents with dyslipidemia, hypertension, prior myocardial infarction, diabetes, smoker, chronic pulmonary disease, coronary artery disease, prior percutaneous coronary intervention and a chest pain assessment of typical angina. NSTE - ACS.  Procedure Description: After infiltration with 2% Lidocaine, the right femoral artery was cannulated with a modified Seldinger technique. Subsequently a 5 Latvian sheath was placed in the right femoral artery. Selective coronary catheterization was performed using a 5 Fr  catheter(s) exchanged over a guide wire to cannulate the coronary arteries. A JL 4 tip catheter was used for left coronary injections. A 3drc tip catheter was used for right coronary injections. Multiple injections of contrast were made into the left and right coronary arteries with angiograms recorded in multiple projections. Retrograde left heart catheterizion was accomplished with a 5 Fr. pigtail catheter. A single plane left ventriculogram was recorded in the 30 degree RAYMOND projection. The contrast dose was 20 ml injected at 10 ml/sec. The catheter was then withdrawn across the aortic valve under continuous pressure monitoring and removed. After completion of the procedure, femoral artery angiography was performed. This demonstrated a common femoral artery puncture appropriate for closure. A Vascade 5F vascular closure Device was placed per protocol.  Coronary Angiography: The coronary circulation is right dominant.  Left Main Coronary Artery: The left main coronary artery is calcified. There is 70% stenosis in the distal left main coronary artery.  Left Anterior Descending Coronary Artery Distribution: There is 80% stenosis in the proximal left anterior descending artery.  Circumflex Coronary Artery Distribution: There is 90% stenosis in the the proximal Circumflex artery. There is 75% stenosis in the Prox Ramus Circumflex artery. There is 80% stenosis in the Mid Ramus Circumflex artery.  Right Coronary Artery Distribution: There is 80% stenosis in the the mid Right Coronary Artery. There is evidence of instent restenosis in this segment. The distal right coronary artery showed 90% stenosis.  Left Ventriculography: The estimated left ventricular ejection fraction is normal at 55%.  Hemo Personnel: +---------------------------+---------+ Name                       Duty      +---------------------------+---------+ Wil Bronson MD 1 +---------------------------+---------+  Hemodynamic  Pressures:  +----+------------------+---------+-------------+-------------+------+---------+ Site    Date Time       Phase  Systolic mmHg  Diastolic    ED  Mean mmHg                         Name                    mmHg      mmHg           +----+------------------+---------+-------------+-------------+------+---------+   AO  2/6/2025 9:36:07 AIR REST          127           65             87                     AM                                                   +----+------------------+---------+-------------+-------------+------+---------+   AO  2/6/2025 9:39:06 AIR REST           91           61             65                     AM                                                   +----+------------------+---------+-------------+-------------+------+---------+   AO  2/6/2025 9:44:05 AIR REST          133           72             96                     AM                                                   +----+------------------+---------+-------------+-------------+------+---------+   LV  2/6/2025 9:47:36 AIR REST          115           -1     6                              AM                                                   +----+------------------+---------+-------------+-------------+------+---------+  Cardiac Cath Post Procedure Notes: Post Procedure Diagnosis: Triple vessel disease. Blood Loss:               Estimated blood loss during the procedure was 0 mls. Specimens Removed:        Number of specimen(s) removed: none. ____________________________________________________________________________________ CONCLUSIONS:  1. Distal Left Main: 70% stenosis.  2. Left Main Coronary Artery: This artery is calcified.  3. Proximal LAD Lesion: The percent stenosis is 80%.  4. Proximal CX Lesion: The percent stenosis is 90%.  5. Prox Ramus CX Lesion: The percent stenosis is 75%.  6. Mid Ramus CX Lesion: The percent stenosis is 80%.  7. Mid  RCA Lesion: The percent stenosis is 80%.  8. Mid RCA Lesion: Instent Restenosis.  9. Distal RCA Lesion: The percent stenosis is 90%. 10. The Left Ventricular Ejection Fraction is 55%. 11. Mid CX Lesion: 100%. ICD 10 Codes: Non ST elevation (NSTEMI) myocardial infarction-I21.4  CPT Codes: Left Heart Cath (visualization of coronaries) and [id number]; Moderate Sedation Services initial 15 minutes patient >5 [id number]  [id number] Wil Montana MD Performing Physician Electronically signed by [id number] Wil Montana MD on 2/6/2025 at 10:19:09 AM  ** Final **     ECG 12 lead    Result Date: 2/6/2025  Sinus rhythm with 2nd degree AV block (Mobitz I) Right superior axis deviation Nonspecific ST abnormality Abnormal ECG When compared with ECG of 17-OCT-2023 18:49, No significant change was found Confirmed by Shanika Reese ([id number]) on 2/6/2025 9:38:21 AM    ECG 12 lead    Result Date: 2/6/2025  Sinus rhythm with 2nd degree AV block (Mobitz I) Right superior axis deviation Abnormal ECG When compared with ECG of 05-FEB-2025 21:41, (unconfirmed) No significant change was found    XR chest 1 view    Result Date: 2/5/2025  Interpreted By:  Tobin Machado, STUDY: XR CHEST 1 VIEW;  2/5/2025 10:17 pm   INDICATION: Signs/Symptoms:Chest Pain.   COMPARISON: None.   ACCESSION NUMBER(S): [id number]   ORDERING CLINICIAN: MARIELY ROCKWELL   FINDINGS: The cardiac silhouette is normal in size. There is mild bilateral opacities may correspond to chronic lung changes and atelectasis versus pneumonia. No segment pleural effusion. No pneumothorax.       Mild bilateral opacities may correspond to chronic lung changes and atelectasis versus pneumonia.   MACRO: None   Signed by: Tobin Machado 2/5/2025 10:38 PM Dictation workstation:   [id number]               Assessment/Plan   Assessment & Plan  Chest pain, unspecified type    Primary hypertension    Chronic obstructive pulmonary disease, unspecified    Current every day smoker    GERD  (gastroesophageal reflux disease)    Hiatal hernia with GERD    Smoker    Spinal stenosis of cervical region    Mixed hyperlipidemia    CAD (coronary artery disease)    Mobitz type 1 second degree AV block    Type 2 diabetes mellitus with hyperglycemia, with long-term current use of insulin    Hyponatremia    Noncompliance with CPAP treatment    Obstructive sleep apnea    Recurrent major depressive disorder, in partial remission (CMS-HCC)    Hx of heart artery stent    Oropharyngeal dysphagia    History of constipation    Noncompliance with diabetes treatment    Abnormal weight loss    NSTEMI (non-ST elevated myocardial infarction) (Multi)    Coronary artery disease; NSTEMI  Admitted with left-sided chest pain symptoms unrelieved with nitroglycerin, subsequently ruled in for NSTEMI, coronary angiography showing severe multivessel coronary artery disease as noted above.  Angiograms have been reviewed by Dr Paniagua.   -Await completion of preoperative testing. CT of chest showing new lesion in the RLL but unable to determine if this is infectious/inflammatory, follow study is recommended. There is additionally an additional finding of a pancreatic lesion that is not adequately characterized as this was a non contrast study. MRCP w/wo contrast has been ordered.      Type 2 diabetes mellitus  Suboptimally controlled type 2 diabetes mellitus with questionable medication compliance prior to hospitalization.  A1c this admission 14.7  -Endocrinology consult  -Continue current regimen per primary medical service     COPD; chronic lung nodule; ongoing heavy tobacco use  Bilateral lung nodules per screening CT in May of last year, noted to be stable in size.  Patient has continued to smoke heavily 1 to 1-1/2 packs/day  -Await PFTs  -Smoking cessation education     Unintentional weight loss  Patient reports unintentional weight loss of over 50 pounds over the course of a year, may be reflective of poor glycemic control, also  concern for possibility of malignancy given known lung nodules and ongoing tobacco use.  -Await results MRCP  -TSH normal  -Consider nutritional consult       I spent 35 minutes in the professional and overall care of this patient.      CELIA Gomes-CNP

## 2025-02-08 ENCOUNTER — APPOINTMENT (OUTPATIENT)
Dept: CARDIOLOGY | Facility: HOSPITAL | Age: 75
End: 2025-02-08
Payer: MEDICARE

## 2025-02-08 PROBLEM — I21.4 NSTEMI (NON-ST ELEVATED MYOCARDIAL INFARCTION) (MULTI): Status: RESOLVED | Noted: 2025-02-05 | Resolved: 2025-02-08

## 2025-02-08 PROBLEM — E87.6 HYPOKALEMIA: Status: ACTIVE | Noted: 2025-02-08

## 2025-02-08 LAB
ALBUMIN SERPL BCP-MCNC: 3.4 G/DL (ref 3.4–5)
ANION GAP SERPL CALC-SCNC: 10 MMOL/L (ref 10–20)
BUN SERPL-MCNC: 17 MG/DL (ref 6–23)
CALCIUM SERPL-MCNC: 8.5 MG/DL (ref 8.6–10.3)
CHLORIDE SERPL-SCNC: 99 MMOL/L (ref 98–107)
CO2 SERPL-SCNC: 26 MMOL/L (ref 21–32)
CREAT SERPL-MCNC: 0.7 MG/DL (ref 0.5–1.3)
EGFRCR SERPLBLD CKD-EPI 2021: >90 ML/MIN/1.73M*2
ERYTHROCYTE [DISTWIDTH] IN BLOOD BY AUTOMATED COUNT: 12.8 % (ref 11.5–14.5)
GLUCOSE BLD MANUAL STRIP-MCNC: 186 MG/DL (ref 74–99)
GLUCOSE BLD MANUAL STRIP-MCNC: 193 MG/DL (ref 74–99)
GLUCOSE BLD MANUAL STRIP-MCNC: 196 MG/DL (ref 74–99)
GLUCOSE BLD MANUAL STRIP-MCNC: 208 MG/DL (ref 74–99)
GLUCOSE SERPL-MCNC: 188 MG/DL (ref 74–99)
HCT VFR BLD AUTO: 40 % (ref 41–52)
HGB BLD-MCNC: 14.1 G/DL (ref 13.5–17.5)
MAGNESIUM SERPL-MCNC: 1.62 MG/DL (ref 1.6–2.4)
MCH RBC QN AUTO: 29.3 PG (ref 26–34)
MCHC RBC AUTO-ENTMCNC: 35.3 G/DL (ref 32–36)
MCV RBC AUTO: 83 FL (ref 80–100)
NRBC BLD-RTO: 0 /100 WBCS (ref 0–0)
PHOSPHATE SERPL-MCNC: 3.4 MG/DL (ref 2.5–4.9)
PLATELET # BLD AUTO: 135 X10*3/UL (ref 150–450)
POTASSIUM SERPL-SCNC: 3.2 MMOL/L (ref 3.5–5.3)
RBC # BLD AUTO: 4.81 X10*6/UL (ref 4.5–5.9)
SODIUM SERPL-SCNC: 132 MMOL/L (ref 136–145)
UFH PPP CHRO-ACNC: 0.3 IU/ML
WBC # BLD AUTO: 6.8 X10*3/UL (ref 4.4–11.3)

## 2025-02-08 PROCEDURE — 2500000002 HC RX 250 W HCPCS SELF ADMINISTERED DRUGS (ALT 637 FOR MEDICARE OP, ALT 636 FOR OP/ED): Performed by: NURSE PRACTITIONER

## 2025-02-08 PROCEDURE — 2500000001 HC RX 250 WO HCPCS SELF ADMINISTERED DRUGS (ALT 637 FOR MEDICARE OP): Performed by: NURSE PRACTITIONER

## 2025-02-08 PROCEDURE — 83735 ASSAY OF MAGNESIUM: CPT | Performed by: NURSE PRACTITIONER

## 2025-02-08 PROCEDURE — 2500000004 HC RX 250 GENERAL PHARMACY W/ HCPCS (ALT 636 FOR OP/ED): Performed by: NURSE PRACTITIONER

## 2025-02-08 PROCEDURE — 2500000002 HC RX 250 W HCPCS SELF ADMINISTERED DRUGS (ALT 637 FOR MEDICARE OP, ALT 636 FOR OP/ED): Performed by: INTERNAL MEDICINE

## 2025-02-08 PROCEDURE — 2500000001 HC RX 250 WO HCPCS SELF ADMINISTERED DRUGS (ALT 637 FOR MEDICARE OP): Performed by: INTERNAL MEDICINE

## 2025-02-08 PROCEDURE — 2500000004 HC RX 250 GENERAL PHARMACY W/ HCPCS (ALT 636 FOR OP/ED): Performed by: INTERNAL MEDICINE

## 2025-02-08 PROCEDURE — 2500000001 HC RX 250 WO HCPCS SELF ADMINISTERED DRUGS (ALT 637 FOR MEDICARE OP): Performed by: PSYCHIATRY & NEUROLOGY

## 2025-02-08 PROCEDURE — 93005 ELECTROCARDIOGRAM TRACING: CPT

## 2025-02-08 PROCEDURE — 85520 HEPARIN ASSAY: CPT | Performed by: INTERNAL MEDICINE

## 2025-02-08 PROCEDURE — 2500000005 HC RX 250 GENERAL PHARMACY W/O HCPCS: Performed by: INTERNAL MEDICINE

## 2025-02-08 PROCEDURE — 99233 SBSQ HOSP IP/OBS HIGH 50: CPT | Performed by: INTERNAL MEDICINE

## 2025-02-08 PROCEDURE — 82947 ASSAY GLUCOSE BLOOD QUANT: CPT

## 2025-02-08 PROCEDURE — 80069 RENAL FUNCTION PANEL: CPT | Performed by: NURSE PRACTITIONER

## 2025-02-08 PROCEDURE — 85027 COMPLETE CBC AUTOMATED: CPT | Performed by: NURSE PRACTITIONER

## 2025-02-08 PROCEDURE — 99232 SBSQ HOSP IP/OBS MODERATE 35: CPT | Performed by: PSYCHIATRY & NEUROLOGY

## 2025-02-08 PROCEDURE — 93010 ELECTROCARDIOGRAM REPORT: CPT | Performed by: INTERNAL MEDICINE

## 2025-02-08 PROCEDURE — 2060000001 HC INTERMEDIATE ICU ROOM DAILY

## 2025-02-08 PROCEDURE — 36415 COLL VENOUS BLD VENIPUNCTURE: CPT | Performed by: NURSE PRACTITIONER

## 2025-02-08 RX ORDER — POTASSIUM CHLORIDE 20 MEQ/1
40 TABLET, EXTENDED RELEASE ORAL ONCE
Status: COMPLETED | OUTPATIENT
Start: 2025-02-08 | End: 2025-02-08

## 2025-02-08 RX ORDER — BUPROPION HYDROCHLORIDE 150 MG/1
150 TABLET ORAL DAILY
Status: DISPENSED | OUTPATIENT
Start: 2025-02-08

## 2025-02-08 RX ADMIN — POTASSIUM CHLORIDE 40 MEQ: 1500 TABLET, EXTENDED RELEASE ORAL at 09:06

## 2025-02-08 RX ADMIN — BUPROPION HYDROCHLORIDE 150 MG: 150 TABLET, EXTENDED RELEASE ORAL at 12:55

## 2025-02-08 RX ADMIN — AMLODIPINE BESYLATE 5 MG: 5 TABLET ORAL at 09:06

## 2025-02-08 RX ADMIN — ACETAMINOPHEN 650 MG: 325 TABLET ORAL at 21:59

## 2025-02-08 RX ADMIN — INSULIN LISPRO 8 UNITS: 100 INJECTION, SOLUTION INTRAVENOUS; SUBCUTANEOUS at 09:07

## 2025-02-08 RX ADMIN — INSULIN GLARGINE 35 UNITS: 100 INJECTION, SOLUTION SUBCUTANEOUS at 21:58

## 2025-02-08 RX ADMIN — VENLAFAXINE HYDROCHLORIDE 150 MG: 150 CAPSULE, EXTENDED RELEASE ORAL at 09:06

## 2025-02-08 RX ADMIN — INSULIN LISPRO 8 UNITS: 100 INJECTION, SOLUTION INTRAVENOUS; SUBCUTANEOUS at 17:57

## 2025-02-08 RX ADMIN — POLYETHYLENE GLYCOL 3350 17 G: 17 POWDER, FOR SOLUTION ORAL at 09:06

## 2025-02-08 RX ADMIN — INSULIN LISPRO 3 UNITS: 100 INJECTION, SOLUTION INTRAVENOUS; SUBCUTANEOUS at 17:56

## 2025-02-08 RX ADMIN — INSULIN GLARGINE 32 UNITS: 100 INJECTION, SOLUTION SUBCUTANEOUS at 09:08

## 2025-02-08 RX ADMIN — INSULIN LISPRO 6 UNITS: 100 INJECTION, SOLUTION INTRAVENOUS; SUBCUTANEOUS at 09:07

## 2025-02-08 RX ADMIN — ASPIRIN 81 MG: 81 TABLET, CHEWABLE ORAL at 09:06

## 2025-02-08 RX ADMIN — SERTRALINE HYDROCHLORIDE 25 MG: 50 TABLET, FILM COATED ORAL at 09:06

## 2025-02-08 RX ADMIN — LOVASTATIN 40 MG: 10 TABLET ORAL at 21:23

## 2025-02-08 RX ADMIN — NITROGLYCERIN 0.4 MG: 0.4 TABLET SUBLINGUAL at 20:27

## 2025-02-08 RX ADMIN — SODIUM PHOSPHATE, DIBASIC AND SODIUM PHOSPHATE, MONOBASIC 1 ENEMA: 7; 19 ENEMA RECTAL at 12:55

## 2025-02-08 RX ADMIN — ISOSORBIDE MONONITRATE 30 MG: 30 TABLET, EXTENDED RELEASE ORAL at 09:06

## 2025-02-08 RX ADMIN — MAGNESIUM HYDROXIDE 30 ML: 400 SUSPENSION ORAL at 21:59

## 2025-02-08 RX ADMIN — HYDROXYZINE HYDROCHLORIDE 25 MG: 25 TABLET ORAL at 21:23

## 2025-02-08 RX ADMIN — INSULIN LISPRO 8 UNITS: 100 INJECTION, SOLUTION INTRAVENOUS; SUBCUTANEOUS at 12:55

## 2025-02-08 RX ADMIN — HYDROXYZINE HYDROCHLORIDE 25 MG: 25 TABLET ORAL at 09:06

## 2025-02-08 RX ADMIN — HEPARIN SODIUM 1200 UNITS/HR: 10000 INJECTION, SOLUTION INTRAVENOUS at 14:46

## 2025-02-08 RX ADMIN — PANTOPRAZOLE SODIUM 40 MG: 40 TABLET, DELAYED RELEASE ORAL at 06:57

## 2025-02-08 RX ADMIN — INSULIN LISPRO 3 UNITS: 100 INJECTION, SOLUTION INTRAVENOUS; SUBCUTANEOUS at 12:56

## 2025-02-08 RX ADMIN — EZETIMIBE 10 MG: 10 TABLET ORAL at 21:23

## 2025-02-08 ASSESSMENT — COGNITIVE AND FUNCTIONAL STATUS - GENERAL
MOBILITY SCORE: 24
DAILY ACTIVITIY SCORE: 24

## 2025-02-08 ASSESSMENT — PAIN - FUNCTIONAL ASSESSMENT: PAIN_FUNCTIONAL_ASSESSMENT: 0-10

## 2025-02-08 ASSESSMENT — PAIN SCALES - GENERAL: PAINLEVEL_OUTOF10: 0 - NO PAIN

## 2025-02-08 NOTE — PROGRESS NOTES
Endocrinology Progress Note  Patient: Feliciano Worthington  Unit/Bed: 816/816-A  YOB: 1950  MRN: [id number]  Acct: [id number]   Admitting Diagnosis: NSTEMI (non-ST elevated myocardial infarction) (Multi) [I21.4]  Chest pain, unspecified type [R07.9]  Date:  2/5/2025  Hospital Day: 2  Attending: Shanika Reese MD       Complaint:  Chief Complaint   Patient presents with    Chest Pain     CP x 36 hrs. Pt took 2-3 nitroglycerin yesterday and 4-5 today. Pt received 324 ASA in EMS           Assessment     Patient Active Problem List   Diagnosis    Primary hypertension    Chronic obstructive pulmonary disease, unspecified    Current every day smoker    GERD (gastroesophageal reflux disease)    Arcus senilis, bilateral    Astigmatism of both eyes    Cataract, bilateral    Chronic venous stasis dermatitis    Depression, major, in remission (CMS-HCC)    Erectile dysfunction    Hiatal hernia with GERD    Multiple lung nodules on CT    Nuclear sclerotic cataract of both eyes    Osteoarthritis of hips, bilateral    Other spondylosis, cervical region    Periumbilical hernia    Testosterone deficiency    Smoker    Sleep apnea    Vitamin D deficiency    Noncompliance with dietary restriction    Arthritis of left acromioclavicular joint    Bruit of right carotid artery    Degenerative joint disease (DJD) of hip    Spinal stenosis of cervical region    Encounter for immunization    Mixed hyperlipidemia    CAD (coronary artery disease)    Mobitz type 1 second degree AV block    Type 2 diabetes mellitus with hyperglycemia, with long-term current use of insulin    Hyponatremia    Chest pain, unspecified type    NSTEMI (non-ST elevated myocardial infarction) (Multi)    Noncompliance with CPAP treatment    Obstructive sleep apnea    Recurrent major depressive disorder, in partial remission (CMS-HCC)    Hx of heart artery stent    Oropharyngeal dysphagia    History of constipation    Noncompliance with diabetes treatment     Abnormal weight loss    Hypokalemia          Plan:  Diabetes - had improving glucose yesterday but icreased in am   CAD  On lantus and Humalog 8 units/meal and scale- will watch for now         SUBJECTIVE    Glucoses    246   188            Fasting glucose              Blood glucose              POCT glucose  233 289 238 100 140  208      Potassium    Potassium, Blood    3.7   3.2       Medication Dosing      insulin glargine,hum.rec.anlog SUBQ (Units)  32 35  32 35  32      insulin lispro SUBQ (Units)  6 12  14 11  14             VITALS      Vitals:    02/08/25 0026 02/08/25 0444 02/08/25 0731 02/08/25 0840   BP: 172/77 127/64  136/68   BP Location: Left arm Right arm     Patient Position: Lying Lying     Pulse: 67 67  70   Resp: 18 18     Temp: 36.3 °C (97.3 °F) 36.3 °C (97.3 °F)  36.4 °C (97.5 °F)   TempSrc: Temporal Temporal     SpO2: 96% 97%  98%   Weight:   87.1 kg (192 lb 0.3 oz)    Height:           Intake/Output Summary (Last 24 hours) at 2/8/2025 0944  Last data filed at 2/8/2025 0444  Gross per 24 hour   Intake 436 ml   Output 375 ml   Net 61 ml      Wt Readings from Last 4 Encounters:   02/08/25 87.1 kg (192 lb 0.3 oz)   01/29/25 85.5 kg (188 lb 6.4 oz)   10/15/24 90.3 kg (199 lb)   10/04/24 89.8 kg (198 lb)        Allergies:  Allergies   Allergen Reactions    Tetracyclines Hives, Other, Unknown, Rash and Shortness of breath     Water blisters in mouth and genital region.    Water blister on tongue and penis    Atorvastatin Unknown     Cramping all over the body. Sore shoulders    Empagliflozin Unknown    Pravastatin Unknown    Rosuvastatin Unknown    Simvastatin Unknown    Statins-Hmg-Coa Reductase Inhibitors Unknown        PHYSICAL EXAM   Physical Exam  deferred    LABS   Magnesium:  Results from last 7 days   Lab Units 02/08/25  0521 02/07/25  0639 02/06/25  0309   MAGNESIUM mg/dL 1.62 1.78 1.86     Lipid Panel:  Results from last 7 days   Lab Units 02/05/25  2248   HDL mg/dL 24.7   CHOLESTEROL/HDL  RATIO  10.4   VLDL mg/dL 62*   TRIGLYCERIDES mg/dL 309*   NON HDL CHOL. mg/dL 233*      Lab Review  Lab Results   Component Value Date    BILITOT 0.6 02/05/2025    CALCIUM 8.5 (L) 02/08/2025    CO2 26 02/08/2025    CL 99 02/08/2025    CREATININE 0.70 02/08/2025    GLUCOSE 188 (H) 02/08/2025    ALKPHOS 110 02/05/2025    K 3.2 (L) 02/08/2025    PROT 7.3 02/05/2025     (L) 02/08/2025    AST 15 02/05/2025    ALT 12 02/05/2025    BUN 17 02/08/2025    ANIONGAP 10 02/08/2025    MG 1.62 02/08/2025    PHOS 3.4 02/08/2025    ALBUMIN 3.4 02/08/2025    LIPASE 10 10/17/2023    GFRMALE 79 08/10/2023     Lab Results   Component Value Date    TRIG 309 (H) 02/05/2025    CHOL 258 (H) 02/05/2025    LDLCALC 172 (H) 02/05/2025    HDL 24.7 02/05/2025     Lab Results   Component Value Date    HGBA1C 14.7 (H) 02/06/2025    HGBA1C 14.0 (H) 12/26/2024    HGBA1C 11.3 (A) 09/03/2024     The ASCVD Risk score (Bandar RODRIGUEZ, et al., 2019) failed to calculate for the following reasons:    Risk score cannot be calculated because patient has a medical history suggesting prior/existing ASCVD   Lab Results   Component Value Date    HGBA1C 14.7 (H) 02/06/2025    HGBA1C 14.0 (H) 12/26/2024    HGBA1C 11.3 (A) 09/03/2024     (L) 02/08/2025    K 3.2 (L) 02/08/2025    CL 99 02/08/2025    CO2 26 02/08/2025    BUN 17 02/08/2025    CREATININE 0.70 02/08/2025    CALCIUM 8.5 (L) 02/08/2025    ALBUMIN 3.4 02/08/2025    PROT 7.3 02/05/2025    BILITOT 0.6 02/05/2025    ALKPHOS 110 02/05/2025    ALT 12 02/05/2025    AST 15 02/05/2025    GLUCOSE 188 (H) 02/08/2025    CHOL 258 (H) 02/05/2025    TRIG 309 (H) 02/05/2025    HDL 24.7 02/05/2025        amLODIPine, 5 mg, oral, Daily  aspirin, 81 mg, oral, Daily  ezetimibe, 10 mg, oral, Nightly  hydrOXYzine HCL, 25 mg, oral, BID  insulin glargine, 32 Units, subcutaneous, q AM  insulin glargine, 35 Units, subcutaneous, Nightly  insulin lispro, 0-15 Units, subcutaneous, TID AC  insulin lispro, 8 Units, subcutaneous,  TID AC  isosorbide mononitrate ER, 30 mg, oral, Daily  LORazepam, 0.5 mg, intravenous, Once  lovastatin, 40 mg, oral, Nightly  pantoprazole, 40 mg, oral, Daily  polyethylene glycol, 17 g, oral, Daily  sertraline, 25 mg, oral, Daily  venlafaxine XR, 150 mg, oral, Daily with breakfast             Electronically signed by Cristina Wiseman MD on 2/8/2025 at 9:44 AM

## 2025-02-08 NOTE — PROGRESS NOTES
"  Feliciano Worthington is a 74 y.o. male on day 2 of admission presenting with NSTEMI (non-ST elevated myocardial infarction) (Multi).      ASSESSMENT/PLAN   -Acute non-STEMI with triple-vessel CAD on cardiac catheterization this morning-for CABG.  Still on heparin drip.  -History of coronary artery disease with prior stents  -Uncontrolled type 2 diabetes with hyperglycemia and noncompliance-HbA1c at 14.7.  Blood sugars improving with resumption of his normal glargine regimen, SSI, and mealtime lispro.  -Noncompliance with medications PTA  -Hypokalemia-replacing orally  -Depression/anxiety-sertraline was added to his venlafaxine on 1/29-psychiatry consult appreciated-sertraline discontinued and started on bupropion which should also help his smoking cessation.    -Possible pancreatic lesion seen on CT done as part of preop evaluation-MRCP still pending.  CA 19-9 normal.  -Pulmonary nodules-most appear chronic, 1 new one in the right lower lobe, \"possibly infectious/inflammatory in nature but should have follow-up to document resolution\".  -Hyponatremia-132 today, will continue to follow.  His chlorthalidone is on hold    -Hyperlipidemia-lovastatin restarted.  -History of Mobitz type I-on telemetry  -COPD-nebulizers as needed  -Continued tobacco abuse-he declines a nicotine patch  -Hypertension-BPs controlled  -Abnormal weight loss-likely due to uncontrolled diabetes, does have pancreatic lesion being evaluated  -Some difficulty swallowing dry solids-will order swallow eval (for prior to his probable CABG).  Was unable to be done Friday as he was down for a procedure, not complaining of swallowing presently.    -History of GERD with prior esophageal ulcers and achalasia-on daily PPI  -Neck and shoulder pain-has known moderate canal stenosis C3-4 & foraminal stenosis at multiple levels most pronounced on the right at C4-5 on MRI 12/16/2020 & foraminal stenosis, previously saw Dr. Parker and had some injections in the " "past  -History obstructive sleep apnea not using CPAP-possibly improved due to his 50 pound weight loss in the last year    SUBJECTIVE/OBJECTIVE   02/08/25   -Feels okay, other than has not had results yet from his fleets enema.  Denies any chest pain, no shortness of breath.  -Complaining of constipation-last BM several days ago.  At home he has been using fleets enemas, requested 1 ordered.  -We discussed again smoking cessation, starting of bupropion to help with both his smoking cessation as well as his depression.  Psychiatry consult discussed with patient.  -He is afebrile, blood pressures good.  Satting 98% on room air.  -Chest is clear to auscultation  -Cardiac exam is a regular rhythm  -Blood sugars continue to improve-100-195 range yesterday, 208 this morning.  -Chemistry panel with a sodium of 132, potassium 3.2-will replace orally.  -BUN 17 with a creatinine of 0.70.  -Magnesium 1.62.  -Heparin therapeutic  -CBC with a WBC of 6.8, H/H 14.1/40.0.  Platelet count down slightly at 135 K-will continue to monitor as he is still on heparin  -CA 19-9 normal at <4.0  -Psychiatry consult appreciated-agreed with changing his sertraline to bupropion, was ordered.    2/7/2025  -Wife is in the room with him.  He admits that he often did not take his insulin, but when he did \"I would take 55 units\", rather than the prescribed amounts.  -Has a long history of depression-long ago went to the [facility] but did not think they helped.  His wife notes that that was probably 20 years ago.  I discussed with him if he would like to see psychiatry while in the hospital, he agrees and his wife would appreciate it.  Wonder if he would do well with addition of bupropion which may also help with smoking cessation.  When we discussed possible suicide by medication noncompliance, he said he would not because he has his dogs and lavinia to take care of.  -I discussed with both of them the need to stop smoking-he admits that he would " "often use it as an excuse not to do things, saying \"wait till I have a cigarette\".  His wife also admits that she occasionally \"sneaks 1\" even though she quit smoking 2 years ago.  -He is afebrile, vital signs stable.  Satting 98% on room air  -His chest is clear to auscultation  -Cardiac exam is a regular rhythm  -Blood sugars in the 200s now that he is back on glargine.  238 this morning.  Hemoglobin A1c 14.7  -Chemistry panel with a blood sugar of 246, sodium 134, other electrolytes normal.  -BUN 17 with a creatinine 0.76.  -Magnesium 1.78.  -TSH 2.23.  -CBC with a WBC of 7.7, H/H 15.0/42.5.  Platelet count 145 K  -CT of the chest showed areas of fibrosis/scarring for 2 eminently along the periphery, not significantly changed from prior exams.  He does have several small nodular lesions, one was a new 1 in the right upper lobe 8 mm in size.  Also noted an indeterminate lesion involving the tail of the pancreas.  -Discussed with Dr. Wiseman, adding mealtime scheduled insulin with lunch along with SSI.  -Discussed with CT surgery-initial plan was to try discharging to get his sugars and other medical problems under control, but significant concerns about whether this would happen if he was discharged due to his noncompliance.  The patient tells me this afternoon that it is scheduled for Monday.      2/6/2025 (patient transferred to my service, his PCP is Dr. José Bernal)  -Patient is lying in bed, still mildly sedated from his cardiac catheterization this morning.  Otherwise in NAD.  -He is afebrile, vital signs stable.  -Chest is clear to auscultation  -Cardiac exam is a regular rhythm  -Refer to complete PE below for rest of exam  -Chemistry panel with a blood sugar of 328, sodium 132, potassium 3.9, chloride 96, bicarb 25.  -BUN 15 with a creatinine of 0.90.  -Most recent troponin up to 2450  -CBC with a WBC of 8.3, H/H is 15.8/44.2.  Platelet count 145 K  -Lipid panel with a cholesterol of 258, triglycerides " "309, HDL 24, , non-HDL cholesterol 233  -Case discussed with Dr. Haskins-patient was previously intolerant of statins, but did tolerate lovastatin in the past.  Not on formulary here, he has asked the pharmacy to try and obtain.  Discussed with him possibility of a PSK 9-suggested we may try evolocumab (Repatha) as an inpatient if available, otherwise consider Leqvio which could be administered twice a year in an infusion center.  He also notes that the patient seems to be depressed, and he is concerned by some apparent short-term memory loss.  He has known the patient for many years.  -Patient had cardiac catheterization this morning, found to have triple-vessel CAD with recommendation for CABG.    ROS:    Chart summary:  PCP is Dr. José Bernal, cardiologist is Dr. Haskins, neurologist is Dr. DENISE Alcala (saw for myoclonic jerks)    Patient is a 74-year-old male with a known history of CAD s/p prior stents, DM, HTN, HLP, COPD still smoking, GERD/history of esophagitis, ELLY not on CPAP, DJD of the C-spine, BPH with LUTS, and prior nephrolithiasis.      He presented to the ER last night complaining of chest pain for 36 hours.  He tells me that it started late Tuesday afternoon, was \"different than before\", dull and in his left chest without radiation.  Before he came to the ER yesterday, he tried NTG x 4 with only some help.  In the ER his EKG showed Mobitz type I but no ST elevations.  His blood sugar was 438, sodium 129, potassium 4.4, chloride 93.  BUN 16 with a creatinine of 0.88.  LFTs normal.  Magnesium 1.92.  Initial troponin was elevated to 299, this morning is up to 2450.  Cholesterol 258, , triglycerides 309, HDL 24.  CBC with a WBC of 9.8, H/H of 16.0/45.4.  Platelet count 176 K.    ROS: He denies headaches or dizziness.  No episodes of syncope.  He denies recent prior chest pains or palpitations before yesterday.  He denies chronic cough, shortness of breath.  He has some problems intermittently " "swallowing, says that solids (especially bread) seems to stick in his upper throat.  He does have a history of \"narrowing of my esophagus\" (reported achalasia on EGD in ).  He has had constant nausea for several months with only occasional vomiting.  He does get some heartburn and is on omeprazole daily which helps \"sometimes\".  He had problems with constipation for several months and had to take enemas, but it has now straightened out.  He admits to being erratic in using his insulin, when he checks his blood sugars they often come back as \"high\".  He has had increased frequency of urination which he assumes is due to his high blood sugars, but no dysuria or hematuria.  He denies any fevers or chills, has occasional night sweats.  No pedal edema.  He has a long history of depression/anxiety, was on venlafaxine until recently and was just changed to Zoloft several days PTA.    Past Medical History:  -CAD with history of stents  -History of Mobitz type I  -Diabetes-uncontrolled, last HbA1c in December was 14.0  -Hypertension  -Hyperlipidemia, intolerant of statins in the past (did tolerate lovastatin in the past per Dr. Haskins)  -COPD  -Continued tobacco abuse  -Obstructive sleep apnea not using CPAP  -BPH with LUTS  -Depression/anxiety  -History of GERD and esophagitis  -Cervical spondylosis  -History of myoclonic jerks    Past Surgical History:  -Remote tonsillectomy  -Oral surgery  -Cardiac catheterization with stent to circumflex in  ([facility] in [place])  -EGD 2009-spastic lower esophagus, severe gastritis (Dr. Gonzalez)  -Cardiac catheterization with stent to RCA   -Right and left heart catheterization 2013-patent previously placed stents, 90% mid RCA treated with TREVOR, 80% proximal circumflex, EF = 45%  -EGD and colonoscopy 2016-hiatal hernia, multiple esophageal ulcers, colon polyp  -Placement of inflatable penile prosthesis 2018    Family History:  -Father- age 71 with an " MI, had HTN, DM  -Mother- age 87, had HTN & an MI   -Siblings-1  half brother and 4 half sisters-unknown health status  -Children-1    Social History:  -Tobacco-2 packs daily from the age of 17, down to 1.5 pack daily now.  Has quit a couple times in the past but always restarted.  -Alcohol-prior alcoholism, quit in   -Drugs-remote use of illicit drugs, quit   -Marital-he is  and lives with his wife  -Occupation-prior /    *These notes are being done using Dragon voice recognition technology and may include unintended errors with respect to translation of words, typographical errors or grammar errors which may not have been identified prior to finalization of the chart note.    CURRENT MEDICATIONS     Scheduled Medications:    Current Facility-Administered Medications:     acetaminophen (Tylenol) tablet 650 mg, 650 mg, oral, q4h PRN, 650 mg at 25 2300 **OR** acetaminophen (Tylenol) oral liquid 650 mg, 650 mg, nasogastric tube, q4h PRN **OR** acetaminophen (Tylenol) suppository 650 mg, 650 mg, rectal, q4h PRN, CELIA Soni-CNP    acetaminophen (Tylenol) tablet 650 mg, 650 mg, oral, q4h PRN, Shanika Reese MD, 650 mg at 25    alum-mag hydroxide-simeth (Mylanta) 200-200-20 mg/5 mL oral suspension 30 mL, 30 mL, oral, 4x daily PRN, Shanika Reese MD    amLODIPine (Norvasc) tablet 5 mg, 5 mg, oral, Daily, CELIA Soni-CNP, 5 mg at 25 0939    aspirin chewable tablet 81 mg, 81 mg, oral, Daily, KHADIJAH Soni, 81 mg at 25 0939    dextrose 50 % injection 12.5 g, 12.5 g, intravenous, q15 min PRN, Cristina Wiseman MD    dextrose 50 % injection 25 g, 25 g, intravenous, q15 min PRN, Cristina Wiseman MD    ezetimibe (Zetia) tablet 10 mg, 10 mg, oral, Nightly, Trice Landa APRN-CNP, 10 mg at 25 3452    glucagon (Glucagen) injection 1 mg, 1 mg, intramuscular, q15 min PRN, Cristina Wiseman MD    glucagon (Glucagen) injection 1 mg, 1 mg,  intramuscular, q15 min PRN, Cristina Wiseman MD    heparin 25,000 Units in dextrose 5% 250 mL (100 Units/mL) infusion (premix), 0-4,000 Units/hr, intravenous, Continuous, KHADIJAH Gomes, Last Rate: 12 mL/hr at 02/07/25 1553, 1,200 Units/hr at 02/07/25 1553    heparin bolus from bag 2,000-4,000 Units, 2,000-4,000 Units, intravenous, q4h PRN, KHADIJAH Soni, 2,000 Units at 02/06/25 2218    hydrOXYzine HCL (Atarax) tablet 25 mg, 25 mg, oral, BID, KHADIJAH Soni, 25 mg at 02/07/25 2159    insulin glargine (Lantus) injection 32 Units, 32 Units, subcutaneous, q AM, KHADIJAH Soni, 32 Units at 02/07/25 0802    insulin glargine (Lantus) injection 35 Units, 35 Units, subcutaneous, Nightly, KHADIJAH Soni, 35 Units at 02/07/25 2159    insulin lispro injection 0-15 Units, 0-15 Units, subcutaneous, TID AC, KHADIJAH Soni, 3 Units at 02/07/25 1714    insulin lispro injection 8 Units, 8 Units, subcutaneous, TID AC, Cristina Wiseman MD, 8 Units at 02/07/25 1714    ipratropium-albuteroL (Duo-Neb) 0.5-2.5 mg/3 mL nebulizer solution 3 mL, 3 mL, nebulization, q4h PRN, Shanika Reese MD    isosorbide mononitrate ER (Imdur) 24 hr tablet 30 mg, 30 mg, oral, Daily, KHADIJAH Soni, 30 mg at 02/07/25 0939    LORazepam (Ativan) injection 0.5 mg, 0.5 mg, intravenous, Once, KHADIJAH Gomes    lovastatin (Mevacor) tablet 40 mg, 40 mg, oral, Nightly, Shanika Reese MD, 40 mg at 02/07/25 2301    magnesium hydroxide (Milk of Magnesia) 400 mg/5 mL suspension 30 mL, 30 mL, oral, Daily PRN, Shanika Reese MD    melatonin tablet 5 mg, 5 mg, oral, Nightly PRN, Shanika Reese MD    nitroglycerin (Nitrostat) SL tablet 0.4 mg, 0.4 mg, sublingual, q5 min PRN, Trice Landa, APRN-CNP    ondansetron (Zofran) injection 4 mg, 4 mg, intravenous, q4h PRN, Shanika Reese MD    ondansetron (Zofran) tablet 4 mg, 4 mg, oral, q8h PRN, Shanika Reese MD    pantoprazole (ProtoNix) EC tablet 40 mg, 40  mg, oral, Daily, Trice Landa, APRN-CNP, 40 mg at 02/08/25 0657    polyethylene glycol (Glycolax, Miralax) packet 17 g, 17 g, oral, Daily, Shanika Reese MD, 17 g at 02/07/25 0939    sertraline (Zoloft) tablet 25 mg, 25 mg, oral, Daily, Shanika Reese MD, 25 mg at 02/07/25 0939    venlafaxine XR (Effexor-XR) 24 hr capsule 150 mg, 150 mg, oral, Daily with breakfast, Shanika Reese MD, 150 mg at 02/07/25 0939     PRN Medications:  PRN medications   Medication    acetaminophen    Or    acetaminophen    Or    acetaminophen    acetaminophen    alum-mag hydroxide-simeth    dextrose    dextrose    glucagon    glucagon    heparin    ipratropium-albuteroL    magnesium hydroxide    melatonin    nitroglycerin    ondansetron    ondansetron         IVs:  heparin, 0-4,000 Units/hr, Last Rate: 1,200 Units/hr (02/07/25 1553)         I&Os     Intake/Output last 3 Shifts:  I/O last 3 completed shifts:  In: 536 (6.5 mL/kg) [P.O.:536]  Out: 375 (4.6 mL/kg) [Urine:375 (0.1 mL/kg/hr)]  Weight: 82 kg     VITAL SIGNS     Vitals:    02/08/25 0026 02/08/25 0444 02/08/25 0731 02/08/25 0840   BP: 172/77 127/64  136/68   BP Location: Left arm Right arm     Patient Position: Lying Lying     Pulse: 67 67  70   Resp: 18 18     Temp: 36.3 °C (97.3 °F) 36.3 °C (97.3 °F)  36.4 °C (97.5 °F)   TempSrc: Temporal Temporal     SpO2: 96% 97%  98%   Weight:   87.1 kg (192 lb 0.3 oz)    Height:           PHYSICAL EXAM   Physical exam:  -General appearance: Lying in bed, alert and in NAD.  Encouraged him to ambulate.  -Vital signs:  As above  -HEENT: No icterus  -Neck: No adenopathy  -Chest: Lungs are clear  -Cardiac: Regular rhythm  -Abdomen: Active bowel sounds  -Extremities: No edema  -Skin: No rash  -Neurologic:   Patient is alert and oriented x 3.   -Behavior/Emotional:  Appropriate, cooperative, appropriate mood.    LABS   Relevant Results:     Results from last 72 hours   Lab Units 02/08/25  0706 02/08/25  0521 02/07/25  2039 02/07/25  1701 02/07/25  0642  02/07/25 0639 02/06/25 0631 02/06/25 0309   POCT GLUCOSE mg/dL 208*  --  140* 195*   < >  --    < >  --    GLUCOSE mg/dL  --  188*  --   --   --  246*  --  328*    < > = values in this interval not displayed.      HbA1c:    Lab Results   Component Value Date    HGBA1C 14.7 (H) 02/06/2025     CBC:   Lab Results   Component Value Date    WBC 6.8 02/08/2025    HGB 14.1 02/08/2025    HCT 40.0 (L) 02/08/2025    MCV 83 02/08/2025     (L) 02/08/2025       Results from last 7 days   Lab Units 02/08/25 0521 02/06/25 0309 02/05/25 2147   WBC AUTO x10*3/uL 6.8   < > 9.8   HEMOGLOBIN g/dL 14.1   < > 16.0   HEMATOCRIT % 40.0*   < > 45.4   PLATELETS AUTO x10*3/uL 135*   < > 176   NEUTROS PCT AUTO %  --   --  68.9   LYMPHS PCT AUTO %  --   --  22.1   MONOS PCT AUTO %  --   --  7.4   EOS PCT AUTO %  --   --  0.8    < > = values in this interval not displayed.     ESR:    Lab Results   Component Value Date    SEDRATE 1 03/24/2021     CMP:    Results from last 7 days   Lab Units 02/08/25 0521 02/07/25 0639 02/06/25 0309 02/05/25  2147   SODIUM mmol/L 132* 134* 132* 129*   POTASSIUM mmol/L 3.2* 3.7 3.9 4.4   CHLORIDE mmol/L 99 100 96* 93*   CO2 mmol/L 26 25 25 23   BUN mg/dL 17 17 15 16   CREATININE mg/dL 0.70 0.76 0.90 0.99   CALCIUM mg/dL 8.5* 8.9 9.1 9.3   PROTEIN TOTAL g/dL  --   --   --  7.3   BILIRUBIN TOTAL mg/dL  --   --   --  0.6   ALK PHOS U/L  --   --   --  110   ALT U/L  --   --   --  12   AST U/L  --   --   --  15   GLUCOSE mg/dL 188* 246* 328* 438*     Magnesium:   Results from last 7 days   Lab Units 02/08/25  0521 02/07/25  0639 02/06/25  0309   MAGNESIUM mg/dL 1.62 1.78 1.86     Troponin:    Results from last 7 days   Lab Units 02/06/25  0800 02/06/25  0309 02/05/25  2248 02/05/25  2147   TROPHS ng/L 2,450* 1,858* 353* 299*     Lipid Panel:    Lab Results   Component Value Date    CHOL 258 (H) 02/05/2025    CHOL 319 (H) 12/26/2024     Lab Results   Component Value Date    HDL 24.7 02/05/2025    HDL  32.8 12/26/2024     Lab Results   Component Value Date    LDLCALC 172 (H) 02/05/2025    LDLCALC  12/26/2024      Comment:      The calculation of LDL and VLDL are inaccurate when the Triglycerides are greater than 400 mg/dL or when the patient is non-fasting. If LDL measurement is necessary contact the testing laboratory for an alternative LDL assay.                                  Near   Borderline      AGE      Desirable  Optimal    High     High     Very High     0-19 Y     0 - 109     ---    110-129   >/= 130     ----    20-24 Y     0 - 119     ---    120-159   >/= 160     ----      >24 Y     0 -  99   100-129  130-159   160-189     >/=190       Lab Results   Component Value Date    TRIG 309 (H) 02/05/2025    TRIG 468 (H) 12/26/2024     Urinalysis:    Lab Results   Component Value Date    COLORU Light-Yellow 12/26/2024    APPEARANCEU Clear 12/26/2024    SPECGRAVU 1.043 (N) 12/26/2024    NORMA 5.5 12/26/2024    PROTUR 30 (1+) (A) 12/26/2024    GLUCOSEU OVER (4+) (A) 12/26/2024    BLOODU NEGATIVE 12/26/2024    KETONESU TRACE (A) 12/26/2024    BILIRUBINU NEGATIVE 12/26/2024    UROBILINOGEN Normal 12/26/2024    NITRITEU NEGATIVE 12/26/2024    LEUKOCYTESU NEGATIVE 12/26/2024    WBCU NONE 12/26/2024    RBCU NONE 12/26/2024    MUCUSU FEW 12/26/2024         Results for orders placed or performed during the hospital encounter of 02/05/25 (from the past 24 hours)   Transthoracic Echo Complete   Result Value Ref Range    AV mn grad 6 mmHg    AV pk mary 1.59 m/s    LV Biplane EF 47 %    LVOT diam 2.10 cm    MV E/A ratio 0.64     Tricuspid annular plane systolic excursion 2.0 cm    MV avg E/e' ratio 7.90     LA vol index A/L 15.3 ml/m2    LV EF 53 %    RV free wall pk S' 10.10 cm/s    RVSP 30.9 mmHg    LVIDd 4.62 cm    Aortic Valve Area by Continuity of Peak Velocity 2.00 cm2    AV pk grad 10 mmHg    Aortic Valve Area by Continuity of VTI 1.98 cm2    LV A4C EF 47.4    BLOOD GAS ARTERIAL FULL PANEL   Result Value Ref Range     POCT pH, Arterial 7.47 (H) 7.38 - 7.42 pH    POCT pCO2, Arterial 33 (L) 38 - 42 mm Hg    POCT pO2, Arterial 96 (H) 85 - 95 mm Hg    POCT SO2, Arterial 98 94 - 100 %    POCT Oxy Hemoglobin, Arterial 96.4 94.0 - 98.0 %    POCT Hematocrit Calculated, Arterial 49.0 41.0 - 52.0 %    POCT Sodium, Arterial 133 (L) 136 - 145 mmol/L    POCT Potassium, Arterial 3.8 3.5 - 5.3 mmol/L    POCT Chloride, Arterial 104 98 - 107 mmol/L    POCT Ionized Calcium, Arterial 1.18 1.10 - 1.33 mmol/L    POCT Glucose, Arterial 99 74 - 99 mg/dL    POCT Lactate, Arterial 1.7 0.4 - 2.0 mmol/L    POCT Base Excess, Arterial 1.1 -2.0 - 3.0 mmol/L    POCT HCO3 Calculated, Arterial 24.0 22.0 - 26.0 mmol/L    POCT Hemoglobin, Arterial 16.3 13.5 - 17.5 g/dL    POCT Anion Gap, Arterial 9 (L) 10 - 25 mmo/L    Patient Temperature      FiO2 21 %    Site of Arterial Puncture Radial Right     Jerardo's Test Positive    Pulmonary function testing   Result Value Ref Range    FVC - Predicted 3.92     FEV1 - Predicted 2.92     FVC - PRE 3.90     FEV1 - Pre 2.95    POCT GLUCOSE   Result Value Ref Range    POCT Glucose 100 (H) 74 - 99 mg/dL   POCT GLUCOSE   Result Value Ref Range    POCT Glucose 172 (H) 74 - 99 mg/dL   POCT GLUCOSE   Result Value Ref Range    POCT Glucose 195 (H) 74 - 99 mg/dL   POCT GLUCOSE   Result Value Ref Range    POCT Glucose 140 (H) 74 - 99 mg/dL   CBC   Result Value Ref Range    WBC 6.8 4.4 - 11.3 x10*3/uL    nRBC 0.0 0.0 - 0.0 /100 WBCs    RBC 4.81 4.50 - 5.90 x10*6/uL    Hemoglobin 14.1 13.5 - 17.5 g/dL    Hematocrit 40.0 (L) 41.0 - 52.0 %    MCV 83 80 - 100 fL    MCH 29.3 26.0 - 34.0 pg    MCHC 35.3 32.0 - 36.0 g/dL    RDW 12.8 11.5 - 14.5 %    Platelets 135 (L) 150 - 450 x10*3/uL   Renal Function Panel   Result Value Ref Range    Glucose 188 (H) 74 - 99 mg/dL    Sodium 132 (L) 136 - 145 mmol/L    Potassium 3.2 (L) 3.5 - 5.3 mmol/L    Chloride 99 98 - 107 mmol/L    Bicarbonate 26 21 - 32 mmol/L    Anion Gap 10 10 - 20 mmol/L    Urea  Nitrogen 17 6 - 23 mg/dL    Creatinine 0.70 0.50 - 1.30 mg/dL    eGFR >90 >60 mL/min/1.73m*2    Calcium 8.5 (L) 8.6 - 10.3 mg/dL    Phosphorus 3.4 2.5 - 4.9 mg/dL    Albumin 3.4 3.4 - 5.0 g/dL   Magnesium   Result Value Ref Range    Magnesium 1.62 1.60 - 2.40 mg/dL   Heparin Assay, UFH   Result Value Ref Range    Heparin Unfractionated 0.3 See Comment Below for Therapeutic Ranges IU/mL   POCT GLUCOSE   Result Value Ref Range    POCT Glucose 208 (H) 74 - 99 mg/dL     Results for orders placed or performed during the hospital encounter of 02/05/25 (from the past 24 hours)   Transthoracic Echo Complete   Result Value Ref Range    AV mn grad 6 mmHg    AV pk mary 1.59 m/s    LV Biplane EF 47 %    LVOT diam 2.10 cm    MV E/A ratio 0.64     Tricuspid annular plane systolic excursion 2.0 cm    MV avg E/e' ratio 7.90     LA vol index A/L 15.3 ml/m2    LV EF 53 %    RV free wall pk S' 10.10 cm/s    RVSP 30.9 mmHg    LVIDd 4.62 cm    Aortic Valve Area by Continuity of Peak Velocity 2.00 cm2    AV pk grad 10 mmHg    Aortic Valve Area by Continuity of VTI 1.98 cm2    LV A4C EF 47.4    BLOOD GAS ARTERIAL FULL PANEL   Result Value Ref Range    POCT pH, Arterial 7.47 (H) 7.38 - 7.42 pH    POCT pCO2, Arterial 33 (L) 38 - 42 mm Hg    POCT pO2, Arterial 96 (H) 85 - 95 mm Hg    POCT SO2, Arterial 98 94 - 100 %    POCT Oxy Hemoglobin, Arterial 96.4 94.0 - 98.0 %    POCT Hematocrit Calculated, Arterial 49.0 41.0 - 52.0 %    POCT Sodium, Arterial 133 (L) 136 - 145 mmol/L    POCT Potassium, Arterial 3.8 3.5 - 5.3 mmol/L    POCT Chloride, Arterial 104 98 - 107 mmol/L    POCT Ionized Calcium, Arterial 1.18 1.10 - 1.33 mmol/L    POCT Glucose, Arterial 99 74 - 99 mg/dL    POCT Lactate, Arterial 1.7 0.4 - 2.0 mmol/L    POCT Base Excess, Arterial 1.1 -2.0 - 3.0 mmol/L    POCT HCO3 Calculated, Arterial 24.0 22.0 - 26.0 mmol/L    POCT Hemoglobin, Arterial 16.3 13.5 - 17.5 g/dL    POCT Anion Gap, Arterial 9 (L) 10 - 25 mmo/L    Patient Temperature       FiO2 21 %    Site of Arterial Puncture Radial Right     Jerardo's Test Positive    Pulmonary function testing   Result Value Ref Range    FVC - Predicted 3.92     FEV1 - Predicted 2.92     FVC - PRE 3.90     FEV1 - Pre 2.95    POCT GLUCOSE   Result Value Ref Range    POCT Glucose 100 (H) 74 - 99 mg/dL   POCT GLUCOSE   Result Value Ref Range    POCT Glucose 172 (H) 74 - 99 mg/dL   POCT GLUCOSE   Result Value Ref Range    POCT Glucose 195 (H) 74 - 99 mg/dL   POCT GLUCOSE   Result Value Ref Range    POCT Glucose 140 (H) 74 - 99 mg/dL   CBC   Result Value Ref Range    WBC 6.8 4.4 - 11.3 x10*3/uL    nRBC 0.0 0.0 - 0.0 /100 WBCs    RBC 4.81 4.50 - 5.90 x10*6/uL    Hemoglobin 14.1 13.5 - 17.5 g/dL    Hematocrit 40.0 (L) 41.0 - 52.0 %    MCV 83 80 - 100 fL    MCH 29.3 26.0 - 34.0 pg    MCHC 35.3 32.0 - 36.0 g/dL    RDW 12.8 11.5 - 14.5 %    Platelets 135 (L) 150 - 450 x10*3/uL   Renal Function Panel   Result Value Ref Range    Glucose 188 (H) 74 - 99 mg/dL    Sodium 132 (L) 136 - 145 mmol/L    Potassium 3.2 (L) 3.5 - 5.3 mmol/L    Chloride 99 98 - 107 mmol/L    Bicarbonate 26 21 - 32 mmol/L    Anion Gap 10 10 - 20 mmol/L    Urea Nitrogen 17 6 - 23 mg/dL    Creatinine 0.70 0.50 - 1.30 mg/dL    eGFR >90 >60 mL/min/1.73m*2    Calcium 8.5 (L) 8.6 - 10.3 mg/dL    Phosphorus 3.4 2.5 - 4.9 mg/dL    Albumin 3.4 3.4 - 5.0 g/dL   Magnesium   Result Value Ref Range    Magnesium 1.62 1.60 - 2.40 mg/dL   Heparin Assay, UFH   Result Value Ref Range    Heparin Unfractionated 0.3 See Comment Below for Therapeutic Ranges IU/mL   POCT GLUCOSE   Result Value Ref Range    POCT Glucose 208 (H) 74 - 99 mg/dL       IMAGING     Transthoracic Echo Complete   Final Result      Vascular US lower extremity vein mapping bilateral   Final Result   Lower extremity venous mapping as detailed above.        MACRO:   None.        Signed by: Amarjit Ortiz 2/7/2025 7:25 AM   Dictation workstation:   [id number]      Carotid duplex bilateral   Final Result    Utilizing the peak systolic velocities, the estimated degree of   stenosis within the internal carotid arteries is less than 50%   bilaterally.        MACRO:   None.             Signed by: Amarjit Ortiz 2/7/2025 7:24 AM   Dictation workstation:   [id number]      CT chest wo IV contrast   Final Result   No calcified plaques along the ascending aorta. There is   atherosclerotic disease of the remaining portions of the aorta and   its branches. Indeterminate lesion involving the tail the pancreas.   MRI recommended for further evaluation. Interval development of an 8   mm right lower lobe nodule which may be infectious/inflammatory in   nature but will require follow-up to document resolution.        MACRO:   None.        Signed by: Amarjit Ortiz 2/7/2025 9:05 AM   Dictation workstation:   [id number]      Cardiac Catheterization Procedure   Final Result      XR chest 1 view   Final Result   Mild bilateral opacities may correspond to chronic lung changes and   atelectasis versus pneumonia.        MACRO:   None        Signed by: Tobin Machado 2/5/2025 10:38 PM   Dictation workstation:   [id number]      MRCP pancreas w and wo IV contrast    (Results Pending)       Shanika Reese MD

## 2025-02-08 NOTE — CARE PLAN
The clinical goals for the shift include patient to remain hemodynamically stable by the end of shift.      Problem: Diabetes  Goal: Achieve decreasing blood glucose levels by end of shift  Outcome: Progressing  Goal: Maintain electrolyte levels within acceptable range throughout shift  Outcome: Progressing     Problem: Pain - Adult  Goal: Verbalizes/displays adequate comfort level or baseline comfort level  Outcome: Progressing     Problem: Safety - Adult  Goal: Free from fall injury  Outcome: Progressing

## 2025-02-08 NOTE — PROGRESS NOTES
Rounding Cardiologist:  Julian Long MD,     Primary Cardiologist: Dr. Roberto Haskins       Date:  2/8/2025    Patient:  Feliciano Worthington    YOB: 1950  MRN:  [id number]     Admit Date:  2/5/2025      SUBJECTIVE:      2/8/25    Patient doing well.  Asymptomatic.  Vital signs stable.    Awaiting workup for planned coronary bypass surgery.      ----    Feliciano Worthington was seen and examined today at bedside.     He denies any chest pain or shortness of breath.     LHC revealed 70% stenosis in the distal left main, 80% stenosis in the proximal LAD, 90% stenosis in the proximal circumflex artery, 75% stenosis in the proximal ramus, 80% stenosis in the mid ramus, 80% stenosis in the mid RCA, 90% stenosis in the distal RCA and an LVEF of 55%.     Cardiac surgery evaluation taking place      ASSESSMENT:   Left  NSTEMI type I with history of coronary artery disease and remote stenting to the circumflex and right coronary arteries, last PCI 2011  Severe multivessel coronary artery disease  Mobitz type I second-degree AV block  Hypertension  Hyperlipidemia   COPD with chronic tobacco use  Diabetes mellitus  Hyponatremia, sodium stable on admission        PLAN:     Monitor on telemetry.  Remains in normal sinus rhythm with first-degree AV block.  Rates are 70.  Supplemental O2, keep SpO2 greater than 92%  Monitor electrolytes, keep potassium greater than 4 and magnesium greater than 2  LHC revealed 70% stenosis in the distal left main, 80% stenosis in the proximal LAD, 90% stenosis in the proximal circumflex artery, 75% stenosis in the proximal ramus, 80% stenosis in the mid ramus, 80% stenosis in the mid RCA, 90% stenosis in the distal RCA and an LVEF of 55%.   CABG evaluation taking place by cardiothoracic surgery team  Echocardiogram today  Continue heparin infusion for now, defer further recommendation to cardiothoracic team  Endocrinology following for type 2 diabetes mellitus  with uncontrolled hemoglobin A1c  Nicotine patch for smoking cessation  Will continue to follow along with you        VITALS:     Vitals:    02/08/25 0026 02/08/25 0444 02/08/25 0731 02/08/25 0840   BP: 172/77 127/64  136/68   BP Location: Left arm Right arm     Patient Position: Lying Lying     Pulse: 67 67  70   Resp: 18 18     Temp: 36.3 °C (97.3 °F) 36.3 °C (97.3 °F)  36.4 °C (97.5 °F)   TempSrc: Temporal Temporal     SpO2: 96% 97%  98%   Weight:   87.1 kg (192 lb 0.3 oz)    Height:           Intake/Output Summary (Last 24 hours) at 2/8/2025 1038  Last data filed at 2/8/2025 0444  Gross per 24 hour   Intake 436 ml   Output 375 ml   Net 61 ml       Wt Readings from Last 4 Encounters:   02/08/25 87.1 kg (192 lb 0.3 oz)   01/29/25 85.5 kg (188 lb 6.4 oz)   10/15/24 90.3 kg (199 lb)   10/04/24 89.8 kg (198 lb)       CURRENT HOSPITAL MEDICATIONS:   amLODIPine, 5 mg, oral, Daily  aspirin, 81 mg, oral, Daily  ezetimibe, 10 mg, oral, Nightly  hydrOXYzine HCL, 25 mg, oral, BID  insulin glargine, 32 Units, subcutaneous, q AM  insulin glargine, 35 Units, subcutaneous, Nightly  insulin lispro, 0-15 Units, subcutaneous, TID AC  insulin lispro, 8 Units, subcutaneous, TID AC  isosorbide mononitrate ER, 30 mg, oral, Daily  LORazepam, 0.5 mg, intravenous, Once  lovastatin, 40 mg, oral, Nightly  pantoprazole, 40 mg, oral, Daily  polyethylene glycol, 17 g, oral, Daily  sertraline, 25 mg, oral, Daily  venlafaxine XR, 150 mg, oral, Daily with breakfast      heparin, 0-4,000 Units/hr, Last Rate: 1,200 Units/hr (02/07/25 1553)      Current Outpatient Medications   Medication Instructions    ACETAMINOPHEN ORAL 650 mg, Every 8 hours PRN    alcohol swabs (BD Alcohol Swabs) pads, medicated Use twice daily while checking sugar.    amLODIPine (NORVASC) 5 mg, oral, Daily    aspirin 81 mg, Daily    blood glucose control high,low (Accu-Chek Guide L1-L2 Ctrl Sol) solution 1 bottle, miscellaneous, Daily PRN    blood sugar diagnostic (Accu-Chek  Guide test strips) strip Test twice daily    blood-glucose meter (Accu-Chek Guide Glucose Meter) misc Test twice daily    chlorthalidone (Hygroton) 25 mg tablet TAKE 1 TABLET EVERY MONDAY AND THURSDAY    ezetimibe (ZETIA) 10 mg, oral, Daily    glipiZIDE (GLUCOTROL) 10 mg, oral, 2 times daily before meals    hydrOXYzine HCL (ATARAX) 25 mg, oral, 2 times daily    insulin glargine (LANTUS U-100 INSULIN) 32 Units, Every morning    ipratropium (Atrovent) 21 mcg (0.03 %) nasal spray 2 sprays, Each Nostril, Every 12 hours    lancets (Accu-Chek Softclix Lancets) misc Test twice daily    nitroglycerin (NITROSTAT) 0.4 mg, sublingual, Every 5 min PRN, DO NOT EXCEED A TOTAL OF 3 DOSES IN 15 MINUTES    omeprazole (PRILOSEC) 40 mg, oral, Daily before breakfast, Do not crush or chew.    ramipril (ALTACE) 5 mg, oral, 2 times daily    sertraline (ZOLOFT) 25 mg, oral, Daily    venlafaxine XR (EFFEXOR-XR) 150 mg, oral, Daily, Do not crush or chew.        PHYSICAL EXAMINATION:     Physical Exam  HENT:      Head: Normocephalic.      Mouth/Throat:      Mouth: Mucous membranes are moist.   Eyes:      Pupils: Pupils are equal, round, and reactive to light.   Cardiovascular:      Rate and Rhythm: Normal rate and regular rhythm.   Pulmonary:      Effort: Pulmonary effort is normal.      Comments: Wheezing  Abdominal:      General: Bowel sounds are normal.      Palpations: Abdomen is soft.   Musculoskeletal:         General: Normal range of motion.   Skin:     General: Skin is warm and dry.      Capillary Refill: Capillary refill takes less than 2 seconds.   Neurological:      Mental Status: He is alert and oriented to person, place, and time.   Psychiatric:         Mood and Affect: Mood normal.         LAB DATA:     CBC:   Results from last 7 days   Lab Units 02/08/25  0521 02/07/25  0639 02/06/25  0309   WBC AUTO x10*3/uL 6.8 7.7 8.3   RBC AUTO x10*6/uL 4.81 5.15 5.40   HEMOGLOBIN g/dL 14.1 15.0 15.8   HEMATOCRIT % 40.0* 42.5 44.2   MCV fL  83 83 82   MCH pg 29.3 29.1 29.3   MCHC g/dL 35.3 35.3 35.7   RDW % 12.8 13.3 13.0   PLATELETS AUTO x10*3/uL 135* 145* 145*     CMP:    Results from last 7 days   Lab Units 02/08/25  0521 02/07/25  0639 02/06/25  0309 02/05/25  2147   SODIUM mmol/L 132* 134* 132* 129*   POTASSIUM mmol/L 3.2* 3.7 3.9 4.4   CHLORIDE mmol/L 99 100 96* 93*   CO2 mmol/L 26 25 25 23   BUN mg/dL 17 17 15 16   CREATININE mg/dL 0.70 0.76 0.90 0.99   GLUCOSE mg/dL 188* 246* 328* 438*   PROTEIN TOTAL g/dL  --   --   --  7.3   CALCIUM mg/dL 8.5* 8.9 9.1 9.3   BILIRUBIN TOTAL mg/dL  --   --   --  0.6   ALK PHOS U/L  --   --   --  110   AST U/L  --   --   --  15   ALT U/L  --   --   --  12     BMP:    Results from last 7 days   Lab Units 02/08/25  0521 02/07/25  0639 02/06/25  0309   SODIUM mmol/L 132* 134* 132*   POTASSIUM mmol/L 3.2* 3.7 3.9   CHLORIDE mmol/L 99 100 96*   CO2 mmol/L 26 25 25   BUN mg/dL 17 17 15   CREATININE mg/dL 0.70 0.76 0.90   CALCIUM mg/dL 8.5* 8.9 9.1   GLUCOSE mg/dL 188* 246* 328*     Magnesium:  Results from last 7 days   Lab Units 02/08/25  0521 02/07/25  0639 02/06/25  0309   MAGNESIUM mg/dL 1.62 1.78 1.86     Troponin:    Results from last 7 days   Lab Units 02/06/25  0800 02/06/25  0309 02/05/25  2248   TROPHS ng/L 2,450* 1,858* 353*     BNP:     Lipid Panel:  Results from last 7 days   Lab Units 02/05/25  2248   HDL mg/dL 24.7   CHOLESTEROL/HDL RATIO  10.4   VLDL mg/dL 62*   TRIGLYCERIDES mg/dL 309*   NON HDL CHOL. mg/dL 233*        DIAGNOSTIC TESTING:   @No results found for this or any previous visit.    Echocardiogram:     Coronary Angiography:   Left Heart Cath, With LV 02/06/2025    [facility], Cath Lab  [address] [place] [address]    Cardiovascular Catheterization Report    Patient Name:      JAIME DOVER REBECCA      Performing Physician:  [id number] Wil Montana MD  Study Date:        2/6/2025             Verifying Physician:   [id number] Montana MD  MRN/PID:            [id number]             Cardiologist/Co-Scrub:  Accession#:        [id number]         Ordering Provider:     [id number] DERIK ENAMORADO  Date of Birth/Age: 1950 / 74 years Cardiologist:  Gender:            M                    Fellow:  Encounter#:        [id number]           Surgeon:      Study: Left Heart Cath      Indications:  JAIME FERNANDEZ is a 75 year old male who presents with dyslipidemia, hypertension, prior myocardial infarction, diabetes, smoker, chronic pulmonary disease, coronary artery disease, prior percutaneous coronary intervention and a chest pain assessment of typical angina. NSTE - ACS.    Procedure Description:  After infiltration with 2% Lidocaine, the right femoral artery was cannulated with a modified Seldinger technique. Subsequently a 5 Spanish sheath was placed in the right femoral artery. Selective coronary catheterization was performed using a 5 Fr catheter(s) exchanged over a guide wire to cannulate the coronary arteries. A JL 4 tip catheter was used for left coronary injections. A 3drc tip catheter was used for right coronary injections.  Multiple injections of contrast were made into the left and right coronary arteries with angiograms recorded in multiple projections. Retrograde left heart catheterizion was accomplished with a 5 Fr. pigtail catheter. A single plane left ventriculogram was recorded in the 30 degree RAYMOND projection. The contrast dose was 20 ml injected at 10 ml/sec. The catheter was then withdrawn across the aortic valve under continuous pressure monitoring and removed. After completion of the procedure, femoral artery angiography was performed. This demonstrated a common femoral artery puncture appropriate for closure. A Vascade 5F vascular closure Device was placed per protocol.    Coronary Angiography:  The coronary circulation is right dominant.    Left Main Coronary Artery:  The left main coronary artery is calcified. There is 70% stenosis in the distal left main  coronary artery.    Left Anterior Descending Coronary Artery Distribution:  There is 80% stenosis in the proximal left anterior descending artery.    Circumflex Coronary Artery Distribution:  There is 90% stenosis in the the proximal Circumflex artery. There is 75% stenosis in the Prox Ramus Circumflex artery. There is 80% stenosis in the Mid Ramus Circumflex artery.    Right Coronary Artery Distribution:    There is 80% stenosis in the the mid Right Coronary Artery. There is evidence of instent restenosis in this segment. The distal right coronary artery showed 90% stenosis.      Left Ventriculography:  The estimated left ventricular ejection fraction is normal at 55%.    Hemo Personnel:  +---------------------------+---------+  Name                       Duty       +---------------------------+---------+  Wil Bronson MD 1  +---------------------------+---------+      Hemodynamic Pressures:    +----+------------------+---------+-------------+-------------+------+---------+  Site    Date Time       Phase  Systolic mmHg  Diastolic    ED  Mean mmHg                          Name                    mmHg      mmHg            +----+------------------+---------+-------------+-------------+------+---------+    PAOLA  2/6/2025 9:36:07 AIR REST          127           65             87                      AM                                                    +----+------------------+---------+-------------+-------------+------+---------+    AO  2/6/2025 9:39:06 AIR REST           91           61             65                      AM                                                    +----+------------------+---------+-------------+-------------+------+---------+    AO  2/6/2025 9:44:05 AIR REST          133           72             96                      AM                                                     +----+------------------+---------+-------------+-------------+------+---------+    LV  2/6/2025 9:47:36 AIR REST          115           -1     6                               AM                                                    +----+------------------+---------+-------------+-------------+------+---------+      Cardiac Cath Post Procedure Notes:  Post Procedure Diagnosis: Triple vessel disease.  Blood Loss:               Estimated blood loss during the procedure was 0 mls.  Specimens Removed:        Number of specimen(s) removed: none.    ____________________________________________________________________________________  CONCLUSIONS:  1. Distal Left Main: 70% stenosis.  2. Left Main Coronary Artery: This artery is calcified.  3. Proximal LAD Lesion: The percent stenosis is 80%.  4. Proximal CX Lesion: The percent stenosis is 90%.  5. Prox Ramus CX Lesion: The percent stenosis is 75%.  6. Mid Ramus CX Lesion: The percent stenosis is 80%.  7. Mid RCA Lesion: The percent stenosis is 80%.  8. Mid RCA Lesion: Instent Restenosis.  9. Distal RCA Lesion: The percent stenosis is 90%.  10. The Left Ventricular Ejection Fraction is 55%.  11. Mid CX Lesion: 100%.    ICD 10 Codes:  Non ST elevation (NSTEMI) myocardial infarction-I21.4    CPT Codes:  Left Heart Cath (visualization of coronaries) and [id number]; Moderate Sedation Services initial 15 minutes patient >5 [id number]    [id number] Wil Montana MD  Performing Physician  Electronically signed by [id number] Wil Montana MD on 2/6/2025 at 10:19:09  AM          ** Final **            RADIOLOGY:     Transthoracic Echo Complete   Final Result      Vascular US lower extremity vein mapping bilateral   Final Result   Lower extremity venous mapping as detailed above.        MACRO:   None.        Signed by: Amarjit Ortiz 2/7/2025 7:25 AM   Dictation workstation:   [id number]      Carotid duplex bilateral   Final Result   Utilizing the peak systolic  velocities, the estimated degree of   stenosis within the internal carotid arteries is less than 50%   bilaterally.        MACRO:   None.             Signed by: Amarjit Ortiz 2/7/2025 7:24 AM   Dictation workstation:   [id number]      CT chest wo IV contrast   Final Result   No calcified plaques along the ascending aorta. There is   atherosclerotic disease of the remaining portions of the aorta and   its branches. Indeterminate lesion involving the tail the pancreas.   MRI recommended for further evaluation. Interval development of an 8   mm right lower lobe nodule which may be infectious/inflammatory in   nature but will require follow-up to document resolution.        MACRO:   None.        Signed by: Amarjit Ortiz 2/7/2025 9:05 AM   Dictation workstation:   [id number]      Cardiac Catheterization Procedure   Final Result      XR chest 1 view   Final Result   Mild bilateral opacities may correspond to chronic lung changes and   atelectasis versus pneumonia.        MACRO:   None        Signed by: Tobin Machado 2/5/2025 10:38 PM   Dictation workstation:   [id number]      MRCP pancreas w and wo IV contrast    (Results Pending)       PROBLEM LIST     Patient Active Problem List   Diagnosis    Primary hypertension    Chronic obstructive pulmonary disease, unspecified    Current every day smoker    GERD (gastroesophageal reflux disease)    Arcus senilis, bilateral    Astigmatism of both eyes    Cataract, bilateral    Chronic venous stasis dermatitis    Depression, major, in remission (CMS-HCC)    Erectile dysfunction    Hiatal hernia with GERD    Multiple lung nodules on CT    Nuclear sclerotic cataract of both eyes    Osteoarthritis of hips, bilateral    Other spondylosis, cervical region    Periumbilical hernia    Testosterone deficiency    Smoker    Sleep apnea    Vitamin D deficiency    Noncompliance with dietary restriction    Arthritis of left acromioclavicular joint    Bruit of right carotid artery    Degenerative  joint disease (DJD) of hip    Spinal stenosis of cervical region    Encounter for immunization    Mixed hyperlipidemia    CAD (coronary artery disease)    Mobitz type 1 second degree AV block    Type 2 diabetes mellitus with hyperglycemia, with long-term current use of insulin    Hyponatremia    Chest pain, unspecified type    NSTEMI (non-ST elevated myocardial infarction) (Multi)    Noncompliance with CPAP treatment    Obstructive sleep apnea    Recurrent major depressive disorder, in partial remission (CMS-HCC)    Hx of heart artery stent    Oropharyngeal dysphagia    History of constipation    Noncompliance with diabetes treatment    Abnormal weight loss    Hypokalemia             Of note, this documentation is completed using the Dragon Dictation system (voice recognition software). There may be spelling and/or grammatical errors that were not corrected prior to final submission.    Please do not hesitate to call with questions.  Electronically signed by Julian Long MD, on 2/8/2025 at 10:38 AM

## 2025-02-08 NOTE — PROGRESS NOTES
"Feliciano Worthington is a 74 y.o. male on day 2 of admission presenting with NSTEMI (non-ST elevated myocardial infarction) (Multi).    Subjective   The patient has history of depression on venlafaxine 150 mg daily for a few years.  Recently last few weeks, Zoloft 50 mg was added due to worsening depression.  Depression is described as low mood and anhedonia with decreased motivation.  No thoughts of suicide or homicide.  He was admitted to [facility] this time for chest pain and he will have CABG tomorrow.  We were consulted for medication management for depression.  I reviewed the note and interviewed the patient and talked with the staff.  The patient has history of depression, worse lately.  No history of inpatient admission or suicidal ideation.  No history of hallucinations.  No use of drugs or alcohol.  He is tolerating venlafaxine.  No history of seizure       Objective     Physical Exam  Psychiatric:         Attention and Perception: Attention normal.         Mood and Affect: Mood is depressed.         Speech: Speech normal.         Behavior: Behavior is cooperative.         Thought Content: Thought content normal.         Cognition and Memory: Cognition normal.         Judgment: Judgment normal.         Last Recorded Vitals  Blood pressure 136/68, pulse 70, temperature 36.4 °C (97.5 °F), resp. rate 18, height 1.778 m (5' 10\"), weight 87.1 kg (192 lb 0.3 oz), SpO2 98%.  Intake/Output last 3 Shifts:  I/O last 3 completed shifts:  In: 536 (6.5 mL/kg) [P.O.:536]  Out: 375 (4.6 mL/kg) [Urine:375 (0.1 mL/kg/hr)]  Weight: 82 kg     Scheduled medications  amLODIPine, 5 mg, oral, Daily  aspirin, 81 mg, oral, Daily  ezetimibe, 10 mg, oral, Nightly  hydrOXYzine HCL, 25 mg, oral, BID  insulin glargine, 32 Units, subcutaneous, q AM  insulin glargine, 35 Units, subcutaneous, Nightly  insulin lispro, 0-15 Units, subcutaneous, TID AC  insulin lispro, 8 Units, subcutaneous, TID AC  isosorbide mononitrate ER, 30 mg, oral, " Daily  LORazepam, 0.5 mg, intravenous, Once  lovastatin, 40 mg, oral, Nightly  pantoprazole, 40 mg, oral, Daily  polyethylene glycol, 17 g, oral, Daily  sertraline, 25 mg, oral, Daily  venlafaxine XR, 150 mg, oral, Daily with breakfast      Continuous medications  heparin, 0-4,000 Units/hr, Last Rate: 1,200 Units/hr (02/07/25 1553)      PRN medications  PRN medications: acetaminophen **OR** acetaminophen **OR** acetaminophen, acetaminophen, alum-mag hydroxide-simeth, dextrose, dextrose, glucagon, glucagon, heparin, ipratropium-albuteroL, magnesium hydroxide, melatonin, nitroglycerin, ondansetron, ondansetron            Assessment/Plan   Assessment & Plan  Recurrent major depressive disorder, in partial remission (CMS-[facility])    No criteria for inpatient admission  I suggest to continue venlafaxine 150, discontinue Zoloft and start Wellbutrin.  Risks and benefits were discussed with the patient.  He agreed    Aric Cueva MD

## 2025-02-09 VITALS
TEMPERATURE: 97.2 F | BODY MASS INDEX: 27.49 KG/M2 | OXYGEN SATURATION: 96 % | HEIGHT: 70 IN | WEIGHT: 192.02 LBS | SYSTOLIC BLOOD PRESSURE: 137 MMHG | RESPIRATION RATE: 18 BRPM | HEART RATE: 57 BPM | DIASTOLIC BLOOD PRESSURE: 66 MMHG

## 2025-02-09 LAB
ANION GAP SERPL CALC-SCNC: 14 MMOL/L (ref 10–20)
ATRIAL RATE: 81 BPM
BUN SERPL-MCNC: 12 MG/DL (ref 6–23)
CALCIUM SERPL-MCNC: 8.5 MG/DL (ref 8.6–10.3)
CHLORIDE SERPL-SCNC: 99 MMOL/L (ref 98–107)
CO2 SERPL-SCNC: 25 MMOL/L (ref 21–32)
CREAT SERPL-MCNC: 0.66 MG/DL (ref 0.5–1.3)
EGFRCR SERPLBLD CKD-EPI 2021: >90 ML/MIN/1.73M*2
ERYTHROCYTE [DISTWIDTH] IN BLOOD BY AUTOMATED COUNT: 12.9 % (ref 11.5–14.5)
GLUCOSE BLD MANUAL STRIP-MCNC: 226 MG/DL (ref 74–99)
GLUCOSE BLD MANUAL STRIP-MCNC: 235 MG/DL (ref 74–99)
GLUCOSE BLD MANUAL STRIP-MCNC: 298 MG/DL (ref 74–99)
GLUCOSE BLD MANUAL STRIP-MCNC: 75 MG/DL (ref 74–99)
GLUCOSE SERPL-MCNC: 93 MG/DL (ref 74–99)
HCT VFR BLD AUTO: 38.7 % (ref 41–52)
HGB BLD-MCNC: 13.6 G/DL (ref 13.5–17.5)
HOLD SPECIMEN: NORMAL
MAGNESIUM SERPL-MCNC: 1.92 MG/DL (ref 1.6–2.4)
MCH RBC QN AUTO: 29 PG (ref 26–34)
MCHC RBC AUTO-ENTMCNC: 35.1 G/DL (ref 32–36)
MCV RBC AUTO: 83 FL (ref 80–100)
NRBC BLD-RTO: 0 /100 WBCS (ref 0–0)
P AXIS: 53 DEGREES
PLATELET # BLD AUTO: 131 X10*3/UL (ref 150–450)
POTASSIUM SERPL-SCNC: 4 MMOL/L (ref 3.5–5.3)
Q ONSET: 213 MS
QRS COUNT: 10 BEATS
QRS DURATION: 100 MS
QT INTERVAL: 402 MS
QTC CALCULATION(BAZETT): 404 MS
QTC FREDERICIA: 404 MS
R AXIS: -85 DEGREES
RBC # BLD AUTO: 4.69 X10*6/UL (ref 4.5–5.9)
SODIUM SERPL-SCNC: 134 MMOL/L (ref 136–145)
T AXIS: 87 DEGREES
T OFFSET: 414 MS
UFH PPP CHRO-ACNC: 0.2 IU/ML
UFH PPP CHRO-ACNC: 0.3 IU/ML
UFH PPP CHRO-ACNC: 0.4 IU/ML
VENTRICULAR RATE: 61 BPM
WBC # BLD AUTO: 6 X10*3/UL (ref 4.4–11.3)

## 2025-02-09 PROCEDURE — 82947 ASSAY GLUCOSE BLOOD QUANT: CPT

## 2025-02-09 PROCEDURE — 2500000001 HC RX 250 WO HCPCS SELF ADMINISTERED DRUGS (ALT 637 FOR MEDICARE OP): Performed by: INTERNAL MEDICINE

## 2025-02-09 PROCEDURE — 36415 COLL VENOUS BLD VENIPUNCTURE: CPT | Performed by: INTERNAL MEDICINE

## 2025-02-09 PROCEDURE — 2500000004 HC RX 250 GENERAL PHARMACY W/ HCPCS (ALT 636 FOR OP/ED): Performed by: NURSE PRACTITIONER

## 2025-02-09 PROCEDURE — 80048 BASIC METABOLIC PNL TOTAL CA: CPT | Performed by: INTERNAL MEDICINE

## 2025-02-09 PROCEDURE — 99232 SBSQ HOSP IP/OBS MODERATE 35: CPT | Performed by: INTERNAL MEDICINE

## 2025-02-09 PROCEDURE — 99233 SBSQ HOSP IP/OBS HIGH 50: CPT | Performed by: INTERNAL MEDICINE

## 2025-02-09 PROCEDURE — 2060000001 HC INTERMEDIATE ICU ROOM DAILY

## 2025-02-09 PROCEDURE — 2500000004 HC RX 250 GENERAL PHARMACY W/ HCPCS (ALT 636 FOR OP/ED): Performed by: INTERNAL MEDICINE

## 2025-02-09 PROCEDURE — 2500000001 HC RX 250 WO HCPCS SELF ADMINISTERED DRUGS (ALT 637 FOR MEDICARE OP): Performed by: PSYCHIATRY & NEUROLOGY

## 2025-02-09 PROCEDURE — 2500000001 HC RX 250 WO HCPCS SELF ADMINISTERED DRUGS (ALT 637 FOR MEDICARE OP): Performed by: NURSE PRACTITIONER

## 2025-02-09 PROCEDURE — 85520 HEPARIN ASSAY: CPT | Performed by: INTERNAL MEDICINE

## 2025-02-09 PROCEDURE — 85027 COMPLETE CBC AUTOMATED: CPT | Performed by: INTERNAL MEDICINE

## 2025-02-09 PROCEDURE — 2500000002 HC RX 250 W HCPCS SELF ADMINISTERED DRUGS (ALT 637 FOR MEDICARE OP, ALT 636 FOR OP/ED): Performed by: NURSE PRACTITIONER

## 2025-02-09 PROCEDURE — 83735 ASSAY OF MAGNESIUM: CPT | Performed by: INTERNAL MEDICINE

## 2025-02-09 PROCEDURE — 2500000002 HC RX 250 W HCPCS SELF ADMINISTERED DRUGS (ALT 637 FOR MEDICARE OP, ALT 636 FOR OP/ED): Performed by: INTERNAL MEDICINE

## 2025-02-09 RX ORDER — INSULIN LISPRO 100 [IU]/ML
5 INJECTION, SOLUTION INTRAVENOUS; SUBCUTANEOUS
Status: DISPENSED | OUTPATIENT
Start: 2025-02-09

## 2025-02-09 RX ADMIN — INSULIN LISPRO 5 UNITS: 100 INJECTION, SOLUTION INTRAVENOUS; SUBCUTANEOUS at 17:16

## 2025-02-09 RX ADMIN — HYDROXYZINE HYDROCHLORIDE 25 MG: 25 TABLET ORAL at 08:35

## 2025-02-09 RX ADMIN — BUPROPION HYDROCHLORIDE 150 MG: 150 TABLET, EXTENDED RELEASE ORAL at 08:35

## 2025-02-09 RX ADMIN — ASPIRIN 81 MG: 81 TABLET, CHEWABLE ORAL at 08:35

## 2025-02-09 RX ADMIN — PANTOPRAZOLE SODIUM 40 MG: 40 TABLET, DELAYED RELEASE ORAL at 06:43

## 2025-02-09 RX ADMIN — ISOSORBIDE MONONITRATE 30 MG: 30 TABLET, EXTENDED RELEASE ORAL at 08:35

## 2025-02-09 RX ADMIN — INSULIN GLARGINE 32 UNITS: 100 INJECTION, SOLUTION SUBCUTANEOUS at 10:54

## 2025-02-09 RX ADMIN — HEPARIN SODIUM 1400 UNITS/HR: 10000 INJECTION, SOLUTION INTRAVENOUS at 12:09

## 2025-02-09 RX ADMIN — HYDROXYZINE HYDROCHLORIDE 25 MG: 25 TABLET ORAL at 21:33

## 2025-02-09 RX ADMIN — INSULIN LISPRO 6 UNITS: 100 INJECTION, SOLUTION INTRAVENOUS; SUBCUTANEOUS at 12:11

## 2025-02-09 RX ADMIN — INSULIN LISPRO 9 UNITS: 100 INJECTION, SOLUTION INTRAVENOUS; SUBCUTANEOUS at 17:17

## 2025-02-09 RX ADMIN — EZETIMIBE 10 MG: 10 TABLET ORAL at 21:33

## 2025-02-09 RX ADMIN — AMLODIPINE BESYLATE 5 MG: 5 TABLET ORAL at 08:35

## 2025-02-09 RX ADMIN — VENLAFAXINE HYDROCHLORIDE 150 MG: 150 CAPSULE, EXTENDED RELEASE ORAL at 08:35

## 2025-02-09 RX ADMIN — POLYETHYLENE GLYCOL 3350 17 G: 17 POWDER, FOR SOLUTION ORAL at 08:35

## 2025-02-09 RX ADMIN — INSULIN LISPRO 5 UNITS: 100 INJECTION, SOLUTION INTRAVENOUS; SUBCUTANEOUS at 12:10

## 2025-02-09 RX ADMIN — INSULIN GLARGINE 35 UNITS: 100 INJECTION, SOLUTION SUBCUTANEOUS at 21:38

## 2025-02-09 RX ADMIN — LOVASTATIN 40 MG: 10 TABLET ORAL at 21:33

## 2025-02-09 ASSESSMENT — COGNITIVE AND FUNCTIONAL STATUS - GENERAL
MOBILITY SCORE: 24
DAILY ACTIVITIY SCORE: 24
DAILY ACTIVITIY SCORE: 24
MOBILITY SCORE: 24

## 2025-02-09 ASSESSMENT — PAIN - FUNCTIONAL ASSESSMENT: PAIN_FUNCTIONAL_ASSESSMENT: 0-10

## 2025-02-09 ASSESSMENT — PAIN SCALES - GENERAL: PAINLEVEL_OUTOF10: 0 - NO PAIN

## 2025-02-09 NOTE — PROGRESS NOTES
Rounding Cardiologist:  Julian Long MD,     Primary Cardiologist: Dr. Roberto Haskins       Date:  2/9/2025    Patient:  Feliciano Worthington    YOB: 1950  MRN:  [id number]     Admit Date:  2/5/2025      SUBJECTIVE:      2/9/25    Patient overall feels well.  No changes.  Vital signs stable.  Continue plan for coronary bypass surgery which.    2/8/25    Patient doing well.  Asymptomatic.  Vital signs stable.    Awaiting workup for planned coronary bypass surgery.      2/7/25    He denies any chest pain or shortness of breath.     LHC revealed 70% stenosis in the distal left main, 80% stenosis in the proximal LAD, 90% stenosis in the proximal circumflex artery, 75% stenosis in the proximal ramus, 80% stenosis in the mid ramus, 80% stenosis in the mid RCA, 90% stenosis in the distal RCA and an LVEF of 55%.     Cardiac surgery evaluation taking place      ASSESSMENT:     NSTEMI   History of coronary artery disease and remote stenting to the circumflex and right coronary arteries, last PCI 2011  Severe multivessel coronary artery disease by cath 2/2025, Pending CABG.  Normal LV function, LVEF 55%, Echo 2/25 0  Mobitz type I second-degree AV block  Hypertension  Hyperlipidemia   Diabetes mellitus  COPD with chronic tobacco use  Hyponatremia, sodium stable on admission  Otherwise as noted below and prior to      PLAN:     Monitor on telemetry.  Remains in normal sinus rhythm with first-degree AV block.  Rates are 70.  Supplemental O2, keep SpO2 greater than 92%  Monitor electrolytes, keep potassium greater than 4 and magnesium greater than 2  LHC revealed 70% stenosis in the distal left main, 80% stenosis in the proximal LAD, 90% stenosis in the proximal circumflex artery, 75% stenosis in the proximal ramus, 80% stenosis in the mid ramus, 80% stenosis in the mid RCA, 90% stenosis in the distal RCA and an LVEF of 55%.   CABG evaluation taking place by cardiothoracic surgery  team  Echocardiogram today  Continue heparin infusion for now, defer further recommendation to cardiothoracic team  Endocrinology following for type 2 diabetes mellitus with uncontrolled hemoglobin A1c  Nicotine patch for smoking cessation  Will continue to follow along with you        VITALS:     Vitals:    02/08/25 2020 02/09/25 0008 02/09/25 0446 02/09/25 0802   BP: 132/75 166/80 179/85 145/82   BP Location: Left arm Left arm Left arm    Patient Position: Lying Lying Lying    Pulse: 62 55 59    Resp: 18 18 18    Temp: 37 °C (98.6 °F) 36.3 °C (97.3 °F) 36.4 °C (97.5 °F) 36 °C (96.8 °F)   TempSrc: Temporal Temporal Temporal    SpO2: 95% 94% 98% 96%   Weight:       Height:           Intake/Output Summary (Last 24 hours) at 2/9/2025 0946  Last data filed at 2/9/2025 0837  Gross per 24 hour   Intake 1121.63 ml   Output 700 ml   Net 421.63 ml       Wt Readings from Last 4 Encounters:   02/08/25 87.1 kg (192 lb 0.3 oz)   01/29/25 85.5 kg (188 lb 6.4 oz)   10/15/24 90.3 kg (199 lb)   10/04/24 89.8 kg (198 lb)       CURRENT HOSPITAL MEDICATIONS:   amLODIPine, 5 mg, oral, Daily  aspirin, 81 mg, oral, Daily  buPROPion XL, 150 mg, oral, Daily  ezetimibe, 10 mg, oral, Nightly  hydrOXYzine HCL, 25 mg, oral, BID  insulin glargine, 32 Units, subcutaneous, q AM  insulin glargine, 35 Units, subcutaneous, Nightly  insulin lispro, 0-15 Units, subcutaneous, TID AC  insulin lispro, 5 Units, subcutaneous, TID AC  isosorbide mononitrate ER, 30 mg, oral, Daily  LORazepam, 0.5 mg, intravenous, Once  lovastatin, 40 mg, oral, Nightly  pantoprazole, 40 mg, oral, Daily  polyethylene glycol, 17 g, oral, Daily  venlafaxine XR, 150 mg, oral, Daily with breakfast      heparin, 0-4,000 Units/hr, Last Rate: 1,200 Units/hr (02/09/25 0837)      Current Outpatient Medications   Medication Instructions    ACETAMINOPHEN ORAL 650 mg, Every 8 hours PRN    alcohol swabs (BD Alcohol Swabs) pads, medicated Use twice daily while checking sugar.    amLODIPine  (NORVASC) 5 mg, oral, Daily    aspirin 81 mg, Daily    blood glucose control high,low (Accu-Chek Guide L1-L2 Ctrl Sol) solution 1 bottle, miscellaneous, Daily PRN    blood sugar diagnostic (Accu-Chek Guide test strips) strip Test twice daily    blood-glucose meter (Accu-Chek Guide Glucose Meter) misc Test twice daily    chlorthalidone (Hygroton) 25 mg tablet TAKE 1 TABLET EVERY MONDAY AND THURSDAY    ezetimibe (ZETIA) 10 mg, oral, Daily    glipiZIDE (GLUCOTROL) 10 mg, oral, 2 times daily before meals    hydrOXYzine HCL (ATARAX) 25 mg, oral, 2 times daily    insulin glargine (LANTUS U-100 INSULIN) 32 Units, Every morning    ipratropium (Atrovent) 21 mcg (0.03 %) nasal spray 2 sprays, Each Nostril, Every 12 hours    lancets (Accu-Chek Softclix Lancets) misc Test twice daily    nitroglycerin (NITROSTAT) 0.4 mg, sublingual, Every 5 min PRN, DO NOT EXCEED A TOTAL OF 3 DOSES IN 15 MINUTES    omeprazole (PRILOSEC) 40 mg, oral, Daily before breakfast, Do not crush or chew.    ramipril (ALTACE) 5 mg, oral, 2 times daily    sertraline (ZOLOFT) 25 mg, oral, Daily    venlafaxine XR (EFFEXOR-XR) 150 mg, oral, Daily, Do not crush or chew.        PHYSICAL EXAMINATION:     Physical Exam  HENT:      Head: Normocephalic.      Mouth/Throat:      Mouth: Mucous membranes are moist.   Eyes:      Pupils: Pupils are equal, round, and reactive to light.   Cardiovascular:      Rate and Rhythm: Normal rate and regular rhythm.   Pulmonary:      Effort: Pulmonary effort is normal.      Comments: Wheezing  Abdominal:      General: Bowel sounds are normal.      Palpations: Abdomen is soft.   Musculoskeletal:         General: Normal range of motion.   Skin:     General: Skin is warm and dry.      Capillary Refill: Capillary refill takes less than 2 seconds.   Neurological:      Mental Status: He is alert and oriented to person, place, and time.   Psychiatric:         Mood and Affect: Mood normal.         LAB DATA:     CBC:   Results from last 7  days   Lab Units 02/09/25 0536 02/08/25 0521 02/07/25  0639   WBC AUTO x10*3/uL 6.0 6.8 7.7   RBC AUTO x10*6/uL 4.69 4.81 5.15   HEMOGLOBIN g/dL 13.6 14.1 15.0   HEMATOCRIT % 38.7* 40.0* 42.5   MCV fL 83 83 83   MCH pg 29.0 29.3 29.1   MCHC g/dL 35.1 35.3 35.3   RDW % 12.9 12.8 13.3   PLATELETS AUTO x10*3/uL 131* 135* 145*     CMP:    Results from last 7 days   Lab Units 02/09/25 0536 02/08/25  0521 02/07/25  0639 02/06/25  0309 02/05/25  2147   SODIUM mmol/L 134* 132* 134*   < > 129*   POTASSIUM mmol/L 4.0 3.2* 3.7   < > 4.4   CHLORIDE mmol/L 99 99 100   < > 93*   CO2 mmol/L 25 26 25   < > 23   BUN mg/dL 12 17 17   < > 16   CREATININE mg/dL 0.66 0.70 0.76   < > 0.99   GLUCOSE mg/dL 93 188* 246*   < > 438*   PROTEIN TOTAL g/dL  --   --   --   --  7.3   CALCIUM mg/dL 8.5* 8.5* 8.9   < > 9.3   BILIRUBIN TOTAL mg/dL  --   --   --   --  0.6   ALK PHOS U/L  --   --   --   --  110   AST U/L  --   --   --   --  15   ALT U/L  --   --   --   --  12    < > = values in this interval not displayed.     BMP:    Results from last 7 days   Lab Units 02/09/25 0536 02/08/25 0521 02/07/25  0639   SODIUM mmol/L 134* 132* 134*   POTASSIUM mmol/L 4.0 3.2* 3.7   CHLORIDE mmol/L 99 99 100   CO2 mmol/L 25 26 25   BUN mg/dL 12 17 17   CREATININE mg/dL 0.66 0.70 0.76   CALCIUM mg/dL 8.5* 8.5* 8.9   GLUCOSE mg/dL 93 188* 246*     Magnesium:  Results from last 7 days   Lab Units 02/09/25 0536 02/08/25  0521 02/07/25  0639   MAGNESIUM mg/dL 1.92 1.62 1.78     Troponin:    Results from last 7 days   Lab Units 02/06/25  0800 02/06/25  0309 02/05/25  2248   TROPHS ng/L 2,450* 1,858* 353*     BNP:     Lipid Panel:  Results from last 7 days   Lab Units 02/05/25  2248   HDL mg/dL 24.7   CHOLESTEROL/HDL RATIO  10.4   VLDL mg/dL 62*   TRIGLYCERIDES mg/dL 309*   NON HDL CHOL. mg/dL 233*        DIAGNOSTIC TESTING:       Echocardiogram:     Coronary Angiography:   Left Heart Cath, With LV 02/06/2025    [facility], Cath Lab  [address]  [facility] [address]    Cardiovascular Catheterization Report    Patient Name:      JAIME FERNANDEZ      Performing Physician:  [id number] Wil Montana MD  Study Date:        2/6/2025             Verifying Physician:   [id number] Wil Montana MD  MRN/PID:           [id number]             Cardiologist/Co-Scrub:  Accession#:        [id number]         Ordering Provider:     [id number] DERIK ENAMORADO  Date of Birth/Age: 1950 / 74 years Cardiologist:  Gender:            M                    Fellow:  Encounter#:        [id number]           Surgeon:      Study: Left Heart Cath      Indications:  JAIME FERNANDEZ is a 75 year old male who presents with dyslipidemia, hypertension, prior myocardial infarction, diabetes, smoker, chronic pulmonary disease, coronary artery disease, prior percutaneous coronary intervention and a chest pain assessment of typical angina. NSTE - ACS.    Procedure Description:  After infiltration with 2% Lidocaine, the right femoral artery was cannulated with a modified Seldinger technique. Subsequently a 5 Australian sheath was placed in the right femoral artery. Selective coronary catheterization was performed using a 5 Fr catheter(s) exchanged over a guide wire to cannulate the coronary arteries. A JL 4 tip catheter was used for left coronary injections. A 3drc tip catheter was used for right coronary injections.  Multiple injections of contrast were made into the left and right coronary arteries with angiograms recorded in multiple projections. Retrograde left heart catheterizion was accomplished with a 5 Fr. pigtail catheter. A single plane left ventriculogram was recorded in the 30 degree RAYMOND projection. The contrast dose was 20 ml injected at 10 ml/sec. The catheter was then withdrawn across the aortic valve under continuous pressure monitoring and removed. After completion of the procedure, femoral artery angiography was performed. This demonstrated a common femoral artery  puncture appropriate for closure. A Vascade 5F vascular closure Device was placed per protocol.    Coronary Angiography:  The coronary circulation is right dominant.    Left Main Coronary Artery:  The left main coronary artery is calcified. There is 70% stenosis in the distal left main coronary artery.    Left Anterior Descending Coronary Artery Distribution:  There is 80% stenosis in the proximal left anterior descending artery.    Circumflex Coronary Artery Distribution:  There is 90% stenosis in the the proximal Circumflex artery. There is 75% stenosis in the Prox Ramus Circumflex artery. There is 80% stenosis in the Mid Ramus Circumflex artery.    Right Coronary Artery Distribution:    There is 80% stenosis in the the mid Right Coronary Artery. There is evidence of instent restenosis in this segment. The distal right coronary artery showed 90% stenosis.      Left Ventriculography:  The estimated left ventricular ejection fraction is normal at 55%.    Hemo Personnel:  +---------------------------+---------+  Name                       Duty       +---------------------------+---------+  Wil Bronson MD 1  +---------------------------+---------+      Hemodynamic Pressures:    +----+------------------+---------+-------------+-------------+------+---------+  Site    Date Time       Phase  Systolic mmHg  Diastolic    ED  Mean mmHg                          Name                    mmHg      mmHg            +----+------------------+---------+-------------+-------------+------+---------+    PAOLA  2/6/2025 9:36:07 AIR REST          127           65             87                      AM                                                    +----+------------------+---------+-------------+-------------+------+---------+    AO  2/6/2025 9:39:06 AIR REST           91           61             65                      AM                                                     +----+------------------+---------+-------------+-------------+------+---------+    AO  2/6/2025 9:44:05 AIR REST          133           72             96                      AM                                                    +----+------------------+---------+-------------+-------------+------+---------+    LV  2/6/2025 9:47:36 AIR REST          115           -1     6                               AM                                                    +----+------------------+---------+-------------+-------------+------+---------+      Cardiac Cath Post Procedure Notes:  Post Procedure Diagnosis: Triple vessel disease.  Blood Loss:               Estimated blood loss during the procedure was 0 mls.  Specimens Removed:        Number of specimen(s) removed: none.    ____________________________________________________________________________________  CONCLUSIONS:  1. Distal Left Main: 70% stenosis.  2. Left Main Coronary Artery: This artery is calcified.  3. Proximal LAD Lesion: The percent stenosis is 80%.  4. Proximal CX Lesion: The percent stenosis is 90%.  5. Prox Ramus CX Lesion: The percent stenosis is 75%.  6. Mid Ramus CX Lesion: The percent stenosis is 80%.  7. Mid RCA Lesion: The percent stenosis is 80%.  8. Mid RCA Lesion: Instent Restenosis.  9. Distal RCA Lesion: The percent stenosis is 90%.  10. The Left Ventricular Ejection Fraction is 55%.  11. Mid CX Lesion: 100%.    ICD 10 Codes:  Non ST elevation (NSTEMI) myocardial infarction-I21.4    CPT Codes:  Left Heart Cath (visualization of coronaries) and [id number]; Moderate Sedation Services initial 15 minutes patient >5 [id number]    [id number] Wil Montana MD  Performing Physician  Electronically signed by [id number] Wil Montana MD on 2/6/2025 at 10:19:09  AM              RADIOLOGY:     Transthoracic Echo Complete   Final Result      Vascular US lower extremity vein mapping bilateral   Final Result    Lower extremity venous mapping as detailed above.        MACRO:   None.        Signed by: Amarjit Ortiz 2/7/2025 7:25 AM   Dictation workstation:   [id number]      Carotid duplex bilateral   Final Result   Utilizing the peak systolic velocities, the estimated degree of   stenosis within the internal carotid arteries is less than 50%   bilaterally.        MACRO:   None.             Signed by: Amarjit Ortiz 2/7/2025 7:24 AM   Dictation workstation:   [id number]      CT chest wo IV contrast   Final Result   No calcified plaques along the ascending aorta. There is   atherosclerotic disease of the remaining portions of the aorta and   its branches. Indeterminate lesion involving the tail the pancreas.   MRI recommended for further evaluation. Interval development of an 8   mm right lower lobe nodule which may be infectious/inflammatory in   nature but will require follow-up to document resolution.        MACRO:   None.        Signed by: Amarjit Ortiz 2/7/2025 9:05 AM   Dictation workstation:   [id number]      Cardiac Catheterization Procedure   Final Result      XR chest 1 view   Final Result   Mild bilateral opacities may correspond to chronic lung changes and   atelectasis versus pneumonia.        MACRO:   None        Signed by: Tobin Machado 2/5/2025 10:38 PM   Dictation workstation:   [id number]      MRCP pancreas w and wo IV contrast    (Results Pending)       PROBLEM LIST     Patient Active Problem List   Diagnosis    Primary hypertension    Chronic obstructive pulmonary disease, unspecified    Current every day smoker    GERD (gastroesophageal reflux disease)    Arcus senilis, bilateral    Astigmatism of both eyes    Cataract, bilateral    Chronic venous stasis dermatitis    Depression, major, in remission (CMS-HCC)    Erectile dysfunction    Hiatal hernia with GERD    Multiple lung nodules on CT    Nuclear sclerotic cataract of both eyes    Osteoarthritis of hips, bilateral    Other spondylosis, cervical region     Periumbilical hernia    Testosterone deficiency    Smoker    Sleep apnea    Vitamin D deficiency    Noncompliance with dietary restriction    Arthritis of left acromioclavicular joint    Bruit of right carotid artery    Degenerative joint disease (DJD) of hip    Spinal stenosis of cervical region    Encounter for immunization    Mixed hyperlipidemia    CAD (coronary artery disease)    Mobitz type 1 second degree AV block    Type 2 diabetes mellitus with hyperglycemia, with long-term current use of insulin    Hyponatremia    Chest pain, unspecified type    NSTEMI (non-ST elevated myocardial infarction) (Multi)    Noncompliance with CPAP treatment    Obstructive sleep apnea    Recurrent major depressive disorder, in partial remission (CMS-HCC)    Hx of heart artery stent    Oropharyngeal dysphagia    History of constipation    Noncompliance with diabetes treatment    Abnormal weight loss    Hypokalemia             Of note, this documentation is completed using the Dragon Dictation system (voice recognition software). There may be spelling and/or grammatical errors that were not corrected prior to final submission.    Please do not hesitate to call with questions.  Electronically signed by Julian Long MD, on 2/9/2025 at 9:46 AM

## 2025-02-09 NOTE — PROGRESS NOTES
"  Feliciano Worthington is a 74 y.o. male on day 3 of admission presenting with NSTEMI (non-ST elevated myocardial infarction) (Multi).    ASSESSMENT/PLAN   -Acute non-STEMI with triple-vessel CAD on cardiac catheterization this morning-for CABG.  Still on heparin drip.  -History of coronary artery disease with prior stents  -Uncontrolled type 2 diabetes with hyperglycemia and noncompliance-HbA1c at 14.7.  Blood sugars improved, was actually on the low side this morning so decreased his mealtime lispro.  -Depression/anxiety-sertraline was added to his venlafaxine on 1/29-psychiatry consult appreciated-sertraline discontinued and started on bupropion which should also help his smoking cessation.    -Possible pancreatic lesion seen on CT done as part of preop evaluation-MRCP still pending.  CA 19-9 normal.  -Pulmonary nodules-most appear chronic, 1 new one in the right lower lobe, \"possibly infectious/inflammatory in nature but should have follow-up to document resolution\".  -Hyponatremia-up to 134 today, chlorthalidone still on hold    -Noncompliance with medications PTA  -Hypokalemia-resolved after replacement  -Hyperlipidemia-lovastatin restarted.  -History of Mobitz type I-on telemetry  -COPD-nebulizers as needed  -Continued tobacco abuse-he declines a nicotine patch  -Hypertension-BPs controlled  -Abnormal weight loss-likely due to uncontrolled diabetes, does have pancreatic lesion being evaluated  -Some difficulty swallowing dry solids-will order swallow eval (for prior to his probable CABG).  Was unable to be done Friday as he was down for a procedure, not complaining of swallowing presently.    -History of GERD with prior esophageal ulcers and achalasia-on daily PPI  -Neck and shoulder pain-has known moderate canal stenosis C3-4 & foraminal stenosis at multiple levels most pronounced on the right at C4-5 on MRI 12/16/2020 & foraminal stenosis, previously saw Dr. Parker and had some injections in the past  -History " obstructive sleep apnea not using CPAP-possibly improved due to his 50 pound weight loss in the last year    SUBJECTIVE/OBJECTIVE   02/09/25   -Finally was able to have a bowel movement-will continue scheduled laxatives along with as needed ones.  -Used to be a runner, was talking to the nurse about it.  He quit drinking alcohol 20 years ago, but then was into crack cocaine, went to rehab for both in the past.  No longer using drugs for years.  -Still waiting to find out if he is going to have his CABG on Monday or not.  -He is afebrile, during the middle of the night blood pressure was up some, improved to this morning.  -Chest is clear to auscultation  -Cardiac exam is a regular rhythm  -Blood sugars reasonably controlled-highest 208 yesterday morning, this morning down to 75.  Decreasing his mealtime insulin.  -Chemistry panel with a sodium up to 134, other electrolytes normal including a potassium of 4.0 after replacement yesterday.  -Magnesium 1.92.  -BUN 12 with a creatinine is 0.66.  -Heparin therapeutic  -CBC with a WBC of 6.0, H/H of 13.6/38.7, platelet count stable at 131 K.  -MRCP of pancreas still pending.    2/8/2025  -Feels okay, other than has not had results yet from his fleets enema.  Denies any chest pain, no shortness of breath.  -Complaining of constipation-last BM several days ago.  At home he has been using fleets enemas, requested 1 ordered.  -We discussed again smoking cessation, starting of bupropion to help with both his smoking cessation as well as his depression.  Psychiatry consult discussed with patient.  -He is afebrile, blood pressures good.  Satting 98% on room air.  -Chest is clear to auscultation  -Cardiac exam is a regular rhythm  -Blood sugars continue to improve-100-195 range yesterday, 208 this morning.  -Chemistry panel with a sodium of 132, potassium 3.2-will replace orally.  -BUN 17 with a creatinine of 0.70.  -Magnesium 1.62.  -Heparin therapeutic  -CBC with a WBC of 6.8,  "H/H 14.1/40.0.  Platelet count down slightly at 135 K-will continue to monitor as he is still on heparin  -CA 19-9 normal at <4.0  -Psychiatry consult appreciated-agreed with changing his sertraline to bupropion, was ordered.    2/7/2025  -Wife is in the room with him.  He admits that he often did not take his insulin, but when he did \"I would take 55 units\", rather than the prescribed amounts.  -Has a long history of depression-long ago went to the [facility] but did not think they helped.  His wife notes that that was probably 20 years ago.  I discussed with him if he would like to see psychiatry while in the hospital, he agrees and his wife would appreciate it.  Wonder if he would do well with addition of bupropion which may also help with smoking cessation.  When we discussed possible suicide by medication noncompliance, he said he would not because he has his dogs and lavinia to take care of.  -I discussed with both of them the need to stop smoking-he admits that he would often use it as an excuse not to do things, saying \"wait till I have a cigarette\".  His wife also admits that she occasionally \"sneaks 1\" even though she quit smoking 2 years ago.  -He is afebrile, vital signs stable.  Satting 98% on room air  -His chest is clear to auscultation  -Cardiac exam is a regular rhythm  -Blood sugars in the 200s now that he is back on glargine.  238 this morning.  Hemoglobin A1c 14.7  -Chemistry panel with a blood sugar of 246, sodium 134, other electrolytes normal.  -BUN 17 with a creatinine 0.76.  -Magnesium 1.78.  -TSH 2.23.  -CBC with a WBC of 7.7, H/H 15.0/42.5.  Platelet count 145 K  -CT of the chest showed areas of fibrosis/scarring for 2 eminently along the periphery, not significantly changed from prior exams.  He does have several small nodular lesions, one was a new 1 in the right upper lobe 8 mm in size.  Also noted an indeterminate lesion involving the tail of the pancreas.  -Discussed with Dr. Wiseman, " adding mealtime scheduled insulin with lunch along with SSI.  -Discussed with CT surgery-initial plan was to try discharging to get his sugars and other medical problems under control, but significant concerns about whether this would happen if he was discharged due to his noncompliance.  The patient tells me this afternoon that it is scheduled for Monday.      2/6/2025 (patient transferred to my service, his PCP is Dr. José Bernal)  -Patient is lying in bed, still mildly sedated from his cardiac catheterization this morning.  Otherwise in NAD.  -He is afebrile, vital signs stable.  -Chest is clear to auscultation  -Cardiac exam is a regular rhythm  -Refer to complete PE below for rest of exam  -Chemistry panel with a blood sugar of 328, sodium 132, potassium 3.9, chloride 96, bicarb 25.  -BUN 15 with a creatinine of 0.90.  -Most recent troponin up to 2450  -CBC with a WBC of 8.3, H/H is 15.8/44.2.  Platelet count 145 K  -Lipid panel with a cholesterol of 258, triglycerides 309, HDL 24, , non-HDL cholesterol 233  -Case discussed with Dr. Haskins-patient was previously intolerant of statins, but did tolerate lovastatin in the past.  Not on formulary here, he has asked the pharmacy to try and obtain.  Discussed with him possibility of a PSK 9-suggested we may try evolocumab (Repatha) as an inpatient if available, otherwise consider Leqvio which could be administered twice a year in an infusion center.  He also notes that the patient seems to be depressed, and he is concerned by some apparent short-term memory loss.  He has known the patient for many years.  -Patient had cardiac catheterization this morning, found to have triple-vessel CAD with recommendation for CABG.    ROS:    Chart summary:  PCP is Dr. José Bernal, cardiologist is Dr. Haskins, neurologist is Dr. DENISE Alcala (saw for myoclonic jerks)    Patient is a 74-year-old male with a known history of CAD s/p prior stents, DM, HTN, HLP, COPD still  "smoking, GERD/history of esophagitis, ELLY not on CPAP, DJD of the C-spine, BPH with LUTS, and prior nephrolithiasis.      He presented to the ER last night complaining of chest pain for 36 hours.  He tells me that it started late Tuesday afternoon, was \"different than before\", dull and in his left chest without radiation.  Before he came to the ER yesterday, he tried NTG x 4 with only some help.  In the ER his EKG showed Mobitz type I but no ST elevations.  His blood sugar was 438, sodium 129, potassium 4.4, chloride 93.  BUN 16 with a creatinine of 0.88.  LFTs normal.  Magnesium 1.92.  Initial troponin was elevated to 299, this morning is up to 2450.  Cholesterol 258, , triglycerides 309, HDL 24.  CBC with a WBC of 9.8, H/H of 16.0/45.4.  Platelet count 176 K.    ROS: He denies headaches or dizziness.  No episodes of syncope.  He denies recent prior chest pains or palpitations before yesterday.  He denies chronic cough, shortness of breath.  He has some problems intermittently swallowing, says that solids (especially bread) seems to stick in his upper throat.  He does have a history of \"narrowing of my esophagus\" (reported achalasia on EGD in 2009).  He has had constant nausea for several months with only occasional vomiting.  He does get some heartburn and is on omeprazole daily which helps \"sometimes\".  He had problems with constipation for several months and had to take enemas, but it has now straightened out.  He admits to being erratic in using his insulin, when he checks his blood sugars they often come back as \"high\".  He has had increased frequency of urination which he assumes is due to his high blood sugars, but no dysuria or hematuria.  He denies any fevers or chills, has occasional night sweats.  No pedal edema.  He has a long history of depression/anxiety, was on venlafaxine until recently and was just changed to Zoloft several days PTA.    Past Medical History:  -CAD with history of " stents  -History of Mobitz type I  -Diabetes-uncontrolled, last HbA1c in December was 14.0  -Hypertension  -Hyperlipidemia, intolerant of statins in the past (did tolerate lovastatin in the past per Dr. Haskins)  -COPD  -Continued tobacco abuse  -Obstructive sleep apnea not using CPAP  -BPH with LUTS  -Depression/anxiety  -History of GERD and esophagitis  -Cervical spondylosis  -History of myoclonic jerks    Past Surgical History:  -Remote tonsillectomy  -Oral surgery  -Cardiac catheterization with stent to circumflex in  ([facility] in [place])  -EGD 2009-spastic lower esophagus, severe gastritis (Dr. Gonzalez)  -Cardiac catheterization with stent to RCA   -Right and left heart catheterization 2013-patent previously placed stents, 90% mid RCA treated with TREVOR, 80% proximal circumflex, EF = 45%  -EGD and colonoscopy 2016-hiatal hernia, multiple esophageal ulcers, colon polyp  -Placement of inflatable penile prosthesis 2018    Family History:  -Father- age 71 with an MI, had HTN, DM  -Mother- age 87, had HTN & an MI   -Siblings-1  half brother and 4 half sisters-unknown health status  -Children-1    Social History:  -Tobacco-2 packs daily from the age of 17, down to 1.5 pack daily now.  Has quit a couple times in the past but always restarted.  -Alcohol-prior alcoholism, quit in   -Drugs-remote use of illicit drugs, quit   -Marital-he is  and lives with his wife  -Occupation-prior /[employer]    *These notes are being done using Dragon voice recognition technology and may include unintended errors with respect to translation of words, typographical errors or grammar errors which may not have been identified prior to finalization of the chart note.    CURRENT MEDICATIONS     Scheduled Medications:    Current Facility-Administered Medications:     acetaminophen (Tylenol) tablet 650 mg, 650 mg, oral, q4h PRN, 650 mg at 25 1624 **OR** acetaminophen (Tylenol) oral  liquid 650 mg, 650 mg, nasogastric tube, q4h PRN **OR** acetaminophen (Tylenol) suppository 650 mg, 650 mg, rectal, q4h PRN, KHADIJAH Soni    acetaminophen (Tylenol) tablet 650 mg, 650 mg, oral, q4h PRN, Shanika Reese MD, 650 mg at 02/06/25 2023    alum-mag hydroxide-simeth (Mylanta) 200-200-20 mg/5 mL oral suspension 30 mL, 30 mL, oral, 4x daily PRN, Shanika Reese MD    amLODIPine (Norvasc) tablet 5 mg, 5 mg, oral, Daily, KHADIJAH Soni, 5 mg at 02/09/25 0835    aspirin chewable tablet 81 mg, 81 mg, oral, Daily, KHADIJAH Soni, 81 mg at 02/09/25 0835    buPROPion XL (Wellbutrin XL) 24 hr tablet 150 mg, 150 mg, oral, Daily, Aric Cueva MD, 150 mg at 02/09/25 0835    dextrose 50 % injection 12.5 g, 12.5 g, intravenous, q15 min PRN, Cristina Wiseman MD    dextrose 50 % injection 25 g, 25 g, intravenous, q15 min PRN, Cristina Wiseman MD    ezetimibe (Zetia) tablet 10 mg, 10 mg, oral, Nightly, KHADIJAH Soni, 10 mg at 02/08/25 2123    glucagon (Glucagen) injection 1 mg, 1 mg, intramuscular, q15 min PRN, Cristina Wiseman MD    glucagon (Glucagen) injection 1 mg, 1 mg, intramuscular, q15 min PRN, Cristina Wiseman MD    heparin 25,000 Units in dextrose 5% 250 mL (100 Units/mL) infusion (premix), 0-4,000 Units/hr, intravenous, Continuous, KHADIJAH Gomes, Last Rate: 12 mL/hr at 02/09/25 0837, 1,200 Units/hr at 02/09/25 0837    heparin bolus from bag 2,000-4,000 Units, 2,000-4,000 Units, intravenous, q4h PRN, KHADIJAH Soni, Last Rate: 2,000 mL/hr at 02/09/25 0837, Rate Verify at 02/09/25 0837    hydrOXYzine HCL (Atarax) tablet 25 mg, 25 mg, oral, BID, KHADIJAH Soni, 25 mg at 02/09/25 0835    insulin glargine (Lantus) injection 32 Units, 32 Units, subcutaneous, q AM, KHADIJAH Soni, 32 Units at 02/08/25 0908    insulin glargine (Lantus) injection 35 Units, 35 Units, subcutaneous, Nightly, KHADIJAH Soni, 35 Units at 02/08/25  2158    insulin lispro injection 0-15 Units, 0-15 Units, subcutaneous, TID AC, KHADIJAH Soni, 3 Units at 02/08/25 1756    insulin lispro injection 5 Units, 5 Units, subcutaneous, TID AC, Shanika Reese MD    ipratropium-albuteroL (Duo-Neb) 0.5-2.5 mg/3 mL nebulizer solution 3 mL, 3 mL, nebulization, q4h PRN, Shanika Reese MD    isosorbide mononitrate ER (Imdur) 24 hr tablet 30 mg, 30 mg, oral, Daily, KHADIJAH Soni, 30 mg at 02/09/25 0835    LORazepam (Ativan) injection 0.5 mg, 0.5 mg, intravenous, Once, Susie Mcfarland, APRN-CNP    lovastatin (Mevacor) tablet 40 mg, 40 mg, oral, Nightly, Shanika Reese MD, 40 mg at 02/08/25 2123    magnesium hydroxide (Milk of Magnesia) 400 mg/5 mL suspension 30 mL, 30 mL, oral, Daily PRN, Shanika Reese MD, 30 mL at 02/08/25 2159    melatonin tablet 5 mg, 5 mg, oral, Nightly PRN, Shanika Reese MD    nitroglycerin (Nitrostat) SL tablet 0.4 mg, 0.4 mg, sublingual, q5 min PRN, KHADIJAH Soni, 0.4 mg at 02/08/25 2027    ondansetron (Zofran) injection 4 mg, 4 mg, intravenous, q4h PRN, Shanika Reese MD    ondansetron (Zofran) tablet 4 mg, 4 mg, oral, q8h PRN, Shanika Reese MD    pantoprazole (ProtoNix) EC tablet 40 mg, 40 mg, oral, Daily, KHADIJAH Soni, 40 mg at 02/09/25 0643    polyethylene glycol (Glycolax, Miralax) packet 17 g, 17 g, oral, Daily, Shanika Reese MD, 17 g at 02/09/25 0835    venlafaxine XR (Effexor-XR) 24 hr capsule 150 mg, 150 mg, oral, Daily with breakfast, Shanika Reese MD, 150 mg at 02/09/25 0835     PRN Medications:  PRN medications   Medication    acetaminophen    Or    acetaminophen    Or    acetaminophen    acetaminophen    alum-mag hydroxide-simeth    dextrose    dextrose    glucagon    glucagon    heparin    ipratropium-albuteroL    magnesium hydroxide    melatonin    nitroglycerin    ondansetron    ondansetron         IVs:  heparin, 0-4,000 Units/hr, Last Rate: 1,200 Units/hr (02/09/25 0837)         I&Os     Intake/Output last 3  Shifts:  I/O last 3 completed shifts:  In: 1360.6 (15.6 mL/kg) [P.O.:686; I.V.:674.6 (7.7 mL/kg)]  Out: 700 (8 mL/kg) [Urine:700 (0.2 mL/kg/hr)]  Weight: 87.1 kg     VITAL SIGNS     Vitals:    02/08/25 2020 02/09/25 0008 02/09/25 0446 02/09/25 0802   BP: 132/75 166/80 179/85 145/82   BP Location: Left arm Left arm Left arm    Patient Position: Lying Lying Lying    Pulse: 62 55 59    Resp: 18 18 18    Temp: 37 °C (98.6 °F) 36.3 °C (97.3 °F) 36.4 °C (97.5 °F) 36 °C (96.8 °F)   TempSrc: Temporal Temporal Temporal    SpO2: 95% 94% 98% 96%   Weight:       Height:           PHYSICAL EXAM   Physical exam:  -General appearance: Lying in bed, alert and in NAD.  -Vital signs:  As above  -HEENT: No icterus  -Neck: No adenopathy  -Chest: Chest is clear to auscultation  -Cardiac: Regular rhythm  -Abdomen: Bowel sounds active  -Extremities: No edema  -Skin: No rash  -Neurologic:   Patient is alert and oriented x 3.   -Behavior/Emotional:  Appropriate, cooperative, appropriate mood.    LABS   Relevant Results:     Results from last 72 hours   Lab Units 02/09/25  0649 02/09/25  0536 02/08/25 2028 02/08/25  1712 02/08/25  0706 02/08/25  0521 02/07/25  0642 02/07/25  0639   POCT GLUCOSE mg/dL 75  --  186* 196*   < >  --    < >  --    GLUCOSE mg/dL  --  93  --   --   --  188*  --  246*    < > = values in this interval not displayed.      HbA1c:    Lab Results   Component Value Date    HGBA1C 14.7 (H) 02/06/2025     CBC:   Lab Results   Component Value Date    WBC 6.0 02/09/2025    HGB 13.6 02/09/2025    HCT 38.7 (L) 02/09/2025    MCV 83 02/09/2025     (L) 02/09/2025       Results from last 7 days   Lab Units 02/09/25  0536 02/06/25  0309 02/05/25  2147   WBC AUTO x10*3/uL 6.0   < > 9.8   HEMOGLOBIN g/dL 13.6   < > 16.0   HEMATOCRIT % 38.7*   < > 45.4   PLATELETS AUTO x10*3/uL 131*   < > 176   NEUTROS PCT AUTO %  --   --  68.9   LYMPHS PCT AUTO %  --   --  22.1   MONOS PCT AUTO %  --   --  7.4   EOS PCT AUTO %  --   --  0.8     < > = values in this interval not displayed.     ESR:    Lab Results   Component Value Date    SEDRATE 1 03/24/2021     CMP:    Results from last 7 days   Lab Units 02/09/25  0536 02/08/25  0521 02/07/25  0639 02/06/25  0309 02/05/25  2147   SODIUM mmol/L 134* 132* 134*   < > 129*   POTASSIUM mmol/L 4.0 3.2* 3.7   < > 4.4   CHLORIDE mmol/L 99 99 100   < > 93*   CO2 mmol/L 25 26 25   < > 23   BUN mg/dL 12 17 17   < > 16   CREATININE mg/dL 0.66 0.70 0.76   < > 0.99   CALCIUM mg/dL 8.5* 8.5* 8.9   < > 9.3   PROTEIN TOTAL g/dL  --   --   --   --  7.3   BILIRUBIN TOTAL mg/dL  --   --   --   --  0.6   ALK PHOS U/L  --   --   --   --  110   ALT U/L  --   --   --   --  12   AST U/L  --   --   --   --  15   GLUCOSE mg/dL 93 188* 246*   < > 438*    < > = values in this interval not displayed.     Magnesium:   Results from last 7 days   Lab Units 02/09/25  0536 02/08/25  0521 02/07/25  0639   MAGNESIUM mg/dL 1.92 1.62 1.78     Troponin:    Results from last 7 days   Lab Units 02/06/25  0800 02/06/25  0309 02/05/25  2248 02/05/25  2147   TROPHS ng/L 2,450* 1,858* 353* 299*     Lipid Panel:    Lab Results   Component Value Date    CHOL 258 (H) 02/05/2025    CHOL 319 (H) 12/26/2024     Lab Results   Component Value Date    HDL 24.7 02/05/2025    HDL 32.8 12/26/2024     Lab Results   Component Value Date    LDLCALC 172 (H) 02/05/2025    LDLCALC  12/26/2024      Comment:      The calculation of LDL and VLDL are inaccurate when the Triglycerides are greater than 400 mg/dL or when the patient is non-fasting. If LDL measurement is necessary contact the testing laboratory for an alternative LDL assay.                                  Near   Borderline      AGE      Desirable  Optimal    High     High     Very High     0-19 Y     0 - 109     ---    110-129   >/= 130     ----    20-24 Y     0 - 119     ---    120-159   >/= 160     ----      >24 Y     0 -  99   100-129  130-159   160-189     >/=190       Lab Results   Component Value Date     TRIG 309 (H) 02/05/2025    TRIG 468 (H) 12/26/2024     Urinalysis:    Lab Results   Component Value Date    COLORU Light-Yellow 12/26/2024    APPEARANCEU Clear 12/26/2024    SPECGRAVU 1.043 (N) 12/26/2024    NORMA 5.5 12/26/2024    PROTUR 30 (1+) (A) 12/26/2024    GLUCOSEU OVER (4+) (A) 12/26/2024    BLOODU NEGATIVE 12/26/2024    KETONESU TRACE (A) 12/26/2024    BILIRUBINU NEGATIVE 12/26/2024    UROBILINOGEN Normal 12/26/2024    NITRITEU NEGATIVE 12/26/2024    LEUKOCYTESU NEGATIVE 12/26/2024    WBCU NONE 12/26/2024    RBCU NONE 12/26/2024    MUCUSU FEW 12/26/2024         Results for orders placed or performed during the hospital encounter of 02/05/25 (from the past 24 hours)   POCT GLUCOSE   Result Value Ref Range    POCT Glucose 193 (H) 74 - 99 mg/dL   POCT GLUCOSE   Result Value Ref Range    POCT Glucose 196 (H) 74 - 99 mg/dL   POCT GLUCOSE   Result Value Ref Range    POCT Glucose 186 (H) 74 - 99 mg/dL   ECG 12 lead   Result Value Ref Range    Ventricular Rate 61 BPM    Atrial Rate 81 BPM    QRS Duration 100 ms    QT Interval 402 ms    QTC Calculation(Bazett) 404 ms    P Axis 53 degrees    R Axis -85 degrees    T Axis 87 degrees    QRS Count 10 beats    Q Onset 213 ms    T Offset 414 ms    QTC Fredericia 404 ms   Basic Metabolic Panel   Result Value Ref Range    Glucose 93 74 - 99 mg/dL    Sodium 134 (L) 136 - 145 mmol/L    Potassium 4.0 3.5 - 5.3 mmol/L    Chloride 99 98 - 107 mmol/L    Bicarbonate 25 21 - 32 mmol/L    Anion Gap 14 10 - 20 mmol/L    Urea Nitrogen 12 6 - 23 mg/dL    Creatinine 0.66 0.50 - 1.30 mg/dL    eGFR >90 >60 mL/min/1.73m*2    Calcium 8.5 (L) 8.6 - 10.3 mg/dL   Magnesium   Result Value Ref Range    Magnesium 1.92 1.60 - 2.40 mg/dL   CBC   Result Value Ref Range    WBC 6.0 4.4 - 11.3 x10*3/uL    nRBC 0.0 0.0 - 0.0 /100 WBCs    RBC 4.69 4.50 - 5.90 x10*6/uL    Hemoglobin 13.6 13.5 - 17.5 g/dL    Hematocrit 38.7 (L) 41.0 - 52.0 %    MCV 83 80 - 100 fL    MCH 29.0 26.0 - 34.0 pg    MCHC 35.1  32.0 - 36.0 g/dL    RDW 12.9 11.5 - 14.5 %    Platelets 131 (L) 150 - 450 x10*3/uL   Heparin Assay, UFH   Result Value Ref Range    Heparin Unfractionated 0.2 See Comment Below for Therapeutic Ranges IU/mL   POCT GLUCOSE   Result Value Ref Range    POCT Glucose 75 74 - 99 mg/dL     Results for orders placed or performed during the hospital encounter of 02/05/25 (from the past 24 hours)   POCT GLUCOSE   Result Value Ref Range    POCT Glucose 193 (H) 74 - 99 mg/dL   POCT GLUCOSE   Result Value Ref Range    POCT Glucose 196 (H) 74 - 99 mg/dL   POCT GLUCOSE   Result Value Ref Range    POCT Glucose 186 (H) 74 - 99 mg/dL   ECG 12 lead   Result Value Ref Range    Ventricular Rate 61 BPM    Atrial Rate 81 BPM    QRS Duration 100 ms    QT Interval 402 ms    QTC Calculation(Bazett) 404 ms    P Axis 53 degrees    R Axis -85 degrees    T Axis 87 degrees    QRS Count 10 beats    Q Onset 213 ms    T Offset 414 ms    QTC Fredericia 404 ms   Basic Metabolic Panel   Result Value Ref Range    Glucose 93 74 - 99 mg/dL    Sodium 134 (L) 136 - 145 mmol/L    Potassium 4.0 3.5 - 5.3 mmol/L    Chloride 99 98 - 107 mmol/L    Bicarbonate 25 21 - 32 mmol/L    Anion Gap 14 10 - 20 mmol/L    Urea Nitrogen 12 6 - 23 mg/dL    Creatinine 0.66 0.50 - 1.30 mg/dL    eGFR >90 >60 mL/min/1.73m*2    Calcium 8.5 (L) 8.6 - 10.3 mg/dL   Magnesium   Result Value Ref Range    Magnesium 1.92 1.60 - 2.40 mg/dL   CBC   Result Value Ref Range    WBC 6.0 4.4 - 11.3 x10*3/uL    nRBC 0.0 0.0 - 0.0 /100 WBCs    RBC 4.69 4.50 - 5.90 x10*6/uL    Hemoglobin 13.6 13.5 - 17.5 g/dL    Hematocrit 38.7 (L) 41.0 - 52.0 %    MCV 83 80 - 100 fL    MCH 29.0 26.0 - 34.0 pg    MCHC 35.1 32.0 - 36.0 g/dL    RDW 12.9 11.5 - 14.5 %    Platelets 131 (L) 150 - 450 x10*3/uL   Heparin Assay, UFH   Result Value Ref Range    Heparin Unfractionated 0.2 See Comment Below for Therapeutic Ranges IU/mL   POCT GLUCOSE   Result Value Ref Range    POCT Glucose 75 74 - 99 mg/dL       IMAGING      Transthoracic Echo Complete   Final Result      Vascular US lower extremity vein mapping bilateral   Final Result   Lower extremity venous mapping as detailed above.        MACRO:   None.        Signed by: Amarjit Ortiz 2/7/2025 7:25 AM   Dictation workstation:   [id number]      Carotid duplex bilateral   Final Result   Utilizing the peak systolic velocities, the estimated degree of   stenosis within the internal carotid arteries is less than 50%   bilaterally.        MACRO:   None.             Signed by: Amarjit Ortiz 2/7/2025 7:24 AM   Dictation workstation:   [id number]      CT chest wo IV contrast   Final Result   No calcified plaques along the ascending aorta. There is   atherosclerotic disease of the remaining portions of the aorta and   its branches. Indeterminate lesion involving the tail the pancreas.   MRI recommended for further evaluation. Interval development of an 8   mm right lower lobe nodule which may be infectious/inflammatory in   nature but will require follow-up to document resolution.        MACRO:   None.        Signed by: Amarjit Ortiz 2/7/2025 9:05 AM   Dictation workstation:   [id number]      Cardiac Catheterization Procedure   Final Result      XR chest 1 view   Final Result   Mild bilateral opacities may correspond to chronic lung changes and   atelectasis versus pneumonia.        MACRO:   None        Signed by: Tobin Machado 2/5/2025 10:38 PM   Dictation workstation:   [id number]      MRCP pancreas w and wo IV contrast    (Results Pending)       Shanika Reese MD

## 2025-02-10 ENCOUNTER — APPOINTMENT (OUTPATIENT)
Dept: RADIOLOGY | Facility: HOSPITAL | Age: 75
End: 2025-02-10
Payer: MEDICARE

## 2025-02-10 LAB
ANION GAP SERPL CALC-SCNC: 12 MMOL/L (ref 10–20)
ATRIAL RATE: 81 BPM
BUN SERPL-MCNC: 10 MG/DL (ref 6–23)
CALCIUM SERPL-MCNC: 8.8 MG/DL (ref 8.6–10.3)
CARDIAC TROPONIN I PNL SERPL HS: 143 NG/L (ref 0–20)
CHLORIDE SERPL-SCNC: 99 MMOL/L (ref 98–107)
CO2 SERPL-SCNC: 24 MMOL/L (ref 21–32)
CREAT SERPL-MCNC: 0.72 MG/DL (ref 0.5–1.3)
EGFRCR SERPLBLD CKD-EPI 2021: >90 ML/MIN/1.73M*2
ERYTHROCYTE [DISTWIDTH] IN BLOOD BY AUTOMATED COUNT: 12.9 % (ref 11.5–14.5)
GLUCOSE BLD MANUAL STRIP-MCNC: 148 MG/DL (ref 74–99)
GLUCOSE BLD MANUAL STRIP-MCNC: 156 MG/DL (ref 74–99)
GLUCOSE BLD MANUAL STRIP-MCNC: 168 MG/DL (ref 74–99)
GLUCOSE BLD MANUAL STRIP-MCNC: 173 MG/DL (ref 74–99)
GLUCOSE BLD MANUAL STRIP-MCNC: 243 MG/DL (ref 74–99)
GLUCOSE SERPL-MCNC: 231 MG/DL (ref 74–99)
HCT VFR BLD AUTO: 38.1 % (ref 41–52)
HGB BLD-MCNC: 13.4 G/DL (ref 13.5–17.5)
MAGNESIUM SERPL-MCNC: 1.83 MG/DL (ref 1.6–2.4)
MCH RBC QN AUTO: 29 PG (ref 26–34)
MCHC RBC AUTO-ENTMCNC: 35.2 G/DL (ref 32–36)
MCV RBC AUTO: 83 FL (ref 80–100)
NRBC BLD-RTO: 0 /100 WBCS (ref 0–0)
P AXIS: 53 DEGREES
PLATELET # BLD AUTO: 134 X10*3/UL (ref 150–450)
POTASSIUM SERPL-SCNC: 4.2 MMOL/L (ref 3.5–5.3)
Q ONSET: 213 MS
QRS COUNT: 10 BEATS
QRS DURATION: 100 MS
QT INTERVAL: 402 MS
QTC CALCULATION(BAZETT): 404 MS
QTC FREDERICIA: 404 MS
R AXIS: -85 DEGREES
RBC # BLD AUTO: 4.62 X10*6/UL (ref 4.5–5.9)
SODIUM SERPL-SCNC: 131 MMOL/L (ref 136–145)
T AXIS: 87 DEGREES
T OFFSET: 414 MS
UFH PPP CHRO-ACNC: 0.3 IU/ML
VENTRICULAR RATE: 61 BPM
WBC # BLD AUTO: 6.4 X10*3/UL (ref 4.4–11.3)

## 2025-02-10 PROCEDURE — 2500000002 HC RX 250 W HCPCS SELF ADMINISTERED DRUGS (ALT 637 FOR MEDICARE OP, ALT 636 FOR OP/ED): Performed by: INTERNAL MEDICINE

## 2025-02-10 PROCEDURE — 99232 SBSQ HOSP IP/OBS MODERATE 35: CPT | Performed by: NURSE PRACTITIONER

## 2025-02-10 PROCEDURE — 2500000002 HC RX 250 W HCPCS SELF ADMINISTERED DRUGS (ALT 637 FOR MEDICARE OP, ALT 636 FOR OP/ED): Performed by: NURSE PRACTITIONER

## 2025-02-10 PROCEDURE — A9575 INJ GADOTERATE MEGLUMI 0.1ML: HCPCS | Performed by: FAMILY MEDICINE

## 2025-02-10 PROCEDURE — 82947 ASSAY GLUCOSE BLOOD QUANT: CPT

## 2025-02-10 PROCEDURE — 2500000001 HC RX 250 WO HCPCS SELF ADMINISTERED DRUGS (ALT 637 FOR MEDICARE OP): Performed by: INTERNAL MEDICINE

## 2025-02-10 PROCEDURE — 99233 SBSQ HOSP IP/OBS HIGH 50: CPT | Performed by: NURSE PRACTITIONER

## 2025-02-10 PROCEDURE — 2060000001 HC INTERMEDIATE ICU ROOM DAILY

## 2025-02-10 PROCEDURE — 99233 SBSQ HOSP IP/OBS HIGH 50: CPT | Performed by: FAMILY MEDICINE

## 2025-02-10 PROCEDURE — 84484 ASSAY OF TROPONIN QUANT: CPT | Performed by: NURSE PRACTITIONER

## 2025-02-10 PROCEDURE — 2500000001 HC RX 250 WO HCPCS SELF ADMINISTERED DRUGS (ALT 637 FOR MEDICARE OP): Performed by: PSYCHIATRY & NEUROLOGY

## 2025-02-10 PROCEDURE — 36415 COLL VENOUS BLD VENIPUNCTURE: CPT | Performed by: INTERNAL MEDICINE

## 2025-02-10 PROCEDURE — 2550000001 HC RX 255 CONTRASTS: Performed by: FAMILY MEDICINE

## 2025-02-10 PROCEDURE — 85520 HEPARIN ASSAY: CPT | Performed by: FAMILY MEDICINE

## 2025-02-10 PROCEDURE — 92610 EVALUATE SWALLOWING FUNCTION: CPT | Mod: GN

## 2025-02-10 PROCEDURE — 83735 ASSAY OF MAGNESIUM: CPT | Performed by: INTERNAL MEDICINE

## 2025-02-10 PROCEDURE — 85520 HEPARIN ASSAY: CPT | Performed by: INTERNAL MEDICINE

## 2025-02-10 PROCEDURE — 74183 MRI ABD W/O CNTR FLWD CNTR: CPT

## 2025-02-10 PROCEDURE — 76376 3D RENDER W/INTRP POSTPROCES: CPT | Performed by: RADIOLOGY

## 2025-02-10 PROCEDURE — 2500000001 HC RX 250 WO HCPCS SELF ADMINISTERED DRUGS (ALT 637 FOR MEDICARE OP): Performed by: NURSE PRACTITIONER

## 2025-02-10 PROCEDURE — 36415 COLL VENOUS BLD VENIPUNCTURE: CPT | Performed by: FAMILY MEDICINE

## 2025-02-10 PROCEDURE — 85027 COMPLETE CBC AUTOMATED: CPT | Performed by: INTERNAL MEDICINE

## 2025-02-10 PROCEDURE — 2500000004 HC RX 250 GENERAL PHARMACY W/ HCPCS (ALT 636 FOR OP/ED): Performed by: NURSE PRACTITIONER

## 2025-02-10 PROCEDURE — 74183 MRI ABD W/O CNTR FLWD CNTR: CPT | Performed by: RADIOLOGY

## 2025-02-10 PROCEDURE — 80048 BASIC METABOLIC PNL TOTAL CA: CPT | Performed by: INTERNAL MEDICINE

## 2025-02-10 RX ORDER — GADOTERATE MEGLUMINE 376.9 MG/ML
17 INJECTION INTRAVENOUS
Status: COMPLETED | OUTPATIENT
Start: 2025-02-10 | End: 2025-02-10

## 2025-02-10 RX ORDER — INSULIN LISPRO 100 [IU]/ML
10 INJECTION, SOLUTION INTRAVENOUS; SUBCUTANEOUS
Status: DISCONTINUED | OUTPATIENT
Start: 2025-02-10 | End: 2025-02-13

## 2025-02-10 RX ORDER — INSULIN GLARGINE 100 [IU]/ML
25 INJECTION, SOLUTION SUBCUTANEOUS NIGHTLY
Status: DISCONTINUED | OUTPATIENT
Start: 2025-02-10 | End: 2025-02-13

## 2025-02-10 RX ADMIN — INSULIN LISPRO 6 UNITS: 100 INJECTION, SOLUTION INTRAVENOUS; SUBCUTANEOUS at 08:29

## 2025-02-10 RX ADMIN — INSULIN GLARGINE 32 UNITS: 100 INJECTION, SOLUTION SUBCUTANEOUS at 08:28

## 2025-02-10 RX ADMIN — PANTOPRAZOLE SODIUM 40 MG: 40 TABLET, DELAYED RELEASE ORAL at 06:38

## 2025-02-10 RX ADMIN — HYDROXYZINE HYDROCHLORIDE 25 MG: 25 TABLET ORAL at 08:27

## 2025-02-10 RX ADMIN — INSULIN LISPRO 3 UNITS: 100 INJECTION, SOLUTION INTRAVENOUS; SUBCUTANEOUS at 16:34

## 2025-02-10 RX ADMIN — LOVASTATIN 40 MG: 10 TABLET ORAL at 20:38

## 2025-02-10 RX ADMIN — INSULIN LISPRO 5 UNITS: 100 INJECTION, SOLUTION INTRAVENOUS; SUBCUTANEOUS at 08:28

## 2025-02-10 RX ADMIN — HEPARIN SODIUM 1400 UNITS/HR: 10000 INJECTION, SOLUTION INTRAVENOUS at 08:30

## 2025-02-10 RX ADMIN — HYDROXYZINE HYDROCHLORIDE 25 MG: 25 TABLET ORAL at 20:38

## 2025-02-10 RX ADMIN — ASPIRIN 81 MG: 81 TABLET, CHEWABLE ORAL at 08:27

## 2025-02-10 RX ADMIN — INSULIN LISPRO 10 UNITS: 100 INJECTION, SOLUTION INTRAVENOUS; SUBCUTANEOUS at 11:33

## 2025-02-10 RX ADMIN — AMLODIPINE BESYLATE 5 MG: 5 TABLET ORAL at 08:27

## 2025-02-10 RX ADMIN — INSULIN LISPRO 10 UNITS: 100 INJECTION, SOLUTION INTRAVENOUS; SUBCUTANEOUS at 16:34

## 2025-02-10 RX ADMIN — ISOSORBIDE MONONITRATE 30 MG: 30 TABLET, EXTENDED RELEASE ORAL at 11:33

## 2025-02-10 RX ADMIN — INSULIN GLARGINE 25 UNITS: 100 INJECTION, SOLUTION SUBCUTANEOUS at 20:38

## 2025-02-10 RX ADMIN — ACETAMINOPHEN 650 MG: 325 TABLET ORAL at 17:51

## 2025-02-10 RX ADMIN — GADOTERATE MEGLUMINE 17 ML: 376.9 INJECTION INTRAVENOUS at 15:07

## 2025-02-10 RX ADMIN — EZETIMIBE 10 MG: 10 TABLET ORAL at 20:38

## 2025-02-10 RX ADMIN — VENLAFAXINE HYDROCHLORIDE 150 MG: 150 CAPSULE, EXTENDED RELEASE ORAL at 08:27

## 2025-02-10 RX ADMIN — BUPROPION HYDROCHLORIDE 150 MG: 150 TABLET, EXTENDED RELEASE ORAL at 08:27

## 2025-02-10 ASSESSMENT — COGNITIVE AND FUNCTIONAL STATUS - GENERAL
MOBILITY SCORE: 24
DAILY ACTIVITIY SCORE: 24
DAILY ACTIVITIY SCORE: 24
MOBILITY SCORE: 24

## 2025-02-10 ASSESSMENT — PAIN SCALES - GENERAL
PAINLEVEL_OUTOF10: 0 - NO PAIN
PAINLEVEL_OUTOF10: 0 - NO PAIN
PAINLEVEL_OUTOF10: 5 - MODERATE PAIN

## 2025-02-10 ASSESSMENT — PAIN - FUNCTIONAL ASSESSMENT: PAIN_FUNCTIONAL_ASSESSMENT: 0-10

## 2025-02-10 NOTE — CARE PLAN
The clinical goals for the shift include saftey    Problem: Diabetes  Goal: Achieve decreasing blood glucose levels by end of shift  Outcome: Progressing  Goal: Increase stability of blood glucose readings by end of shift  Outcome: Progressing  Goal: Decrease in ketones present in urine by end of shift  Outcome: Progressing  Goal: Maintain electrolyte levels within acceptable range throughout shift  Outcome: Progressing  Goal: Maintain glucose levels >70mg/dl to <250mg/dl throughout shift  Outcome: Progressing  Goal: No changes in neurological exam by end of shift  Outcome: Progressing  Goal: Learn about and adhere to nutrition recommendations by end of shift  Outcome: Progressing  Goal: Vital signs within normal range for age by end of shift  Outcome: Progressing  Goal: Increase self care and/or family involovement by end of shift  Outcome: Progressing  Goal: Receive DSME education by end of shift  Outcome: Progressing     Problem: Pain - Adult  Goal: Verbalizes/displays adequate comfort level or baseline comfort level  Outcome: Progressing     Problem: Safety - Adult  Goal: Free from fall injury  Outcome: Progressing     Problem: Discharge Planning  Goal: Discharge to home or other facility with appropriate resources  Outcome: Progressing     Problem: Chronic Conditions and Co-morbidities  Goal: Patient's chronic conditions and co-morbidity symptoms are monitored and maintained or improved  Outcome: Progressing     Problem: Nutrition  Goal: Nutrient intake appropriate for maintaining nutritional needs  Outcome: Progressing     Problem: Fall/Injury  Goal: Not fall by end of shift  Outcome: Progressing  Goal: Be free from injury by end of the shift  Outcome: Progressing  Goal: Verbalize understanding of personal risk factors for fall in the hospital  Outcome: Progressing  Goal: Verbalize understanding of risk factor reduction measures to prevent injury from fall in the home  Outcome: Progressing  Goal: Use assistive  devices by end of the shift  Outcome: Progressing  Goal: Pace activities to prevent fatigue by end of the shift  Outcome: Progressing

## 2025-02-10 NOTE — PROGRESS NOTES
Rounding Cardiologist:  Julian Tidwell, APRN-CNP,     Primary Cardiologist: Dr. Roberto Haskins       Date:  2/10/2025    Patient:  Feliciano Worthington    YOB: 1950  MRN:  [id number]     Admit Date:  2/5/2025      SUBJECTIVE:      2/10/25  Resting in bed with no complaints.  Denies any chest pain or shortness of breath  Cardiac surgery evaluation still taking place    2/9/25    Patient overall feels well.  No changes.  Vital signs stable.  Continue plan for coronary bypass surgery which.    2/8/25    Patient doing well.  Asymptomatic.  Vital signs stable.    Awaiting workup for planned coronary bypass surgery.      2/7/25    He denies any chest pain or shortness of breath.     LHC revealed 70% stenosis in the distal left main, 80% stenosis in the proximal LAD, 90% stenosis in the proximal circumflex artery, 75% stenosis in the proximal ramus, 80% stenosis in the mid ramus, 80% stenosis in the mid RCA, 90% stenosis in the distal RCA and an LVEF of 55%.     Cardiac surgery evaluation taking place      ASSESSMENT:     NSTEMI   History of coronary artery disease and remote stenting to the circumflex and right coronary arteries, last PCI 2011  Severe multivessel coronary artery disease by cath 2/2025, Pending CABG.  Normal LV function, LVEF 55%, Echo 2/25 0  Mobitz type I second-degree AV block  Hypertension  Hyperlipidemia   Diabetes mellitus  COPD with chronic tobacco use  Hyponatremia, sodium stable on admission  Otherwise as noted below and prior to      PLAN:     Monitor on telemetry.  Remains in normal sinus rhythm with first-degree AV block.  Rates are 70.  Supplemental O2, keep SpO2 greater than 92%  Monitor electrolytes, keep potassium greater than 4 and magnesium greater than 2  LHC revealed 70% stenosis in the distal left main, 80% stenosis in the proximal LAD, 90% stenosis in the proximal circumflex artery, 75% stenosis in the proximal ramus, 80% stenosis in the mid ramus, 80%  stenosis in the mid RCA, 90% stenosis in the distal RCA and an LVEF of 55%.   CABG evaluation taking place by cardiothoracic surgery team  Echocardiogram today  Continue heparin infusion for now, defer further recommendation to cardiothoracic team  Endocrinology following for type 2 diabetes mellitus with uncontrolled hemoglobin A1c  Nicotine patch for smoking cessation  Will continue to follow along with you      Julian Tidwell [facility]  Adult Gerontology Acute Care Nurse Practitioner  [facility] [place]   [facility]  [phone]          VITALS:     Vitals:    02/09/25 1915 02/10/25 0045 02/10/25 0435 02/10/25 0729   BP: 137/66 147/62 130/60 139/81   BP Location:       Patient Position:       Pulse: 57 50 54 51   Resp:  20 18 17   Temp: 36.2 °C (97.2 °F) 36.7 °C (98.1 °F) 36.3 °C (97.3 °F) 36.3 °C (97.3 °F)   TempSrc:       SpO2: 96% 97% 98% 97%   Weight:       Height:           Intake/Output Summary (Last 24 hours) at 2/10/2025 0953  Last data filed at 2/9/2025 1411  Gross per 24 hour   Intake 70.87 ml   Output --   Net 70.87 ml       Wt Readings from Last 4 Encounters:   02/08/25 87.1 kg (192 lb 0.3 oz)   01/29/25 85.5 kg (188 lb 6.4 oz)   10/15/24 90.3 kg (199 lb)   10/04/24 89.8 kg (198 lb)       CURRENT HOSPITAL MEDICATIONS:   amLODIPine, 5 mg, oral, Daily  aspirin, 81 mg, oral, Daily  buPROPion XL, 150 mg, oral, Daily  ezetimibe, 10 mg, oral, Nightly  hydrOXYzine HCL, 25 mg, oral, BID  insulin glargine, 32 Units, subcutaneous, q AM  insulin glargine, 35 Units, subcutaneous, Nightly  insulin lispro, 0-15 Units, subcutaneous, TID AC  insulin lispro, 5 Units, subcutaneous, TID AC  isosorbide mononitrate ER, 30 mg, oral, Daily  LORazepam, 0.5 mg, intravenous, Once  lovastatin, 40 mg, oral, Nightly  pantoprazole, 40 mg, oral, Daily  polyethylene glycol, 17 g, oral, Daily  venlafaxine XR, 150 mg, oral, Daily with breakfast      heparin, 0-4,000 Units/hr,  Last Rate: 1,400 Units/hr (02/10/25 0830)      Current Outpatient Medications   Medication Instructions    ACETAMINOPHEN ORAL 650 mg, Every 8 hours PRN    alcohol swabs (BD Alcohol Swabs) pads, medicated Use twice daily while checking sugar.    amLODIPine (NORVASC) 5 mg, oral, Daily    aspirin 81 mg, Daily    blood glucose control high,low (Accu-Chek Guide L1-L2 Ctrl Sol) solution 1 bottle, miscellaneous, Daily PRN    blood sugar diagnostic (Accu-Chek Guide test strips) strip Test twice daily    blood-glucose meter (Accu-Chek Guide Glucose Meter) misc Test twice daily    chlorthalidone (Hygroton) 25 mg tablet TAKE 1 TABLET EVERY MONDAY AND THURSDAY    ezetimibe (ZETIA) 10 mg, oral, Daily    glipiZIDE (GLUCOTROL) 10 mg, oral, 2 times daily before meals    hydrOXYzine HCL (ATARAX) 25 mg, oral, 2 times daily    insulin glargine (LANTUS U-100 INSULIN) 32 Units, Every morning    ipratropium (Atrovent) 21 mcg (0.03 %) nasal spray 2 sprays, Each Nostril, Every 12 hours    lancets (Accu-Chek Softclix Lancets) misc Test twice daily    nitroglycerin (NITROSTAT) 0.4 mg, sublingual, Every 5 min PRN, DO NOT EXCEED A TOTAL OF 3 DOSES IN 15 MINUTES    omeprazole (PRILOSEC) 40 mg, oral, Daily before breakfast, Do not crush or chew.    ramipril (ALTACE) 5 mg, oral, 2 times daily    sertraline (ZOLOFT) 25 mg, oral, Daily    venlafaxine XR (EFFEXOR-XR) 150 mg, oral, Daily, Do not crush or chew.        PHYSICAL EXAMINATION:     Physical Exam  HENT:      Head: Normocephalic.      Mouth/Throat:      Mouth: Mucous membranes are moist.   Eyes:      Pupils: Pupils are equal, round, and reactive to light.   Cardiovascular:      Rate and Rhythm: Normal rate and regular rhythm.   Pulmonary:      Effort: Pulmonary effort is normal.      Comments: Wheezing  Abdominal:      General: Bowel sounds are normal.      Palpations: Abdomen is soft.   Musculoskeletal:         General: Normal range of motion.   Skin:     General: Skin is warm and dry.       Capillary Refill: Capillary refill takes less than 2 seconds.   Neurological:      Mental Status: He is alert and oriented to person, place, and time.   Psychiatric:         Mood and Affect: Mood normal.         LAB DATA:     CBC:   Results from last 7 days   Lab Units 02/10/25  0502 02/09/25 0536 02/08/25 0521   WBC AUTO x10*3/uL 6.4 6.0 6.8   RBC AUTO x10*6/uL 4.62 4.69 4.81   HEMOGLOBIN g/dL 13.4* 13.6 14.1   HEMATOCRIT % 38.1* 38.7* 40.0*   MCV fL 83 83 83   MCH pg 29.0 29.0 29.3   MCHC g/dL 35.2 35.1 35.3   RDW % 12.9 12.9 12.8   PLATELETS AUTO x10*3/uL 134* 131* 135*     CMP:    Results from last 7 days   Lab Units 02/10/25  0502 02/09/25  0536 02/08/25  0521 02/06/25  0309 02/05/25  2147   SODIUM mmol/L 131* 134* 132*   < > 129*   POTASSIUM mmol/L 4.2 4.0 3.2*   < > 4.4   CHLORIDE mmol/L 99 99 99   < > 93*   CO2 mmol/L 24 25 26   < > 23   BUN mg/dL 10 12 17   < > 16   CREATININE mg/dL 0.72 0.66 0.70   < > 0.99   GLUCOSE mg/dL 231* 93 188*   < > 438*   PROTEIN TOTAL g/dL  --   --   --   --  7.3   CALCIUM mg/dL 8.8 8.5* 8.5*   < > 9.3   BILIRUBIN TOTAL mg/dL  --   --   --   --  0.6   ALK PHOS U/L  --   --   --   --  110   AST U/L  --   --   --   --  15   ALT U/L  --   --   --   --  12    < > = values in this interval not displayed.     BMP:    Results from last 7 days   Lab Units 02/10/25  0502 02/09/25 0536 02/08/25  0521   SODIUM mmol/L 131* 134* 132*   POTASSIUM mmol/L 4.2 4.0 3.2*   CHLORIDE mmol/L 99 99 99   CO2 mmol/L 24 25 26   BUN mg/dL 10 12 17   CREATININE mg/dL 0.72 0.66 0.70   CALCIUM mg/dL 8.8 8.5* 8.5*   GLUCOSE mg/dL 231* 93 188*     Magnesium:  Results from last 7 days   Lab Units 02/10/25  0502 02/09/25  0536 02/08/25  0521   MAGNESIUM mg/dL 1.83 1.92 1.62     Troponin:    Results from last 7 days   Lab Units 02/06/25  0800 02/06/25  0309 02/05/25  2248   TROPHS ng/L 2,450* 1,858* 353*     BNP:     Lipid Panel:  Results from last 7 days   Lab Units 02/05/25  2248   HDL mg/dL 24.7    CHOLESTEROL/HDL RATIO  10.4   VLDL mg/dL 62*   TRIGLYCERIDES mg/dL 309*   NON HDL CHOL. mg/dL 233*        DIAGNOSTIC TESTING:       Echocardiogram:     Coronary Angiography:   Left Heart Cath, With LV 02/06/2025    [facility], Cath Lab  [address] [place], [place] [id number]    Cardiovascular Catheterization Report    Patient Name:      JAIME FERNANDEZ      Performing Physician:  Litzy Montana MD  Study Date:        2/6/2025             Verifying Physician:   Litzy Montana MD  MRN/PID:           [id number]             Cardiologist/Co-Scrub:  Accession#:        [id number]         Ordering Provider:     [id number] DERIK ENAMORADO  Date of Birth/Age: 1950 / 74 years Cardiologist:  Gender:            M                    Fellow:  Encounter#:        [id number]           Surgeon:      Study: Left Heart Cath      Indications:  JAIME FERNANDEZ is a 75 year old male who presents with dyslipidemia, hypertension, prior myocardial infarction, diabetes, smoker, chronic pulmonary disease, coronary artery disease, prior percutaneous coronary intervention and a chest pain assessment of typical angina. NSTE - ACS.    Procedure Description:  After infiltration with 2% Lidocaine, the right femoral artery was cannulated with a modified Seldinger technique. Subsequently a 5 Arabic sheath was placed in the right femoral artery. Selective coronary catheterization was performed using a 5 Fr catheter(s) exchanged over a guide wire to cannulate the coronary arteries. A JL 4 tip catheter was used for left coronary injections. A 3drc tip catheter was used for right coronary injections.  Multiple injections of contrast were made into the left and right coronary arteries with angiograms recorded in multiple projections. Retrograde left heart catheterizion was accomplished with a 5 Fr. pigtail catheter. A single plane left ventriculogram was recorded in the 30 degree RAYMOND projection. The contrast  dose was 20 ml injected at 10 ml/sec. The catheter was then withdrawn across the aortic valve under continuous pressure monitoring and removed. After completion of the procedure, femoral artery angiography was performed. This demonstrated a common femoral artery puncture appropriate for closure. A Vascade 5F vascular closure Device was placed per protocol.    Coronary Angiography:  The coronary circulation is right dominant.    Left Main Coronary Artery:  The left main coronary artery is calcified. There is 70% stenosis in the distal left main coronary artery.    Left Anterior Descending Coronary Artery Distribution:  There is 80% stenosis in the proximal left anterior descending artery.    Circumflex Coronary Artery Distribution:  There is 90% stenosis in the the proximal Circumflex artery. There is 75% stenosis in the Prox Ramus Circumflex artery. There is 80% stenosis in the Mid Ramus Circumflex artery.    Right Coronary Artery Distribution:    There is 80% stenosis in the the mid Right Coronary Artery. There is evidence of instent restenosis in this segment. The distal right coronary artery showed 90% stenosis.      Left Ventriculography:  The estimated left ventricular ejection fraction is normal at 55%.    Hemo Personnel:  +---------------------------+---------+  Name                       Duty       +---------------------------+---------+  Wil Bronson MD 1  +---------------------------+---------+      Hemodynamic Pressures:    +----+------------------+---------+-------------+-------------+------+---------+  Site    Date Time       Phase  Systolic mmHg  Diastolic    ED  Mean mmHg                          Name                    mmHg      mmHg            +----+------------------+---------+-------------+-------------+------+---------+    AO  2/6/2025 9:36:07 AIR REST          127           10             87                      AM                                                     +----+------------------+---------+-------------+-------------+------+---------+    AO  2/6/2025 9:39:06 AIR REST           91           61             65                      AM                                                    +----+------------------+---------+-------------+-------------+------+---------+    AO  2/6/2025 9:44:05 AIR REST          133           72             96                      AM                                                    +----+------------------+---------+-------------+-------------+------+---------+    LV  2/6/2025 9:47:36 AIR REST          115           -1     6                               AM                                                    +----+------------------+---------+-------------+-------------+------+---------+      Cardiac Cath Post Procedure Notes:  Post Procedure Diagnosis: Triple vessel disease.  Blood Loss:               Estimated blood loss during the procedure was 0 mls.  Specimens Removed:        Number of specimen(s) removed: none.    ____________________________________________________________________________________  CONCLUSIONS:  1. Distal Left Main: 70% stenosis.  2. Left Main Coronary Artery: This artery is calcified.  3. Proximal LAD Lesion: The percent stenosis is 80%.  4. Proximal CX Lesion: The percent stenosis is 90%.  5. Prox Ramus CX Lesion: The percent stenosis is 75%.  6. Mid Ramus CX Lesion: The percent stenosis is 80%.  7. Mid RCA Lesion: The percent stenosis is 80%.  8. Mid RCA Lesion: Instent Restenosis.  9. Distal RCA Lesion: The percent stenosis is 90%.  10. The Left Ventricular Ejection Fraction is 55%.  11. Mid CX Lesion: 100%.    ICD 10 Codes:  Non ST elevation (NSTEMI) myocardial infarction-I21.4    CPT Codes:  Left Heart Cath (visualization of coronaries) and LV-00501; Moderate Sedation Services initial 15 minutes patient >5 years-[id number]    [id number] Wil  Nan ESPINOZA  Performing Physician  Electronically signed by [id number] Wil Montana MD on 2/6/2025 at 10:19:09  AM              RADIOLOGY:     Transthoracic Echo Complete   Final Result      Vascular US lower extremity vein mapping bilateral   Final Result   Lower extremity venous mapping as detailed above.        MACRO:   None.        Signed by: Amarjit Ortiz 2/7/2025 7:25 AM   Dictation workstation:   [id number]      Carotid duplex bilateral   Final Result   Utilizing the peak systolic velocities, the estimated degree of   stenosis within the internal carotid arteries is less than 50%   bilaterally.        MACRO:   None.             Signed by: Amarjit Ortiz 2/7/2025 7:24 AM   Dictation workstation:   [id number]      CT chest wo IV contrast   Final Result   No calcified plaques along the ascending aorta. There is   atherosclerotic disease of the remaining portions of the aorta and   its branches. Indeterminate lesion involving the tail the pancreas.   MRI recommended for further evaluation. Interval development of an 8   mm right lower lobe nodule which may be infectious/inflammatory in   nature but will require follow-up to document resolution.        MACRO:   None.        Signed by: Amarjit Ortiz 2/7/2025 9:05 AM   Dictation workstation:   [id number]      Cardiac Catheterization Procedure   Final Result      XR chest 1 view   Final Result   Mild bilateral opacities may correspond to chronic lung changes and   atelectasis versus pneumonia.        MACRO:   None        Signed by: Tobin Machado 2/5/2025 10:38 PM   Dictation workstation:   [id number]      MRCP pancreas w and wo IV contrast    (Results Pending)       PROBLEM LIST     Patient Active Problem List   Diagnosis    Primary hypertension    Chronic obstructive pulmonary disease, unspecified    Current every day smoker    GERD (gastroesophageal reflux disease)    Arcus senilis, bilateral    Astigmatism of both eyes    Cataract, bilateral    Chronic venous  stasis dermatitis    Depression, major, in remission (CMS-HCC)    Erectile dysfunction    Hiatal hernia with GERD    Multiple lung nodules on CT    Nuclear sclerotic cataract of both eyes    Osteoarthritis of hips, bilateral    Other spondylosis, cervical region    Periumbilical hernia    Testosterone deficiency    Smoker    Sleep apnea    Vitamin D deficiency    Noncompliance with dietary restriction    Arthritis of left acromioclavicular joint    Bruit of right carotid artery    Degenerative joint disease (DJD) of hip    Spinal stenosis of cervical region    Encounter for immunization    Mixed hyperlipidemia    CAD (coronary artery disease)    Mobitz type 1 second degree AV block    Type 2 diabetes mellitus with hyperglycemia, with long-term current use of insulin    Hyponatremia    Chest pain, unspecified type    NSTEMI (non-ST elevated myocardial infarction) (Multi)    Noncompliance with CPAP treatment    Obstructive sleep apnea    Recurrent major depressive disorder, in partial remission (CMS-HCC)    Hx of heart artery stent    Oropharyngeal dysphagia    History of constipation    Noncompliance with diabetes treatment    Abnormal weight loss    Hypokalemia             Of note, this documentation is completed using the Dragon Dictation system (voice recognition software). There may be spelling and/or grammatical errors that were not corrected prior to final submission.    Please do not hesitate to call with questions.  Electronically signed by KHADIJAH Claros, on 2/10/2025 at 9:53 AM

## 2025-02-10 NOTE — PROGRESS NOTES
02/10/25 1141   Discharge Planning   Living Arrangements Spouse/significant other   Support Systems Spouse/significant other   Assistance Needed PTA - none, reports independent at baseline.   Type of Residence Private residence   Home or Post Acute Services In home services;Post acute facilities (Rehab/SNF/etc)   Expected Discharge Disposition  --    Does the patient need discharge transport arranged? No   Patient Choice   Provider Choice list and CMS website (https://medicare.gov/care-compare#search) for post-acute Quality and Resource Measure Data were provided and reviewed with: Patient   Patient / Family choosing to utilize agency / facility established prior to hospitalization No   Intensity of Service   Intensity of Service 0-30 min     Presented for chest pain that was not relieved with Nitro. Taken for LHC for NSTEMI on 2/6 with Dr Bronson. CT surgery consulted for recommendation of CABG. Pre-op testing in process, surgery date TBD. Will likely need HHC vs SNF at dc once post op therapy eval done.

## 2025-02-10 NOTE — PROGRESS NOTES
"Feliciano Worthington is a 74 y.o. male on day 4 of admission presenting with NSTEMI (non-ST elevated myocardial infarction) (Multi).    Subjective   Hospital follow-up.  Patient seen at the bedside earlier this morning.  Was resting comfortably.  No current episodes of chest pain.  Appreciate ongoing cardiology follow-up.  Medical management.  Multivessel coronary artery disease.  Echocardiogram to be done.  Appreciate cardiothoracic surgery consultation.  Preoperative discussion.  Awaiting MRCP results of pancreatic lesion.  Associated with weight loss.  COPD and pulmonary nodules noted on imaging.  Glycemic control.  Had been started on depression and anxiety medications.  Patient is adjusted by psychiatry change sertraline to bupropion.       Objective     Physical Exam  Alert breathing B no distress scattered rhonchi anteriorly distant heart sounds no acute focal neurologic deficits     Last Recorded Vitals  Blood pressure 127/67, pulse 53, temperature 36.2 °C (97.2 °F), temperature source Temporal, resp. rate 18, height 1.778 m (5' 10\"), weight 87.1 kg (192 lb 0.3 oz), SpO2 97%.  Intake/Output last 3 Shifts:  I/O last 3 completed shifts:  In: 517.9 (5.9 mL/kg) [P.O.:250; I.V.:267.9 (3.1 mL/kg)]  Out: - (0 mL/kg)   Weight: 87.1 kg     Relevant Results  Recent Results (from the past 72 hours)   POCT GLUCOSE    Collection Time: 02/07/25  8:39 PM   Result Value Ref Range    POCT Glucose 140 (H) 74 - 99 mg/dL   CBC    Collection Time: 02/08/25  5:21 AM   Result Value Ref Range    WBC 6.8 4.4 - 11.3 x10*3/uL    nRBC 0.0 0.0 - 0.0 /100 WBCs    RBC 4.81 4.50 - 5.90 x10*6/uL    Hemoglobin 14.1 13.5 - 17.5 g/dL    Hematocrit 40.0 (L) 41.0 - 52.0 %    MCV 83 80 - 100 fL    MCH 29.3 26.0 - 34.0 pg    MCHC 35.3 32.0 - 36.0 g/dL    RDW 12.8 11.5 - 14.5 %    Platelets 135 (L) 150 - 450 x10*3/uL   Renal Function Panel    Collection Time: 02/08/25  5:21 AM   Result Value Ref Range    Glucose 188 (H) 74 - 99 mg/dL    Sodium 132 (L) " 136 - 145 mmol/L    Potassium 3.2 (L) 3.5 - 5.3 mmol/L    Chloride 99 98 - 107 mmol/L    Bicarbonate 26 21 - 32 mmol/L    Anion Gap 10 10 - 20 mmol/L    Urea Nitrogen 17 6 - 23 mg/dL    Creatinine 0.70 0.50 - 1.30 mg/dL    eGFR >90 >60 mL/min/1.73m*2    Calcium 8.5 (L) 8.6 - 10.3 mg/dL    Phosphorus 3.4 2.5 - 4.9 mg/dL    Albumin 3.4 3.4 - 5.0 g/dL   Magnesium    Collection Time: 02/08/25  5:21 AM   Result Value Ref Range    Magnesium 1.62 1.60 - 2.40 mg/dL   Heparin Assay, UFH    Collection Time: 02/08/25  5:21 AM   Result Value Ref Range    Heparin Unfractionated 0.3 See Comment Below for Therapeutic Ranges IU/mL   POCT GLUCOSE    Collection Time: 02/08/25  7:06 AM   Result Value Ref Range    POCT Glucose 208 (H) 74 - 99 mg/dL   POCT GLUCOSE    Collection Time: 02/08/25 11:47 AM   Result Value Ref Range    POCT Glucose 193 (H) 74 - 99 mg/dL   POCT GLUCOSE    Collection Time: 02/08/25  5:12 PM   Result Value Ref Range    POCT Glucose 196 (H) 74 - 99 mg/dL   POCT GLUCOSE    Collection Time: 02/08/25  8:28 PM   Result Value Ref Range    POCT Glucose 186 (H) 74 - 99 mg/dL   ECG 12 lead    Collection Time: 02/08/25 10:10 PM   Result Value Ref Range    Ventricular Rate 61 BPM    Atrial Rate 81 BPM    QRS Duration 100 ms    QT Interval 402 ms    QTC Calculation(Bazett) 404 ms    P Axis 53 degrees    R Axis -85 degrees    T Axis 87 degrees    QRS Count 10 beats    Q Onset 213 ms    T Offset 414 ms    QTC Fredericia 404 ms   Basic Metabolic Panel    Collection Time: 02/09/25  5:36 AM   Result Value Ref Range    Glucose 93 74 - 99 mg/dL    Sodium 134 (L) 136 - 145 mmol/L    Potassium 4.0 3.5 - 5.3 mmol/L    Chloride 99 98 - 107 mmol/L    Bicarbonate 25 21 - 32 mmol/L    Anion Gap 14 10 - 20 mmol/L    Urea Nitrogen 12 6 - 23 mg/dL    Creatinine 0.66 0.50 - 1.30 mg/dL    eGFR >90 >60 mL/min/1.73m*2    Calcium 8.5 (L) 8.6 - 10.3 mg/dL   Magnesium    Collection Time: 02/09/25  5:36 AM   Result Value Ref Range    Magnesium 1.92  1.60 - 2.40 mg/dL   CBC    Collection Time: 02/09/25  5:36 AM   Result Value Ref Range    WBC 6.0 4.4 - 11.3 x10*3/uL    nRBC 0.0 0.0 - 0.0 /100 WBCs    RBC 4.69 4.50 - 5.90 x10*6/uL    Hemoglobin 13.6 13.5 - 17.5 g/dL    Hematocrit 38.7 (L) 41.0 - 52.0 %    MCV 83 80 - 100 fL    MCH 29.0 26.0 - 34.0 pg    MCHC 35.1 32.0 - 36.0 g/dL    RDW 12.9 11.5 - 14.5 %    Platelets 131 (L) 150 - 450 x10*3/uL   Heparin Assay, UFH    Collection Time: 02/09/25  5:36 AM   Result Value Ref Range    Heparin Unfractionated 0.2 See Comment Below for Therapeutic Ranges IU/mL   POCT GLUCOSE    Collection Time: 02/09/25  6:49 AM   Result Value Ref Range    POCT Glucose 75 74 - 99 mg/dL   POCT GLUCOSE    Collection Time: 02/09/25 10:52 AM   Result Value Ref Range    POCT Glucose 235 (H) 74 - 99 mg/dL   Heparin Assay    Collection Time: 02/09/25 11:04 AM   Result Value Ref Range    Heparin Unfractionated 0.4 See Comment Below for Therapeutic Ranges IU/mL   SST TOP    Collection Time: 02/09/25  3:15 PM   Result Value Ref Range    Extra Tube Hold for add-ons.    Heparin Assay    Collection Time: 02/09/25  3:18 PM   Result Value Ref Range    Heparin Unfractionated 0.3 See Comment Below for Therapeutic Ranges IU/mL   POCT GLUCOSE    Collection Time: 02/09/25  3:54 PM   Result Value Ref Range    POCT Glucose 298 (H) 74 - 99 mg/dL   POCT GLUCOSE    Collection Time: 02/09/25  9:37 PM   Result Value Ref Range    POCT Glucose 226 (H) 74 - 99 mg/dL   Basic Metabolic Panel    Collection Time: 02/10/25  5:02 AM   Result Value Ref Range    Glucose 231 (H) 74 - 99 mg/dL    Sodium 131 (L) 136 - 145 mmol/L    Potassium 4.2 3.5 - 5.3 mmol/L    Chloride 99 98 - 107 mmol/L    Bicarbonate 24 21 - 32 mmol/L    Anion Gap 12 10 - 20 mmol/L    Urea Nitrogen 10 6 - 23 mg/dL    Creatinine 0.72 0.50 - 1.30 mg/dL    eGFR >90 >60 mL/min/1.73m*2    Calcium 8.8 8.6 - 10.3 mg/dL   Magnesium    Collection Time: 02/10/25  5:02 AM   Result Value Ref Range    Magnesium 1.83  1.60 - 2.40 mg/dL   CBC    Collection Time: 02/10/25  5:02 AM   Result Value Ref Range    WBC 6.4 4.4 - 11.3 x10*3/uL    nRBC 0.0 0.0 - 0.0 /100 WBCs    RBC 4.62 4.50 - 5.90 x10*6/uL    Hemoglobin 13.4 (L) 13.5 - 17.5 g/dL    Hematocrit 38.1 (L) 41.0 - 52.0 %    MCV 83 80 - 100 fL    MCH 29.0 26.0 - 34.0 pg    MCHC 35.2 32.0 - 36.0 g/dL    RDW 12.9 11.5 - 14.5 %    Platelets 134 (L) 150 - 450 x10*3/uL   Heparin Assay, UFH    Collection Time: 02/10/25  5:02 AM   Result Value Ref Range    Heparin Unfractionated 0.3 See Comment Below for Therapeutic Ranges IU/mL   Troponin I, High Sensitivity    Collection Time: 02/10/25  5:02 AM   Result Value Ref Range    Troponin I, High Sensitivity 143 (HH) 0 - 20 ng/L   POCT GLUCOSE    Collection Time: 02/10/25  6:40 AM   Result Value Ref Range    POCT Glucose 243 (H) 74 - 99 mg/dL   POCT GLUCOSE    Collection Time: 02/10/25 11:13 AM   Result Value Ref Range    POCT Glucose 148 (H) 74 - 99 mg/dL   POCT GLUCOSE    Collection Time: 02/10/25  4:03 PM   Result Value Ref Range    POCT Glucose 156 (H) 74 - 99 mg/dL          This patient currently has cardiac telemetry ordered; if you would like to modify or discontinue the telemetry order, click here to go to the orders activity to modify/discontinue the order.               amLODIPine, 5 mg, oral, Daily  aspirin, 81 mg, oral, Daily  buPROPion XL, 150 mg, oral, Daily  ezetimibe, 10 mg, oral, Nightly  hydrOXYzine HCL, 25 mg, oral, BID  insulin glargine, 25 Units, subcutaneous, Nightly  insulin glargine, 32 Units, subcutaneous, q AM  insulin lispro, 0-15 Units, subcutaneous, TID AC  insulin lispro, 10 Units, subcutaneous, TID AC  isosorbide mononitrate ER, 30 mg, oral, Daily  LORazepam, 0.5 mg, intravenous, Once  lovastatin, 40 mg, oral, Nightly  pantoprazole, 40 mg, oral, Daily  polyethylene glycol, 17 g, oral, Daily  venlafaxine XR, 150 mg, oral, Daily with breakfast        CT chest wo IV contrast    Result Date: 2/7/2025  Interpreted  By:  Amarjit Ortiz, STUDY: CT CHEST WO IV CONTRAST;  2/6/2025 4:39 pm   INDICATION: Signs/Symptoms:pre op cardiac surgery.     COMPARISON: 05/28/2024   ACCESSION NUMBER(S): [id number]   ORDERING CLINICIAN: MAXIMILIAN ROSA   TECHNIQUE: Helical data acquisition of the chest was obtained without the use of IV contrast. Images were reformatted in axial, coronal, and sagittal planes.   FINDINGS: POTENTIAL LIMITATIONS OF THE STUDY:   Lack of IV contrast   HEART AND VESSELS:   No calcified plaques are seen involving the ascending aorta. There are atherosclerotic calcifications of the aortic arch, descending thoracic aorta, and the branches of the aorta. Aorta is unchanged in course and caliber. Stable ectasia of the ascending aorta up to 3.9 cm.   The heart is unchanged in size.   No pericardial effusion is seen.   MEDIASTINUM AND MARIA INES, LOWER NECK AND AXILLA: The visualized thyroid gland is within normal limits.   No evidence of thoracic lymphadenopathy by CT criteria.   Esophagus appears within normal limits as seen.   LUNGS AND AIRWAYS: The trachea and central airways are patent. No endobronchial lesion.   There are areas of fibrosis/scarring in the lungs, predominantly along the periphery. Appearance is not significantly changed when compared to the previous examination. No new areas of consolidation. No effusion. No pneumothorax. Stable partially calcified nodule along the major fissure on the right measuring approximately 8 mm. A few additional nodules are seen measuring 5 mm or less in size, for example, image 117 in the right upper lobe. There is a new nodular opacity in the medial aspect of the right lower lobe measuring approximately 8 mm in size. Although this may be infectious/inflammatory nature, follow-up is recommended to document resolution, image 146.   UPPER ABDOMEN: The visualized subdiaphragmatic structures demonstrate no acute abnormality. 2.2 cm cyst in the midpole of the left kidney. There is  nonspecific prominence/thickening of the tail the pancreas with a 2.5 cm hypoattenuating lesion. This is not adequately characterized on this unenhanced CT. MRI is recommended for further evaluation.   CHEST WALL AND OSSEOUS STRUCTURES: Degenerative changes. No acute process.       No calcified plaques along the ascending aorta. There is atherosclerotic disease of the remaining portions of the aorta and its branches. Indeterminate lesion involving the tail the pancreas. MRI recommended for further evaluation. Interval development of an 8 mm right lower lobe nodule which may be infectious/inflammatory in nature but will require follow-up to document resolution.   MACRO: None.   Signed by: Amarjit Ortiz 2/7/2025 9:05 AM Dictation workstation:   [id number]    Vascular US lower extremity vein mapping bilateral    Result Date: 2/7/2025  Interpreted By:  Amarjit Ortiz, STUDY: [facility] US LOWER EXTREMITY VEIN MAPPING BILATERAL;  2/6/2025 5:33 pm   INDICATION: Signs/Symptoms:pre op cardiac surgery.   ,I21.4 Non-ST elevation (NSTEMI) myocardial infarction (Multi),R60.1 Generalized edema   COMPARISON: None.   ACCESSION NUMBER(S): [id number]   ORDERING CLINICIAN: MAXIMILIAN ROSA   TECHNIQUE: Real time duplex sonographic evaluation of the lower extremity venous systems was performed for venous mapping.   FINDINGS: Right Greater Saphenous Vein: Thigh- Proximal-0.3 x 0.2 cm Mid-0.2 x 0.2 cm Distal-0.3 x 0.2 cm   Calf-   Proximal-0.3 x 0.2 cm Mid-0.2 x 0.1 cm Distal-0.2 x 0.1 cm   Right Lesser Saphenous Vein: Proximal-0.3 x 0.2 cm Mid-0.2 x 0.1 cm Distal-0.2 x 0.1 cm     Left Greater Saphenous Vein: Thigh- Proximal-0.3 x 0.3 cm Mid-0.2 x 0.2 cm Distal-0.1 x 0.1 cm   Calf-   Proximal-0.1 x 0.1 cm Mid-0.2 x 0.1 cm Distal-0.3 x 0.1 cm   Left Lesser Saphenous Vein: Proximal-0.3 x 0.1 cm Mid-0.2 x 0.2 cm Distal-0.2 x 0.1 cm       Lower extremity venous mapping as detailed above.   MACRO: None.   Signed by: Amarjit Ortiz 2/7/2025 7:25 AM  Dictation workstation:   [id number]    Carotid duplex bilateral    Result Date: 2/7/2025  Interpreted By:  Amarjit Ortiz, STUDY: [facility] CAROTID ARTERY DUPLEX BILATERAL;  2/6/2025 5:32 pm   INDICATION: Signs/Symptoms:pre op caridac surgery.   ,I21.4 Non-ST elevation (NSTEMI) myocardial infarction (Multi),I79.8 Other disorders of arteries, arterioles and capillaries in diseases classified elsewhere   COMPARISON: 10/30/2023   ACCESSION NUMBER(S): [id number]   ORDERING CLINICIAN: MAXIMILIAN ROSA   TECHNIQUE: Vascular ultrasound of the extracranial carotid system was performed bilaterally.  Gray scale, color Doppler and spectral Doppler waveform analysis was performed.   FINDINGS: There is atherosclerotic disease most conspicuous at the carotid bulbs.   RIGHT:   RIGHT SIDE PEAK SYSTOLIC VELOCITY TABLE: CCA  78 cm/s. ICA  126 cm/s. ECA  69 cm/s.     The ratio of the peak systolic velocity of the right ICA/CCA is  1.6.   RIGHT VERTEBRAL ARTERY: The right vertebral artery demonstrates proximal anterograde flow.   LEFT:   LEFT SIDE PEAK SYSTOLIC VELOCITY TABLE: CCA 50 cm/s. ICA 62 cm/s. ECA 64 cm/s.     The ratio of the peak systolic velocity of the left ICA/CCA is 1.1.   LEFT VERTEBRAL ARTERY: The left vertebral artery demonstrates proximal anterograde flow.       Utilizing the peak systolic velocities, the estimated degree of stenosis within the internal carotid arteries is less than 50% bilaterally.   MACRO: None.     Signed by: Amarjit Ortiz 2/7/2025 7:24 AM Dictation workstation:   [id number]    Cardiac Catheterization Procedure    Result Date: 2/6/2025   [facility], Cath Lab        [address] [place] [address] Cardiovascular Catheterization Report Patient Name:      JAIME DOVER REBECCA      Performing Physician:  [id number] Wil Montana MD Study Date:        2/6/2025             Verifying Physician:   Litzy De La Torre                                                                 Nan ESPINOZA MRN/PID:           [id number]             Cardiologist/Co-Scrub: Accession#:        [id number]         Ordering Provider:     [id number] DERIK ENAMORADO Date of Birth/Age: 1950 / 74 years Cardiologist: Gender:            M                    Fellow: Encounter#:        [id number]           Surgeon:  Study: Left Heart Cath  Indications: JAIME FERNANDEZ is a 75 year old male who presents with dyslipidemia, hypertension, prior myocardial infarction, diabetes, smoker, chronic pulmonary disease, coronary artery disease, prior percutaneous coronary intervention and a chest pain assessment of typical angina. NSTE - ACS.  Procedure Description: After infiltration with 2% Lidocaine, the right femoral artery was cannulated with a modified Seldinger technique. Subsequently a 5 Uzbek sheath was placed in the right femoral artery. Selective coronary catheterization was performed using a 5 Fr catheter(s) exchanged over a guide wire to cannulate the coronary arteries. A JL 4 tip catheter was used for left coronary injections. A 3drc tip catheter was used for right coronary injections. Multiple injections of contrast were made into the left and right coronary arteries with angiograms recorded in multiple projections. Retrograde left heart catheterizion was accomplished with a 5 Fr. pigtail catheter. A single plane left ventriculogram was recorded in the 30 degree RAYMOND projection. The contrast dose was 20 ml injected at 10 ml/sec. The catheter was then withdrawn across the aortic valve under continuous pressure monitoring and removed. After completion of the procedure, femoral artery angiography was performed. This demonstrated a common femoral artery puncture appropriate for closure. A Vascade 5F vascular closure Device was placed per protocol.  Coronary Angiography: The coronary circulation is right dominant.   Left Main Coronary Artery: The left main coronary artery is calcified. There is 70% stenosis in the distal left main coronary artery.  Left Anterior Descending Coronary Artery Distribution: There is 80% stenosis in the proximal left anterior descending artery.  Circumflex Coronary Artery Distribution: There is 90% stenosis in the the proximal Circumflex artery. There is 75% stenosis in the Prox Ramus Circumflex artery. There is 80% stenosis in the Mid Ramus Circumflex artery.  Right Coronary Artery Distribution: There is 80% stenosis in the the mid Right Coronary Artery. There is evidence of instent restenosis in this segment. The distal right coronary artery showed 90% stenosis.  Left Ventriculography: The estimated left ventricular ejection fraction is normal at 55%.  Hemo Personnel: +---------------------------+---------+ Name                       Duty      +---------------------------+---------+ Wil Bronson MD 1 +---------------------------+---------+  Hemodynamic Pressures:  +----+------------------+---------+-------------+-------------+------+---------+ Site    Date Time       Phase  Systolic mmHg  Diastolic    ED  Mean mmHg                         Name                    mmHg      mmHg           +----+------------------+---------+-------------+-------------+------+---------+   AO  2/6/2025 9:36:07 AIR REST          127           65             87                     AM                                                   +----+------------------+---------+-------------+-------------+------+---------+   AO  2/6/2025 9:39:06 AIR REST           91           61             65                     AM                                                   +----+------------------+---------+-------------+-------------+------+---------+   AO  2/6/2025 9:44:05 AIR REST          133           72             96                     AM                                                    +----+------------------+---------+-------------+-------------+------+---------+   LV  2/6/2025 9:47:36 AIR REST          115           -1     6                              AM                                                   +----+------------------+---------+-------------+-------------+------+---------+  Cardiac Cath Post Procedure Notes: Post Procedure Diagnosis: Triple vessel disease. Blood Loss:               Estimated blood loss during the procedure was 0 mls. Specimens Removed:        Number of specimen(s) removed: none. ____________________________________________________________________________________ CONCLUSIONS:  1. Distal Left Main: 70% stenosis.  2. Left Main Coronary Artery: This artery is calcified.  3. Proximal LAD Lesion: The percent stenosis is 80%.  4. Proximal CX Lesion: The percent stenosis is 90%.  5. Prox Ramus CX Lesion: The percent stenosis is 75%.  6. Mid Ramus CX Lesion: The percent stenosis is 80%.  7. Mid RCA Lesion: The percent stenosis is 80%.  8. Mid RCA Lesion: Instent Restenosis.  9. Distal RCA Lesion: The percent stenosis is 90%. 10. The Left Ventricular Ejection Fraction is 55%. 11. Mid CX Lesion: 100%. ICD 10 Codes: Non ST elevation (NSTEMI) myocardial infarction-I21.4  CPT Codes: Left Heart Cath (visualization of coronaries) and [id number]; Moderate Sedation Services initial 15 minutes patient >5 [id number]  [id number] Wil Montana MD Performing Physician Electronically signed by [id number] Wil Montana MD on 2/6/2025 at 10:19:09 AM  ** Final **     ECG 12 lead    Result Date: 2/6/2025  Sinus rhythm with 2nd degree AV block (Mobitz I) Right superior axis deviation Nonspecific ST abnormality Abnormal ECG When compared with ECG of 17-OCT-2023 18:49, No significant change was found Confirmed by Shanika Reese ([id number]) on 2/6/2025 9:38:21 AM    ECG 12 lead    Result Date: 2/6/2025  Sinus rhythm with 2nd degree AV block  (Mobitz I) Right superior axis deviation Abnormal ECG When compared with ECG of 05-FEB-2025 21:41, (unconfirmed) No significant change was found    XR chest 1 view    Result Date: 2/5/2025  Interpreted By:  Tobin Machado, STUDY: XR CHEST 1 VIEW;  2/5/2025 10:17 pm   INDICATION: Signs/Symptoms:Chest Pain.   COMPARISON: None.   ACCESSION NUMBER(S): [id number]   ORDERING CLINICIAN: MARIELY ROCKWELL   FINDINGS: The cardiac silhouette is normal in size. There is mild bilateral opacities may correspond to chronic lung changes and atelectasis versus pneumonia. No segment pleural effusion. No pneumothorax.       Mild bilateral opacities may correspond to chronic lung changes and atelectasis versus pneumonia.   MACRO: None   Signed by: Tobin Machado 2/5/2025 10:38 PM Dictation workstation:   [id number]       Assessment/Plan   Assessment & Plan  NSTEMI (non-ST elevated myocardial infarction) (Multi)    Primary hypertension    Chronic obstructive pulmonary disease, unspecified    Current every day smoker    GERD (gastroesophageal reflux disease)    Hiatal hernia with GERD    Smoker    Spinal stenosis of cervical region    Mixed hyperlipidemia    CAD (coronary artery disease)    Mobitz type 1 second degree AV block    Type 2 diabetes mellitus with hyperglycemia, with long-term current use of insulin    Hyponatremia    Chest pain, unspecified type    Noncompliance with CPAP treatment    Obstructive sleep apnea    Recurrent major depressive disorder, in partial remission (CMS-HCC)    Hx of heart artery stent    Oropharyngeal dysphagia    History of constipation    Noncompliance with diabetes treatment    Abnormal weight loss    Hypokalemia            I spent     minutes in the professional and overall care of this patient.      Hussain Bernal MD

## 2025-02-10 NOTE — PROGRESS NOTES
Feliciano Worthington is a 74 y.o. male on day 4 of admission presenting with NSTEMI (non-ST elevated myocardial infarction) (Multi).    Subjective   Acute overnight events.  Afebrile, remains in sinus bradycardia with intermittent second-degree type I AV block..  Adequate saturations on room air.  No reports of chest pain at rest or with activity, IV heparin continues to infuse with therapeutic assays.  Control is improving with glucose 140-240, endocrinology is following, patient stable to proceed with surgery from endocrinology standpoint with close monitoring.  Preoperative testing has been completed, PFTs showing normal lung volumes with normal FEV1, FVC, moderate diffusion impairment with DLCO 63.  Pancreatic lesion noted is incidental finding on CT of chest, MRCP was recommended however this is on hold per radiology until safety of this can be determined in setting of previously placed penile implant.  CA markers have been ordered and are normal. Dr Paniagua completed testing and determine surgical plan today.        Objective     Physical Exam  HENT:      Head: Normocephalic.      Nose: Nose normal.      Mouth/Throat:      Mouth: Mucous membranes are moist.   Eyes:      Pupils: Pupils are equal, round, and reactive to light.   Cardiovascular:      Rate and Rhythm: Bradycardia present. Rhythm irregular.   Pulmonary:      Effort: Pulmonary effort is normal.      Breath sounds: Normal breath sounds.   Abdominal:      General: Bowel sounds are normal.   Musculoskeletal:      Cervical back: Normal range of motion.      Right lower leg: No edema.      Left lower leg: No edema.   Skin:     General: Skin is warm and dry.   Neurological:      General: No focal deficit present.      Mental Status: He is alert and oriented to person, place, and time.   Psychiatric:         Mood and Affect: Mood normal.         Last Recorded Vitals  Blood pressure 127/67, pulse 53, temperature 36.2 °C (97.2 °F), temperature source Temporal,  "resp. rate 18, height 1.778 m (5' 10\"), weight 87.1 kg (192 lb 0.3 oz), SpO2 97%.  Intake/Output last 3 Shifts:  I/O last 3 completed shifts:  In: 517.9 (5.9 mL/kg) [P.O.:250; I.V.:267.9 (3.1 mL/kg)]  Out: - (0 mL/kg)   Weight: 87.1 kg     Relevant Results  Scheduled medications  amLODIPine, 5 mg, oral, Daily  aspirin, 81 mg, oral, Daily  buPROPion XL, 150 mg, oral, Daily  ezetimibe, 10 mg, oral, Nightly  hydrOXYzine HCL, 25 mg, oral, BID  insulin glargine, 25 Units, subcutaneous, Nightly  insulin glargine, 32 Units, subcutaneous, q AM  insulin lispro, 0-15 Units, subcutaneous, TID AC  insulin lispro, 10 Units, subcutaneous, TID AC  isosorbide mononitrate ER, 30 mg, oral, Daily  LORazepam, 0.5 mg, intravenous, Once  lovastatin, 40 mg, oral, Nightly  pantoprazole, 40 mg, oral, Daily  polyethylene glycol, 17 g, oral, Daily  venlafaxine XR, 150 mg, oral, Daily with breakfast      Continuous medications  heparin, 0-4,000 Units/hr, Last Rate: 1,400 Units/hr (02/10/25 0830)      PRN medications  PRN medications: acetaminophen **OR** acetaminophen **OR** acetaminophen, acetaminophen, alum-mag hydroxide-simeth, dextrose, dextrose, glucagon, glucagon, heparin, ipratropium-albuteroL, magnesium hydroxide, melatonin, nitroglycerin, ondansetron, ondansetron     Results for orders placed or performed during the hospital encounter of 02/05/25 (from the past 24 hours)   SST TOP   Result Value Ref Range    Extra Tube Hold for add-ons.    Heparin Assay   Result Value Ref Range    Heparin Unfractionated 0.3 See Comment Below for Therapeutic Ranges IU/mL   POCT GLUCOSE   Result Value Ref Range    POCT Glucose 298 (H) 74 - 99 mg/dL   POCT GLUCOSE   Result Value Ref Range    POCT Glucose 226 (H) 74 - 99 mg/dL   Basic Metabolic Panel   Result Value Ref Range    Glucose 231 (H) 74 - 99 mg/dL    Sodium 131 (L) 136 - 145 mmol/L    Potassium 4.2 3.5 - 5.3 mmol/L    Chloride 99 98 - 107 mmol/L    Bicarbonate 24 21 - 32 mmol/L    Anion Gap 12 " 10 - 20 mmol/L    Urea Nitrogen 10 6 - 23 mg/dL    Creatinine 0.72 0.50 - 1.30 mg/dL    eGFR >90 >60 mL/min/1.73m*2    Calcium 8.8 8.6 - 10.3 mg/dL   Magnesium   Result Value Ref Range    Magnesium 1.83 1.60 - 2.40 mg/dL   CBC   Result Value Ref Range    WBC 6.4 4.4 - 11.3 x10*3/uL    nRBC 0.0 0.0 - 0.0 /100 WBCs    RBC 4.62 4.50 - 5.90 x10*6/uL    Hemoglobin 13.4 (L) 13.5 - 17.5 g/dL    Hematocrit 38.1 (L) 41.0 - 52.0 %    MCV 83 80 - 100 fL    MCH 29.0 26.0 - 34.0 pg    MCHC 35.2 32.0 - 36.0 g/dL    RDW 12.9 11.5 - 14.5 %    Platelets 134 (L) 150 - 450 x10*3/uL   Heparin Assay, UFH   Result Value Ref Range    Heparin Unfractionated 0.3 See Comment Below for Therapeutic Ranges IU/mL   POCT GLUCOSE   Result Value Ref Range    POCT Glucose 243 (H) 74 - 99 mg/dL   POCT GLUCOSE   Result Value Ref Range    POCT Glucose 148 (H) 74 - 99 mg/dL        ECG 12 lead    Result Date: 2/10/2025  Sinus rhythm with 2nd degree AV block (Mobitz I) Left axis deviation Anterior infarct , age undetermined Abnormal ECG When compared with ECG of 05-FEB-2025 22:52, (unconfirmed) Anterior infarct is now Present Confirmed by Kimmy Dickinson ([id number]) on 2/10/2025 12:31:40 PM           Assessment/Plan   Assessment & Plan  Chest pain, unspecified type    Primary hypertension    Chronic obstructive pulmonary disease, unspecified    Current every day smoker    GERD (gastroesophageal reflux disease)    Hiatal hernia with GERD    Smoker    Spinal stenosis of cervical region    Mixed hyperlipidemia    CAD (coronary artery disease)    Mobitz type 1 second degree AV block    Type 2 diabetes mellitus with hyperglycemia, with long-term current use of insulin    Hyponatremia    Noncompliance with CPAP treatment    Obstructive sleep apnea    Recurrent major depressive disorder, in partial remission (CMS-HCC)    Hx of heart artery stent    Oropharyngeal dysphagia    History of constipation    Noncompliance with diabetes treatment    Abnormal weight loss    NSTEMI  (non-ST elevated myocardial infarction) (Multi)    Hypokalemia    Coronary artery disease; NSTEMI  Admitted with left-sided chest pain symptoms unrelieved with nitroglycerin, subsequently ruled in for NSTEMI, coronary angiography showing severe multivessel coronary artery disease as noted above.  Angiograms have been reviewed by Dr Paniagua.   -Pre-operative testing has been completed with the exception of MRCP to evaluate possible pancreatic lesion seen on noncontrast CT of chest, which is on hold per radiology until safety of MRI can be determined in setting of previously placed penile implant.  Cancer markers have been ordered and are within normal limits.  Dr. Paniagua to review completed testing today to determine surgical plan.      Type 2 diabetes mellitus  Suboptimally controlled type 2 diabetes mellitus with questionable medication compliance prior to hospitalization.  A1c this admission 14.7  -Endocrinology consulted, glucose is improving with adjustments of insulin, stable to proceed with surgery from endo standpoint with close monitoring.       COPD; chronic lung nodule; ongoing heavy tobacco use  Bilateral lung nodules per screening CT in May of last year, noted to be stable in size.  Patient has continued to smoke heavily 1 to 1-1/2 packs/day.  Showing normal lung volumes with normal FEV1/FVC, moderate impairment of diffusion with DLCO 63.  CT of chest showing stability of previously noted bilateral lung nodules with new 8 mm nodule noted in the right middle lobe.  -Smoking cessation education     Unintentional weight loss  Patient reports unintentional weight loss of over 50 pounds over the course of a year, may be reflective of poor glycemic control, also concern for possibility of malignancy given known lung nodules and ongoing tobacco use.  -Await results MRCP  -TSH normal  -Consider nutritional consult       I spent 35 minutes in the professional and overall care of this patient.      Susie Mcfarland,  APRN-CNP

## 2025-02-10 NOTE — DOCUMENTATION CLARIFICATION NOTE
"    PATIENT:               JAIME FERNANDEZ  ACCT #:                  [id number]  MRN:                       [id number]  :                       1950  ADMIT DATE:       2025 9:40 PM  DISCH DATE:  RESPONDING PROVIDER #:        [id number]          PROVIDER RESPONSE TEXT:    Thrombocytopenia    CDI QUERY TEXT:    Clarification      Instruction:    Based on your assessment of the patient and the clinical information, please provide the requested documentation by clicking on the appropriate radio button and enter any additional information if prompted.    Question: Is there a diagnosis indicative of the lab values or image study    When answering this query, please exercise your independent professional judgment. The fact that a question is being asked, does not imply that any particular answer is desired or expected.    The patient's clinical indicators include:  Clinical Information: 74 presented with NSTEMI      Clinical Indicators:  Labs Platelets  176,   145,   145,   135,   131,  2/10 134     Internal Medicine \"Platelet count 145 K\"     Internal Medicne \" Acute non-STEMI with triple-vessel CAD on cardiac catheterization this morning-for CABG.  Still on heparin drip.\"  \"platelet count stable at 131 K.\"      Treatment: Monitoring Daily labs    Risk Factors: pt on heparin gtt  Options provided:  -- Thrombocytopenia  -- Other - I will add my own diagnosis  -- Refer to Clinical Documentation Reviewer    Query created by: Ysabel Ibrahim on 2/10/2025 8:53 AM      Electronically signed by:  RASHID MOLINA MD 2/10/2025 2:07 PM          "

## 2025-02-10 NOTE — PROGRESS NOTES
Inpatient Speech-Language Pathology Clinical Swallow Evaluation       Patient Name: Feliciano Worthington  MRN: [id number]  : 1950  Patient Room: [address] [place], [place] [address]  Today's Date: 02/10/25  Time Calculation  Start Time: 1000  Stop Time: 1010  Time Calculation (min): 10 min         Recommendations:  Solid Diet Recommendations : Regular (IDDSI Level 7)   Liquid Diet Recommendations: Thin (IDDSI Level 0)   Compensatory Swallowing Strategies: Small bites/sips, Add moisture to solids, Alternate solids and liquids, Upright 90 degrees as possible for all oral intake     Medication Administration Recommendations: Whole, With Liquid    Medical Staff Made Aware: Yes        Assessment:  Assessment Results: Pt presenting with functional oral phase of the swallow. No suspected pharyngeal deficits. Pt with esophageal deficits however reported he can manage them and does not want further intervention at this time.  He demonstrated appropriate mastication and clearance of the oral cavity. No s/s aspiration consecutive sips thin liquids or with solids. Silent aspiration not suspected.   Pt reported he has had esophageal dilation in the past and reported some globus sensation has returned. However he stated he is able to manage at this time and refused a GI consult or further assessment. Pt gave verbal understanding when educated on ways to increase esophageal clearance.   Respiratory Status: Room air   Patient Positioning: Upright in Bed   Consistencies Trialed: Consistencies Trialed: Thin (IDDSI Level 0) - Straw, Regular (IDDSI Level 7)   Oral Motor: Within Functional Limits  Lingual Strength: Within Functional Limits   Dentition: Adequate/Natural        Plan:  SLP Plan: No skilled SLP        Next Treatment Priority: Eval only   Discussed POC: Patient, Nursing   Discussed Risks/Benefits: Yes   Patient/Caregiver Agreeable: Yes   Prognosis: Excellent  Barriers to improvement: none at this time   SLP Discharge Recommendations: unable to  determine at this time, refer to subsequent notes    Goals:  N/A- eval only   Subjective   Pt sleeping upon entering room but woke to auditory stimuli.         General Visit Information:  Pt. Admitted from ED after he Presented for chest pain that was not relieved with Nitro. Taken for [facility] for NSTEMI on  with Dr Bronson. CT surgery consulted for recommendation of CABG.   Past medical history: COPD with chronic tobacco use, HTN, hyperlipidemia, CAD     Living Environment: Home           Reason for Referral: Globus sensation      Current Diet : Regular/thin   Prior to Session Communication: Bedside nurse   Pain:  Ratin-10   Pt report: 0  Action taken: none needed        Treatment Completed with evaluation:   No      SLP Inpatient Education:  Learner patient   Barriers to Learning None   Method Verbal   Education - Topic ST provided patient education regarding role of ST, purpose of assessment, clinical impressions   Outcome    2= meets goals/outcomes

## 2025-02-11 PROBLEM — K86.89 PANCREATIC MASS (HHS-HCC): Status: ACTIVE | Noted: 2025-02-11

## 2025-02-11 LAB
ALBUMIN SERPL BCP-MCNC: 3.7 G/DL (ref 3.4–5)
ALP SERPL-CCNC: 81 U/L (ref 33–136)
ALT SERPL W P-5'-P-CCNC: 16 U/L (ref 10–52)
AST SERPL W P-5'-P-CCNC: 17 U/L (ref 9–39)
BILIRUB DIRECT SERPL-MCNC: 0.1 MG/DL (ref 0–0.3)
BILIRUB SERPL-MCNC: 0.4 MG/DL (ref 0–1.2)
GLUCOSE BLD MANUAL STRIP-MCNC: 108 MG/DL (ref 74–99)
GLUCOSE BLD MANUAL STRIP-MCNC: 167 MG/DL (ref 74–99)
GLUCOSE BLD MANUAL STRIP-MCNC: 184 MG/DL (ref 74–99)
GLUCOSE BLD MANUAL STRIP-MCNC: 204 MG/DL (ref 74–99)
HOLD SPECIMEN: NORMAL
PROT SERPL-MCNC: 6.9 G/DL (ref 6.4–8.2)
UFH PPP CHRO-ACNC: 0.2 IU/ML
UFH PPP CHRO-ACNC: 0.5 IU/ML
UFH PPP CHRO-ACNC: 0.5 IU/ML

## 2025-02-11 PROCEDURE — 2500000002 HC RX 250 W HCPCS SELF ADMINISTERED DRUGS (ALT 637 FOR MEDICARE OP, ALT 636 FOR OP/ED): Performed by: NURSE PRACTITIONER

## 2025-02-11 PROCEDURE — 2500000004 HC RX 250 GENERAL PHARMACY W/ HCPCS (ALT 636 FOR OP/ED): Performed by: NURSE PRACTITIONER

## 2025-02-11 PROCEDURE — 80076 HEPATIC FUNCTION PANEL: CPT | Performed by: NURSE PRACTITIONER

## 2025-02-11 PROCEDURE — 36415 COLL VENOUS BLD VENIPUNCTURE: CPT | Performed by: FAMILY MEDICINE

## 2025-02-11 PROCEDURE — 2500000001 HC RX 250 WO HCPCS SELF ADMINISTERED DRUGS (ALT 637 FOR MEDICARE OP): Performed by: INTERNAL MEDICINE

## 2025-02-11 PROCEDURE — 2500000001 HC RX 250 WO HCPCS SELF ADMINISTERED DRUGS (ALT 637 FOR MEDICARE OP): Performed by: NURSE PRACTITIONER

## 2025-02-11 PROCEDURE — 99222 1ST HOSP IP/OBS MODERATE 55: CPT | Performed by: NURSE PRACTITIONER

## 2025-02-11 PROCEDURE — 82947 ASSAY GLUCOSE BLOOD QUANT: CPT

## 2025-02-11 PROCEDURE — 99233 SBSQ HOSP IP/OBS HIGH 50: CPT | Performed by: NURSE PRACTITIONER

## 2025-02-11 PROCEDURE — 2500000002 HC RX 250 W HCPCS SELF ADMINISTERED DRUGS (ALT 637 FOR MEDICARE OP, ALT 636 FOR OP/ED): Performed by: INTERNAL MEDICINE

## 2025-02-11 PROCEDURE — 99233 SBSQ HOSP IP/OBS HIGH 50: CPT | Performed by: FAMILY MEDICINE

## 2025-02-11 PROCEDURE — 2500000001 HC RX 250 WO HCPCS SELF ADMINISTERED DRUGS (ALT 637 FOR MEDICARE OP)

## 2025-02-11 PROCEDURE — 85520 HEPARIN ASSAY: CPT | Performed by: FAMILY MEDICINE

## 2025-02-11 PROCEDURE — 2500000001 HC RX 250 WO HCPCS SELF ADMINISTERED DRUGS (ALT 637 FOR MEDICARE OP): Performed by: PSYCHIATRY & NEUROLOGY

## 2025-02-11 PROCEDURE — 99233 SBSQ HOSP IP/OBS HIGH 50: CPT | Performed by: INTERNAL MEDICINE

## 2025-02-11 PROCEDURE — 2060000001 HC INTERMEDIATE ICU ROOM DAILY

## 2025-02-11 RX ORDER — LOVASTATIN 40 MG/1
40 TABLET ORAL NIGHTLY
Status: DISCONTINUED | OUTPATIENT
Start: 2025-02-11 | End: 2025-02-14 | Stop reason: HOSPADM

## 2025-02-11 RX ADMIN — AMLODIPINE BESYLATE 5 MG: 5 TABLET ORAL at 08:14

## 2025-02-11 RX ADMIN — EZETIMIBE 10 MG: 10 TABLET ORAL at 20:46

## 2025-02-11 RX ADMIN — INSULIN LISPRO 10 UNITS: 100 INJECTION, SOLUTION INTRAVENOUS; SUBCUTANEOUS at 12:10

## 2025-02-11 RX ADMIN — INSULIN LISPRO 10 UNITS: 100 INJECTION, SOLUTION INTRAVENOUS; SUBCUTANEOUS at 17:43

## 2025-02-11 RX ADMIN — INSULIN LISPRO 10 UNITS: 100 INJECTION, SOLUTION INTRAVENOUS; SUBCUTANEOUS at 08:12

## 2025-02-11 RX ADMIN — INSULIN GLARGINE 32 UNITS: 100 INJECTION, SOLUTION SUBCUTANEOUS at 08:12

## 2025-02-11 RX ADMIN — LOVASTATIN 40 MG: 40 TABLET ORAL at 20:46

## 2025-02-11 RX ADMIN — INSULIN LISPRO 3 UNITS: 100 INJECTION, SOLUTION INTRAVENOUS; SUBCUTANEOUS at 17:43

## 2025-02-11 RX ADMIN — BUPROPION HYDROCHLORIDE 150 MG: 150 TABLET, EXTENDED RELEASE ORAL at 08:14

## 2025-02-11 RX ADMIN — INSULIN LISPRO 6 UNITS: 100 INJECTION, SOLUTION INTRAVENOUS; SUBCUTANEOUS at 08:12

## 2025-02-11 RX ADMIN — HYDROXYZINE HYDROCHLORIDE 25 MG: 25 TABLET ORAL at 08:14

## 2025-02-11 RX ADMIN — PANTOPRAZOLE SODIUM 40 MG: 40 TABLET, DELAYED RELEASE ORAL at 06:22

## 2025-02-11 RX ADMIN — ISOSORBIDE MONONITRATE 30 MG: 30 TABLET, EXTENDED RELEASE ORAL at 08:13

## 2025-02-11 RX ADMIN — HEPARIN SODIUM 1600 UNITS/HR: 10000 INJECTION, SOLUTION INTRAVENOUS at 06:24

## 2025-02-11 RX ADMIN — HYDROXYZINE HYDROCHLORIDE 25 MG: 25 TABLET ORAL at 20:46

## 2025-02-11 RX ADMIN — HEPARIN SODIUM 1600 UNITS/HR: 10000 INJECTION, SOLUTION INTRAVENOUS at 21:13

## 2025-02-11 RX ADMIN — VENLAFAXINE HYDROCHLORIDE 150 MG: 150 CAPSULE, EXTENDED RELEASE ORAL at 08:12

## 2025-02-11 RX ADMIN — ASPIRIN 81 MG: 81 TABLET, CHEWABLE ORAL at 08:14

## 2025-02-11 RX ADMIN — INSULIN GLARGINE 25 UNITS: 100 INJECTION, SOLUTION SUBCUTANEOUS at 20:42

## 2025-02-11 ASSESSMENT — COGNITIVE AND FUNCTIONAL STATUS - GENERAL
MOBILITY SCORE: 24
DAILY ACTIVITIY SCORE: 24
MOBILITY SCORE: 24
DAILY ACTIVITIY SCORE: 24

## 2025-02-11 ASSESSMENT — PAIN SCALES - GENERAL
PAINLEVEL_OUTOF10: 0 - NO PAIN
PAINLEVEL_OUTOF10: 0 - NO PAIN

## 2025-02-11 ASSESSMENT — PAIN - FUNCTIONAL ASSESSMENT: PAIN_FUNCTIONAL_ASSESSMENT: 0-10

## 2025-02-11 NOTE — PROGRESS NOTES
Endocrinology Progress Note  Patient: Feliciano Worthington  Unit/Bed: 816/816-A  YOB: 1950  MRN: [id number]  Acct: [id number]   Admitting Diagnosis: NSTEMI (non-ST elevated myocardial infarction) (Multi) [I21.4]  Chest pain, unspecified type [R07.9]  Date:  2/5/2025  Hospital Day: 4  Attending: Hussain Bernal MD       Complaint:  Chief Complaint   Patient presents with    Chest Pain     CP x 36 hrs. Pt took 2-3 nitroglycerin yesterday and 4-5 today. Pt received 324 ASA in EMS           Assessment     Patient Active Problem List   Diagnosis    Primary hypertension    Chronic obstructive pulmonary disease, unspecified    Current every day smoker    GERD (gastroesophageal reflux disease)    Arcus senilis, bilateral    Astigmatism of both eyes    Cataract, bilateral    Chronic venous stasis dermatitis    Depression, major, in remission (CMS-HCC)    Erectile dysfunction    Hiatal hernia with GERD    Multiple lung nodules on CT    Nuclear sclerotic cataract of both eyes    Osteoarthritis of hips, bilateral    Other spondylosis, cervical region    Periumbilical hernia    Testosterone deficiency    Smoker    Sleep apnea    Vitamin D deficiency    Noncompliance with dietary restriction    Arthritis of left acromioclavicular joint    Bruit of right carotid artery    Degenerative joint disease (DJD) of hip    Spinal stenosis of cervical region    Encounter for immunization    Mixed hyperlipidemia    CAD (coronary artery disease)    Mobitz type 1 second degree AV block    Type 2 diabetes mellitus with hyperglycemia, with long-term current use of insulin    Hyponatremia    Chest pain, unspecified type    NSTEMI (non-ST elevated myocardial infarction) (Multi)    Noncompliance with CPAP treatment    Obstructive sleep apnea    Recurrent major depressive disorder, in partial remission (CMS-HCC)    Hx of heart artery stent    Oropharyngeal dysphagia    History of constipation    Noncompliance with diabetes  treatment    Abnormal weight loss    Hypokalemia          Plan:  Diabetes mellitus type 2 uncontrolled--glucoses now in the mid 200s need to be improved  Patient's Lantus which is 35+32 units will be reduced to 32 units in the morning and 25 at bedtime his Humalog is at 5 units per meal will be changed to 10 units per meal and a scale  Will also needducation          SUBJECTIVE    Diabetes mellitus being treated with his Humalog reduced over the weekend PCP patient was having lower a.m. glucoses though his getting high to mid 200 glucoses now    188   93   231            Fasting glucose                 Blood glucose                 POCT glucose  140  193 186 75 235 226 243 148 156      Potassium    Potassium, Blood   3.2   4.0   4.2        Medication Dosing    insulin glargine,hum.rec.anlog SUBQ (Units)  35  32 35  32 35  32       insulin lispro SUBQ (Units)  11  25 11  11 14  21 1        VITALS      Vitals:    02/10/25 0045 02/10/25 0435 02/10/25 0729 02/10/25 1114   BP: 147/62 130/60 139/81 127/67   BP Location:    Right arm   Patient Position:    Lying   Pulse: 50 54 51 53   Resp: 20 18 17 18   Temp: 36.7 °C (98.1 °F) 36.3 °C (97.3 °F) 36.3 °C (97.3 °F) 36.2 °C (97.2 °F)   TempSrc:    Temporal   SpO2: 97% 98% 97% 97%   Weight:       Height:           Intake/Output Summary (Last 24 hours) at 2/10/2025 1926  Last data filed at 2/10/2025 1157  Gross per 24 hour   Intake 240 ml   Output --   Net 240 ml      Wt Readings from Last 4 Encounters:   02/08/25 87.1 kg (192 lb 0.3 oz)   01/29/25 85.5 kg (188 lb 6.4 oz)   10/15/24 90.3 kg (199 lb)   10/04/24 89.8 kg (198 lb)        Allergies:  Allergies   Allergen Reactions    Tetracyclines Hives, Other, Unknown, Rash and Shortness of breath     Water blisters in mouth and genital region.    Water blister on tongue and penis    Atorvastatin Unknown     Cramping all over the body. Sore shoulders    Empagliflozin Unknown    Pravastatin Unknown    Rosuvastatin Unknown     Simvastatin Unknown    Statins-Hmg-Coa Reductase Inhibitors Unknown        PHYSICAL EXAM   Physical Exam      LABS   Magnesium:  Results from last 7 days   Lab Units 02/10/25  0502 02/09/25  0536 02/08/25  0521   MAGNESIUM mg/dL 1.83 1.92 1.62     Lipid Panel:  Results from last 7 days   Lab Units 02/05/25  2248   HDL mg/dL 24.7   CHOLESTEROL/HDL RATIO  10.4   VLDL mg/dL 62*   TRIGLYCERIDES mg/dL 309*   NON HDL CHOL. mg/dL 233*      Lab Review  Lab Results   Component Value Date    BILITOT 0.6 02/05/2025    CALCIUM 8.8 02/10/2025    CO2 24 02/10/2025    CL 99 02/10/2025    CREATININE 0.72 02/10/2025    GLUCOSE 231 (H) 02/10/2025    ALKPHOS 110 02/05/2025    K 4.2 02/10/2025    PROT 7.3 02/05/2025     (L) 02/10/2025    AST 15 02/05/2025    ALT 12 02/05/2025    BUN 10 02/10/2025    ANIONGAP 12 02/10/2025    MG 1.83 02/10/2025    PHOS 3.4 02/08/2025    ALBUMIN 3.4 02/08/2025    LIPASE 10 10/17/2023    GFRMALE 79 08/10/2023     Lab Results   Component Value Date    TRIG 309 (H) 02/05/2025    CHOL 258 (H) 02/05/2025    LDLCALC 172 (H) 02/05/2025    HDL 24.7 02/05/2025     Lab Results   Component Value Date    HGBA1C 14.7 (H) 02/06/2025    HGBA1C 14.0 (H) 12/26/2024    HGBA1C 11.3 (A) 09/03/2024     The ASCVD Risk score (Bandar RODRIGUEZ, et al., 2019) failed to calculate for the following reasons:    Risk score cannot be calculated because patient has a medical history suggesting prior/existing ASCVD   Lab Results   Component Value Date    HGBA1C 14.7 (H) 02/06/2025    HGBA1C 14.0 (H) 12/26/2024    HGBA1C 11.3 (A) 09/03/2024     (L) 02/10/2025    K 4.2 02/10/2025    CL 99 02/10/2025    CO2 24 02/10/2025    BUN 10 02/10/2025    CREATININE 0.72 02/10/2025    CALCIUM 8.8 02/10/2025    ALBUMIN 3.4 02/08/2025    PROT 7.3 02/05/2025    BILITOT 0.6 02/05/2025    ALKPHOS 110 02/05/2025    ALT 12 02/05/2025    AST 15 02/05/2025    GLUCOSE 231 (H) 02/10/2025    CHOL 258 (H) 02/05/2025    TRIG 309 (H) 02/05/2025    HDL 24.7  02/05/2025        amLODIPine, 5 mg, oral, Daily  aspirin, 81 mg, oral, Daily  buPROPion XL, 150 mg, oral, Daily  ezetimibe, 10 mg, oral, Nightly  hydrOXYzine HCL, 25 mg, oral, BID  insulin glargine, 25 Units, subcutaneous, Nightly  insulin glargine, 32 Units, subcutaneous, q AM  insulin lispro, 0-15 Units, subcutaneous, TID AC  insulin lispro, 10 Units, subcutaneous, TID AC  isosorbide mononitrate ER, 30 mg, oral, Daily  LORazepam, 0.5 mg, intravenous, Once  lovastatin, 40 mg, oral, Nightly  pantoprazole, 40 mg, oral, Daily  polyethylene glycol, 17 g, oral, Daily  venlafaxine XR, 150 mg, oral, Daily with breakfast             Electronically signed by Cristina Wiseman MD on 2/10/2025 at 7:26 PM

## 2025-02-11 NOTE — PROGRESS NOTES
"Feliciano Worthington is a 74 y.o. male on day 5 of admission presenting with NSTEMI (non-ST elevated myocardial infarction) (Multi).    Subjective   No acute overnight events. Remains free of chest pain, IV heparin infusing with therapeutic assays. Troponin trended down. MRCP was obtained with findings of lesion on pancreatic tail suspicious for pancreatic cancer with hepatic lesion suspicious for metastasis. Findings discussed at length with Dr Paniagua, findings of possible pancreatic malignancy prohibitive of cardiac surgery at this time until metastatic workup can be completed.       Objective     Physical Exam  Constitutional:       General: He is not in acute distress.  HENT:      Head: Normocephalic.      Nose: Nose normal.      Mouth/Throat:      Mouth: Mucous membranes are moist.   Eyes:      Pupils: Pupils are equal, round, and reactive to light.   Cardiovascular:      Rate and Rhythm: Normal rate and regular rhythm.      Comments: Diminished lower extremity pulses  Pulmonary:      Effort: Pulmonary effort is normal.      Breath sounds: Normal breath sounds.   Musculoskeletal:      Cervical back: Normal range of motion.      Right lower leg: No edema.      Left lower leg: No edema.   Skin:     General: Skin is warm and dry.   Neurological:      General: No focal deficit present.      Mental Status: He is alert and oriented to person, place, and time.   Psychiatric:         Mood and Affect: Mood normal.         Last Recorded Vitals  Blood pressure 125/69, pulse 65, temperature 36.4 °C (97.5 °F), resp. rate 16, height 1.778 m (5' 10\"), weight 87.1 kg (192 lb 0.3 oz), SpO2 97%.  Intake/Output last 3 Shifts:  I/O last 3 completed shifts:  In: 240 (2.8 mL/kg) [P.O.:240]  Out: 400 (4.6 mL/kg) [Urine:400 (0.1 mL/kg/hr)]  Weight: 87.1 kg     Relevant Results  Scheduled medications  amLODIPine, 5 mg, oral, Daily  aspirin, 81 mg, oral, Daily  buPROPion XL, 150 mg, oral, Daily  ezetimibe, 10 mg, oral, Nightly  hydrOXYzine " HCL, 25 mg, oral, BID  insulin glargine, 25 Units, subcutaneous, Nightly  insulin glargine, 32 Units, subcutaneous, q AM  insulin lispro, 0-15 Units, subcutaneous, TID AC  insulin lispro, 10 Units, subcutaneous, TID AC  isosorbide mononitrate ER, 30 mg, oral, Daily  LORazepam, 0.5 mg, intravenous, Once  lovastatin, 40 mg, oral, Nightly  pantoprazole, 40 mg, oral, Daily  polyethylene glycol, 17 g, oral, Daily  venlafaxine XR, 150 mg, oral, Daily with breakfast      Continuous medications  heparin, 0-4,000 Units/hr, Last Rate: 1,600 Units/hr (02/11/25 1144)      PRN medications  PRN medications: acetaminophen **OR** acetaminophen **OR** acetaminophen, acetaminophen, alum-mag hydroxide-simeth, dextrose, dextrose, glucagon, glucagon, heparin, ipratropium-albuteroL, magnesium hydroxide, melatonin, nitroglycerin, ondansetron, ondansetron     Results for orders placed or performed during the hospital encounter of 02/05/25 (from the past 24 hours)   POCT GLUCOSE   Result Value Ref Range    POCT Glucose 173 (H) 74 - 99 mg/dL   POCT GLUCOSE   Result Value Ref Range    POCT Glucose 168 (H) 74 - 99 mg/dL   Heparin Assay   Result Value Ref Range    Heparin Unfractionated 0.2 See Comment Below for Therapeutic Ranges IU/mL   Lavender Top   Result Value Ref Range    Extra Tube Hold for add-ons.    PST Top   Result Value Ref Range    Extra Tube Hold for add-ons.    Hepatic function panel   Result Value Ref Range    Albumin 3.7 3.4 - 5.0 g/dL    Bilirubin, Total 0.4 0.0 - 1.2 mg/dL    Bilirubin, Direct 0.1 0.0 - 0.3 mg/dL    Alkaline Phosphatase 81 33 - 136 U/L    ALT 16 10 - 52 U/L    AST 17 9 - 39 U/L    Total Protein 6.9 6.4 - 8.2 g/dL   Heparin Assay   Result Value Ref Range    Heparin Unfractionated 0.5 See Comment Below for Therapeutic Ranges IU/mL   POCT GLUCOSE   Result Value Ref Range    POCT Glucose 204 (H) 74 - 99 mg/dL   SST TOP   Result Value Ref Range    Extra Tube Hold for add-ons.    Heparin Assay   Result Value Ref  Range    Heparin Unfractionated 0.5 See Comment Below for Therapeutic Ranges IU/mL   POCT GLUCOSE   Result Value Ref Range    POCT Glucose 108 (H) 74 - 99 mg/dL        MRCP pancreas w and wo IV contrast    Result Date: 2/10/2025  Interpreted By:  Dyllan James, STUDY: MRCP PANCREAS W AND WO IV CONTRAST;  2/10/2025 3:20 pm   INDICATION: Signs/Symptoms:pancreatic lesion noted on CT, pending cardiac surgery.     COMPARISON: None.   ACCESSION NUMBER(S): [id number]   ORDERING CLINICIAN: MAXIMILIAN ROSA   TECHNIQUE: MRI PANCREAS; Multiplanar magnetic resonance images of the abdomen were obtained including the following sequences; T2-weighted SSFSE with and without fat saturation, T1-weighted GRE in/opposed phase, DWI, fat saturated 3D-T1w GRE pre and dynamically post contrast. Radial thick slab T2w RARE MRCP and coronally reconstructed navigator gated high resolution 3-D T2w RESTORE MRCP with MIP reconstruction were also performed for MRCP.  17 ML of Dotarem was administered intravenously without immediate complication.   FINDINGS: Severely motion degraded exam.   LIVER: No definite steatosis. Difficult to characterize due to motion, there is an ill-defined 15 mm mildly T2 hyperintense, hypoenhancing mass in segment VI.   BILE DUCTS: No dilatation. Common bile duct measures 3 mm. No definite filling defect identified.   GALLBLADDER: No stone or wall thickening.   PANCREAS: There is a 38 mm hypoenhancing mass in the tail of the pancreas as annotated on series 34, image 41/72. The remainder of the pancreas appears unremarkable without ductal dilatation. Evaluation is limited due to motion artifact.   SPLEEN: Mildly enlarged measuring 14.1 cm in length.   ADRENAL GLANDS: Unremarkable.   KIDNEYS: 28 mm cyst in the interpolar cortex of the left kidney. Otherwise unremarkable kidneys without hydronephrosis   LYMPH NODES: No lymphadenopathy.   ABDOMINAL VESSELS: Abdominal aorta is atherosclerotic but otherwise patent without  aneurysm. Major visceral arterial branches are patent. IVC and visualized major branches are patent. Major portal venous branches are patent.   BOWEL: No dilated bowel is visualized.Severe distal colonic diverticulosis.   PERITONEUM/RETROPERITONEUM: No visualized free fluid.   BONES AND LOWER THORAX: Mild thoracolumbar degenerative changes. Bibasilar atelectasis.       1. Severely motion degraded exam. 2. 38 mm pancreatic tail hypoenhancing mass suspicious for adenocarcinoma. 3. 15 mm hepatic segment VI hypoenhancing mass suspicious for metastasis. 4. No definite lymphadenopathy.     MACRO: None   Signed by: Dyllan James 2/10/2025 3:33 PM Dictation workstation:   [id number]           Assessment/Plan   Assessment & Plan  Chest pain, unspecified type    Primary hypertension    Chronic obstructive pulmonary disease, unspecified    Current every day smoker    GERD (gastroesophageal reflux disease)    Hiatal hernia with GERD    Smoker    Spinal stenosis of cervical region    Mixed hyperlipidemia    CAD (coronary artery disease)    Mobitz type 1 second degree AV block    Type 2 diabetes mellitus with hyperglycemia, with long-term current use of insulin    Hyponatremia    Noncompliance with CPAP treatment    Obstructive sleep apnea    Recurrent major depressive disorder, in partial remission (CMS-HCC)    Hx of heart artery stent    Oropharyngeal dysphagia    History of constipation    Noncompliance with diabetes treatment    Abnormal weight loss    NSTEMI (non-ST elevated myocardial infarction) (Multi)    Hypokalemia    Pancreatic mass (HHS-HCC)    Coronary artery disease; NSTEMI  Admitted with left-sided chest pain symptoms unrelieved with nitroglycerin, subsequently ruled in for NSTEMI, coronary angiography showing severe multivessel coronary artery disease as noted above.  Angiograms have been reviewed by Dr Paniagua.   Preoperative testing was completed, CT of chest with incidental finding of lesion on tail of pancreas.   Subsequent MRCP showing a 38 mm pancreatic tail mass that is suspicious for adenocarcinoma with additional finding of a 15 mm hepatic mass that is highly suspicious for metastasis.  Findings have been reviewed by Dr. Paniagua  -Findings of possible malignancy with metastatic disease prohibitive for cardiac surgery at this time until complete metastatic workup with prognostic indicators is completed.  GI has been consulted for further workup. Patient may require transfer to [facility] for complete ocnologic workup before revascularization is considered.     Type 2 diabetes mellitus  Suboptimally controlled type 2 diabetes mellitus with questionable medication compliance prior to hospitalization.  A1c this admission 14.7  -Endocrinology consulted, glucose is improving with adjustments of insulin, stable to proceed with surgery from endo standpoint with close monitoring.        COPD; chronic lung nodule; ongoing heavy tobacco use  Bilateral lung nodules per screening CT in May of last year, noted to be stable in size.  Patient has continued to smoke heavily 1 to 1-1/2 packs/day.  Showing normal lung volumes with normal FEV1/FVC, moderate impairment of diffusion with DLCO 63.  CT of chest showing stability of previously noted bilateral lung nodules with new 8 mm nodule noted in the right middle lobe.  -Smoking cessation education            I spent 35 minutes in the professional and overall care of this patient.      Susie Mcfarland, APRN-CNP

## 2025-02-11 NOTE — PROGRESS NOTES
"Feliciano Worthington is a 74 y.o. male on day 5 of admission presenting with NSTEMI (non-ST elevated myocardial infarction) (Multi).    Subjective   Hospital follow-up.  Patient seen at the bedside.  Reports feeling the same as yesterday.  No active chest pain.  I did review the results of his MRCP.  Discussed concerning findings of pancreatic mass concerning for adenocarcinoma and a liver mass concerning for metastases.  Will consult gastroenterology.  Appreciate ongoing cardiology follow-up and cardiac surgery.  Definitive staging of cardiovascular surgery pending.  Appreciate ongoing endocrinology consultation.  Attempting tighter glycemic control.  Continue current medical therapy in the interim.    Objective     Physical Exam  Breathing is of no acute distress distant heart sounds soft normoactive bowel sounds no rebound or guarding.  Scattered rhonchi anteriorly.     Last Recorded Vitals  Blood pressure 115/65, pulse 71, temperature 36.2 °C (97.2 °F), resp. rate 16, height 1.778 m (5' 10\"), weight 87.1 kg (192 lb 0.3 oz), SpO2 98%.  Intake/Output last 3 Shifts:  I/O last 3 completed shifts:  In: 240 (2.8 mL/kg) [P.O.:240]  Out: 400 (4.6 mL/kg) [Urine:400 (0.1 mL/kg/hr)]  Weight: 87.1 kg     Relevant Results  Recent Results (from the past 72 hours)   POCT GLUCOSE    Collection Time: 02/08/25  5:12 PM   Result Value Ref Range    POCT Glucose 196 (H) 74 - 99 mg/dL   POCT GLUCOSE    Collection Time: 02/08/25  8:28 PM   Result Value Ref Range    POCT Glucose 186 (H) 74 - 99 mg/dL   ECG 12 lead    Collection Time: 02/08/25 10:10 PM   Result Value Ref Range    Ventricular Rate 61 BPM    Atrial Rate 81 BPM    QRS Duration 100 ms    QT Interval 402 ms    QTC Calculation(Bazett) 404 ms    P Axis 53 degrees    R Axis -85 degrees    T Axis 87 degrees    QRS Count 10 beats    Q Onset 213 ms    T Offset 414 ms    QTC Fredericia 404 ms   Basic Metabolic Panel    Collection Time: 02/09/25  5:36 AM   Result Value Ref Range    " Glucose 93 74 - 99 mg/dL    Sodium 134 (L) 136 - 145 mmol/L    Potassium 4.0 3.5 - 5.3 mmol/L    Chloride 99 98 - 107 mmol/L    Bicarbonate 25 21 - 32 mmol/L    Anion Gap 14 10 - 20 mmol/L    Urea Nitrogen 12 6 - 23 mg/dL    Creatinine 0.66 0.50 - 1.30 mg/dL    eGFR >90 >60 mL/min/1.73m*2    Calcium 8.5 (L) 8.6 - 10.3 mg/dL   Magnesium    Collection Time: 02/09/25  5:36 AM   Result Value Ref Range    Magnesium 1.92 1.60 - 2.40 mg/dL   CBC    Collection Time: 02/09/25  5:36 AM   Result Value Ref Range    WBC 6.0 4.4 - 11.3 x10*3/uL    nRBC 0.0 0.0 - 0.0 /100 WBCs    RBC 4.69 4.50 - 5.90 x10*6/uL    Hemoglobin 13.6 13.5 - 17.5 g/dL    Hematocrit 38.7 (L) 41.0 - 52.0 %    MCV 83 80 - 100 fL    MCH 29.0 26.0 - 34.0 pg    MCHC 35.1 32.0 - 36.0 g/dL    RDW 12.9 11.5 - 14.5 %    Platelets 131 (L) 150 - 450 x10*3/uL   Heparin Assay, UFH    Collection Time: 02/09/25  5:36 AM   Result Value Ref Range    Heparin Unfractionated 0.2 See Comment Below for Therapeutic Ranges IU/mL   POCT GLUCOSE    Collection Time: 02/09/25  6:49 AM   Result Value Ref Range    POCT Glucose 75 74 - 99 mg/dL   POCT GLUCOSE    Collection Time: 02/09/25 10:52 AM   Result Value Ref Range    POCT Glucose 235 (H) 74 - 99 mg/dL   Heparin Assay    Collection Time: 02/09/25 11:04 AM   Result Value Ref Range    Heparin Unfractionated 0.4 See Comment Below for Therapeutic Ranges IU/mL   SST TOP    Collection Time: 02/09/25  3:15 PM   Result Value Ref Range    Extra Tube Hold for add-ons.    Heparin Assay    Collection Time: 02/09/25  3:18 PM   Result Value Ref Range    Heparin Unfractionated 0.3 See Comment Below for Therapeutic Ranges IU/mL   POCT GLUCOSE    Collection Time: 02/09/25  3:54 PM   Result Value Ref Range    POCT Glucose 298 (H) 74 - 99 mg/dL   POCT GLUCOSE    Collection Time: 02/09/25  9:37 PM   Result Value Ref Range    POCT Glucose 226 (H) 74 - 99 mg/dL   Basic Metabolic Panel    Collection Time: 02/10/25  5:02 AM   Result Value Ref Range     Glucose 231 (H) 74 - 99 mg/dL    Sodium 131 (L) 136 - 145 mmol/L    Potassium 4.2 3.5 - 5.3 mmol/L    Chloride 99 98 - 107 mmol/L    Bicarbonate 24 21 - 32 mmol/L    Anion Gap 12 10 - 20 mmol/L    Urea Nitrogen 10 6 - 23 mg/dL    Creatinine 0.72 0.50 - 1.30 mg/dL    eGFR >90 >60 mL/min/1.73m*2    Calcium 8.8 8.6 - 10.3 mg/dL   Magnesium    Collection Time: 02/10/25  5:02 AM   Result Value Ref Range    Magnesium 1.83 1.60 - 2.40 mg/dL   CBC    Collection Time: 02/10/25  5:02 AM   Result Value Ref Range    WBC 6.4 4.4 - 11.3 x10*3/uL    nRBC 0.0 0.0 - 0.0 /100 WBCs    RBC 4.62 4.50 - 5.90 x10*6/uL    Hemoglobin 13.4 (L) 13.5 - 17.5 g/dL    Hematocrit 38.1 (L) 41.0 - 52.0 %    MCV 83 80 - 100 fL    MCH 29.0 26.0 - 34.0 pg    MCHC 35.2 32.0 - 36.0 g/dL    RDW 12.9 11.5 - 14.5 %    Platelets 134 (L) 150 - 450 x10*3/uL   Heparin Assay, UFH    Collection Time: 02/10/25  5:02 AM   Result Value Ref Range    Heparin Unfractionated 0.3 See Comment Below for Therapeutic Ranges IU/mL   Troponin I, High Sensitivity    Collection Time: 02/10/25  5:02 AM   Result Value Ref Range    Troponin I, High Sensitivity 143 (HH) 0 - 20 ng/L   POCT GLUCOSE    Collection Time: 02/10/25  6:40 AM   Result Value Ref Range    POCT Glucose 243 (H) 74 - 99 mg/dL   POCT GLUCOSE    Collection Time: 02/10/25 11:13 AM   Result Value Ref Range    POCT Glucose 148 (H) 74 - 99 mg/dL   POCT GLUCOSE    Collection Time: 02/10/25  4:03 PM   Result Value Ref Range    POCT Glucose 156 (H) 74 - 99 mg/dL   POCT GLUCOSE    Collection Time: 02/10/25  8:37 PM   Result Value Ref Range    POCT Glucose 173 (H) 74 - 99 mg/dL   POCT GLUCOSE    Collection Time: 02/10/25  8:50 PM   Result Value Ref Range    POCT Glucose 168 (H) 74 - 99 mg/dL   Heparin Assay    Collection Time: 02/10/25 11:17 PM   Result Value Ref Range    Heparin Unfractionated 0.2 See Comment Below for Therapeutic Ranges IU/mL   Lavender Top    Collection Time: 02/11/25  5:56 AM   Result Value Ref Range     Extra Tube Hold for add-ons.    PST Top    Collection Time: 02/11/25  5:56 AM   Result Value Ref Range    Extra Tube Hold for add-ons.    Heparin Assay    Collection Time: 02/11/25  5:58 AM   Result Value Ref Range    Heparin Unfractionated 0.5 See Comment Below for Therapeutic Ranges IU/mL   POCT GLUCOSE    Collection Time: 02/11/25  6:27 AM   Result Value Ref Range    POCT Glucose 204 (H) 74 - 99 mg/dL   Heparin Assay    Collection Time: 02/11/25 10:12 AM   Result Value Ref Range    Heparin Unfractionated 0.5 See Comment Below for Therapeutic Ranges IU/mL   POCT GLUCOSE    Collection Time: 02/11/25 11:25 AM   Result Value Ref Range    POCT Glucose 108 (H) 74 - 99 mg/dL          This patient currently has cardiac telemetry ordered; if you would like to modify or discontinue the telemetry order, click here to go to the orders activity to modify/discontinue the order.               amLODIPine, 5 mg, oral, Daily  aspirin, 81 mg, oral, Daily  buPROPion XL, 150 mg, oral, Daily  ezetimibe, 10 mg, oral, Nightly  hydrOXYzine HCL, 25 mg, oral, BID  insulin glargine, 25 Units, subcutaneous, Nightly  insulin glargine, 32 Units, subcutaneous, q AM  insulin lispro, 0-15 Units, subcutaneous, TID AC  insulin lispro, 10 Units, subcutaneous, TID AC  isosorbide mononitrate ER, 30 mg, oral, Daily  LORazepam, 0.5 mg, intravenous, Once  lovastatin, 40 mg, oral, Nightly  pantoprazole, 40 mg, oral, Daily  polyethylene glycol, 17 g, oral, Daily  venlafaxine XR, 150 mg, oral, Daily with breakfast        CT chest wo IV contrast    Result Date: 2/7/2025  Interpreted By:  Amarjit Ortiz, STUDY: CT CHEST WO IV CONTRAST;  2/6/2025 4:39 pm   INDICATION: Signs/Symptoms:pre op cardiac surgery.     COMPARISON: 05/28/2024   ACCESSION NUMBER(S): [id number]   ORDERING CLINICIAN: MAXIMILIAN ROSA   TECHNIQUE: Helical data acquisition of the chest was obtained without the use of IV contrast. Images were reformatted in axial, coronal, and sagittal planes.    FINDINGS: POTENTIAL LIMITATIONS OF THE STUDY:   Lack of IV contrast   HEART AND VESSELS:   No calcified plaques are seen involving the ascending aorta. There are atherosclerotic calcifications of the aortic arch, descending thoracic aorta, and the branches of the aorta. Aorta is unchanged in course and caliber. Stable ectasia of the ascending aorta up to 3.9 cm.   The heart is unchanged in size.   No pericardial effusion is seen.   MEDIASTINUM AND MARIA INES, LOWER NECK AND AXILLA: The visualized thyroid gland is within normal limits.   No evidence of thoracic lymphadenopathy by CT criteria.   Esophagus appears within normal limits as seen.   LUNGS AND AIRWAYS: The trachea and central airways are patent. No endobronchial lesion.   There are areas of fibrosis/scarring in the lungs, predominantly along the periphery. Appearance is not significantly changed when compared to the previous examination. No new areas of consolidation. No effusion. No pneumothorax. Stable partially calcified nodule along the major fissure on the right measuring approximately 8 mm. A few additional nodules are seen measuring 5 mm or less in size, for example, image 117 in the right upper lobe. There is a new nodular opacity in the medial aspect of the right lower lobe measuring approximately 8 mm in size. Although this may be infectious/inflammatory nature, follow-up is recommended to document resolution, image 146.   UPPER ABDOMEN: The visualized subdiaphragmatic structures demonstrate no acute abnormality. 2.2 cm cyst in the midpole of the left kidney. There is nonspecific prominence/thickening of the tail the pancreas with a 2.5 cm hypoattenuating lesion. This is not adequately characterized on this unenhanced CT. MRI is recommended for further evaluation.   CHEST WALL AND OSSEOUS STRUCTURES: Degenerative changes. No acute process.       No calcified plaques along the ascending aorta. There is atherosclerotic disease of the remaining portions  of the aorta and its branches. Indeterminate lesion involving the tail the pancreas. MRI recommended for further evaluation. Interval development of an 8 mm right lower lobe nodule which may be infectious/inflammatory in nature but will require follow-up to document resolution.   MACRO: None.   Signed by: Amarjit Ortiz 2/7/2025 9:05 AM Dictation workstation:   [id number]    Vascular US lower extremity vein mapping bilateral    Result Date: 2/7/2025  Interpreted By:  Amarjit Ortiz, STUDY: VAS US LOWER EXTREMITY VEIN MAPPING BILATERAL;  2/6/2025 5:33 pm   INDICATION: Signs/Symptoms:pre op cardiac surgery.   ,I21.4 Non-ST elevation (NSTEMI) myocardial infarction (Multi),R60.1 Generalized edema   COMPARISON: None.   ACCESSION NUMBER(S): [id number]   ORDERING CLINICIAN: MAXIMILIAN ROSA   TECHNIQUE: Real time duplex sonographic evaluation of the lower extremity venous systems was performed for venous mapping.   FINDINGS: Right Greater Saphenous Vein: Thigh- Proximal-0.3 x 0.2 cm Mid-0.2 x 0.2 cm Distal-0.3 x 0.2 cm   Calf-   Proximal-0.3 x 0.2 cm Mid-0.2 x 0.1 cm Distal-0.2 x 0.1 cm   Right Lesser Saphenous Vein: Proximal-0.3 x 0.2 cm Mid-0.2 x 0.1 cm Distal-0.2 x 0.1 cm     Left Greater Saphenous Vein: Thigh- Proximal-0.3 x 0.3 cm Mid-0.2 x 0.2 cm Distal-0.1 x 0.1 cm   Calf-   Proximal-0.1 x 0.1 cm Mid-0.2 x 0.1 cm Distal-0.3 x 0.1 cm   Left Lesser Saphenous Vein: Proximal-0.3 x 0.1 cm Mid-0.2 x 0.2 cm Distal-0.2 x 0.1 cm       Lower extremity venous mapping as detailed above.   MACRO: None.   Signed by: Amarjit Ortiz 2/7/2025 7:25 AM Dictation workstation:   [id number]    Carotid duplex bilateral    Result Date: 2/7/2025  Interpreted By:  Amarjit Ortiz, STUDY: [facility] US CAROTID ARTERY DUPLEX BILATERAL;  2/6/2025 5:32 pm   INDICATION: Signs/Symptoms:pre op caridac surgery.   ,I21.4 Non-ST elevation (NSTEMI) myocardial infarction (Multi),I79.8 Other disorders of arteries, arterioles and capillaries in diseases classified  elsewhere   COMPARISON: 10/30/2023   ACCESSION NUMBER(S): [id number]   ORDERING CLINICIAN: MAXIMILIAN ROSA   TECHNIQUE: Vascular ultrasound of the extracranial carotid system was performed bilaterally.  Gray scale, color Doppler and spectral Doppler waveform analysis was performed.   FINDINGS: There is atherosclerotic disease most conspicuous at the carotid bulbs.   RIGHT:   RIGHT SIDE PEAK SYSTOLIC VELOCITY TABLE: CCA  78 cm/s. ICA  126 cm/s. ECA  69 cm/s.     The ratio of the peak systolic velocity of the right ICA/CCA is  1.6.   RIGHT VERTEBRAL ARTERY: The right vertebral artery demonstrates proximal anterograde flow.   LEFT:   LEFT SIDE PEAK SYSTOLIC VELOCITY TABLE: CCA 50 cm/s. ICA 62 cm/s. ECA 64 cm/s.     The ratio of the peak systolic velocity of the left ICA/CCA is 1.1.   LEFT VERTEBRAL ARTERY: The left vertebral artery demonstrates proximal anterograde flow.       Utilizing the peak systolic velocities, the estimated degree of stenosis within the internal carotid arteries is less than 50% bilaterally.   MACRO: None.     Signed by: Amarjit Ortiz 2/7/2025 7:24 AM Dictation workstation:   [id number]    Cardiac Catheterization Procedure    Result Date: 2/6/2025   [facility], Cath Lab        [address] [place] [address] Cardiovascular Catheterization Report Patient Name:      JAIME FERNANDEZ      Performing Physician:  Litzy Montana MD Study Date:        2/6/2025             Verifying Physician:   Litzy Montana MD MRN/PID:           [id number]             Cardiologist/Co-Scrub: Accession#:        [id number]         Ordering Provider:     [id number] ENAMORADO Date of Birth/Age: 1950 / 74 years Cardiologist: Gender:            M                    Fellow: Encounter#:         [id number]           Surgeon:  Study: Left Heart Cath  Indications: JAIME FERNANDEZ is a 75 year old male who presents with dyslipidemia, hypertension, prior myocardial infarction, diabetes, smoker, chronic pulmonary disease, coronary artery disease, prior percutaneous coronary intervention and a chest pain assessment of typical angina. NSTE - ACS.  Procedure Description: After infiltration with 2% Lidocaine, the right femoral artery was cannulated with a modified Seldinger technique. Subsequently a 5 Turkish sheath was placed in the right femoral artery. Selective coronary catheterization was performed using a 5 Fr catheter(s) exchanged over a guide wire to cannulate the coronary arteries. A JL 4 tip catheter was used for left coronary injections. A 3drc tip catheter was used for right coronary injections. Multiple injections of contrast were made into the left and right coronary arteries with angiograms recorded in multiple projections. Retrograde left heart catheterizion was accomplished with a 5 Fr. pigtail catheter. A single plane left ventriculogram was recorded in the 30 degree RAYMOND projection. The contrast dose was 20 ml injected at 10 ml/sec. The catheter was then withdrawn across the aortic valve under continuous pressure monitoring and removed. After completion of the procedure, femoral artery angiography was performed. This demonstrated a common femoral artery puncture appropriate for closure. A Vascade 5F vascular closure Device was placed per protocol.  Coronary Angiography: The coronary circulation is right dominant.  Left Main Coronary Artery: The left main coronary artery is calcified. There is 70% stenosis in the distal left main coronary artery.  Left Anterior Descending Coronary Artery Distribution: There is 80% stenosis in the proximal left anterior descending artery.  Circumflex Coronary Artery Distribution: There is 90% stenosis in the the proximal Circumflex artery. There is 75% stenosis in the  Prox Ramus Circumflex artery. There is 80% stenosis in the Mid Ramus Circumflex artery.  Right Coronary Artery Distribution: There is 80% stenosis in the the mid Right Coronary Artery. There is evidence of instent restenosis in this segment. The distal right coronary artery showed 90% stenosis.  Left Ventriculography: The estimated left ventricular ejection fraction is normal at 55%.  Hemo Personnel: +---------------------------+---------+ Name                       Duty      +---------------------------+---------+ Wil Bronson MD 1 +---------------------------+---------+  Hemodynamic Pressures:  +----+------------------+---------+-------------+-------------+------+---------+ Site    Date Time       Phase  Systolic mmHg  Diastolic    ED  Mean mmHg                         Name                    mmHg      mmHg           +----+------------------+---------+-------------+-------------+------+---------+   AO  2/6/2025 9:36:07 AIR REST          127           65             87                     AM                                                   +----+------------------+---------+-------------+-------------+------+---------+   AO  2/6/2025 9:39:06 AIR REST           91           61             65                     AM                                                   +----+------------------+---------+-------------+-------------+------+---------+   AO  2/6/2025 9:44:05 AIR REST          133           72             96                     AM                                                   +----+------------------+---------+-------------+-------------+------+---------+   LV  2/6/2025 9:47:36 AIR REST          115           -1     6                              AM                                                   +----+------------------+---------+-------------+-------------+------+---------+  Cardiac Cath Post  Procedure Notes: Post Procedure Diagnosis: Triple vessel disease. Blood Loss:               Estimated blood loss during the procedure was 0 mls. Specimens Removed:        Number of specimen(s) removed: none. ____________________________________________________________________________________ CONCLUSIONS:  1. Distal Left Main: 70% stenosis.  2. Left Main Coronary Artery: This artery is calcified.  3. Proximal LAD Lesion: The percent stenosis is 80%.  4. Proximal CX Lesion: The percent stenosis is 90%.  5. Prox Ramus CX Lesion: The percent stenosis is 75%.  6. Mid Ramus CX Lesion: The percent stenosis is 80%.  7. Mid RCA Lesion: The percent stenosis is 80%.  8. Mid RCA Lesion: Instent Restenosis.  9. Distal RCA Lesion: The percent stenosis is 90%. 10. The Left Ventricular Ejection Fraction is 55%. 11. Mid CX Lesion: 100%. ICD 10 Codes: Non ST elevation (NSTEMI) myocardial infarction-I21.4  CPT Codes: Left Heart Cath (visualization of coronaries) and [id number]; Moderate Sedation Services initial 15 minutes patient >5 [id number]  [id number] Wil Montana MD Performing Physician Electronically signed by [id number] Wil Montana MD on 2/6/2025 at 10:19:09 AM  ** Final **     ECG 12 lead    Result Date: 2/6/2025  Sinus rhythm with 2nd degree AV block (Mobitz I) Right superior axis deviation Nonspecific ST abnormality Abnormal ECG When compared with ECG of 17-OCT-2023 18:49, No significant change was found Confirmed by Shanika Reese ([id number]) on 2/6/2025 9:38:21 AM    ECG 12 lead    Result Date: 2/6/2025  Sinus rhythm with 2nd degree AV block (Mobitz I) Right superior axis deviation Abnormal ECG When compared with ECG of 05-FEB-2025 21:41, (unconfirmed) No significant change was found    XR chest 1 view    Result Date: 2/5/2025  Interpreted By:  Tobin Machado, STUDY: XR CHEST 1 VIEW;  2/5/2025 10:17 pm   INDICATION: Signs/Symptoms:Chest Pain.   COMPARISON: None.   ACCESSION NUMBER(S): [id number]   ORDERING CLINICIAN:  MARIELY ROCKWELL   FINDINGS: The cardiac silhouette is normal in size. There is mild bilateral opacities may correspond to chronic lung changes and atelectasis versus pneumonia. No segment pleural effusion. No pneumothorax.       Mild bilateral opacities may correspond to chronic lung changes and atelectasis versus pneumonia.   MACRO: None   Signed by: Tobin Machado 2/5/2025 10:38 PM Dictation workstation:   [id number]       Assessment/Plan   Assessment & Plan  NSTEMI (non-ST elevated myocardial infarction) (Multi)    Primary hypertension    Chronic obstructive pulmonary disease, unspecified    Current every day smoker    GERD (gastroesophageal reflux disease)    Hiatal hernia with GERD    Smoker    Spinal stenosis of cervical region    Mixed hyperlipidemia    CAD (coronary artery disease)    Mobitz type 1 second degree AV block    Type 2 diabetes mellitus with hyperglycemia, with long-term current use of insulin    Hyponatremia    Chest pain, unspecified type    Noncompliance with CPAP treatment    Obstructive sleep apnea    Recurrent major depressive disorder, in partial remission (CMS-HCC)    Hx of heart artery stent    Oropharyngeal dysphagia    History of constipation    Noncompliance with diabetes treatment    Abnormal weight loss    Hypokalemia    Pancreatic mass ([facility])            I spent    minutes in the professional and overall care of this patient.      uHssain Bernal MD

## 2025-02-11 NOTE — PROGRESS NOTES
Rounding Cardiologist:  Faisal Dunn DO, Dr. Dunn    Primary Cardiologist: Dr. Roberto Haskins       Date:  2/11/2025    Patient:  Feliciano Worthington    YOB: 1950  MRN:  [id number]     Admit Date:  2/5/2025      SUBJECTIVE:      2/11/25  Resting in bed with no complaints.  Remains on heparin infusion.  Denies any chest pain or increased shortness of breath.  Ambulatory in the room.    2/10/25  Resting in bed with no complaints.  Denies any chest pain or shortness of breath  Cardiac surgery evaluation still taking place    2/9/25    Patient overall feels well.  No changes.  Vital signs stable.  Continue plan for coronary bypass surgery which.    2/8/25    Patient doing well.  Asymptomatic.  Vital signs stable.    Awaiting workup for planned coronary bypass surgery.      2/7/25    He denies any chest pain or shortness of breath.     LHC revealed 70% stenosis in the distal left main, 80% stenosis in the proximal LAD, 90% stenosis in the proximal circumflex artery, 75% stenosis in the proximal ramus, 80% stenosis in the mid ramus, 80% stenosis in the mid RCA, 90% stenosis in the distal RCA and an LVEF of 55%.     Cardiac surgery evaluation taking place      ASSESSMENT:     NSTEMI   History of coronary artery disease and remote stenting to the circumflex and right coronary arteries, last PCI 2011  Severe multivessel coronary artery disease by cath 2/2025, Pending CABG.  Normal LV function, LVEF 55%, Echo 2/25 0  Mobitz type I second-degree AV block  Hypertension  Hyperlipidemia   Diabetes mellitus  COPD with chronic tobacco use  Hyponatremia, sodium stable on admission  Otherwise as noted below and prior to      PLAN:     Monitor on telemetry.  Remains in normal sinus rhythm with first-degree AV block.  Rates are 70.  Supplemental O2, keep SpO2 greater than 92%  Monitor electrolytes, keep potassium greater than 4 and magnesium greater than 2  LHC revealed 70% stenosis in the distal  left main, 80% stenosis in the proximal LAD, 90% stenosis in the proximal circumflex artery, 75% stenosis in the proximal ramus, 80% stenosis in the mid ramus, 80% stenosis in the mid RCA, 90% stenosis in the distal RCA and an LVEF of 55%.   Continue heparin infusion for now, defer further recommendation to cardiothoracic team  Endocrinology following for type 2 diabetes mellitus with uncontrolled hemoglobin A1c  Nicotine patch for smoking cessation  Will continue to follow along with you  9.   Multidisciplinary team decision made when to safely performed coronary bypass graft with Dr. Paniagua.  Discussed with Dr. Dunn.  Further decision to be made today.  Patient remains high risk to be discharged home with a 70% left main.  Also remains high risk with uncontrolled diabetes mellitus type 2.  Personally would be better suited to be optimized here in the hospital with his uncontrolled diabetes and then surgery performed next week sometime.        Discussion:  This is a 74-year-old gentleman with diagnosed severe triple-vessel disease including left main disease and normal LV function.  Patient has been an ongoing smoker with diabetes sleep apnea and other medical conditions for which she has been poorly compliant.  He is currently being seen by endocrinology.  I discussed the situation with the attending cardiac surgeon yesterday.  Based on the information available in the patient's overall history and compliance issues it may be best suited for the patient to remain hospitalized while all medical matters are addressed and maximized in terms of treatment.  Surgery can then follow-up.  Patient also lives quite a distance from any local hospital and any issues that would occur while at home could jeopardize his overall outcome.  I reviewed this with the patient in detail and his preference at this point is to remain hospitalized and undergo surgery when all other aspects of his medical needs have been addressed as  best as can be accomplished.  Will discuss further with the endocrine service and general medical team along with thoracic surgery.      Faisal Dunn DO,FACC        [facility]  Adult Gerontology Acute Care Nurse Practitioner  [facility] [place]   [facility]  [phone]          VITALS:     Vitals:    02/10/25 1114 02/11/25 0112 02/11/25 0730 02/11/25 0813   BP: 127/67 139/60 142/66    BP Location: Right arm Left arm     Patient Position: Lying Lying     Pulse: 53 56 58 78   Resp: 18 16     Temp: 36.2 °C (97.2 °F) 36.1 °C (97 °F) 36.4 °C (97.5 °F)    TempSrc: Temporal Temporal     SpO2: 97% 97% 98%    Weight:       Height:           Intake/Output Summary (Last 24 hours) at 2/11/2025 1041  Last data filed at 2/11/2025 0112  Gross per 24 hour   Intake 240 ml   Output 400 ml   Net -160 ml       Wt Readings from Last 4 Encounters:   02/08/25 87.1 kg (192 lb 0.3 oz)   01/29/25 85.5 kg (188 lb 6.4 oz)   10/15/24 90.3 kg (199 lb)   10/04/24 89.8 kg (198 lb)       CURRENT HOSPITAL MEDICATIONS:   amLODIPine, 5 mg, oral, Daily  aspirin, 81 mg, oral, Daily  buPROPion XL, 150 mg, oral, Daily  ezetimibe, 10 mg, oral, Nightly  hydrOXYzine HCL, 25 mg, oral, BID  insulin glargine, 25 Units, subcutaneous, Nightly  insulin glargine, 32 Units, subcutaneous, q AM  insulin lispro, 0-15 Units, subcutaneous, TID AC  insulin lispro, 10 Units, subcutaneous, TID AC  isosorbide mononitrate ER, 30 mg, oral, Daily  LORazepam, 0.5 mg, intravenous, Once  lovastatin, 40 mg, oral, Nightly  pantoprazole, 40 mg, oral, Daily  polyethylene glycol, 17 g, oral, Daily  venlafaxine XR, 150 mg, oral, Daily with breakfast      heparin, 0-4,000 Units/hr, Last Rate: 1,600 Units/hr (02/11/25 0844)      Current Outpatient Medications   Medication Instructions    ACETAMINOPHEN ORAL 650 mg, Every 8 hours PRN    alcohol swabs (BD Alcohol Swabs) pads, medicated Use twice daily while checking  sugar.    amLODIPine (NORVASC) 5 mg, oral, Daily    aspirin 81 mg, Daily    blood glucose control high,low (Accu-Chek Guide L1-L2 Ctrl Sol) solution 1 bottle, miscellaneous, Daily PRN    blood sugar diagnostic (Accu-Chek Guide test strips) strip Test twice daily    blood-glucose meter (Accu-Chek Guide Glucose Meter) misc Test twice daily    chlorthalidone (Hygroton) 25 mg tablet TAKE 1 TABLET EVERY MONDAY AND THURSDAY    ezetimibe (ZETIA) 10 mg, oral, Daily    glipiZIDE (GLUCOTROL) 10 mg, oral, 2 times daily before meals    hydrOXYzine HCL (ATARAX) 25 mg, oral, 2 times daily    insulin glargine (LANTUS U-100 INSULIN) 32 Units, Every morning    ipratropium (Atrovent) 21 mcg (0.03 %) nasal spray 2 sprays, Each Nostril, Every 12 hours    lancets (Accu-Chek Softclix Lancets) misc Test twice daily    nitroglycerin (NITROSTAT) 0.4 mg, sublingual, Every 5 min PRN, DO NOT EXCEED A TOTAL OF 3 DOSES IN 15 MINUTES    omeprazole (PRILOSEC) 40 mg, oral, Daily before breakfast, Do not crush or chew.    ramipril (ALTACE) 5 mg, oral, 2 times daily    sertraline (ZOLOFT) 25 mg, oral, Daily    venlafaxine XR (EFFEXOR-XR) 150 mg, oral, Daily, Do not crush or chew.        PHYSICAL EXAMINATION:     Physical Exam  HENT:      Head: Normocephalic.      Mouth/Throat:      Mouth: Mucous membranes are moist.   Eyes:      Pupils: Pupils are equal, round, and reactive to light.   Cardiovascular:      Rate and Rhythm: Normal rate and regular rhythm.   Pulmonary:      Effort: Pulmonary effort is normal.      Comments: Wheezing  Abdominal:      General: Bowel sounds are normal.      Palpations: Abdomen is soft.   Musculoskeletal:         General: Normal range of motion.   Skin:     General: Skin is warm and dry.      Capillary Refill: Capillary refill takes less than 2 seconds.   Neurological:      Mental Status: He is alert and oriented to person, place, and time.   Psychiatric:         Mood and Affect: Mood normal.         LAB DATA:     CBC:    Results from last 7 days   Lab Units 02/10/25  0502 02/09/25  0536 02/08/25 0521   WBC AUTO x10*3/uL 6.4 6.0 6.8   RBC AUTO x10*6/uL 4.62 4.69 4.81   HEMOGLOBIN g/dL 13.4* 13.6 14.1   HEMATOCRIT % 38.1* 38.7* 40.0*   MCV fL 83 83 83   MCH pg 29.0 29.0 29.3   MCHC g/dL 35.2 35.1 35.3   RDW % 12.9 12.9 12.8   PLATELETS AUTO x10*3/uL 134* 131* 135*     CMP:    Results from last 7 days   Lab Units 02/10/25  0502 02/09/25  0536 02/08/25  0521 02/06/25  0309 02/05/25  2147   SODIUM mmol/L 131* 134* 132*   < > 129*   POTASSIUM mmol/L 4.2 4.0 3.2*   < > 4.4   CHLORIDE mmol/L 99 99 99   < > 93*   CO2 mmol/L 24 25 26   < > 23   BUN mg/dL 10 12 17   < > 16   CREATININE mg/dL 0.72 0.66 0.70   < > 0.99   GLUCOSE mg/dL 231* 93 188*   < > 438*   PROTEIN TOTAL g/dL  --   --   --   --  7.3   CALCIUM mg/dL 8.8 8.5* 8.5*   < > 9.3   BILIRUBIN TOTAL mg/dL  --   --   --   --  0.6   ALK PHOS U/L  --   --   --   --  110   AST U/L  --   --   --   --  15   ALT U/L  --   --   --   --  12    < > = values in this interval not displayed.     BMP:    Results from last 7 days   Lab Units 02/10/25  0502 02/09/25  0536 02/08/25  0521   SODIUM mmol/L 131* 134* 132*   POTASSIUM mmol/L 4.2 4.0 3.2*   CHLORIDE mmol/L 99 99 99   CO2 mmol/L 24 25 26   BUN mg/dL 10 12 17   CREATININE mg/dL 0.72 0.66 0.70   CALCIUM mg/dL 8.8 8.5* 8.5*   GLUCOSE mg/dL 231* 93 188*     Magnesium:  Results from last 7 days   Lab Units 02/10/25  0502 02/09/25  0536 02/08/25  0521   MAGNESIUM mg/dL 1.83 1.92 1.62     Troponin:    Results from last 7 days   Lab Units 02/10/25  0502 02/06/25  0800 02/06/25  0309   TROPHS ng/L 143* 2,450* 1,858*     BNP:     Lipid Panel:  Results from last 7 days   Lab Units 02/05/25  2248   HDL mg/dL 24.7   CHOLESTEROL/HDL RATIO  10.4   VLDL mg/dL 62*   TRIGLYCERIDES mg/dL 309*   NON HDL CHOL. mg/dL 233*        DIAGNOSTIC TESTING:       Echocardiogram:     Coronary Angiography:   Left Heart Cath, With LV 02/06/2025    [facility]  [facility], Cath Lab  [address] [place] [id number]    Cardiovascular Catheterization Report    Patient Name:      JAIME FERNANDEZ      Performing Physician:  [id number] Wil Montana MD  Study Date:        2/6/2025             Verifying Physician:   [id number] Wil Montana MD  MRN/PID:           [id number]             Cardiologist/Co-Scrub:  Accession#:        [id number]         Ordering Provider:     [id number] DERIK ENAMORADO  Date of Birth/Age: 1950 / 74 years Cardiologist:  Gender:            M                    Fellow:  Encounter#:        [id number]           Surgeon:      Study: Left Heart Cath      Indications:  JAIME FERNANDEZ is a 75 year old male who presents with dyslipidemia, hypertension, prior myocardial infarction, diabetes, smoker, chronic pulmonary disease, coronary artery disease, prior percutaneous coronary intervention and a chest pain assessment of typical angina. NSTE - ACS.    Procedure Description:  After infiltration with 2% Lidocaine, the right femoral artery was cannulated with a modified Seldinger technique. Subsequently a 5 Romanian sheath was placed in the right femoral artery. Selective coronary catheterization was performed using a 5 Fr catheter(s) exchanged over a guide wire to cannulate the coronary arteries. A JL 4 tip catheter was used for left coronary injections. A 3drc tip catheter was used for right coronary injections.  Multiple injections of contrast were made into the left and right coronary arteries with angiograms recorded in multiple projections. Retrograde left heart catheterizion was accomplished with a 5 Fr. pigtail catheter. A single plane left ventriculogram was recorded in the 30 degree RAYMOND projection. The contrast dose was 20 ml injected at 10 ml/sec. The catheter was then withdrawn across the aortic valve under continuous pressure monitoring and removed. After completion of the procedure, femoral artery angiography was performed. This demonstrated a  common femoral artery puncture appropriate for closure. A Vascade 5F vascular closure Device was placed per protocol.    Coronary Angiography:  The coronary circulation is right dominant.    Left Main Coronary Artery:  The left main coronary artery is calcified. There is 70% stenosis in the distal left main coronary artery.    Left Anterior Descending Coronary Artery Distribution:  There is 80% stenosis in the proximal left anterior descending artery.    Circumflex Coronary Artery Distribution:  There is 90% stenosis in the the proximal Circumflex artery. There is 75% stenosis in the Prox Ramus Circumflex artery. There is 80% stenosis in the Mid Ramus Circumflex artery.    Right Coronary Artery Distribution:    There is 80% stenosis in the the mid Right Coronary Artery. There is evidence of instent restenosis in this segment. The distal right coronary artery showed 90% stenosis.      Left Ventriculography:  The estimated left ventricular ejection fraction is normal at 55%.    Hemo Personnel:  +---------------------------+---------+  Name                       Duty       +---------------------------+---------+  Wil Bronson MD 1  +---------------------------+---------+      Hemodynamic Pressures:    +----+------------------+---------+-------------+-------------+------+---------+  Site    Date Time       Phase  Systolic mmHg  Diastolic    ED  Mean mmHg                          Name                    mmHg      mmHg            +----+------------------+---------+-------------+-------------+------+---------+    PAOLA  2/6/2025 9:36:07 AIR REST          127           65             87                      AM                                                    +----+------------------+---------+-------------+-------------+------+---------+    AO  2/6/2025 9:39:06 AIR REST           91           61             65                      AM                                                     +----+------------------+---------+-------------+-------------+------+---------+    AO  2/6/2025 9:44:05 AIR REST          133           72             96                      AM                                                    +----+------------------+---------+-------------+-------------+------+---------+    LV  2/6/2025 9:47:36 AIR REST          115           -1     6                               AM                                                    +----+------------------+---------+-------------+-------------+------+---------+      Cardiac Cath Post Procedure Notes:  Post Procedure Diagnosis: Triple vessel disease.  Blood Loss:               Estimated blood loss during the procedure was 0 mls.  Specimens Removed:        Number of specimen(s) removed: none.    ____________________________________________________________________________________  CONCLUSIONS:  1. Distal Left Main: 70% stenosis.  2. Left Main Coronary Artery: This artery is calcified.  3. Proximal LAD Lesion: The percent stenosis is 80%.  4. Proximal CX Lesion: The percent stenosis is 90%.  5. Prox Ramus CX Lesion: The percent stenosis is 75%.  6. Mid Ramus CX Lesion: The percent stenosis is 80%.  7. Mid RCA Lesion: The percent stenosis is 80%.  8. Mid RCA Lesion: Instent Restenosis.  9. Distal RCA Lesion: The percent stenosis is 90%.  10. The Left Ventricular Ejection Fraction is 55%.  11. Mid CX Lesion: 100%.    ICD 10 Codes:  Non ST elevation (NSTEMI) myocardial infarction-I21.4    CPT Codes:  Left Heart Cath (visualization of coronaries) and LV-11644; Moderate Sedation Services initial 15 minutes patient >5 years-89405    [id number] Wil Montana MD  Performing Physician  Electronically signed by [id number] Wil Montana MD on 2/6/2025 at 10:19:09  AM              RADIOLOGY:     MRCP pancreas w and wo IV contrast   Final Result   1. Severely motion degraded exam.   2. 38 mm  pancreatic tail hypoenhancing mass suspicious for   adenocarcinoma.   3. 15 mm hepatic segment VI hypoenhancing mass suspicious for   metastasis.   4. No definite lymphadenopathy.             MACRO:   None        Signed by: Dyllan James 2/10/2025 3:33 PM   Dictation workstation:   [id number]      Transthoracic Echo Complete   Final Result      Vascular US lower extremity vein mapping bilateral   Final Result   Lower extremity venous mapping as detailed above.        MACRO:   None.        Signed by: Amarjit Ortiz 2/7/2025 7:25 AM   Dictation workstation:   [id number]      Carotid duplex bilateral   Final Result   Utilizing the peak systolic velocities, the estimated degree of   stenosis within the internal carotid arteries is less than 50%   bilaterally.        MACRO:   None.             Signed by: Amarjit Ortiz 2/7/2025 7:24 AM   Dictation workstation:   [id number]      CT chest wo IV contrast   Final Result   No calcified plaques along the ascending aorta. There is   atherosclerotic disease of the remaining portions of the aorta and   its branches. Indeterminate lesion involving the tail the pancreas.   MRI recommended for further evaluation. Interval development of an 8   mm right lower lobe nodule which may be infectious/inflammatory in   nature but will require follow-up to document resolution.        MACRO:   None.        Signed by: Amarjit Ortiz 2/7/2025 9:05 AM   Dictation workstation:   [id number]      Cardiac Catheterization Procedure   Final Result      XR chest 1 view   Final Result   Mild bilateral opacities may correspond to chronic lung changes and   atelectasis versus pneumonia.        MACRO:   None        Signed by: Tobin Machado 2/5/2025 10:38 PM   Dictation workstation:   [id number]          PROBLEM LIST     Patient Active Problem List   Diagnosis    Primary hypertension    Chronic obstructive pulmonary disease, unspecified    Current every day smoker    GERD (gastroesophageal reflux disease)     Arcus senilis, bilateral    Astigmatism of both eyes    Cataract, bilateral    Chronic venous stasis dermatitis    Depression, major, in remission (CMS-HCC)    Erectile dysfunction    Hiatal hernia with GERD    Multiple lung nodules on CT    Nuclear sclerotic cataract of both eyes    Osteoarthritis of hips, bilateral    Other spondylosis, cervical region    Periumbilical hernia    Testosterone deficiency    Smoker    Sleep apnea    Vitamin D deficiency    Noncompliance with dietary restriction    Arthritis of left acromioclavicular joint    Bruit of right carotid artery    Degenerative joint disease (DJD) of hip    Spinal stenosis of cervical region    Encounter for immunization    Mixed hyperlipidemia    CAD (coronary artery disease)    Mobitz type 1 second degree AV block    Type 2 diabetes mellitus with hyperglycemia, with long-term current use of insulin    Hyponatremia    Chest pain, unspecified type    NSTEMI (non-ST elevated myocardial infarction) (Multi)    Noncompliance with CPAP treatment    Obstructive sleep apnea    Recurrent major depressive disorder, in partial remission (CMS-HCC)    Hx of heart artery stent    Oropharyngeal dysphagia    History of constipation    Noncompliance with diabetes treatment    Abnormal weight loss    Hypokalemia             Of note, this documentation is completed using the Dragon Dictation system (voice recognition software). There may be spelling and/or grammatical errors that were not corrected prior to final submission.    Please do not hesitate to call with questions.  Electronically signed by Faisal Dunn DO, on 2/11/2025 at 10:41 AM

## 2025-02-11 NOTE — CONSULTS
Department of Internal Medicine  Gastroenterology  Consult note      Reason for Consult: pancreatic mass, concerning for adenocarcinoma, liver mass concern for mets    Chief Complaint: Chest pain, noted to have NSTEMI    History Obtained from: Patient, medical staff and prior medical records    History of Present Illness      The patient is a 74 y.o. male with signficant past medical history of CAD s/p PCI with TREVOR 2011, Mobitz type 1 AV block, HTN, HLD, IDDM-II, COPD, tobacco use disorder, GERD, depression & anxiety who presented to [facility] [place], [place] due to chest pain.     Admitted on 2/5/2025 for NSTEMI started on IV heparin, ASA continued.    LHC performed 2/7/2025 revealed 70% stenosis in the distal left main, 80% stenosis in the proximal LAD, 90% stenosis in the proximal circumflex artery, 75% stenosis in the proximal ramus, 80% stenosis in the mid ramus, 80% stenosis in the mid RCA, 90% stenosis in the distal RCA and an LVEF of 55%.  Patient recommended to have evaluation for coronary bypass surgery.    During evaluation for cardiac surgery, MRCP noted pancreatic tail mass, concerning for adenocarcinoma as well as liver mass concerning for metastasis.    Resting in bed with no current complaints.  No current chest pain or shortness of breath.  Patient afebrile.  Tolerating a diet.  No abdominal pain or nausea/vomiting.  No current constipation or diarrhea.  Patient denies hematemesis, melena or hematochezia.  Patient does report unintentional weight loss of about 50 pounds over the past year.  Patient remains on heparin drip.    Discussed plan with cardiac surgery (NGUYEN Richards) who reports they are currently holding off on a CABG due to numerous reasons including elevated A1c 14.7 and pending workup for pancreatic mass.  If poor prognosis, may not be a surgical candidate at all from cardiac surgery standpoint.        Allergies  Tetracyclines, Atorvastatin, Empagliflozin, Pravastatin, Rosuvastatin, Simvastatin, and  Statins-hmg-coa reductase inhibitors    Current Medication    Current Facility-Administered Medications:     acetaminophen (Tylenol) tablet 650 mg, 650 mg, oral, q4h PRN, 650 mg at 02/10/25 1751 **OR** acetaminophen (Tylenol) oral liquid 650 mg, 650 mg, nasogastric tube, q4h PRN **OR** acetaminophen (Tylenol) suppository 650 mg, 650 mg, rectal, q4h PRN, KHADIJAH Soni    acetaminophen (Tylenol) tablet 650 mg, 650 mg, oral, q4h PRN, Shanika Reese MD, 650 mg at 02/06/25 2023    alum-mag hydroxide-simeth (Mylanta) 200-200-20 mg/5 mL oral suspension 30 mL, 30 mL, oral, 4x daily PRN, Shanika Reese MD    amLODIPine (Norvasc) tablet 5 mg, 5 mg, oral, Daily, KHADIJAH Soni, 5 mg at 02/11/25 0814    aspirin chewable tablet 81 mg, 81 mg, oral, Daily, KHADIJAH Soni, 81 mg at 02/11/25 0814    buPROPion XL (Wellbutrin XL) 24 hr tablet 150 mg, 150 mg, oral, Daily, Aric Cueva MD, 150 mg at 02/11/25 0814    dextrose 50 % injection 12.5 g, 12.5 g, intravenous, q15 min PRN, Cristina Wiseman MD    dextrose 50 % injection 25 g, 25 g, intravenous, q15 min PRN, Cristina Wiseman MD    ezetimibe (Zetia) tablet 10 mg, 10 mg, oral, Nightly, KHADIJAH Soni, 10 mg at 02/10/25 2038    glucagon (Glucagen) injection 1 mg, 1 mg, intramuscular, q15 min PRN, Cristina Wiseman MD    glucagon (Glucagen) injection 1 mg, 1 mg, intramuscular, q15 min PRN, Cristina Wiseman MD    heparin 25,000 Units in dextrose 5% 250 mL (100 Units/mL) infusion (premix), 0-4,000 Units/hr, intravenous, Continuous, KHADIJAH Gomes, Last Rate: 16 mL/hr at 02/11/25 1144, 1,600 Units/hr at 02/11/25 1144    heparin bolus from bag 2,000-4,000 Units, 2,000-4,000 Units, intravenous, q4h PRN, KHADIJAH Soni, Last Rate: 2,000 mL/hr at 02/09/25 1411, Rate Verify at 02/09/25 1411    hydrOXYzine HCL (Atarax) tablet 25 mg, 25 mg, oral, BID, KHADIJAH Soni, 25 mg at 02/11/25 0814    insulin glargine (Lantus)  injection 25 Units, 25 Units, subcutaneous, Nightly, Cristina Wiseman MD, 25 Units at 02/10/25 2038    insulin glargine (Lantus) injection 32 Units, 32 Units, subcutaneous, q AM, KHADIJAH Soni, 32 Units at 02/11/25 0812    insulin lispro injection 0-15 Units, 0-15 Units, subcutaneous, TID AC, KHADIJAH Soni, 6 Units at 02/11/25 0812    insulin lispro injection 10 Units, 10 Units, subcutaneous, TID AC, Cristina Wiseman MD, 10 Units at 02/11/25 1210    ipratropium-albuteroL (Duo-Neb) 0.5-2.5 mg/3 mL nebulizer solution 3 mL, 3 mL, nebulization, q4h PRN, Shanika Reese MD    isosorbide mononitrate ER (Imdur) 24 hr tablet 30 mg, 30 mg, oral, Daily, KHADIJAH Soni, 30 mg at 02/11/25 0813    LORazepam (Ativan) injection 0.5 mg, 0.5 mg, intravenous, Once, Susie Mcfarland, APRN-CNP    lovastatin (Mevacor) tablet 40 mg, 40 mg, oral, Nightly, Geraldine Castellanos, PharmD    magnesium hydroxide (Milk of Magnesia) 400 mg/5 mL suspension 30 mL, 30 mL, oral, Daily PRN, Shanika Reese MD, 30 mL at 02/08/25 2159    melatonin tablet 5 mg, 5 mg, oral, Nightly PRN, Shanika Reese MD    nitroglycerin (Nitrostat) SL tablet 0.4 mg, 0.4 mg, sublingual, q5 min PRN, KHADIJAH Soni, 0.4 mg at 02/08/25 2027    ondansetron (Zofran) injection 4 mg, 4 mg, intravenous, q4h PRN, Shanika Reese MD    ondansetron (Zofran) tablet 4 mg, 4 mg, oral, q8h PRN, Shanika Reese MD    pantoprazole (ProtoNix) EC tablet 40 mg, 40 mg, oral, Daily, CELIA Soni-CNP, 40 mg at 02/11/25 0622    polyethylene glycol (Glycolax, Miralax) packet 17 g, 17 g, oral, Daily, Shanika Reese MD, 17 g at 02/09/25 0835    venlafaxine XR (Effexor-XR) 24 hr capsule 150 mg, 150 mg, oral, Daily with breakfast, Shanika Reese MD, 150 mg at 02/11/25 0812    Past Medical History  Active Ambulatory Problems     Diagnosis Date Noted    Primary hypertension 10/09/2023    Chronic obstructive pulmonary disease, unspecified 03/21/2018    Current every day smoker  10/09/2023    GERD (gastroesophageal reflux disease) 10/09/2023    Arcus senilis, bilateral 10/13/2023    Astigmatism of both eyes 10/13/2023    Cataract, bilateral 10/13/2023    Chronic venous stasis dermatitis 10/13/2023    Depression, major, in remission (CMS-HCC) 10/13/2023    Erectile dysfunction 06/01/2018    Hiatal hernia with GERD 10/13/2023    Multiple lung nodules on CT 10/13/2023    Nuclear sclerotic cataract of both eyes 10/13/2023    Osteoarthritis of hips, bilateral 10/13/2023    Other spondylosis, cervical region 10/13/2023    Periumbilical hernia 10/13/2023    Testosterone deficiency 10/13/2023    Smoker 01/19/2021    Sleep apnea 10/13/2023    Vitamin D deficiency 03/05/2019    Noncompliance with dietary restriction 10/13/2023    Arthritis of left acromioclavicular joint 10/13/2023    Bruit of right carotid artery 10/13/2023    Degenerative joint disease (DJD) of hip 10/13/2023    Spinal stenosis of cervical region 10/13/2023    Encounter for immunization 10/31/2023    Mixed hyperlipidemia 11/07/2023    CAD (coronary artery disease) 11/08/2023    Mobitz type 1 second degree AV block 11/08/2023    Type 2 diabetes mellitus with hyperglycemia, with long-term current use of insulin 12/20/2023    Hyponatremia 05/07/2024     Resolved Ambulatory Problems     Diagnosis Date Noted    Community acquired pneumonia of both lower lobes 10/08/2023    Pleuritis 10/09/2023    Type 2 diabetes mellitus with hypoglycemia, with long-term current use of insulin 10/09/2023    Hyperlipidemia associated with type 2 diabetes mellitus (Multi) 10/09/2023    Atherosclerotic heart disease of native coronary artery with angina pectoris 10/13/2023    Class 1 obesity with body mass index (BMI) of 30.0 to 30.9 in adult 10/13/2023    Peripheral vascular disease in type 1 diabetes mellitus 10/13/2023    Type 2 diabetes mellitus without complication, without long-term current use of insulin (Multi) 10/13/2023    BMI 29.0-29.9,adult  10/13/2023    Hospital discharge follow-up 10/13/2023    Acute embolism and thrombosis of deep vein of right upper extremity (Multi) 10/31/2023    Coronary artery disease involving native coronary artery of native heart without angina pectoris 11/07/2023    Hypernatremia 11/07/2023    Shortness of breath 11/07/2023    Acute deep vein thrombosis (DVT) of brachial vein of right upper extremity (Multi) 11/07/2023    Acute deep vein thrombosis (DVT) of right upper extremity (Multi) 12/20/2023     Past Medical History:   Diagnosis Date    Acute myocardial infarction, unspecified 01/24/2018    Acute myocardial infarction, unspecified 01/24/2018    Coronary angioplasty status     Encounter for screening for malignant neoplasm of colon 08/25/2016    Encounter for screening for malignant neoplasm of skin 04/14/2022    Nicotine dependence, cigarettes, uncomplicated 09/12/2022    Obesity, unspecified 05/25/2022    Old myocardial infarction 01/24/2018    Other acute postprocedural pain     Pain in left knee     Pain in right hip 11/04/2022    Personal history of other diseases of the digestive system 01/24/2018    Personal history of other diseases of the musculoskeletal system and connective tissue 08/25/2016    Personal history of other diseases of the respiratory system     Personal history of other endocrine, nutritional and metabolic disease 08/25/2016    Personal history of other mental and behavioral disorders 04/14/2022    Personal history of other specified conditions 12/08/2016    Personal history of other specified conditions     Personal history of other specified conditions 06/20/2018    Separation of muscle (nontraumatic), other site 05/14/2018    Sprain of medial collateral ligament of left knee, initial encounter 08/28/2017    Sprain of medial collateral ligament of left knee, subsequent encounter 10/12/2017    Type 2 diabetes mellitus without complications (Multi) 01/24/2018    Type 2 diabetes mellitus without  "complications (Multi) 01/24/2018    Umbilical hernia without obstruction or gangrene 05/14/2018       Past Surgical History  Past Surgical History:   Procedure Laterality Date    CARDIAC CATHETERIZATION N/A 2/6/2025    Procedure: Left Heart Cath, With LV;  Surgeon: Wil Bronson MD;  Location: [facility];  Service: Cardiovascular;  Laterality: N/A;    COLONOSCOPY  10/06/2016    Colonoscopy    CORONARY ANGIOPLASTY WITH STENT PLACEMENT  08/25/2016    Cath Stent Placement    OTHER SURGICAL HISTORY  05/25/2022    Oral surgery    SKIN CANCER EXCISION  01/17/2025    right forearm, right shoulder    TONSILLECTOMY  08/25/2016    Tonsillectomy       Family History  Family History   Problem Relation Name Age of Onset    Coronary artery disease Mother      Diabetes Father      Coronary artery disease Father      Other (acute myocardial infarction) Father       No family history of stomach or colon cancer.  No family history of liver or pancreatic cancer.    Social History  TOBACCO:  reports that he has been smoking cigarettes. He started smoking about 57 years ago. He has a 75 pack-year smoking history. He has been exposed to tobacco smoke. He has never used smokeless tobacco.  ETOH:  reports that he does not currently use alcohol.  DRUGS:  reports no history of drug use.  MARITAL STATUS:   OCCUPATION:    Review of Systems  All 10 systems reviewed with the patient/family and negative except for what is mentioned in HPI      PHYSICAL EXAM  VS: /65   Pulse 71   Temp 36.2 °C (97.2 °F)   Resp 16   Ht 1.778 m (5' 10\")   Wt 87.1 kg (192 lb 0.3 oz)   SpO2 98%   BMI 27.55 kg/m²  Body mass index is 27.55 kg/m².  Physical Exam  Vitals reviewed.   Constitutional:       General: He is not in acute distress.     Appearance: Normal appearance.   HENT:      Head: Normocephalic.      Nose: Nose normal.      Mouth/Throat:      Mouth: Mucous membranes are moist.      Pharynx: Oropharynx is clear.   Eyes:      " Extraocular Movements: Extraocular movements intact.      Conjunctiva/sclera: Conjunctivae normal.      Pupils: Pupils are equal, round, and reactive to light.   Cardiovascular:      Rate and Rhythm: Normal rate and regular rhythm.      Pulses: Normal pulses.      Heart sounds: Normal heart sounds.   Pulmonary:      Effort: Pulmonary effort is normal.      Breath sounds: Normal breath sounds.   Abdominal:      General: Bowel sounds are normal. There is no distension.      Palpations: Abdomen is soft.      Tenderness: There is no abdominal tenderness. There is no guarding or rebound.   Musculoskeletal:         General: Normal range of motion.      Cervical back: Normal range of motion.   Skin:     General: Skin is warm and dry.   Neurological:      General: No focal deficit present.      Mental Status: He is alert and oriented to person, place, and time.   Psychiatric:         Mood and Affect: Mood normal.         Behavior: Behavior normal.          DATA  Recent blood work and relevant radiology and endoscopic studies were reviewed and discussed with the patient   Results from last 7 days   Lab Units 02/10/25  0502   WBC AUTO x10*3/uL 6.4   RBC AUTO x10*6/uL 4.62   HEMOGLOBIN g/dL 13.4*   HEMATOCRIT % 38.1*   MCV fL 83   MCHC g/dL 35.2   RDW % 12.9   PLATELETS AUTO x10*3/uL 134*       Results from last 72 hours   Lab Units 02/10/25  0502   SODIUM mmol/L 131*   POTASSIUM mmol/L 4.2   CHLORIDE mmol/L 99   CO2 mmol/L 24   BUN mg/dL 10   CREATININE mg/dL 0.72   CALCIUM mg/dL 8.8                   RADIOLOGY REVIEW  MRCP pancreas 2/10/2025:  IMPRESSION:  1. Severely motion degraded exam.  2. 38 mm pancreatic tail hypoenhancing mass suspicious for  adenocarcinoma.  3. 15 mm hepatic segment VI hypoenhancing mass suspicious for  metastasis.  4. No definite lymphadenopathy.      ENDOSCOPIC REVIEW  EGD and colonoscopy (adequate prep) 9/28/2016 with Dr. Muse indicated for history of colon polyps, dysphagia and GERD:  2-3 cm  hiatal hernia, multiple ulcerations x 5 in the upper esophagus ranging from 4 mm to 8 mm in size, nonbleeding and 4 colon polyps (3 polyps in the distal sigmoid and 1 polyp in the rectum), diverticulosis        IMPRESSION/RECOMMENDATIONS  The patient is a 74 y.o. male with signficant past medical history of CAD s/p PCI with TREVOR 2011, Mobitz type 1 AV block, HTN, HLD, IDDM-II, COPD, tobacco use disorder, GERD, depression & anxiety who presented to [facility] due to chest pain. Admitted on 2/5/2025 for NSTEMI.    LHC performed 2/7/2025 revealed 70% stenosis in the distal left main, 80% stenosis in the proximal LAD, 90% stenosis in the proximal circumflex artery, 75% stenosis in the proximal ramus, 80% stenosis in the mid ramus, 80% stenosis in the mid RCA, 90% stenosis in the distal RCA and an LVEF of 55%. Patient recommended to have evaluation for coronary bypass surgery.    During evaluation for cardiac surgery, MRCP noted pancreatic tail mass, concerning for adenocarcinoma as well as liver mass concerning for metastasis which GI was consulted.        # Pancreatic mass with possible mets to the liver on imaging   LFTs WNL, afebrile. no leukocytosis. Tolerating a diet with no abdominal pain or N/V  Cancer AG 19-9 (< 4) on 2/7/2025  # Unintentional weight loss - 50 lbs over the past year  #Acute NSTEMI - currently on heparin gtt and ASA.  Cardiac surgery on board for evaluation for CABG, but currently holding off due to several reasons including elevated A1c and pending workup of pancreatic mass. If poor prognosis, may not be a surgical candidate at all from cardiac surgery standpoint.      PLAN  There is high concern for underlying pancreatic cancer which may be surgically managed.  [place] [facility] [place] access to a multidisciplinary team including hepatobiliary surgery, oncology and advanced GI that can figure out proper diagnosis and further management.                   Plan  discussed with Dr. Flynn.   (Electronically signed byKHADIJAH Shrestha on 2/11/2025 at 12:15 PM)     Inpatient consult to Gastroenterology  Consult performed by: KHADIJAH Shrestha  Consult ordered by: Hussain Bernal MD

## 2025-02-12 LAB
GLUCOSE BLD MANUAL STRIP-MCNC: 155 MG/DL (ref 74–99)
GLUCOSE BLD MANUAL STRIP-MCNC: 205 MG/DL (ref 74–99)
GLUCOSE BLD MANUAL STRIP-MCNC: 225 MG/DL (ref 74–99)
GLUCOSE BLD MANUAL STRIP-MCNC: 275 MG/DL (ref 74–99)
HOLD SPECIMEN: NORMAL
UFH PPP CHRO-ACNC: 0.5 IU/ML

## 2025-02-12 PROCEDURE — 82947 ASSAY GLUCOSE BLOOD QUANT: CPT

## 2025-02-12 PROCEDURE — 2500000002 HC RX 250 W HCPCS SELF ADMINISTERED DRUGS (ALT 637 FOR MEDICARE OP, ALT 636 FOR OP/ED): Performed by: INTERNAL MEDICINE

## 2025-02-12 PROCEDURE — 2500000001 HC RX 250 WO HCPCS SELF ADMINISTERED DRUGS (ALT 637 FOR MEDICARE OP): Performed by: NURSE PRACTITIONER

## 2025-02-12 PROCEDURE — 2500000004 HC RX 250 GENERAL PHARMACY W/ HCPCS (ALT 636 FOR OP/ED): Performed by: NURSE PRACTITIONER

## 2025-02-12 PROCEDURE — 2500000002 HC RX 250 W HCPCS SELF ADMINISTERED DRUGS (ALT 637 FOR MEDICARE OP, ALT 636 FOR OP/ED): Performed by: NURSE PRACTITIONER

## 2025-02-12 PROCEDURE — 99232 SBSQ HOSP IP/OBS MODERATE 35: CPT | Performed by: NURSE PRACTITIONER

## 2025-02-12 PROCEDURE — 99231 SBSQ HOSP IP/OBS SF/LOW 25: CPT | Performed by: NURSE PRACTITIONER

## 2025-02-12 PROCEDURE — 85520 HEPARIN ASSAY: CPT | Performed by: FAMILY MEDICINE

## 2025-02-12 PROCEDURE — 2500000001 HC RX 250 WO HCPCS SELF ADMINISTERED DRUGS (ALT 637 FOR MEDICARE OP): Performed by: PSYCHIATRY & NEUROLOGY

## 2025-02-12 PROCEDURE — 2500000001 HC RX 250 WO HCPCS SELF ADMINISTERED DRUGS (ALT 637 FOR MEDICARE OP): Performed by: INTERNAL MEDICINE

## 2025-02-12 PROCEDURE — 36415 COLL VENOUS BLD VENIPUNCTURE: CPT | Performed by: FAMILY MEDICINE

## 2025-02-12 PROCEDURE — 99233 SBSQ HOSP IP/OBS HIGH 50: CPT | Performed by: FAMILY MEDICINE

## 2025-02-12 PROCEDURE — 2060000001 HC INTERMEDIATE ICU ROOM DAILY

## 2025-02-12 PROCEDURE — 2500000001 HC RX 250 WO HCPCS SELF ADMINISTERED DRUGS (ALT 637 FOR MEDICARE OP)

## 2025-02-12 RX ADMIN — INSULIN LISPRO 10 UNITS: 100 INJECTION, SOLUTION INTRAVENOUS; SUBCUTANEOUS at 16:37

## 2025-02-12 RX ADMIN — INSULIN LISPRO 10 UNITS: 100 INJECTION, SOLUTION INTRAVENOUS; SUBCUTANEOUS at 11:43

## 2025-02-12 RX ADMIN — AMLODIPINE BESYLATE 5 MG: 5 TABLET ORAL at 09:46

## 2025-02-12 RX ADMIN — HYDROXYZINE HYDROCHLORIDE 25 MG: 25 TABLET ORAL at 09:46

## 2025-02-12 RX ADMIN — HEPARIN SODIUM 16 UNITS/HR: 10000 INJECTION, SOLUTION INTRAVENOUS at 16:36

## 2025-02-12 RX ADMIN — ASPIRIN 81 MG: 81 TABLET, CHEWABLE ORAL at 09:46

## 2025-02-12 RX ADMIN — LOVASTATIN 40 MG: 40 TABLET ORAL at 20:24

## 2025-02-12 RX ADMIN — EZETIMIBE 10 MG: 10 TABLET ORAL at 20:24

## 2025-02-12 RX ADMIN — ISOSORBIDE MONONITRATE 30 MG: 30 TABLET, EXTENDED RELEASE ORAL at 09:46

## 2025-02-12 RX ADMIN — INSULIN LISPRO 6 UNITS: 100 INJECTION, SOLUTION INTRAVENOUS; SUBCUTANEOUS at 09:46

## 2025-02-12 RX ADMIN — BUPROPION HYDROCHLORIDE 150 MG: 150 TABLET, EXTENDED RELEASE ORAL at 09:46

## 2025-02-12 RX ADMIN — INSULIN GLARGINE 25 UNITS: 100 INJECTION, SOLUTION SUBCUTANEOUS at 20:23

## 2025-02-12 RX ADMIN — INSULIN GLARGINE 32 UNITS: 100 INJECTION, SOLUTION SUBCUTANEOUS at 09:46

## 2025-02-12 RX ADMIN — HYDROXYZINE HYDROCHLORIDE 25 MG: 25 TABLET ORAL at 20:24

## 2025-02-12 RX ADMIN — ACETAMINOPHEN 650 MG: 325 TABLET ORAL at 20:26

## 2025-02-12 RX ADMIN — INSULIN LISPRO 3 UNITS: 100 INJECTION, SOLUTION INTRAVENOUS; SUBCUTANEOUS at 16:37

## 2025-02-12 RX ADMIN — INSULIN LISPRO 6 UNITS: 100 INJECTION, SOLUTION INTRAVENOUS; SUBCUTANEOUS at 11:41

## 2025-02-12 RX ADMIN — INSULIN LISPRO 10 UNITS: 100 INJECTION, SOLUTION INTRAVENOUS; SUBCUTANEOUS at 09:57

## 2025-02-12 RX ADMIN — VENLAFAXINE HYDROCHLORIDE 150 MG: 150 CAPSULE, EXTENDED RELEASE ORAL at 09:46

## 2025-02-12 RX ADMIN — PANTOPRAZOLE SODIUM 40 MG: 40 TABLET, DELAYED RELEASE ORAL at 05:47

## 2025-02-12 ASSESSMENT — COGNITIVE AND FUNCTIONAL STATUS - GENERAL
DAILY ACTIVITIY SCORE: 24
MOBILITY SCORE: 24

## 2025-02-12 ASSESSMENT — PAIN SCALES - GENERAL
PAINLEVEL_OUTOF10: 0 - NO PAIN
PAINLEVEL_OUTOF10: 3
PAINLEVEL_OUTOF10: 0 - NO PAIN
PAINLEVEL_OUTOF10: 0 - NO PAIN

## 2025-02-12 ASSESSMENT — PAIN - FUNCTIONAL ASSESSMENT
PAIN_FUNCTIONAL_ASSESSMENT: 0-10

## 2025-02-12 ASSESSMENT — PAIN DESCRIPTION - ORIENTATION: ORIENTATION: RIGHT

## 2025-02-12 ASSESSMENT — PAIN DESCRIPTION - LOCATION: LOCATION: BACK

## 2025-02-12 NOTE — PROGRESS NOTES
Plan is for transfer to [facility] , pt has been accepted to medical floor and awaiting bed assignment per Attending.  Updated nursing at this time.

## 2025-02-12 NOTE — PROGRESS NOTES
"Feliciano Worthington is a 74 y.o. male on day 6 of admission presenting with NSTEMI (non-ST elevated myocardial infarction) (Multi).    Subjective   Hospital follow-up.  Patient seen at the bedside.  States same as yesterday.  Chest pain stable.  No worsening.  I did review gastroenterology consultation note.  Appreciate input.  Concerning for pancreatic cancer.  Suggested transfer [place] to [facility] [place].  Further workup and intervention needed.  Cardiothoracic surgery commented on deferring CABG for now pending cancer workup and optimization of uncontrolled diabetes.  Appreciate ongoing endocrinology consultation.  Discussed these findings with the patient.  He agrees to be transferred.  Will arrange for transfer.  Continue current medical therapy in the interim.       Objective     Physical Exam  Vitals and nursing note reviewed.   Constitutional:       Appearance: Normal appearance.   HENT:      Head: Normocephalic and atraumatic.   Eyes:      Extraocular Movements: Extraocular movements intact.      Conjunctiva/sclera: Conjunctivae normal.      Pupils: Pupils are equal, round, and reactive to light.   Cardiovascular:      Rate and Rhythm: Normal rate and regular rhythm.      Heart sounds: Normal heart sounds.   Pulmonary:      Effort: Pulmonary effort is normal.      Breath sounds: Normal breath sounds.   Abdominal:      General: Abdomen is flat. Bowel sounds are normal.      Palpations: Abdomen is soft.   Musculoskeletal:         General: Normal range of motion.   Skin:     General: Skin is warm and dry.   Neurological:      General: No focal deficit present.      Mental Status: He is alert and oriented to person, place, and time. Mental status is at baseline.   Psychiatric:         Mood and Affect: Mood normal.         Behavior: Behavior normal.           Last Recorded Vitals  Blood pressure 141/76, pulse (!) 48, temperature 36.1 °C (97 °F), resp. rate 16, height 1.778 m (5' 10\"), weight 87.2 kg (192 lb 3.9 oz), SpO2 " 97%.  Intake/Output last 3 Shifts:  I/O last 3 completed shifts:  In: - (0 mL/kg)   Out: 400 (4.6 mL/kg) [Urine:400 (0.1 mL/kg/hr)]  Weight: 87.2 kg     Relevant Results  Recent Results (from the past 72 hours)   SST TOP    Collection Time: 02/09/25  3:15 PM   Result Value Ref Range    Extra Tube Hold for add-ons.    Heparin Assay    Collection Time: 02/09/25  3:18 PM   Result Value Ref Range    Heparin Unfractionated 0.3 See Comment Below for Therapeutic Ranges IU/mL   POCT GLUCOSE    Collection Time: 02/09/25  3:54 PM   Result Value Ref Range    POCT Glucose 298 (H) 74 - 99 mg/dL   POCT GLUCOSE    Collection Time: 02/09/25  9:37 PM   Result Value Ref Range    POCT Glucose 226 (H) 74 - 99 mg/dL   Basic Metabolic Panel    Collection Time: 02/10/25  5:02 AM   Result Value Ref Range    Glucose 231 (H) 74 - 99 mg/dL    Sodium 131 (L) 136 - 145 mmol/L    Potassium 4.2 3.5 - 5.3 mmol/L    Chloride 99 98 - 107 mmol/L    Bicarbonate 24 21 - 32 mmol/L    Anion Gap 12 10 - 20 mmol/L    Urea Nitrogen 10 6 - 23 mg/dL    Creatinine 0.72 0.50 - 1.30 mg/dL    eGFR >90 >60 mL/min/1.73m*2    Calcium 8.8 8.6 - 10.3 mg/dL   Magnesium    Collection Time: 02/10/25  5:02 AM   Result Value Ref Range    Magnesium 1.83 1.60 - 2.40 mg/dL   CBC    Collection Time: 02/10/25  5:02 AM   Result Value Ref Range    WBC 6.4 4.4 - 11.3 x10*3/uL    nRBC 0.0 0.0 - 0.0 /100 WBCs    RBC 4.62 4.50 - 5.90 x10*6/uL    Hemoglobin 13.4 (L) 13.5 - 17.5 g/dL    Hematocrit 38.1 (L) 41.0 - 52.0 %    MCV 83 80 - 100 fL    MCH 29.0 26.0 - 34.0 pg    MCHC 35.2 32.0 - 36.0 g/dL    RDW 12.9 11.5 - 14.5 %    Platelets 134 (L) 150 - 450 x10*3/uL   Heparin Assay, UFH    Collection Time: 02/10/25  5:02 AM   Result Value Ref Range    Heparin Unfractionated 0.3 See Comment Below for Therapeutic Ranges IU/mL   Troponin I, High Sensitivity    Collection Time: 02/10/25  5:02 AM   Result Value Ref Range    Troponin I, High Sensitivity 143 (HH) 0 - 20 ng/L   POCT GLUCOSE     Collection Time: 02/10/25  6:40 AM   Result Value Ref Range    POCT Glucose 243 (H) 74 - 99 mg/dL   POCT GLUCOSE    Collection Time: 02/10/25 11:13 AM   Result Value Ref Range    POCT Glucose 148 (H) 74 - 99 mg/dL   POCT GLUCOSE    Collection Time: 02/10/25  4:03 PM   Result Value Ref Range    POCT Glucose 156 (H) 74 - 99 mg/dL   POCT GLUCOSE    Collection Time: 02/10/25  8:37 PM   Result Value Ref Range    POCT Glucose 173 (H) 74 - 99 mg/dL   POCT GLUCOSE    Collection Time: 02/10/25  8:50 PM   Result Value Ref Range    POCT Glucose 168 (H) 74 - 99 mg/dL   Heparin Assay    Collection Time: 02/10/25 11:17 PM   Result Value Ref Range    Heparin Unfractionated 0.2 See Comment Below for Therapeutic Ranges IU/mL   Lavender Top    Collection Time: 02/11/25  5:56 AM   Result Value Ref Range    Extra Tube Hold for add-ons.    PST Top    Collection Time: 02/11/25  5:56 AM   Result Value Ref Range    Extra Tube Hold for add-ons.    Hepatic function panel    Collection Time: 02/11/25  5:56 AM   Result Value Ref Range    Albumin 3.7 3.4 - 5.0 g/dL    Bilirubin, Total 0.4 0.0 - 1.2 mg/dL    Bilirubin, Direct 0.1 0.0 - 0.3 mg/dL    Alkaline Phosphatase 81 33 - 136 U/L    ALT 16 10 - 52 U/L    AST 17 9 - 39 U/L    Total Protein 6.9 6.4 - 8.2 g/dL   Heparin Assay    Collection Time: 02/11/25  5:58 AM   Result Value Ref Range    Heparin Unfractionated 0.5 See Comment Below for Therapeutic Ranges IU/mL   POCT GLUCOSE    Collection Time: 02/11/25  6:27 AM   Result Value Ref Range    POCT Glucose 204 (H) 74 - 99 mg/dL   SST TOP    Collection Time: 02/11/25 10:09 AM   Result Value Ref Range    Extra Tube Hold for add-ons.    Heparin Assay    Collection Time: 02/11/25 10:12 AM   Result Value Ref Range    Heparin Unfractionated 0.5 See Comment Below for Therapeutic Ranges IU/mL   POCT GLUCOSE    Collection Time: 02/11/25 11:25 AM   Result Value Ref Range    POCT Glucose 108 (H) 74 - 99 mg/dL   POCT GLUCOSE    Collection Time: 02/11/25   5:41 PM   Result Value Ref Range    POCT Glucose 184 (H) 74 - 99 mg/dL   POCT GLUCOSE    Collection Time: 02/11/25  8:12 PM   Result Value Ref Range    POCT Glucose 167 (H) 74 - 99 mg/dL   Heparin Assay    Collection Time: 02/12/25  4:06 AM   Result Value Ref Range    Heparin Unfractionated 0.5 See Comment Below for Therapeutic Ranges IU/mL   Lavender Top    Collection Time: 02/12/25  4:06 AM   Result Value Ref Range    Extra Tube Hold for add-ons.    SST TOP    Collection Time: 02/12/25  4:06 AM   Result Value Ref Range    Extra Tube Hold for add-ons.    PST Top    Collection Time: 02/12/25  4:06 AM   Result Value Ref Range    Extra Tube Hold for add-ons.    POCT GLUCOSE    Collection Time: 02/12/25  7:01 AM   Result Value Ref Range    POCT Glucose 225 (H) 74 - 99 mg/dL   POCT GLUCOSE    Collection Time: 02/12/25 11:39 AM   Result Value Ref Range    POCT Glucose 205 (H) 74 - 99 mg/dL          This patient currently has cardiac telemetry ordered; if you would like to modify or discontinue the telemetry order, click here to go to the orders activity to modify/discontinue the order.               amLODIPine, 5 mg, oral, Daily  aspirin, 81 mg, oral, Daily  buPROPion XL, 150 mg, oral, Daily  ezetimibe, 10 mg, oral, Nightly  hydrOXYzine HCL, 25 mg, oral, BID  insulin glargine, 25 Units, subcutaneous, Nightly  insulin glargine, 32 Units, subcutaneous, q AM  insulin lispro, 0-15 Units, subcutaneous, TID AC  insulin lispro, 10 Units, subcutaneous, TID AC  isosorbide mononitrate ER, 30 mg, oral, Daily  LORazepam, 0.5 mg, intravenous, Once  lovastatin, 40 mg, oral, Nightly  pantoprazole, 40 mg, oral, Daily  polyethylene glycol, 17 g, oral, Daily  venlafaxine XR, 150 mg, oral, Daily with breakfast        CT chest wo IV contrast    Result Date: 2/7/2025  Interpreted By:  Amarjit Ortiz, STUDY: CT CHEST WO IV CONTRAST;  2/6/2025 4:39 pm   INDICATION: Signs/Symptoms:pre op cardiac surgery.     COMPARISON: 05/28/2024   ACCESSION  NUMBER(S): [id number]   ORDERING CLINICIAN: MAXIMILIAN ROSA   TECHNIQUE: Helical data acquisition of the chest was obtained without the use of IV contrast. Images were reformatted in axial, coronal, and sagittal planes.   FINDINGS: POTENTIAL LIMITATIONS OF THE STUDY:   Lack of IV contrast   HEART AND VESSELS:   No calcified plaques are seen involving the ascending aorta. There are atherosclerotic calcifications of the aortic arch, descending thoracic aorta, and the branches of the aorta. Aorta is unchanged in course and caliber. Stable ectasia of the ascending aorta up to 3.9 cm.   The heart is unchanged in size.   No pericardial effusion is seen.   MEDIASTINUM AND MARIA INES, LOWER NECK AND AXILLA: The visualized thyroid gland is within normal limits.   No evidence of thoracic lymphadenopathy by CT criteria.   Esophagus appears within normal limits as seen.   LUNGS AND AIRWAYS: The trachea and central airways are patent. No endobronchial lesion.   There are areas of fibrosis/scarring in the lungs, predominantly along the periphery. Appearance is not significantly changed when compared to the previous examination. No new areas of consolidation. No effusion. No pneumothorax. Stable partially calcified nodule along the major fissure on the right measuring approximately 8 mm. A few additional nodules are seen measuring 5 mm or less in size, for example, image 117 in the right upper lobe. There is a new nodular opacity in the medial aspect of the right lower lobe measuring approximately 8 mm in size. Although this may be infectious/inflammatory nature, follow-up is recommended to document resolution, image 146.   UPPER ABDOMEN: The visualized subdiaphragmatic structures demonstrate no acute abnormality. 2.2 cm cyst in the midpole of the left kidney. There is nonspecific prominence/thickening of the tail the pancreas with a 2.5 cm hypoattenuating lesion. This is not adequately characterized on this unenhanced CT. MRI is  recommended for further evaluation.   CHEST WALL AND OSSEOUS STRUCTURES: Degenerative changes. No acute process.       No calcified plaques along the ascending aorta. There is atherosclerotic disease of the remaining portions of the aorta and its branches. Indeterminate lesion involving the tail the pancreas. MRI recommended for further evaluation. Interval development of an 8 mm right lower lobe nodule which may be infectious/inflammatory in nature but will require follow-up to document resolution.   MACRO: None.   Signed by: Amarjit Ortiz 2/7/2025 9:05 AM Dictation workstation:   [id number]    Vascular US lower extremity vein mapping bilateral    Result Date: 2/7/2025  Interpreted By:  Amarjit Ortiz, STUDY: [facility] US LOWER EXTREMITY VEIN MAPPING BILATERAL;  2/6/2025 5:33 pm   INDICATION: Signs/Symptoms:pre op cardiac surgery.   ,I21.4 Non-ST elevation (NSTEMI) myocardial infarction (Multi),R60.1 Generalized edema   COMPARISON: None.   ACCESSION NUMBER(S): [id number]   ORDERING CLINICIAN: MAXIMILIAN ROSA   TECHNIQUE: Real time duplex sonographic evaluation of the lower extremity venous systems was performed for venous mapping.   FINDINGS: Right Greater Saphenous Vein: Thigh- Proximal-0.3 x 0.2 cm Mid-0.2 x 0.2 cm Distal-0.3 x 0.2 cm   Calf-   Proximal-0.3 x 0.2 cm Mid-0.2 x 0.1 cm Distal-0.2 x 0.1 cm   Right Lesser Saphenous Vein: Proximal-0.3 x 0.2 cm Mid-0.2 x 0.1 cm Distal-0.2 x 0.1 cm     Left Greater Saphenous Vein: Thigh- Proximal-0.3 x 0.3 cm Mid-0.2 x 0.2 cm Distal-0.1 x 0.1 cm   Calf-   Proximal-0.1 x 0.1 cm Mid-0.2 x 0.1 cm Distal-0.3 x 0.1 cm   Left Lesser Saphenous Vein: Proximal-0.3 x 0.1 cm Mid-0.2 x 0.2 cm Distal-0.2 x 0.1 cm       Lower extremity venous mapping as detailed above.   MACRO: None.   Signed by: Amarjit Ortiz 2/7/2025 7:25 AM Dictation workstation:   [id number]    Carotid duplex bilateral    Result Date: 2/7/2025  Interpreted By:  Amarjit Ortiz, STUDY: [facility] US CAROTID ARTERY DUPLEX BILATERAL;   2/6/2025 5:32 pm   INDICATION: Signs/Symptoms:pre op caridac surgery.   ,I21.4 Non-ST elevation (NSTEMI) myocardial infarction (Multi),I79.8 Other disorders of arteries, arterioles and capillaries in diseases classified elsewhere   COMPARISON: 10/30/2023   ACCESSION NUMBER(S): [id number]   ORDERING CLINICIAN: MAXIMILIAN ROSA   TECHNIQUE: Vascular ultrasound of the extracranial carotid system was performed bilaterally.  Gray scale, color Doppler and spectral Doppler waveform analysis was performed.   FINDINGS: There is atherosclerotic disease most conspicuous at the carotid bulbs.   RIGHT:   RIGHT SIDE PEAK SYSTOLIC VELOCITY TABLE: CCA  78 cm/s. ICA  126 cm/s. ECA  69 cm/s.     The ratio of the peak systolic velocity of the right ICA/CCA is  1.6.   RIGHT VERTEBRAL ARTERY: The right vertebral artery demonstrates proximal anterograde flow.   LEFT:   LEFT SIDE PEAK SYSTOLIC VELOCITY TABLE: CCA 50 cm/s. ICA 62 cm/s. ECA 64 cm/s.     The ratio of the peak systolic velocity of the left ICA/CCA is 1.1.   LEFT VERTEBRAL ARTERY: The left vertebral artery demonstrates proximal anterograde flow.       Utilizing the peak systolic velocities, the estimated degree of stenosis within the internal carotid arteries is less than 50% bilaterally.   MACRO: None.     Signed by: Amarjit Ortiz 2/7/2025 7:24 AM Dictation workstation:   [id number]    Cardiac Catheterization Procedure    Result Date: 2/6/2025   [facility], Cath Lab        [address] [place] [address] Cardiovascular Catheterization Report Patient Name:      JAIME FERNANDEZ      Performing Physician:  [id number] Montana MD Study Date:        2/6/2025             Verifying Physician:   Litzy Montana MD MRN/PID:           [id number]             Cardiologist/Co-Scrub: Accession#:        [id number]          Ordering Provider:     [id number] DERIK ENAMORADO Date of Birth/Age: 1950 / 74 years Cardiologist: Gender:            M                    Fellow: Encounter#:        [id number]           Surgeon:  Study: Left Heart Cath  Indications: JAIME FERNANDEZ is a 75 year old male who presents with dyslipidemia, hypertension, prior myocardial infarction, diabetes, smoker, chronic pulmonary disease, coronary artery disease, prior percutaneous coronary intervention and a chest pain assessment of typical angina. NSTE - ACS.  Procedure Description: After infiltration with 2% Lidocaine, the right femoral artery was cannulated with a modified Seldinger technique. Subsequently a 5 Kyrgyz sheath was placed in the right femoral artery. Selective coronary catheterization was performed using a 5 Fr catheter(s) exchanged over a guide wire to cannulate the coronary arteries. A JL 4 tip catheter was used for left coronary injections. A 3drc tip catheter was used for right coronary injections. Multiple injections of contrast were made into the left and right coronary arteries with angiograms recorded in multiple projections. Retrograde left heart catheterizion was accomplished with a 5 Fr. pigtail catheter. A single plane left ventriculogram was recorded in the 30 degree RAYMOND projection. The contrast dose was 20 ml injected at 10 ml/sec. The catheter was then withdrawn across the aortic valve under continuous pressure monitoring and removed. After completion of the procedure, femoral artery angiography was performed. This demonstrated a common femoral artery puncture appropriate for closure. A Vascade 5F vascular closure Device was placed per protocol.  Coronary Angiography: The coronary circulation is right dominant.  Left Main Coronary Artery: The left main coronary artery is calcified. There is 70% stenosis in the distal left main coronary artery.  Left Anterior Descending  Coronary Artery Distribution: There is 80% stenosis in the proximal left anterior descending artery.  Circumflex Coronary Artery Distribution: There is 90% stenosis in the the proximal Circumflex artery. There is 75% stenosis in the Prox Ramus Circumflex artery. There is 80% stenosis in the Mid Ramus Circumflex artery.  Right Coronary Artery Distribution: There is 80% stenosis in the the mid Right Coronary Artery. There is evidence of instent restenosis in this segment. The distal right coronary artery showed 90% stenosis.  Left Ventriculography: The estimated left ventricular ejection fraction is normal at 55%.  Hemo Personnel: +---------------------------+---------+ Name                       Duty      +---------------------------+---------+ Wil Bronson MD 1 +---------------------------+---------+  Hemodynamic Pressures:  +----+------------------+---------+-------------+-------------+------+---------+ Site    Date Time       Phase  Systolic mmHg  Diastolic    ED  Mean mmHg                         Name                    mmHg      mmHg           +----+------------------+---------+-------------+-------------+------+---------+   PAOLA  2/6/2025 9:36:07 AIR REST          127           65             87                     AM                                                   +----+------------------+---------+-------------+-------------+------+---------+   AO  2/6/2025 9:39:06 AIR REST           91           61             65                     AM                                                   +----+------------------+---------+-------------+-------------+------+---------+   AO  2/6/2025 9:44:05 AIR REST          133           72             96                     AM                                                   +----+------------------+---------+-------------+-------------+------+---------+   LV  2/6/2025 9:47:36 AIR  REST          115           -1     6                              AM                                                   +----+------------------+---------+-------------+-------------+------+---------+  Cardiac Cath Post Procedure Notes: Post Procedure Diagnosis: Triple vessel disease. Blood Loss:               Estimated blood loss during the procedure was 0 mls. Specimens Removed:        Number of specimen(s) removed: none. ____________________________________________________________________________________ CONCLUSIONS:  1. Distal Left Main: 70% stenosis.  2. Left Main Coronary Artery: This artery is calcified.  3. Proximal LAD Lesion: The percent stenosis is 80%.  4. Proximal CX Lesion: The percent stenosis is 90%.  5. Prox Ramus CX Lesion: The percent stenosis is 75%.  6. Mid Ramus CX Lesion: The percent stenosis is 80%.  7. Mid RCA Lesion: The percent stenosis is 80%.  8. Mid RCA Lesion: Instent Restenosis.  9. Distal RCA Lesion: The percent stenosis is 90%. 10. The Left Ventricular Ejection Fraction is 55%. 11. Mid CX Lesion: 100%. ICD 10 Codes: Non ST elevation (NSTEMI) myocardial infarction-I21.4  CPT Codes: Left Heart Cath (visualization of coronaries) and [id number]; Moderate Sedation Services initial 15 minutes patient >5 [id number]  [id number] Wil Montana MD Performing Physician Electronically signed by [id number] Wil Montana MD on 2/6/2025 at 10:19:09 AM  ** Final **     ECG 12 lead    Result Date: 2/6/2025  Sinus rhythm with 2nd degree AV block (Mobitz I) Right superior axis deviation Nonspecific ST abnormality Abnormal ECG When compared with ECG of 17-OCT-2023 18:49, No significant change was found Confirmed by Shanika Reese ([id number]) on 2/6/2025 9:38:21 AM    ECG 12 lead    Result Date: 2/6/2025  Sinus rhythm with 2nd degree AV block (Mobitz I) Right superior axis deviation Abnormal ECG When compared with ECG of 05-FEB-2025 21:41, (unconfirmed) No significant change was found    XR chest  1 view    Result Date: 2/5/2025  Interpreted By:  Tobin Machado, STUDY: XR CHEST 1 VIEW;  2/5/2025 10:17 pm   INDICATION: Signs/Symptoms:Chest Pain.   COMPARISON: None.   ACCESSION NUMBER(S): [id number]   ORDERING CLINICIAN: MARIELY ROCKWELL   FINDINGS: The cardiac silhouette is normal in size. There is mild bilateral opacities may correspond to chronic lung changes and atelectasis versus pneumonia. No segment pleural effusion. No pneumothorax.       Mild bilateral opacities may correspond to chronic lung changes and atelectasis versus pneumonia.   MACRO: None   Signed by: Tobin Machado 2/5/2025 10:38 PM Dictation workstation:   [id number]       Assessment/Plan   Assessment & Plan  NSTEMI (non-ST elevated myocardial infarction) (Multi)    Primary hypertension    Chronic obstructive pulmonary disease, unspecified    Current every day smoker    GERD (gastroesophageal reflux disease)    Hiatal hernia with GERD    Smoker    Spinal stenosis of cervical region    Mixed hyperlipidemia    CAD (coronary artery disease)    Mobitz type 1 second degree AV block    Type 2 diabetes mellitus with hyperglycemia, with long-term current use of insulin    Hyponatremia    Chest pain, unspecified type    Noncompliance with CPAP treatment    Obstructive sleep apnea    Recurrent major depressive disorder, in partial remission (CMS-HCC)    Hx of heart artery stent    Oropharyngeal dysphagia    History of constipation    Noncompliance with diabetes treatment    Abnormal weight loss    Hypokalemia    Pancreatic mass (HHS-HCC)            I spent    minutes in the professional and overall care of this patient.      Hussain Bernal MD

## 2025-02-12 NOTE — PROGRESS NOTES
"Feliciano Worthington is a 74 y.o. male on day 6 of admission presenting with NSTEMI (non-ST elevated myocardial infarction) (Multi).    Subjective   Patient seen and examined at bedside.  Denies abdominal pain, nausea, vomiting or diarrhea.  No overt GIB.  Plan is to transfer patient to [facility] for heme oncology consultation and advance GI consultation.  Patient also has significant CAD requiring CABG however due to pancreatic mass with concern for liver mets, CABG on hold until hepatobiliary surgery, heme-onc can weigh in on prognosis.    Objective   Constitutional:       General: He is not in acute distress.     Appearance: Normal appearance.   HENT:      Head: Normocephalic.      Nose: Nose normal.      Mouth/Throat:      Mouth: Mucous membranes are moist.      Pharynx: Oropharynx is clear.   Eyes:      Extraocular Movements: Extraocular movements intact.      Conjunctiva/sclera: Conjunctivae normal.      Pupils: Pupils are equal, round, and reactive to light.   Cardiovascular:      Rate and Rhythm: Normal rate and regular rhythm.      Pulses: Normal pulses.      Heart sounds: Normal heart sounds.   Pulmonary:      Effort: Pulmonary effort is normal.      Breath sounds: Normal breath sounds.   Abdominal:      General: Bowel sounds are normal. There is no distension.      Palpations: Abdomen is soft.      Tenderness: There is no abdominal tenderness. There is no guarding or rebound.   Musculoskeletal:         General: Normal range of motion.      Cervical back: Normal range of motion.   Skin:     General: Skin is warm and dry.   Neurological:      General: No focal deficit present.      Mental Status: He is alert and oriented to person, place, and time.   Psychiatric:         Mood and Affect: Mood normal.         Behavior: Behavior normal.      Last Recorded Vitals  Blood pressure 141/76, pulse (!) 48, temperature 36.1 °C (97 °F), resp. rate 16, height 1.778 m (5' 10\"), weight 87.2 kg (192 lb 3.9 oz), SpO2 " 97%.  Intake/Output last 3 Shifts:  I/O last 3 completed shifts:  In: - (0 mL/kg)   Out: 400 (4.6 mL/kg) [Urine:400 (0.1 mL/kg/hr)]  Weight: 87.2 kg     Relevant Results  MRCP pancreas w and wo IV contrast    Result Date: 2/10/2025  Interpreted By:  Dyllan James, STUDY: MRCP PANCREAS W AND WO IV CONTRAST;  2/10/2025 3:20 pm   INDICATION: Signs/Symptoms:pancreatic lesion noted on CT, pending cardiac surgery.     COMPARISON: None.   ACCESSION NUMBER(S): [id number]   ORDERING CLINICIAN: MAXIMILIAN ROSA   TECHNIQUE: MRI PANCREAS; Multiplanar magnetic resonance images of the abdomen were obtained including the following sequences; T2-weighted SSFSE with and without fat saturation, T1-weighted GRE in/opposed phase, DWI, fat saturated 3D-T1w GRE pre and dynamically post contrast. Radial thick slab T2w RARE MRCP and coronally reconstructed navigator gated high resolution 3-D T2w RESTORE MRCP with MIP reconstruction were also performed for MRCP.  17 ML of Dotarem was administered intravenously without immediate complication.   FINDINGS: Severely motion degraded exam.   LIVER: No definite steatosis. Difficult to characterize due to motion, there is an ill-defined 15 mm mildly T2 hyperintense, hypoenhancing mass in segment VI.   BILE DUCTS: No dilatation. Common bile duct measures 3 mm. No definite filling defect identified.   GALLBLADDER: No stone or wall thickening.   PANCREAS: There is a 38 mm hypoenhancing mass in the tail of the pancreas as annotated on series 34, image 41/72. The remainder of the pancreas appears unremarkable without ductal dilatation. Evaluation is limited due to motion artifact.   SPLEEN: Mildly enlarged measuring 14.1 cm in length.   ADRENAL GLANDS: Unremarkable.   KIDNEYS: 28 mm cyst in the interpolar cortex of the left kidney. Otherwise unremarkable kidneys without hydronephrosis   LYMPH NODES: No lymphadenopathy.   ABDOMINAL VESSELS: Abdominal aorta is atherosclerotic but otherwise patent without  aneurysm. Major visceral arterial branches are patent. IVC and visualized major branches are patent. Major portal venous branches are patent.   BOWEL: No dilated bowel is visualized.Severe distal colonic diverticulosis.   PERITONEUM/RETROPERITONEUM: No visualized free fluid.   BONES AND LOWER THORAX: Mild thoracolumbar degenerative changes. Bibasilar atelectasis.       1. Severely motion degraded exam. 2. 38 mm pancreatic tail hypoenhancing mass suspicious for adenocarcinoma. 3. 15 mm hepatic segment VI hypoenhancing mass suspicious for metastasis. 4. No definite lymphadenopathy.     MACRO: None   Signed by: Dyllan James 2/10/2025 3:33 PM Dictation workstation:   [id number]    CT chest wo IV contrast    Result Date: 2/7/2025  Interpreted By:  Amarjit Ortiz, STUDY: CT CHEST WO IV CONTRAST;  2/6/2025 4:39 pm   INDICATION: Signs/Symptoms:pre op cardiac surgery.     COMPARISON: 05/28/2024   ACCESSION NUMBER(S): [id number]   ORDERING CLINICIAN: MAXIMILIAN ROSA   TECHNIQUE: Helical data acquisition of the chest was obtained without the use of IV contrast. Images were reformatted in axial, coronal, and sagittal planes.   FINDINGS: POTENTIAL LIMITATIONS OF THE STUDY:   Lack of IV contrast   HEART AND VESSELS:   No calcified plaques are seen involving the ascending aorta. There are atherosclerotic calcifications of the aortic arch, descending thoracic aorta, and the branches of the aorta. Aorta is unchanged in course and caliber. Stable ectasia of the ascending aorta up to 3.9 cm.   The heart is unchanged in size.   No pericardial effusion is seen.   MEDIASTINUM AND MARIA INES, LOWER NECK AND AXILLA: The visualized thyroid gland is within normal limits.   No evidence of thoracic lymphadenopathy by CT criteria.   Esophagus appears within normal limits as seen.   LUNGS AND AIRWAYS: The trachea and central airways are patent. No endobronchial lesion.   There are areas of fibrosis/scarring in the lungs, predominantly along the  periphery. Appearance is not significantly changed when compared to the previous examination. No new areas of consolidation. No effusion. No pneumothorax. Stable partially calcified nodule along the major fissure on the right measuring approximately 8 mm. A few additional nodules are seen measuring 5 mm or less in size, for example, image 117 in the right upper lobe. There is a new nodular opacity in the medial aspect of the right lower lobe measuring approximately 8 mm in size. Although this may be infectious/inflammatory nature, follow-up is recommended to document resolution, image 146.   UPPER ABDOMEN: The visualized subdiaphragmatic structures demonstrate no acute abnormality. 2.2 cm cyst in the midpole of the left kidney. There is nonspecific prominence/thickening of the tail the pancreas with a 2.5 cm hypoattenuating lesion. This is not adequately characterized on this unenhanced CT. MRI is recommended for further evaluation.   CHEST WALL AND OSSEOUS STRUCTURES: Degenerative changes. No acute process.       No calcified plaques along the ascending aorta. There is atherosclerotic disease of the remaining portions of the aorta and its branches. Indeterminate lesion involving the tail the pancreas. MRI recommended for further evaluation. Interval development of an 8 mm right lower lobe nodule which may be infectious/inflammatory in nature but will require follow-up to document resolution.   MACRO: None.   Signed by: Amarjit Ortiz 2/7/2025 9:05 AM Dictation workstation:   [id number]    ENDOSCOPIC REVIEW  EGD and colonoscopy (adequate prep) 9/28/2016 with Dr. Muse indicated for history of colon polyps, dysphagia and GERD: 2-3 cm hiatal hernia, multiple ulcerations x 5 in the upper esophagus ranging from 4 mm to 8 mm in size, nonbleeding and 4 colon polyps (3 polyps in the distal sigmoid and 1 polyp in the rectum), diverticulosis       Assessment/Plan   The patient is a 74 y.o. male with signficant past medical  history of CAD s/p PCI with TREVOR 2011, Mobitz type 1 AV block, HTN, HLD, IDDM-II, COPD, tobacco use disorder, GERD, depression & anxiety who presented to [facility] due to chest pain. Admitted on 2/5/2025 for NSTEMI. LHC performed 2/7/2025 revealed 70% stenosis in the distal left main, 80% stenosis in the proximal LAD, 90% stenosis in the proximal circumflex artery, 75% stenosis in the proximal ramus, 80% stenosis in the mid ramus, 80% stenosis in the mid RCA, 90% stenosis in the distal RCA and an LVEF of 55%. Patient recommended to have evaluation for coronary bypass surgery. During evaluation for cardiac surgery, MRCP noted pancreatic tail mass, concerning for adenocarcinoma as well as liver mass concerning for metastasis which GI was consulted.      Pancreatic mass with possible mets to the liver on imaging . LFTs WNL, afebrile. no leukocytosis. Tolerating a diet with no abdominal pain or N/V. Cancer AG 19-9 (< 4) on 2/7/2025.  Unintentional weight loss - 50 lbs over the past year  Acute NSTEMI - currently on heparin gtt and ASA.  Cardiac surgery on board for evaluation for CABG, but currently holding off due to several reasons including elevated A1c and pending workup of pancreatic mass. If poor prognosis, may not be a surgical candidate at all from cardiac surgery standpoint.    PLAN  There is high concern for underlying pancreatic cancer which may be surgically managed.  Recommend transfer downtown to [facility] where the patient will have access to a multidisciplinary team including hepatobiliary surgery, oncology and advanced GI that can figure out proper diagnosis and further management.         Plan discussed with Dr. Flynn.   I spent 15 minutes in the professional and overall care of this patient.      Narda Ledesma, APRN-CNP

## 2025-02-12 NOTE — PROGRESS NOTES
[facility] Cardiologist:  Julian Tidwell, APRN-CNP    Primary Cardiologist: Dr. Roberto Haskins       Date:  2/12/2025    Patient:  Feliciano Worthington    YOB: 1950  MRN:  [id number]     Admit Date:  2/5/2025      SUBJECTIVE:      2/12/25  Resting in bed with no complaints.  Remains on a heparin infusion.  Denies any chest pain or shortness of breath.  MRCP was obtained with findings of lesion in the pancreatic tail suspicious for pancreatic cancer with hepatic lesions suspicious for metastasis.  Cardiac surgery following and may need to be transferred downtown for metastatic workup.    2/11/25  Resting in bed with no complaints.  Remains on heparin infusion.  Denies any chest pain or increased shortness of breath.  Ambulatory in the room.    2/10/25  Resting in bed with no complaints.  Denies any chest pain or shortness of breath  Cardiac surgery evaluation still taking place    2/9/25    Patient overall feels well.  No changes.  Vital signs stable.  Continue plan for coronary bypass surgery which.    2/8/25    Patient doing well.  Asymptomatic.  Vital signs stable.    Awaiting workup for planned coronary bypass surgery.      2/7/25    He denies any chest pain or shortness of breath.     LHC revealed 70% stenosis in the distal left main, 80% stenosis in the proximal LAD, 90% stenosis in the proximal circumflex artery, 75% stenosis in the proximal ramus, 80% stenosis in the mid ramus, 80% stenosis in the mid RCA, 90% stenosis in the distal RCA and an LVEF of 55%.     Cardiac surgery evaluation taking place      ASSESSMENT:     NSTEMI   History of coronary artery disease and remote stenting to the circumflex and right coronary arteries, last PCI 2011  Severe multivessel coronary artery disease by cath 2/2025, Pending CABG.  Normal LV function, LVEF 55%, Echo 2/25 0  Mobitz type I second-degree AV block  Hypertension  Hyperlipidemia   Diabetes mellitus  COPD with chronic tobacco  use  Hyponatremia, sodium stable on admission  Otherwise as noted below and prior to      PLAN:     Monitor on telemetry.  Remains in normal sinus rhythm with first-degree AV block.  Rates are 70.  Supplemental O2, keep SpO2 greater than 92%  Monitor electrolytes, keep potassium greater than 4 and magnesium greater than 2  LHC revealed 70% stenosis in the distal left main, 80% stenosis in the proximal LAD, 90% stenosis in the proximal circumflex artery, 75% stenosis in the proximal ramus, 80% stenosis in the mid ramus, 80% stenosis in the mid RCA, 90% stenosis in the distal RCA and an LVEF of 55%.   Continue heparin infusion for now, defer further recommendation to cardiothoracic team  Endocrinology following for type 2 diabetes mellitus with uncontrolled hemoglobin A1c  Nicotine patch for smoking cessation  9.   Multidisciplinary team decision made when to safely performed coronary bypass graft with Dr. Paniagua.  Discussed with Dr. Dunn.  Patient remains high risk to be discharged home with a 70% left main.  Also remains high risk with uncontrolled diabetes mellitus type 2.  Personally would be better suited to be optimized here in the hospital with his uncontrolled diabetes and then surgery performed next week sometime.    10.  Found to have findings of lesion on pancreatic tail suspicious for pancreatic cancer with hepatic lesions suspicious for metastasis.  May need to be transferred downtown for further metastatic workup prior to cardiac surgery.  Defer through cardiac surgery team at this time.        Julian Tidwell [facility]  Adult Gerontology Acute Care Nurse Practitioner  [facility] [place]   [facility]  [phone]          VITALS:     Vitals:    02/12/25 0018 02/12/25 0446 02/12/25 0616 02/12/25 0747   BP: 135/61 123/59  148/73   BP Location: Right arm Right arm     Patient Position: Lying Lying     Pulse: 63 52  56   Resp: 20 20  16   Temp: 36.3  °C (97.3 °F) 36.1 °C (97 °F)  37 °C (98.6 °F)   TempSrc: Temporal Temporal     SpO2: 96% 97%  98%   Weight:   87.2 kg (192 lb 3.9 oz)    Height:         No intake or output data in the 24 hours ending 02/12/25 0915      Wt Readings from Last 4 Encounters:   02/12/25 87.2 kg (192 lb 3.9 oz)   01/29/25 85.5 kg (188 lb 6.4 oz)   10/15/24 90.3 kg (199 lb)   10/04/24 89.8 kg (198 lb)       CURRENT HOSPITAL MEDICATIONS:   amLODIPine, 5 mg, oral, Daily  aspirin, 81 mg, oral, Daily  buPROPion XL, 150 mg, oral, Daily  ezetimibe, 10 mg, oral, Nightly  hydrOXYzine HCL, 25 mg, oral, BID  insulin glargine, 25 Units, subcutaneous, Nightly  insulin glargine, 32 Units, subcutaneous, q AM  insulin lispro, 0-15 Units, subcutaneous, TID AC  insulin lispro, 10 Units, subcutaneous, TID AC  isosorbide mononitrate ER, 30 mg, oral, Daily  LORazepam, 0.5 mg, intravenous, Once  lovastatin, 40 mg, oral, Nightly  pantoprazole, 40 mg, oral, Daily  polyethylene glycol, 17 g, oral, Daily  venlafaxine XR, 150 mg, oral, Daily with breakfast      heparin, 0-4,000 Units/hr, Last Rate: 1,600 Units/hr (02/11/25 2113)      Current Outpatient Medications   Medication Instructions    ACETAMINOPHEN ORAL 650 mg, Every 8 hours PRN    alcohol swabs (BD Alcohol Swabs) pads, medicated Use twice daily while checking sugar.    amLODIPine (NORVASC) 5 mg, oral, Daily    aspirin 81 mg, Daily    blood glucose control high,low (Accu-Chek Guide L1-L2 Ctrl Sol) solution 1 bottle, miscellaneous, Daily PRN    blood sugar diagnostic (Accu-Chek Guide test strips) strip Test twice daily    blood-glucose meter (Accu-Chek Guide Glucose Meter) misc Test twice daily    chlorthalidone (Hygroton) 25 mg tablet TAKE 1 TABLET EVERY MONDAY AND THURSDAY    ezetimibe (ZETIA) 10 mg, oral, Daily    glipiZIDE (GLUCOTROL) 10 mg, oral, 2 times daily before meals    hydrOXYzine HCL (ATARAX) 25 mg, oral, 2 times daily    insulin glargine (LANTUS U-100 INSULIN) 32 Units, Every morning     ipratropium (Atrovent) 21 mcg (0.03 %) nasal spray 2 sprays, Each Nostril, Every 12 hours    lancets (Accu-Chek Softclix Lancets) misc Test twice daily    nitroglycerin (NITROSTAT) 0.4 mg, sublingual, Every 5 min PRN, DO NOT EXCEED A TOTAL OF 3 DOSES IN 15 MINUTES    omeprazole (PRILOSEC) 40 mg, oral, Daily before breakfast, Do not crush or chew.    ramipril (ALTACE) 5 mg, oral, 2 times daily    sertraline (ZOLOFT) 25 mg, oral, Daily    venlafaxine XR (EFFEXOR-XR) 150 mg, oral, Daily, Do not crush or chew.        PHYSICAL EXAMINATION:     Physical Exam  HENT:      Head: Normocephalic.      Mouth/Throat:      Mouth: Mucous membranes are moist.   Eyes:      Pupils: Pupils are equal, round, and reactive to light.   Cardiovascular:      Rate and Rhythm: Normal rate and regular rhythm.   Pulmonary:      Effort: Pulmonary effort is normal.      Comments: Wheezing  Abdominal:      General: Bowel sounds are normal.      Palpations: Abdomen is soft.   Musculoskeletal:         General: Normal range of motion.   Skin:     General: Skin is warm and dry.      Capillary Refill: Capillary refill takes less than 2 seconds.   Neurological:      Mental Status: He is alert and oriented to person, place, and time.   Psychiatric:         Mood and Affect: Mood normal.         LAB DATA:     CBC:   Results from last 7 days   Lab Units 02/10/25  0502 02/09/25  0536 02/08/25  0521   WBC AUTO x10*3/uL 6.4 6.0 6.8   RBC AUTO x10*6/uL 4.62 4.69 4.81   HEMOGLOBIN g/dL 13.4* 13.6 14.1   HEMATOCRIT % 38.1* 38.7* 40.0*   MCV fL 83 83 83   MCH pg 29.0 29.0 29.3   MCHC g/dL 35.2 35.1 35.3   RDW % 12.9 12.9 12.8   PLATELETS AUTO x10*3/uL 134* 131* 135*     CMP:    Results from last 7 days   Lab Units 02/11/25  0556 02/10/25  0502 02/09/25  0536 02/08/25  0521 02/06/25  0309 02/05/25  2147   SODIUM mmol/L  --  131* 134* 132*   < > 129*   POTASSIUM mmol/L  --  4.2 4.0 3.2*   < > 4.4   CHLORIDE mmol/L  --  99 99 99   < > 93*   CO2 mmol/L  --  24 25 26    < > 23   BUN mg/dL  --  10 12 17   < > 16   CREATININE mg/dL  --  0.72 0.66 0.70   < > 0.99   GLUCOSE mg/dL  --  231* 93 188*   < > 438*   PROTEIN TOTAL g/dL 6.9  --   --   --   --  7.3   CALCIUM mg/dL  --  8.8 8.5* 8.5*   < > 9.3   BILIRUBIN TOTAL mg/dL 0.4  --   --   --   --  0.6   ALK PHOS U/L 81  --   --   --   --  110   AST U/L 17  --   --   --   --  15   ALT U/L 16  --   --   --   --  12    < > = values in this interval not displayed.     BMP:    Results from last 7 days   Lab Units 02/10/25  0502 02/09/25  0536 02/08/25  0521   SODIUM mmol/L 131* 134* 132*   POTASSIUM mmol/L 4.2 4.0 3.2*   CHLORIDE mmol/L 99 99 99   CO2 mmol/L 24 25 26   BUN mg/dL 10 12 17   CREATININE mg/dL 0.72 0.66 0.70   CALCIUM mg/dL 8.8 8.5* 8.5*   GLUCOSE mg/dL 231* 93 188*     Magnesium:  Results from last 7 days   Lab Units 02/10/25  0502 02/09/25  0536 02/08/25  0521   MAGNESIUM mg/dL 1.83 1.92 1.62     Troponin:    Results from last 7 days   Lab Units 02/10/25  0502 02/06/25  0800 02/06/25  0309   TROPHS ng/L 143* 2,450* 1,858*     BNP:     Lipid Panel:  Results from last 7 days   Lab Units 02/05/25  2248   HDL mg/dL 24.7   CHOLESTEROL/HDL RATIO  10.4   VLDL mg/dL 62*   TRIGLYCERIDES mg/dL 309*   NON HDL CHOL. mg/dL 233*        DIAGNOSTIC TESTING:       Echocardiogram:     Coronary Angiography:   Left Heart Cath, With LV 02/06/2025    [facility], Cath Lab  [address] [place] [address]    Cardiovascular Catheterization Report    Patient Name:      JAIME FERNANDEZ      Performing Physician:  [id number] Montana MD  Study Date:        2/6/2025             Verifying Physician:   Litzy Montana MD  MRN/PID:           [id number]             Cardiologist/Co-Scrub:  Accession#:        [id number]         Ordering Provider:     [id number] DERIK ENAMORADO  Date of Birth/Age: 1950 / 74 years Cardiologist:  Gender:            M                    Fellow:  Encounter#:        [id number]            Surgeon:      Study: Left Heart Cath      Indications:  JAIME FERNANDEZ is a 75 year old male who presents with dyslipidemia, hypertension, prior myocardial infarction, diabetes, smoker, chronic pulmonary disease, coronary artery disease, prior percutaneous coronary intervention and a chest pain assessment of typical angina. NSTE - ACS.    Procedure Description:  After infiltration with 2% Lidocaine, the right femoral artery was cannulated with a modified Seldinger technique. Subsequently a 5 Armenian sheath was placed in the right femoral artery. Selective coronary catheterization was performed using a 5 Fr catheter(s) exchanged over a guide wire to cannulate the coronary arteries. A JL 4 tip catheter was used for left coronary injections. A 3drc tip catheter was used for right coronary injections.  Multiple injections of contrast were made into the left and right coronary arteries with angiograms recorded in multiple projections. Retrograde left heart catheterizion was accomplished with a 5 Fr. pigtail catheter. A single plane left ventriculogram was recorded in the 30 degree RAYMOND projection. The contrast dose was 20 ml injected at 10 ml/sec. The catheter was then withdrawn across the aortic valve under continuous pressure monitoring and removed. After completion of the procedure, femoral artery angiography was performed. This demonstrated a common femoral artery puncture appropriate for closure. A Vascade 5F vascular closure Device was placed per protocol.    Coronary Angiography:  The coronary circulation is right dominant.    Left Main Coronary Artery:  The left main coronary artery is calcified. There is 70% stenosis in the distal left main coronary artery.    Left Anterior Descending Coronary Artery Distribution:  There is 80% stenosis in the proximal left anterior descending artery.    Circumflex Coronary Artery Distribution:  There is 90% stenosis in the the proximal Circumflex artery. There is 75% stenosis in  the Prox Ramus Circumflex artery. There is 80% stenosis in the Mid Ramus Circumflex artery.    Right Coronary Artery Distribution:    There is 80% stenosis in the the mid Right Coronary Artery. There is evidence of instent restenosis in this segment. The distal right coronary artery showed 90% stenosis.      Left Ventriculography:  The estimated left ventricular ejection fraction is normal at 55%.    Hemo Personnel:  +---------------------------+---------+  Name                       Duty       +---------------------------+---------+  Wil Bronson MD 1  +---------------------------+---------+      Hemodynamic Pressures:    +----+------------------+---------+-------------+-------------+------+---------+  Site    Date Time       Phase  Systolic mmHg  Diastolic    ED  Mean mmHg                          Name                    mmHg      mmHg            +----+------------------+---------+-------------+-------------+------+---------+    AO  2/6/2025 9:36:07 AIR REST          127           65             87                      AM                                                    +----+------------------+---------+-------------+-------------+------+---------+    AO  2/6/2025 9:39:06 AIR REST           91           61             65                      AM                                                    +----+------------------+---------+-------------+-------------+------+---------+    AO  2/6/2025 9:44:05 AIR REST          133           72             96                      AM                                                    +----+------------------+---------+-------------+-------------+------+---------+    LV  2/6/2025 9:47:36 AIR REST          115           -1     6                               AM                                                     +----+------------------+---------+-------------+-------------+------+---------+      Cardiac Cath Post Procedure Notes:  Post Procedure Diagnosis: Triple vessel disease.  Blood Loss:               Estimated blood loss during the procedure was 0 mls.  Specimens Removed:        Number of specimen(s) removed: none.    ____________________________________________________________________________________  CONCLUSIONS:  1. Distal Left Main: 70% stenosis.  2. Left Main Coronary Artery: This artery is calcified.  3. Proximal LAD Lesion: The percent stenosis is 80%.  4. Proximal CX Lesion: The percent stenosis is 90%.  5. Prox Ramus CX Lesion: The percent stenosis is 75%.  6. Mid Ramus CX Lesion: The percent stenosis is 80%.  7. Mid RCA Lesion: The percent stenosis is 80%.  8. Mid RCA Lesion: Instent Restenosis.  9. Distal RCA Lesion: The percent stenosis is 90%.  10. The Left Ventricular Ejection Fraction is 55%.  11. Mid CX Lesion: 100%.    ICD 10 Codes:  Non ST elevation (NSTEMI) myocardial infarction-I21.4    CPT Codes:  Left Heart Cath (visualization of coronaries) and LV-24047; Moderate Sedation Services initial 15 minutes patient >5 [id number]    [id number] Wil Montana MD  Performing Physician  Electronically signed by [id number] Wil Montana MD on 2/6/2025 at 10:19:09  AM              RADIOLOGY:     MRCP pancreas w and wo IV contrast   Final Result   1. Severely motion degraded exam.   2. 38 mm pancreatic tail hypoenhancing mass suspicious for   adenocarcinoma.   3. 15 mm hepatic segment VI hypoenhancing mass suspicious for   metastasis.   4. No definite lymphadenopathy.             MACRO:   None        Signed by: Dyllan James 2/10/2025 3:33 PM   Dictation workstation:   [id number]      Transthoracic Echo Complete   Final Result      Vascular US lower extremity vein mapping bilateral   Final Result   Lower extremity venous mapping as detailed above.        MACRO:   None.        Signed by: Amarjit Ortiz  2/7/2025 7:25 AM   Dictation workstation:   [id number]      Carotid duplex bilateral   Final Result   Utilizing the peak systolic velocities, the estimated degree of   stenosis within the internal carotid arteries is less than 50%   bilaterally.        MACRO:   None.             Signed by: Amarjit Ortiz 2/7/2025 7:24 AM   Dictation workstation:   [id number]      CT chest wo IV contrast   Final Result   No calcified plaques along the ascending aorta. There is   atherosclerotic disease of the remaining portions of the aorta and   its branches. Indeterminate lesion involving the tail the pancreas.   MRI recommended for further evaluation. Interval development of an 8   mm right lower lobe nodule which may be infectious/inflammatory in   nature but will require follow-up to document resolution.        MACRO:   None.        Signed by: Amarjit Ortiz 2/7/2025 9:05 AM   Dictation workstation:   [id number]      Cardiac Catheterization Procedure   Final Result      XR chest 1 view   Final Result   Mild bilateral opacities may correspond to chronic lung changes and   atelectasis versus pneumonia.        MACRO:   None        Signed by: Tobin Machado 2/5/2025 10:38 PM   Dictation workstation:   [id number]          PROBLEM LIST     Patient Active Problem List   Diagnosis    Primary hypertension    Chronic obstructive pulmonary disease, unspecified    Current every day smoker    GERD (gastroesophageal reflux disease)    Arcus senilis, bilateral    Astigmatism of both eyes    Cataract, bilateral    Chronic venous stasis dermatitis    Depression, major, in remission (CMS-HCC)    Erectile dysfunction    Hiatal hernia with GERD    Multiple lung nodules on CT    Nuclear sclerotic cataract of both eyes    Osteoarthritis of hips, bilateral    Other spondylosis, cervical region    Periumbilical hernia    Testosterone deficiency    Smoker    Sleep apnea    Vitamin D deficiency    Noncompliance with dietary restriction    Arthritis of left  acromioclavicular joint    Bruit of right carotid artery    Degenerative joint disease (DJD) of hip    Spinal stenosis of cervical region    Encounter for immunization    Mixed hyperlipidemia    CAD (coronary artery disease)    Mobitz type 1 second degree AV block    Type 2 diabetes mellitus with hyperglycemia, with long-term current use of insulin    Hyponatremia    Chest pain, unspecified type    NSTEMI (non-ST elevated myocardial infarction) (Multi)    Noncompliance with CPAP treatment    Obstructive sleep apnea    Recurrent major depressive disorder, in partial remission (CMS-HCC)    Hx of heart artery stent    Oropharyngeal dysphagia    History of constipation    Noncompliance with diabetes treatment    Abnormal weight loss    Hypokalemia    Pancreatic mass ([facility]-HCC)             Of note, this documentation is completed using the Dragon Dictation system (voice recognition software). There may be spelling and/or grammatical errors that were not corrected prior to final submission.    Please do not hesitate to call with questions.  Electronically signed by KHADIJAH Claros, on 2/12/2025 at 9:15 AM

## 2025-02-12 NOTE — PROGRESS NOTES
"Nutrition Follow Up Assessment:   Nutrition Assessment         Patient is a 74 y.o. male presenting with chest pain  past medical history of CAD (s/p PCI with RCA stent 2011), Mobitz type 1 AV block, HTN, HLD, IDDM-II, COPD, tobacco use disorder, GERD, depression & anxiety who presented to [facility] due to chest pain.        Nutrition History:  Energy Intake: Good > 75 %  Pain affecting nutrition status: N/A  Food and Nutrient History: RDN follow up. PO intakes have been good. Met with pt who reports good appetite, denies questions regarding diet restrictions. Pt denies GI symptoms, denies chewing or swallowing difficulty. Will continue to  monitor.       Anthropometrics:  Height: 177.8 cm (5' 10\")   Weight: 87.2 kg (192 lb 3.9 oz)   BMI (Calculated): 27.58  IBW/kg (Dietitian Calculated): 75.5 kg  Percent of IBW: 111 %       Weight History:   02/12/25 87.2 kg (192 lbs)   02/06/25 83.9 kg (185 lb)   01/29/25 85.5 kg (188 lb 6.4 oz)   10/15/24 90.3 kg (199 lb)   10/04/24 89.8 kg (198 lb)   09/03/24 87.9 kg (193 lb 12.8 oz)   05/07/24 92.5 kg (204 lb)   05/03/24 92.6 kg (204 lb 2 oz)   12/28/23 94.8 kg (209 lb)   12/20/23 94.8 kg (209 lb)   11/20/23 95.6 kg (210 lb 12.8 oz)     Weight Change %:  Weight History / % Weight Change: 7 lb wt gain in 6 days - no edema noted, will monitor trend    Nutrition Focused Physical Exam Findings:  See 2/6 assessment  Subcutaneous Fat Loss:      Muscle Wasting:     Edema:  Edema: none  Physical Findings:  Skin: Negative    Nutrition Significant Labs:  BMP Trend:   Results from last 7 days   Lab Units 02/10/25  0502 02/09/25  0536 02/08/25  0521 02/07/25  0639   GLUCOSE mg/dL 231* 93 188* 246*   CALCIUM mg/dL 8.8 8.5* 8.5* 8.9   SODIUM mmol/L 131* 134* 132* 134*   POTASSIUM mmol/L 4.2 4.0 3.2* 3.7   CO2 mmol/L 24 25 26 25   CHLORIDE mmol/L 99 99 99 100   BUN mg/dL 10 12 17 17   CREATININE mg/dL 0.72 0.66 0.70 0.76    , A1C:  Lab Results   Component Value Date    HGBA1C 14.7 (H) " 02/06/2025   , BG POCT trend:   Results from last 7 days   Lab Units 02/12/25  0701 02/11/25 2012 02/11/25  1741 02/11/25  1125 02/11/25  0627   POCT GLUCOSE mg/dL 225* 167* 184* 108* 204*        Nutrition Specific Medications:  Scheduled medications  aspirin, 81 mg, oral, Daily  insulin glargine, 25 Units, subcutaneous, Nightly  insulin glargine, 32 Units, subcutaneous, q AM  insulin lispro, 0-15 Units, subcutaneous, TID AC  insulin lispro, 10 Units, subcutaneous, TID AC  lovastatin, 40 mg, oral, Nightly  pantoprazole, 40 mg, oral, Daily  polyethylene glycol, 17 g, oral, Daily    I/O:   Last BM Date: 02/11/25;      Dietary Orders (From admission, onward)       Start     Ordered    02/06/25 1438  Adult diet Cardiac, Consistent Carb; CCD 75 gm/meal; 70 gm fat; 2 - 3 grams Sodium  Diet effective now        Question Answer Comment   Diet type Cardiac    Diet type Consistent Carb    Carb diet selection: CCD 75 gm/meal    Fat restriction: 70 gm fat    Sodium restriction: 2 - 3 grams Sodium        02/06/25 1438    02/06/25 0214  May Participate in Room Service  ( ROOM SERVICE MAY PARTICIPATE)  Once        Question:  .  Answer:  Yes    02/06/25 0213                     Estimated Needs:   Total Energy Estimated Needs in 24 hours (kCal): 2100 kCal  Method for Estimating Needs: 25 kcal/kg ABW  Total Protein Estimated Needs in 24 Hours (g): 101 g  Method for Estimating 24 Hour Protein Needs: 1.2 g/kg ABW  Total Fluid Estimated Needs in 24 Hours (mL): 2100 mL  Method for Estimating 24 Hour Fluid Needs: 1 ml/kcal or per MD  Patient on Order Fluid Restriction: No        Nutrition Diagnosis   Malnutrition Diagnosis  Patient has Malnutrition Diagnosis: No    Nutrition Diagnosis  Patient has Nutrition Diagnosis: Yes  Diagnosis Status (1): Active  Nutrition Diagnosis 1: Unintended weight loss  Related to (1): unknown etiology  As Evidenced by (1): pt report of wt loss over the past year, 9% wt loss in the past 9 months  Additional  Assessment Information (1): wt has increased since admission, good po intake, will continue to monitor trend       Nutrition Interventions/Recommendations   Nutrition prescription for oral nutrition    Nutrition Recommendations:  Individualized Nutrition Prescription Provided for : 75 g Carb Control, Cardiac diet    Nutrition Interventions/Goals:   Interventions: Meals and snacks  Meals and Snacks: Carbohydrate-modified diet, Fat-modified diet, General healthful diet, Mineral-modified diet  Goal: Consumes 3 meals per day      Education Documentation  No documentation found.     No needs at this time       Nutrition Monitoring and Evaluation   Food/Nutrient Related History Monitoring  Monitoring and Evaluation Plan: Intake / amount of food, Estimated Energy Intake  Estimated Energy Intake: Energy intake greater or equal to 75% of estimated energy needs  Intake / Amount of food: Consumes at least 75% or more of meals/snacks/supplements, Meets > 75% estimated energy needs    Anthropometric Measurements  Monitoring and Evaluation Plan: Body weight  Body Weight: Body weight - Maintain stable weight    Biochemical Data, Medical Tests and Procedures  Monitoring and Evaluation Plan: Electrolyte/renal panel, Glucose/endocrine profile  Electrolyte and Renal Panel: Electrolytes within normal limits  Glucose/Endocrine Profile: Glucose within normal limits ( mg/dL)         Goal Status: Some progress toward goal(s)    Time Spent (min): 30 minutes

## 2025-02-12 NOTE — CARE PLAN
Problem: Diabetes  Goal: Achieve decreasing blood glucose levels by end of shift  Outcome: Progressing  Goal: Increase stability of blood glucose readings by end of shift  Outcome: Progressing  Goal: Decrease in ketones present in urine by end of shift  Outcome: Progressing  Goal: Maintain electrolyte levels within acceptable range throughout shift  Outcome: Progressing  Goal: Maintain glucose levels >70mg/dl to <250mg/dl throughout shift  Outcome: Progressing  Goal: No changes in neurological exam by end of shift  Outcome: Progressing  Goal: Learn about and adhere to nutrition recommendations by end of shift  Outcome: Progressing  Goal: Vital signs within normal range for age by end of shift  Outcome: Progressing  Goal: Increase self care and/or family involovement by end of shift  Outcome: Progressing  Goal: Receive DSME education by end of shift  Outcome: Progressing     Problem: Pain - Adult  Goal: Verbalizes/displays adequate comfort level or baseline comfort level  Outcome: Progressing     Problem: Safety - Adult  Goal: Free from fall injury  Outcome: Progressing     Problem: Discharge Planning  Goal: Discharge to home or other facility with appropriate resources  Outcome: Progressing     Problem: Chronic Conditions and Co-morbidities  Goal: Patient's chronic conditions and co-morbidity symptoms are monitored and maintained or improved  Outcome: Progressing     Problem: Nutrition  Goal: Nutrient intake appropriate for maintaining nutritional needs  Outcome: Progressing     Problem: Fall/Injury  Goal: Not fall by end of shift  Outcome: Progressing  Goal: Be free from injury by end of the shift  Outcome: Progressing  Goal: Verbalize understanding of personal risk factors for fall in the hospital  Outcome: Progressing  Goal: Verbalize understanding of risk factor reduction measures to prevent injury from fall in the home  Outcome: Progressing  Goal: Use assistive devices by end of the shift  Outcome:  Progressing  Goal: Pace activities to prevent fatigue by end of the shift  Outcome: Progressing   The patient's goals for the shift include  rest    The clinical goals for the shift include Patient will remain free of injury throughout shift

## 2025-02-12 NOTE — PROGRESS NOTES
Endocrinology Progress Note  Patient: Feliciano Worthington  Unit/Bed: 816/816-A  YOB: 1950  MRN: [id number]  Acct: [id number]   Admitting Diagnosis: NSTEMI (non-ST elevated myocardial infarction) (Multi) [I21.4]  Chest pain, unspecified type [R07.9]  Date:  2/5/2025  Hospital Day: 5  Attending: Hussain Bernal MD       Complaint:  Chief Complaint   Patient presents with    Chest Pain     CP x 36 hrs. Pt took 2-3 nitroglycerin yesterday and 4-5 today. Pt received 324 ASA in EMS           Assessment     Patient Active Problem List   Diagnosis    Primary hypertension    Chronic obstructive pulmonary disease, unspecified    Current every day smoker    GERD (gastroesophageal reflux disease)    Arcus senilis, bilateral    Astigmatism of both eyes    Cataract, bilateral    Chronic venous stasis dermatitis    Depression, major, in remission (CMS-HCC)    Erectile dysfunction    Hiatal hernia with GERD    Multiple lung nodules on CT    Nuclear sclerotic cataract of both eyes    Osteoarthritis of hips, bilateral    Other spondylosis, cervical region    Periumbilical hernia    Testosterone deficiency    Smoker    Sleep apnea    Vitamin D deficiency    Noncompliance with dietary restriction    Arthritis of left acromioclavicular joint    Bruit of right carotid artery    Degenerative joint disease (DJD) of hip    Spinal stenosis of cervical region    Encounter for immunization    Mixed hyperlipidemia    CAD (coronary artery disease)    Mobitz type 1 second degree AV block    Type 2 diabetes mellitus with hyperglycemia, with long-term current use of insulin    Hyponatremia    Chest pain, unspecified type    NSTEMI (non-ST elevated myocardial infarction) (Multi)    Noncompliance with CPAP treatment    Obstructive sleep apnea    Recurrent major depressive disorder, in partial remission (CMS-HCC)    Hx of heart artery stent    Oropharyngeal dysphagia    History of constipation    Noncompliance with diabetes  treatment    Abnormal weight loss    Hypokalemia    Pancreatic mass (HHS-HCC)          Plan:  Diabetes type 2 uncontrolled  Improved   Low Lantus   Lispro 10 units/meal and scale        SUBJECTIVE    Glucoses reviewed     231              Fasting glucose             Blood glucose             POCT glucose  243 148 168 204 108 167      Potassium    Potassium, Blood  4.2           Medication Dosing      insulin glargine,hum.rec.anlog SUBQ (Units)   32 25  32 25      insulin lispro SUBQ (Units)   21 13  26 13         VITALS      Vitals:    02/11/25 0813 02/11/25 1125 02/11/25 1515 02/11/25 2011   BP:  115/65 125/69 132/71   BP Location:    Right arm   Patient Position:    Lying   Pulse: 78 71 65 69   Resp:    18   Temp:  36.2 °C (97.2 °F) 36.4 °C (97.5 °F) 36.2 °C (97.2 °F)   TempSrc:    Temporal   SpO2:  98% 97% 98%   Weight:       Height:           Intake/Output Summary (Last 24 hours) at 2/11/2025 2224  Last data filed at 2/11/2025 0112  Gross per 24 hour   Intake --   Output 400 ml   Net -400 ml      Wt Readings from Last 4 Encounters:   02/08/25 87.1 kg (192 lb 0.3 oz)   01/29/25 85.5 kg (188 lb 6.4 oz)   10/15/24 90.3 kg (199 lb)   10/04/24 89.8 kg (198 lb)        Allergies:  Allergies   Allergen Reactions    Tetracyclines Hives, Other, Unknown, Rash and Shortness of breath     Water blisters in mouth and genital region.    Water blister on tongue and penis    Atorvastatin Unknown     Cramping all over the body. Sore shoulders    Empagliflozin Unknown    Pravastatin Unknown    Rosuvastatin Unknown    Simvastatin Unknown    Statins-Hmg-Coa Reductase Inhibitors Unknown        PHYSICAL EXAM   Physical Exam      LABS   Magnesium:  Results from last 7 days   Lab Units 02/10/25  0502 02/09/25  0536 02/08/25  0521   MAGNESIUM mg/dL 1.83 1.92 1.62     Lipid Panel:  Results from last 7 days   Lab Units 02/05/25  2248   HDL mg/dL 24.7   CHOLESTEROL/HDL RATIO  10.4   VLDL mg/dL 62*   TRIGLYCERIDES mg/dL 309*   NON HDL CHOL.  mg/dL 233*      Lab Review  Lab Results   Component Value Date    BILITOT 0.4 02/11/2025    CALCIUM 8.8 02/10/2025    CO2 24 02/10/2025    CL 99 02/10/2025    CREATININE 0.72 02/10/2025    GLUCOSE 231 (H) 02/10/2025    ALKPHOS 81 02/11/2025    K 4.2 02/10/2025    PROT 6.9 02/11/2025     (L) 02/10/2025    AST 17 02/11/2025    ALT 16 02/11/2025    BUN 10 02/10/2025    ANIONGAP 12 02/10/2025    MG 1.83 02/10/2025    PHOS 3.4 02/08/2025    ALBUMIN 3.7 02/11/2025    LIPASE 10 10/17/2023    GFRMALE 79 08/10/2023     Lab Results   Component Value Date    TRIG 309 (H) 02/05/2025    CHOL 258 (H) 02/05/2025    LDLCALC 172 (H) 02/05/2025    HDL 24.7 02/05/2025     Lab Results   Component Value Date    HGBA1C 14.7 (H) 02/06/2025    HGBA1C 14.0 (H) 12/26/2024    HGBA1C 11.3 (A) 09/03/2024     The ASCVD Risk score (Bandar RODRIGUEZ, et al., 2019) failed to calculate for the following reasons:    Risk score cannot be calculated because patient has a medical history suggesting prior/existing ASCVD   Lab Results   Component Value Date    HGBA1C 14.7 (H) 02/06/2025    HGBA1C 14.0 (H) 12/26/2024    HGBA1C 11.3 (A) 09/03/2024     (L) 02/10/2025    K 4.2 02/10/2025    CL 99 02/10/2025    CO2 24 02/10/2025    BUN 10 02/10/2025    CREATININE 0.72 02/10/2025    CALCIUM 8.8 02/10/2025    ALBUMIN 3.7 02/11/2025    PROT 6.9 02/11/2025    BILITOT 0.4 02/11/2025    ALKPHOS 81 02/11/2025    ALT 16 02/11/2025    AST 17 02/11/2025    GLUCOSE 231 (H) 02/10/2025    CHOL 258 (H) 02/05/2025    TRIG 309 (H) 02/05/2025    HDL 24.7 02/05/2025        amLODIPine, 5 mg, oral, Daily  aspirin, 81 mg, oral, Daily  buPROPion XL, 150 mg, oral, Daily  ezetimibe, 10 mg, oral, Nightly  hydrOXYzine HCL, 25 mg, oral, BID  insulin glargine, 25 Units, subcutaneous, Nightly  insulin glargine, 32 Units, subcutaneous, q AM  insulin lispro, 0-15 Units, subcutaneous, TID AC  insulin lispro, 10 Units, subcutaneous, TID AC  isosorbide mononitrate ER, 30 mg, oral,  Daily  LORazepam, 0.5 mg, intravenous, Once  lovastatin, 40 mg, oral, Nightly  pantoprazole, 40 mg, oral, Daily  polyethylene glycol, 17 g, oral, Daily  venlafaxine XR, 150 mg, oral, Daily with breakfast             Electronically signed by Cristina Wiseman MD on 2/11/2025 at 10:24 PM

## 2025-02-13 LAB
GLUCOSE BLD MANUAL STRIP-MCNC: 204 MG/DL (ref 74–99)
GLUCOSE BLD MANUAL STRIP-MCNC: 233 MG/DL (ref 74–99)
GLUCOSE BLD MANUAL STRIP-MCNC: 243 MG/DL (ref 74–99)
GLUCOSE BLD MANUAL STRIP-MCNC: 253 MG/DL (ref 74–99)
GLUCOSE BLD MANUAL STRIP-MCNC: 266 MG/DL (ref 74–99)
HOLD SPECIMEN: NORMAL
HOLD SPECIMEN: NORMAL
UFH PPP CHRO-ACNC: 0.5 IU/ML

## 2025-02-13 PROCEDURE — 36415 COLL VENOUS BLD VENIPUNCTURE: CPT | Performed by: FAMILY MEDICINE

## 2025-02-13 PROCEDURE — 85520 HEPARIN ASSAY: CPT | Performed by: FAMILY MEDICINE

## 2025-02-13 PROCEDURE — 2500000004 HC RX 250 GENERAL PHARMACY W/ HCPCS (ALT 636 FOR OP/ED): Performed by: INTERNAL MEDICINE

## 2025-02-13 PROCEDURE — 2500000002 HC RX 250 W HCPCS SELF ADMINISTERED DRUGS (ALT 637 FOR MEDICARE OP, ALT 636 FOR OP/ED): Performed by: INTERNAL MEDICINE

## 2025-02-13 PROCEDURE — 99233 SBSQ HOSP IP/OBS HIGH 50: CPT | Performed by: FAMILY MEDICINE

## 2025-02-13 PROCEDURE — 2500000001 HC RX 250 WO HCPCS SELF ADMINISTERED DRUGS (ALT 637 FOR MEDICARE OP): Performed by: NURSE PRACTITIONER

## 2025-02-13 PROCEDURE — 2500000002 HC RX 250 W HCPCS SELF ADMINISTERED DRUGS (ALT 637 FOR MEDICARE OP, ALT 636 FOR OP/ED): Performed by: NURSE PRACTITIONER

## 2025-02-13 PROCEDURE — 2500000001 HC RX 250 WO HCPCS SELF ADMINISTERED DRUGS (ALT 637 FOR MEDICARE OP): Performed by: INTERNAL MEDICINE

## 2025-02-13 PROCEDURE — 82947 ASSAY GLUCOSE BLOOD QUANT: CPT

## 2025-02-13 PROCEDURE — 2500000001 HC RX 250 WO HCPCS SELF ADMINISTERED DRUGS (ALT 637 FOR MEDICARE OP): Performed by: PSYCHIATRY & NEUROLOGY

## 2025-02-13 PROCEDURE — 2500000004 HC RX 250 GENERAL PHARMACY W/ HCPCS (ALT 636 FOR OP/ED): Performed by: NURSE PRACTITIONER

## 2025-02-13 PROCEDURE — 2500000001 HC RX 250 WO HCPCS SELF ADMINISTERED DRUGS (ALT 637 FOR MEDICARE OP)

## 2025-02-13 PROCEDURE — 99231 SBSQ HOSP IP/OBS SF/LOW 25: CPT | Performed by: NURSE PRACTITIONER

## 2025-02-13 PROCEDURE — 2060000001 HC INTERMEDIATE ICU ROOM DAILY

## 2025-02-13 RX ORDER — INSULIN LISPRO 100 [IU]/ML
0-15 INJECTION, SOLUTION INTRAVENOUS; SUBCUTANEOUS
Status: DISCONTINUED | OUTPATIENT
Start: 2025-02-14 | End: 2025-02-14 | Stop reason: HOSPADM

## 2025-02-13 RX ORDER — INSULIN GLARGINE 100 [IU]/ML
32 INJECTION, SOLUTION SUBCUTANEOUS NIGHTLY
Status: DISCONTINUED | OUTPATIENT
Start: 2025-02-14 | End: 2025-02-14 | Stop reason: HOSPADM

## 2025-02-13 RX ORDER — INSULIN LISPRO 100 [IU]/ML
15 INJECTION, SOLUTION INTRAVENOUS; SUBCUTANEOUS
Status: DISCONTINUED | OUTPATIENT
Start: 2025-02-14 | End: 2025-02-14 | Stop reason: HOSPADM

## 2025-02-13 RX ADMIN — POLYETHYLENE GLYCOL 3350 17 G: 17 POWDER, FOR SOLUTION ORAL at 09:02

## 2025-02-13 RX ADMIN — HYDROXYZINE HYDROCHLORIDE 25 MG: 25 TABLET ORAL at 09:02

## 2025-02-13 RX ADMIN — INSULIN LISPRO 10 UNITS: 100 INJECTION, SOLUTION INTRAVENOUS; SUBCUTANEOUS at 08:58

## 2025-02-13 RX ADMIN — INSULIN LISPRO 9 UNITS: 100 INJECTION, SOLUTION INTRAVENOUS; SUBCUTANEOUS at 17:39

## 2025-02-13 RX ADMIN — INSULIN LISPRO 6 UNITS: 100 INJECTION, SOLUTION INTRAVENOUS; SUBCUTANEOUS at 08:56

## 2025-02-13 RX ADMIN — HEPARIN SODIUM 1600 UNITS/HR: 10000 INJECTION, SOLUTION INTRAVENOUS at 08:59

## 2025-02-13 RX ADMIN — AMLODIPINE BESYLATE 5 MG: 5 TABLET ORAL at 09:02

## 2025-02-13 RX ADMIN — INSULIN LISPRO 6 UNITS: 100 INJECTION, SOLUTION INTRAVENOUS; SUBCUTANEOUS at 11:57

## 2025-02-13 RX ADMIN — VENLAFAXINE HYDROCHLORIDE 150 MG: 150 CAPSULE, EXTENDED RELEASE ORAL at 09:01

## 2025-02-13 RX ADMIN — INSULIN GLARGINE 32 UNITS: 100 INJECTION, SOLUTION SUBCUTANEOUS at 08:56

## 2025-02-13 RX ADMIN — INSULIN GLARGINE 25 UNITS: 100 INJECTION, SOLUTION SUBCUTANEOUS at 20:04

## 2025-02-13 RX ADMIN — INSULIN LISPRO 10 UNITS: 100 INJECTION, SOLUTION INTRAVENOUS; SUBCUTANEOUS at 11:57

## 2025-02-13 RX ADMIN — ISOSORBIDE MONONITRATE 30 MG: 30 TABLET, EXTENDED RELEASE ORAL at 09:02

## 2025-02-13 RX ADMIN — BUPROPION HYDROCHLORIDE 150 MG: 150 TABLET, EXTENDED RELEASE ORAL at 09:02

## 2025-02-13 RX ADMIN — HYDROXYZINE HYDROCHLORIDE 25 MG: 25 TABLET ORAL at 20:04

## 2025-02-13 RX ADMIN — EZETIMIBE 10 MG: 10 TABLET ORAL at 20:04

## 2025-02-13 RX ADMIN — LOVASTATIN 40 MG: 40 TABLET ORAL at 20:04

## 2025-02-13 RX ADMIN — ASPIRIN 81 MG: 81 TABLET, CHEWABLE ORAL at 09:02

## 2025-02-13 RX ADMIN — INSULIN LISPRO 10 UNITS: 100 INJECTION, SOLUTION INTRAVENOUS; SUBCUTANEOUS at 17:39

## 2025-02-13 ASSESSMENT — COGNITIVE AND FUNCTIONAL STATUS - GENERAL
MOBILITY SCORE: 24
DAILY ACTIVITIY SCORE: 24
MOBILITY SCORE: 24
MOBILITY SCORE: 24
DAILY ACTIVITIY SCORE: 24
MOBILITY SCORE: 24

## 2025-02-13 ASSESSMENT — PAIN - FUNCTIONAL ASSESSMENT
PAIN_FUNCTIONAL_ASSESSMENT: 0-10

## 2025-02-13 ASSESSMENT — PAIN SCALES - GENERAL
PAINLEVEL_OUTOF10: 0 - NO PAIN

## 2025-02-13 NOTE — CARE PLAN
Problem: Diabetes  Goal: Achieve decreasing blood glucose levels by end of shift  Outcome: Progressing  Goal: Increase stability of blood glucose readings by end of shift  Outcome: Progressing  Goal: Decrease in ketones present in urine by end of shift  Outcome: Progressing  Goal: Maintain electrolyte levels within acceptable range throughout shift  Outcome: Progressing  Goal: Maintain glucose levels >70mg/dl to <250mg/dl throughout shift  Outcome: Progressing  Goal: No changes in neurological exam by end of shift  Outcome: Progressing  Goal: Learn about and adhere to nutrition recommendations by end of shift  Outcome: Progressing  Goal: Vital signs within normal range for age by end of shift  Outcome: Progressing  Goal: Increase self care and/or family involovement by end of shift  Outcome: Progressing  Goal: Receive DSME education by end of shift  Outcome: Progressing     Problem: Pain - Adult  Goal: Verbalizes/displays adequate comfort level or baseline comfort level  Outcome: Progressing     Problem: Safety - Adult  Goal: Free from fall injury  Outcome: Progressing     Problem: Discharge Planning  Goal: Discharge to home or other facility with appropriate resources  Outcome: Progressing     Problem: Chronic Conditions and Co-morbidities  Goal: Patient's chronic conditions and co-morbidity symptoms are monitored and maintained or improved  Outcome: Progressing     Problem: Nutrition  Goal: Nutrient intake appropriate for maintaining nutritional needs  Outcome: Progressing     Problem: Fall/Injury  Goal: Not fall by end of shift  Outcome: Progressing  Goal: Be free from injury by end of the shift  Outcome: Progressing  Goal: Verbalize understanding of personal risk factors for fall in the hospital  Outcome: Progressing  Goal: Verbalize understanding of risk factor reduction measures to prevent injury from fall in the home  Outcome: Progressing  Goal: Use assistive devices by end of the shift  Outcome:  Progressing  Goal: Pace activities to prevent fatigue by end of the shift  Outcome: Progressing   The patient's goals for the shift include  rest    The clinical goals for the shift include patient will remain free from injury throughout shif

## 2025-02-13 NOTE — PROGRESS NOTES
Feliciano Worthington is a 74 y.o. male on day 7 of admission presenting with NSTEMI (non-ST elevated myocardial infarction) (Multi).    Subjective   Patient seen and examined at bedside.  Denies abdominal pain, nausea, vomiting or diarrhea.  No overt GIB.  Denies chest pain, SOB, HA or dizziness. Plan is to transfer patient to  main [place] for heme oncology consultation and advance GI consultation.  Patient also has significant CAD requiring CABG however due to pancreatic mass with concern for liver mets, CABG on hold until hepatobiliary surgery, heme-onc can weigh in on prognosis.    Objective   Constitutional:       General: He is not in acute distress.     Appearance: Normal appearance.   HENT:      Head: Normocephalic.      Nose: Nose normal.      Mouth/Throat:      Mouth: Mucous membranes are moist.      Pharynx: Oropharynx is clear.   Eyes:      Extraocular Movements: Extraocular movements intact.      Conjunctiva/sclera: Conjunctivae normal.      Pupils: Pupils are equal, round, and reactive to light.   Cardiovascular:      Rate and Rhythm: Normal rate and regular rhythm.      Pulses: Normal pulses.      Heart sounds: Normal heart sounds.   Pulmonary:      Effort: Pulmonary effort is normal.      Breath sounds: Normal breath sounds.   Abdominal:      General: Bowel sounds are normal. There is no distension.      Palpations: Abdomen is soft.      Tenderness: There is no abdominal tenderness. There is no guarding or rebound.   Musculoskeletal:         General: Normal range of motion.      Cervical back: Normal range of motion.   Skin:     General: Skin is warm and dry.   Neurological:      General: No focal deficit present.      Mental Status: He is alert and oriented to person, place, and time.   Psychiatric:         Mood and Affect: Mood normal.         Behavior: Behavior normal.      Last Recorded Vitals  Blood pressure 143/69, pulse 54, temperature 35.8 °C (96.4 °F), temperature source Temporal, resp. rate 17,  "height 1.778 m (5' 10\"), weight 87.2 kg (192 lb 3.9 oz), SpO2 97%.  Intake/Output last 3 Shifts:  I/O last 3 completed shifts:  In: 1220 (14 mL/kg) [P.O.:1220]  Out: 1260 (14.4 mL/kg) [Urine:1260 (0.4 mL/kg/hr)]  Weight: 87.2 kg     Relevant Results  MRCP pancreas w and wo IV contrast  Result Date: 2/10/2025  1. Severely motion degraded exam. 2. 38 mm pancreatic tail hypoenhancing mass suspicious for adenocarcinoma. 3. 15 mm hepatic segment VI hypoenhancing mass suspicious for metastasis. 4. No definite lymphadenopathy.     MACRO: None   Signed by: Dyllan James 2/10/2025 3:33 PM Dictation workstation:   [id number]      ENDOSCOPIC REVIEW  EGD and colonoscopy (adequate prep) 9/28/2016 with Dr. Muse indicated for history of colon polyps, dysphagia and GERD: 2-3 cm hiatal hernia, multiple ulcerations x 5 in the upper esophagus ranging from 4 mm to 8 mm in size, nonbleeding and 4 colon polyps (3 polyps in the distal sigmoid and 1 polyp in the rectum), diverticulosis       Assessment/Plan   The patient is a 74 y.o. male with signficant past medical history of CAD s/p PCI with TREVOR 2011, Mobitz type 1 AV block, HTN, HLD, IDDM-II, COPD, tobacco use disorder, GERD, depression & anxiety who presented to [facility] due to chest pain. Admitted on 2/5/2025 for NSTEMI. LHC performed 2/7/2025 revealed 70% stenosis in the distal left main, 80% stenosis in the proximal LAD, 90% stenosis in the proximal circumflex artery, 75% stenosis in the proximal ramus, 80% stenosis in the mid ramus, 80% stenosis in the mid RCA, 90% stenosis in the distal RCA and an LVEF of 55%. Patient recommended to have evaluation for coronary bypass surgery. During evaluation for cardiac surgery, MRCP noted pancreatic tail mass, concerning for adenocarcinoma as well as liver mass concerning for metastasis which GI was consulted.      Pancreatic mass with possible mets to the liver on imaging . LFTs WNL, afebrile. no leukocytosis. Tolerating a diet with no " abdominal pain or N/V. Cancer AG 19-9 (< 4) on 2/7/2025.  Unintentional weight loss - 50 lbs over the past year  Acute NSTEMI - currently on heparin gtt and ASA.  Cardiac surgery on board for evaluation for CABG, but currently holding off due to several reasons including elevated A1c and pending workup of pancreatic mass. If poor prognosis, may not be a surgical candidate at all from cardiac surgery standpoint.    PLAN  There is high concern for underlying pancreatic cancer which may be surgically managed.  Recommend transfer downtown to [facility] where the patient will have access to a multidisciplinary team including hepatobiliary surgery, oncology and advanced GI that can figure out proper diagnosis and further management. Transfer team notified, patient accepted at [facility], waiting for bed.        Plan discussed with Dr. Nilesh CASTAÑEDA spent 15 minutes in the professional and overall care of this patient.      Narda Ledesma, CELIA-CNP

## 2025-02-13 NOTE — CARE PLAN
The patient's goals for the shift include      The clinical goals for the shift include pt will remain hemodynamically stablethrought shift    Over the shift, the patient did not make progress toward the following goals. Barriers to progression include transfer center having an open bed . Recommendations to address these barriers include medications and monitoring.

## 2025-02-13 NOTE — PROGRESS NOTES
Feliciano Worthington is a 74 y.o. male on day 7 of admission presenting with NSTEMI (non-ST elevated myocardial infarction) (Multi).    Subjective   Hospital follow-up.  Patient seen at the bedside.  Doing well today.  No episodes no symptoms of chest pain palpitations.  No abdominal pain.  Cardiology follow-up noted.  I spoke with cardiothoracic surgery yesterday.  Holding off on CABG for now.  I did make arrangements for transfer downtown as per GI recommendations.  Spoke with [facility].  [facility] with [facility].  Transfer accepted.  Awaiting bed.  Will go downtown for endoscopic ultrasound biopsy and further treatment recommendations about possible surgical intervention and treatment for possible likely pancreatic cancer to be determined.  For now he continues on the heparin drip.  Continue maximized medical therapy from a cardiovascular standpoint.  Awaiting bed availability.  Continue other current medical therapy in the interim.  Appreciate ongoing endocrinology treatment as well.  Blood sugars improving.  Optimize glycemic control.       Objective     Physical Exam  Vitals and nursing note reviewed.   Constitutional:       Appearance: Normal appearance.   HENT:      Head: Normocephalic and atraumatic.   Eyes:      Extraocular Movements: Extraocular movements intact.      Conjunctiva/sclera: Conjunctivae normal.      Pupils: Pupils are equal, round, and reactive to light.   Cardiovascular:      Rate and Rhythm: Normal rate and regular rhythm.      Heart sounds: Normal heart sounds.   Pulmonary:      Effort: Pulmonary effort is normal.      Breath sounds: Normal breath sounds.   Abdominal:      General: Abdomen is flat. Bowel sounds are normal.      Palpations: Abdomen is soft.   Musculoskeletal:         General: Normal range of motion.   Skin:     General: Skin is warm and dry.   Neurological:      General: No focal deficit present.      Mental Status: He is alert and oriented to person,  "place, and time. Mental status is at baseline.   Psychiatric:         Mood and Affect: Mood normal.         Behavior: Behavior normal.          Last Recorded Vitals  Blood pressure 143/69, pulse 54, temperature 35.8 °C (96.4 °F), temperature source Temporal, resp. rate 17, height 1.778 m (5' 10\"), weight 87.2 kg (192 lb 3.9 oz), SpO2 97%.  Intake/Output last 3 Shifts:  I/O last 3 completed shifts:  In: 1220 (14 mL/kg) [P.O.:1220]  Out: 1260 (14.4 mL/kg) [Urine:1260 (0.4 mL/kg/hr)]  Weight: 87.2 kg     Relevant Results  Recent Results (from the past 72 hours)   POCT GLUCOSE    Collection Time: 02/10/25  4:03 PM   Result Value Ref Range    POCT Glucose 156 (H) 74 - 99 mg/dL   POCT GLUCOSE    Collection Time: 02/10/25  8:37 PM   Result Value Ref Range    POCT Glucose 173 (H) 74 - 99 mg/dL   POCT GLUCOSE    Collection Time: 02/10/25  8:50 PM   Result Value Ref Range    POCT Glucose 168 (H) 74 - 99 mg/dL   Heparin Assay    Collection Time: 02/10/25 11:17 PM   Result Value Ref Range    Heparin Unfractionated 0.2 See Comment Below for Therapeutic Ranges IU/mL   Lavender Top    Collection Time: 02/11/25  5:56 AM   Result Value Ref Range    Extra Tube Hold for add-ons.    PST Top    Collection Time: 02/11/25  5:56 AM   Result Value Ref Range    Extra Tube Hold for add-ons.    Hepatic function panel    Collection Time: 02/11/25  5:56 AM   Result Value Ref Range    Albumin 3.7 3.4 - 5.0 g/dL    Bilirubin, Total 0.4 0.0 - 1.2 mg/dL    Bilirubin, Direct 0.1 0.0 - 0.3 mg/dL    Alkaline Phosphatase 81 33 - 136 U/L    ALT 16 10 - 52 U/L    AST 17 9 - 39 U/L    Total Protein 6.9 6.4 - 8.2 g/dL   Heparin Assay    Collection Time: 02/11/25  5:58 AM   Result Value Ref Range    Heparin Unfractionated 0.5 See Comment Below for Therapeutic Ranges IU/mL   POCT GLUCOSE    Collection Time: 02/11/25  6:27 AM   Result Value Ref Range    POCT Glucose 204 (H) 74 - 99 mg/dL   [facility] TOP    Collection Time: 02/11/25 10:09 AM   Result Value Ref Range "    Extra Tube Hold for add-ons.    Heparin Assay    Collection Time: 02/11/25 10:12 AM   Result Value Ref Range    Heparin Unfractionated 0.5 See Comment Below for Therapeutic Ranges IU/mL   POCT GLUCOSE    Collection Time: 02/11/25 11:25 AM   Result Value Ref Range    POCT Glucose 108 (H) 74 - 99 mg/dL   POCT GLUCOSE    Collection Time: 02/11/25  5:41 PM   Result Value Ref Range    POCT Glucose 184 (H) 74 - 99 mg/dL   POCT GLUCOSE    Collection Time: 02/11/25  8:12 PM   Result Value Ref Range    POCT Glucose 167 (H) 74 - 99 mg/dL   Heparin Assay    Collection Time: 02/12/25  4:06 AM   Result Value Ref Range    Heparin Unfractionated 0.5 See Comment Below for Therapeutic Ranges IU/mL   Lavender Top    Collection Time: 02/12/25  4:06 AM   Result Value Ref Range    Extra Tube Hold for add-ons.    SST TOP    Collection Time: 02/12/25  4:06 AM   Result Value Ref Range    Extra Tube Hold for add-ons.    PST Top    Collection Time: 02/12/25  4:06 AM   Result Value Ref Range    Extra Tube Hold for add-ons.    POCT GLUCOSE    Collection Time: 02/12/25  7:01 AM   Result Value Ref Range    POCT Glucose 225 (H) 74 - 99 mg/dL   POCT GLUCOSE    Collection Time: 02/12/25 11:39 AM   Result Value Ref Range    POCT Glucose 205 (H) 74 - 99 mg/dL   POCT GLUCOSE    Collection Time: 02/12/25  4:07 PM   Result Value Ref Range    POCT Glucose 155 (H) 74 - 99 mg/dL   POCT GLUCOSE    Collection Time: 02/12/25  8:21 PM   Result Value Ref Range    POCT Glucose 275 (H) 74 - 99 mg/dL   Heparin Assay    Collection Time: 02/13/25  5:40 AM   Result Value Ref Range    Heparin Unfractionated 0.5 See Comment Below for Therapeutic Ranges IU/mL   Lavender Top    Collection Time: 02/13/25  5:40 AM   Result Value Ref Range    Extra Tube Hold for add-ons.    PST Top    Collection Time: 02/13/25  5:40 AM   Result Value Ref Range    Extra Tube Hold for add-ons.    POCT GLUCOSE    Collection Time: 02/13/25  6:43 AM   Result Value Ref Range    POCT Glucose  233 (H) 74 - 99 mg/dL          This patient currently has cardiac telemetry ordered; if you would like to modify or discontinue the telemetry order, click here to go to the orders activity to modify/discontinue the order.               amLODIPine, 5 mg, oral, Daily  aspirin, 81 mg, oral, Daily  buPROPion XL, 150 mg, oral, Daily  ezetimibe, 10 mg, oral, Nightly  hydrOXYzine HCL, 25 mg, oral, BID  insulin glargine, 25 Units, subcutaneous, Nightly  insulin glargine, 32 Units, subcutaneous, q AM  insulin lispro, 0-15 Units, subcutaneous, TID AC  insulin lispro, 10 Units, subcutaneous, TID AC  isosorbide mononitrate ER, 30 mg, oral, Daily  LORazepam, 0.5 mg, intravenous, Once  lovastatin, 40 mg, oral, Nightly  pantoprazole, 40 mg, oral, Daily  polyethylene glycol, 17 g, oral, Daily  venlafaxine XR, 150 mg, oral, Daily with breakfast        CT chest wo IV contrast    Result Date: 2/7/2025  Interpreted By:  Amarjit Ortiz, STUDY: CT CHEST WO IV CONTRAST;  2/6/2025 4:39 pm   INDICATION: Signs/Symptoms:pre op cardiac surgery.     COMPARISON: 05/28/2024   ACCESSION NUMBER(S): [id number]   ORDERING CLINICIAN: MAXIMILIAN ROSA   TECHNIQUE: Helical data acquisition of the chest was obtained without the use of IV contrast. Images were reformatted in axial, coronal, and sagittal planes.   FINDINGS: POTENTIAL LIMITATIONS OF THE STUDY:   Lack of IV contrast   HEART AND VESSELS:   No calcified plaques are seen involving the ascending aorta. There are atherosclerotic calcifications of the aortic arch, descending thoracic aorta, and the branches of the aorta. Aorta is unchanged in course and caliber. Stable ectasia of the ascending aorta up to 3.9 cm.   The heart is unchanged in size.   No pericardial effusion is seen.   MEDIASTINUM AND MARIA INES, LOWER NECK AND AXILLA: The visualized thyroid gland is within normal limits.   No evidence of thoracic lymphadenopathy by CT criteria.   Esophagus appears within normal limits as seen.   LUNGS AND  AIRWAYS: The trachea and central airways are patent. No endobronchial lesion.   There are areas of fibrosis/scarring in the lungs, predominantly along the periphery. Appearance is not significantly changed when compared to the previous examination. No new areas of consolidation. No effusion. No pneumothorax. Stable partially calcified nodule along the major fissure on the right measuring approximately 8 mm. A few additional nodules are seen measuring 5 mm or less in size, for example, image 117 in the right upper lobe. There is a new nodular opacity in the medial aspect of the right lower lobe measuring approximately 8 mm in size. Although this may be infectious/inflammatory nature, follow-up is recommended to document resolution, image 146.   UPPER ABDOMEN: The visualized subdiaphragmatic structures demonstrate no acute abnormality. 2.2 cm cyst in the midpole of the left kidney. There is nonspecific prominence/thickening of the tail the pancreas with a 2.5 cm hypoattenuating lesion. This is not adequately characterized on this unenhanced CT. MRI is recommended for further evaluation.   CHEST WALL AND OSSEOUS STRUCTURES: Degenerative changes. No acute process.       No calcified plaques along the ascending aorta. There is atherosclerotic disease of the remaining portions of the aorta and its branches. Indeterminate lesion involving the tail the pancreas. MRI recommended for further evaluation. Interval development of an 8 mm right lower lobe nodule which may be infectious/inflammatory in nature but will require follow-up to document resolution.   MACRO: None.   Signed by: Amarjit Ortiz 2/7/2025 9:05 AM Dictation workstation:   [id number]    Vascular US lower extremity vein mapping bilateral    Result Date: 2/7/2025  Interpreted By:  Amarjit Ortiz, STUDY: VASC US LOWER EXTREMITY VEIN MAPPING BILATERAL;  2/6/2025 5:33 pm   INDICATION: Signs/Symptoms:pre op cardiac surgery.   ,I21.4 Non-ST elevation (NSTEMI) myocardial  infarction (Multi),R60.1 Generalized edema   COMPARISON: None.   ACCESSION NUMBER(S): [id number]   ORDERING CLINICIAN: MAXIMILIAN ROSA   TECHNIQUE: Real time duplex sonographic evaluation of the lower extremity venous systems was performed for venous mapping.   FINDINGS: Right Greater Saphenous Vein: Thigh- Proximal-0.3 x 0.2 cm Mid-0.2 x 0.2 cm Distal-0.3 x 0.2 cm   Calf-   Proximal-0.3 x 0.2 cm Mid-0.2 x 0.1 cm Distal-0.2 x 0.1 cm   Right Lesser Saphenous Vein: Proximal-0.3 x 0.2 cm Mid-0.2 x 0.1 cm Distal-0.2 x 0.1 cm     Left Greater Saphenous Vein: Thigh- Proximal-0.3 x 0.3 cm Mid-0.2 x 0.2 cm Distal-0.1 x 0.1 cm   Calf-   Proximal-0.1 x 0.1 cm Mid-0.2 x 0.1 cm Distal-0.3 x 0.1 cm   Left Lesser Saphenous Vein: Proximal-0.3 x 0.1 cm Mid-0.2 x 0.2 cm Distal-0.2 x 0.1 cm       Lower extremity venous mapping as detailed above.   MACRO: None.   Signed by: Amarjit Ortiz 2/7/2025 7:25 AM Dictation workstation:   [id number]    Carotid duplex bilateral    Result Date: 2/7/2025  Interpreted By:  Amarjit Ortiz, STUDY: [facility] CAROTID ARTERY DUPLEX BILATERAL;  2/6/2025 5:32 pm   INDICATION: Signs/Symptoms:pre op caridac surgery.   ,I21.4 Non-ST elevation (NSTEMI) myocardial infarction (Multi),I79.8 Other disorders of arteries, arterioles and capillaries in diseases classified elsewhere   COMPARISON: 10/30/2023   ACCESSION NUMBER(S): [id number]   ORDERING CLINICIAN: MAXIMILIAN ROSA   TECHNIQUE: Vascular ultrasound of the extracranial carotid system was performed bilaterally.  Gray scale, color Doppler and spectral Doppler waveform analysis was performed.   FINDINGS: There is atherosclerotic disease most conspicuous at the carotid bulbs.   RIGHT:   RIGHT SIDE PEAK SYSTOLIC VELOCITY TABLE: CCA  78 cm/s. ICA  126 cm/s. ECA  69 cm/s.     The ratio of the peak systolic velocity of the right ICA/CCA is  1.6.   RIGHT VERTEBRAL ARTERY: The right vertebral artery demonstrates proximal anterograde flow.   LEFT:   LEFT SIDE PEAK  SYSTOLIC VELOCITY TABLE: CCA 50 cm/s. ICA 62 cm/s. ECA 64 cm/s.     The ratio of the peak systolic velocity of the left ICA/CCA is 1.1.   LEFT VERTEBRAL ARTERY: The left vertebral artery demonstrates proximal anterograde flow.       Utilizing the peak systolic velocities, the estimated degree of stenosis within the internal carotid arteries is less than 50% bilaterally.   MACRO: None.     Signed by: Amarjit Ortiz 2/7/2025 7:24 AM Dictation workstation:   [id number]    Cardiac Catheterization Procedure    Result Date: 2/6/2025   [facility], Cath Lab        [address] [place] [address] Cardiovascular Catheterization Report Patient Name:      JAIME FERNANDEZ      Performing Physician:  [id number] Montana MD Study Date:        2/6/2025             Verifying Physician:   Litzy Montana MD MRN/PID:           [id number]             Cardiologist/Co-Scrub: Accession#:        [id number]         Ordering Provider:     [id number] DERIK ENAMORADO Date of Birth/Age: 1950 / 74 years Cardiologist: Gender:            M                    Fellow: Encounter#:        [id number]           Surgeon:  Study: Left Heart Cath  Indications: JAIME FERNANDEZ is a 75 year old male who presents with dyslipidemia, hypertension, prior myocardial infarction, diabetes, smoker, chronic pulmonary disease, coronary artery disease, prior percutaneous coronary intervention and a chest pain assessment of typical angina. NSTE - ACS.  Procedure Description: After infiltration with 2% Lidocaine, the right femoral artery was cannulated with a modified Seldinger technique. Subsequently a 5 Gabonese sheath was placed in the right femoral artery. Selective coronary catheterization was performed using a 5 Fr catheter(s) exchanged over a  guide wire to cannulate the coronary arteries. A JL 4 tip catheter was used for left coronary injections. A 3drc tip catheter was used for right coronary injections. Multiple injections of contrast were made into the left and right coronary arteries with angiograms recorded in multiple projections. Retrograde left heart catheterizion was accomplished with a 5 Fr. pigtail catheter. A single plane left ventriculogram was recorded in the 30 degree RAYMOND projection. The contrast dose was 20 ml injected at 10 ml/sec. The catheter was then withdrawn across the aortic valve under continuous pressure monitoring and removed. After completion of the procedure, femoral artery angiography was performed. This demonstrated a common femoral artery puncture appropriate for closure. A Vascade 5F vascular closure Device was placed per protocol.  Coronary Angiography: The coronary circulation is right dominant.  Left Main Coronary Artery: The left main coronary artery is calcified. There is 70% stenosis in the distal left main coronary artery.  Left Anterior Descending Coronary Artery Distribution: There is 80% stenosis in the proximal left anterior descending artery.  Circumflex Coronary Artery Distribution: There is 90% stenosis in the the proximal Circumflex artery. There is 75% stenosis in the Prox Ramus Circumflex artery. There is 80% stenosis in the Mid Ramus Circumflex artery.  Right Coronary Artery Distribution: There is 80% stenosis in the the mid Right Coronary Artery. There is evidence of instent restenosis in this segment. The distal right coronary artery showed 90% stenosis.  Left Ventriculography: The estimated left ventricular ejection fraction is normal at 55%.  Hemo Personnel: +---------------------------+---------+ Name                       Duty      +---------------------------+---------+ Wil Bronson MD 1 +---------------------------+---------+  Hemodynamic Pressures:   +----+------------------+---------+-------------+-------------+------+---------+ Site    Date Time       Phase  Systolic mmHg  Diastolic    ED  Mean mmHg                         Name                    mmHg      mmHg           +----+------------------+---------+-------------+-------------+------+---------+   AO  2/6/2025 9:36:07 AIR REST          127           65             87                     AM                                                   +----+------------------+---------+-------------+-------------+------+---------+   AO  2/6/2025 9:39:06 AIR REST           91           61             65                     AM                                                   +----+------------------+---------+-------------+-------------+------+---------+   AO  2/6/2025 9:44:05 AIR REST          133           72             96                     AM                                                   +----+------------------+---------+-------------+-------------+------+---------+   LV  2/6/2025 9:47:36 AIR REST          115           -1     6                              AM                                                   +----+------------------+---------+-------------+-------------+------+---------+  Cardiac Cath Post Procedure Notes: Post Procedure Diagnosis: Triple vessel disease. Blood Loss:               Estimated blood loss during the procedure was 0 mls. Specimens Removed:        Number of specimen(s) removed: none. ____________________________________________________________________________________ CONCLUSIONS:  1. Distal Left Main: 70% stenosis.  2. Left Main Coronary Artery: This artery is calcified.  3. Proximal LAD Lesion: The percent stenosis is 80%.  4. Proximal CX Lesion: The percent stenosis is 90%.  5. Prox Ramus CX Lesion: The percent stenosis is 75%.  6. Mid Ramus CX Lesion: The percent stenosis is 80%.  7. Mid RCA Lesion:  The percent stenosis is 80%.  8. Mid RCA Lesion: Instent Restenosis.  9. Distal RCA Lesion: The percent stenosis is 90%. 10. The Left Ventricular Ejection Fraction is 55%. 11. Mid CX Lesion: 100%. ICD 10 Codes: Non ST elevation (NSTEMI) myocardial infarction-I21.4  CPT Codes: Left Heart Cath (visualization of coronaries) and [id number]; Moderate Sedation Services initial 15 minutes patient >5 [id number]  [id number] Wil Montana MD Performing Physician Electronically signed by [id number] Wil Montana MD on 2/6/2025 at 10:19:09 AM  ** Final **     ECG 12 lead    Result Date: 2/6/2025  Sinus rhythm with 2nd degree AV block (Mobitz I) Right superior axis deviation Nonspecific ST abnormality Abnormal ECG When compared with ECG of 17-OCT-2023 18:49, No significant change was found Confirmed by Shanika Reese ([id number]) on 2/6/2025 9:38:21 AM    ECG 12 lead    Result Date: 2/6/2025  Sinus rhythm with 2nd degree AV block (Mobitz I) Right superior axis deviation Abnormal ECG When compared with ECG of 05-FEB-2025 21:41, (unconfirmed) No significant change was found    XR chest 1 view    Result Date: 2/5/2025  Interpreted By:  Tobin Machado, STUDY: XR CHEST 1 VIEW;  2/5/2025 10:17 pm   INDICATION: Signs/Symptoms:Chest Pain.   COMPARISON: None.   ACCESSION NUMBER(S): [id number]   ORDERING CLINICIAN: MARIELY ROCKWELL   FINDINGS: The cardiac silhouette is normal in size. There is mild bilateral opacities may correspond to chronic lung changes and atelectasis versus pneumonia. No segment pleural effusion. No pneumothorax.       Mild bilateral opacities may correspond to chronic lung changes and atelectasis versus pneumonia.   MACRO: None   Signed by: Tobin Machado 2/5/2025 10:38 PM Dictation workstation:   [id number]       Assessment/Plan   Assessment & Plan  NSTEMI (non-ST elevated myocardial infarction) (Multi)    Primary hypertension    Chronic obstructive pulmonary disease, unspecified    Current every day smoker    GERD  (gastroesophageal reflux disease)    Hiatal hernia with GERD    Smoker    Spinal stenosis of cervical region    Mixed hyperlipidemia    CAD (coronary artery disease)    Mobitz type 1 second degree AV block    Type 2 diabetes mellitus with hyperglycemia, with long-term current use of insulin    Hyponatremia    Chest pain, unspecified type    Noncompliance with CPAP treatment    Obstructive sleep apnea    Recurrent major depressive disorder, in partial remission (CMS-HCC)    Hx of heart artery stent    Oropharyngeal dysphagia    History of constipation    Noncompliance with diabetes treatment    Abnormal weight loss    Hypokalemia    Pancreatic mass ([facility])            I spent    minutes in the professional and overall care of this patient.      Hussain Bernal MD

## 2025-02-14 ENCOUNTER — HOSPITAL ENCOUNTER (INPATIENT)
Facility: HOSPITAL | Age: 75
DRG: 438 | End: 2025-02-14
Attending: STUDENT IN AN ORGANIZED HEALTH CARE EDUCATION/TRAINING PROGRAM | Admitting: INTERNAL MEDICINE
Payer: MEDICARE

## 2025-02-14 VITALS
WEIGHT: 192.24 LBS | HEIGHT: 70 IN | OXYGEN SATURATION: 97 % | SYSTOLIC BLOOD PRESSURE: 137 MMHG | DIASTOLIC BLOOD PRESSURE: 74 MMHG | TEMPERATURE: 97.5 F | BODY MASS INDEX: 27.52 KG/M2 | HEART RATE: 56 BPM | RESPIRATION RATE: 18 BRPM

## 2025-02-14 DIAGNOSIS — I21.4 NSTEMI (NON-ST ELEVATED MYOCARDIAL INFARCTION) (MULTI): ICD-10-CM

## 2025-02-14 DIAGNOSIS — K59.03 DRUG-INDUCED CONSTIPATION: ICD-10-CM

## 2025-02-14 DIAGNOSIS — E87.6 HYPOKALEMIA: ICD-10-CM

## 2025-02-14 DIAGNOSIS — E11.65 TYPE 2 DIABETES MELLITUS WITH HYPERGLYCEMIA, WITH LONG-TERM CURRENT USE OF INSULIN: ICD-10-CM

## 2025-02-14 DIAGNOSIS — Z79.4 TYPE 2 DIABETES MELLITUS WITH HYPERGLYCEMIA, WITH LONG-TERM CURRENT USE OF INSULIN: ICD-10-CM

## 2025-02-14 DIAGNOSIS — R11.0 NAUSEA: ICD-10-CM

## 2025-02-14 DIAGNOSIS — K86.89 PANCREATIC MASS (HHS-HCC): Primary | ICD-10-CM

## 2025-02-14 LAB
ALBUMIN SERPL BCP-MCNC: 3.6 G/DL (ref 3.4–5)
ANION GAP SERPL CALC-SCNC: 14 MMOL/L (ref 10–20)
BASOPHILS # BLD AUTO: 0.07 X10*3/UL (ref 0–0.1)
BASOPHILS NFR BLD AUTO: 0.9 %
BUN SERPL-MCNC: 13 MG/DL (ref 6–23)
CALCIUM SERPL-MCNC: 9.4 MG/DL (ref 8.6–10.6)
CHLORIDE SERPL-SCNC: 98 MMOL/L (ref 98–107)
CO2 SERPL-SCNC: 24 MMOL/L (ref 21–32)
CREAT SERPL-MCNC: 0.76 MG/DL (ref 0.5–1.3)
EGFRCR SERPLBLD CKD-EPI 2021: >90 ML/MIN/1.73M*2
EOSINOPHIL # BLD AUTO: 0.23 X10*3/UL (ref 0–0.4)
EOSINOPHIL NFR BLD AUTO: 2.9 %
ERYTHROCYTE [DISTWIDTH] IN BLOOD BY AUTOMATED COUNT: 12.6 % (ref 11.5–14.5)
GLUCOSE BLD MANUAL STRIP-MCNC: 156 MG/DL (ref 74–99)
GLUCOSE BLD MANUAL STRIP-MCNC: 210 MG/DL (ref 74–99)
GLUCOSE BLD MANUAL STRIP-MCNC: 228 MG/DL (ref 74–99)
GLUCOSE BLD MANUAL STRIP-MCNC: 279 MG/DL (ref 74–99)
GLUCOSE SERPL-MCNC: 150 MG/DL (ref 74–99)
HCT VFR BLD AUTO: 38 % (ref 41–52)
HGB BLD-MCNC: 13.6 G/DL (ref 13.5–17.5)
IMM GRANULOCYTES # BLD AUTO: 0.06 X10*3/UL (ref 0–0.5)
IMM GRANULOCYTES NFR BLD AUTO: 0.8 % (ref 0–0.9)
LYMPHOCYTES # BLD AUTO: 2.54 X10*3/UL (ref 0.8–3)
LYMPHOCYTES NFR BLD AUTO: 32 %
MAGNESIUM SERPL-MCNC: 1.82 MG/DL (ref 1.6–2.4)
MCH RBC QN AUTO: 29.4 PG (ref 26–34)
MCHC RBC AUTO-ENTMCNC: 35.8 G/DL (ref 32–36)
MCV RBC AUTO: 82 FL (ref 80–100)
MONOCYTES # BLD AUTO: 0.72 X10*3/UL (ref 0.05–0.8)
MONOCYTES NFR BLD AUTO: 9.1 %
NEUTROPHILS # BLD AUTO: 4.31 X10*3/UL (ref 1.6–5.5)
NEUTROPHILS NFR BLD AUTO: 54.3 %
NRBC BLD-RTO: 0 /100 WBCS (ref 0–0)
PHOSPHATE SERPL-MCNC: 4.4 MG/DL (ref 2.5–4.9)
PLATELET # BLD AUTO: 163 X10*3/UL (ref 150–450)
POTASSIUM SERPL-SCNC: 4.1 MMOL/L (ref 3.5–5.3)
RBC # BLD AUTO: 4.63 X10*6/UL (ref 4.5–5.9)
SODIUM SERPL-SCNC: 132 MMOL/L (ref 136–145)
UFH PPP CHRO-ACNC: 0.5 IU/ML
WBC # BLD AUTO: 7.9 X10*3/UL (ref 4.4–11.3)

## 2025-02-14 PROCEDURE — 1200000002 HC GENERAL ROOM WITH TELEMETRY DAILY

## 2025-02-14 PROCEDURE — 99222 1ST HOSP IP/OBS MODERATE 55: CPT | Performed by: STUDENT IN AN ORGANIZED HEALTH CARE EDUCATION/TRAINING PROGRAM

## 2025-02-14 PROCEDURE — 99231 SBSQ HOSP IP/OBS SF/LOW 25: CPT | Performed by: NURSE PRACTITIONER

## 2025-02-14 PROCEDURE — 99239 HOSP IP/OBS DSCHRG MGMT >30: CPT | Performed by: FAMILY MEDICINE

## 2025-02-14 PROCEDURE — 82947 ASSAY GLUCOSE BLOOD QUANT: CPT

## 2025-02-14 PROCEDURE — 83735 ASSAY OF MAGNESIUM: CPT

## 2025-02-14 PROCEDURE — 2500000002 HC RX 250 W HCPCS SELF ADMINISTERED DRUGS (ALT 637 FOR MEDICARE OP, ALT 636 FOR OP/ED): Performed by: INTERNAL MEDICINE

## 2025-02-14 PROCEDURE — 2500000004 HC RX 250 GENERAL PHARMACY W/ HCPCS (ALT 636 FOR OP/ED)

## 2025-02-14 PROCEDURE — 2500000004 HC RX 250 GENERAL PHARMACY W/ HCPCS (ALT 636 FOR OP/ED): Performed by: NURSE PRACTITIONER

## 2025-02-14 PROCEDURE — 2500000002 HC RX 250 W HCPCS SELF ADMINISTERED DRUGS (ALT 637 FOR MEDICARE OP, ALT 636 FOR OP/ED): Performed by: NURSE PRACTITIONER

## 2025-02-14 PROCEDURE — 2500000001 HC RX 250 WO HCPCS SELF ADMINISTERED DRUGS (ALT 637 FOR MEDICARE OP)

## 2025-02-14 PROCEDURE — 2500000004 HC RX 250 GENERAL PHARMACY W/ HCPCS (ALT 636 FOR OP/ED): Performed by: INTERNAL MEDICINE

## 2025-02-14 PROCEDURE — 2500000001 HC RX 250 WO HCPCS SELF ADMINISTERED DRUGS (ALT 637 FOR MEDICARE OP): Performed by: NURSE PRACTITIONER

## 2025-02-14 PROCEDURE — 36415 COLL VENOUS BLD VENIPUNCTURE: CPT | Performed by: FAMILY MEDICINE

## 2025-02-14 PROCEDURE — 2500000001 HC RX 250 WO HCPCS SELF ADMINISTERED DRUGS (ALT 637 FOR MEDICARE OP): Performed by: INTERNAL MEDICINE

## 2025-02-14 PROCEDURE — 36415 COLL VENOUS BLD VENIPUNCTURE: CPT

## 2025-02-14 PROCEDURE — 85520 HEPARIN ASSAY: CPT | Performed by: FAMILY MEDICINE

## 2025-02-14 PROCEDURE — 80069 RENAL FUNCTION PANEL: CPT

## 2025-02-14 PROCEDURE — 2500000002 HC RX 250 W HCPCS SELF ADMINISTERED DRUGS (ALT 637 FOR MEDICARE OP, ALT 636 FOR OP/ED)

## 2025-02-14 PROCEDURE — 85025 COMPLETE CBC W/AUTO DIFF WBC: CPT

## 2025-02-14 PROCEDURE — 2500000001 HC RX 250 WO HCPCS SELF ADMINISTERED DRUGS (ALT 637 FOR MEDICARE OP): Performed by: PSYCHIATRY & NEUROLOGY

## 2025-02-14 RX ORDER — ACETAMINOPHEN 325 MG/1
650 TABLET ORAL EVERY 4 HOURS PRN
Status: DISCONTINUED | OUTPATIENT
Start: 2025-02-14 | End: 2025-02-14

## 2025-02-14 RX ORDER — ISOSORBIDE MONONITRATE 30 MG/1
30 TABLET, EXTENDED RELEASE ORAL DAILY
Status: DISCONTINUED | OUTPATIENT
Start: 2025-02-15 | End: 2025-02-18 | Stop reason: HOSPADM

## 2025-02-14 RX ORDER — ONDANSETRON HYDROCHLORIDE 2 MG/ML
4 INJECTION, SOLUTION INTRAVENOUS EVERY 4 HOURS PRN
Status: DISCONTINUED | OUTPATIENT
Start: 2025-02-14 | End: 2025-02-18 | Stop reason: HOSPADM

## 2025-02-14 RX ORDER — DEXTROSE 50 % IN WATER (D50W) INTRAVENOUS SYRINGE
25
Status: DISCONTINUED | OUTPATIENT
Start: 2025-02-14 | End: 2025-02-18 | Stop reason: HOSPADM

## 2025-02-14 RX ORDER — ACETAMINOPHEN 325 MG/1
650 TABLET ORAL EVERY 4 HOURS PRN
Status: DISCONTINUED | OUTPATIENT
Start: 2025-02-14 | End: 2025-02-18 | Stop reason: HOSPADM

## 2025-02-14 RX ORDER — INSULIN GLARGINE 100 [IU]/ML
32 INJECTION, SOLUTION SUBCUTANEOUS NIGHTLY
Start: 2025-02-14

## 2025-02-14 RX ORDER — LOVASTATIN 40 MG/1
40 TABLET ORAL NIGHTLY
Qty: 90 TABLET | Refills: 1 | Status: SHIPPED | OUTPATIENT
Start: 2025-02-14

## 2025-02-14 RX ORDER — ACETAMINOPHEN 500 MG
5 TABLET ORAL NIGHTLY PRN
Status: DISCONTINUED | OUTPATIENT
Start: 2025-02-14 | End: 2025-02-18 | Stop reason: HOSPADM

## 2025-02-14 RX ORDER — MAGNESIUM SULFATE HEPTAHYDRATE 40 MG/ML
2 INJECTION, SOLUTION INTRAVENOUS ONCE
Status: COMPLETED | OUTPATIENT
Start: 2025-02-14 | End: 2025-02-14

## 2025-02-14 RX ORDER — BUPROPION HYDROCHLORIDE 150 MG/1
150 TABLET ORAL DAILY
Status: DISCONTINUED | OUTPATIENT
Start: 2025-02-15 | End: 2025-02-18 | Stop reason: HOSPADM

## 2025-02-14 RX ORDER — NITROGLYCERIN 0.4 MG/1
0.4 TABLET SUBLINGUAL EVERY 5 MIN PRN
Status: DISCONTINUED | OUTPATIENT
Start: 2025-02-14 | End: 2025-02-18 | Stop reason: HOSPADM

## 2025-02-14 RX ORDER — ACETAMINOPHEN 650 MG/1
650 SUPPOSITORY RECTAL EVERY 4 HOURS PRN
Status: DISCONTINUED | OUTPATIENT
Start: 2025-02-14 | End: 2025-02-14

## 2025-02-14 RX ORDER — INSULIN GLARGINE 100 [IU]/ML
32 INJECTION, SOLUTION SUBCUTANEOUS NIGHTLY
Status: DISCONTINUED | OUTPATIENT
Start: 2025-02-15 | End: 2025-02-17

## 2025-02-14 RX ORDER — VENLAFAXINE HYDROCHLORIDE 150 MG/1
150 CAPSULE, EXTENDED RELEASE ORAL
Status: DISCONTINUED | OUTPATIENT
Start: 2025-02-15 | End: 2025-02-18 | Stop reason: HOSPADM

## 2025-02-14 RX ORDER — HYDROXYZINE HYDROCHLORIDE 25 MG/1
25 TABLET, FILM COATED ORAL 2 TIMES DAILY
Status: DISCONTINUED | OUTPATIENT
Start: 2025-02-14 | End: 2025-02-18 | Stop reason: HOSPADM

## 2025-02-14 RX ORDER — INSULIN LISPRO 100 [IU]/ML
0-15 INJECTION, SOLUTION INTRAVENOUS; SUBCUTANEOUS
Status: DISCONTINUED | OUTPATIENT
Start: 2025-02-14 | End: 2025-02-18 | Stop reason: HOSPADM

## 2025-02-14 RX ORDER — IPRATROPIUM BROMIDE AND ALBUTEROL SULFATE 2.5; .5 MG/3ML; MG/3ML
3 SOLUTION RESPIRATORY (INHALATION) EVERY 4 HOURS PRN
Status: DISCONTINUED | OUTPATIENT
Start: 2025-02-14 | End: 2025-02-18 | Stop reason: HOSPADM

## 2025-02-14 RX ORDER — DEXTROSE 50 % IN WATER (D50W) INTRAVENOUS SYRINGE
12.5
Status: DISCONTINUED | OUTPATIENT
Start: 2025-02-14 | End: 2025-02-18 | Stop reason: HOSPADM

## 2025-02-14 RX ORDER — EZETIMIBE 10 MG/1
10 TABLET ORAL NIGHTLY
Status: DISCONTINUED | OUTPATIENT
Start: 2025-02-14 | End: 2025-02-18 | Stop reason: HOSPADM

## 2025-02-14 RX ORDER — AMLODIPINE BESYLATE 5 MG/1
5 TABLET ORAL DAILY
Status: DISCONTINUED | OUTPATIENT
Start: 2025-02-15 | End: 2025-02-18 | Stop reason: HOSPADM

## 2025-02-14 RX ORDER — INSULIN LISPRO 100 [IU]/ML
15 INJECTION, SOLUTION INTRAVENOUS; SUBCUTANEOUS
Status: DISCONTINUED | OUTPATIENT
Start: 2025-02-14 | End: 2025-02-18 | Stop reason: HOSPADM

## 2025-02-14 RX ORDER — ADHESIVE BANDAGE
30 BANDAGE TOPICAL DAILY PRN
Status: DISCONTINUED | OUTPATIENT
Start: 2025-02-14 | End: 2025-02-18 | Stop reason: HOSPADM

## 2025-02-14 RX ORDER — HEPARIN SODIUM 10000 [USP'U]/100ML
0-4000 INJECTION, SOLUTION INTRAVENOUS CONTINUOUS
Status: DISCONTINUED | OUTPATIENT
Start: 2025-02-14 | End: 2025-02-16

## 2025-02-14 RX ORDER — BUPROPION HYDROCHLORIDE 150 MG/1
150 TABLET ORAL DAILY
Qty: 90 TABLET | Refills: 1 | Status: SHIPPED | OUTPATIENT
Start: 2025-02-15

## 2025-02-14 RX ORDER — NAPROXEN SODIUM 220 MG/1
81 TABLET, FILM COATED ORAL DAILY
Status: DISCONTINUED | OUTPATIENT
Start: 2025-02-15 | End: 2025-02-18 | Stop reason: HOSPADM

## 2025-02-14 RX ORDER — INSULIN GLARGINE 100 [IU]/ML
32 INJECTION, SOLUTION SUBCUTANEOUS EVERY MORNING
Status: DISCONTINUED | OUTPATIENT
Start: 2025-02-15 | End: 2025-02-17

## 2025-02-14 RX ORDER — POLYETHYLENE GLYCOL 3350 17 G/17G
17 POWDER, FOR SOLUTION ORAL DAILY
Status: DISCONTINUED | OUTPATIENT
Start: 2025-02-15 | End: 2025-02-18 | Stop reason: HOSPADM

## 2025-02-14 RX ORDER — ATORVASTATIN CALCIUM 10 MG/1
10 TABLET, FILM COATED ORAL NIGHTLY
Status: DISCONTINUED | OUTPATIENT
Start: 2025-02-14 | End: 2025-02-15

## 2025-02-14 RX ORDER — ISOSORBIDE MONONITRATE 30 MG/1
30 TABLET, EXTENDED RELEASE ORAL DAILY
Qty: 90 TABLET | Refills: 1 | Status: SHIPPED | OUTPATIENT
Start: 2025-02-15

## 2025-02-14 RX ORDER — ACETAMINOPHEN 160 MG/5ML
650 SOLUTION ORAL EVERY 4 HOURS PRN
Status: DISCONTINUED | OUTPATIENT
Start: 2025-02-14 | End: 2025-02-14

## 2025-02-14 RX ORDER — ONDANSETRON 4 MG/1
4 TABLET, FILM COATED ORAL EVERY 8 HOURS PRN
Status: DISCONTINUED | OUTPATIENT
Start: 2025-02-14 | End: 2025-02-18 | Stop reason: HOSPADM

## 2025-02-14 RX ORDER — ALUMINUM HYDROXIDE, MAGNESIUM HYDROXIDE, AND SIMETHICONE 1200; 120; 1200 MG/30ML; MG/30ML; MG/30ML
30 SUSPENSION ORAL 4 TIMES DAILY PRN
Status: DISCONTINUED | OUTPATIENT
Start: 2025-02-14 | End: 2025-02-18 | Stop reason: HOSPADM

## 2025-02-14 RX ORDER — PANTOPRAZOLE SODIUM 40 MG/1
40 TABLET, DELAYED RELEASE ORAL DAILY
Status: DISCONTINUED | OUTPATIENT
Start: 2025-02-15 | End: 2025-02-18 | Stop reason: HOSPADM

## 2025-02-14 RX ORDER — INSULIN GLARGINE 100 [IU]/ML
32 INJECTION, SOLUTION SUBCUTANEOUS EVERY MORNING
Start: 2025-02-15

## 2025-02-14 RX ADMIN — ACETAMINOPHEN 650 MG: 325 TABLET ORAL at 13:51

## 2025-02-14 RX ADMIN — INSULIN LISPRO 6 UNITS: 100 INJECTION, SOLUTION INTRAVENOUS; SUBCUTANEOUS at 08:17

## 2025-02-14 RX ADMIN — AMLODIPINE BESYLATE 5 MG: 5 TABLET ORAL at 08:29

## 2025-02-14 RX ADMIN — INSULIN LISPRO 15 UNITS: 100 INJECTION, SOLUTION INTRAVENOUS; SUBCUTANEOUS at 08:16

## 2025-02-14 RX ADMIN — ISOSORBIDE MONONITRATE 30 MG: 30 TABLET, EXTENDED RELEASE ORAL at 08:29

## 2025-02-14 RX ADMIN — INSULIN LISPRO 9 UNITS: 100 INJECTION, SOLUTION INTRAVENOUS; SUBCUTANEOUS at 03:53

## 2025-02-14 RX ADMIN — INSULIN GLARGINE 32 UNITS: 100 INJECTION, SOLUTION SUBCUTANEOUS at 08:16

## 2025-02-14 RX ADMIN — HYDROXYZINE HYDROCHLORIDE 25 MG: 25 TABLET ORAL at 21:37

## 2025-02-14 RX ADMIN — HYDROXYZINE HYDROCHLORIDE 25 MG: 25 TABLET ORAL at 08:28

## 2025-02-14 RX ADMIN — POLYETHYLENE GLYCOL 3350 17 G: 17 POWDER, FOR SOLUTION ORAL at 08:28

## 2025-02-14 RX ADMIN — HEPARIN SODIUM 1600 UNITS/HR: 10000 INJECTION, SOLUTION INTRAVENOUS at 00:34

## 2025-02-14 RX ADMIN — ATORVASTATIN CALCIUM 10 MG: 10 TABLET, FILM COATED ORAL at 21:37

## 2025-02-14 RX ADMIN — INSULIN LISPRO 3 UNITS: 100 INJECTION, SOLUTION INTRAVENOUS; SUBCUTANEOUS at 14:09

## 2025-02-14 RX ADMIN — EZETIMIBE 10 MG: 10 TABLET ORAL at 21:37

## 2025-02-14 RX ADMIN — ASPIRIN 81 MG: 81 TABLET, CHEWABLE ORAL at 08:29

## 2025-02-14 RX ADMIN — HEPARIN SODIUM 1600 UNITS/HR: 10000 INJECTION, SOLUTION INTRAVENOUS at 18:38

## 2025-02-14 RX ADMIN — MAGNESIUM SULFATE HEPTAHYDRATE 2 G: 40 INJECTION, SOLUTION INTRAVENOUS at 19:20

## 2025-02-14 RX ADMIN — INSULIN LISPRO 6 UNITS: 100 INJECTION, SOLUTION INTRAVENOUS; SUBCUTANEOUS at 21:42

## 2025-02-14 RX ADMIN — VENLAFAXINE HYDROCHLORIDE 150 MG: 150 CAPSULE, EXTENDED RELEASE ORAL at 08:29

## 2025-02-14 RX ADMIN — BUPROPION HYDROCHLORIDE 150 MG: 150 TABLET, EXTENDED RELEASE ORAL at 08:29

## 2025-02-14 SDOH — SOCIAL STABILITY: SOCIAL INSECURITY: DOES ANYONE TRY TO KEEP YOU FROM HAVING/CONTACTING OTHER FRIENDS OR DOING THINGS OUTSIDE YOUR HOME?: NO

## 2025-02-14 SDOH — SOCIAL STABILITY: SOCIAL INSECURITY: DO YOU FEEL UNSAFE GOING BACK TO THE PLACE WHERE YOU ARE LIVING?: NO

## 2025-02-14 SDOH — ECONOMIC STABILITY: FOOD INSECURITY: WITHIN THE PAST 12 MONTHS, YOU WORRIED THAT YOUR FOOD WOULD RUN OUT BEFORE YOU GOT THE MONEY TO BUY MORE.: NEVER TRUE

## 2025-02-14 SDOH — SOCIAL STABILITY: SOCIAL INSECURITY: ABUSE: ADULT

## 2025-02-14 SDOH — SOCIAL STABILITY: SOCIAL INSECURITY: DO YOU FEEL ANYONE HAS EXPLOITED OR TAKEN ADVANTAGE OF YOU FINANCIALLY OR OF YOUR PERSONAL PROPERTY?: NO

## 2025-02-14 SDOH — ECONOMIC STABILITY: FOOD INSECURITY: WITHIN THE PAST 12 MONTHS, THE FOOD YOU BOUGHT JUST DIDN'T LAST AND YOU DIDN'T HAVE MONEY TO GET MORE.: NEVER TRUE

## 2025-02-14 SDOH — SOCIAL STABILITY: SOCIAL INSECURITY: HAS ANYONE EVER THREATENED TO HURT YOUR FAMILY OR YOUR PETS?: NO

## 2025-02-14 SDOH — SOCIAL STABILITY: SOCIAL INSECURITY: WITHIN THE LAST YEAR, HAVE YOU BEEN AFRAID OF YOUR PARTNER OR EX-PARTNER?: NO

## 2025-02-14 SDOH — ECONOMIC STABILITY: INCOME INSECURITY: IN THE PAST 12 MONTHS HAS THE ELECTRIC, GAS, OIL, OR WATER COMPANY THREATENED TO SHUT OFF SERVICES IN YOUR HOME?: NO

## 2025-02-14 SDOH — SOCIAL STABILITY: SOCIAL INSECURITY: WITHIN THE LAST YEAR, HAVE YOU BEEN HUMILIATED OR EMOTIONALLY ABUSED IN OTHER WAYS BY YOUR PARTNER OR EX-PARTNER?: NO

## 2025-02-14 SDOH — SOCIAL STABILITY: SOCIAL INSECURITY: HAVE YOU HAD THOUGHTS OF HARMING ANYONE ELSE?: NO

## 2025-02-14 SDOH — SOCIAL STABILITY: SOCIAL INSECURITY: WERE YOU ABLE TO COMPLETE ALL THE BEHAVIORAL HEALTH SCREENINGS?: YES

## 2025-02-14 SDOH — SOCIAL STABILITY: SOCIAL INSECURITY: ARE YOU OR HAVE YOU BEEN THREATENED OR ABUSED PHYSICALLY, EMOTIONALLY, OR SEXUALLY BY ANYONE?: NO

## 2025-02-14 SDOH — SOCIAL STABILITY: SOCIAL INSECURITY: ARE THERE ANY APPARENT SIGNS OF INJURIES/BEHAVIORS THAT COULD BE RELATED TO ABUSE/NEGLECT?: NO

## 2025-02-14 ASSESSMENT — LIFESTYLE VARIABLES
AUDIT-C TOTAL SCORE: 0
HOW OFTEN DO YOU HAVE A DRINK CONTAINING ALCOHOL: NEVER
HOW MANY STANDARD DRINKS CONTAINING ALCOHOL DO YOU HAVE ON A TYPICAL DAY: PATIENT DOES NOT DRINK
AUDIT-C TOTAL SCORE: 0
HOW OFTEN DO YOU HAVE 6 OR MORE DRINKS ON ONE OCCASION: NEVER
SKIP TO QUESTIONS 9-10: 1

## 2025-02-14 ASSESSMENT — COGNITIVE AND FUNCTIONAL STATUS - GENERAL
DAILY ACTIVITIY SCORE: 24
PATIENT BASELINE BEDBOUND: NO
DAILY ACTIVITIY SCORE: 24
MOBILITY SCORE: 24
MOBILITY SCORE: 24

## 2025-02-14 ASSESSMENT — ACTIVITIES OF DAILY LIVING (ADL)
JUDGMENT_ADEQUATE_SAFELY_COMPLETE_DAILY_ACTIVITIES: YES
FEEDING YOURSELF: INDEPENDENT
ASSISTIVE_DEVICE: OTHER (COMMENT)
BATHING: INDEPENDENT
ADEQUATE_TO_COMPLETE_ADL: YES
HEARING - LEFT EAR: FUNCTIONAL
LACK_OF_TRANSPORTATION: NO
TOILETING: INDEPENDENT
HEARING - RIGHT EAR: FUNCTIONAL
PATIENT'S MEMORY ADEQUATE TO SAFELY COMPLETE DAILY ACTIVITIES?: YES
GROOMING: INDEPENDENT
WALKS IN HOME: INDEPENDENT
DRESSING YOURSELF: INDEPENDENT

## 2025-02-14 ASSESSMENT — PAIN DESCRIPTION - LOCATION: LOCATION: BACK

## 2025-02-14 ASSESSMENT — PAIN DESCRIPTION - ORIENTATION: ORIENTATION: LEFT;LOWER

## 2025-02-14 ASSESSMENT — PATIENT HEALTH QUESTIONNAIRE - PHQ9
1. LITTLE INTEREST OR PLEASURE IN DOING THINGS: NOT AT ALL
SUM OF ALL RESPONSES TO PHQ9 QUESTIONS 1 & 2: 0
2. FEELING DOWN, DEPRESSED OR HOPELESS: NOT AT ALL

## 2025-02-14 ASSESSMENT — PAIN SCALES - GENERAL
PAINLEVEL_OUTOF10: 3
PAINLEVEL_OUTOF10: 0 - NO PAIN

## 2025-02-14 ASSESSMENT — COLUMBIA-SUICIDE SEVERITY RATING SCALE - C-SSRS
2. HAVE YOU ACTUALLY HAD ANY THOUGHTS OF KILLING YOURSELF?: NO
6. HAVE YOU EVER DONE ANYTHING, STARTED TO DO ANYTHING, OR PREPARED TO DO ANYTHING TO END YOUR LIFE?: NO
1. IN THE PAST MONTH, HAVE YOU WISHED YOU WERE DEAD OR WISHED YOU COULD GO TO SLEEP AND NOT WAKE UP?: NO

## 2025-02-14 NOTE — PROGRESS NOTES
Endocrinology Progress Note  Patient: Feliciano Worthington  Unit/Bed: 816/816-A  YOB: 1950  MRN: [id number]  Acct: [id number]   Admitting Diagnosis: NSTEMI (non-ST elevated myocardial infarction) (Multi) [I21.4]  Chest pain, unspecified type [R07.9]  Date:  2/5/2025  Hospital Day: 7  Attending: Hussain Bernal MD       Complaint:  Chief Complaint   Patient presents with    Chest Pain     CP x 36 hrs. Pt took 2-3 nitroglycerin yesterday and 4-5 today. Pt received 324 ASA in EMS           Assessment     Patient Active Problem List   Diagnosis    Primary hypertension    Chronic obstructive pulmonary disease, unspecified    Current every day smoker    GERD (gastroesophageal reflux disease)    Arcus senilis, bilateral    Astigmatism of both eyes    Cataract, bilateral    Chronic venous stasis dermatitis    Depression, major, in remission (CMS-HCC)    Erectile dysfunction    Hiatal hernia with GERD    Multiple lung nodules on CT    Nuclear sclerotic cataract of both eyes    Osteoarthritis of hips, bilateral    Other spondylosis, cervical region    Periumbilical hernia    Testosterone deficiency    Smoker    Sleep apnea    Vitamin D deficiency    Noncompliance with dietary restriction    Arthritis of left acromioclavicular joint    Bruit of right carotid artery    Degenerative joint disease (DJD) of hip    Spinal stenosis of cervical region    Encounter for immunization    Mixed hyperlipidemia    CAD (coronary artery disease)    Mobitz type 1 second degree AV block    Type 2 diabetes mellitus with hyperglycemia, with long-term current use of insulin    Hyponatremia    Chest pain, unspecified type    NSTEMI (non-ST elevated myocardial infarction) (Multi)    Noncompliance with CPAP treatment    Obstructive sleep apnea    Recurrent major depressive disorder, in partial remission (CMS-HCC)    Hx of heart artery stent    Oropharyngeal dysphagia    History of constipation    Noncompliance with diabetes  treatment    Abnormal weight loss    Hypokalemia    Pancreatic mass (HHS-HCC)          Plan:  Glucoses reviewed  Diabetes mellitus type 2 uncontrolled improved from before still not at goal  Patient is on Lantus 32+25 will increase to 32 twice daily  Patient's mealtime NovoLog 10 units 3 times daily to be increased to 15 units 3 times daily  Sliding scale will be adjusted        SUBJECTIVE                    POCT glucose   205 275 233 253 243      Potassium    Potassium, Blood             Medication Dosing    insulin glargine,hum.rec.anlog SUBQ (Units)   32 25  32 25      insulin lispro SUBQ (Units)   32 13  32 19            VITALS      Vitals:    02/13/25 0758 02/13/25 1244 02/13/25 1532 02/13/25 2002   BP: 143/69 121/64 102/62 114/66   BP Location: Left arm Left arm     Patient Position: Lying Lying     Pulse: 54 72 76 72   Resp: 17 18 18 18   Temp: 35.8 °C (96.4 °F) 36.8 °C (98.2 °F) 36.7 °C (98.1 °F) 36.9 °C (98.4 °F)   TempSrc: Temporal Temporal     SpO2: 97% 97% 98% 96%   Weight:       Height:           Intake/Output Summary (Last 24 hours) at 2/13/2025 2105  Last data filed at 2/13/2025 1244  Gross per 24 hour   Intake 360 ml   Output 1050 ml   Net -690 ml      Wt Readings from Last 4 Encounters:   02/12/25 87.2 kg (192 lb 3.9 oz)   01/29/25 85.5 kg (188 lb 6.4 oz)   10/15/24 90.3 kg (199 lb)   10/04/24 89.8 kg (198 lb)        Allergies:  Allergies   Allergen Reactions    Tetracyclines Hives, Other, Unknown, Rash and Shortness of breath     Water blisters in mouth and genital region.    Water blister on tongue and penis    Atorvastatin Unknown     Cramping all over the body. Sore shoulders    Empagliflozin Unknown    Pravastatin Unknown    Rosuvastatin Unknown    Simvastatin Unknown    Statins-Hmg-Coa Reductase Inhibitors Unknown        PHYSICAL EXAM   Physical Exam  Deferred    LABS   Magnesium:  Results from last 7 days   Lab Units 02/10/25  0502 02/09/25  0536 02/08/25  0521   MAGNESIUM mg/dL 1.83 1.92  1.62     Lipid Panel:       Lab Review  Lab Results   Component Value Date    BILITOT 0.4 02/11/2025    CALCIUM 8.8 02/10/2025    CO2 24 02/10/2025    CL 99 02/10/2025    CREATININE 0.72 02/10/2025    GLUCOSE 231 (H) 02/10/2025    ALKPHOS 81 02/11/2025    K 4.2 02/10/2025    PROT 6.9 02/11/2025     (L) 02/10/2025    AST 17 02/11/2025    ALT 16 02/11/2025    BUN 10 02/10/2025    ANIONGAP 12 02/10/2025    MG 1.83 02/10/2025    PHOS 3.4 02/08/2025    ALBUMIN 3.7 02/11/2025    LIPASE 10 10/17/2023    GFRMALE 79 08/10/2023     Lab Results   Component Value Date    TRIG 309 (H) 02/05/2025    CHOL 258 (H) 02/05/2025    LDLCALC 172 (H) 02/05/2025    HDL 24.7 02/05/2025     Lab Results   Component Value Date    HGBA1C 14.7 (H) 02/06/2025    HGBA1C 14.0 (H) 12/26/2024    HGBA1C 11.3 (A) 09/03/2024     The ASCVD Risk score (Bandar RODRIGUEZ, et al., 2019) failed to calculate for the following reasons:    Risk score cannot be calculated because patient has a medical history suggesting prior/existing ASCVD   Lab Results   Component Value Date    HGBA1C 14.7 (H) 02/06/2025    HGBA1C 14.0 (H) 12/26/2024    HGBA1C 11.3 (A) 09/03/2024     (L) 02/10/2025    K 4.2 02/10/2025    CL 99 02/10/2025    CO2 24 02/10/2025    BUN 10 02/10/2025    CREATININE 0.72 02/10/2025    CALCIUM 8.8 02/10/2025    ALBUMIN 3.7 02/11/2025    PROT 6.9 02/11/2025    BILITOT 0.4 02/11/2025    ALKPHOS 81 02/11/2025    ALT 16 02/11/2025    AST 17 02/11/2025    GLUCOSE 231 (H) 02/10/2025    CHOL 258 (H) 02/05/2025    TRIG 309 (H) 02/05/2025    HDL 24.7 02/05/2025        amLODIPine, 5 mg, oral, Daily  aspirin, 81 mg, oral, Daily  buPROPion XL, 150 mg, oral, Daily  ezetimibe, 10 mg, oral, Nightly  hydrOXYzine HCL, 25 mg, oral, BID  insulin glargine, 25 Units, subcutaneous, Nightly  insulin glargine, 32 Units, subcutaneous, q AM  insulin lispro, 0-15 Units, subcutaneous, TID AC  insulin lispro, 10 Units, subcutaneous, TID AC  isosorbide mononitrate ER, 30 mg,  oral, Daily  LORazepam, 0.5 mg, intravenous, Once  lovastatin, 40 mg, oral, Nightly  pantoprazole, 40 mg, oral, Daily  polyethylene glycol, 17 g, oral, Daily  venlafaxine XR, 150 mg, oral, Daily with breakfast             Electronically signed by Cristina Wiseman MD on 2/13/2025 at 9:05 PM

## 2025-02-14 NOTE — SIGNIFICANT EVENT
- Senior Staffing Note -     For details can go to the excellent H&P.    Mr Feliciano Gottlieb  with a significant past medical history of CAD (s/p PCI with RCA stent 2011), Mobitz type 1 AV block, HTN, HLD, IDDM-II, COPD, tobacco use disorder, GERD, depression & anxiety came to [facility] as a transfer from [place] where he came on 2/6 with worsening Chest pain, was found to have acute non-STEMI , chest pain was unresponsive to NG and he underwent  cardiac catheterization on same day which showed triple vessel CAD, was planned for CABG and as part of ajit-operative workup he underwent CT chest which revealed indeterminate lesion involving the tail of the pancreas. MRCP was performed with findings concerning for 38 mm pancreatic tail hypo enhancing mass suspicious for adenocarcinoma, as well as 15 mm hepatic segment hypo enhancing mass suspicion for metastasis without definite lymphadenopathy. LFTs wnl and CA 19-9 was normal. No abdominal pain and tolerating a diet. GI was consulted who recommended transfer to [facility] for ultrasound guided endoscopic biopsy.   CABG currently deferred 2/2 to pancreatic mass finding as well as better blood sugar control.   Of note Endocrinology was consulted and last A1c was 14.9; with last recommendation for Lantus 32 units BID and Novolog 15 units TID.     At time patient seen he reported feeling well, no Chest pain or SOB or abdominal pain nor N&V. Reported good appetite.    On Physical Exam:  Vitals:    02/14/25 1349   BP: 121/70   Pulse: 74   Resp: 16   Temp: 36.5 °C (97.7 °F)   SpO2: 95%   General: NAD, no jaundice  Chest: GAEB  Heart: RRR, normal S1.S2  Abdomen: +BS, soft lax non tender  LL: w\o edema    Labs:  Last done on 2/9  CMP: normal Cr .66, Na 134, K 4, Mg 1.94  CBC: HGB 13, normal WBC, Plt 134  Normal Liver enzyme 2/11  Negative Respiratory Viral panel    Imaging:  LL US w\o DVT  MRCP: IMPRESSION:  1. Severely motion degraded exam.  2. 38 mm pancreatic tail hypoenhancing mass  suspicious for  adenocarcinoma.  3. 15 mm hepatic segment VI hypoenhancing mass suspicious for  metastasis.  4. No definite lymphadenopathy.    TTE Feb 6  CONCLUSIONS:   1. The left ventricular systolic function is low normal, with a visually estimated ejection fraction of 50-55%.   2. No regional wall motion abnormalities.   3. Spectral Doppler shows a Grade I (impaired relaxation pattern) of left ventricular diastolic filling with normal left atrial filling pressure.   4. Trivial mitral regurgitation.   5. Trivial to 1+ tricuspid regurgitation with estimated RVSP 31 mmHg.   6. There is moderate aortic valve cusp calcification.   7. Mild aortic valve regurgitation.   8. The aortic root measures 3.9 cm.      The ascending aorta measures 4.0 cm.   9. No previous available for comparison.        Assessment and Plan:    #New Pancreatic tail mass  ::CT chest revealing indeterminate pancreatic tail mass  ::MRCP revealing 38 mm pancreatic tail mass and 15 mm hepatic lesion concerning for metastasis   :: Last LFTs wnl  ::CA 19-9 <4  :: GI consulted; awaiting formal recommendations     Plan:  - Follow up GI recommendations, Likely to consult IR as liver lesions are superficial        #Acute non-STEMI with triple-vessel CAD   #CABG Evaluation  #Hx of Mobitz type I  ::Patient with evidence of 3 vessel disease from previous catheterization at [facility] with CABG recommended  ::Hx of prior stent to RCA in 2011  ::Patient received aspirin and heparin gtt >48 hours  ::Chest pain resolved per patient     Plan:  - Continue aspirin  - Stop heparin  - Called cardiology , no need for official consult at this time; no need for plavix  - Hold cardiothoracic CABG consult depending on prognosis of pancreatic tumor biopsy  - Continue telemetry  - Nitroglycerin PRN   - EKG PRN  - Consider GDMT as tolerated with slightly reduced EF    #Uncontrolled type 2 diabetes   :: last A1c of 14.7  :: [facility] Endocrinology recommendations of Lantus 32  "BID and Novolog 15 TID     Plan:  - Continue to monitor BG on this regimen   - Consider endocrinology consult if difficulty controlling BG     #Depression/anxiety  :: Patient noted to have depressive type symptoms at OSH   :: Psych consulted at OSH and recommended buproprion added to his venlafaxine as well to aid in decreasing smoking cessation  :: Patient did not endorse on admission interview 2/14/25     Plan:   - Continue medications as above  - CTM     #Pulmonary nodule  ::Pulmonary nodules-most appear chronic  :: new 8 mm right lower lobe, \"possibly infectious/inflammatory in nature but should have follow-up to document resolution     Plan:   - PCP follow up on discharge to monitor of nodules     #Hyponatremia  :: Patient persistently hyponatremic at OSH ~132  :: Chlorthalidone held at OSH     Plan:  - Repeat RFP on transfer  -  Continue to monitor   -  Consider hyponatremia workup if worsening     #HLD  :: Takes lovastatin at home     Plan:  - Continue statin     #COPD ?  :: Patient actively smokes 1 to 1.5 ppd  :: PFT's 2/7/25 reveal normal lung volumes, normal FEV/FVC, but decreased DLCO of 63     Plan:  - CTM  - Duonebs as needed     #Unintentional weight loss  :: patient reports 50 lbs weight loss over the course of the year  :: possibly secondary to poor glycemic control versus malignancy   :: TSH wnl      Plan:  - Continue workup for pancreatic tail mass as above  - Consider nutrition consult in the AM     F: PRN  E: PRN  N: Carb controlled  A: PIV     Code: Full  NOK: Britney (Wife) [phone]  "

## 2025-02-14 NOTE — CARE PLAN
The patient's goals for the shift include      The clinical goals for the shift include Pt will be safe throughout the shift.      Problem: Pain - Adult  Goal: Verbalizes/displays adequate comfort level or baseline comfort level  Outcome: Progressing     Problem: Safety - Adult  Goal: Free from fall injury  Outcome: Progressing     Problem: Discharge Planning  Goal: Discharge to home or other facility with appropriate resources  Outcome: Progressing     Problem: Chronic Conditions and Co-morbidities  Goal: Patient's chronic conditions and co-morbidity symptoms are monitored and maintained or improved  Outcome: Progressing     Problem: Nutrition  Goal: Nutrient intake appropriate for maintaining nutritional needs  Outcome: Progressing     Problem: Diabetes  Goal: Achieve decreasing blood glucose levels by end of shift  Outcome: Progressing  Goal: Increase stability of blood glucose readings by end of shift  Outcome: Progressing  Goal: Decrease in ketones present in urine by end of shift  Outcome: Progressing  Goal: Maintain electrolyte levels within acceptable range throughout shift  Outcome: Progressing  Goal: Maintain glucose levels >70mg/dl to <250mg/dl throughout shift  Outcome: Progressing  Goal: No changes in neurological exam by end of shift  Outcome: Progressing  Goal: Learn about and adhere to nutrition recommendations by end of shift  Outcome: Progressing  Goal: Vital signs within normal range for age by end of shift  Outcome: Progressing  Goal: Increase self care and/or family involovement by end of shift  Outcome: Progressing  Goal: Receive DSME education by end of shift  Outcome: Progressing

## 2025-02-14 NOTE — PROGRESS NOTES
"Feliciano Worthington is a 74 y.o. male on day 8 of admission presenting with NSTEMI (non-ST elevated myocardial infarction) (Multi).    Subjective   Dic not working  Accepted downtown  Will transfer  See previous notes     Doing ok  No cp  No abd pain  Objective     Physical Exam  Vitals and nursing note reviewed.   Constitutional:       Appearance: Normal appearance.   HENT:      Head: Normocephalic and atraumatic.   Eyes:      Extraocular Movements: Extraocular movements intact.      Conjunctiva/sclera: Conjunctivae normal.      Pupils: Pupils are equal, round, and reactive to light.   Cardiovascular:      Rate and Rhythm: Normal rate and regular rhythm.      Heart sounds: Normal heart sounds.   Pulmonary:      Effort: Pulmonary effort is normal.      Breath sounds: Normal breath sounds.   Abdominal:      General: Abdomen is flat. Bowel sounds are normal.      Palpations: Abdomen is soft.   Musculoskeletal:         General: Normal range of motion.   Skin:     General: Skin is warm and dry.   Neurological:      General: No focal deficit present.      Mental Status: He is alert and oriented to person, place, and time. Mental status is at baseline.   Psychiatric:         Mood and Affect: Mood normal.         Behavior: Behavior normal.         Last Recorded Vitals  Blood pressure 137/74, pulse 56, temperature 36.4 °C (97.5 °F), resp. rate 18, height 1.778 m (5' 10\"), weight 87.2 kg (192 lb 3.9 oz), SpO2 97%.  Intake/Output last 3 Shifts:  I/O last 3 completed shifts:  In: 1074 (12.3 mL/kg) [P.O.:1074]  Out: 1900 (21.8 mL/kg) [Urine:1900 (0.6 mL/kg/hr)]  Weight: 87.2 kg     Relevant Results  Recent Results (from the past 72 hours)   POCT GLUCOSE    Collection Time: 02/11/25 11:25 AM   Result Value Ref Range    POCT Glucose 108 (H) 74 - 99 mg/dL   POCT GLUCOSE    Collection Time: 02/11/25  5:41 PM   Result Value Ref Range    POCT Glucose 184 (H) 74 - 99 mg/dL   POCT GLUCOSE    Collection Time: 02/11/25  8:12 PM   Result " Value Ref Range    POCT Glucose 167 (H) 74 - 99 mg/dL   Heparin Assay    Collection Time: 02/12/25  4:06 AM   Result Value Ref Range    Heparin Unfractionated 0.5 See Comment Below for Therapeutic Ranges IU/mL   Lavender Top    Collection Time: 02/12/25  4:06 AM   Result Value Ref Range    Extra Tube Hold for add-ons.    SST TOP    Collection Time: 02/12/25  4:06 AM   Result Value Ref Range    Extra Tube Hold for add-ons.    PST Top    Collection Time: 02/12/25  4:06 AM   Result Value Ref Range    Extra Tube Hold for add-ons.    POCT GLUCOSE    Collection Time: 02/12/25  7:01 AM   Result Value Ref Range    POCT Glucose 225 (H) 74 - 99 mg/dL   POCT GLUCOSE    Collection Time: 02/12/25 11:39 AM   Result Value Ref Range    POCT Glucose 205 (H) 74 - 99 mg/dL   POCT GLUCOSE    Collection Time: 02/12/25  4:07 PM   Result Value Ref Range    POCT Glucose 155 (H) 74 - 99 mg/dL   POCT GLUCOSE    Collection Time: 02/12/25  8:21 PM   Result Value Ref Range    POCT Glucose 275 (H) 74 - 99 mg/dL   Heparin Assay    Collection Time: 02/13/25  5:40 AM   Result Value Ref Range    Heparin Unfractionated 0.5 See Comment Below for Therapeutic Ranges IU/mL   Lavender Top    Collection Time: 02/13/25  5:40 AM   Result Value Ref Range    Extra Tube Hold for add-ons.    PST Top    Collection Time: 02/13/25  5:40 AM   Result Value Ref Range    Extra Tube Hold for add-ons.    POCT GLUCOSE    Collection Time: 02/13/25  6:43 AM   Result Value Ref Range    POCT Glucose 233 (H) 74 - 99 mg/dL   POCT GLUCOSE    Collection Time: 02/13/25 11:47 AM   Result Value Ref Range    POCT Glucose 204 (H) 74 - 99 mg/dL   POCT GLUCOSE    Collection Time: 02/13/25 12:45 PM   Result Value Ref Range    POCT Glucose 253 (H) 74 - 99 mg/dL   POCT GLUCOSE    Collection Time: 02/13/25  4:48 PM   Result Value Ref Range    POCT Glucose 266 (H) 74 - 99 mg/dL   POCT GLUCOSE    Collection Time: 02/13/25  8:03 PM   Result Value Ref Range    POCT Glucose 243 (H) 74 - 99 mg/dL    POCT GLUCOSE    Collection Time: 02/14/25  3:49 AM   Result Value Ref Range    POCT Glucose 279 (H) 74 - 99 mg/dL   Heparin Assay    Collection Time: 02/14/25  4:45 AM   Result Value Ref Range    Heparin Unfractionated 0.5 See Comment Below for Therapeutic Ranges IU/mL   POCT GLUCOSE    Collection Time: 02/14/25  6:49 AM   Result Value Ref Range    POCT Glucose 210 (H) 74 - 99 mg/dL          This patient currently has cardiac telemetry ordered; if you would like to modify or discontinue the telemetry order, click here to go to the orders activity to modify/discontinue the order.               amLODIPine, 5 mg, oral, Daily  aspirin, 81 mg, oral, Daily  buPROPion XL, 150 mg, oral, Daily  ezetimibe, 10 mg, oral, Nightly  hydrOXYzine HCL, 25 mg, oral, BID  insulin glargine, 32 Units, subcutaneous, q AM  insulin glargine, 32 Units, subcutaneous, Nightly  insulin lispro, 0-15 Units, subcutaneous, TID with meals, nightly, & 0300  insulin lispro, 15 Units, subcutaneous, TID AC  isosorbide mononitrate ER, 30 mg, oral, Daily  LORazepam, 0.5 mg, intravenous, Once  lovastatin, 40 mg, oral, Nightly  pantoprazole, 40 mg, oral, Daily  polyethylene glycol, 17 g, oral, Daily  venlafaxine XR, 150 mg, oral, Daily with breakfast        CT chest wo IV contrast    Result Date: 2/7/2025  Interpreted By:  Amarjit Ortiz, STUDY: CT CHEST WO IV CONTRAST;  2/6/2025 4:39 pm   INDICATION: Signs/Symptoms:pre op cardiac surgery.     COMPARISON: 05/28/2024   ACCESSION NUMBER(S): [id number]   ORDERING CLINICIAN: MAXIMILIAN ROSA   TECHNIQUE: Helical data acquisition of the chest was obtained without the use of IV contrast. Images were reformatted in axial, coronal, and sagittal planes.   FINDINGS: POTENTIAL LIMITATIONS OF THE STUDY:   Lack of IV contrast   HEART AND VESSELS:   No calcified plaques are seen involving the ascending aorta. There are atherosclerotic calcifications of the aortic arch, descending thoracic aorta, and the branches of the  aorta. Aorta is unchanged in course and caliber. Stable ectasia of the ascending aorta up to 3.9 cm.   The heart is unchanged in size.   No pericardial effusion is seen.   MEDIASTINUM AND MARIA INES, LOWER NECK AND AXILLA: The visualized thyroid gland is within normal limits.   No evidence of thoracic lymphadenopathy by CT criteria.   Esophagus appears within normal limits as seen.   LUNGS AND AIRWAYS: The trachea and central airways are patent. No endobronchial lesion.   There are areas of fibrosis/scarring in the lungs, predominantly along the periphery. Appearance is not significantly changed when compared to the previous examination. No new areas of consolidation. No effusion. No pneumothorax. Stable partially calcified nodule along the major fissure on the right measuring approximately 8 mm. A few additional nodules are seen measuring 5 mm or less in size, for example, image 117 in the right upper lobe. There is a new nodular opacity in the medial aspect of the right lower lobe measuring approximately 8 mm in size. Although this may be infectious/inflammatory nature, follow-up is recommended to document resolution, image 146.   UPPER ABDOMEN: The visualized subdiaphragmatic structures demonstrate no acute abnormality. 2.2 cm cyst in the midpole of the left kidney. There is nonspecific prominence/thickening of the tail the pancreas with a 2.5 cm hypoattenuating lesion. This is not adequately characterized on this unenhanced CT. MRI is recommended for further evaluation.   CHEST WALL AND OSSEOUS STRUCTURES: Degenerative changes. No acute process.       No calcified plaques along the ascending aorta. There is atherosclerotic disease of the remaining portions of the aorta and its branches. Indeterminate lesion involving the tail the pancreas. MRI recommended for further evaluation. Interval development of an 8 mm right lower lobe nodule which may be infectious/inflammatory in nature but will require follow-up to document  resolution.   MACRO: None.   Signed by: Amarjit Ortiz 2/7/2025 9:05 AM Dictation workstation:   [id number]    Vascular US lower extremity vein mapping bilateral    Result Date: 2/7/2025  Interpreted By:  Amarjit Ortiz, STUDY: [facility] US LOWER EXTREMITY VEIN MAPPING BILATERAL;  2/6/2025 5:33 pm   INDICATION: Signs/Symptoms:pre op cardiac surgery.   ,I21.4 Non-ST elevation (NSTEMI) myocardial infarction (Multi),R60.1 Generalized edema   COMPARISON: None.   ACCESSION NUMBER(S): [id number]   ORDERING CLINICIAN: MAXIMILIAN ROSA   TECHNIQUE: Real time duplex sonographic evaluation of the lower extremity venous systems was performed for venous mapping.   FINDINGS: Right Greater Saphenous Vein: Thigh- Proximal-0.3 x 0.2 cm Mid-0.2 x 0.2 cm Distal-0.3 x 0.2 cm   Calf-   Proximal-0.3 x 0.2 cm Mid-0.2 x 0.1 cm Distal-0.2 x 0.1 cm   Right Lesser Saphenous Vein: Proximal-0.3 x 0.2 cm Mid-0.2 x 0.1 cm Distal-0.2 x 0.1 cm     Left Greater Saphenous Vein: Thigh- Proximal-0.3 x 0.3 cm Mid-0.2 x 0.2 cm Distal-0.1 x 0.1 cm   Calf-   Proximal-0.1 x 0.1 cm Mid-0.2 x 0.1 cm Distal-0.3 x 0.1 cm   Left Lesser Saphenous Vein: Proximal-0.3 x 0.1 cm Mid-0.2 x 0.2 cm Distal-0.2 x 0.1 cm       Lower extremity venous mapping as detailed above.   MACRO: None.   Signed by: Amarjit Ortiz 2/7/2025 7:25 AM Dictation workstation:   [id number]    Carotid duplex bilateral    Result Date: 2/7/2025  Interpreted By:  Amarjit Ortiz, STUDY: [facility] US CAROTID ARTERY DUPLEX BILATERAL;  2/6/2025 5:32 pm   INDICATION: Signs/Symptoms:pre op caridac surgery.   ,I21.4 Non-ST elevation (NSTEMI) myocardial infarction (Multi),I79.8 Other disorders of arteries, arterioles and capillaries in diseases classified elsewhere   COMPARISON: 10/30/2023   ACCESSION NUMBER(S): [id number]   ORDERING CLINICIAN: MAXIMILIAN ROSA   TECHNIQUE: Vascular ultrasound of the extracranial carotid system was performed bilaterally.  Gray scale, color Doppler and spectral Doppler waveform analysis  was performed.   FINDINGS: There is atherosclerotic disease most conspicuous at the carotid bulbs.   RIGHT:   RIGHT SIDE PEAK SYSTOLIC VELOCITY TABLE: CCA  78 cm/s. ICA  126 cm/s. ECA  69 cm/s.     The ratio of the peak systolic velocity of the right ICA/CCA is  1.6.   RIGHT VERTEBRAL ARTERY: The right vertebral artery demonstrates proximal anterograde flow.   LEFT:   LEFT SIDE PEAK SYSTOLIC VELOCITY TABLE: CCA 50 cm/s. ICA 62 cm/s. ECA 64 cm/s.     The ratio of the peak systolic velocity of the left ICA/CCA is 1.1.   LEFT VERTEBRAL ARTERY: The left vertebral artery demonstrates proximal anterograde flow.       Utilizing the peak systolic velocities, the estimated degree of stenosis within the internal carotid arteries is less than 50% bilaterally.   MACRO: None.     Signed by: Amarjit Ortiz 2/7/2025 7:24 AM Dictation workstation:   [id number]    Cardiac Catheterization Procedure    Result Date: 2/6/2025   [facility], Cath Lab        [address] [place] [address] Cardiovascular Catheterization Report Patient Name:      JAIME FERNANDEZ      Performing Physician:  [id number] Montana MD Study Date:        2/6/2025             Verifying Physician:   Litzy Montana MD MRN/PID:           [id number]             Cardiologist/Co-Scrub: Accession#:        [id number]         Ordering Provider:     [id number] DERIK ENAMORADO Date of Birth/Age: 1950 / 74 years Cardiologist: Gender:            M                    Fellow: Encounter#:        [id number]           Surgeon:  Study: Left Heart Cath  Indications: JAIME FERNANDEZ is a 75 year old male who presents with dyslipidemia, hypertension, prior myocardial infarction, diabetes, smoker, chronic pulmonary disease, coronary artery disease, prior percutaneous  coronary intervention and a chest pain assessment of typical angina. NSTE - ACS.  Procedure Description: After infiltration with 2% Lidocaine, the right femoral artery was cannulated with a modified Seldinger technique. Subsequently a 5 Occitan sheath was placed in the right femoral artery. Selective coronary catheterization was performed using a 5 Fr catheter(s) exchanged over a guide wire to cannulate the coronary arteries. A JL 4 tip catheter was used for left coronary injections. A 3drc tip catheter was used for right coronary injections. Multiple injections of contrast were made into the left and right coronary arteries with angiograms recorded in multiple projections. Retrograde left heart catheterizion was accomplished with a 5 Fr. pigtail catheter. A single plane left ventriculogram was recorded in the 30 degree RAYMOND projection. The contrast dose was 20 ml injected at 10 ml/sec. The catheter was then withdrawn across the aortic valve under continuous pressure monitoring and removed. After completion of the procedure, femoral artery angiography was performed. This demonstrated a common femoral artery puncture appropriate for closure. A Vascade 5F vascular closure Device was placed per protocol.  Coronary Angiography: The coronary circulation is right dominant.  Left Main Coronary Artery: The left main coronary artery is calcified. There is 70% stenosis in the distal left main coronary artery.  Left Anterior Descending Coronary Artery Distribution: There is 80% stenosis in the proximal left anterior descending artery.  Circumflex Coronary Artery Distribution: There is 90% stenosis in the the proximal Circumflex artery. There is 75% stenosis in the Prox Ramus Circumflex artery. There is 80% stenosis in the Mid Ramus Circumflex artery.  Right Coronary Artery Distribution: There is 80% stenosis in the the mid Right Coronary Artery. There is evidence of instent restenosis in this segment. The distal right coronary  artery showed 90% stenosis.  Left Ventriculography: The estimated left ventricular ejection fraction is normal at 55%.  Hemo Personnel: +---------------------------+---------+ Name                       Duty      +---------------------------+---------+ Wil Bronson MD 1 +---------------------------+---------+  Hemodynamic Pressures:  +----+------------------+---------+-------------+-------------+------+---------+ Site    Date Time       Phase  Systolic mmHg  Diastolic    ED  Mean mmHg                         Name                    mmHg      mmHg           +----+------------------+---------+-------------+-------------+------+---------+   AO  2/6/2025 9:36:07 AIR REST          127           65             87                     AM                                                   +----+------------------+---------+-------------+-------------+------+---------+   AO  2/6/2025 9:39:06 AIR REST           91           61             65                     AM                                                   +----+------------------+---------+-------------+-------------+------+---------+   AO  2/6/2025 9:44:05 AIR REST          133           72             96                     AM                                                   +----+------------------+---------+-------------+-------------+------+---------+   LV  2/6/2025 9:47:36 AIR REST          115           -1     6                              AM                                                   +----+------------------+---------+-------------+-------------+------+---------+  Cardiac Cath Post Procedure Notes: Post Procedure Diagnosis: Triple vessel disease. Blood Loss:               Estimated blood loss during the procedure was 0 mls. Specimens Removed:        Number of specimen(s) removed: none.  ____________________________________________________________________________________ CONCLUSIONS:  1. Distal Left Main: 70% stenosis.  2. Left Main Coronary Artery: This artery is calcified.  3. Proximal LAD Lesion: The percent stenosis is 80%.  4. Proximal CX Lesion: The percent stenosis is 90%.  5. Prox Ramus CX Lesion: The percent stenosis is 75%.  6. Mid Ramus CX Lesion: The percent stenosis is 80%.  7. Mid RCA Lesion: The percent stenosis is 80%.  8. Mid RCA Lesion: Instent Restenosis.  9. Distal RCA Lesion: The percent stenosis is 90%. 10. The Left Ventricular Ejection Fraction is 55%. 11. Mid CX Lesion: 100%. ICD 10 Codes: Non ST elevation (NSTEMI) myocardial infarction-I21.4  CPT Codes: Left Heart Cath (visualization of coronaries) and [id number]; Moderate Sedation Services initial 15 minutes patient >5 [id number]  [id number] Wil Montana MD Performing Physician Electronically signed by [id number] Wil Montana MD on 2/6/2025 at 10:19:09 AM  ** Final **     ECG 12 lead    Result Date: 2/6/2025  Sinus rhythm with 2nd degree AV block (Mobitz I) Right superior axis deviation Nonspecific ST abnormality Abnormal ECG When compared with ECG of 17-OCT-2023 18:49, No significant change was found Confirmed by Shanika Reese ([id number]) on 2/6/2025 9:38:21 AM    ECG 12 lead    Result Date: 2/6/2025  Sinus rhythm with 2nd degree AV block (Mobitz I) Right superior axis deviation Abnormal ECG When compared with ECG of 05-FEB-2025 21:41, (unconfirmed) No significant change was found    XR chest 1 view    Result Date: 2/5/2025  Interpreted By:  Tobin Machado, STUDY: XR CHEST 1 VIEW;  2/5/2025 10:17 pm   INDICATION: Signs/Symptoms:Chest Pain.   COMPARISON: None.   ACCESSION NUMBER(S): [id number]   ORDERING CLINICIAN: MARIELY ROCKWELL   FINDINGS: The cardiac silhouette is normal in size. There is mild bilateral opacities may correspond to chronic lung changes and atelectasis versus pneumonia. No segment pleural effusion. No  pneumothorax.       Mild bilateral opacities may correspond to chronic lung changes and atelectasis versus pneumonia.   MACRO: None   Signed by: Tobin Machado 2/5/2025 10:38 PM Dictation workstation:   [id number]       Assessment/Plan   Assessment & Plan  NSTEMI (non-ST elevated myocardial infarction) (Multi)    Primary hypertension    Chronic obstructive pulmonary disease, unspecified    Current every day smoker    GERD (gastroesophageal reflux disease)    Hiatal hernia with GERD    Smoker    Spinal stenosis of cervical region    Mixed hyperlipidemia    CAD (coronary artery disease)    Mobitz type 1 second degree AV block    Type 2 diabetes mellitus with hyperglycemia, with long-term current use of insulin    Hyponatremia    Chest pain, unspecified type    Noncompliance with CPAP treatment    Obstructive sleep apnea    Recurrent major depressive disorder, in partial remission (CMS-HCC)    Hx of heart artery stent    Oropharyngeal dysphagia    History of constipation    Noncompliance with diabetes treatment    Abnormal weight loss    Hypokalemia    Pancreatic mass (HHS-HCC)            I spent     minutes in the professional and overall care of this patient.      Hussain Bernal MD

## 2025-02-14 NOTE — ASSESSMENT & PLAN NOTE
Feliciano Worthington is a 74 y.o. male with a significant past medical history of CAD (s/p PCI with RCA stent 2011), Mobitz type 1 AV block, HTN, HLD, IDDM-II, COPD, tobacco use disorder, GERD, depression & anxiety presenting as a transfer from [facility] s/p NSTEMI with incidental pancreatic mass noted on ajit-operative CABG evaluation s/p MRCP revelaing 38 mm pancreatic tail mass with 15 mm liver lesion with c/f metastasis and now pending endoscopic ultrasound + biopsy with GI for further characterization.

## 2025-02-14 NOTE — CARE PLAN
The patient's goals for the shift include      The clinical goals for the shift include pt will remain hemodynamically stable throughout shift    Over the shift, the patient did not make progress toward the following goals. Barriers to progression include transfer downtown. Recommendations to address these barriers include medications and monitoring.

## 2025-02-14 NOTE — NURSING NOTE
Pt and family aware pt to transfer to [facility] today pt will go with heparin gtt and IV's in place, call to [place] [phone] for update that transport is here 8  pt transferred to [facility]

## 2025-02-14 NOTE — DISCHARGE SUMMARY
"Discharge Diagnosis  NSTEMI (non-ST elevated myocardial infarction) (Multi)    Issues Requiring Follow-Up  Being [facility]    Test Results Pending At Discharge  Pending Labs       No current pending labs.            Hospital Course    Dictation not working    Feliciano Worthington is a 74 y.o. male with a significant past medical history of CAD (s/p PCI with RCA stent 2011), Mobitz type 1 AV block, HTN, HLD, IDDM-II, COPD, tobacco use disorder, GERD, depression & anxiety who presented to [facility] due to chest pain. Patient states his chest pain began 1.5 days ago while at home watching TV. He describes chest pain as mild (3-4 out of 10 in severity), retrosternal, without radiation to arms/neck/jaw, unchanged with positional changes and unaffected by his breathing. He took sublingual nitroglycerin \"several times\" but it didn't make much of a difference per patient. Due to the chest pain continuing into today he felt it was best to come to hospital for evaluation. Patient currently reporting to be chest pain free. Denies headaches, vision changes, SOB, abdominal pain, N/V, weight changes, leg swelling.     Acute non-STEMI with triple-vessel CAD on cardiac catheterization this morning-for CABG.  Still on heparin drip.  -History of coronary artery disease with prior stents  -Uncontrolled type 2 diabetes with hyperglycemia and noncompliance-HbA1c at 14.7.  Blood sugars improved, was actually on the low side this morning so decreased his mealtime lispro.  -Depression/anxiety-sertraline was added to his venlafaxine on 1/29-psychiatry consult appreciated-sertraline discontinued and started on bupropion which should also help his smoking cessation.    -Possible pancreatic lesion seen on CT done as part of preop evaluation-MRCP still pending.  CA 19-9 normal.  -Pulmonary nodules-most appear chronic, 1 new one in the right lower lobe, \"possibly infectious/inflammatory in nature but should have follow-up to document " "resolution\".  -Hyponatremia-up to 134 today, chlorthalidone still on hold     -Noncompliance with medications PTA  -Hypokalemia-resolved after replacement  -Hyperlipidemia-lovastatin restarted.  -History of Mobitz type I-on telemetry  -COPD-nebulizers as needed  -Continued tobacco abuse-he declines a nicotine patch  -Hypertension-BPs controlled  -Abnormal weight loss-likely due to uncontrolled diabetes, does have pancreatic lesion being evaluated  -Some difficulty swallowing dry solids-will order swallow eval (for prior to his probable CABG).  Was unable to be done Friday as he was down for a procedure, not complaining of swallowing presently.    I did review the results of his MRCP. Discussed concerning findings of pancreatic mass concerning for adenocarcinoma and a liver mass concerning for metastases. Will consult gastroenterology.   I did review gastroenterology consultation note. Appreciate input. Concerning for pancreatic cancer. Suggested transfer downtown to [facility]. Further workup and intervention needed. Cardiothoracic surgery commented on deferring CABG for now pending cancer workup and optimization of uncontrolled diabetes. Appreciate ongoing endocrinology consultation. Discussed these findings with the patient. He agrees to be transferred. Will arrange for transfer. Continue current medical therapy in the interim.   No episodes no symptoms of chest pain palpitations. No abdominal pain. Cardiology follow-up noted. I spoke with cardiothoracic surgery yesterday. Holding off on CABG for now. I did make arrangements for transfer downtown as per GI recommendations. Spoke with [facility]. Spoke with [facility]. Transfer accepted. Awaiting bed. Will go downtown for endoscopic ultrasound biopsy and further treatment recommendations about possible surgical intervention and treatment for possible likely pancreatic cancer to be determined. For now he continues on the heparin drip. Continue " maximized medical therapy from a cardiovascular standpoint. Awaiting bed availability. Continue other current medical therapy in the interim. Appreciate ongoing endocrinology treatment as well. Blood sugars improving. Optimize glycemic control    >31 min discharge day  Pertinent Physical Exam At Time of Discharge  Physical Exam    Home Medications     Medication List      START taking these medications     buPROPion  mg 24 hr tablet; Commonly known as: Wellbutrin XL; Take   1 tablet (150 mg) by mouth once daily. Do not crush, chew, or split.;   Start taking on: February 15, 2025   isosorbide mononitrate ER 30 mg 24 hr tablet; Commonly known as: Imdur;   Take 1 tablet (30 mg) by mouth once daily. Do not crush or chew.; Start   taking on: February 15, 2025   lovastatin 40 mg tablet; Commonly known as: Mevacor; Take 1 tablet (40   mg) by mouth once daily at bedtime.     CHANGE how you take these medications     * insulin glargine 100 unit/mL injection; Commonly known as: Lantus;   Inject 32 Units under the skin once daily at bedtime. Take as directed per   insulin instructions.; What changed: You were already taking a medication   with the same name, and this prescription was added. Make sure you   understand how and when to take each.   * Lantus U-100 Insulin 100 unit/mL injection; Generic drug: insulin   glargine; Inject 32 Units under the skin once daily in the morning. Take   as directed per insulin instructions.; Start taking on: February 15, 2025;   What changed: additional instructions  * This list has 2 medication(s) that are the same as other medications   prescribed for you. Read the directions carefully, and ask your doctor or   other care provider to review them with you.     CONTINUE taking these medications     Accu-Chek Guide L1-L2 Ctrl Sol solution; Generic drug: blood glucose   control high,low; 1 bottle once daily as needed (use when need to un   controls on glucose meter).   ACETAMINOPHEN  ORAL   alcohol swabs pads, medicated; Commonly known as: BD Alcohol Swabs; Use   twice daily while checking sugar.   amLODIPine 5 mg tablet; Commonly known as: Norvasc; Take 1 tablet (5 mg)   by mouth once daily.   aspirin 81 mg EC tablet   blood-glucose meter misc; Commonly known as: Accu-Chek Guide Glucose   Meter; Test twice daily   ezetimibe 10 mg tablet; Commonly known as: Zetia; Take 1 tablet (10 mg)   by mouth once daily.   hydrOXYzine HCL 25 mg tablet; Commonly known as: Atarax; Take 1 tablet   (25 mg) by mouth 2 times a day.   lancets misc; Commonly known as: Accu-Chek Softclix Lancets; Test twice   daily   nitroglycerin 0.4 mg SL tablet; Commonly known as: Nitrostat; Place 1   tablet (0.4 mg) under the tongue every 5 minutes if needed for chest pain.   DO NOT EXCEED A TOTAL OF 3 DOSES IN 15 MINUTES   omeprazole 40 mg DR capsule; Commonly known as: PriLOSEC; Take 1 capsule   (40 mg) by mouth once daily in the morning. Take before meals. Do not   crush or chew.   venlafaxine  mg 24 hr capsule; Commonly known as: Effexor-XR; Take   1 capsule (150 mg) by mouth once daily. Do not crush or chew.     STOP taking these medications     Accu-Chek Guide test strips strip; Generic drug: blood sugar diagnostic   chlorthalidone 25 mg tablet; Commonly known as: Hygroton   glipiZIDE 10 mg tablet; Commonly known as: Glucotrol   ipratropium 21 mcg (0.03 %) nasal spray; Commonly known as: Atrovent   ramipril 5 mg capsule; Commonly known as: Altace   sertraline 25 mg tablet; Commonly known as: Zoloft       Outpatient Follow-Up  Future Appointments   Date Time Provider Department [place]   4/1/2025  1:00 PM Roberto Haskins MD [id number] [place]   4/30/2025 11:30 AM Hussain Bernal MD [id number] [place]       Hussain Bernal MD

## 2025-02-14 NOTE — PROGRESS NOTES
"Feliciano Worthington is a 74 y.o. male on day 8 of admission presenting with NSTEMI (non-ST elevated myocardial infarction) (Multi).    Subjective   Patient seen and examined at bedside. No new complaints. He denies abdominal pain, nausea, vomiting or diarrhea.  No overt GIB.  Patient has a bed at main campus and will transfer later today.     Objective   Constitutional:       General: He is not in acute distress.     Appearance: Normal appearance.   HENT:      Head: Normocephalic.      Nose: Nose normal.      Mouth/Throat:      Mouth: Mucous membranes are moist.      Pharynx: Oropharynx is clear.   Eyes:      Extraocular Movements: Extraocular movements intact.      Conjunctiva/sclera: Conjunctivae normal.      Pupils: Pupils are equal, round, and reactive to light.   Cardiovascular:      Rate and Rhythm: Normal rate and regular rhythm.      Pulses: Normal pulses.      Heart sounds: Normal heart sounds.   Pulmonary:      Effort: Pulmonary effort is normal.      Breath sounds: Normal breath sounds.   Abdominal:      General: Bowel sounds are normal. There is no distension.      Palpations: Abdomen is soft.      Tenderness: There is no abdominal tenderness. There is no guarding or rebound.   Musculoskeletal:         General: Normal range of motion.      Cervical back: Normal range of motion.   Skin:     General: Skin is warm and dry.   Neurological:      General: No focal deficit present.      Mental Status: He is alert and oriented to person, place, and time.   Psychiatric:         Mood and Affect: Mood normal.         Behavior: Behavior normal.      Last Recorded Vitals  Blood pressure 137/74, pulse 56, temperature 36.4 °C (97.5 °F), resp. rate 18, height 1.778 m (5' 10\"), weight 87.2 kg (192 lb 3.9 oz), SpO2 97%.  Intake/Output last 3 Shifts:  I/O last 3 completed shifts:  In: 1074 (12.3 mL/kg) [P.O.:1074]  Out: 1900 (21.8 mL/kg) [Urine:1900 (0.6 mL/kg/hr)]  Weight: 87.2 kg     Relevant Results  MRCP pancreas w and wo " IV contrast  Result Date: 2/10/2025  1. Severely motion degraded exam. 2. 38 mm pancreatic tail hypoenhancing mass suspicious for adenocarcinoma. 3. 15 mm hepatic segment VI hypoenhancing mass suspicious for metastasis. 4. No definite lymphadenopathy.     MACRO: None   Signed by: Dyllan James 2/10/2025 3:33 PM Dictation workstation:   [id number]      ENDOSCOPIC REVIEW  EGD and colonoscopy (adequate prep) 9/28/2016 with Dr. Muse indicated for history of colon polyps, dysphagia and GERD: 2-3 cm hiatal hernia, multiple ulcerations x 5 in the upper esophagus ranging from 4 mm to 8 mm in size, nonbleeding and 4 colon polyps (3 polyps in the distal sigmoid and 1 polyp in the rectum), diverticulosis       Assessment/Plan   The patient is a 74 y.o. male with signficant past medical history of CAD s/p PCI with TREVOR 2011, Mobitz type 1 AV block, HTN, HLD, IDDM-II, COPD, tobacco use disorder, GERD, depression & anxiety who presented to [facility] due to chest pain. Admitted on 2/5/2025 for NSTEMI. LHC performed 2/7/2025 revealed 70% stenosis in the distal left main, 80% stenosis in the proximal LAD, 90% stenosis in the proximal circumflex artery, 75% stenosis in the proximal ramus, 80% stenosis in the mid ramus, 80% stenosis in the mid RCA, 90% stenosis in the distal RCA and an LVEF of 55%. Patient recommended to have evaluation for coronary bypass surgery. During evaluation for cardiac surgery, MRCP noted pancreatic tail mass, concerning for adenocarcinoma as well as liver mass concerning for metastasis which GI was consulted.      Pancreatic mass with possible mets to the liver on imaging . LFTs WNL, afebrile. no leukocytosis. Tolerating a diet with no abdominal pain or N/V. Cancer AG 19-9 (< 4) on 2/7/2025.  Unintentional weight loss - 50 lbs over the past year  Acute NSTEMI - currently on heparin gtt and ASA.  Cardiac surgery on board for evaluation for CABG, but currently holding off due to several reasons including  elevated A1c and pending workup of pancreatic mass. If poor prognosis, may not be a surgical candidate at all from cardiac surgery standpoint.    PLAN  There is high concern for underlying pancreatic cancer which may be surgically managed.  Recommend transfer downtown to [facility] where the patient will have access to a multidisciplinary team including hepatobiliary surgery, oncology and advanced GI that can figure out proper diagnosis and further management. Transfer team notified, patient accepted at main [place], and now a bed is available. He will transfer to [facility] today.         Plan discussed with Dr. Galloway    I spent 15 minutes in the professional and overall care of this patient.      Narda Ledesma, APRN-CNP

## 2025-02-14 NOTE — H&P
History Of Present Illness  Feliciano Worthington is a 74 y.o. male with a significant past medical history of CAD (s/p PCI with RCA stent 2011), Mobitz type 1 AV block, HTN, HLD, IDDM-II, COPD, tobacco use disorder, GERD, depression & anxiety presenting as a transfer from [facility] s/p NSTEMI with incidental pancreatic mass noted on ajit-operative CABG evaluation.     The patient presented to the [facility] ED on 2/06 where he noted increased chest pain at rest that was unresponsive to nitroglycerin, and was found to have non-STEMI with triple vessel CAD on cardiac catheterization. Of note, he has history of CAD with stent in 2011. He is also an active smoker. As apart of ajit-operative workup he underwent CT chest which revealed indeterminate lesion involving the tail of the pancreas. MRCP was performed with findings concerning for 38 mm pancreatic tail hypo enhancing mass suspicious for adenocarcinoma, as well as 15 mm hepatic segment hypo enhancing mass suspicion for metastasis without definite lymphadenopathy. LFTs wnl and CA 19-9 was normal. No abdominal pain and tolerating a diet. GI was consulted who recommended transfer to [facility] for ultrasound guided endoscopic biopsy. CABG currently deferred 2/2 to pancreatic mass finding as well as better blood sugar control. Endocrinology was consulted and last A1c was 14.9; with last recommendation for Lantus 32 units BID and Novolog 15 units TID.     Upon presentation to [facility], the patient was hemodynamically stable and without concern. He denied any subjective symptoms and only endorsed mild back pain response to tylenol. Otherwise he denied FC, SOB, NV, DC. He denied any chest pain since his admission on 2/6.        Past Medical History  He has a past medical history of Acute myocardial infarction, unspecified (01/24/2018), Acute myocardial infarction, unspecified (01/24/2018), BMI 29.0-29.9,adult (10/13/2023), Chronic obstructive pulmonary disease, unspecified (01/24/2018),  Chronic obstructive pulmonary disease, unspecified (01/24/2018), Coronary angioplasty status, Encounter for immunization (11/04/2022), Encounter for screening for malignant neoplasm of colon (08/25/2016), Encounter for screening for malignant neoplasm of skin (04/14/2022), Nicotine dependence, cigarettes, uncomplicated (09/12/2022), Obesity, unspecified (05/25/2022), Old myocardial infarction (01/24/2018), Other acute postprocedural pain, Pain in left knee, Pain in right hip (11/04/2022), Personal history of other diseases of the digestive system (01/24/2018), Personal history of other diseases of the musculoskeletal system and connective tissue (08/25/2016), Personal history of other diseases of the respiratory system, Personal history of other endocrine, nutritional and metabolic disease (08/25/2016), Personal history of other mental and behavioral disorders (04/14/2022), Personal history of other specified conditions (12/08/2016), Personal history of other specified conditions, Personal history of other specified conditions (06/20/2018), Separation of muscle (nontraumatic), other site (05/14/2018), Sprain of medial collateral ligament of left knee, initial encounter (08/28/2017), Sprain of medial collateral ligament of left knee, subsequent encounter (10/12/2017), Type 2 diabetes mellitus without complications (Multi) (01/24/2018), Type 2 diabetes mellitus without complications (Multi) (01/24/2018), and Umbilical hernia without obstruction or gangrene (05/14/2018).    Surgical History  He has a past surgical history that includes Coronary angioplasty with stent (08/25/2016); Tonsillectomy (08/25/2016); Colonoscopy (10/06/2016); Other surgical history (05/25/2022); Skin cancer excision (01/17/2025); and Cardiac catheterization (N/A, 2/6/2025).     Social History  He reports that he has been smoking cigarettes. He started smoking about 57 years ago. He has a 75 pack-year smoking history. He has been exposed to  tobacco smoke. He has never used smokeless tobacco. He reports that he does not currently use alcohol. He reports that he does not use drugs.    Family History  Family History   Problem Relation Name Age of Onset    Coronary artery disease Mother      Diabetes Father      Coronary artery disease Father      Other (acute myocardial infarction) Father          Allergies  Tetracyclines, Atorvastatin, Empagliflozin, Pravastatin, Rosuvastatin, Simvastatin, and Statins-hmg-coa reductase inhibitors    Review of Systems  As above     Physical Exam  Constitutional:       General: He is not in acute distress.     Appearance: Normal appearance. He is normal weight. He is not ill-appearing.   HENT:      Head: Normocephalic.   Cardiovascular:      Rate and Rhythm: Normal rate and regular rhythm.      Heart sounds: Normal heart sounds.   Pulmonary:      Effort: Pulmonary effort is normal. No respiratory distress.   Abdominal:      General: Abdomen is flat.      Palpations: Abdomen is soft.   Musculoskeletal:      Cervical back: Normal range of motion.      Right lower leg: No edema.      Left lower leg: No edema.   Skin:     General: Skin is warm and dry.   Neurological:      Mental Status: He is alert. Mental status is at baseline.   Psychiatric:         Mood and Affect: Mood normal.          Last Recorded Vitals  There were no vitals taken for this visit.    Relevant Results        Scheduled medications  [START ON 2/15/2025] amLODIPine, 5 mg, oral, Daily  [START ON 2/15/2025] aspirin, 81 mg, oral, Daily  atorvastatin, 10 mg, oral, Nightly  [START ON 2/15/2025] buPROPion XL, 150 mg, oral, Daily  ezetimibe, 10 mg, oral, Nightly  hydrOXYzine HCL, 25 mg, oral, BID  [START ON 2/15/2025] insulin glargine, 32 Units, subcutaneous, q AM  [START ON 2/15/2025] insulin glargine, 32 Units, subcutaneous, Nightly  insulin lispro, 0-15 Units, subcutaneous, TID with meals, nightly, & 0300  insulin lispro, 15 Units, subcutaneous, TID AC  [START  ON 2/15/2025] isosorbide mononitrate ER, 30 mg, oral, Daily  [START ON 2/15/2025] pantoprazole, 40 mg, oral, Daily  [START ON 2/15/2025] polyethylene glycol, 17 g, oral, Daily  [START ON 2/15/2025] venlafaxine XR, 150 mg, oral, Daily with breakfast      Continuous medications  heparin, 0-4,000 Units/hr      PRN medications  PRN medications: acetaminophen, alum-mag hydroxide-simeth, dextrose, dextrose, dextrose, dextrose, glucagon, glucagon, glucagon, glucagon, ipratropium-albuteroL, magnesium hydroxide, melatonin, nitroglycerin, ondansetron, ondansetron  Results for orders placed or performed during the hospital encounter of 02/14/25 (from the past 24 hours)   POCT GLUCOSE   Result Value Ref Range    POCT Glucose 156 (H) 74 - 99 mg/dL     No results found.       Assessment/Plan   Assessment & Plan  Pancreatic mass ([facility])  Feliciano Worthington is a 74 y.o. male with a significant past medical history of CAD (s/p PCI with RCA stent 2011), Mobitz type 1 AV block, HTN, HLD, IDDM-II, COPD, tobacco use disorder, GERD, depression & anxiety presenting as a transfer from [facility] s/p NSTEMI with incidental pancreatic mass noted on ajit-operative CABG evaluation s/p MRCP revelaing 38 mm pancreatic tail mass with 15 mm liver lesion with c/f metastasis and now pending endoscopic ultrasound + biopsy with GI for further characterization.    #Pancreatic tail mass  ::CT chest revealing indeterminate pancreatic tail mass  ::MRCP revealing 38 mm pancreatic tail mass and 15 mm hepatic lesion concerning for metastasis   :: Last LFTs wnl  ::CA 19-9 <4  :: GI consulted; awaiting formal recommendations    Plan:  - Follow up GI recommendations  - Likely EUS on Monday 2/17    #Acute non-STEMI with triple-vessel CAD   #CABG Evaluation  #Hx of Mobitz type I  ::Patient with evidence of 3 vessel disease from previous catheterization at [facility] with CABG recommended  ::Hx of prior stent to RCA in 2011  ::Patient received aspirin and heparin gtt  ">48 hours  ::Chest pain resolved per patient  :: home meds: amlopidine 5, imdur 30    Plan:  - Continue aspirin  - No need for heparin gtt outside of 48 hour window  - No need for cardiology consult at this time; consider if chest pain returns  - Hold cardiothoracic CABG consult depending on prognosis of pancreatic tumor biopsy  - Continue home meds  - Continue telemetry  - Nitroglycerin PRN   - EKG PRN    #Uncontrolled type 2 diabetes   :: last A1c of 14.7  :: [facility] Endocrinology recommendations of Lantus 32 BID and Novolog 15 TID, sliding scale     Plan:  - Continue to monitor BG on this regimen   - Consider endocrinology consult if difficulty controlling BG    #Depression/anxiety  :: Patient noted to have depressive type symptoms at OSH   :: Psych consulted at OSH and recommended buproprion added to his venlafaxine as well to aid in decreasing smoking cessation  :: Patient did not endorse on admission interview 2/14/25    Plan:   - Continue medications as above  - Hydroxyzine PRN  - CTM    #Pulmonary nodule  ::Pulmonary nodules-most appear chronic  :: new 8 mm right lower lobe, \"possibly infectious/inflammatory in nature but should have follow-up to document resolution    Plan:   - PCP follow up on discharge to monitor of nodules    #Hyponatremia  :: Patient persistently hyponatremic at OSH ~132  :: Chlorthalidone held at OSH    Plan:  - Repeat RFP on transfer  -  Continue to monitor   -  Consider hyponatremia workup if worsening    #HLD  :: Takes lovastatin, ezetimbie at home    Plan:  - Continue home meds    #COPD ?  :: Patient actively smokes 1 to 1.5 ppd  :: PFT's 2/7/25 reveal normal lung volumes, normal FEV/FVC, but decreased DLCO of 63    Plan:  - CTM  - Duonebs as needed    #Unintentional weight loss  :: patient reports 50 lbs weight loss over the course of the year  :: possibly secondary to poor glycemic control versus malignancy   :: TSH wnl     Plan:  - Continue workup for pancreatic tail mass as " above  - Consider nutrition consult in the AM    F: PRN  E: PRN  N: Carb controlled  A: PIV    Code: Full  NOK: Britney (Wife) [phone]         Raymundo James MD

## 2025-02-14 NOTE — CONSULTS
[facility]   [facility] [place]  INITIAL CONSULT NOTE       Reason For Consult  New pancreatic mass c/f pancreatic cancer w/ possible liver metastasis    SUBJECTIVE     History Of Present Illness  Feliciano Worthington is a 74 y.o. male with a past medical history of HTN, DLD, CAD s/p PCI with TREVOR in 2011, Mobitz type 1 AV block, DMT2, current tobacco use, COPD, tobacco use, GERD, depression, and anxiety transferred from [facility] on 2/14/2025 for further work up of new pancreatic mass w/ c/f liver metastasis and CABG evaluation.   The patient initially presented to [facility] due to chest pain. Admitted on 2/5/2025 for NSTEMI. Coronary angiogram performed on 2/7/2025 and revealed 70% stenosis in the distal left main, 80% stenosis in the proximal LAD, 90% stenosis in the proximal circumflex artery, 75% stenosis in the proximal ramus, 80% stenosis in the mid ramus, 80% stenosis in the mid RCA, 90% stenosis in the distal RCA, and a LVEF of 55%. He was being worked up for coronary bypass surgery. During evaluation for the same, CT C w/o contrast showed a lesion in the tail of the pancreas. MRCP was obtained and noted pancreatic tail mass, concerning for adenocarcinoma, as well as liver mass concerning for metastasis.     GI is consulted for new pancreatic mass c/f pancreatic cancer w/ possible liver metastasis.    The patient says that Jan through Nov 2024 he lost ~ 50 lbs. One week prior to the [facility] presentation he has been experiencing nausea. He denied vomiting, abdominal pain, constipation, diarrhea, changes in stool color.    FH:  Father and brother had lung cancer.    SH:  Current tobacco user/smoker.  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     Review of Systems  12-point ROS has been reviewed and is negative, except if mentioned otherwise above.    Past Medical History:    Past Medical History:   Diagnosis Date    Acute myocardial infarction,  unspecified 01/24/2018    Heart attack    Acute myocardial infarction, unspecified 01/24/2018    Heart attack    BMI 29.0-29.9,adult 10/13/2023    Chronic obstructive pulmonary disease, unspecified 01/24/2018    COPD, mild    Chronic obstructive pulmonary disease, unspecified 01/24/2018    COPD, mild    Coronary angioplasty status     Post PTCA    Encounter for immunization 11/04/2022    Encounter for immunization    Encounter for screening for malignant neoplasm of colon 08/25/2016    Encounter for colonoscopy due to history of colonic polyp    Encounter for screening for malignant neoplasm of skin 04/14/2022    Skin cancer screening    Nicotine dependence, cigarettes, uncomplicated 09/12/2022    Cigarette smoker    Obesity, unspecified 05/25/2022    Class 1 obesity with body mass index (BMI) of 31.0 to 31.9 in adult    Old myocardial infarction 01/24/2018    History of myocardial infarction    Other acute postprocedural pain     Post-operative pain    Pain in left knee     Left knee pain, unspecified chronicity    Pain in right hip 11/04/2022    Hip pain, bilateral    Personal history of other diseases of the digestive system 01/24/2018    History of gastroesophageal reflux (GERD)    Personal history of other diseases of the musculoskeletal system and connective tissue 08/25/2016    History of low back pain    Personal history of other diseases of the respiratory system     History of sinusitis    Personal history of other endocrine, nutritional and metabolic disease 08/25/2016    History of hyperlipidemia    Personal history of other mental and behavioral disorders 04/14/2022    History of depression    Personal history of other specified conditions 12/08/2016    History of diarrhea    Personal history of other specified conditions     History of prolonged Q-T interval on ECG    Personal history of other specified conditions 06/20/2018    History of nocturia    Separation of muscle (nontraumatic), other site  05/14/2018    Rectus diastasis    Sprain of medial collateral ligament of left knee, initial encounter 08/28/2017    Sprain of medial collateral ligament of left knee, initial encounter    Sprain of medial collateral ligament of left knee, subsequent encounter 10/12/2017    Sprain of medial collateral ligament of left knee, subsequent encounter    Type 2 diabetes mellitus without complications (Multi) 01/24/2018    Diabetes mellitus    Type 2 diabetes mellitus without complications (Multi) 01/24/2018    Diabetes mellitus    Umbilical hernia without obstruction or gangrene 05/14/2018    Umbilical hernia without obstruction and without gangrene       Home Medications  Medications Prior to Admission   Medication Sig Dispense Refill Last Dose/Taking    ACETAMINOPHEN ORAL Take 650 mg by mouth every 8 hours if needed for mild pain (1 - 3). Do not crush, chew, or split.       alcohol swabs (BD Alcohol Swabs) pads, medicated Use twice daily while checking sugar. 200 each 1     amLODIPine (Norvasc) 5 mg tablet Take 1 tablet (5 mg) by mouth once daily. 90 tablet 1     aspirin 81 mg EC tablet Take 1 tablet (81 mg) by mouth once daily.       blood glucose control high,low (Accu-Chek Guide L1-L2 Ctrl Sol) solution 1 bottle once daily as needed (use when need to un controls on glucose meter). 1 each 1     blood-glucose meter (Accu-Chek Guide Glucose Meter) misc Test twice daily 1 each 0     [START ON 2/15/2025] buPROPion XL (Wellbutrin XL) 150 mg 24 hr tablet Take 1 tablet (150 mg) by mouth once daily. Do not crush, chew, or split. 90 tablet 1     ezetimibe (Zetia) 10 mg tablet Take 1 tablet (10 mg) by mouth once daily. 90 tablet 1     hydrOXYzine HCL (Atarax) 25 mg tablet Take 1 tablet (25 mg) by mouth 2 times a day. 180 tablet 1     [START ON 2/15/2025] insulin glargine (Lantus U-100 Insulin) 100 unit/mL injection Inject 32 Units under the skin once daily in the morning. Take as directed per insulin instructions.       insulin  glargine (Lantus) 100 unit/mL injection Inject 32 Units under the skin once daily at bedtime. Take as directed per insulin instructions.       [START ON 2/15/2025] isosorbide mononitrate ER (Imdur) 30 mg 24 hr tablet Take 1 tablet (30 mg) by mouth once daily. Do not crush or chew. 90 tablet 1     lancets (Accu-Chek Softclix Lancets) misc Test twice daily 200 each 0     lovastatin (Mevacor) 40 mg tablet Take 1 tablet (40 mg) by mouth once daily at bedtime. 90 tablet 1     nitroglycerin (Nitrostat) 0.4 mg SL tablet Place 1 tablet (0.4 mg) under the tongue every 5 minutes if needed for chest pain. DO NOT EXCEED A TOTAL OF 3 DOSES IN 15 MINUTES 25 tablet 2     omeprazole (PriLOSEC) 40 mg DR capsule Take 1 capsule (40 mg) by mouth once daily in the morning. Take before meals. Do not crush or chew. 90 capsule 1     venlafaxine XR (Effexor-XR) 150 mg 24 hr capsule Take 1 capsule (150 mg) by mouth once daily. Do not crush or chew. 90 capsule 1        Surgical History:    Past Surgical History:   Procedure Laterality Date    CARDIAC CATHETERIZATION N/A 2/6/2025    Procedure: Left Heart Cath, With LV;  Surgeon: Wil Bronson MD;  Location: [facility];  Service: Cardiovascular;  Laterality: N/A;    COLONOSCOPY  10/06/2016    Colonoscopy    CORONARY ANGIOPLASTY WITH STENT PLACEMENT  08/25/2016    Cath Stent Placement    OTHER SURGICAL HISTORY  05/25/2022    Oral surgery    SKIN CANCER EXCISION  01/17/2025    right forearm, right shoulder    TONSILLECTOMY  08/25/2016    Tonsillectomy       Allergies:    Allergies   Allergen Reactions    Tetracyclines Hives, Other, Unknown, Rash and Shortness of breath     Water blisters in mouth and genital region.    Water blister on tongue and penis    Atorvastatin Unknown     Cramping all over the body. Sore shoulders    Empagliflozin Unknown    Pravastatin Unknown    Rosuvastatin Unknown    Simvastatin Unknown    Statins-Hmg-Coa Reductase Inhibitors Unknown       Social  History:    Social History     Socioeconomic History    Marital status:      Spouse name: Not on file    Number of children: Not on file    Years of education: Not on file    Highest education level: Not on file   Occupational History    Not on file   Tobacco Use    Smoking status: Every Day     Average packs/day: 1.5 packs/day for 50.0 years (75.0 ttl pk-yrs)     Types: Cigarettes     Start date: 1968     Passive exposure: Current    Smokeless tobacco: Never   Vaping Use    Vaping status: Never Used   Substance and Sexual Activity    Alcohol use: Not Currently     Comment: [id number]    Drug use: Never    Sexual activity: Defer   Other Topics Concern    Not on file   Social History Narrative    Not on file     Social Drivers of Health     Financial Resource Strain: Medium Risk (2/6/2025)    Overall Financial Resource Strain (CARDIA)     Difficulty of Paying Living Expenses: Somewhat hard   Food Insecurity: No Food Insecurity (2/6/2025)    Hunger Vital Sign     Worried About Running Out of Food in the Last Year: Never true     Ran Out of Food in the Last Year: Never true   Transportation Needs: No Transportation Needs (2/6/2025)    PRAPARE - Transportation     Lack of Transportation (Medical): No     Lack of Transportation (Non-Medical): No   Physical Activity: Sufficiently Active (3/29/2024)    Received from [facility]    Exercise Vital Sign     Days of Exercise per Week: 4 days     Minutes of Exercise per Session: 150+ min   Stress: Not on file   Social Connections: Unknown (10/8/2023)    Social Connection and Isolation Panel [NHANES]     Frequency of Communication with Friends and Family: Not on file     Frequency of Social Gatherings with Friends and Family: Not on file     Attends [employer] Services: Not on file     Active Member of Clubs or Organizations: Yes     Attends Club or Organization Meetings: Not on file     Marital Status:     Intimate Partner Violence: Not At Risk (2/6/2025)    Humiliation, Afraid, Rape, and Kick questionnaire     Fear of Current or Ex-Partner: No     Emotionally Abused: No     Physically Abused: No     Sexually Abused: No   Housing Stability: Low Risk  (2/6/2025)    Housing Stability Vital Sign     Unable to Pay for Housing in the Last Year: No     Number of Times Moved in the Last Year: 0     Homeless in the Last Year: No       Family History:    Family History   Problem Relation Name Age of Onset    Coronary artery disease Mother      Diabetes Father      Coronary artery disease Father      Other (acute myocardial infarction) Father         EXAM     Vitals:    Vitals:    02/14/25 1349   BP: 121/70   Pulse: 74   Resp: 16   Temp: 36.5 °C (97.7 °F)   SpO2: 95%     Failed to redirect to the Timeline version of the [employer] SmartLink.  No intake or output data in the 24 hours ending 02/14/25 1703    Physical Exam   GENERAL: In no acute distress.  NEUROLOGIC: A and O x 3. Cranial nerves 2 through 12 grossly intact with no gait disturbances. Intact motor and sensory systems.  HEENT: Pupils are equal, round, and reactive to light and accommodation. Extraocular motion intact. No scleral icterus.  CARDIAC: S1 + S2, RRR, no M/R/G.    LUNGS: CTA BL. No wheezes or coarse breath sounds. Symmetric respirations. No increased work of breathing.   ABDOMEN: Soft, non-tender to palpation. No rebound or guarding.  SKIN: Clean, warm, dry, and intact with normal skin turgor.  PSYCH: Appropriate mood and behavior.      OBJECTIVE                                                                              Medications       Current Facility-Administered Medications:     acetaminophen (Tylenol) tablet 650 mg, 650 mg, oral, q4h PRN, Monisha Guido MD, 650 mg at 02/14/25 1351    alum-mag hydroxide-simeth (Mylanta) 200-200-20 mg/5 mL oral suspension 30 mL, 30 mL, oral, 4x daily PRN, Monisha Guido MD    [START ON 2/15/2025] amLODIPine (Norvasc) tablet 5  mg, 5 mg, oral, Daily, Monisha Guido MD    [START ON 2/15/2025] aspirin chewable tablet 81 mg, 81 mg, oral, Daily, Monisha Guido MD    atorvastatin (Lipitor) tablet 10 mg, 10 mg, oral, Nightly, Monisha Guido MD    [START ON 2/15/2025] buPROPion XL (Wellbutrin XL) 24 hr tablet 150 mg, 150 mg, oral, Daily, Monisha Guido MD    dextrose 50 % injection 12.5 g, 12.5 g, intravenous, q15 min PRN, Monisha Guido MD    dextrose 50 % injection 12.5 g, 12.5 g, intravenous, q15 min PRN, Monisha Guido MD    dextrose 50 % injection 25 g, 25 g, intravenous, q15 min PRN, Monisha Guido MD    dextrose 50 % injection 25 g, 25 g, intravenous, q15 min PRN, Monisha Guido MD    ezetimibe (Zetia) tablet 10 mg, 10 mg, oral, Nightly, Monisha uGido MD    glucagon (Glucagen) injection 1 mg, 1 mg, intramuscular, q15 min PRN, Monisha Guido MD    glucagon (Glucagen) injection 1 mg, 1 mg, intramuscular, q15 min PRN, Monisha Guido MD    glucagon (Glucagen) injection 1 mg, 1 mg, intramuscular, q15 min PRN, Monisha Guido MD    glucagon (Glucagen) injection 1 mg, 1 mg, intramuscular, q15 min PRN, Monisha Guido MD    heparin 25,000 Units in dextrose 5% 250 mL (100 Units/mL) infusion (premix), 0-4,000 Units/hr, intravenous, Continuous, Monisha Guido MD    hydrOXYzine HCL (Atarax) tablet 25 mg, 25 mg, oral, BID, Monisha Guido MD    [START ON 2/15/2025] insulin glargine (Lantus) injection 32 Units, 32 Units, subcutaneous, q AM, Monisha Guido MD    [START ON 2/15/2025] insulin glargine (Lantus) injection 32 Units, 32 Units, subcutaneous, Nightly, Monisha Guido MD    insulin lispro injection 0-15 Units, 0-15 Units, subcutaneous, TID with meals, nightly, & 0300, Monisha Guido MD, 3 Units at 02/14/25 1409    insulin lispro injection 15 Units, 15 Units, subcutaneous, TID AC, Monisha Guido MD    ipratropium-albuteroL (Duo-Neb) 0.5-2.5 mg/3 mL nebulizer solution 3 mL, 3 mL, nebulization, q4h PRN, Monisha Guido MD    [START ON 2/15/2025] isosorbide mononitrate ER (Imdur) 24 hr tablet 30 mg, 30 mg, oral,  Daily, Monisha Guido MD    magnesium hydroxide (Milk of Magnesia) 400 mg/5 mL suspension 30 mL, 30 mL, oral, Daily PRN, Monisha Guido MD    magnesium sulfate 2 g in sterile water for injection 50 mL, 2 g, intravenous, Once, Raymundo James MD    melatonin tablet 5 mg, 5 mg, oral, Nightly PRN, Monisha Guido MD    nitroglycerin (Nitrostat) SL tablet 0.4 mg, 0.4 mg, sublingual, q5 min PRN, Monisha Guido MD    ondansetron (Zofran) injection 4 mg, 4 mg, intravenous, q4h PRN, Monisha Guido MD    ondansetron (Zofran) tablet 4 mg, 4 mg, oral, q8h PRN, Monisha Guido MD    [START ON 2/15/2025] pantoprazole (ProtoNix) EC tablet 40 mg, 40 mg, oral, Daily, Monisha Guido MD    [START ON 2/15/2025] polyethylene glycol (Glycolax, Miralax) packet 17 g, 17 g, oral, Daily, Monisha Guido MD    [START ON 2/15/2025] venlafaxine XR (Effexor-XR) 24 hr capsule 150 mg, 150 mg, oral, Daily with breakfast, Monisha Guido MD                                                                            Labs     Results for orders placed or performed during the hospital encounter of 02/14/25 (from the past 24 hours)   POCT GLUCOSE   Result Value Ref Range    POCT Glucose 156 (H) 74 - 99 mg/dL   CBC and Auto Differential   Result Value Ref Range    WBC 7.9 4.4 - 11.3 x10*3/uL    nRBC 0.0 0.0 - 0.0 /100 WBCs    RBC 4.63 4.50 - 5.90 x10*6/uL    Hemoglobin 13.6 13.5 - 17.5 g/dL    Hematocrit 38.0 (L) 41.0 - 52.0 %    MCV 82 80 - 100 fL    MCH 29.4 26.0 - 34.0 pg    MCHC 35.8 32.0 - 36.0 g/dL    RDW 12.6 11.5 - 14.5 %    Platelets 163 150 - 450 x10*3/uL    Neutrophils % 54.3 40.0 - 80.0 %    Immature Granulocytes %, Automated 0.8 0.0 - 0.9 %    Lymphocytes % 32.0 13.0 - 44.0 %    Monocytes % 9.1 2.0 - 10.0 %    Eosinophils % 2.9 0.0 - 6.0 %    Basophils % 0.9 0.0 - 2.0 %    Neutrophils Absolute 4.31 1.60 - 5.50 x10*3/uL    Immature Granulocytes Absolute, Automated 0.06 0.00 - 0.50 x10*3/uL    Lymphocytes Absolute 2.54 0.80 - 3.00 x10*3/uL    Monocytes Absolute 0.72 0.05 -  0.80 x10*3/uL    Eosinophils Absolute 0.23 0.00 - 0.40 x10*3/uL    Basophils Absolute 0.07 0.00 - 0.10 x10*3/uL   Renal function panel   Result Value Ref Range    Glucose 150 (H) 74 - 99 mg/dL    Sodium 132 (L) 136 - 145 mmol/L    Potassium 4.1 3.5 - 5.3 mmol/L    Chloride 98 98 - 107 mmol/L    Bicarbonate 24 21 - 32 mmol/L    Anion Gap 14 10 - 20 mmol/L    Urea Nitrogen 13 6 - 23 mg/dL    Creatinine 0.76 0.50 - 1.30 mg/dL    eGFR >90 >60 mL/min/1.73m*2    Calcium 9.4 8.6 - 10.6 mg/dL    Phosphorus 4.4 2.5 - 4.9 mg/dL    Albumin 3.6 3.4 - 5.0 g/dL   Magnesium   Result Value Ref Range    Magnesium 1.82 1.60 - 2.40 mg/dL                                                                              Imaging           2/10/2025 MRCP:  IMPRESSION:  1. Severely motion degraded exam.  2. 38 mm pancreatic tail hypoenhancing mass suspicious for  adenocarcinoma.  3. 15 mm hepatic segment VI hypoenhancing mass suspicious for  metastasis.  4. No definite lymphadenopathy.                                                                         GI Procedures     9/2016 EGD and Colonoscopy:         ASSESSMENT / PLAN                  ASSESSMENT/PLAN:    Feliciano Worthington is a 74 y.o. male with a past medical history of HTN, DLD, CAD s/p PCI with TREVOR in 2011, Mobitz type 1 AV block, DMT2, current tobacco use, COPD, tobacco use, GERD, depression, and anxiety transferred from [facility] on 2/14/2025 for further work up of new pancreatic mass w/ c/f liver metastasis and CABG evaluation.   The patient initially presented to [facility] due to chest pain. Admitted on 2/5/2025 for NSTEMI. Coronary angiogram performed on 2/7/2025 and revealed 70% stenosis in the distal left main, 80% stenosis in the proximal LAD, 90% stenosis in the proximal circumflex artery, 75% stenosis in the proximal ramus, 80% stenosis in the mid ramus, 80% stenosis in the mid RCA, 90% stenosis in the distal RCA, and a LVEF of 55%. He was being worked up for coronary bypass  surgery. During evaluation for the same, CT C w/o contrast showed a lesion in the tail of the pancreas. MRCP was obtained and noted pancreatic tail mass, concerning for adenocarcinoma, as well as liver mass concerning for metastasis.     GI is consulted for new pancreatic mass c/f pancreatic cancer w/ possible liver metastasis.    With the recent NSTEMI and the extent of his CAD he is high sedation risk. Ideally, if technically feasible he should undergo IR guided liver mass biopsy. At this time there is no concern for biliary obstruction, which would have made ERCP indicated.     Recommendations:  - Please reach out to IR for liver mass biopsy.  - Daily hepatic function panel.  - Consider sending tumor markers.    The above recommendations were communicated to the [facility] team.    Patient was seen and discussed with Dr. Armstrong.    Gastroenterology will continue to follow.  During weekday hours of 7am-5pm please do not hesitate to contact me on [facility] Chat or page [phone], if there are any further questions between the weekday hours of 7am-5pm.   After hours, on weekends, and on holidays, please page the [place] [phone]. Thank you.    Lucia Saenz MD  PGY-5 Gastroenterology and Hepatology Fellow  [facility]

## 2025-02-15 LAB
ALBUMIN SERPL BCP-MCNC: 3.6 G/DL (ref 3.4–5)
ALP SERPL-CCNC: 87 U/L (ref 33–136)
ALT SERPL W P-5'-P-CCNC: 17 U/L (ref 10–52)
ANION GAP SERPL CALC-SCNC: 16 MMOL/L (ref 10–20)
AST SERPL W P-5'-P-CCNC: 15 U/L (ref 9–39)
BASOPHILS # BLD AUTO: 0.07 X10*3/UL (ref 0–0.1)
BASOPHILS NFR BLD AUTO: 0.9 %
BILIRUB SERPL-MCNC: 0.3 MG/DL (ref 0–1.2)
BUN SERPL-MCNC: 11 MG/DL (ref 6–23)
CALCIUM SERPL-MCNC: 9.3 MG/DL (ref 8.6–10.6)
CHLORIDE SERPL-SCNC: 97 MMOL/L (ref 98–107)
CO2 SERPL-SCNC: 24 MMOL/L (ref 21–32)
CREAT SERPL-MCNC: 0.82 MG/DL (ref 0.5–1.3)
EGFRCR SERPLBLD CKD-EPI 2021: >90 ML/MIN/1.73M*2
EOSINOPHIL # BLD AUTO: 0.19 X10*3/UL (ref 0–0.4)
EOSINOPHIL NFR BLD AUTO: 2.5 %
ERYTHROCYTE [DISTWIDTH] IN BLOOD BY AUTOMATED COUNT: 12.8 % (ref 11.5–14.5)
GLUCOSE BLD MANUAL STRIP-MCNC: 176 MG/DL (ref 74–99)
GLUCOSE BLD MANUAL STRIP-MCNC: 183 MG/DL (ref 74–99)
GLUCOSE BLD MANUAL STRIP-MCNC: 212 MG/DL (ref 74–99)
GLUCOSE BLD MANUAL STRIP-MCNC: 225 MG/DL (ref 74–99)
GLUCOSE BLD MANUAL STRIP-MCNC: 226 MG/DL (ref 74–99)
GLUCOSE SERPL-MCNC: 234 MG/DL (ref 74–99)
HCT VFR BLD AUTO: 40.5 % (ref 41–52)
HGB BLD-MCNC: 13.6 G/DL (ref 13.5–17.5)
IMM GRANULOCYTES # BLD AUTO: 0.04 X10*3/UL (ref 0–0.5)
IMM GRANULOCYTES NFR BLD AUTO: 0.5 % (ref 0–0.9)
LYMPHOCYTES # BLD AUTO: 2.93 X10*3/UL (ref 0.8–3)
LYMPHOCYTES NFR BLD AUTO: 38.4 %
MAGNESIUM SERPL-MCNC: 2.02 MG/DL (ref 1.6–2.4)
MCH RBC QN AUTO: 28.5 PG (ref 26–34)
MCHC RBC AUTO-ENTMCNC: 33.6 G/DL (ref 32–36)
MCV RBC AUTO: 85 FL (ref 80–100)
MONOCYTES # BLD AUTO: 0.71 X10*3/UL (ref 0.05–0.8)
MONOCYTES NFR BLD AUTO: 9.3 %
NEUTROPHILS # BLD AUTO: 3.7 X10*3/UL (ref 1.6–5.5)
NEUTROPHILS NFR BLD AUTO: 48.4 %
NRBC BLD-RTO: 0 /100 WBCS (ref 0–0)
PLATELET # BLD AUTO: 158 X10*3/UL (ref 150–450)
POTASSIUM SERPL-SCNC: 4 MMOL/L (ref 3.5–5.3)
PROT SERPL-MCNC: 6.8 G/DL (ref 6.4–8.2)
RBC # BLD AUTO: 4.77 X10*6/UL (ref 4.5–5.9)
SODIUM SERPL-SCNC: 133 MMOL/L (ref 136–145)
UFH PPP CHRO-ACNC: 0.6 IU/ML
UFH PPP CHRO-ACNC: 0.6 IU/ML
UFH PPP CHRO-ACNC: 0.7 IU/ML
WBC # BLD AUTO: 7.6 X10*3/UL (ref 4.4–11.3)

## 2025-02-15 PROCEDURE — 85025 COMPLETE CBC W/AUTO DIFF WBC: CPT

## 2025-02-15 PROCEDURE — 82947 ASSAY GLUCOSE BLOOD QUANT: CPT

## 2025-02-15 PROCEDURE — 85520 HEPARIN ASSAY: CPT

## 2025-02-15 PROCEDURE — 80053 COMPREHEN METABOLIC PANEL: CPT

## 2025-02-15 PROCEDURE — 99223 1ST HOSP IP/OBS HIGH 75: CPT

## 2025-02-15 PROCEDURE — 83735 ASSAY OF MAGNESIUM: CPT

## 2025-02-15 PROCEDURE — 36415 COLL VENOUS BLD VENIPUNCTURE: CPT

## 2025-02-15 PROCEDURE — 2500000004 HC RX 250 GENERAL PHARMACY W/ HCPCS (ALT 636 FOR OP/ED)

## 2025-02-15 PROCEDURE — 99222 1ST HOSP IP/OBS MODERATE 55: CPT | Performed by: STUDENT IN AN ORGANIZED HEALTH CARE EDUCATION/TRAINING PROGRAM

## 2025-02-15 PROCEDURE — 2500000002 HC RX 250 W HCPCS SELF ADMINISTERED DRUGS (ALT 637 FOR MEDICARE OP, ALT 636 FOR OP/ED)

## 2025-02-15 PROCEDURE — 2500000001 HC RX 250 WO HCPCS SELF ADMINISTERED DRUGS (ALT 637 FOR MEDICARE OP)

## 2025-02-15 PROCEDURE — 1200000002 HC GENERAL ROOM WITH TELEMETRY DAILY

## 2025-02-15 RX ORDER — METOPROLOL TARTRATE 25 MG/1
12.5 TABLET, FILM COATED ORAL 2 TIMES DAILY
Status: DISCONTINUED | OUTPATIENT
Start: 2025-02-15 | End: 2025-02-15

## 2025-02-15 RX ORDER — LOVASTATIN 40 MG/1
40 TABLET ORAL NIGHTLY
Status: DISCONTINUED | OUTPATIENT
Start: 2025-02-15 | End: 2025-02-18 | Stop reason: HOSPADM

## 2025-02-15 RX ADMIN — HYDROXYZINE HYDROCHLORIDE 25 MG: 25 TABLET ORAL at 08:48

## 2025-02-15 RX ADMIN — PANTOPRAZOLE SODIUM 40 MG: 40 TABLET, DELAYED RELEASE ORAL at 05:57

## 2025-02-15 RX ADMIN — HYDROXYZINE HYDROCHLORIDE 25 MG: 25 TABLET ORAL at 22:23

## 2025-02-15 RX ADMIN — ACETAMINOPHEN 650 MG: 325 TABLET ORAL at 09:02

## 2025-02-15 RX ADMIN — AMLODIPINE BESYLATE 5 MG: 5 TABLET ORAL at 08:48

## 2025-02-15 RX ADMIN — ISOSORBIDE MONONITRATE 30 MG: 30 TABLET, EXTENDED RELEASE ORAL at 08:48

## 2025-02-15 RX ADMIN — INSULIN LISPRO 3 UNITS: 100 INJECTION, SOLUTION INTRAVENOUS; SUBCUTANEOUS at 08:50

## 2025-02-15 RX ADMIN — INSULIN LISPRO 15 UNITS: 100 INJECTION, SOLUTION INTRAVENOUS; SUBCUTANEOUS at 17:27

## 2025-02-15 RX ADMIN — INSULIN GLARGINE 32 UNITS: 100 INJECTION, SOLUTION SUBCUTANEOUS at 09:02

## 2025-02-15 RX ADMIN — HEPARIN SODIUM 1400 UNITS/HR: 10000 INJECTION, SOLUTION INTRAVENOUS at 11:27

## 2025-02-15 RX ADMIN — EZETIMIBE 10 MG: 10 TABLET ORAL at 22:20

## 2025-02-15 RX ADMIN — INSULIN LISPRO 15 UNITS: 100 INJECTION, SOLUTION INTRAVENOUS; SUBCUTANEOUS at 13:08

## 2025-02-15 RX ADMIN — VENLAFAXINE HYDROCHLORIDE 150 MG: 150 CAPSULE, EXTENDED RELEASE ORAL at 08:48

## 2025-02-15 RX ADMIN — BUPROPION HYDROCHLORIDE 150 MG: 150 TABLET, EXTENDED RELEASE ORAL at 08:48

## 2025-02-15 RX ADMIN — ASPIRIN 81 MG CHEWABLE TABLET 81 MG: 81 TABLET CHEWABLE at 08:48

## 2025-02-15 RX ADMIN — INSULIN LISPRO 6 UNITS: 100 INJECTION, SOLUTION INTRAVENOUS; SUBCUTANEOUS at 05:28

## 2025-02-15 RX ADMIN — INSULIN LISPRO 15 UNITS: 100 INJECTION, SOLUTION INTRAVENOUS; SUBCUTANEOUS at 08:49

## 2025-02-15 RX ADMIN — INSULIN LISPRO 6 UNITS: 100 INJECTION, SOLUTION INTRAVENOUS; SUBCUTANEOUS at 17:28

## 2025-02-15 RX ADMIN — INSULIN LISPRO 3 UNITS: 100 INJECTION, SOLUTION INTRAVENOUS; SUBCUTANEOUS at 22:07

## 2025-02-15 RX ADMIN — INSULIN LISPRO 6 UNITS: 100 INJECTION, SOLUTION INTRAVENOUS; SUBCUTANEOUS at 13:08

## 2025-02-15 ASSESSMENT — ACTIVITIES OF DAILY LIVING (ADL): LACK_OF_TRANSPORTATION: NO

## 2025-02-15 ASSESSMENT — PAIN SCALES - GENERAL: PAINLEVEL_OUTOF10: 2

## 2025-02-15 NOTE — ASSESSMENT & PLAN NOTE
Feliciano Worthington is a 74 y.o. male with a significant past medical history of CAD (s/p PCI with RCA stent 2011), Mobitz type 1 AV block, HTN, HLD, IDDM-II, COPD, tobacco use disorder, GERD, depression & anxiety presenting as a transfer from [facility] s/p NSTEMI with incidental pancreatic mass noted on ajit-operative CABG evaluation s/p MRCP revelaing 38 mm pancreatic tail mass with 15 mm liver lesion with c/f metastasis and now pending IR guided liver biopsy.

## 2025-02-15 NOTE — CARE PLAN
The patient's goals for the shift include      The clinical goals for the shift include Pt will remain safe and free of falls.    Problem: Pain - Adult  Goal: Verbalizes/displays adequate comfort level or baseline comfort level  Outcome: Progressing     Problem: Safety - Adult  Goal: Free from fall injury  Outcome: Progressing     Problem: Diabetes  Goal: Achieve decreasing blood glucose levels by end of shift  Outcome: Progressing  Goal: Increase stability of blood glucose readings by end of shift  Outcome: Progressing  Goal: Maintain glucose levels >70mg/dl to <250mg/dl throughout shift  Outcome: Progressing

## 2025-02-15 NOTE — CARE PLAN
The patient's goals for the shift include      The clinical goals for the shift include patient will be free from injury and falls    Over the shift, the patient did not make progress toward the following goals. Barriers to progression include   . Recommendations to address these barriers include   .

## 2025-02-15 NOTE — PROGRESS NOTES
02/15/25 1226   Discharge Planning   Living Arrangements Spouse/significant other   Support Systems Spouse/significant other   Assistance Needed none   Type of Residence Private residence   Number of Stairs to Enter Residence 4   Number of Stairs Within Residence 12   Do you have animals or pets at home? Yes   Type of Animals or Pets 2 dogs 1 cat   Who is requesting discharge planning? Provider   Home or Post Acute Services Other (Comment)   Expected Discharge Disposition Home   Does the patient need discharge transport arranged? Yes   RoundTrip coordination needed? Yes   Has discharge transport been arranged? Yes   Financial Resource Strain   How hard is it for you to pay for the very basics like food, housing, medical care, and heating? Not very   Housing Stability   In the last 12 months, was there a time when you were not able to pay the mortgage or rent on time? N   In the past 12 months, how many times have you moved where you were living? 1   At any time in the past 12 months, were you homeless or living in a shelter (including now)? N   Transportation Needs   In the past 12 months, has lack of transportation kept you from medical appointments or from getting medications? no   In the past 12 months, has lack of transportation kept you from meetings, work, or from getting things needed for daily living? No   Patient Choice   Provider Choice list and CMS website (https://medicare.gov/care-compare#search) for post-acute Quality and Resource Measure Data were provided and reviewed with: Patient   Patient / Family choosing to utilize agency / facility established prior to hospitalization No   Stroke Family Assessment   Stroke Family Assessment Needed No     Transitional Care Coordination Progress Note:  Patient discussed during interdisciplinary rounds.   Team members present: RN NIDIA BERRY MD   Plan per Medical/Surgical team: Daily hepatic function panel. GI is consulted    Payor: [facility] MEDICARE   Discharge  disposition: Home   Potential Barriers: None   ADOD: 2-      Previous Home Care: None   DME: None   Pharmacy: [facility]  Falls: None   PCP:  MURIEL LUND   Dialysis: None

## 2025-02-15 NOTE — PROGRESS NOTES
Feliciano Worthington is a 74 y.o. male on day 1 of admission presenting with Pancreatic mass ([facility]CC).      Subjective   Feeling ok, no complaints, no constipation or change in bowel habits, no N&V. No Chest pain       Objective     Last Recorded Vitals  /71   Pulse 66   Temp 36.4 °C (97.5 °F)   Resp 15   Wt 85.5 kg (188 lb 8 oz)   SpO2 95%   Intake/Output last 3 Shifts:    Intake/Output Summary (Last 24 hours) at 2/15/2025 1239  Last data filed at 2/15/2025 1045  Gross per 24 hour   Intake 575.87 ml   Output --   Net 575.87 ml       Admission Weight  Weight: 85.5 kg (188 lb 8 oz) (02/14/25 2036)    Daily Weight  02/14/25 : 85.5 kg (188 lb 8 oz)    Image Results  MRCP pancreas w and wo IV contrast  Narrative: Interpreted By:  Dyllan James,   STUDY:  MRCP PANCREAS W AND WO IV CONTRAST;  2/10/2025 3:20 pm      INDICATION:  Signs/Symptoms:pancreatic lesion noted on CT, pending cardiac surgery.          COMPARISON:  None.      ACCESSION NUMBER(S):  [id number]      ORDERING CLINICIAN:  MAXIMILIAN ROSA      TECHNIQUE:  MRI PANCREAS; Multiplanar magnetic resonance images of the abdomen  were obtained including the following sequences; T2-weighted SSFSE  with and without fat saturation, T1-weighted GRE in/opposed phase,  DWI, fat saturated 3D-T1w GRE pre and dynamically post contrast.  Radial thick slab T2w RARE MRCP and coronally reconstructed navigator  gated high resolution 3-D T2w RESTORE MRCP with MIP reconstruction  were also performed for MRCP.  17 ML of Dotarem was administered  intravenously without immediate complication.      FINDINGS:  Severely motion degraded exam.      LIVER:  No definite steatosis.  Difficult to characterize due to motion, there is an ill-defined 15  mm mildly T2 hyperintense, hypoenhancing mass in segment VI.      BILE DUCTS:  No dilatation. Common bile duct measures 3 mm. No definite filling  defect identified.      GALLBLADDER:  No stone or wall thickening.      PANCREAS:  There  is a 38 mm hypoenhancing mass in the tail of the pancreas as  annotated on series 34, image 41/72. The remainder of the pancreas  appears unremarkable without ductal dilatation. Evaluation is limited  due to motion artifact.      SPLEEN:  Mildly enlarged measuring 14.1 cm in length.      ADRENAL GLANDS:  Unremarkable.      KIDNEYS:  28 mm cyst in the interpolar cortex of the left kidney. Otherwise  unremarkable kidneys without hydronephrosis      LYMPH NODES:  No lymphadenopathy.      ABDOMINAL VESSELS:  Abdominal aorta is atherosclerotic but otherwise patent without  aneurysm. Major visceral arterial branches are patent. IVC and  visualized major branches are patent. Major portal venous branches  are patent.      BOWEL:  No dilated bowel is visualized.Severe distal colonic diverticulosis.      PERITONEUM/RETROPERITONEUM:  No visualized free fluid.      BONES AND LOWER THORAX:  Mild thoracolumbar degenerative changes.  Bibasilar atelectasis.      Impression: 1. Severely motion degraded exam.  2. 38 mm pancreatic tail hypoenhancing mass suspicious for  adenocarcinoma.  3. 15 mm hepatic segment VI hypoenhancing mass suspicious for  metastasis.  4. No definite lymphadenopathy.          MACRO:  None      Signed by: Dyllan James 2/10/2025 3:33 PM  Dictation workstation:   [id number]  ECG 12 lead  Sinus rhythm with 2nd degree AV block (Mobitz I)  Left axis deviation  Anterior infarct , age undetermined  Abnormal ECG  When compared with ECG of 05-FEB-2025 22:52, (unconfirmed)  Anterior infarct is now Present  Confirmed by Kimmy Dickinson ([id number]) on 2/10/2025 12:31:40 PM      Physical Exam  Constitutional:       General: He is not in acute distress.     Appearance: Normal appearance. He is normal weight. He is not ill-appearing.   HENT:      Head: Normocephalic.   Cardiovascular:      Rate and Rhythm: Normal rate and regular rhythm.      Heart sounds: Normal heart sounds.   Pulmonary:      Effort: Pulmonary effort is normal. No  respiratory distress.   Abdominal:      General: Abdomen is flat.      Palpations: Abdomen is soft.   Musculoskeletal:      Cervical back: Normal range of motion.      Right lower leg: No edema.      Left lower leg: No edema.   Skin:     General: Skin is warm and dry.   Neurological:      Mental Status: He is alert. Mental status is at baseline.   Psychiatric:         Mood and Affect: Mood normal.      Relevant Results  MRCP pancreas w and wo IV contrast    Result Date: 2/10/2025  Interpreted By:  Dyllan James, STUDY: MRCP PANCREAS W AND WO IV CONTRAST;  2/10/2025 3:20 pm   INDICATION: Signs/Symptoms:pancreatic lesion noted on CT, pending cardiac surgery.     COMPARISON: None.   ACCESSION NUMBER(S): [id number]   ORDERING CLINICIAN: MAXIMILIAN ROSA   TECHNIQUE: MRI PANCREAS; Multiplanar magnetic resonance images of the abdomen were obtained including the following sequences; T2-weighted SSFSE with and without fat saturation, T1-weighted GRE in/opposed phase, DWI, fat saturated 3D-T1w GRE pre and dynamically post contrast. Radial thick slab T2w RARE MRCP and coronally reconstructed navigator gated high resolution 3-D T2w RESTORE MRCP with MIP reconstruction were also performed for MRCP.  17 ML of Dotarem was administered intravenously without immediate complication.   FINDINGS: Severely motion degraded exam.   LIVER: No definite steatosis. Difficult to characterize due to motion, there is an ill-defined 15 mm mildly T2 hyperintense, hypoenhancing mass in segment VI.   BILE DUCTS: No dilatation. Common bile duct measures 3 mm. No definite filling defect identified.   GALLBLADDER: No stone or wall thickening.   PANCREAS: There is a 38 mm hypoenhancing mass in the tail of the pancreas as annotated on series 34, image 41/72. The remainder of the pancreas appears unremarkable without ductal dilatation. Evaluation is limited due to motion artifact.   SPLEEN: Mildly enlarged measuring 14.1 cm in length.   ADRENAL GLANDS:  Unremarkable.   KIDNEYS: 28 mm cyst in the interpolar cortex of the left kidney. Otherwise unremarkable kidneys without hydronephrosis   LYMPH NODES: No lymphadenopathy.   ABDOMINAL VESSELS: Abdominal aorta is atherosclerotic but otherwise patent without aneurysm. Major visceral arterial branches are patent. IVC and visualized major branches are patent. Major portal venous branches are patent.   BOWEL: No dilated bowel is visualized.Severe distal colonic diverticulosis.   PERITONEUM/RETROPERITONEUM: No visualized free fluid.   BONES AND LOWER THORAX: Mild thoracolumbar degenerative changes. Bibasilar atelectasis.       1. Severely motion degraded exam. 2. 38 mm pancreatic tail hypoenhancing mass suspicious for adenocarcinoma. 3. 15 mm hepatic segment VI hypoenhancing mass suspicious for metastasis. 4. No definite lymphadenopathy.     MACRO: None   Signed by: Dyllan James 2/10/2025 3:33 PM Dictation workstation:   [id number]    ECG 12 lead    Result Date: 2/10/2025  Sinus rhythm with 2nd degree AV block (Mobitz I) Left axis deviation Anterior infarct , age undetermined Abnormal ECG When compared with ECG of 05-FEB-2025 22:52, (unconfirmed) Anterior infarct is now Present Confirmed by Kimmy Dickinson ([id number]) on 2/10/2025 12:31:40 PM    Pulmonary function testing    Result Date: 2/7/2025  The FEV1/FVC is normal. The FEV1 is normal. The FVC is normal. The TLC by body plethysmography is normal. The DLCO corrected for hemoglobin is moderately reduced. The airways resistance is normal.   Conclusion: Normal spirometry. Lung volumes are consistent with hyperinflation. DLCO values corrected for hemoglobin are consistent with pulmonary vascular impairment, emphysema with preserved lung volume or anemia.    Transthoracic Echo Complete    Result Date: 2/7/2025          [place] [id number]  Tel [phone] Fax [phone] TRANSTHORACIC ECHOCARDIOGRAM REPORT Patient Name:       JAIME FERNANDEZ      Reading Physician:    [id number] Fredrick Davidson MD, [facility] Study Date:         2/7/2025            Ordering Provider:    [id number] MAXIMILIAN ROSA MRN/PID:            [id number]            Fellow: Accession#:         [id number]        Nurse: Date of Birth/Age:  1950 / 74      Sonographer:          Suzie DE LA CRUZ Gender Assigned at  M                   Additional Staff: Birth: Height:             177.80 cm           Admit Date:           2/5/2025 Weight:             81.65 kg            Admission Status:     Inpatient -                                                               Routine BSA / BMI:          2.00 m2 / 25.83     Department Location:  [facility]                     kg/m2                                     Echo Lab Blood Pressure: 133 /76 mmHg Study Type:    TRANSTHORACIC ECHO (TTE) COMPLETE Diagnosis/ICD: Encounter for other preprocedural examination-Z01.818; Non ST                elevation (NSTEMI) myocardial infarction-I21.4 Indication:    PRE CABG, CPT Codes:     Echo Complete w Full Doppler-[id number] Patient History: Smoker:            Current. Diabetes:          Yes Pertinent History: HTN, Hyperlipidemia, CAD and COPD. Study Detail: The following Echo studies were performed: 2D, M-Mode, Doppler and               color flow. Technically challenging study due to body habitus,               prominent lung artifact and poor acoustic windows. Definity used               as a contrast agent for endocardial border definition. Total               contrast used for this procedure was 2 mL via IV push. The patient               was awake.  PHYSICIAN INTERPRETATION: Left Ventricle: The left ventricular systolic function is low normal, with a visually estimated ejection fraction of 50-55%. There are no regional wall  motion abnormalities. The left ventricular cavity size is normal. There is mild increased septal and mildly increased posterior left ventricular wall thickness. There is left ventricular concentric remodeling. Spectral Doppler shows a Grade I (impaired relaxation pattern) of left ventricular diastolic filling with normal left atrial filling pressure. Left Atrium: The left atrial size is normal. Left atrial volume index 18.9 mL/m2. Right Ventricle: The right ventricle is normal in size. There is normal right ventricular global systolic function. Right Atrium: The right atrial size is normal. Aortic Valve: The aortic valve is trileaflet. There is moderate aortic valve cusp calcification. The aortic valve dimensionless index is 0.57. There is mild aortic valve regurgitation. The peak instantaneous gradient of the aortic valve is 10 mmHg. The mean gradient of the aortic valve is 6 mmHg. 1+ aortic regurgitation. Mitral Valve: The mitral valve is normal in structure. There is mild mitral annular calcification. There is trace mitral valve regurgitation. Trivial mitral regurgitation. Tricuspid Valve: The tricuspid valve is structurally normal. No evidence of tricuspid regurgitation. Trivial to 1+ tricuspid regurgitation with estimated RVSP 31 mmHg. Pulmonic Valve: The pulmonic valve is structurally normal. There is no indication of pulmonic valve regurgitation. Pericardium: Trivial pericardial effusion. There is a pericardial fat pad present. Aorta: The aortic root is normal. There is mild dilatation of the ascending aorta. There is mild dilatation of the aortic root. The aortic root measures 3.9 cm. The ascending aorta measures 4.0 cm. Systemic Veins: The inferior vena cava appears normal in size, with IVC inspiratory collapse greater than 50%. In comparison to the previous echocardiogram(s): No previous available for comparison.  CONCLUSIONS:  1. The left ventricular systolic function is low normal, with a visually  estimated ejection fraction of 50-55%.  2. No regional wall motion abnormalities.  3. Spectral Doppler shows a Grade I (impaired relaxation pattern) of left ventricular diastolic filling with normal left atrial filling pressure.  4. Trivial mitral regurgitation.  5. Trivial to 1+ tricuspid regurgitation with estimated RVSP 31 mmHg.  6. There is moderate aortic valve cusp calcification.  7. Mild aortic valve regurgitation.  8. The aortic root measures 3.9 cm.     The ascending aorta measures 4.0 cm.  9. No previous available for comparison. QUANTITATIVE DATA SUMMARY:  2D MEASUREMENTS:             Normal Ranges: Ao Root d:       3.90 cm     (2.0-3.7cm) LAs:             3.30 cm     (2.7-4.0cm) IVSd:            1.08 cm     (0.6-1.1cm) LVPWd:           1.08 cm     (0.6-1.1cm) LVIDd:           4.62 cm     (3.9-5.9cm) LVIDs:           3.48 cm LV Mass Index:   89.1 g/m2 LVEDV Index:     47.39 ml/m2 LV % FS          24.7 %  LEFT ATRIUM:                  Normal Ranges: LA Vol A4C:        21.8 ml    (22+/-6mL/m2) LA Vol A2C:        37.7 ml LA Vol BP:         30.5 ml LA Vol Index A4C:  10.9ml/m2 LA Vol Index A2C:  18.9 ml/m2 LA Vol Index BP:   15.3 ml/m2 LA Area A4C:       10.5 cm2 LA Area A2C:       14.7 cm2 LA Major Axis A4C: 4.3 cm LA Major Axis A2C: 4.9 cm LA Volume Index:   14.5 ml/m2 LA Vol A4C:        22.0 ml LA Vol A2C:        35.0 ml LA Vol Index BSA:  14.3 ml/m2  RIGHT ATRIUM:                 Normal Ranges: RA Vol A4C:        22.4 ml    (8.3-19.5ml) RA Vol Index A4C:  11.2 ml/m2 RA Area A4C:       10.7 cm2 RA Major Axis A4C: 4.4 cm  AORTA MEASUREMENTS:         Normal Ranges: Asc Ao, d:          4.00 cm (2.1-3.4cm)  LV SYSTOLIC FUNCTION:                      Normal Ranges: EF-A4C View:    47 % (>=55%) EF-A2C View:    48 % EF-Biplane:     47 % EF-Visual:      53 % LV EF Reported: 53 %  LV DIASTOLIC FUNCTION:             Normal Ranges: MV Peak E:             0.61 m/s    (0.7-1.2 m/s) MV Peak A:             0.96 m/s     (0.42-0.7 m/s) E/A Ratio:             0.64        (1.0-2.2) MV e'                  0.079 m/s   (>8.0) MV lateral e'          0.08 m/s MV medial e'           0.08 m/s E/e' Ratio:            7.69        (<8.0) PulmV Sys Dion:         56.60 cm/s PulmV Carias Dion:        43.20 cm/s PulmV S/D Dion:         1.30 PulmV A Revs Dion:      29.10 cm/s PulmV A Revs Dur:      103.00 msec  MITRAL VALVE:          Normal Ranges: MV DT:        240 msec (150-240msec)  AORTIC VALVE:                      Normal Ranges: AoV Vmax:                1.59 m/s  (<=1.7m/s) AoV Peak PG:             10.1 mmHg (<20mmHg) AoV Mean P.0 mmHg  (1.7-11.5mmHg) LVOT Max Dion:            0.92 m/s  (<=1.1m/s) AoV VTI:                 30.20 cm  (18-25cm) LVOT VTI:                17.30 cm LVOT Diameter:           2.10 cm   (1.8-2.4cm) AoV Area, VTI:           1.98 cm2  (2.5-5.5cm2) AoV Area,Vmax:           2.00 cm2  (2.5-4.5cm2) AoV Dimensionless Index: 0.57  AORTIC INSUFFICIENCY: AI Vmax:       3.77 m/s AI Half-time:  623 msec AI Decel Rate: 178.00 cm/s2  RIGHT VENTRICLE: RV Basal 3.50 cm RV Mid   2.98 cm RV Major 8.1 cm TAPSE:   19.7 mm RV s'    0.10 m/s  TRICUSPID VALVE/RVSP:          Normal Ranges: Peak TR Velocity:     2.64 m/s RV Syst Pressure:     31 mmHg  (< 30mmHg) IVC Diam:             1.13 cm  PULMONIC VALVE:          Normal Ranges: PV Accel Time:  129 msec (>120ms) PV Max Dion:     0.9 m/s  (0.6-0.9m/s) PV Max PG:      3.3 mmHg  PULMONARY VEINS: PulmV A Revs Dur: 103.00 msec PulmV A Revs Dion: 29.10 cm/s PulmV Carias Dion:   43.20 cm/s PulmV S/D Dion:    1.30 PulmV Sys Dion:    56.60 cm/s  [id number] Fredrick Davidson MD, [facility] Electronically signed on 2025 at 10:51:06 AM  ** Final **     CT chest wo IV contrast    Result Date: 2025  Interpreted By:  Amarjit Ortiz, STUDY: CT CHEST WO IV CONTRAST;  2025 4:39 pm   INDICATION: Signs/Symptoms:pre op cardiac surgery.     COMPARISON: 2024   ACCESSION NUMBER(S): [id number]   ORDERING  CLINICIAN: MAXIMILIAN ROSA   TECHNIQUE: Helical data acquisition of the chest was obtained without the use of IV contrast. Images were reformatted in axial, coronal, and sagittal planes.   FINDINGS: POTENTIAL LIMITATIONS OF THE STUDY:   Lack of IV contrast   HEART AND VESSELS:   No calcified plaques are seen involving the ascending aorta. There are atherosclerotic calcifications of the aortic arch, descending thoracic aorta, and the branches of the aorta. Aorta is unchanged in course and caliber. Stable ectasia of the ascending aorta up to 3.9 cm.   The heart is unchanged in size.   No pericardial effusion is seen.   MEDIASTINUM AND MARIA INES, LOWER NECK AND AXILLA: The visualized thyroid gland is within normal limits.   No evidence of thoracic lymphadenopathy by CT criteria.   Esophagus appears within normal limits as seen.   LUNGS AND AIRWAYS: The trachea and central airways are patent. No endobronchial lesion.   There are areas of fibrosis/scarring in the lungs, predominantly along the periphery. Appearance is not significantly changed when compared to the previous examination. No new areas of consolidation. No effusion. No pneumothorax. Stable partially calcified nodule along the major fissure on the right measuring approximately 8 mm. A few additional nodules are seen measuring 5 mm or less in size, for example, image 117 in the right upper lobe. There is a new nodular opacity in the medial aspect of the right lower lobe measuring approximately 8 mm in size. Although this may be infectious/inflammatory nature, follow-up is recommended to document resolution, image 146.   UPPER ABDOMEN: The visualized subdiaphragmatic structures demonstrate no acute abnormality. 2.2 cm cyst in the midpole of the left kidney. There is nonspecific prominence/thickening of the tail the pancreas with a 2.5 cm hypoattenuating lesion. This is not adequately characterized on this unenhanced CT. MRI is recommended for further evaluation.    CHEST WALL AND OSSEOUS STRUCTURES: Degenerative changes. No acute process.       No calcified plaques along the ascending aorta. There is atherosclerotic disease of the remaining portions of the aorta and its branches. Indeterminate lesion involving the tail the pancreas. MRI recommended for further evaluation. Interval development of an 8 mm right lower lobe nodule which may be infectious/inflammatory in nature but will require follow-up to document resolution.   MACRO: None.   Signed by: Amarjit Ortiz 2/7/2025 9:05 AM Dictation workstation:   [id number]    Vascular US lower extremity vein mapping bilateral    Result Date: 2/7/2025  Interpreted By:  Amarjit Ortiz, STUDY: [facility] US LOWER EXTREMITY VEIN MAPPING BILATERAL;  2/6/2025 5:33 pm   INDICATION: Signs/Symptoms:pre op cardiac surgery.   ,I21.4 Non-ST elevation (NSTEMI) myocardial infarction (Multi),R60.1 Generalized edema   COMPARISON: None.   ACCESSION NUMBER(S): [id number]   ORDERING CLINICIAN: MAXIMILIAN ROSA   TECHNIQUE: Real time duplex sonographic evaluation of the lower extremity venous systems was performed for venous mapping.   FINDINGS: Right Greater Saphenous Vein: Thigh- Proximal-0.3 x 0.2 cm Mid-0.2 x 0.2 cm Distal-0.3 x 0.2 cm   Calf-   Proximal-0.3 x 0.2 cm Mid-0.2 x 0.1 cm Distal-0.2 x 0.1 cm   Right Lesser Saphenous Vein: Proximal-0.3 x 0.2 cm Mid-0.2 x 0.1 cm Distal-0.2 x 0.1 cm     Left Greater Saphenous Vein: Thigh- Proximal-0.3 x 0.3 cm Mid-0.2 x 0.2 cm Distal-0.1 x 0.1 cm   Calf-   Proximal-0.1 x 0.1 cm Mid-0.2 x 0.1 cm Distal-0.3 x 0.1 cm   Left Lesser Saphenous Vein: Proximal-0.3 x 0.1 cm Mid-0.2 x 0.2 cm Distal-0.2 x 0.1 cm       Lower extremity venous mapping as detailed above.   MACRO: None.   Signed by: Amarjit Ortiz 2/7/2025 7:25 AM Dictation workstation:   [id number]    Carotid duplex bilateral    Result Date: 2/7/2025  Interpreted By:  Amarjit Ortiz, STUDY: [facility] US CAROTID ARTERY DUPLEX BILATERAL;  2/6/2025 5:32 pm   INDICATION:  Signs/Symptoms:pre op caridac surgery.   ,I21.4 Non-ST elevation (NSTEMI) myocardial infarction (Multi),I79.8 Other disorders of arteries, arterioles and capillaries in diseases classified elsewhere   COMPARISON: 10/30/2023   ACCESSION NUMBER(S): [id number]   ORDERING CLINICIAN: MAXIMILIAN ROSA   TECHNIQUE: Vascular ultrasound of the extracranial carotid system was performed bilaterally.  Gray scale, color Doppler and spectral Doppler waveform analysis was performed.   FINDINGS: There is atherosclerotic disease most conspicuous at the carotid bulbs.   RIGHT:   RIGHT SIDE PEAK SYSTOLIC VELOCITY TABLE: CCA  78 cm/s. ICA  126 cm/s. ECA  69 cm/s.     The ratio of the peak systolic velocity of the right ICA/CCA is  1.6.   RIGHT VERTEBRAL ARTERY: The right vertebral artery demonstrates proximal anterograde flow.   LEFT:   LEFT SIDE PEAK SYSTOLIC VELOCITY TABLE: CCA 50 cm/s. ICA 62 cm/s. ECA 64 cm/s.     The ratio of the peak systolic velocity of the left ICA/CCA is 1.1.   LEFT VERTEBRAL ARTERY: The left vertebral artery demonstrates proximal anterograde flow.       Utilizing the peak systolic velocities, the estimated degree of stenosis within the internal carotid arteries is less than 50% bilaterally.   MACRO: None.     Signed by: Amarjit Ortiz 2/7/2025 7:24 AM Dictation workstation:   [id number]    Cardiac Catheterization Procedure    Result Date: 2/6/2025   [facility], Cath Lab        [address] [place] [address] Cardiovascular Catheterization Report Patient Name:      JAIME JAZZMINE FERNANDEZ      Performing Physician:  Litzy Montana MD Study Date:        2/6/2025             Verifying Physician:   Litzy Montana MD MRN/PID:           [id number]             Cardiologist/Co-Scrub: Accession#:        [id number]         Ordering Provider:     [id number] DERIK PALOMARES                                                                 FEI Date of Birth/Age: 1950 / 74 years Cardiologist: Gender:            M                    Fellow: Encounter#:        [id number]           Surgeon:  Study: Left Heart Cath  Indications: JAIME FERNANDEZ is a 75 year old male who presents with dyslipidemia, hypertension, prior myocardial infarction, diabetes, smoker, chronic pulmonary disease, coronary artery disease, prior percutaneous coronary intervention and a chest pain assessment of typical angina. NSTE - ACS.  Procedure Description: After infiltration with 2% Lidocaine, the right femoral artery was cannulated with a modified Seldinger technique. Subsequently a 5 Niuean sheath was placed in the right femoral artery. Selective coronary catheterization was performed using a 5 Fr catheter(s) exchanged over a guide wire to cannulate the coronary arteries. A JL 4 tip catheter was used for left coronary injections. A 3drc tip catheter was used for right coronary injections. Multiple injections of contrast were made into the left and right coronary arteries with angiograms recorded in multiple projections. Retrograde left heart catheterizion was accomplished with a 5 Fr. pigtail catheter. A single plane left ventriculogram was recorded in the 30 degree RAYMOND projection. The contrast dose was 20 ml injected at 10 ml/sec. The catheter was then withdrawn across the aortic valve under continuous pressure monitoring and removed. After completion of the procedure, femoral artery angiography was performed. This demonstrated a common femoral artery puncture appropriate for closure. A Vascade 5F vascular closure Device was placed per protocol.  Coronary Angiography: The coronary circulation is right dominant.  Left Main Coronary Artery: The left main coronary artery is calcified. There is 70% stenosis in the distal left main coronary artery.  Left Anterior Descending Coronary Artery Distribution: There is 80%  stenosis in the proximal left anterior descending artery.  Circumflex Coronary Artery Distribution: There is 90% stenosis in the the proximal Circumflex artery. There is 75% stenosis in the Prox Ramus Circumflex artery. There is 80% stenosis in the Mid Ramus Circumflex artery.  Right Coronary Artery Distribution: There is 80% stenosis in the the mid Right Coronary Artery. There is evidence of instent restenosis in this segment. The distal right coronary artery showed 90% stenosis.  Left Ventriculography: The estimated left ventricular ejection fraction is normal at 55%.  Hemo Personnel: +---------------------------+---------+ Name                       Duty      +---------------------------+---------+ Wil Bronson MD 1 +---------------------------+---------+  Hemodynamic Pressures:  +----+------------------+---------+-------------+-------------+------+---------+ Site    Date Time       Phase  Systolic mmHg  Diastolic    ED  Mean mmHg                         Name                    mmHg      mmHg           +----+------------------+---------+-------------+-------------+------+---------+   PAOLA  2/6/2025 9:36:07 AIR REST          127           65             87                     AM                                                   +----+------------------+---------+-------------+-------------+------+---------+   AO  2/6/2025 9:39:06 AIR REST           91           61             65                     AM                                                   +----+------------------+---------+-------------+-------------+------+---------+   AO  2/6/2025 9:44:05 AIR REST          133           72             96                     AM                                                   +----+------------------+---------+-------------+-------------+------+---------+   LV  2/6/2025 9:47:36 AIR REST          115           -1     6                               AM                                                   +----+------------------+---------+-------------+-------------+------+---------+  Cardiac Cath Post Procedure Notes: Post Procedure Diagnosis: Triple vessel disease. Blood Loss:               Estimated blood loss during the procedure was 0 mls. Specimens Removed:        Number of specimen(s) removed: none. ____________________________________________________________________________________ CONCLUSIONS:  1. Distal Left Main: 70% stenosis.  2. Left Main Coronary Artery: This artery is calcified.  3. Proximal LAD Lesion: The percent stenosis is 80%.  4. Proximal CX Lesion: The percent stenosis is 90%.  5. Prox Ramus CX Lesion: The percent stenosis is 75%.  6. Mid Ramus CX Lesion: The percent stenosis is 80%.  7. Mid RCA Lesion: The percent stenosis is 80%.  8. Mid RCA Lesion: Instent Restenosis.  9. Distal RCA Lesion: The percent stenosis is 90%. 10. The Left Ventricular Ejection Fraction is 55%. 11. Mid CX Lesion: 100%. ICD 10 Codes: Non ST elevation (NSTEMI) myocardial infarction-I21.4  CPT Codes: Left Heart Cath (visualization of coronaries) and [id number]; Moderate Sedation Services initial 15 minutes patient >5 years-[id number]  [id number] Wil Montana MD Performing Physician Electronically signed by [id number] Wil Montana MD on 2/6/2025 at 10:19:09 AM  ** Final **     ECG 12 lead    Result Date: 2/6/2025  Sinus rhythm with 2nd degree AV block (Mobitz I) Right superior axis deviation Nonspecific ST abnormality Abnormal ECG When compared with ECG of 17-OCT-2023 18:49, No significant change was found Confirmed by Shanika Reese ([id number]) on 2/6/2025 9:38:21 AM    ECG 12 lead    Result Date: 2/6/2025  Sinus rhythm with 2nd degree AV block (Mobitz I) Right superior axis deviation Abnormal ECG When compared with ECG of 05-FEB-2025 21:41, (unconfirmed) No significant change was found    XR chest 1 view    Result Date: 2/5/2025  Interpreted By:   Tobin Machado, STUDY: XR CHEST 1 VIEW;  2/5/2025 10:17 pm   INDICATION: Signs/Symptoms:Chest Pain.   COMPARISON: None.   ACCESSION NUMBER(S): [id number]   ORDERING CLINICIAN: MARIELY ROCKWELL   FINDINGS: The cardiac silhouette is normal in size. There is mild bilateral opacities may correspond to chronic lung changes and atelectasis versus pneumonia. No segment pleural effusion. No pneumothorax.       Mild bilateral opacities may correspond to chronic lung changes and atelectasis versus pneumonia.   MACRO: None   Signed by: Tobin Machado 2/5/2025 10:38 PM Dictation workstation:   [id number]      Results for orders placed or performed during the hospital encounter of 02/14/25 (from the past 24 hours)   POCT GLUCOSE   Result Value Ref Range    POCT Glucose 156 (H) 74 - 99 mg/dL   CBC and Auto Differential   Result Value Ref Range    WBC 7.9 4.4 - 11.3 x10*3/uL    nRBC 0.0 0.0 - 0.0 /100 WBCs    RBC 4.63 4.50 - 5.90 x10*6/uL    Hemoglobin 13.6 13.5 - 17.5 g/dL    Hematocrit 38.0 (L) 41.0 - 52.0 %    MCV 82 80 - 100 fL    MCH 29.4 26.0 - 34.0 pg    MCHC 35.8 32.0 - 36.0 g/dL    RDW 12.6 11.5 - 14.5 %    Platelets 163 150 - 450 x10*3/uL    Neutrophils % 54.3 40.0 - 80.0 %    Immature Granulocytes %, Automated 0.8 0.0 - 0.9 %    Lymphocytes % 32.0 13.0 - 44.0 %    Monocytes % 9.1 2.0 - 10.0 %    Eosinophils % 2.9 0.0 - 6.0 %    Basophils % 0.9 0.0 - 2.0 %    Neutrophils Absolute 4.31 1.60 - 5.50 x10*3/uL    Immature Granulocytes Absolute, Automated 0.06 0.00 - 0.50 x10*3/uL    Lymphocytes Absolute 2.54 0.80 - 3.00 x10*3/uL    Monocytes Absolute 0.72 0.05 - 0.80 x10*3/uL    Eosinophils Absolute 0.23 0.00 - 0.40 x10*3/uL    Basophils Absolute 0.07 0.00 - 0.10 x10*3/uL   Renal function panel   Result Value Ref Range    Glucose 150 (H) 74 - 99 mg/dL    Sodium 132 (L) 136 - 145 mmol/L    Potassium 4.1 3.5 - 5.3 mmol/L    Chloride 98 98 - 107 mmol/L    Bicarbonate 24 21 - 32 mmol/L    Anion Gap 14 10 - 20 mmol/L    Urea  Nitrogen 13 6 - 23 mg/dL    Creatinine 0.76 0.50 - 1.30 mg/dL    eGFR >90 >60 mL/min/1.73m*2    Calcium 9.4 8.6 - 10.6 mg/dL    Phosphorus 4.4 2.5 - 4.9 mg/dL    Albumin 3.6 3.4 - 5.0 g/dL   Magnesium   Result Value Ref Range    Magnesium 1.82 1.60 - 2.40 mg/dL   POCT GLUCOSE   Result Value Ref Range    POCT Glucose 228 (H) 74 - 99 mg/dL   CBC and Auto Differential   Result Value Ref Range    WBC 7.6 4.4 - 11.3 x10*3/uL    nRBC 0.0 0.0 - 0.0 /100 WBCs    RBC 4.77 4.50 - 5.90 x10*6/uL    Hemoglobin 13.6 13.5 - 17.5 g/dL    Hematocrit 40.5 (L) 41.0 - 52.0 %    MCV 85 80 - 100 fL    MCH 28.5 26.0 - 34.0 pg    MCHC 33.6 32.0 - 36.0 g/dL    RDW 12.8 11.5 - 14.5 %    Platelets 158 150 - 450 x10*3/uL    Neutrophils % 48.4 40.0 - 80.0 %    Immature Granulocytes %, Automated 0.5 0.0 - 0.9 %    Lymphocytes % 38.4 13.0 - 44.0 %    Monocytes % 9.3 2.0 - 10.0 %    Eosinophils % 2.5 0.0 - 6.0 %    Basophils % 0.9 0.0 - 2.0 %    Neutrophils Absolute 3.70 1.60 - 5.50 x10*3/uL    Immature Granulocytes Absolute, Automated 0.04 0.00 - 0.50 x10*3/uL    Lymphocytes Absolute 2.93 0.80 - 3.00 x10*3/uL    Monocytes Absolute 0.71 0.05 - 0.80 x10*3/uL    Eosinophils Absolute 0.19 0.00 - 0.40 x10*3/uL    Basophils Absolute 0.07 0.00 - 0.10 x10*3/uL   Comprehensive Metabolic Panel   Result Value Ref Range    Glucose 234 (H) 74 - 99 mg/dL    Sodium 133 (L) 136 - 145 mmol/L    Potassium 4.0 3.5 - 5.3 mmol/L    Chloride 97 (L) 98 - 107 mmol/L    Bicarbonate 24 21 - 32 mmol/L    Anion Gap 16 10 - 20 mmol/L    Urea Nitrogen 11 6 - 23 mg/dL    Creatinine 0.82 0.50 - 1.30 mg/dL    eGFR >90 >60 mL/min/1.73m*2    Calcium 9.3 8.6 - 10.6 mg/dL    Albumin 3.6 3.4 - 5.0 g/dL    Alkaline Phosphatase 87 33 - 136 U/L    Total Protein 6.8 6.4 - 8.2 g/dL    AST 15 9 - 39 U/L    Bilirubin, Total 0.3 0.0 - 1.2 mg/dL    ALT 17 10 - 52 U/L   Magnesium   Result Value Ref Range    Magnesium 2.02 1.60 - 2.40 mg/dL   Heparin Assay, UFH   Result Value Ref Range     Heparin Unfractionated 0.7 See Comment Below for Therapeutic Ranges IU/mL   POCT GLUCOSE   Result Value Ref Range    POCT Glucose 226 (H) 74 - 99 mg/dL   POCT GLUCOSE   Result Value Ref Range    POCT Glucose 176 (H) 74 - 99 mg/dL   Heparin Assay, UFH   Result Value Ref Range    Heparin Unfractionated 0.6 See Comment Below for Therapeutic Ranges IU/mL   POCT GLUCOSE   Result Value Ref Range    POCT Glucose 212 (H) 74 - 99 mg/dL      Scheduled medications  amLODIPine, 5 mg, oral, Daily  aspirin, 81 mg, oral, Daily  buPROPion XL, 150 mg, oral, Daily  ezetimibe, 10 mg, oral, Nightly  hydrOXYzine HCL, 25 mg, oral, BID  insulin glargine, 32 Units, subcutaneous, q AM  insulin glargine, 32 Units, subcutaneous, Nightly  insulin lispro, 0-15 Units, subcutaneous, TID with meals, nightly, & 0300  insulin lispro, 15 Units, subcutaneous, TID AC  isosorbide mononitrate ER, 30 mg, oral, Daily  lovastatin, 40 mg, oral, Nightly  metoprolol tartrate, 12.5 mg, oral, BID  pantoprazole, 40 mg, oral, Daily  polyethylene glycol, 17 g, oral, Daily  venlafaxine XR, 150 mg, oral, Daily with breakfast      Continuous medications  heparin, 0-4,000 Units/hr, Last Rate: 1,400 Units/hr (02/15/25 1127)      PRN medications  PRN medications: acetaminophen, alum-mag hydroxide-simeth, dextrose, dextrose, dextrose, dextrose, glucagon, glucagon, glucagon, glucagon, ipratropium-albuteroL, magnesium hydroxide, melatonin, nitroglycerin, ondansetron, ondansetron     Assessment/Plan                  Assessment & Plan  Pancreatic mass ([facility])  Feliciano Worthington is a 74 y.o. male with a significant past medical history of CAD (s/p PCI with RCA stent 2011), Mobitz type 1 AV block, HTN, HLD, IDDM-II, COPD, tobacco use disorder, GERD, depression & anxiety presenting as a transfer from [facility] s/p NSTEMI with incidental pancreatic mass noted on ajit-operative CABG evaluation s/p MRCP revelaing 38 mm pancreatic tail mass with 15 mm liver lesion with c/f  metastasis and now pending IR guided liver biopsy.    Updates 2/15/2025:  - As per GI, IR guided liver biopsy recommended, plan for Monday  - Consulting cardiology for the CAD\ patient allergic to statins? Not on Beta blockers -concerning with Mobitz I-, not on ACE-I and TTE showed mildly reduced EF 45%    #New Pancreatic tail mass  ::CT chest revealing indeterminate pancreatic tail mass  ::MRCP revealing 38 mm pancreatic tail mass and 15 mm hepatic lesion concerning for metastasis   :: Last LFTs wnl  ::CA 19-9 <4  :: GI consulted; awaiting formal recommendations     Plan:  - Follow up GI recommendations, plan to consult IR for liver biopsy on Monday      #Acute non-STEMI with triple-vessel CAD   #CABG Evaluation  #Hx of Mobitz type I  ::Patient with evidence of 3 vessel disease from previous catheterization at [facility] with CABG recommended  ::Hx of prior stent to RCA in 2011  ::Patient received aspirin and heparin gtt >48 hours  ::Chest pain resolved per patient     Plan:  - Continue aspirin  - Stop heparin  - Called cardiology   - Hold cardiothoracic CABG consult depending on prognosis of pancreatic tumor biopsy  - Continue telemetry  - Nitroglycerin PRN   - EKG PRN  - Consider GDMT as tolerated with slightly reduced EF  - Avoiding BB in setting of Mobitz 1  - Resume home lovastatin     #Uncontrolled type 2 diabetes   :: last A1c of 14.7  :: [facility] Endocrinology recommendations of Lantus 32 BID and Novolog 15 TID     Plan:  - Continue to monitor BG on this regimen   - Consider endocrinology consult if difficulty controlling BG     #Depression/anxiety  :: Patient noted to have depressive type symptoms at OSH   :: Psych consulted at OSH and recommended buproprion added to his venlafaxine as well to aid in decreasing smoking cessation  :: Patient did not endorse on admission interview 2/14/25     Plan:   - Continue medications as above  - CTM     #Pulmonary nodule  ::Pulmonary nodules-most appear chronic  :: new  "8 mm right lower lobe, \"possibly infectious/inflammatory in nature but should have follow-up to document resolution     Plan:   - PCP follow up on discharge to monitor of nodules     #Hyponatremia  :: Patient persistently hyponatremic at OSH ~132  :: Chlorthalidone held at OSH     Plan:  - Repeat RFP on transfer  -  Continue to monitor   -  Consider hyponatremia workup if worsening     #HLD  :: Takes lovastatin at home     Plan:  - Continue statin     #COPD ?  :: Patient actively smokes 1 to 1.5 ppd  :: PFT's 2/7/25 reveal normal lung volumes, normal FEV/FVC, but decreased DLCO of 63     Plan:  - CTM  - Duonebs as needed     #Unintentional weight loss  :: patient reports 50 lbs weight loss over the course of the year  :: possibly secondary to poor glycemic control versus malignancy   :: TSH wnl      Plan:  - Continue workup for pancreatic tail mass as above  - Consider nutrition consult in the AM     F: PRN  E: PRN  N: Carb controlled  A: PIV     Code: Full  NOK: Britney (Wife) [phone]              Monisha Guido MD      "

## 2025-02-15 NOTE — CONSULTS
Reason For Consult  3vCAD     History Of Present Illness  Feliciano Worthington is a 74 y.o. male with a significant past medical history of CAD (s/p PCI with RCA stent 2011), Mobitz type 1 AV block, HTN, HLD, IDDM-II, COPD, tobacco use disorder, GERD, depression & anxiety presenting as a transfer from [facility] s/p NSTEMI with incidental pancreatic mass noted on ajit-operative CABG evaluation.      The patient presented to the [facility] ED on 2/06 where he noted increased chest pain at rest that was unresponsive to nitroglycerin, and was found to have non-STEMI with triple vessel CAD on cardiac catheterization. Of note, he has history of CAD with stent in 2011. He is also an active smoker. As apart of ajit-operative workup he underwent CT chest which revealed indeterminate lesion involving the tail of the pancreas. MRCP was performed with findings concerning for 38 mm pancreatic tail hypo enhancing mass suspicious for adenocarcinoma, as well as 15 mm hepatic segment hypo enhancing mass suspicion for metastasis without definite lymphadenopathy. CABG currently deferred 2/2 to pancreatic mass finding as well as better blood sugar control.     Cardiology consulted for further management.    On interview, patient denies any chest pain, SOB, leg swelling, orthopnea, PND    Cardiac Testing:  -EKG 2/8/25: NSR, Mobitz Type 1 AVB    -TTE   1. The left ventricular systolic function is low normal, with a visually estimated ejection fraction of 50-55%.   2. No regional wall motion abnormalities.   3. Spectral Doppler shows a Grade I (impaired relaxation pattern) of left ventricular diastolic filling with normal left atrial filling pressure.   4. Trivial mitral regurgitation.   5. Trivial to 1+ tricuspid regurgitation with estimated RVSP 31 mmHg.   6. There is moderate aortic valve cusp calcification.   7. Mild aortic valve regurgitation.   8. The aortic root measures 3.9 cm.      The ascending aorta measures 4.0 cm.   9. No previous  available for comparison.     -[facility] 2/15/25:   1. Distal Left Main: 70% stenosis.   2. Left Main Coronary Artery: This artery is calcified.   3. Proximal LAD Lesion: The percent stenosis is 80%.   4. Proximal CX Lesion: The percent stenosis is 90%.   5. Prox Ramus CX Lesion: The percent stenosis is 75%.   6. Mid Ramus CX Lesion: The percent stenosis is 80%.   7. Mid RCA Lesion: The percent stenosis is 80%.   8. Mid RCA Lesion: Instent Restenosis.   9. Distal RCA Lesion: The percent stenosis is 90%.  10. The Left Ventricular Ejection Fraction is 55%.  11. Mid CX Lesion: 100%.       Past Medical History  He has a past medical history of Acute myocardial infarction, unspecified (01/24/2018), Acute myocardial infarction, unspecified (01/24/2018), BMI 29.0-29.9,adult (10/13/2023), Chronic obstructive pulmonary disease, unspecified (01/24/2018), Chronic obstructive pulmonary disease, unspecified (01/24/2018), Coronary angioplasty status, Encounter for immunization (11/04/2022), Encounter for screening for malignant neoplasm of colon (08/25/2016), Encounter for screening for malignant neoplasm of skin (04/14/2022), Nicotine dependence, cigarettes, uncomplicated (09/12/2022), Obesity, unspecified (05/25/2022), Old myocardial infarction (01/24/2018), Other acute postprocedural pain, Pain in left knee, Pain in right hip (11/04/2022), Personal history of other diseases of the digestive system (01/24/2018), Personal history of other diseases of the musculoskeletal system and connective tissue (08/25/2016), Personal history of other diseases of the respiratory system, Personal history of other endocrine, nutritional and metabolic disease (08/25/2016), Personal history of other mental and behavioral disorders (04/14/2022), Personal history of other specified conditions (12/08/2016), Personal history of other specified conditions, Personal history of other specified conditions (06/20/2018), Separation of muscle (nontraumatic),  other site (05/14/2018), Sprain of medial collateral ligament of left knee, initial encounter (08/28/2017), Sprain of medial collateral ligament of left knee, subsequent encounter (10/12/2017), Type 2 diabetes mellitus without complications (Multi) (01/24/2018), Type 2 diabetes mellitus without complications (Multi) (01/24/2018), and Umbilical hernia without obstruction or gangrene (05/14/2018).    Surgical History  He has a past surgical history that includes Coronary angioplasty with stent (08/25/2016); Tonsillectomy (08/25/2016); Colonoscopy (10/06/2016); Other surgical history (05/25/2022); Skin cancer excision (01/17/2025); and Cardiac catheterization (N/A, 2/6/2025).     Social History  He reports that he has been smoking cigarettes. He started smoking about 57 years ago. He has a 75 pack-year smoking history. He has been exposed to tobacco smoke. He has never used smokeless tobacco. He reports that he does not currently use alcohol. He reports that he does not use drugs.    Family History  Family History   Problem Relation Name Age of Onset    Coronary artery disease Mother      Diabetes Father      Coronary artery disease Father      Other (acute myocardial infarction) Father          Allergies  Tetracyclines, Atorvastatin, Empagliflozin, Pravastatin, Rosuvastatin, Simvastatin, and Statins-hmg-coa reductase inhibitors    Review of Systems  12 point ROS reviewed and -ve       Physical Exam  Gen: well appearing  Neuro: AAOx3, CN 2-12 intact, no FND  HEENT: PEERL, EOMI, sclera anicteric, no conjunctival injection, MMM, no oropharyngeal lesions  Neck: no elevated JVD  Resp: CTAB, normal breath sounds, no wheezing/crackles/ rales  CV: RRR, normal S1/S2, no murmurs/ rubs/gallops  GI: non-tender, non-distended, normal BSs in all 4 quadrants  MSK: warm and well perfused, no edema  Skin: warm and dry, no lesions, no rashes       Last Recorded Vitals  Blood pressure 132/78, pulse 71, temperature 36.6 °C (97.9 °F),  "resp. rate 16, height 1.778 m (5' 10\"), weight 85.5 kg (188 lb 8 oz), SpO2 94%.    Relevant Results  LABS:  CMP:  Results from last 7 days   Lab Units 02/15/25  0542 02/14/25  1520 02/11/25  0556 02/10/25  0502 02/09/25  0536   SODIUM mmol/L 133* 132*  --  131* 134*   POTASSIUM mmol/L 4.0 4.1  --  4.2 4.0   CHLORIDE mmol/L 97* 98  --  99 99   CO2 mmol/L 24 24  --  24 25   ANION GAP mmol/L 16 14  --  12 14   BUN mg/dL 11 13  --  10 12   CREATININE mg/dL 0.82 0.76  --  0.72 0.66   EGFR mL/min/1.73m*2 >90 >90  --  >90 >90   MAGNESIUM mg/dL 2.02 1.82  --  1.83 1.92   ALBUMIN g/dL 3.6 3.6 3.7  --   --    ALT U/L 17  --  16  --   --    AST U/L 15  --  17  --   --    BILIRUBIN TOTAL mg/dL 0.3  --  0.4  --   --      CBC:  Results from last 7 days   Lab Units 02/15/25  0542 02/14/25  1520 02/10/25  0502 02/09/25  0536   WBC AUTO x10*3/uL 7.6 7.9 6.4 6.0   HEMOGLOBIN g/dL 13.6 13.6 13.4* 13.6   HEMATOCRIT % 40.5* 38.0* 38.1* 38.7*   PLATELETS AUTO x10*3/uL 158 163 134* 131*   MCV fL 85 82 83 83     COAG:     ABO: No results found for: \"ABO\"  HEME/ENDO:     CARDIAC:   Results from last 7 days   Lab Units 02/10/25  0502   TROPHS ng/L 143*     Recent Labs     02/05/25  2248 12/26/24  1241 08/10/23  1209 04/18/23  1048 08/30/22  1013   CHOL 258* 319* 166 202* 182   LDLF  --   --  73 118* 91   LDLCALC 172*  --   --   --   --    HDL 24.7 32.8 26.9* 29.5* 25.0*   TRIG 309* 468* 332* 273* 331*      Scheduled medications  amLODIPine, 5 mg, oral, Daily  aspirin, 81 mg, oral, Daily  buPROPion XL, 150 mg, oral, Daily  ezetimibe, 10 mg, oral, Nightly  hydrOXYzine HCL, 25 mg, oral, BID  insulin glargine, 32 Units, subcutaneous, q AM  insulin glargine, 32 Units, subcutaneous, Nightly  insulin lispro, 0-15 Units, subcutaneous, TID with meals, nightly, & 0300  insulin lispro, 15 Units, subcutaneous, TID AC  isosorbide mononitrate ER, 30 mg, oral, Daily  lovastatin, 40 mg, oral, Nightly  pantoprazole, 40 mg, oral, Daily  polyethylene glycol, " 17 g, oral, Daily  venlafaxine XR, 150 mg, oral, Daily with breakfast      Continuous medications  heparin, 0-4,000 Units/hr, Last Rate: 1,400 Units/hr (02/15/25 1127)      PRN medications  PRN medications: acetaminophen, alum-mag hydroxide-simeth, dextrose, dextrose, dextrose, dextrose, glucagon, glucagon, glucagon, glucagon, ipratropium-albuteroL, magnesium hydroxide, melatonin, nitroglycerin, ondansetron, ondansetron       Assessment/Plan   Feliciano Worthington is a 74 y.o. male with a significant past medical history of CAD (s/p PCI with RCA stent 2011), Mobitz type 1 AV block, HTN, HLD, IDDM-II, COPD, tobacco use disorder, GERD, depression & anxiety who initially presented to [place] with an NSTEMI. Cor angio showed multivessel disease. EF 50-55% on TTE. Patient sent to [facility] for further workup of pancreatic mass which was incidentally found on CABG workup. Patient is currently asymptomatic (denies any chest pain).    -Cont ASA   -Pt's NSTEMI was 48h ago - does not need heparin for multivessel disease  -Cont lovastatin 40mg +zetia (patient has had intolerance to multiple statins in the past)  -Start metop tartrate 12.5mg BID for goal HR ~ 60  -Cont imdur 30mg  -If patient is a candidate for CABG in future, please consult CTS    Thank you for the consult. Cardiology will sign off.      I spent 45 minutes in the professional and overall care of this patient.      Kerri Moreno MD

## 2025-02-15 NOTE — NURSING NOTE
Patient admitted to [place] [id number] from [facility] earlier today. His admission screenings were completed this evening. He lives at home with his wife, brother in law, and his pets. He was independent with ADLs before coming to the hospital. Anticipate discharge to home when medically ready.

## 2025-02-16 VITALS
HEIGHT: 70 IN | BODY MASS INDEX: 26.99 KG/M2 | TEMPERATURE: 98.4 F | SYSTOLIC BLOOD PRESSURE: 120 MMHG | DIASTOLIC BLOOD PRESSURE: 60 MMHG | WEIGHT: 188.5 LBS | HEART RATE: 73 BPM | RESPIRATION RATE: 19 BRPM | OXYGEN SATURATION: 96 %

## 2025-02-16 LAB
ALBUMIN SERPL BCP-MCNC: 3.6 G/DL (ref 3.4–5)
ALP SERPL-CCNC: 77 U/L (ref 33–136)
ALT SERPL W P-5'-P-CCNC: 19 U/L (ref 10–52)
ANION GAP SERPL CALC-SCNC: 14 MMOL/L (ref 10–20)
AST SERPL W P-5'-P-CCNC: 15 U/L (ref 9–39)
BASOPHILS # BLD AUTO: 0.08 X10*3/UL (ref 0–0.1)
BASOPHILS NFR BLD AUTO: 1.1 %
BILIRUB SERPL-MCNC: 0.4 MG/DL (ref 0–1.2)
BUN SERPL-MCNC: 15 MG/DL (ref 6–23)
CALCIUM SERPL-MCNC: 9.1 MG/DL (ref 8.6–10.6)
CHLORIDE SERPL-SCNC: 99 MMOL/L (ref 98–107)
CO2 SERPL-SCNC: 24 MMOL/L (ref 21–32)
CREAT SERPL-MCNC: 0.87 MG/DL (ref 0.5–1.3)
EGFRCR SERPLBLD CKD-EPI 2021: >90 ML/MIN/1.73M*2
EOSINOPHIL # BLD AUTO: 0.21 X10*3/UL (ref 0–0.4)
EOSINOPHIL NFR BLD AUTO: 2.8 %
ERYTHROCYTE [DISTWIDTH] IN BLOOD BY AUTOMATED COUNT: 13 % (ref 11.5–14.5)
GLUCOSE BLD MANUAL STRIP-MCNC: 149 MG/DL (ref 74–99)
GLUCOSE BLD MANUAL STRIP-MCNC: 149 MG/DL (ref 74–99)
GLUCOSE BLD MANUAL STRIP-MCNC: 172 MG/DL (ref 74–99)
GLUCOSE BLD MANUAL STRIP-MCNC: 173 MG/DL (ref 74–99)
GLUCOSE BLD MANUAL STRIP-MCNC: 236 MG/DL (ref 74–99)
GLUCOSE BLD MANUAL STRIP-MCNC: 62 MG/DL (ref 74–99)
GLUCOSE SERPL-MCNC: 176 MG/DL (ref 74–99)
HCT VFR BLD AUTO: 39.6 % (ref 41–52)
HGB BLD-MCNC: 13.3 G/DL (ref 13.5–17.5)
IMM GRANULOCYTES # BLD AUTO: 0.05 X10*3/UL (ref 0–0.5)
IMM GRANULOCYTES NFR BLD AUTO: 0.7 % (ref 0–0.9)
LYMPHOCYTES # BLD AUTO: 2.72 X10*3/UL (ref 0.8–3)
LYMPHOCYTES NFR BLD AUTO: 35.9 %
MCH RBC QN AUTO: 28.5 PG (ref 26–34)
MCHC RBC AUTO-ENTMCNC: 33.6 G/DL (ref 32–36)
MCV RBC AUTO: 85 FL (ref 80–100)
MONOCYTES # BLD AUTO: 0.67 X10*3/UL (ref 0.05–0.8)
MONOCYTES NFR BLD AUTO: 8.9 %
NEUTROPHILS # BLD AUTO: 3.84 X10*3/UL (ref 1.6–5.5)
NEUTROPHILS NFR BLD AUTO: 50.6 %
NRBC BLD-RTO: 0 /100 WBCS (ref 0–0)
PLATELET # BLD AUTO: 149 X10*3/UL (ref 150–450)
POTASSIUM SERPL-SCNC: 4.2 MMOL/L (ref 3.5–5.3)
PROT SERPL-MCNC: 6.9 G/DL (ref 6.4–8.2)
RBC # BLD AUTO: 4.67 X10*6/UL (ref 4.5–5.9)
SODIUM SERPL-SCNC: 133 MMOL/L (ref 136–145)
UFH PPP CHRO-ACNC: 0.3 IU/ML
WBC # BLD AUTO: 7.6 X10*3/UL (ref 4.4–11.3)

## 2025-02-16 PROCEDURE — 80053 COMPREHEN METABOLIC PANEL: CPT

## 2025-02-16 PROCEDURE — 36415 COLL VENOUS BLD VENIPUNCTURE: CPT

## 2025-02-16 PROCEDURE — 82947 ASSAY GLUCOSE BLOOD QUANT: CPT

## 2025-02-16 PROCEDURE — 85520 HEPARIN ASSAY: CPT

## 2025-02-16 PROCEDURE — 2500000004 HC RX 250 GENERAL PHARMACY W/ HCPCS (ALT 636 FOR OP/ED)

## 2025-02-16 PROCEDURE — 2500000002 HC RX 250 W HCPCS SELF ADMINISTERED DRUGS (ALT 637 FOR MEDICARE OP, ALT 636 FOR OP/ED)

## 2025-02-16 PROCEDURE — 1200000002 HC GENERAL ROOM WITH TELEMETRY DAILY

## 2025-02-16 PROCEDURE — 99232 SBSQ HOSP IP/OBS MODERATE 35: CPT

## 2025-02-16 PROCEDURE — 85025 COMPLETE CBC W/AUTO DIFF WBC: CPT

## 2025-02-16 PROCEDURE — 2500000001 HC RX 250 WO HCPCS SELF ADMINISTERED DRUGS (ALT 637 FOR MEDICARE OP)

## 2025-02-16 RX ADMIN — ISOSORBIDE MONONITRATE 30 MG: 30 TABLET, EXTENDED RELEASE ORAL at 08:44

## 2025-02-16 RX ADMIN — EZETIMIBE 10 MG: 10 TABLET ORAL at 20:37

## 2025-02-16 RX ADMIN — PANTOPRAZOLE SODIUM 40 MG: 40 TABLET, DELAYED RELEASE ORAL at 06:55

## 2025-02-16 RX ADMIN — AMLODIPINE BESYLATE 5 MG: 5 TABLET ORAL at 08:44

## 2025-02-16 RX ADMIN — HEPARIN SODIUM 1400 UNITS/HR: 10000 INJECTION, SOLUTION INTRAVENOUS at 06:54

## 2025-02-16 RX ADMIN — INSULIN LISPRO 15 UNITS: 100 INJECTION, SOLUTION INTRAVENOUS; SUBCUTANEOUS at 08:43

## 2025-02-16 RX ADMIN — INSULIN GLARGINE 32 UNITS: 100 INJECTION, SOLUTION SUBCUTANEOUS at 03:45

## 2025-02-16 RX ADMIN — ASPIRIN 81 MG CHEWABLE TABLET 81 MG: 81 TABLET CHEWABLE at 08:44

## 2025-02-16 RX ADMIN — INSULIN GLARGINE 32 UNITS: 100 INJECTION, SOLUTION SUBCUTANEOUS at 20:39

## 2025-02-16 RX ADMIN — INSULIN LISPRO 15 UNITS: 100 INJECTION, SOLUTION INTRAVENOUS; SUBCUTANEOUS at 17:24

## 2025-02-16 RX ADMIN — INSULIN LISPRO 15 UNITS: 100 INJECTION, SOLUTION INTRAVENOUS; SUBCUTANEOUS at 11:55

## 2025-02-16 RX ADMIN — BUPROPION HYDROCHLORIDE 150 MG: 150 TABLET, EXTENDED RELEASE ORAL at 08:47

## 2025-02-16 RX ADMIN — VENLAFAXINE HYDROCHLORIDE 150 MG: 150 CAPSULE, EXTENDED RELEASE ORAL at 08:47

## 2025-02-16 RX ADMIN — HYDROXYZINE HYDROCHLORIDE 25 MG: 25 TABLET ORAL at 20:37

## 2025-02-16 RX ADMIN — HYDROXYZINE HYDROCHLORIDE 25 MG: 25 TABLET ORAL at 08:44

## 2025-02-16 RX ADMIN — INSULIN LISPRO 3 UNITS: 100 INJECTION, SOLUTION INTRAVENOUS; SUBCUTANEOUS at 03:45

## 2025-02-16 RX ADMIN — INSULIN GLARGINE 32 UNITS: 100 INJECTION, SOLUTION SUBCUTANEOUS at 08:43

## 2025-02-16 RX ADMIN — INSULIN LISPRO 3 UNITS: 100 INJECTION, SOLUTION INTRAVENOUS; SUBCUTANEOUS at 11:55

## 2025-02-16 ASSESSMENT — PAIN SCALES - GENERAL: PAINLEVEL_OUTOF10: 0 - NO PAIN

## 2025-02-16 ASSESSMENT — PAIN SCALES - WONG BAKER: WONGBAKER_NUMERICALRESPONSE: NO HURT

## 2025-02-16 NOTE — PROGRESS NOTES
Feliciano Worthington is a 74 y.o. male on day 2 of admission presenting with Pancreatic mass ([facility]).           Subjective  Feeling ok, no complaints, no constipation or change in bowel habits, no N&V. No Chest pain          Objective  Last Recorded Vitals  /71   Pulse 66   Temp 36.4 °C (97.5 °F)   Resp 15   Wt 85.5 kg (188 lb 8 oz)   SpO2 95%   Intake/Output last 3 Shifts:     Intake/Output Summary (Last 24 hours) at 2/15/2025 1239  Last data filed at 2/15/2025 1045      Gross per 24 hour   Intake 575.87 ml   Output --   Net 575.87 ml         Admission Weight  Weight: 85.5 kg (188 lb 8 oz) (02/14/25 2036)     Daily Weight  02/14/25 : 85.5 kg (188 lb 8 oz)     Image Results  MRCP pancreas w and wo IV contrast  Narrative: Interpreted By:  Dyllan aJmes,   STUDY:  MRCP PANCREAS W AND WO IV CONTRAST;  2/10/2025 3:20 pm      INDICATION:  Signs/Symptoms:pancreatic lesion noted on CT, pending cardiac surgery.          COMPARISON:  None.      ACCESSION NUMBER(S):  [id number]      ORDERING CLINICIAN:  MAXIMILIAN ROSA      TECHNIQUE:  MRI PANCREAS; Multiplanar magnetic resonance images of the abdomen  were obtained including the following sequences; T2-weighted SSFSE  with and without fat saturation, T1-weighted GRE in/opposed phase,  DWI, fat saturated 3D-T1w GRE pre and dynamically post contrast.  Radial thick slab T2w RARE MRCP and coronally reconstructed navigator  gated high resolution 3-D T2w RESTORE MRCP with MIP reconstruction  were also performed for MRCP.  17 ML of Dotarem was administered  intravenously without immediate complication.      FINDINGS:  Severely motion degraded exam.      LIVER:  No definite steatosis.  Difficult to characterize due to motion, there is an ill-defined 15  mm mildly T2 hyperintense, hypoenhancing mass in segment VI.      BILE DUCTS:  No dilatation. Common bile duct measures 3 mm. No definite filling  defect identified.      GALLBLADDER:  No stone or wall thickening.       PANCREAS:  There is a 38 mm hypoenhancing mass in the tail of the pancreas as  annotated on series 34, image 41/72. The remainder of the pancreas  appears unremarkable without ductal dilatation. Evaluation is limited  due to motion artifact.      SPLEEN:  Mildly enlarged measuring 14.1 cm in length.      ADRENAL GLANDS:  Unremarkable.      KIDNEYS:  28 mm cyst in the interpolar cortex of the left kidney. Otherwise  unremarkable kidneys without hydronephrosis      LYMPH NODES:  No lymphadenopathy.      ABDOMINAL VESSELS:  Abdominal aorta is atherosclerotic but otherwise patent without  aneurysm. Major visceral arterial branches are patent. IVC and  visualized major branches are patent. Major portal venous branches  are patent.      BOWEL:  No dilated bowel is visualized.Severe distal colonic diverticulosis.      PERITONEUM/RETROPERITONEUM:  No visualized free fluid.      BONES AND LOWER THORAX:  Mild thoracolumbar degenerative changes.  Bibasilar atelectasis.      Impression: 1. Severely motion degraded exam.  2. 38 mm pancreatic tail hypoenhancing mass suspicious for  adenocarcinoma.  3. 15 mm hepatic segment VI hypoenhancing mass suspicious for  metastasis.  4. No definite lymphadenopathy.          MACRO:  None      Signed by: Dyllan James 2/10/2025 3:33 PM  Dictation workstation:   [id number]  ECG 12 lead  Sinus rhythm with 2nd degree AV block (Mobitz I)  Left axis deviation  Anterior infarct , age undetermined  Abnormal ECG  When compared with ECG of 05-FEB-2025 22:52, (unconfirmed)  Anterior infarct is now Present  Confirmed by Kimmy Dickinson ([id number]) on 2/10/2025 12:31:40 PM        Physical Exam  Constitutional:       General: He is not in acute distress.     Appearance: Normal appearance. He is normal weight. He is not ill-appearing.   HENT:      Head: Normocephalic.   Cardiovascular:      Rate and Rhythm: Normal rate and regular rhythm.      Heart sounds: Normal heart sounds.   Pulmonary:      Effort: Pulmonary  effort is normal. No respiratory distress.   Abdominal:      General: Abdomen is flat.      Palpations: Abdomen is soft.   Musculoskeletal:      Cervical back: Normal range of motion.      Right lower leg: No edema.      Left lower leg: No edema.   Skin:     General: Skin is warm and dry.   Neurological:      Mental Status: He is alert. Mental status is at baseline.   Psychiatric:         Mood and Affect: Mood normal.      Relevant Results  MRCP pancreas w and wo IV contrast     Result Date: 2/10/2025  Interpreted By:  Dyllan James, STUDY: MRCP PANCREAS W AND WO IV CONTRAST;  2/10/2025 3:20 pm   INDICATION: Signs/Symptoms:pancreatic lesion noted on CT, pending cardiac surgery.     COMPARISON: None.   ACCESSION NUMBER(S): [id number]   ORDERING CLINICIAN: MAXIMILIAN ROSA   TECHNIQUE: MRI PANCREAS; Multiplanar magnetic resonance images of the abdomen were obtained including the following sequences; T2-weighted SSFSE with and without fat saturation, T1-weighted GRE in/opposed phase, DWI, fat saturated 3D-T1w GRE pre and dynamically post contrast. Radial thick slab T2w RARE MRCP and coronally reconstructed navigator gated high resolution 3-D T2w RESTORE MRCP with MIP reconstruction were also performed for MRCP.  17 ML of Dotarem was administered intravenously without immediate complication.   FINDINGS: Severely motion degraded exam.   LIVER: No definite steatosis. Difficult to characterize due to motion, there is an ill-defined 15 mm mildly T2 hyperintense, hypoenhancing mass in segment VI.   BILE DUCTS: No dilatation. Common bile duct measures 3 mm. No definite filling defect identified.   GALLBLADDER: No stone or wall thickening.   PANCREAS: There is a 38 mm hypoenhancing mass in the tail of the pancreas as annotated on series 34, image 41/72. The remainder of the pancreas appears unremarkable without ductal dilatation. Evaluation is limited due to motion artifact.   SPLEEN: Mildly enlarged measuring 14.1 cm in  length.   ADRENAL GLANDS: Unremarkable.   KIDNEYS: 28 mm cyst in the interpolar cortex of the left kidney. Otherwise unremarkable kidneys without hydronephrosis   LYMPH NODES: No lymphadenopathy.   ABDOMINAL VESSELS: Abdominal aorta is atherosclerotic but otherwise patent without aneurysm. Major visceral arterial branches are patent. IVC and visualized major branches are patent. Major portal venous branches are patent.   BOWEL: No dilated bowel is visualized.Severe distal colonic diverticulosis.   PERITONEUM/RETROPERITONEUM: No visualized free fluid.   BONES AND LOWER THORAX: Mild thoracolumbar degenerative changes. Bibasilar atelectasis.        1. Severely motion degraded exam. 2. 38 mm pancreatic tail hypoenhancing mass suspicious for adenocarcinoma. 3. 15 mm hepatic segment VI hypoenhancing mass suspicious for metastasis. 4. No definite lymphadenopathy.     MACRO: None   Signed by: Dyllan Jaems 2/10/2025 3:33 PM Dictation workstation:   [id number]     ECG 12 lead     Result Date: 2/10/2025  Sinus rhythm with 2nd degree AV block (Mobitz I) Left axis deviation Anterior infarct , age undetermined Abnormal ECG When compared with ECG of 05-FEB-2025 22:52, (unconfirmed) Anterior infarct is now Present Confirmed by Kimmy Dickinson ([id number]) on 2/10/2025 12:31:40 PM     Pulmonary function testing     Result Date: 2/7/2025  The FEV1/FVC is normal. The FEV1 is normal. The FVC is normal. The TLC by body plethysmography is normal. The DLCO corrected for hemoglobin is moderately reduced. The airways resistance is normal.   Conclusion: Normal spirometry. Lung volumes are consistent with hyperinflation. DLCO values corrected for hemoglobin are consistent with pulmonary vascular impairment, emphysema with preserved lung volume or anemia.     Transthoracic Echo Complete     Result Date: 2/7/2025          [address] [address]  Tel [phone] Fax [phone] TRANSTHORACIC ECHOCARDIOGRAM REPORT  Patient Name:       JAIME FERNANDEZ     Casa Physician:    [id number] Fredrick Davidson MD, [facility] Study Date:         2/7/2025            Ordering Provider:    [id number] MAXIMILIAN ROSA MRN/PID:            [id number]            Fellow: Accession#:         [id number]        Nurse: Date of Birth/Age:  1950 / 74      Sonographer:          Suzie DE LA CRUZ Gender Assigned at                     Additional Staff: Birth: Height:             177.80 cm           Admit Date:           2/5/2025 Weight:             81.65 kg            Admission Status:     Inpatient -                                                               Routine BSA / BMI:          2.00 m2 / 25.83     Department Location:  [place] [address]                                     Echo Lab Blood Pressure: 133 /76 mmHg Study Type:    TRANSTHORACIC ECHO (TTE) COMPLETE Diagnosis/ICD: Encounter for other preprocedural examination-Z01.818; Non ST                elevation (NSTEMI) myocardial infarction-I21.4 Indication:    PRE CABG, CPT Codes:     Echo Complete w Full Doppler-[id number] Patient History: Smoker:            Current. Diabetes:          Yes Pertinent History: HTN, Hyperlipidemia, CAD and COPD. Study Detail: The following Echo studies were performed: 2D, M-Mode, Doppler and               color flow. Technically challenging study due to body habitus,               prominent lung artifact and poor acoustic windows. Definity used               as a contrast agent for endocardial border definition. Total               contrast used for this procedure was 2 mL via IV push. The patient               was awake.  PHYSICIAN INTERPRETATION: Left Ventricle: The left ventricular systolic function is low normal, with a visually estimated ejection fraction of  50-55%. There are no regional wall motion abnormalities. The left ventricular cavity size is normal. There is mild increased septal and mildly increased posterior left ventricular wall thickness. There is left ventricular concentric remodeling. Spectral Doppler shows a Grade I (impaired relaxation pattern) of left ventricular diastolic filling with normal left atrial filling pressure. Left Atrium: The left atrial size is normal. Left atrial volume index 18.9 mL/m2. Right Ventricle: The right ventricle is normal in size. There is normal right ventricular global systolic function. Right Atrium: The right atrial size is normal. Aortic Valve: The aortic valve is trileaflet. There is moderate aortic valve cusp calcification. The aortic valve dimensionless index is 0.57. There is mild aortic valve regurgitation. The peak instantaneous gradient of the aortic valve is 10 mmHg. The mean gradient of the aortic valve is 6 mmHg. 1+ aortic regurgitation. Mitral Valve: The mitral valve is normal in structure. There is mild mitral annular calcification. There is trace mitral valve regurgitation. Trivial mitral regurgitation. Tricuspid Valve: The tricuspid valve is structurally normal. No evidence of tricuspid regurgitation. Trivial to 1+ tricuspid regurgitation with estimated RVSP 31 mmHg. Pulmonic Valve: The pulmonic valve is structurally normal. There is no indication of pulmonic valve regurgitation. Pericardium: Trivial pericardial effusion. There is a pericardial fat pad present. Aorta: The aortic root is normal. There is mild dilatation of the ascending aorta. There is mild dilatation of the aortic root. The aortic root measures 3.9 cm. The ascending aorta measures 4.0 cm. Systemic Veins: The inferior vena cava appears normal in size, with IVC inspiratory collapse greater than 50%. In comparison to the previous echocardiogram(s): No previous available for comparison.  CONCLUSIONS:  1. The left ventricular systolic function is  low normal, with a visually estimated ejection fraction of 50-55%.  2. No regional wall motion abnormalities.  3. Spectral Doppler shows a Grade I (impaired relaxation pattern) of left ventricular diastolic filling with normal left atrial filling pressure.  4. Trivial mitral regurgitation.  5. Trivial to 1+ tricuspid regurgitation with estimated RVSP 31 mmHg.  6. There is moderate aortic valve cusp calcification.  7. Mild aortic valve regurgitation.  8. The aortic root measures 3.9 cm.     The ascending aorta measures 4.0 cm.  9. No previous available for comparison. QUANTITATIVE DATA SUMMARY:  2D MEASUREMENTS:             Normal Ranges: Ao Root d:       3.90 cm     (2.0-3.7cm) LAs:             3.30 cm     (2.7-4.0cm) IVSd:            1.08 cm     (0.6-1.1cm) LVPWd:           1.08 cm     (0.6-1.1cm) LVIDd:           4.62 cm     (3.9-5.9cm) LVIDs:           3.48 cm LV Mass Index:   89.1 g/m2 LVEDV Index:     47.39 ml/m2 LV % FS          24.7 %  LEFT ATRIUM:                  Normal Ranges: LA Vol A4C:        21.8 ml    (22+/-6mL/m2) LA Vol A2C:        37.7 ml LA Vol BP:         30.5 ml LA Vol Index A4C:  10.9ml/m2 LA Vol Index A2C:  18.9 ml/m2 LA Vol Index BP:   15.3 ml/m2 LA Area A4C:       10.5 cm2 LA Area A2C:       14.7 cm2 LA Major Axis A4C: 4.3 cm LA Major Axis A2C: 4.9 cm LA Volume Index:   14.5 ml/m2 LA Vol A4C:        22.0 ml LA Vol A2C:        35.0 ml LA Vol Index BSA:  14.3 ml/m2  RIGHT ATRIUM:                 Normal Ranges: RA Vol A4C:        22.4 ml    (8.3-19.5ml) RA Vol Index A4C:  11.2 ml/m2 RA Area A4C:       10.7 cm2 RA Major Axis A4C: 4.4 cm  AORTA MEASUREMENTS:         Normal Ranges: Asc Ao, d:          4.00 cm (2.1-3.4cm)  LV SYSTOLIC FUNCTION:                      Normal Ranges: EF-A4C View:    47 % (>=55%) EF-A2C View:    48 % EF-Biplane:     47 % EF-Visual:      53 % LV EF Reported: 53 %  LV DIASTOLIC FUNCTION:             Normal Ranges: MV Peak E:             0.61 m/s    (0.7-1.2 m/s) MV Peak  A:             0.96 m/s    (0.42-0.7 m/s) E/A Ratio:             0.64        (1.0-2.2) MV e'                  0.079 m/s   (>8.0) MV lateral e'          0.08 m/s MV medial e'           0.08 m/s E/e' Ratio:            7.69        (<8.0) PulmV Sys Dion:         56.60 cm/s PulmV Carias Dion:        43.20 cm/s PulmV S/D Dion:         1.30 PulmV A Revs Dion:      29.10 cm/s PulmV A Revs Dur:      103.00 msec  MITRAL VALVE:          Normal Ranges: MV DT:        240 msec (150-240msec)  AORTIC VALVE:                      Normal Ranges: AoV Vmax:                1.59 m/s  (<=1.7m/s) AoV Peak PG:             10.1 mmHg (<20mmHg) AoV Mean P.0 mmHg  (1.7-11.5mmHg) LVOT Max Dion:            0.92 m/s  (<=1.1m/s) AoV VTI:                 30.20 cm  (18-25cm) LVOT VTI:                17.30 cm LVOT Diameter:           2.10 cm   (1.8-2.4cm) AoV Area, VTI:           1.98 cm2  (2.5-5.5cm2) AoV Area,Vmax:           2.00 cm2  (2.5-4.5cm2) AoV Dimensionless Index: 0.57  AORTIC INSUFFICIENCY: AI Vmax:       3.77 m/s AI Half-time:  623 msec AI Decel Rate: 178.00 cm/s2  RIGHT VENTRICLE: RV Basal 3.50 cm RV Mid   2.98 cm RV Major 8.1 cm TAPSE:   19.7 mm RV s'    0.10 m/s  TRICUSPID VALVE/RVSP:          Normal Ranges: Peak TR Velocity:     2.64 m/s RV Syst Pressure:     31 mmHg  (< 30mmHg) IVC Diam:             1.13 cm  PULMONIC VALVE:          Normal Ranges: PV Accel Time:  129 msec (>120ms) PV Max Dion:     0.9 m/s  (0.6-0.9m/s) PV Max PG:      3.3 mmHg  PULMONARY VEINS: PulmV A Revs Dur: 103.00 msec PulmV A Revs Dion: 29.10 cm/s PulmV Carias Dion:   43.20 cm/s PulmV S/D Dion:    1.30 PulmV Sys Dion:    56.60 cm/s  [id number] Fredrick Davidson MD, [facility] Electronically signed on 2025 at 10:51:06 AM  ** Final **      CT chest wo IV contrast     Result Date: 2025  Interpreted By:  Amarjit Ortiz, STUDY: CT CHEST WO IV CONTRAST;  2025 4:39 pm   INDICATION: Signs/Symptoms:pre op cardiac surgery.     COMPARISON: 2024   ACCESSION NUMBER(S):  [id number]   ORDERING CLINICIAN: MAXIMILIAN ROSA   TECHNIQUE: Helical data acquisition of the chest was obtained without the use of IV contrast. Images were reformatted in axial, coronal, and sagittal planes.   FINDINGS: POTENTIAL LIMITATIONS OF THE STUDY:   Lack of IV contrast   HEART AND VESSELS:   No calcified plaques are seen involving the ascending aorta. There are atherosclerotic calcifications of the aortic arch, descending thoracic aorta, and the branches of the aorta. Aorta is unchanged in course and caliber. Stable ectasia of the ascending aorta up to 3.9 cm.   The heart is unchanged in size.   No pericardial effusion is seen.   MEDIASTINUM AND MARIA INES, LOWER NECK AND AXILLA: The visualized thyroid gland is within normal limits.   No evidence of thoracic lymphadenopathy by CT criteria.   Esophagus appears within normal limits as seen.   LUNGS AND AIRWAYS: The trachea and central airways are patent. No endobronchial lesion.   There are areas of fibrosis/scarring in the lungs, predominantly along the periphery. Appearance is not significantly changed when compared to the previous examination. No new areas of consolidation. No effusion. No pneumothorax. Stable partially calcified nodule along the major fissure on the right measuring approximately 8 mm. A few additional nodules are seen measuring 5 mm or less in size, for example, image 117 in the right upper lobe. There is a new nodular opacity in the medial aspect of the right lower lobe measuring approximately 8 mm in size. Although this may be infectious/inflammatory nature, follow-up is recommended to document resolution, image 146.   UPPER ABDOMEN: The visualized subdiaphragmatic structures demonstrate no acute abnormality. 2.2 cm cyst in the midpole of the left kidney. There is nonspecific prominence/thickening of the tail the pancreas with a 2.5 cm hypoattenuating lesion. This is not adequately characterized on this unenhanced CT. MRI is recommended for  further evaluation.   CHEST WALL AND OSSEOUS STRUCTURES: Degenerative changes. No acute process.        No calcified plaques along the ascending aorta. There is atherosclerotic disease of the remaining portions of the aorta and its branches. Indeterminate lesion involving the tail the pancreas. MRI recommended for further evaluation. Interval development of an 8 mm right lower lobe nodule which may be infectious/inflammatory in nature but will require follow-up to document resolution.   MACRO: None.   Signed by: Amarjit Ortiz 2/7/2025 9:05 AM Dictation workstation:   [id number]     Vascular US lower extremity vein mapping bilateral     Result Date: 2/7/2025  Interpreted By:  Amarjit Ortiz, STUDY: [facility] US LOWER EXTREMITY VEIN MAPPING BILATERAL;  2/6/2025 5:33 pm   INDICATION: Signs/Symptoms:pre op cardiac surgery.   ,I21.4 Non-ST elevation (NSTEMI) myocardial infarction (Multi),R60.1 Generalized edema   COMPARISON: None.   ACCESSION NUMBER(S): [id number]   ORDERING CLINICIAN: MAXIMILIAN ROSA   TECHNIQUE: Real time duplex sonographic evaluation of the lower extremity venous systems was performed for venous mapping.   FINDINGS: Right Greater Saphenous Vein: Thigh- Proximal-0.3 x 0.2 cm Mid-0.2 x 0.2 cm Distal-0.3 x 0.2 cm   Calf-   Proximal-0.3 x 0.2 cm Mid-0.2 x 0.1 cm Distal-0.2 x 0.1 cm   Right Lesser Saphenous Vein: Proximal-0.3 x 0.2 cm Mid-0.2 x 0.1 cm Distal-0.2 x 0.1 cm     Left Greater Saphenous Vein: Thigh- Proximal-0.3 x 0.3 cm Mid-0.2 x 0.2 cm Distal-0.1 x 0.1 cm   Calf-   Proximal-0.1 x 0.1 cm Mid-0.2 x 0.1 cm Distal-0.3 x 0.1 cm   Left Lesser Saphenous Vein: Proximal-0.3 x 0.1 cm Mid-0.2 x 0.2 cm Distal-0.2 x 0.1 cm        Lower extremity venous mapping as detailed above.   MACRO: None.   Signed by: Amarjit Ortiz 2/7/2025 7:25 AM Dictation workstation:   [id number]     Carotid duplex bilateral     Result Date: 2/7/2025  Interpreted By:  Amarjit Ortiz, STUDY: [facility] US CAROTID ARTERY DUPLEX BILATERAL;  2/6/2025  5:32 pm   INDICATION: Signs/Symptoms:pre op caridac surgery.   ,I21.4 Non-ST elevation (NSTEMI) myocardial infarction (Multi),I79.8 Other disorders of arteries, arterioles and capillaries in diseases classified elsewhere   COMPARISON: 10/30/2023   ACCESSION NUMBER(S): [id number]   ORDERING CLINICIAN: MAXIMILIAN ROAS   TECHNIQUE: Vascular ultrasound of the extracranial carotid system was performed bilaterally.  Gray scale, color Doppler and spectral Doppler waveform analysis was performed.   FINDINGS: There is atherosclerotic disease most conspicuous at the carotid bulbs.   RIGHT:   RIGHT SIDE PEAK SYSTOLIC VELOCITY TABLE: CCA  78 cm/s. ICA  126 cm/s. ECA  69 cm/s.     The ratio of the peak systolic velocity of the right ICA/CCA is  1.6.   RIGHT VERTEBRAL ARTERY: The right vertebral artery demonstrates proximal anterograde flow.   LEFT:   LEFT SIDE PEAK SYSTOLIC VELOCITY TABLE: CCA 50 cm/s. ICA 62 cm/s. ECA 64 cm/s.     The ratio of the peak systolic velocity of the left ICA/CCA is 1.1.   LEFT VERTEBRAL ARTERY: The left vertebral artery demonstrates proximal anterograde flow.        Utilizing the peak systolic velocities, the estimated degree of stenosis within the internal carotid arteries is less than 50% bilaterally.   MACRO: None.     Signed by: Amarjit Ortiz 2/7/2025 7:24 AM Dictation workstation:   [id number]     Cardiac Catheterization Procedure     Result Date: 2/6/2025   [facility], Cath Lab        [address] [place] [address] Cardiovascular Catheterization Report Patient Name:      JAIME FERNANDEZ      Performing Physician:  [id number] Montana MD Study Date:        2/6/2025             Verifying Physician:   Litzy Montana MD MRN/PID:           [id number]             Cardiologist/Co-Scrub: Accession#:        [id number]         Ordering  Provider:     [id number] DERIK MARIELLE                                                                FEI Date of Birth/Age: 1950 / 74 years Cardiologist: Gender:            M                    Fellow: Encounter#:        [id number]           Surgeon:  Study: Left Heart Cath  Indications: JAIME FERNANDEZ is a 75 year old male who presents with dyslipidemia, hypertension, prior myocardial infarction, diabetes, smoker, chronic pulmonary disease, coronary artery disease, prior percutaneous coronary intervention and a chest pain assessment of typical angina. NSTE - ACS.  Procedure Description: After infiltration with 2% Lidocaine, the right femoral artery was cannulated with a modified Seldinger technique. Subsequently a 5 Slovak sheath was placed in the right femoral artery. Selective coronary catheterization was performed using a 5 Fr catheter(s) exchanged over a guide wire to cannulate the coronary arteries. A JL 4 tip catheter was used for left coronary injections. A 3drc tip catheter was used for right coronary injections. Multiple injections of contrast were made into the left and right coronary arteries with angiograms recorded in multiple projections. Retrograde left heart catheterizion was accomplished with a 5 Fr. pigtail catheter. A single plane left ventriculogram was recorded in the 30 degree RAYMOND projection. The contrast dose was 20 ml injected at 10 ml/sec. The catheter was then withdrawn across the aortic valve under continuous pressure monitoring and removed. After completion of the procedure, femoral artery angiography was performed. This demonstrated a common femoral artery puncture appropriate for closure. A Vascade 5F vascular closure Device was placed per protocol.  Coronary Angiography: The coronary circulation is right dominant.  Left Main Coronary Artery: The left main coronary artery is calcified. There is 70% stenosis in the distal left main coronary artery.  Left Anterior Descending Coronary  Artery Distribution: There is 80% stenosis in the proximal left anterior descending artery.  Circumflex Coronary Artery Distribution: There is 90% stenosis in the the proximal Circumflex artery. There is 75% stenosis in the Prox Ramus Circumflex artery. There is 80% stenosis in the Mid Ramus Circumflex artery.  Right Coronary Artery Distribution: There is 80% stenosis in the the mid Right Coronary Artery. There is evidence of instent restenosis in this segment. The distal right coronary artery showed 90% stenosis.  Left Ventriculography: The estimated left ventricular ejection fraction is normal at 55%.  Hemo Personnel: +---------------------------+---------+ Name                       Duty      +---------------------------+---------+ Wil Bronson MD 1 +---------------------------+---------+  Hemodynamic Pressures:  +----+------------------+---------+-------------+-------------+------+---------+ Site    Date Time       Phase  Systolic mmHg  Diastolic    ED  Mean mmHg                         Name                    mmHg      mmHg           +----+------------------+---------+-------------+-------------+------+---------+   PAOLA  2/6/2025 9:36:07 AIR REST          127           65             87                     AM                                                   +----+------------------+---------+-------------+-------------+------+---------+   AO  2/6/2025 9:39:06 AIR REST           91           61             65                     AM                                                   +----+------------------+---------+-------------+-------------+------+---------+   AO  2/6/2025 9:44:05 AIR REST          133           72             96                     AM                                                   +----+------------------+---------+-------------+-------------+------+---------+   LV  2/6/2025 9:47:36 AIR REST           115           -1     6                              AM                                                   +----+------------------+---------+-------------+-------------+------+---------+  Cardiac Cath Post Procedure Notes: Post Procedure Diagnosis: Triple vessel disease. Blood Loss:               Estimated blood loss during the procedure was 0 mls. Specimens Removed:        Number of specimen(s) removed: none. ____________________________________________________________________________________ CONCLUSIONS:  1. Distal Left Main: 70% stenosis.  2. Left Main Coronary Artery: This artery is calcified.  3. Proximal LAD Lesion: The percent stenosis is 80%.  4. Proximal CX Lesion: The percent stenosis is 90%.  5. Prox Ramus CX Lesion: The percent stenosis is 75%.  6. Mid Ramus CX Lesion: The percent stenosis is 80%.  7. Mid RCA Lesion: The percent stenosis is 80%.  8. Mid RCA Lesion: Instent Restenosis.  9. Distal RCA Lesion: The percent stenosis is 90%. 10. The Left Ventricular Ejection Fraction is 55%. 11. Mid CX Lesion: 100%. ICD 10 Codes: Non ST elevation (NSTEMI) myocardial infarction-I21.4  CPT Codes: Left Heart Cath (visualization of coronaries) and [id number]; Moderate Sedation Services initial 15 minutes patient >5 [id number]  [id number] Wil Montana MD Performing Physician Electronically signed by [id number] Wil Montana MD on 2/6/2025 at 10:19:09 AM  ** Final **      ECG 12 lead     Result Date: 2/6/2025  Sinus rhythm with 2nd degree AV block (Mobitz I) Right superior axis deviation Nonspecific ST abnormality Abnormal ECG When compared with ECG of 17-OCT-2023 18:49, No significant change was found Confirmed by Shanika Reese ([id number]) on 2/6/2025 9:38:21 AM     ECG 12 lead     Result Date: 2/6/2025  Sinus rhythm with 2nd degree AV block (Mobitz I) Right superior axis deviation Abnormal ECG When compared with ECG of 05-FEB-2025 21:41, (unconfirmed) No significant change was found     XR chest 1 view      Result Date: 2/5/2025  Interpreted By:  Tobin Machado, STUDY: XR CHEST 1 VIEW;  2/5/2025 10:17 pm   INDICATION: Signs/Symptoms:Chest Pain.   COMPARISON: None.   ACCESSION NUMBER(S): [id number]   ORDERING CLINICIAN: MARIELY ROCKWELL   FINDINGS: The cardiac silhouette is normal in size. There is mild bilateral opacities may correspond to chronic lung changes and atelectasis versus pneumonia. No segment pleural effusion. No pneumothorax.        Mild bilateral opacities may correspond to chronic lung changes and atelectasis versus pneumonia.   MACRO: None   Signed by: Tobin Machado 2/5/2025 10:38 PM Dictation workstation:   [id number]             Results for orders placed or performed during the hospital encounter of 02/14/25 (from the past 24 hours)   POCT GLUCOSE   Result Value Ref Range     POCT Glucose 156 (H) 74 - 99 mg/dL   CBC and Auto Differential   Result Value Ref Range     WBC 7.9 4.4 - 11.3 x10*3/uL     nRBC 0.0 0.0 - 0.0 /100 WBCs     RBC 4.63 4.50 - 5.90 x10*6/uL     Hemoglobin 13.6 13.5 - 17.5 g/dL     Hematocrit 38.0 (L) 41.0 - 52.0 %     MCV 82 80 - 100 fL     MCH 29.4 26.0 - 34.0 pg     MCHC 35.8 32.0 - 36.0 g/dL     RDW 12.6 11.5 - 14.5 %     Platelets 163 150 - 450 x10*3/uL     Neutrophils % 54.3 40.0 - 80.0 %     Immature Granulocytes %, Automated 0.8 0.0 - 0.9 %     Lymphocytes % 32.0 13.0 - 44.0 %     Monocytes % 9.1 2.0 - 10.0 %     Eosinophils % 2.9 0.0 - 6.0 %     Basophils % 0.9 0.0 - 2.0 %     Neutrophils Absolute 4.31 1.60 - 5.50 x10*3/uL     Immature Granulocytes Absolute, Automated 0.06 0.00 - 0.50 x10*3/uL     Lymphocytes Absolute 2.54 0.80 - 3.00 x10*3/uL     Monocytes Absolute 0.72 0.05 - 0.80 x10*3/uL     Eosinophils Absolute 0.23 0.00 - 0.40 x10*3/uL     Basophils Absolute 0.07 0.00 - 0.10 x10*3/uL   Renal function panel   Result Value Ref Range     Glucose 150 (H) 74 - 99 mg/dL     Sodium 132 (L) 136 - 145 mmol/L     Potassium 4.1 3.5 - 5.3 mmol/L     Chloride 98 98 - 107 mmol/L      Bicarbonate 24 21 - 32 mmol/L     Anion Gap 14 10 - 20 mmol/L     Urea Nitrogen 13 6 - 23 mg/dL     Creatinine 0.76 0.50 - 1.30 mg/dL     eGFR >90 >60 mL/min/1.73m*2     Calcium 9.4 8.6 - 10.6 mg/dL     Phosphorus 4.4 2.5 - 4.9 mg/dL     Albumin 3.6 3.4 - 5.0 g/dL   Magnesium   Result Value Ref Range     Magnesium 1.82 1.60 - 2.40 mg/dL   POCT GLUCOSE   Result Value Ref Range     POCT Glucose 228 (H) 74 - 99 mg/dL   CBC and Auto Differential   Result Value Ref Range     WBC 7.6 4.4 - 11.3 x10*3/uL     nRBC 0.0 0.0 - 0.0 /100 WBCs     RBC 4.77 4.50 - 5.90 x10*6/uL     Hemoglobin 13.6 13.5 - 17.5 g/dL     Hematocrit 40.5 (L) 41.0 - 52.0 %     MCV 85 80 - 100 fL     MCH 28.5 26.0 - 34.0 pg     MCHC 33.6 32.0 - 36.0 g/dL     RDW 12.8 11.5 - 14.5 %     Platelets 158 150 - 450 x10*3/uL     Neutrophils % 48.4 40.0 - 80.0 %     Immature Granulocytes %, Automated 0.5 0.0 - 0.9 %     Lymphocytes % 38.4 13.0 - 44.0 %     Monocytes % 9.3 2.0 - 10.0 %     Eosinophils % 2.5 0.0 - 6.0 %     Basophils % 0.9 0.0 - 2.0 %     Neutrophils Absolute 3.70 1.60 - 5.50 x10*3/uL     Immature Granulocytes Absolute, Automated 0.04 0.00 - 0.50 x10*3/uL     Lymphocytes Absolute 2.93 0.80 - 3.00 x10*3/uL     Monocytes Absolute 0.71 0.05 - 0.80 x10*3/uL     Eosinophils Absolute 0.19 0.00 - 0.40 x10*3/uL     Basophils Absolute 0.07 0.00 - 0.10 x10*3/uL   Comprehensive Metabolic Panel   Result Value Ref Range     Glucose 234 (H) 74 - 99 mg/dL     Sodium 133 (L) 136 - 145 mmol/L     Potassium 4.0 3.5 - 5.3 mmol/L     Chloride 97 (L) 98 - 107 mmol/L     Bicarbonate 24 21 - 32 mmol/L     Anion Gap 16 10 - 20 mmol/L     Urea Nitrogen 11 6 - 23 mg/dL     Creatinine 0.82 0.50 - 1.30 mg/dL     eGFR >90 >60 mL/min/1.73m*2     Calcium 9.3 8.6 - 10.6 mg/dL     Albumin 3.6 3.4 - 5.0 g/dL     Alkaline Phosphatase 87 33 - 136 U/L     Total Protein 6.8 6.4 - 8.2 g/dL     AST 15 9 - 39 U/L     Bilirubin, Total 0.3 0.0 - 1.2 mg/dL     ALT 17 10 - 52 U/L    Magnesium   Result Value Ref Range     Magnesium 2.02 1.60 - 2.40 mg/dL   Heparin Assay, UFH   Result Value Ref Range     Heparin Unfractionated 0.7 See Comment Below for Therapeutic Ranges IU/mL   POCT GLUCOSE   Result Value Ref Range     POCT Glucose 226 (H) 74 - 99 mg/dL   POCT GLUCOSE   Result Value Ref Range     POCT Glucose 176 (H) 74 - 99 mg/dL   Heparin Assay, UFH   Result Value Ref Range     Heparin Unfractionated 0.6 See Comment Below for Therapeutic Ranges IU/mL   POCT GLUCOSE   Result Value Ref Range     POCT Glucose 212 (H) 74 - 99 mg/dL      Scheduled medications    Scheduled Medications   amLODIPine, 5 mg, oral, Daily  aspirin, 81 mg, oral, Daily  buPROPion XL, 150 mg, oral, Daily  ezetimibe, 10 mg, oral, Nightly  hydrOXYzine HCL, 25 mg, oral, BID  insulin glargine, 32 Units, subcutaneous, q AM  insulin glargine, 32 Units, subcutaneous, Nightly  insulin lispro, 0-15 Units, subcutaneous, TID with meals, nightly, & 0300  insulin lispro, 15 Units, subcutaneous, TID AC  isosorbide mononitrate ER, 30 mg, oral, Daily  lovastatin, 40 mg, oral, Nightly  metoprolol tartrate, 12.5 mg, oral, BID  pantoprazole, 40 mg, oral, Daily  polyethylene glycol, 17 g, oral, Daily  venlafaxine XR, 150 mg, oral, Daily with breakfast         Continuous medications    Continuous Medications   heparin, 0-4,000 Units/hr, Last Rate: 1,400 Units/hr (02/15/25 1127)         PRN medications  PRN Medications   PRN medications: acetaminophen, alum-mag hydroxide-simeth, dextrose, dextrose, dextrose, dextrose, glucagon, glucagon, glucagon, glucagon, ipratropium-albuteroL, magnesium hydroxide, melatonin, nitroglycerin, ondansetron, ondansetron        Assessment & Plan  Pancreatic mass ([facility])  Feliciano Worthington is a 74 y.o. male with a significant past medical history of CAD (s/p PCI with RCA stent 2011), Mobitz type 1 AV block, HTN, HLD, IDDM-II, COPD, tobacco use disorder, GERD, depression & anxiety presenting as a transfer from   [place] s/p NSTEMI with incidental pancreatic mass noted on ajit-operative CABG evaluation s/p MRCP revelaing 38 mm pancreatic tail mass with 15 mm liver lesion with c/f metastasis and now pending IR guided liver biopsy.     Updates 2/16/2025:  -Npo midnight for  IR guided liver biopsy   - Keep off heparin  - Cardiology signed off     #New Pancreatic tail mass  ::CT chest revealing indeterminate pancreatic tail mass  ::MRCP revealing 38 mm pancreatic tail mass and 15 mm hepatic lesion concerning for metastasis   :: Last LFTs wnl  ::CA 19-9 <4  :: GI consulted; awaiting formal recommendations     Plan:  - Follow up GI recommendations, plan to consult IR for liver biopsy on Monday      #Acute non-STEMI with triple-vessel CAD   #CABG Evaluation  #Hx of Mobitz type I  ::Patient with evidence of 3 vessel disease from previous catheterization at [facility] with CABG recommended  ::Hx of prior stent to RCA in 2011  ::Patient received aspirin and heparin gtt >48 hours  ::Chest pain resolved per patient     Plan:  - Continue aspirin  - Stop heparin  - Called cardiology   - Hold cardiothoracic CABG consult depending on prognosis of pancreatic tumor biopsy  - Continue telemetry  - Nitroglycerin PRN   - EKG PRN  - Consider GDMT as tolerated with slightly reduced EF  - Avoiding BB in setting of Mobitz 1  - Resume home lovastatin     #Uncontrolled type 2 diabetes   :: last A1c of 14.7  :: [facility] Endocrinology recommendations of Lantus 32 BID and Novolog 15 TID     Plan:  - Continue to monitor BG on this regimen   - Consider endocrinology consult if difficulty controlling BG     #Depression/anxiety  :: Patient noted to have depressive type symptoms at OSH   :: Psych consulted at OSH and recommended buproprion added to his venlafaxine as well to aid in decreasing smoking cessation  :: Patient did not endorse on admission interview 2/14/25     Plan:   - Continue medications as above  - CTM     #Pulmonary nodule  ::Pulmonary  "nodules-most appear chronic  :: new 8 mm right lower lobe, \"possibly infectious/inflammatory in nature but should have follow-up to document resolution     Plan:   - PCP follow up on discharge to monitor of nodules     #Hyponatremia  :: Patient persistently hyponatremic at OSH ~132  :: Chlorthalidone held at OSH     Plan:  - Repeat RFP on transfer  -  Continue to monitor   -  Consider hyponatremia workup if worsening     #HLD  :: Takes lovastatin at home     Plan:  - Continue statin     #COPD ?  :: Patient actively smokes 1 to 1.5 ppd  :: PFT's 2/7/25 reveal normal lung volumes, normal FEV/FVC, but decreased DLCO of 63     Plan:  - CTM  - Duonebs as needed     #Unintentional weight loss  :: patient reports 50 lbs weight loss over the course of the year  :: possibly secondary to poor glycemic control versus malignancy   :: TSH wnl      Plan:  - Continue workup for pancreatic tail mass as above  - Consider nutrition consult in the AM     F: PRN  E: PRN  N: Carb controlled  A: PIV     Code: Full  NOK: Britney (Wife) [phone]    Monisha Guido MD      "

## 2025-02-16 NOTE — CARE PLAN
Problem: Pain - Adult  Goal: Verbalizes/displays adequate comfort level or baseline comfort level  Outcome: Progressing     Problem: Safety - Adult  Goal: Free from fall injury  Outcome: Progressing     Problem: Diabetes  Goal: Achieve decreasing blood glucose levels by end of shift  Outcome: Progressing  Goal: Increase stability of blood glucose readings by end of shift  Outcome: Progressing  Goal: Maintain glucose levels >70mg/dl to <250mg/dl throughout shift  Outcome: Progressing   The patient's goals for the shift include      The clinical goals for the shift include Pt will remain safe and free of falls.

## 2025-02-17 LAB
ALBUMIN SERPL BCP-MCNC: 3.8 G/DL (ref 3.4–5)
ALP SERPL-CCNC: 83 U/L (ref 33–136)
ALT SERPL W P-5'-P-CCNC: 19 U/L (ref 10–52)
ANION GAP SERPL CALC-SCNC: 10 MMOL/L (ref 10–20)
APTT PPP: 26 SECONDS (ref 27–38)
AST SERPL W P-5'-P-CCNC: 13 U/L (ref 9–39)
BILIRUB SERPL-MCNC: 0.3 MG/DL (ref 0–1.2)
BUN SERPL-MCNC: 13 MG/DL (ref 6–23)
CALCIUM SERPL-MCNC: 9.2 MG/DL (ref 8.6–10.6)
CHLORIDE SERPL-SCNC: 99 MMOL/L (ref 98–107)
CO2 SERPL-SCNC: 29 MMOL/L (ref 21–32)
CREAT SERPL-MCNC: 0.77 MG/DL (ref 0.5–1.3)
EGFRCR SERPLBLD CKD-EPI 2021: >90 ML/MIN/1.73M*2
GLUCOSE BLD MANUAL STRIP-MCNC: 121 MG/DL (ref 74–99)
GLUCOSE BLD MANUAL STRIP-MCNC: 209 MG/DL (ref 74–99)
GLUCOSE BLD MANUAL STRIP-MCNC: 226 MG/DL (ref 74–99)
GLUCOSE BLD MANUAL STRIP-MCNC: 295 MG/DL (ref 74–99)
GLUCOSE BLD MANUAL STRIP-MCNC: 305 MG/DL (ref 74–99)
GLUCOSE BLD MANUAL STRIP-MCNC: 51 MG/DL (ref 74–99)
GLUCOSE BLD MANUAL STRIP-MCNC: 67 MG/DL (ref 74–99)
GLUCOSE SERPL-MCNC: 153 MG/DL (ref 74–99)
HOLD SPECIMEN: NORMAL
INR PPP: 1 (ref 0.9–1.1)
POTASSIUM SERPL-SCNC: 3.9 MMOL/L (ref 3.5–5.3)
PROT SERPL-MCNC: 6.8 G/DL (ref 6.4–8.2)
PROTHROMBIN TIME: 11.4 SECONDS (ref 9.8–12.8)
SODIUM SERPL-SCNC: 134 MMOL/L (ref 136–145)

## 2025-02-17 PROCEDURE — 2500000002 HC RX 250 W HCPCS SELF ADMINISTERED DRUGS (ALT 637 FOR MEDICARE OP, ALT 636 FOR OP/ED)

## 2025-02-17 PROCEDURE — 1200000002 HC GENERAL ROOM WITH TELEMETRY DAILY

## 2025-02-17 PROCEDURE — 85610 PROTHROMBIN TIME: CPT

## 2025-02-17 PROCEDURE — 99232 SBSQ HOSP IP/OBS MODERATE 35: CPT

## 2025-02-17 PROCEDURE — 82947 ASSAY GLUCOSE BLOOD QUANT: CPT

## 2025-02-17 PROCEDURE — 36415 COLL VENOUS BLD VENIPUNCTURE: CPT

## 2025-02-17 PROCEDURE — 84075 ASSAY ALKALINE PHOSPHATASE: CPT

## 2025-02-17 PROCEDURE — 2500000005 HC RX 250 GENERAL PHARMACY W/O HCPCS

## 2025-02-17 PROCEDURE — 2500000001 HC RX 250 WO HCPCS SELF ADMINISTERED DRUGS (ALT 637 FOR MEDICARE OP)

## 2025-02-17 RX ORDER — INSULIN GLARGINE 100 [IU]/ML
27 INJECTION, SOLUTION SUBCUTANEOUS NIGHTLY
Status: DISCONTINUED | OUTPATIENT
Start: 2025-02-17 | End: 2025-02-18 | Stop reason: HOSPADM

## 2025-02-17 RX ORDER — INSULIN GLARGINE 100 [IU]/ML
27 INJECTION, SOLUTION SUBCUTANEOUS EVERY MORNING
Status: DISCONTINUED | OUTPATIENT
Start: 2025-02-18 | End: 2025-02-18 | Stop reason: HOSPADM

## 2025-02-17 RX ORDER — CLOPIDOGREL BISULFATE 75 MG/1
75 TABLET ORAL DAILY
Status: DISCONTINUED | OUTPATIENT
Start: 2025-02-17 | End: 2025-02-18 | Stop reason: HOSPADM

## 2025-02-17 RX ADMIN — VENLAFAXINE HYDROCHLORIDE 150 MG: 150 CAPSULE, EXTENDED RELEASE ORAL at 09:32

## 2025-02-17 RX ADMIN — INSULIN LISPRO 9 UNITS: 100 INJECTION, SOLUTION INTRAVENOUS; SUBCUTANEOUS at 13:43

## 2025-02-17 RX ADMIN — EZETIMIBE 10 MG: 10 TABLET ORAL at 20:11

## 2025-02-17 RX ADMIN — INSULIN LISPRO 6 UNITS: 100 INJECTION, SOLUTION INTRAVENOUS; SUBCUTANEOUS at 20:46

## 2025-02-17 RX ADMIN — CLOPIDOGREL BISULFATE 75 MG: 75 TABLET ORAL at 18:36

## 2025-02-17 RX ADMIN — INSULIN LISPRO 15 UNITS: 100 INJECTION, SOLUTION INTRAVENOUS; SUBCUTANEOUS at 18:37

## 2025-02-17 RX ADMIN — DEXTROSE MONOHYDRATE 12.5 G: 25 INJECTION, SOLUTION INTRAVENOUS at 09:01

## 2025-02-17 RX ADMIN — ISOSORBIDE MONONITRATE 30 MG: 30 TABLET, EXTENDED RELEASE ORAL at 09:31

## 2025-02-17 RX ADMIN — HYDROXYZINE HYDROCHLORIDE 25 MG: 25 TABLET ORAL at 09:31

## 2025-02-17 RX ADMIN — ASPIRIN 81 MG CHEWABLE TABLET 81 MG: 81 TABLET CHEWABLE at 09:31

## 2025-02-17 RX ADMIN — INSULIN LISPRO 12 UNITS: 100 INJECTION, SOLUTION INTRAVENOUS; SUBCUTANEOUS at 18:50

## 2025-02-17 RX ADMIN — INSULIN GLARGINE 32 UNITS: 100 INJECTION, SOLUTION SUBCUTANEOUS at 09:36

## 2025-02-17 RX ADMIN — INSULIN LISPRO 15 UNITS: 100 INJECTION, SOLUTION INTRAVENOUS; SUBCUTANEOUS at 06:06

## 2025-02-17 RX ADMIN — ACETAMINOPHEN 650 MG: 325 TABLET ORAL at 22:36

## 2025-02-17 RX ADMIN — AMLODIPINE BESYLATE 5 MG: 5 TABLET ORAL at 09:31

## 2025-02-17 RX ADMIN — BUPROPION HYDROCHLORIDE 150 MG: 150 TABLET, EXTENDED RELEASE ORAL at 09:32

## 2025-02-17 RX ADMIN — INSULIN LISPRO 15 UNITS: 100 INJECTION, SOLUTION INTRAVENOUS; SUBCUTANEOUS at 13:43

## 2025-02-17 RX ADMIN — PANTOPRAZOLE SODIUM 40 MG: 40 TABLET, DELAYED RELEASE ORAL at 06:09

## 2025-02-17 RX ADMIN — INSULIN GLARGINE 27 UNITS: 100 INJECTION, SOLUTION SUBCUTANEOUS at 20:46

## 2025-02-17 RX ADMIN — HYDROXYZINE HYDROCHLORIDE 25 MG: 25 TABLET ORAL at 20:11

## 2025-02-17 ASSESSMENT — COGNITIVE AND FUNCTIONAL STATUS - GENERAL
MOBILITY SCORE: 24
DAILY ACTIVITIY SCORE: 24
DAILY ACTIVITIY SCORE: 24
MOBILITY SCORE: 24

## 2025-02-17 ASSESSMENT — PAIN SCALES - GENERAL
PAINLEVEL_OUTOF10: 0 - NO PAIN
PAINLEVEL_OUTOF10: 4
PAINLEVEL_OUTOF10: 0 - NO PAIN

## 2025-02-17 ASSESSMENT — PAIN - FUNCTIONAL ASSESSMENT
PAIN_FUNCTIONAL_ASSESSMENT: 0-10
PAIN_FUNCTIONAL_ASSESSMENT: 0-10

## 2025-02-17 NOTE — CARE PLAN
The patient's goals for the shift include      The clinical goals for the shift include Pt will remain safe and free of fall/injury during this shift.    Problem: Safety - Adult  Goal: Free from fall injury  Outcome: Progressing     Problem: Diabetes  Goal: Increase stability of blood glucose readings by end of shift  Outcome: Progressing  Goal: Maintain glucose levels >70mg/dl to <250mg/dl throughout shift  Outcome: Progressing

## 2025-02-17 NOTE — CONSULTS
"Nutrition Initial Assessment:   Nutrition Assessment    Reason for Assessment: Admission nursing screening - MST 4, reported 50 lb weight loss    Patient is a 74 y.o. male presenting as a transfer from [facility] s/p NSTEMI with incidental pancreatic mass noted on ajit-operative CABG evaluation s/p MRCP revelaing 38 mm pancreatic tail mass with 15 mm liver lesion with c/f metastasis and now pending IR guided liver biopsy.     PMHx of CAD (s/p PCI with RCA stent 2011), Mobitz type 1 AV block, HTN, HLD, IDDM-II, COPD, tobacco use disorder, GERD, depression & anxiety     Nutrition History:  Energy Intake: Good > 75 %  Food and Nutrient History: Pt reports his appetite has been good with no recent changes. Pt states his baseline is 3 meals/day with 100% intake. Pt reports feeling as though he is eating good/meeting needs. Pt with 2 documented meals of 75% and 100%. Pt seen by OSH RDN on 2/06 and 2/12, both documented pt with good intake or pt reporting good intake. Pt endorses unintentonal 50 lb weight loss x 1 year and is unsure of the cause. Pt amenable to try Glucerna. Pt denies N/V/D or constipation, pt states his last BM was yesterday and was a normal appearance.  Vitamin/Herbal Supplement Use: MVI, B12, and CoQ10 per pt  Food Allergy:  (None per pt)       Anthropometrics:  Height: 177.8 cm (5' 10\")   Weight: 85.5 kg (188 lb 8 oz)   BMI (Calculated): 27.05  IBW/kg (Dietitian Calculated): 75.5 kg  Percent of IBW: 113 %       Weight History:   Wt Readings from Last 10 Encounters:   02/14/25 85.5 kg (188 lb 8 oz)   02/12/25 87.2 kg (192 lb 3.9 oz) - ?accuracy   01/29/25 85.5 kg (188 lb 6.4 oz)   10/15/24 90.3 kg (199 lb)   10/04/24 89.8 kg (198 lb)   09/03/24 87.9 kg (193 lb 12.8 oz)   05/07/24 92.5 kg (204 lb)   05/03/24 92.6 kg (204 lb 2 oz)   02/14/24 94.8 kg (209 lb)      12/28/23 94.8 kg (209 lb)   12/20/23 94.8 kg (209 lb)       Weight Change %:  Weight History / % Weight Change: Per chart, pt with a 5.3% weight " loss x 4 months, a 7.6% weight loss x 9 months, and a 9.8% weight loss x 1 year.  Significant Weight Loss: No    Nutrition Focused Physical Exam Findings:  Mild-moderate findings may be age-related  Subcutaneous Fat Loss:   Orbital Fat Pads: Well nourished (slightly bulging fat pads)  Buccal Fat Pads: Well nourished (full, rounded cheeks)  Triceps: Well nourished (ample fat tissue)  Muscle Wasting:  Temporalis: Well nourished (well-defined muscle)  Pectoralis (Clavicular Region): Well nourished (clavicle not visible)  Deltoid/Trapezius: Well nourished (rounded appearance at arm, shoulder, neck)  Interosseous: Well nourished (muscle bulges)  Quadriceps: Mild-Moderate (mild depression on inner and outer thigh)  Gastrocnemius: Mild-Moderate (not well developed muscle)  Edema:  Edema: none  Physical Findings:  Skin: Positive (R leg puncture)    Nutrition Significant Labs:  CBC Trend:   Results from last 7 days   Lab Units 02/16/25  0538 02/15/25  0542 02/14/25  1520   WBC AUTO x10*3/uL 7.6 7.6 7.9   RBC AUTO x10*6/uL 4.67 4.77 4.63   HEMOGLOBIN g/dL 13.3* 13.6 13.6   HEMATOCRIT % 39.6* 40.5* 38.0*   MCV fL 85 85 82   PLATELETS AUTO x10*3/uL 149* 158 163    , BMP Trend:   Results from last 7 days   Lab Units 02/17/25  0643 02/16/25  0538 02/15/25  0542 02/14/25  1520   GLUCOSE mg/dL 153* 176* 234* 150*   CALCIUM mg/dL 9.2 9.1 9.3 9.4   SODIUM mmol/L 134* 133* 133* 132*   POTASSIUM mmol/L 3.9 4.2 4.0 4.1   CO2 mmol/L 29 24 24 24   CHLORIDE mmol/L 99 99 97* 98   BUN mg/dL 13 15 11 13   CREATININE mg/dL 0.77 0.87 0.82 0.76    , A1C:  Lab Results   Component Value Date    HGBA1C 14.7 (H) 02/06/2025   , BG POCT trend:   Results from last 7 days   Lab Units 02/17/25  0932 02/17/25  0857 02/17/25  0817 02/17/25  0603 02/16/25  2037   POCT GLUCOSE mg/dL 121* 67* 51* 226* 236*    , Liver Function Trend:   Results from last 7 days   Lab Units 02/17/25  0643 02/16/25  0538 02/15/25  0542 02/11/25  0556   ALK PHOS U/L 83 77 87 81  "  AST U/L 13 15 15 17   ALT U/L 19 19 17 16   BILIRUBIN TOTAL mg/dL 0.3 0.4 0.3 0.4    , Renal Lab Trend:   Results from last 7 days   Lab Units 02/17/25  0643 02/16/25  0538 02/15/25  0542 02/14/25  1520   POTASSIUM mmol/L 3.9 4.2 4.0 4.1   PHOSPHORUS mg/dL  --   --   --  4.4   SODIUM mmol/L 134* 133* 133* 132*   MAGNESIUM mg/dL  --   --  2.02 1.82   EGFR mL/min/1.73m*2 >90 >90 >90 >90   BUN mg/dL 13 15 11 13   CREATININE mg/dL 0.77 0.87 0.82 0.76    , Vit D:   Lab Results   Component Value Date    VITD25 31 08/10/2023    , Vit B12:   Lab Results   Component Value Date    [id number] 411 02/07/2025    , Iron Panel: No results found for: \"IRON\", \"TIBC\", \"FERRITIN\" , Folate: No results found for: \"FOLATE\"     Nutrition Specific Medications:  Scheduled medications  amLODIPine, 5 mg, oral, Daily  aspirin, 81 mg, oral, Daily  buPROPion XL, 150 mg, oral, Daily  ezetimibe, 10 mg, oral, Nightly  hydrOXYzine HCL, 25 mg, oral, BID  insulin glargine, 32 Units, subcutaneous, q AM  insulin glargine, 32 Units, subcutaneous, Nightly  insulin lispro, 0-15 Units, subcutaneous, TID with meals, nightly, & 0300  insulin lispro, 15 Units, subcutaneous, TID AC  isosorbide mononitrate ER, 30 mg, oral, Daily  lovastatin, 40 mg, oral, Nightly  pantoprazole, 40 mg, oral, Daily  polyethylene glycol, 17 g, oral, Daily  venlafaxine XR, 150 mg, oral, Daily with breakfast      Continuous medications     PRN medications  PRN medications: acetaminophen, alum-mag hydroxide-simeth, dextrose, dextrose, dextrose, dextrose, glucagon, glucagon, glucagon, glucagon, ipratropium-albuteroL, magnesium hydroxide, melatonin, nitroglycerin, ondansetron, ondansetron      I/O:    ;      Dietary Orders (From admission, onward)       Start     Ordered    02/17/25 0904  Adult diet Regular, Consistent Carb; CCD 75 gm/meal  Diet effective now        Question Answer Comment   Diet type Regular    Diet type Consistent Carb    Carb diet selection: CCD 75 gm/meal        " 02/17/25 0904    02/14/25 2147  May Participate in Room Service  ( ROOM SERVICE MAY PARTICIPATE)  Once        Question:  .  Answer:  Yes    02/14/25 2146                     Estimated Needs:   Total Energy Estimated Needs in 24 hours (kCal):  (2100 kcal)  Method for Estimating Needs: MSJ 1606 x 1.3  Total Protein Estimated Needs in 24 Hours (g):  (90 g)  Method for Estimating 24 Hour Protein Needs: 1.2 g/kg IBW  Total Fluid Estimated Needs in 24 Hours (mL):  (1 ml/kcal or per med team)           Nutrition Diagnosis   Malnutrition Diagnosis  Patient has Malnutrition Diagnosis: No (Pt with no significant wt loss, adequate appetite, suspect NFPE findings may be age-related)    Nutrition Diagnosis  Patient has Nutrition Diagnosis: Yes  Diagnosis Status (1): New  Nutrition Diagnosis 1: Unintended weight loss  Related to (1): unknown etiology at this time  As Evidenced by (1): Pt report of unintentional 50 lbweight loss, 9.8% weight loss x 1 year       Nutrition Interventions/Recommendations   Nutrition prescription for oral nutrition    Nutrition Recommendations:  May continue consistent carb 75 gm/meal. If intake declines, liberalize to regular to promote intake.   Ordered Glucerna chocolate (220 kcal, 10 g pro) once daily.   Re-check vit D levels to assess supplementation need.    Nutrition Interventions/Goals:   Interventions: Medical food supplement  Medical Food Supplement: Commercial beverage medical food supplement therapy  Goal: Consume 1/day      Education Documentation  Discussed increased protein needs and sources            Nutrition Monitoring and Evaluation   Food/Nutrient Related History Monitoring  Monitoring and Evaluation Plan: Estimated Energy Intake  Estimated Energy Intake: Energy intake greater or equal to 75% of estimated energy needs    Anthropometric Measurements  Monitoring and Evaluation Plan: Body weight  Body Weight: Body weight - Maintain stable weight    Biochemical Data, Medical Tests  and Procedures  Monitoring and Evaluation Plan: Electrolyte/renal panel, Glucose/endocrine profile, Vitamin profile  Criteria: WNL         Goal Status: New goal(s) identified    Time Spent (min): 45 minutes

## 2025-02-17 NOTE — DOCUMENTATION CLARIFICATION NOTE
"    PATIENT:               JAIME FERNANDEZ  ACCT #:                  [id number]  MRN:                       [id number]  :                       1950  ADMIT DATE:       2025 9:40 PM  DISCH DATE:        2025 11:35 AM  RESPONDING PROVIDER #:        [id number]          PROVIDER RESPONSE TEXT:    NSTEMI unrelated to stent    CDI QUERY TEXT:    Clarification      Instruction:    Based on your assessment of the patient and the clinical information, please provide the requested documentation by clicking on the appropriate radio button and enter any additional information if prompted.    Question: Please clarify if a relationship exists between    When answering this query, please exercise your independent professional judgment. The fact that a question is being asked, does not imply that any particular answer is desired or expected.    The patient's clinical indicators include:  Clinical Information: 74-yo with hx of CAD presenting for evaluation of chest pain found to have NSTEMI    Clinical Indicators:  HP \"s/p PCI with RCA stent \" \" He describes chest pain as mild 3-4 out of 10 in severity, retrosternal, without radiation to arms neck jaw\"      Cardiology \"Coronary artery disease with remote stent to circumflex artery  and RCA stent \"     Left Heart Cath \"Mid RCA Lesion: Instent Restenosis.\"     Cardiology \"LHC revealed 70% stenosis in the distal left main, 80% stenosis in the proximal LAD, 90% stenosis in the proximal circumflex artery, 75% stenosis in the proximal ramus, 80% stenosis in the mid ramus, 80% stenosis in the mid RCA, 90% stenosis in the distal RCA and an LVEF of 55%. \"    Treatment: Daily labs, LHC, Planning for CABG, nitroglycerin (Nitrostat) SL tablet 0.4 mg every 5 Min PRN    Risk Factors: Hx stent,  CAD  Options provided:  -- NSTEMI related to stent  -- NSTEMI unrelated to stent  -- Other - I will add my own diagnosis  -- Refer to Clinical Documentation " Reviewer    Query created by: Ysabel Ibrahim on 2/11/2025 6:44 AM      Electronically signed by:  MURIEL LUND MD 2/17/2025 12:53 PM

## 2025-02-17 NOTE — CONSULTS
Interventional Radiology Consult    Feliciano Worthington is a 74 y.o. with a PMHx of CAD s/p PCI with RCA stent, COPD, Type 1 AV Block, who initially presented to OSH for NSTEMI but transferred to [facility] due to incidental 38mm pancreatic tail mass discovered on MRCP with 1.5 cm metastasis to the liver in segment 6. IR was consulted for targeted liver mass biopsy. Given that this liver lesion was discovered incidentally and is not the main reason for patient's current admission, will plan to pursue this biopsy in an outpatient setting.    Angelo Barboza MD   R-2, Diagnostic Radiology

## 2025-02-17 NOTE — PROGRESS NOTES
"Feliciano Worthington is a 74 y.o. male on day 3 of admission presenting with Pancreatic mass (HHS-HCC).    Subjective   NAEON     Patient was seen comfortable in bed this AM. He denies any new acute complaints.     Denies fever, chills, ha, cp, sob, n/v/d/c, dysuria, abdominal pain, skin changes  Objective     Physical Exam  Constitutional:       General: He is not in acute distress.     Appearance: Normal appearance.   HENT:      Head: Normocephalic and atraumatic.   Eyes:      General: No scleral icterus.     Extraocular Movements: Extraocular movements intact.      Conjunctiva/sclera: Conjunctivae normal.      Pupils: Pupils are equal, round, and reactive to light.   Cardiovascular:      Rate and Rhythm: Normal rate and regular rhythm.      Pulses: Normal pulses.      Heart sounds: No murmur heard.     No friction rub. No gallop.   Pulmonary:      Effort: Pulmonary effort is normal. No respiratory distress.      Breath sounds: Normal breath sounds. No wheezing.   Abdominal:      General: Abdomen is flat. Bowel sounds are normal. There is no distension.      Palpations: Abdomen is soft.      Tenderness: There is no abdominal tenderness.   Musculoskeletal:      Cervical back: Normal range of motion.      Right lower leg: No edema.      Left lower leg: No edema.   Skin:     General: Skin is warm.      Capillary Refill: Capillary refill takes less than 2 seconds.   Neurological:      Mental Status: He is alert and oriented to person, place, and time. Mental status is at baseline.   Psychiatric:         Mood and Affect: Mood normal.         Behavior: Behavior normal.         Last Recorded Vitals  Blood pressure 128/62, pulse 68, temperature 36.2 °C (97.2 °F), temperature source Temporal, resp. rate 18, height 1.778 m (5' 10\"), weight 85.5 kg (188 lb 8 oz), SpO2 96%.  Intake/Output last 3 Shifts:  No intake/output data recorded.    Relevant Results  Medications  Scheduled medications  amLODIPine, 5 mg, oral, Daily  aspirin, " 81 mg, oral, Daily  buPROPion XL, 150 mg, oral, Daily  ezetimibe, 10 mg, oral, Nightly  hydrOXYzine HCL, 25 mg, oral, BID  insulin glargine, 32 Units, subcutaneous, q AM  insulin glargine, 32 Units, subcutaneous, Nightly  insulin lispro, 0-15 Units, subcutaneous, TID with meals, nightly, & 0300  insulin lispro, 15 Units, subcutaneous, TID AC  isosorbide mononitrate ER, 30 mg, oral, Daily  lovastatin, 40 mg, oral, Nightly  pantoprazole, 40 mg, oral, Daily  polyethylene glycol, 17 g, oral, Daily  venlafaxine XR, 150 mg, oral, Daily with breakfast      Continuous medications     PRN medications  PRN medications: acetaminophen, alum-mag hydroxide-simeth, dextrose, dextrose, dextrose, dextrose, glucagon, glucagon, glucagon, glucagon, ipratropium-albuteroL, magnesium hydroxide, melatonin, nitroglycerin, ondansetron, ondansetron     Labs  Results for orders placed or performed during the hospital encounter of 02/14/25 (from the past 24 hours)   POCT GLUCOSE   Result Value Ref Range    POCT Glucose 173 (H) 74 - 99 mg/dL   POCT GLUCOSE   Result Value Ref Range    POCT Glucose 62 (L) 74 - 99 mg/dL   POCT GLUCOSE   Result Value Ref Range    POCT Glucose 149 (H) 74 - 99 mg/dL   POCT GLUCOSE   Result Value Ref Range    POCT Glucose 236 (H) 74 - 99 mg/dL   POCT GLUCOSE   Result Value Ref Range    POCT Glucose 226 (H) 74 - 99 mg/dL   Comprehensive Metabolic Panel   Result Value Ref Range    Glucose 153 (H) 74 - 99 mg/dL    Sodium 134 (L) 136 - 145 mmol/L    Potassium 3.9 3.5 - 5.3 mmol/L    Chloride 99 98 - 107 mmol/L    Bicarbonate 29 21 - 32 mmol/L    Anion Gap 10 10 - 20 mmol/L    Urea Nitrogen 13 6 - 23 mg/dL    Creatinine 0.77 0.50 - 1.30 mg/dL    eGFR >90 >60 mL/min/1.73m*2    Calcium 9.2 8.6 - 10.6 mg/dL    Albumin 3.8 3.4 - 5.0 g/dL    Alkaline Phosphatase 83 33 - 136 U/L    Total Protein 6.8 6.4 - 8.2 g/dL    AST 13 9 - 39 U/L    Bilirubin, Total 0.3 0.0 - 1.2 mg/dL    ALT 19 10 - 52 U/L   Coagulation Screen   Result  Value Ref Range    Protime 11.4 9.8 - 12.8 seconds    INR 1.0 0.9 - 1.1    aPTT 26 (L) 27 - 38 seconds   POCT GLUCOSE   Result Value Ref Range    POCT Glucose 51 (L) 74 - 99 mg/dL   POCT GLUCOSE   Result Value Ref Range    POCT Glucose 67 (L) 74 - 99 mg/dL        No results found.          Assessment/Plan   Assessment & Plan  Pancreatic mass (HHS-HCC)  Feliciano Worthington is a 74 y.o. male with a significant past medical history of CAD (s/p PCI with RCA stent 2011), Mobitz type 1 AV block, HTN, HLD, IDDM-II, COPD, tobacco use disorder, GERD, depression & anxiety presenting as a transfer from [facility] s/p NSTEMI with incidental pancreatic mass noted on ajit-operative CABG evaluation s/p MRCP revelaing 38 mm pancreatic tail mass with 15 mm liver lesion with c/f metastasis and now pending IR guided liver biopsy which will be completed on outpatient basis.     jonny Worthington is a 74 y.o. male with a significant past medical history of CAD (s/p PCI with RCA stent 2011), Mobitz type 1 AV block, HTN, HLD, IDDM-II, COPD, tobacco use disorder, GERD, depression & anxiety presenting as a transfer from [facility] s/p NSTEMI with incidental pancreatic mass noted on ajit-operative CABG evaluation s/p MRCP revelaing 38 mm pancreatic tail mass with 15 mm liver lesion with c/f metastasis and now pending IR guided liver biopsy will need to happen outpt per  IR. Will attempt GI consult for pancreatic tail mass biopsy     Updates 02/17/25 :  - Contacted GI for possible pancreatic tail mass biopsy  - IR recommends liver biopsy outpatient.  - Keep off heparin       #New Pancreatic tail mass  ::CT chest revealing indeterminate pancreatic tail mass  ::MRCP revealing 38 mm pancreatic tail mass and 15 mm hepatic lesion concerning for metastasis   :: Last LFTs wnl  ::CA 19-9 <4  :: GI consulted   Plan:  - Follow up GI recommendations     #Acute non-STEMI with triple-vessel CAD   #CABG Evaluation  #Hx of Mobitz type I  ::Patient with evidence of 3  "vessel disease from previous catheterization at [facility] with CABG recommended  ::Hx of prior stent to RCA in 2011  ::Patient received aspirin and heparin gtt >48 hours  ::Chest pain resolved per patient  Plan:  - Continue aspirin  - Stop heparin  - Called cardiology   - Hold cardiothoracic CABG consult depending on prognosis of pancreatic tumor biopsy  - Continue telemetry  - Nitroglycerin PRN   - EKG PRN  - Consider GDMT as tolerated with slightly reduced EF  - Avoiding BB in setting of Mobitz 1  - Resume home lovastatin     #Uncontrolled type 2 diabetes   :: last A1c of 14.7  :: [facility] Endocrinology recommendations of Lantus 32 BID and Novolog 15 TID  Plan:  - Continue to monitor BG on this regimen   - Consider endocrinology consult if difficulty controlling BG     #Depression/anxiety  :: Patient noted to have depressive type symptoms at OSH   :: Psych consulted at OSH and recommended buproprion added to his venlafaxine as well to aid in decreasing smoking cessation  :: Patient did not endorse on admission interview 2/14/25  Plan:   - Continue medications as above  - CTM     #Pulmonary nodule  ::Pulmonary nodules-most appear chronic  :: new 8 mm right lower lobe, \"possibly infectious/inflammatory in nature but should have follow-up to document resolution  Plan:   - PCP follow up on discharge to monitor of nodules     #Hyponatremia  :: Patient persistently hyponatremic at OSH ~132  :: Chlorthalidone held at OSH  Plan:  - Repeat RFP on transfer  -  Continue to monitor   -  Consider hyponatremia workup if worsening     #HLD  :: Takes lovastatin at home  Plan:  - Continue statin     #COPD ?  :: Patient actively smokes 1 to 1.5 ppd  :: PFT's 2/7/25 reveal normal lung volumes, normal FEV/FVC, but decreased DLCO of 63  Plan:  - CTM  - Duonebs as needed     #Unintentional weight loss  :: patient reports 50 lbs weight loss over the course of the year  :: possibly secondary to poor glycemic control versus malignancy "   :: TSH wnl   Plan:  - Continue workup for pancreatic tail mass as above     F: PRN  E: PRN  N: Carb controlled  A: PIV     Code: Full Code  NOK: Britnye (Wife) [phone]             Jose Real MD  Internal Medicine, PGY-1

## 2025-02-17 NOTE — ASSESSMENT & PLAN NOTE
Feliciano Worthington is a 74 y.o. male with a significant past medical history of CAD (s/p PCI with RCA stent 2011), Mobitz type 1 AV block, HTN, HLD, IDDM-II, COPD, tobacco use disorder, GERD, depression & anxiety presenting as a transfer from [facility] s/p NSTEMI with incidental pancreatic mass noted on ajit-operative CABG evaluation s/p MRCP revelaing 38 mm pancreatic tail mass with 15 mm liver lesion with c/f metastasis and now pending IR guided liver biopsy which will be completed on outpatient basis.     jonny Worthington is a 74 y.o. male with a significant past medical history of CAD (s/p PCI with RCA stent 2011), Mobitz type 1 AV block, HTN, HLD, IDDM-II, COPD, tobacco use disorder, GERD, depression & anxiety presenting as a transfer from [facility] s/p NSTEMI with incidental pancreatic mass noted on ajit-operative CABG evaluation s/p MRCP revelaing 38 mm pancreatic tail mass with 15 mm liver lesion with c/f metastasis and now pending IR guided liver biopsy will need to happen outpt per  IR. Will attempt GI consult for pancreatic tail mass biopsy     Updates 02/17/25 :  - Contacted GI for possible pancreatic tail mass biopsy  - IR recommends liver biopsy outpatient.  - Keep off heparin       #New Pancreatic tail mass  ::CT chest revealing indeterminate pancreatic tail mass  ::MRCP revealing 38 mm pancreatic tail mass and 15 mm hepatic lesion concerning for metastasis   :: Last LFTs wnl  ::CA 19-9 <4  :: GI consulted   Plan:  - Follow up GI recommendations     #Acute non-STEMI with triple-vessel CAD   #CABG Evaluation  #Hx of Mobitz type I  ::Patient with evidence of 3 vessel disease from previous catheterization at [facility] with CABG recommended  ::Hx of prior stent to RCA in 2011  ::Patient received aspirin and heparin gtt >48 hours  ::Chest pain resolved per patient  Plan:  - Continue aspirin  - Stop heparin  - Called cardiology   - Hold cardiothoracic CABG consult depending on prognosis of pancreatic tumor  "biopsy  - Continue telemetry  - Nitroglycerin PRN   - EKG PRN  - Consider GDMT as tolerated with slightly reduced EF  - Avoiding BB in setting of Mobitz 1  - Resume home lovastatin     #Uncontrolled type 2 diabetes   :: last A1c of 14.7  :: [facility] Endocrinology recommendations of Lantus 32 BID and Novolog 15 TID  Plan:  - Continue to monitor BG on this regimen   - Consider endocrinology consult if difficulty controlling BG     #Depression/anxiety  :: Patient noted to have depressive type symptoms at OSH   :: Psych consulted at OSH and recommended buproprion added to his venlafaxine as well to aid in decreasing smoking cessation  :: Patient did not endorse on admission interview 2/14/25  Plan:   - Continue medications as above  - CTM     #Pulmonary nodule  ::Pulmonary nodules-most appear chronic  :: new 8 mm right lower lobe, \"possibly infectious/inflammatory in nature but should have follow-up to document resolution  Plan:   - PCP follow up on discharge to monitor of nodules     #Hyponatremia  :: Patient persistently hyponatremic at OSH ~132  :: Chlorthalidone held at OSH  Plan:  - Repeat RFP on transfer  -  Continue to monitor   -  Consider hyponatremia workup if worsening     #HLD  :: Takes lovastatin at home  Plan:  - Continue statin     #COPD ?  :: Patient actively smokes 1 to 1.5 ppd  :: PFT's 2/7/25 reveal normal lung volumes, normal FEV/FVC, but decreased DLCO of 63  Plan:  - CTM  - Duonebs as needed     #Unintentional weight loss  :: patient reports 50 lbs weight loss over the course of the year  :: possibly secondary to poor glycemic control versus malignancy   :: TSH wnl   Plan:  - Continue workup for pancreatic tail mass as above     F: PRN  E: PRN  N: Carb controlled  A: PIV     Code: Full Code  NOK: Britney (Wife) [phone]  "

## 2025-02-17 NOTE — CARE PLAN
The patient's goals for the shift include      The clinical goals for the shift include Pt will remain HDS.    Over the shift, the patient did not make progress toward the following goals. Barriers to progression include . Recommendations to address these barriers include .

## 2025-02-18 VITALS
WEIGHT: 188.5 LBS | HEIGHT: 70 IN | HEART RATE: 70 BPM | BODY MASS INDEX: 26.99 KG/M2 | TEMPERATURE: 98.1 F | OXYGEN SATURATION: 96 % | RESPIRATION RATE: 16 BRPM | SYSTOLIC BLOOD PRESSURE: 114 MMHG | DIASTOLIC BLOOD PRESSURE: 72 MMHG

## 2025-02-18 PROBLEM — K59.00 CONSTIPATION: Status: ACTIVE | Noted: 2025-02-18

## 2025-02-18 PROBLEM — R11.0 NAUSEA: Status: ACTIVE | Noted: 2025-02-18

## 2025-02-18 LAB
ALBUMIN SERPL BCP-MCNC: 3.6 G/DL (ref 3.4–5)
ALP SERPL-CCNC: 83 U/L (ref 33–136)
ALT SERPL W P-5'-P-CCNC: 19 U/L (ref 10–52)
ANION GAP SERPL CALC-SCNC: 13 MMOL/L (ref 10–20)
AST SERPL W P-5'-P-CCNC: 14 U/L (ref 9–39)
BASOPHILS # BLD AUTO: 0.08 X10*3/UL (ref 0–0.1)
BASOPHILS NFR BLD AUTO: 1 %
BILIRUB SERPL-MCNC: 0.3 MG/DL (ref 0–1.2)
BUN SERPL-MCNC: 16 MG/DL (ref 6–23)
CALCIUM SERPL-MCNC: 9 MG/DL (ref 8.6–10.6)
CHLORIDE SERPL-SCNC: 98 MMOL/L (ref 98–107)
CO2 SERPL-SCNC: 28 MMOL/L (ref 21–32)
CREAT SERPL-MCNC: 0.96 MG/DL (ref 0.5–1.3)
EGFRCR SERPLBLD CKD-EPI 2021: 83 ML/MIN/1.73M*2
EOSINOPHIL # BLD AUTO: 0.16 X10*3/UL (ref 0–0.4)
EOSINOPHIL NFR BLD AUTO: 2 %
ERYTHROCYTE [DISTWIDTH] IN BLOOD BY AUTOMATED COUNT: 12.7 % (ref 11.5–14.5)
GLUCOSE BLD MANUAL STRIP-MCNC: 178 MG/DL (ref 74–99)
GLUCOSE BLD MANUAL STRIP-MCNC: 248 MG/DL (ref 74–99)
GLUCOSE BLD MANUAL STRIP-MCNC: 261 MG/DL (ref 74–99)
GLUCOSE SERPL-MCNC: 229 MG/DL (ref 74–99)
HCT VFR BLD AUTO: 39.3 % (ref 41–52)
HGB BLD-MCNC: 13.3 G/DL (ref 13.5–17.5)
IMM GRANULOCYTES # BLD AUTO: 0.07 X10*3/UL (ref 0–0.5)
IMM GRANULOCYTES NFR BLD AUTO: 0.9 % (ref 0–0.9)
LYMPHOCYTES # BLD AUTO: 2.53 X10*3/UL (ref 0.8–3)
LYMPHOCYTES NFR BLD AUTO: 31.1 %
MCH RBC QN AUTO: 29.1 PG (ref 26–34)
MCHC RBC AUTO-ENTMCNC: 33.8 G/DL (ref 32–36)
MCV RBC AUTO: 86 FL (ref 80–100)
MONOCYTES # BLD AUTO: 0.86 X10*3/UL (ref 0.05–0.8)
MONOCYTES NFR BLD AUTO: 10.6 %
NEUTROPHILS # BLD AUTO: 4.44 X10*3/UL (ref 1.6–5.5)
NEUTROPHILS NFR BLD AUTO: 54.4 %
NRBC BLD-RTO: 0 /100 WBCS (ref 0–0)
PLATELET # BLD AUTO: 146 X10*3/UL (ref 150–450)
POTASSIUM SERPL-SCNC: 4.1 MMOL/L (ref 3.5–5.3)
PROT SERPL-MCNC: 6.8 G/DL (ref 6.4–8.2)
RBC # BLD AUTO: 4.57 X10*6/UL (ref 4.5–5.9)
SODIUM SERPL-SCNC: 135 MMOL/L (ref 136–145)
WBC # BLD AUTO: 8.1 X10*3/UL (ref 4.4–11.3)

## 2025-02-18 PROCEDURE — 2500000001 HC RX 250 WO HCPCS SELF ADMINISTERED DRUGS (ALT 637 FOR MEDICARE OP)

## 2025-02-18 PROCEDURE — 85025 COMPLETE CBC W/AUTO DIFF WBC: CPT

## 2025-02-18 PROCEDURE — 36415 COLL VENOUS BLD VENIPUNCTURE: CPT

## 2025-02-18 PROCEDURE — 2500000002 HC RX 250 W HCPCS SELF ADMINISTERED DRUGS (ALT 637 FOR MEDICARE OP, ALT 636 FOR OP/ED)

## 2025-02-18 PROCEDURE — 82947 ASSAY GLUCOSE BLOOD QUANT: CPT

## 2025-02-18 PROCEDURE — 99239 HOSP IP/OBS DSCHRG MGMT >30: CPT

## 2025-02-18 PROCEDURE — 84075 ASSAY ALKALINE PHOSPHATASE: CPT

## 2025-02-18 RX ORDER — ACETAMINOPHEN 500 MG
5 TABLET ORAL NIGHTLY PRN
Qty: 30 TABLET | Refills: 0 | Status: SHIPPED | OUTPATIENT
Start: 2025-02-18 | End: 2025-03-20

## 2025-02-18 RX ORDER — ALUMINUM HYDROXIDE, MAGNESIUM HYDROXIDE, AND SIMETHICONE 1200; 120; 1200 MG/30ML; MG/30ML; MG/30ML
30 SUSPENSION ORAL 4 TIMES DAILY PRN
Qty: 355 ML | Refills: 0 | Status: SHIPPED | OUTPATIENT
Start: 2025-02-18

## 2025-02-18 RX ORDER — CLOPIDOGREL BISULFATE 75 MG/1
75 TABLET ORAL DAILY
Qty: 30 TABLET | Refills: 1 | Status: SHIPPED | OUTPATIENT
Start: 2025-02-19 | End: 2025-04-20

## 2025-02-18 RX ORDER — ONDANSETRON 4 MG/1
4 TABLET, FILM COATED ORAL EVERY 8 HOURS PRN
Qty: 20 TABLET | Refills: 0 | Status: SHIPPED | OUTPATIENT
Start: 2025-02-18 | End: 2025-03-20

## 2025-02-18 RX ORDER — POLYETHYLENE GLYCOL 3350 17 G/17G
17 POWDER, FOR SOLUTION ORAL DAILY
Qty: 90 UNSPECIFIED | Refills: 0 | Status: SHIPPED | OUTPATIENT
Start: 2025-02-19 | End: 2025-05-20

## 2025-02-18 RX ORDER — INSULIN LISPRO 100 [IU]/ML
15 INJECTION, SOLUTION INTRAVENOUS; SUBCUTANEOUS
Qty: 13.5 ML | Refills: 1 | Status: SHIPPED | OUTPATIENT
Start: 2025-02-18 | End: 2025-04-19

## 2025-02-18 RX ORDER — ADHESIVE BANDAGE
30 BANDAGE TOPICAL DAILY PRN
Qty: 360 ML | Refills: 0 | Status: SHIPPED | OUTPATIENT
Start: 2025-02-18

## 2025-02-18 RX ADMIN — INSULIN GLARGINE 27 UNITS: 100 INJECTION, SOLUTION SUBCUTANEOUS at 08:16

## 2025-02-18 RX ADMIN — HYDROXYZINE HYDROCHLORIDE 25 MG: 25 TABLET ORAL at 08:16

## 2025-02-18 RX ADMIN — AMLODIPINE BESYLATE 5 MG: 5 TABLET ORAL at 08:15

## 2025-02-18 RX ADMIN — INSULIN LISPRO 9 UNITS: 100 INJECTION, SOLUTION INTRAVENOUS; SUBCUTANEOUS at 03:40

## 2025-02-18 RX ADMIN — ASPIRIN 81 MG CHEWABLE TABLET 81 MG: 81 TABLET CHEWABLE at 08:15

## 2025-02-18 RX ADMIN — INSULIN LISPRO 15 UNITS: 100 INJECTION, SOLUTION INTRAVENOUS; SUBCUTANEOUS at 11:54

## 2025-02-18 RX ADMIN — CLOPIDOGREL BISULFATE 75 MG: 75 TABLET ORAL at 08:15

## 2025-02-18 RX ADMIN — INSULIN LISPRO 6 UNITS: 100 INJECTION, SOLUTION INTRAVENOUS; SUBCUTANEOUS at 08:17

## 2025-02-18 RX ADMIN — INSULIN LISPRO 3 UNITS: 100 INJECTION, SOLUTION INTRAVENOUS; SUBCUTANEOUS at 11:54

## 2025-02-18 RX ADMIN — PANTOPRAZOLE SODIUM 40 MG: 40 TABLET, DELAYED RELEASE ORAL at 06:09

## 2025-02-18 RX ADMIN — ISOSORBIDE MONONITRATE 30 MG: 30 TABLET, EXTENDED RELEASE ORAL at 08:15

## 2025-02-18 RX ADMIN — BUPROPION HYDROCHLORIDE 150 MG: 150 TABLET, EXTENDED RELEASE ORAL at 08:15

## 2025-02-18 RX ADMIN — VENLAFAXINE HYDROCHLORIDE 150 MG: 150 CAPSULE, EXTENDED RELEASE ORAL at 08:15

## 2025-02-18 RX ADMIN — INSULIN LISPRO 15 UNITS: 100 INJECTION, SOLUTION INTRAVENOUS; SUBCUTANEOUS at 08:17

## 2025-02-18 ASSESSMENT — COGNITIVE AND FUNCTIONAL STATUS - GENERAL
DAILY ACTIVITIY SCORE: 24
MOBILITY SCORE: 24

## 2025-02-18 ASSESSMENT — PAIN SCALES - GENERAL: PAINLEVEL_OUTOF10: 0 - NO PAIN

## 2025-02-18 NOTE — CARE PLAN
Problem: Pain - Adult  Goal: Verbalizes/displays adequate comfort level or baseline comfort level  Outcome: Progressing     Problem: Safety - Adult  Goal: Free from fall injury  Outcome: Progressing     Problem: Discharge Planning  Goal: Discharge to home or other facility with appropriate resources  Outcome: Progressing     Problem: Chronic Conditions and Co-morbidities  Goal: Patient's chronic conditions and co-morbidity symptoms are monitored and maintained or improved  Outcome: Progressing     Problem: Nutrition  Goal: Nutrient intake appropriate for maintaining nutritional needs  Outcome: Progressing     Problem: Diabetes  Goal: Achieve decreasing blood glucose levels by end of shift  Outcome: Progressing  Goal: Increase stability of blood glucose readings by end of shift  Outcome: Progressing  Goal: Decrease in ketones present in urine by end of shift  Outcome: Progressing  Goal: Maintain electrolyte levels within acceptable range throughout shift  Outcome: Progressing  Goal: Maintain glucose levels >70mg/dl to <250mg/dl throughout shift  Outcome: Progressing  Goal: No changes in neurological exam by end of shift  Outcome: Progressing  Goal: Learn about and adhere to nutrition recommendations by end of shift  Outcome: Progressing  Goal: Vital signs within normal range for age by end of shift  Outcome: Progressing  Goal: Increase self care and/or family involovement by end of shift  Outcome: Progressing  Goal: Receive DSME education by end of shift  Outcome: Progressing       The clinical goals for the shift include Patient will remain safe and stable throughout the shift

## 2025-02-18 NOTE — DISCHARGE INSTRUCTIONS
Dear Feliciano Worthington,     You were admitted to [facility] after presenting from [facility] where it was found that chest pain due to blockages of the blood vessels of your heart. As part of the workup before surgery to fix this procedure you received imaging of your abdomen that showed a lesion in the pancreas and liver that was concerning for malignancy. Gastroenterology recommend that you be transferred to [facility] for further workup of these masses. It was determined after you were transferred that you should received a biopsy of your liver to assess how to progress in your treatment. Due to the nature of this procedure, it was determined by our interventional radiologist that you should receive this biopsy in the outpatient setting. IR planning on liver biopsy while on plavix as per discussion with . Appointment scheduled 3/13/2025. For your heart, you will start a new medication called Plavix. You also could benefit from a heart procedure to bypass the blockages in the blood vessels of your heart however this will likely occur after completing the workup for these masses found in the liver and pancreas.     It was a pleasure being apart of your care,     Your [facility]

## 2025-02-18 NOTE — PROGRESS NOTES
02/18/25 1117   Discharge Planning   Home or Post Acute Services None   Expected Discharge Disposition Home   Does the patient need discharge transport arranged? No       Per team, patient will discharge to home today. No discharge needs stated. Family will provide transportation home.      Bina Llamas RN, BSN  Transitional Care Coordinator

## 2025-02-18 NOTE — DISCHARGE SUMMARY
Discharge Diagnosis  Pancreatic mass (HHS-HCC)    Issues Requiring Follow-Up  - IR planning on liver biopsy while on plavix as per discussion with . Appointment scheduled 3/13/2025  - Triple vessel CAD on cardiac catheterization. CABG currently deferred 2/2 to pancreatic mass finding as well as better blood sugar control.   - Uncontrolled DM- Will need PCP follow up for monitoring    Discharge Meds     Medication List      START taking these medications     alum-mag hydroxide-simeth 200-200-20 mg/5 mL oral suspension; Commonly   known as: Mylanta; Take 30 mL by mouth 4 times a day as needed for   indigestion or heartburn.   clopidogrel 75 mg tablet; Commonly known as: Plavix; Take 1 tablet (75   mg) by mouth once daily.; Start taking on: February 19, 2025   insulin lispro 100 unit/mL injection; Inject 15 Units under the skin 3   times a day before meals. Take as directed per insulin instructions.   magnesium hydroxide 400 mg/5 mL suspension; Commonly known as: Milk of   Magnesia; Take 30 mL by mouth once daily as needed for constipation.   melatonin 5 mg tablet; Take 1 tablet (5 mg) by mouth as needed at   bedtime for sleep.   ondansetron 4 mg tablet; Commonly known as: Zofran; Take 1 tablet (4 mg)   by mouth every 8 hours if needed for nausea.   polyethylene glycol 17 gram packet; Commonly known as: Glycolax,   Miralax; Take 17 g by mouth once daily.; Start taking on: February 19, 2025     CONTINUE taking these medications     Accu-Chek Guide L1-L2 Ctrl Sol solution; Generic drug: blood glucose   control high,low; 1 bottle once daily as needed (use when need to un   controls on glucose meter).   ACETAMINOPHEN ORAL   alcohol swabs pads, medicated; Commonly known as: BD Alcohol Swabs; Use   twice daily while checking sugar.   amLODIPine 5 mg tablet; Commonly known as: Norvasc; Take 1 tablet (5 mg)   by mouth once daily.   aspirin 81 mg EC tablet   blood-glucose meter misc; Commonly known as: Accu-Chek Guide  Glucose   Meter; Test twice daily   buPROPion  mg 24 hr tablet; Commonly known as: Wellbutrin XL; Take   1 tablet (150 mg) by mouth once daily. Do not crush, chew, or split.   ezetimibe 10 mg tablet; Commonly known as: Zetia; Take 1 tablet (10 mg)   by mouth once daily.   hydrOXYzine HCL 25 mg tablet; Commonly known as: Atarax; Take 1 tablet   (25 mg) by mouth 2 times a day.   isosorbide mononitrate ER 30 mg 24 hr tablet; Commonly known as: Imdur;   Take 1 tablet (30 mg) by mouth once daily. Do not crush or chew.   lancets misc; Commonly known as: Accu-Chek Softclix Lancets; Test twice   daily   * insulin glargine 100 unit/mL injection; Commonly known as: Lantus;   Inject 32 Units under the skin once daily at bedtime. Take as directed per   insulin instructions.   * Lantus U-100 Insulin 100 unit/mL injection; Generic drug: insulin   glargine; Inject 32 Units under the skin once daily in the morning. Take   as directed per insulin instructions.   lovastatin 40 mg tablet; Commonly known as: Mevacor; Take 1 tablet (40   mg) by mouth once daily at bedtime.   nitroglycerin 0.4 mg SL tablet; Commonly known as: Nitrostat; Place 1   tablet (0.4 mg) under the tongue every 5 minutes if needed for chest pain.   DO NOT EXCEED A TOTAL OF 3 DOSES IN 15 MINUTES   omeprazole 40 mg DR capsule; Commonly known as: PriLOSEC; Take 1 capsule   (40 mg) by mouth once daily in the morning. Take before meals. Do not   crush or chew.   venlafaxine  mg 24 hr capsule; Commonly known as: Effexor-XR; Take   1 capsule (150 mg) by mouth once daily. Do not crush or chew.  * This list has 2 medication(s) that are the same as other medications   prescribed for you. Read the directions carefully, and ask your doctor or   other care provider to review them with you.       Test Results Pending At Discharge  Pending Labs       Order Current Status    CBC and Auto Differential Collected (02/17/25 0617)            Hospital Course  Feliciano DOVER  Elin is a 74 y.o. male with a significant past medical history of CAD (s/p PCI with RCA stent 2011), Mobitz type 1 AV block, HTN, HLD, IDDM-II, COPD, tobacco use disorder, GERD, depression & anxiety presenting as a transfer from [facility] s/p NSTEMI with incidental pancreatic mass noted on ajit-operative CABG evaluation.      The patient presented to the [facility] ED on 2/06 where he noted increased chest pain at rest that was unresponsive to nitroglycerin, and was found to have non-STEMI with triple vessel CAD on cardiac catheterization. Of note, he has history of CAD with stent in 2011. He is also an active smoker. As apart of ajit-operative workup he underwent CT chest which revealed indeterminate lesion involving the tail of the pancreas. MRCP was performed with findings concerning for 38 mm pancreatic tail hypo enhancing mass suspicious for adenocarcinoma, as well as 15 mm hepatic segment hypo enhancing mass suspicion for metastasis without definite lymphadenopathy. LFTs wnl and CA 19-9 was normal. No abdominal pain and tolerating a diet. GI was consulted who recommended transfer to [facility] for ultrasound guided endoscopic biopsy. CABG currently deferred 2/2 to pancreatic mass finding as well as better blood sugar control. Endocrinology was consulted and last A1c was 14.9; with last recommendation for Lantus 32 units BID and Novolog 15 units TID.      Upon presentation to [facility], the patient was hemodynamically stable and without concern. He denied any subjective symptoms and only endorsed mild back pain response to tylenol. Otherwise he denied FC, SOB, NV, DC. He denied any chest pain since his admission on 2/6.     During admission Gastroenterology was consulted for new pancreatic mass c/f pancreatic cancer w/ possible liver metastasis and determined with the recent NSTEMI and the extent of his CAD he is high sedation risk. Ideally, if technically feasible he should undergo IR guided liver mass biopsy. At this  time there is no concern for biliary obstruction, which would have made ERCP indicated.     Interventional Radiology was consulted for liver mass biopsy and determined that given that this liver lesion was discovered incidentally and is not the main reason for patient's current admission, will plan to pursue this biopsy in an outpatient setting.     IR planning on liver biopsy while on plavix as per discussion with . Appointment scheduled 3/13/2025           Pertinent Physical Exam At Time of Discharge  Physical Exam  Constitutional:       General: He is not in acute distress.     Appearance: Normal appearance. He is not ill-appearing.   HENT:      Head: Normocephalic and atraumatic.      Mouth/Throat:      Mouth: Mucous membranes are moist.      Pharynx: Oropharynx is clear.   Eyes:      General: No scleral icterus.     Conjunctiva/sclera: Conjunctivae normal.      Pupils: Pupils are equal, round, and reactive to light.   Cardiovascular:      Rate and Rhythm: Normal rate and regular rhythm.      Pulses: Normal pulses.      Heart sounds: Normal heart sounds. No murmur heard.     No friction rub. No gallop.   Pulmonary:      Effort: Pulmonary effort is normal. No respiratory distress.      Breath sounds: Normal breath sounds. No wheezing.   Abdominal:      General: Abdomen is flat. Bowel sounds are normal. There is no distension.      Palpations: Abdomen is soft.      Tenderness: There is no abdominal tenderness.   Musculoskeletal:      Right lower leg: No edema.      Left lower leg: No edema.   Skin:     General: Skin is warm.      Capillary Refill: Capillary refill takes less than 2 seconds.   Neurological:      Mental Status: He is alert and oriented to person, place, and time. Mental status is at baseline.   Psychiatric:         Mood and Affect: Mood normal.         Behavior: Behavior normal.         Outpatient Follow-Up  Future Appointments   Date Time Provider Department Center   3/13/2025 11:00 AM [facility]  ULTRASOUND 3 [facility]   4/1/2025  1:00 PM Roberto Haskins MD [id number] [place]   4/30/2025 11:30 AM Hussain Bernal MD [facility] [place]         Jose Real MD  Internal Medicine, PGY-1

## 2025-02-19 ENCOUNTER — PATIENT OUTREACH (OUTPATIENT)
Dept: PRIMARY CARE | Facility: CLINIC | Age: 75
End: 2025-02-19
Payer: MEDICARE

## 2025-02-19 NOTE — PROGRESS NOTES
Discharge Facility:  [facility]  Discharge Diagnosis: Pancreatic Mass  Admission Date:  2/14/25  Discharge Date: 2/18/25    Issues Requiring Follow-Up  - IR planning on liver biopsy while on plavix as per discussion with . Appointment scheduled 3/13/2025  - Triple vessel CAD on cardiac catheterization. CABG currently deferred 2/2 to pancreatic mass finding as well as better blood sugar control.   - Uncontrolled DM- Will need PCP follow up for monitoring      PCP Appointment Date: Message sent to Dr Bernal's office  Specialist Appointment Date:     MAR 13  2025 11:00 AM - Ultrasound Guided Procedure  [facility] [place]     APR 1  2025 01:00 PM - Cardiology Clinic Visit  [facility] Office Building - Roberto Haskins MD     Hospital Encounter and Summary Linked: Yes  Admission (Discharged) with Terrance Almaraz MD (02/14/2025)   Discharge Summary by Jose Real MD (02/18/2025 14:26)     Two attempts were made to reach patient within two business days after discharge. Voicemail left with contact information for patient to call back with any non-emergent questions or concerns.

## 2025-02-25 ENCOUNTER — TELEPHONE (OUTPATIENT)
Dept: CARDIOLOGY | Facility: CLINIC | Age: 75
End: 2025-02-25
Payer: MEDICARE

## 2025-02-25 NOTE — TELEPHONE ENCOUNTER
Patient's wife left voice mail stating patient picked up rx for Amlodipine 5 mg daily and was only given 14 tablets which will not last until his next appointment.  Chart reviewed and shows medication was renewed 10/15/24 for 90 days and 1 refill.  Spoke with pharmacist at [facility] in [place] who acknowledged patient was given rx for 14 days but there is an active rx as per chart history.  Pharmacist states patient able to fill that prescription as of 3/4/25.  Called wife back who was notified of same and verbalized understanding.  Eda Giang, CMA

## 2025-02-26 DIAGNOSIS — J44.9 CHRONIC OBSTRUCTIVE PULMONARY DISEASE, UNSPECIFIED COPD TYPE (MULTI): Primary | ICD-10-CM

## 2025-02-26 DIAGNOSIS — Z79.4 TYPE 2 DIABETES MELLITUS WITH HYPERGLYCEMIA, WITH LONG-TERM CURRENT USE OF INSULIN: ICD-10-CM

## 2025-02-26 DIAGNOSIS — E11.65 TYPE 2 DIABETES MELLITUS WITH HYPERGLYCEMIA, WITH LONG-TERM CURRENT USE OF INSULIN: ICD-10-CM

## 2025-02-26 LAB
ATRIAL RATE: 82 BPM
Q ONSET: 211 MS
QRS COUNT: 10 BEATS
QRS DURATION: 110 MS
QT INTERVAL: 440 MS
QTC CALCULATION(BAZETT): 461 MS
QTC FREDERICIA: 454 MS
R AXIS: 262 DEGREES
T AXIS: 76 DEGREES
T OFFSET: 431 MS
VENTRICULAR RATE: 66 BPM

## 2025-02-27 ENCOUNTER — OFFICE VISIT (OUTPATIENT)
Dept: PRIMARY CARE | Facility: CLINIC | Age: 75
End: 2025-02-27
Payer: MEDICARE

## 2025-02-27 VITALS
TEMPERATURE: 97.1 F | DIASTOLIC BLOOD PRESSURE: 66 MMHG | OXYGEN SATURATION: 98 % | HEART RATE: 72 BPM | BODY MASS INDEX: 28.2 KG/M2 | WEIGHT: 197 LBS | HEIGHT: 70 IN | SYSTOLIC BLOOD PRESSURE: 108 MMHG

## 2025-02-27 DIAGNOSIS — K21.9 GASTROESOPHAGEAL REFLUX DISEASE WITHOUT ESOPHAGITIS: ICD-10-CM

## 2025-02-27 DIAGNOSIS — Z01.818 ENCOUNTER FOR OTHER PREPROCEDURAL EXAMINATION: ICD-10-CM

## 2025-02-27 DIAGNOSIS — Z09 HOSPITAL DISCHARGE FOLLOW-UP: Primary | ICD-10-CM

## 2025-02-27 DIAGNOSIS — J44.9 CHRONIC OBSTRUCTIVE PULMONARY DISEASE, UNSPECIFIED COPD TYPE (MULTI): ICD-10-CM

## 2025-02-27 DIAGNOSIS — F17.200 CURRENT EVERY DAY SMOKER: ICD-10-CM

## 2025-02-27 DIAGNOSIS — I10 PRIMARY HYPERTENSION: ICD-10-CM

## 2025-02-27 DIAGNOSIS — E11.65 TYPE 2 DIABETES MELLITUS WITH HYPERGLYCEMIA, WITH LONG-TERM CURRENT USE OF INSULIN: ICD-10-CM

## 2025-02-27 DIAGNOSIS — K86.89 PANCREATIC MASS (HHS-HCC): ICD-10-CM

## 2025-02-27 DIAGNOSIS — I25.10 CORONARY ARTERY DISEASE, UNSPECIFIED VESSEL OR LESION TYPE, UNSPECIFIED WHETHER ANGINA PRESENT, UNSPECIFIED WHETHER NATIVE OR TRANSPLANTED HEART: ICD-10-CM

## 2025-02-27 DIAGNOSIS — Z79.4 TYPE 2 DIABETES MELLITUS WITH HYPERGLYCEMIA, WITH LONG-TERM CURRENT USE OF INSULIN: ICD-10-CM

## 2025-02-27 PROCEDURE — 1159F MED LIST DOCD IN RCRD: CPT | Performed by: FAMILY MEDICINE

## 2025-02-27 PROCEDURE — 3046F HEMOGLOBIN A1C LEVEL >9.0%: CPT | Performed by: FAMILY MEDICINE

## 2025-02-27 PROCEDURE — 3074F SYST BP LT 130 MM HG: CPT | Performed by: FAMILY MEDICINE

## 2025-02-27 PROCEDURE — 1124F ACP DISCUSS-NO DSCNMKR DOCD: CPT | Performed by: FAMILY MEDICINE

## 2025-02-27 PROCEDURE — 1111F DSCHRG MED/CURRENT MED MERGE: CPT | Performed by: FAMILY MEDICINE

## 2025-02-27 PROCEDURE — 99495 TRANSJ CARE MGMT MOD F2F 14D: CPT | Performed by: FAMILY MEDICINE

## 2025-02-27 PROCEDURE — 3078F DIAST BP <80 MM HG: CPT | Performed by: FAMILY MEDICINE

## 2025-02-27 PROCEDURE — 3050F LDL-C >= 130 MG/DL: CPT | Performed by: FAMILY MEDICINE

## 2025-02-27 RX ORDER — INSULIN GLARGINE 100 [IU]/ML
32 INJECTION, SOLUTION SUBCUTANEOUS EVERY MORNING
Qty: 10 ML | Refills: 1 | Status: SHIPPED | OUTPATIENT
Start: 2025-02-27

## 2025-02-27 RX ORDER — INSULIN GLARGINE 100 [IU]/ML
32 INJECTION, SOLUTION SUBCUTANEOUS NIGHTLY
Qty: 10 ML | Refills: 1 | Status: SHIPPED | OUTPATIENT
Start: 2025-02-27

## 2025-02-27 ASSESSMENT — ENCOUNTER SYMPTOMS
LOSS OF SENSATION IN FEET: 0
OCCASIONAL FEELINGS OF UNSTEADINESS: 1
DEPRESSION: 0

## 2025-02-27 NOTE — PROGRESS NOTES
"Subjective   Chief complaint: Feliciano Worthington is a 75 y.o. male who presents for Hospital Follow-up (TCM 2/18/2025 pancreatic mass. Patient advises that he has been falling daily since discharge from hospital. ).    HPI:  HPI  Transitional care management.  Medical records reviewed.  Patient was at [facility].  Heart attack.  Cardiology consultation.  Cardiothoracic surgery consultation for bypass grafting surgery.  However preoperative workup of pulmonary nodules revealed a pancreatic mass.  Seen by gastroenterology.  Transferred downtown for biopsy.  Did not have any biliary obstruction.  So it was arranged for an outpatient biopsy by interventional radiology.  Okay while on Plavix.  Continues maximize cardiovascular care.  Deferred bypass grafting surgery for now.  Diabetes was uncontrolled.  Blood sugars currently improving.  I reviewed his ambulatory blood sugar readings.  Reviewed his medications.  Encouraged medication compliance and dietary modifications which he is working on.  Will get him into with endocrinology Dr. Wiseman was seeing him in the hospital.  To provide an outpatient consultation referral for that as well.  For now continue his other current medical therapy reports lifestyle modifications physical activity as tolerated healthy diet medication compliance encouraged.  He has an appointment for interventional radiology for pancreatic mass biopsy.  Further treatment recommendations pending results.  For now keep his appointment follow-up as scheduled or sooner if Mr.  Objective   /66 (BP Location: Left arm, Patient Position: Sitting, BP Cuff Size: Large adult)   Pulse 72   Temp 36.2 °C (97.1 °F) (Temporal)   Ht 1.778 m (5' 10\")   Wt 89.4 kg (197 lb)   SpO2 98%   BMI 28.27 kg/m²   Physical Exam  Vitals and nursing note reviewed.   Constitutional:       Appearance: Normal appearance.   HENT:      Head: Normocephalic and atraumatic.   Eyes:      Extraocular Movements: " Extraocular movements intact.      Conjunctiva/sclera: Conjunctivae normal.      Pupils: Pupils are equal, round, and reactive to light.   Cardiovascular:      Rate and Rhythm: Normal rate and regular rhythm.      Heart sounds: Normal heart sounds.   Pulmonary:      Effort: Pulmonary effort is normal.      Breath sounds: Normal breath sounds.   Abdominal:      General: Abdomen is flat. Bowel sounds are normal.      Palpations: Abdomen is soft.   Musculoskeletal:         General: Normal range of motion.   Skin:     General: Skin is warm and dry.   Neurological:      General: No focal deficit present.      Mental Status: He is alert and oriented to person, place, and time. Mental status is at baseline.   Psychiatric:         Mood and Affect: Mood normal.         Behavior: Behavior normal.       Review of Systems   I have reviewed and reconciled the medication list with the patient today.   Current Outpatient Medications:     ACETAMINOPHEN ORAL, Take 650 mg by mouth every 8 hours if needed for mild pain (1 - 3). Do not crush, chew, or split., Disp: , Rfl:     alcohol swabs (BD Alcohol Swabs) pads, medicated, Use twice daily while checking sugar., Disp: 200 each, Rfl: 1    alum-mag hydroxide-simeth (Mylanta) 200-200-20 mg/5 mL oral suspension, Take 30 mL by mouth 4 times a day as needed for indigestion or heartburn., Disp: 355 mL, Rfl: 0    amLODIPine (Norvasc) 5 mg tablet, Take 1 tablet (5 mg) by mouth once daily., Disp: 90 tablet, Rfl: 1    aspirin 81 mg EC tablet, Take 1 tablet (81 mg) by mouth once daily., Disp: , Rfl:     blood glucose control high,low (Accu-Chek Guide L1-L2 Ctrl Sol) solution, 1 bottle once daily as needed (use when need to un controls on glucose meter)., Disp: 1 each, Rfl: 1    blood-glucose meter (Accu-Chek Guide Glucose Meter) misc, Test twice daily, Disp: 1 each, Rfl: 0    buPROPion XL (Wellbutrin XL) 150 mg 24 hr tablet, Take 1 tablet (150 mg) by mouth once daily. Do not crush, chew, or  split., Disp: 90 tablet, Rfl: 1    clopidogrel (Plavix) 75 mg tablet, Take 1 tablet (75 mg) by mouth once daily., Disp: 30 tablet, Rfl: 1    ezetimibe (Zetia) 10 mg tablet, Take 1 tablet (10 mg) by mouth once daily., Disp: 90 tablet, Rfl: 1    hydrOXYzine HCL (Atarax) 25 mg tablet, Take 1 tablet (25 mg) by mouth 2 times a day., Disp: 180 tablet, Rfl: 1    insulin lispro 100 unit/mL injection, Inject 15 Units under the skin 3 times a day before meals. Take as directed per insulin instructions., Disp: 13.5 mL, Rfl: 1    isosorbide mononitrate ER (Imdur) 30 mg 24 hr tablet, Take 1 tablet (30 mg) by mouth once daily. Do not crush or chew., Disp: 90 tablet, Rfl: 1    lancets (Accu-Chek Softclix Lancets) misc, Test twice daily, Disp: 200 each, Rfl: 0    lovastatin (Mevacor) 40 mg tablet, Take 1 tablet (40 mg) by mouth once daily at bedtime., Disp: 90 tablet, Rfl: 1    magnesium hydroxide (Milk of Magnesia) 400 mg/5 mL suspension, Take 30 mL by mouth once daily as needed for constipation., Disp: 360 mL, Rfl: 0    melatonin 5 mg tablet, Take 1 tablet (5 mg) by mouth as needed at bedtime for sleep., Disp: 30 tablet, Rfl: 0    nitroglycerin (Nitrostat) 0.4 mg SL tablet, Place 1 tablet (0.4 mg) under the tongue every 5 minutes if needed for chest pain. DO NOT EXCEED A TOTAL OF 3 DOSES IN 15 MINUTES, Disp: 25 tablet, Rfl: 2    omeprazole (PriLOSEC) 40 mg DR capsule, Take 1 capsule (40 mg) by mouth once daily in the morning. Take before meals. Do not crush or chew., Disp: 90 capsule, Rfl: 1    ondansetron (Zofran) 4 mg tablet, Take 1 tablet (4 mg) by mouth every 8 hours if needed for nausea., Disp: 20 tablet, Rfl: 0    polyethylene glycol (Glycolax, Miralax) 17 gram packet, Take 17 g by mouth once daily., Disp: 90 Unspecified, Rfl: 0    venlafaxine XR (Effexor-XR) 150 mg 24 hr capsule, Take 1 capsule (150 mg) by mouth once daily. Do not crush or chew., Disp: 90 capsule, Rfl: 1    insulin glargine (Lantus U-100 Insulin) 100  unit/mL injection, Inject 32 Units under the skin once daily in the morning. Take as directed per insulin instructions., Disp: 10 mL, Rfl: 1    insulin glargine (Lantus) 100 unit/mL injection, Inject 32 Units under the skin once daily at bedtime. Take as directed per insulin instructions., Disp: 10 mL, Rfl: 1     Imaging:  ECG 12 lead    Result Date: 2/26/2025  Sinus rhythm with 2nd degree AV block (Mobitz I) Right superior axis deviation Abnormal ECG When compared with ECG of 05-FEB-2025 21:41, (unconfirmed) No significant change was found See ED provider note for full interpretation and clinical correlation Confirmed by Haley Banks ([id number]) on 2/26/2025 1:50:24 PM    MRCP pancreas w and wo IV contrast    Result Date: 2/10/2025  Interpreted By:  Dyllan James, STUDY: MRCP PANCREAS W AND WO IV CONTRAST;  2/10/2025 3:20 pm   INDICATION: Signs/Symptoms:pancreatic lesion noted on CT, pending cardiac surgery.     COMPARISON: None.   ACCESSION NUMBER(S): [id number]   ORDERING CLINICIAN: MAXIMILIAN ROSA   TECHNIQUE: MRI PANCREAS; Multiplanar magnetic resonance images of the abdomen were obtained including the following sequences; T2-weighted SSFSE with and without fat saturation, T1-weighted GRE in/opposed phase, DWI, fat saturated 3D-T1w GRE pre and dynamically post contrast. Radial thick slab T2w RARE MRCP and coronally reconstructed navigator gated high resolution 3-D T2w RESTORE MRCP with MIP reconstruction were also performed for MRCP.  17 ML of Dotarem was administered intravenously without immediate complication.   FINDINGS: Severely motion degraded exam.   LIVER: No definite steatosis. Difficult to characterize due to motion, there is an ill-defined 15 mm mildly T2 hyperintense, hypoenhancing mass in segment VI.   BILE DUCTS: No dilatation. Common bile duct measures 3 mm. No definite filling defect identified.   GALLBLADDER: No stone or wall thickening.   PANCREAS: There is a 38 mm hypoenhancing mass in the  tail of the pancreas as annotated on series 34, image 41/72. The remainder of the pancreas appears unremarkable without ductal dilatation. Evaluation is limited due to motion artifact.   SPLEEN: Mildly enlarged measuring 14.1 cm in length.   ADRENAL GLANDS: Unremarkable.   KIDNEYS: 28 mm cyst in the interpolar cortex of the left kidney. Otherwise unremarkable kidneys without hydronephrosis   LYMPH NODES: No lymphadenopathy.   ABDOMINAL VESSELS: Abdominal aorta is atherosclerotic but otherwise patent without aneurysm. Major visceral arterial branches are patent. IVC and visualized major branches are patent. Major portal venous branches are patent.   BOWEL: No dilated bowel is visualized.Severe distal colonic diverticulosis.   PERITONEUM/RETROPERITONEUM: No visualized free fluid.   BONES AND LOWER THORAX: Mild thoracolumbar degenerative changes. Bibasilar atelectasis.       1. Severely motion degraded exam. 2. 38 mm pancreatic tail hypoenhancing mass suspicious for adenocarcinoma. 3. 15 mm hepatic segment VI hypoenhancing mass suspicious for metastasis. 4. No definite lymphadenopathy.     MACRO: None   Signed by: Dyllan James 2/10/2025 3:33 PM Dictation workstation:   [id number]    ECG 12 lead    Result Date: 2/10/2025  Sinus rhythm with 2nd degree AV block (Mobitz I) Left axis deviation Anterior infarct , age undetermined Abnormal ECG When compared with ECG of 05-FEB-2025 22:52, (unconfirmed) Anterior infarct is now Present Confirmed by Kimmy Dickinson ([id number]) on 2/10/2025 12:31:40 PM    Pulmonary function testing    Result Date: 2/7/2025  The FEV1/FVC is normal. The FEV1 is normal. The FVC is normal. The TLC by body plethysmography is normal. The DLCO corrected for hemoglobin is moderately reduced. The airways resistance is normal.   Conclusion: Normal spirometry. Lung volumes are consistent with hyperinflation. DLCO values corrected for hemoglobin are consistent with pulmonary vascular impairment, emphysema with  preserved lung volume or anemia.    Transthoracic Echo Complete    Result Date: 2/7/2025          [place] [address]  Tel [phone] Fax [phone] TRANSTHORACIC ECHOCARDIOGRAM REPORT Patient Name:       JAIME Cormier Physician:    [id number] Fredrick Davidson MD, [facility] Study Date:         2/7/2025            Ordering Provider:    [id number] MAXIMILIAN ROSA MRN/PID:            [id number]            Fellow: Accession#:         [id number]        Nurse: Date of Birth/Age:  1950 / 74      Sonographer:          Suzie DE LA CRUZ Gender Assigned at                     Additional Staff: Birth: Height:             177.80 cm           Admit Date:           2/5/2025 Weight:             81.65 kg            Admission Status:     Inpatient -                                                               Routine BSA / BMI:          2.00 m2 / 25.83     Department Location:  [place] [address]                                     Echo Lab Blood Pressure: 133 /76 mmHg Study Type:    TRANSTHORACIC ECHO (TTE) COMPLETE Diagnosis/ICD: Encounter for other preprocedural examination-Z01.818; Non ST                elevation (NSTEMI) myocardial infarction-I21.4 Indication:    PRE CABG, CPT Codes:     Echo Complete w Full Doppler-[id number] Patient History: Smoker:            Current. Diabetes:          Yes Pertinent History: HTN, Hyperlipidemia, CAD and COPD. Study Detail: The following Echo studies were performed: 2D, M-Mode, Doppler and               color flow. Technically challenging study due to body habitus,               prominent lung artifact and poor acoustic windows. Definity used               as a contrast agent for endocardial border definition. Total               contrast  used for this procedure was 2 mL via IV push. The patient               was awake.  PHYSICIAN INTERPRETATION: Left Ventricle: The left ventricular systolic function is low normal, with a visually estimated ejection fraction of 50-55%. There are no regional wall motion abnormalities. The left ventricular cavity size is normal. There is mild increased septal and mildly increased posterior left ventricular wall thickness. There is left ventricular concentric remodeling. Spectral Doppler shows a Grade I (impaired relaxation pattern) of left ventricular diastolic filling with normal left atrial filling pressure. Left Atrium: The left atrial size is normal. Left atrial volume index 18.9 mL/m2. Right Ventricle: The right ventricle is normal in size. There is normal right ventricular global systolic function. Right Atrium: The right atrial size is normal. Aortic Valve: The aortic valve is trileaflet. There is moderate aortic valve cusp calcification. The aortic valve dimensionless index is 0.57. There is mild aortic valve regurgitation. The peak instantaneous gradient of the aortic valve is 10 mmHg. The mean gradient of the aortic valve is 6 mmHg. 1+ aortic regurgitation. Mitral Valve: The mitral valve is normal in structure. There is mild mitral annular calcification. There is trace mitral valve regurgitation. Trivial mitral regurgitation. Tricuspid Valve: The tricuspid valve is structurally normal. No evidence of tricuspid regurgitation. Trivial to 1+ tricuspid regurgitation with estimated RVSP 31 mmHg. Pulmonic Valve: The pulmonic valve is structurally normal. There is no indication of pulmonic valve regurgitation. Pericardium: Trivial pericardial effusion. There is a pericardial fat pad present. Aorta: The aortic root is normal. There is mild dilatation of the ascending aorta. There is mild dilatation of the aortic root. The aortic root measures 3.9 cm. The ascending aorta measures 4.0 cm. Systemic Veins: The inferior  vena cava appears normal in size, with IVC inspiratory collapse greater than 50%. In comparison to the previous echocardiogram(s): No previous available for comparison.  CONCLUSIONS:  1. The left ventricular systolic function is low normal, with a visually estimated ejection fraction of 50-55%.  2. No regional wall motion abnormalities.  3. Spectral Doppler shows a Grade I (impaired relaxation pattern) of left ventricular diastolic filling with normal left atrial filling pressure.  4. Trivial mitral regurgitation.  5. Trivial to 1+ tricuspid regurgitation with estimated RVSP 31 mmHg.  6. There is moderate aortic valve cusp calcification.  7. Mild aortic valve regurgitation.  8. The aortic root measures 3.9 cm.     The ascending aorta measures 4.0 cm.  9. No previous available for comparison. QUANTITATIVE DATA SUMMARY:  2D MEASUREMENTS:             Normal Ranges: Ao Root d:       3.90 cm     (2.0-3.7cm) LAs:             3.30 cm     (2.7-4.0cm) IVSd:            1.08 cm     (0.6-1.1cm) LVPWd:           1.08 cm     (0.6-1.1cm) LVIDd:           4.62 cm     (3.9-5.9cm) LVIDs:           3.48 cm LV Mass Index:   89.1 g/m2 LVEDV Index:     47.39 ml/m2 LV % FS          24.7 %  LEFT ATRIUM:                  Normal Ranges: LA Vol A4C:        21.8 ml    (22+/-6mL/m2) LA Vol A2C:        37.7 ml LA Vol BP:         30.5 ml LA Vol Index A4C:  10.9ml/m2 LA Vol Index A2C:  18.9 ml/m2 LA Vol Index BP:   15.3 ml/m2 LA Area A4C:       10.5 cm2 LA Area A2C:       14.7 cm2 LA Major Axis A4C: 4.3 cm LA Major Axis A2C: 4.9 cm LA Volume Index:   14.5 ml/m2 LA Vol A4C:        22.0 ml LA Vol A2C:        35.0 ml LA Vol Index BSA:  14.3 ml/m2  RIGHT ATRIUM:                 Normal Ranges: RA Vol A4C:        22.4 ml    (8.3-19.5ml) RA Vol Index A4C:  11.2 ml/m2 RA Area A4C:       10.7 cm2 RA Major Axis A4C: 4.4 cm  AORTA MEASUREMENTS:         Normal Ranges: Asc Ao, d:          4.00 cm (2.1-3.4cm)  LV SYSTOLIC FUNCTION:                      Normal  Ranges: EF-A4C View:    47 % (>=55%) EF-A2C View:    48 % EF-Biplane:     47 % EF-Visual:      53 % LV EF Reported: 53 %  LV DIASTOLIC FUNCTION:             Normal Ranges: MV Peak E:             0.61 m/s    (0.7-1.2 m/s) MV Peak A:             0.96 m/s    (0.42-0.7 m/s) E/A Ratio:             0.64        (1.0-2.2) MV e'                  0.079 m/s   (>8.0) MV lateral e'          0.08 m/s MV medial e'           0.08 m/s E/e' Ratio:            7.69        (<8.0) PulmV Sys Dion:         56.60 cm/s PulmV Carias Dion:        43.20 cm/s PulmV S/D Dion:         1.30 PulmV A Revs Dion:      29.10 cm/s PulmV A Revs Dur:      103.00 msec  MITRAL VALVE:          Normal Ranges: MV DT:        240 msec (150-240msec)  AORTIC VALVE:                      Normal Ranges: AoV Vmax:                1.59 m/s  (<=1.7m/s) AoV Peak PG:             10.1 mmHg (<20mmHg) AoV Mean P.0 mmHg  (1.7-11.5mmHg) LVOT Max Dion:            0.92 m/s  (<=1.1m/s) AoV VTI:                 30.20 cm  (18-25cm) LVOT VTI:                17.30 cm LVOT Diameter:           2.10 cm   (1.8-2.4cm) AoV Area, VTI:           1.98 cm2  (2.5-5.5cm2) AoV Area,Vmax:           2.00 cm2  (2.5-4.5cm2) AoV Dimensionless Index: 0.57  AORTIC INSUFFICIENCY: AI Vmax:       3.77 m/s AI Half-time:  623 msec AI Decel Rate: 178.00 cm/s2  RIGHT VENTRICLE: RV Basal 3.50 cm RV Mid   2.98 cm RV Major 8.1 cm TAPSE:   19.7 mm RV s'    0.10 m/s  TRICUSPID VALVE/RVSP:          Normal Ranges: Peak TR Velocity:     2.64 m/s RV Syst Pressure:     31 mmHg  (< 30mmHg) IVC Diam:             1.13 cm  PULMONIC VALVE:          Normal Ranges: PV Accel Time:  129 msec (>120ms) PV Max Dion:     0.9 m/s  (0.6-0.9m/s) PV Max PG:      3.3 mmHg  PULMONARY VEINS: PulmV A Revs Dur: 103.00 msec PulmV A Revs Dion: 29.10 cm/s PulmV Carias Dion:   43.20 cm/s PulmV S/D Dion:    1.30 PulmV Sys Dion:    56.60 cm/s  [id number] Fredrick Davidson MD, FACC Electronically signed on 2025 at 10:51:06 AM  ** Final **     CT  chest wo IV contrast    Result Date: 2/7/2025  Interpreted By:  Amarjit Ortiz, STUDY: CT CHEST WO IV CONTRAST;  2/6/2025 4:39 pm   INDICATION: Signs/Symptoms:pre op cardiac surgery.     COMPARISON: 05/28/2024   ACCESSION NUMBER(S): [id number]   ORDERING CLINICIAN: MAXIMILIAN ROSA   TECHNIQUE: Helical data acquisition of the chest was obtained without the use of IV contrast. Images were reformatted in axial, coronal, and sagittal planes.   FINDINGS: POTENTIAL LIMITATIONS OF THE STUDY:   Lack of IV contrast   HEART AND VESSELS:   No calcified plaques are seen involving the ascending aorta. There are atherosclerotic calcifications of the aortic arch, descending thoracic aorta, and the branches of the aorta. Aorta is unchanged in course and caliber. Stable ectasia of the ascending aorta up to 3.9 cm.   The heart is unchanged in size.   No pericardial effusion is seen.   MEDIASTINUM AND MARIA INES, LOWER NECK AND AXILLA: The visualized thyroid gland is within normal limits.   No evidence of thoracic lymphadenopathy by CT criteria.   Esophagus appears within normal limits as seen.   LUNGS AND AIRWAYS: The trachea and central airways are patent. No endobronchial lesion.   There are areas of fibrosis/scarring in the lungs, predominantly along the periphery. Appearance is not significantly changed when compared to the previous examination. No new areas of consolidation. No effusion. No pneumothorax. Stable partially calcified nodule along the major fissure on the right measuring approximately 8 mm. A few additional nodules are seen measuring 5 mm or less in size, for example, image 117 in the right upper lobe. There is a new nodular opacity in the medial aspect of the right lower lobe measuring approximately 8 mm in size. Although this may be infectious/inflammatory nature, follow-up is recommended to document resolution, image 146.   UPPER ABDOMEN: The visualized subdiaphragmatic structures demonstrate no acute abnormality. 2.2  cm cyst in the midpole of the left kidney. There is nonspecific prominence/thickening of the tail the pancreas with a 2.5 cm hypoattenuating lesion. This is not adequately characterized on this unenhanced CT. MRI is recommended for further evaluation.   CHEST WALL AND OSSEOUS STRUCTURES: Degenerative changes. No acute process.       No calcified plaques along the ascending aorta. There is atherosclerotic disease of the remaining portions of the aorta and its branches. Indeterminate lesion involving the tail the pancreas. MRI recommended for further evaluation. Interval development of an 8 mm right lower lobe nodule which may be infectious/inflammatory in nature but will require follow-up to document resolution.   MACRO: None.   Signed by: Amarjit Ortiz 2/7/2025 9:05 AM Dictation workstation:   [id number]    Vascular US lower extremity vein mapping bilateral    Result Date: 2/7/2025  Interpreted By:  Amarjit Ortiz, STUDY: [facility] US LOWER EXTREMITY VEIN MAPPING BILATERAL;  2/6/2025 5:33 pm   INDICATION: Signs/Symptoms:pre op cardiac surgery.   ,I21.4 Non-ST elevation (NSTEMI) myocardial infarction (Multi),R60.1 Generalized edema   COMPARISON: None.   ACCESSION NUMBER(S): [id number]   ORDERING CLINICIAN: MAXIMILIAN ROSA   TECHNIQUE: Real time duplex sonographic evaluation of the lower extremity venous systems was performed for venous mapping.   FINDINGS: Right Greater Saphenous Vein: Thigh- Proximal-0.3 x 0.2 cm Mid-0.2 x 0.2 cm Distal-0.3 x 0.2 cm   Calf-   Proximal-0.3 x 0.2 cm Mid-0.2 x 0.1 cm Distal-0.2 x 0.1 cm   Right Lesser Saphenous Vein: Proximal-0.3 x 0.2 cm Mid-0.2 x 0.1 cm Distal-0.2 x 0.1 cm     Left Greater Saphenous Vein: Thigh- Proximal-0.3 x 0.3 cm Mid-0.2 x 0.2 cm Distal-0.1 x 0.1 cm   Calf-   Proximal-0.1 x 0.1 cm Mid-0.2 x 0.1 cm Distal-0.3 x 0.1 cm   Left Lesser Saphenous Vein: Proximal-0.3 x 0.1 cm Mid-0.2 x 0.2 cm Distal-0.2 x 0.1 cm       Lower extremity venous mapping as detailed above.   MACRO:  None.   Signed by: Amarjit Ortiz 2/7/2025 7:25 AM Dictation workstation:   [id number]    Carotid duplex bilateral    Result Date: 2/7/2025  Interpreted By:  Amarjit Ortiz, STUDY: [facility] US CAROTID ARTERY DUPLEX BILATERAL;  2/6/2025 5:32 pm   INDICATION: Signs/Symptoms:pre op caridac surgery.   ,I21.4 Non-ST elevation (NSTEMI) myocardial infarction (Multi),I79.8 Other disorders of arteries, arterioles and capillaries in diseases classified elsewhere   COMPARISON: 10/30/2023   ACCESSION NUMBER(S): [id number]   ORDERING CLINICIAN: MAXIMILIAN ROSA   TECHNIQUE: Vascular ultrasound of the extracranial carotid system was performed bilaterally.  Gray scale, color Doppler and spectral Doppler waveform analysis was performed.   FINDINGS: There is atherosclerotic disease most conspicuous at the carotid bulbs.   RIGHT:   RIGHT SIDE PEAK SYSTOLIC VELOCITY TABLE: CCA  78 cm/s. ICA  126 cm/s. ECA  69 cm/s.     The ratio of the peak systolic velocity of the right ICA/CCA is  1.6.   RIGHT VERTEBRAL ARTERY: The right vertebral artery demonstrates proximal anterograde flow.   LEFT:   LEFT SIDE PEAK SYSTOLIC VELOCITY TABLE: CCA 50 cm/s. ICA 62 cm/s. ECA 64 cm/s.     The ratio of the peak systolic velocity of the left ICA/CCA is 1.1.   LEFT VERTEBRAL ARTERY: The left vertebral artery demonstrates proximal anterograde flow.       Utilizing the peak systolic velocities, the estimated degree of stenosis within the internal carotid arteries is less than 50% bilaterally.   MACRO: None.     Signed by: Amarjit Ortiz 2/7/2025 7:24 AM Dictation workstation:   [id number]    Cardiac Catheterization Procedure    Result Date: 2/6/2025   [facility], Cath Lab        [address] [place] [address] Cardiovascular Catheterization Report Patient Name:      JAIME FERNANDEZ      Performing Physician:  [id number] Wil Montana MD Study Date:        2/6/2025              Verifying Physician:   [id number] Wil Montana MD MRN/PID:           [id number]             Cardiologist/Co-Scrub: Accession#:        [id number]         Ordering Provider:     [id number] DERIK ENAMORADO Date of Birth/Age: 1950 / 74 years Cardiologist: Gender:            M                    Fellow: Encounter#:        [id number]           Surgeon:  Study: Left Heart Cath  Indications: JAIME FERNANDEZ is a 75 year old male who presents with dyslipidemia, hypertension, prior myocardial infarction, diabetes, smoker, chronic pulmonary disease, coronary artery disease, prior percutaneous coronary intervention and a chest pain assessment of typical angina. NSTE - ACS.  Procedure Description: After infiltration with 2% Lidocaine, the right femoral artery was cannulated with a modified Seldinger technique. Subsequently a 5 Tunisian sheath was placed in the right femoral artery. Selective coronary catheterization was performed using a 5 Fr catheter(s) exchanged over a guide wire to cannulate the coronary arteries. A JL 4 tip catheter was used for left coronary injections. A 3drc tip catheter was used for right coronary injections. Multiple injections of contrast were made into the left and right coronary arteries with angiograms recorded in multiple projections. Retrograde left heart catheterizion was accomplished with a 5 Fr. pigtail catheter. A single plane left ventriculogram was recorded in the 30 degree RAYMOND projection. The contrast dose was 20 ml injected at 10 ml/sec. The catheter was then withdrawn across the aortic valve under continuous pressure monitoring and removed. After completion of the procedure, femoral artery angiography was performed. This demonstrated a common femoral artery puncture appropriate for closure. A Vascade 5F vascular closure Device was placed per protocol.  Coronary  Angiography: The coronary circulation is right dominant.  Left Main Coronary Artery: The left main coronary artery is calcified. There is 70% stenosis in the distal left main coronary artery.  Left Anterior Descending Coronary Artery Distribution: There is 80% stenosis in the proximal left anterior descending artery.  Circumflex Coronary Artery Distribution: There is 90% stenosis in the the proximal Circumflex artery. There is 75% stenosis in the Prox Ramus Circumflex artery. There is 80% stenosis in the Mid Ramus Circumflex artery.  Right Coronary Artery Distribution: There is 80% stenosis in the the mid Right Coronary Artery. There is evidence of instent restenosis in this segment. The distal right coronary artery showed 90% stenosis.  Left Ventriculography: The estimated left ventricular ejection fraction is normal at 55%.  Hemo Personnel: +---------------------------+---------+ Name                       Duty      +---------------------------+---------+ Wil Bronson MD 1 +---------------------------+---------+  Hemodynamic Pressures:  +----+------------------+---------+-------------+-------------+------+---------+ Site    Date Time       Phase  Systolic mmHg  Diastolic    ED  Mean mmHg                         Name                    mmHg      mmHg           +----+------------------+---------+-------------+-------------+------+---------+   AO  2/6/2025 9:36:07 AIR REST          127           65             87                     AM                                                   +----+------------------+---------+-------------+-------------+------+---------+   AO  2/6/2025 9:39:06 AIR REST           91           61             65                     AM                                                   +----+------------------+---------+-------------+-------------+------+---------+   AO  2/6/2025 9:44:05 AIR REST          133            72             96                     AM                                                   +----+------------------+---------+-------------+-------------+------+---------+   LV  2/6/2025 9:47:36 AIR REST          115           -1     6                              AM                                                   +----+------------------+---------+-------------+-------------+------+---------+  Cardiac Cath Post Procedure Notes: Post Procedure Diagnosis: Triple vessel disease. Blood Loss:               Estimated blood loss during the procedure was 0 mls. Specimens Removed:        Number of specimen(s) removed: none. ____________________________________________________________________________________ CONCLUSIONS:  1. Distal Left Main: 70% stenosis.  2. Left Main Coronary Artery: This artery is calcified.  3. Proximal LAD Lesion: The percent stenosis is 80%.  4. Proximal CX Lesion: The percent stenosis is 90%.  5. Prox Ramus CX Lesion: The percent stenosis is 75%.  6. Mid Ramus CX Lesion: The percent stenosis is 80%.  7. Mid RCA Lesion: The percent stenosis is 80%.  8. Mid RCA Lesion: Instent Restenosis.  9. Distal RCA Lesion: The percent stenosis is 90%. 10. The Left Ventricular Ejection Fraction is 55%. 11. Mid CX Lesion: 100%. ICD 10 Codes: Non ST elevation (NSTEMI) myocardial infarction-I21.4  CPT Codes: Left Heart Cath (visualization of coronaries) and [id number]; Moderate Sedation Services initial 15 minutes patient >5 [id number]  [id number] Wil Montana MD Performing Physician Electronically signed by [id number] Wil Montana MD on 2/6/2025 at 10:19:09 AM  ** Final **     ECG 12 lead    Result Date: 2/6/2025  Sinus rhythm with 2nd degree AV block (Mobitz I) Right superior axis deviation Nonspecific ST abnormality Abnormal ECG When compared with ECG of 17-OCT-2023 18:49, No significant change was found Confirmed by Shanika Reese ([id number]) on 2/6/2025 9:38:21 AM    XR chest 1  view    Result Date: 2/5/2025  Interpreted By:  Tobin Machado, STUDY: XR CHEST 1 VIEW;  2/5/2025 10:17 pm   INDICATION: Signs/Symptoms:Chest Pain.   COMPARISON: None.   ACCESSION NUMBER(S): [id number]   ORDERING CLINICIAN: MAIRELY ROCKWELL   FINDINGS: The cardiac silhouette is normal in size. There is mild bilateral opacities may correspond to chronic lung changes and atelectasis versus pneumonia. No segment pleural effusion. No pneumothorax.       Mild bilateral opacities may correspond to chronic lung changes and atelectasis versus pneumonia.   MACRO: None   Signed by: Tobin Machado 2/5/2025 10:38 PM Dictation workstation:   [id number]       Labs reviewed:    Lab Results   Component Value Date    WBC 8.1 02/18/2025    HGB 13.3 (L) 02/18/2025    HCT 39.3 (L) 02/18/2025     (L) 02/18/2025    CHOL 258 (H) 02/05/2025    TRIG 309 (H) 02/05/2025    HDL 24.7 02/05/2025    ALT 19 02/18/2025    AST 14 02/18/2025     (L) 02/18/2025    K 4.1 02/18/2025    CL 98 02/18/2025    CREATININE 0.96 02/18/2025    BUN 16 02/18/2025    CO2 28 02/18/2025    TSH 2.23 02/07/2025    INR 1.0 02/17/2025    HGBA1C 14.7 (H) 02/06/2025       Assessment/Plan   Problem List Items Addressed This Visit             ICD-10-CM    Primary hypertension I10    Chronic obstructive pulmonary disease, unspecified J44.9    Current every day smoker F17.200    GERD (gastroesophageal reflux disease) K21.9    CAD (coronary artery disease) I25.10    Type 2 diabetes mellitus with hyperglycemia, with long-term current use of insulin E11.65, Z79.4    Relevant Orders    Referral to Endocrinology    Pancreatic mass ([facility]) K86.89     Other Visit Diagnoses         Codes    Hospital discharge follow-up    -  Primary Z09    Encounter for other preprocedural examination     Z01.818    Relevant Medications    insulin glargine (Lantus U-100 Insulin) 100 unit/mL injection    insulin glargine (Lantus) 100 unit/mL injection            Reinforced lifestyle  modifications  Continue current medications as listed  Physical activity as tolerated and healthy diet encouraged  Maintain a healthy weight  Follow up in 4-6 weeks

## 2025-02-28 ENCOUNTER — TELEPHONE (OUTPATIENT)
Dept: PRIMARY CARE | Facility: CLINIC | Age: 75
End: 2025-02-28
Payer: MEDICARE

## 2025-02-28 DIAGNOSIS — E11.65 TYPE 2 DIABETES MELLITUS WITH HYPERGLYCEMIA, WITH LONG-TERM CURRENT USE OF INSULIN: ICD-10-CM

## 2025-02-28 DIAGNOSIS — Z79.4 TYPE 2 DIABETES MELLITUS WITH HYPERGLYCEMIA, WITH LONG-TERM CURRENT USE OF INSULIN: ICD-10-CM

## 2025-02-28 NOTE — TELEPHONE ENCOUNTER
Pharmacy called office requesting for Lantus vials to be changed to the Lantus SoloStar pens. Patient declined wanting the vials. He has always used the pens.     New rx pended for the pens

## 2025-03-01 RX ORDER — INSULIN GLARGINE 100 [IU]/ML
32 INJECTION, SOLUTION SUBCUTANEOUS DAILY
Qty: 29 ML | Refills: 3 | Status: SHIPPED | OUTPATIENT
Start: 2025-03-01

## 2025-03-05 ENCOUNTER — PATIENT OUTREACH (OUTPATIENT)
Dept: PRIMARY CARE | Facility: CLINIC | Age: 75
End: 2025-03-05
Payer: MEDICARE

## 2025-03-05 DIAGNOSIS — K86.89 PANCREATIC MASS (HHS-HCC): ICD-10-CM

## 2025-03-13 ENCOUNTER — APPOINTMENT (OUTPATIENT)
Dept: RADIOLOGY | Facility: HOSPITAL | Age: 75
End: 2025-03-13
Payer: MEDICARE

## 2025-03-13 ENCOUNTER — HOSPITAL ENCOUNTER (OUTPATIENT)
Dept: RADIOLOGY | Facility: HOSPITAL | Age: 75
Discharge: HOME | End: 2025-03-13
Payer: MEDICARE

## 2025-03-13 VITALS
BODY MASS INDEX: 27.92 KG/M2 | SYSTOLIC BLOOD PRESSURE: 123 MMHG | TEMPERATURE: 97.2 F | HEIGHT: 70 IN | OXYGEN SATURATION: 96 % | RESPIRATION RATE: 20 BRPM | DIASTOLIC BLOOD PRESSURE: 59 MMHG | WEIGHT: 195 LBS | HEART RATE: 80 BPM

## 2025-03-13 DIAGNOSIS — K86.89 PANCREATIC MASS (HHS-HCC): ICD-10-CM

## 2025-03-13 PROCEDURE — 76942 ECHO GUIDE FOR BIOPSY: CPT

## 2025-03-13 PROCEDURE — 7100000010 HC PHASE TWO TIME - EACH INCREMENTAL 1 MINUTE

## 2025-03-13 PROCEDURE — 2500000004 HC RX 250 GENERAL PHARMACY W/ HCPCS (ALT 636 FOR OP/ED): Performed by: RADIOLOGY

## 2025-03-13 PROCEDURE — 99152 MOD SED SAME PHYS/QHP 5/>YRS: CPT

## 2025-03-13 PROCEDURE — 2720000007 HC OR 272 NO HCPCS

## 2025-03-13 PROCEDURE — 76978 US TRGT DYN MBUBB 1ST LES: CPT

## 2025-03-13 PROCEDURE — 7100000009 HC PHASE TWO TIME - INITIAL BASE CHARGE

## 2025-03-13 PROCEDURE — 2550000001 HC RX 255 CONTRASTS: Performed by: RADIOLOGY

## 2025-03-13 RX ORDER — FENTANYL CITRATE 50 UG/ML
INJECTION, SOLUTION INTRAMUSCULAR; INTRAVENOUS
Status: COMPLETED | OUTPATIENT
Start: 2025-03-13 | End: 2025-03-13

## 2025-03-13 RX ORDER — MIDAZOLAM HYDROCHLORIDE 1 MG/ML
INJECTION INTRAMUSCULAR; INTRAVENOUS
Status: COMPLETED | OUTPATIENT
Start: 2025-03-13 | End: 2025-03-13

## 2025-03-13 RX ADMIN — MIDAZOLAM HYDROCHLORIDE 1 MG: 2 INJECTION, SOLUTION INTRAMUSCULAR; INTRAVENOUS at 11:23

## 2025-03-13 RX ADMIN — SULFUR HEXAFLUORIDE 2 ML: KIT at 11:35

## 2025-03-13 RX ADMIN — FENTANYL CITRATE 50 MCG: 50 INJECTION, SOLUTION INTRAMUSCULAR; INTRAVENOUS at 11:23

## 2025-03-13 RX ADMIN — FENTANYL CITRATE 50 MCG: 50 INJECTION, SOLUTION INTRAMUSCULAR; INTRAVENOUS at 11:16

## 2025-03-13 RX ADMIN — MIDAZOLAM HYDROCHLORIDE 1 MG: 2 INJECTION, SOLUTION INTRAMUSCULAR; INTRAVENOUS at 11:17

## 2025-03-13 RX ADMIN — SULFUR HEXAFLUORIDE 1.8 ML: KIT at 11:21

## 2025-03-13 ASSESSMENT — PAIN SCALES - GENERAL

## 2025-03-13 ASSESSMENT — PAIN - FUNCTIONAL ASSESSMENT
PAIN_FUNCTIONAL_ASSESSMENT: 0-10

## 2025-03-13 NOTE — POST-PROCEDURE NOTE
Interventional Radiology Post-Procedure Note    Procedure Details:  Three passes were made into a right hepatic lobe lesion using an 18 gauge automated cutting needle via a 17 gauge coaxial introducer needle. Contrast ultrasound was obtained for guidance. Scanning after the procedure demonstrated no evidence of significant bleeding.     Specimen was sent to pathology for further analysis.    Please see PACS for full procedural details.    Patient Tolerance: good  Complications: None    Indication for procedure: The encounter diagnosis was Pancreatic mass ([facility]).    Pre-Procedure Verification and Time Out:  · Procedure Location procedure area   · HUDDLE - Pre-procedure Verification completed   · TIME OUT - Final Verification completed immediately prior to procedure start   · DEBRIEF completed     General Information:  Date/Time of Procedure: 03/13/25 at 11:53 AM  Indication(s): Pancreatic Mass, Liver Mass  Findings: See PACS  Procedure performed by: Dr. Glory Mitchell MD   Assistant(s): None  Estimated Blood Loss (mL): minimal  Specimen: Yes, 3 cores  Informed Consent: written consent obtained    Prep:  Hand Hygiene, Surgical Cap, Surgical Mask, Sterile Gloves, Glasses, Scrubs, and Drape  Patient Position: Supine  Site Prep: chlorhexidine, draped, usual sterile procedure followed    Anesthesia/Medications:  Procedural Sedation:  Medications (Filter: Administrations occurring from 1106 to 1137 on 03/13/25) As of 03/13/25 1137      fentaNYL PF (Sublimaze) injection (mcg) Total dose:  100 mcg      Date/Time Rate/Dose/Volume Action       03/13/25  1116 50 mcg Given      1123 50 mcg Given               midazolam (Versed) injection (mg) Total dose:  2 mg      Date/Time Rate/Dose/Volume Action       03/13/25  1117 1 mg Given      1123 1 mg Given               sulfur hexafluoride microsphr (Lumason) injection (mL) Total volume:  3.8 mL      Date/Time Rate/Dose/Volume Action       03/13/25  1121 1.8 mL Given      1135 2 mL  Given                     Fentanyl: 100 mcg  Versed: 2 mg    Ioana Lieberman MD  PGY-5, Diagnostic Radiology  Contact via Epic Chat    NON-Urgent on call weekends and after hours weekdays (5pm - 5am) IR pager: [phone]  Urgent & emergent on call weekends and after hours weekdays (5pm-7am) IR pager: [phone]

## 2025-03-13 NOTE — PRE-PROCEDURE NOTE
INTERVENTIONAL RADIOLOGY PRE-PROCEDURE NOTE    Feliciano Worthington is a 75 y.o. male who presents to the interventional radiology department for liver lesion biopsy.    Procedure: US guided liver lesion biopsy    Indication for procedure: The encounter diagnosis was Pancreatic mass ([facility]).    Past Medical History:   Diagnosis Date    Acute myocardial infarction, unspecified 01/24/2018    Heart attack    Acute myocardial infarction, unspecified 01/24/2018    Heart attack    BMI 29.0-29.9,adult 10/13/2023    Chronic obstructive pulmonary disease, unspecified 01/24/2018    COPD, mild    Chronic obstructive pulmonary disease, unspecified 01/24/2018    COPD, mild    Coronary angioplasty status     Post PTCA    Encounter for immunization 11/04/2022    Encounter for immunization    Encounter for screening for malignant neoplasm of colon 08/25/2016    Encounter for colonoscopy due to history of colonic polyp    Encounter for screening for malignant neoplasm of skin 04/14/2022    Skin cancer screening    Nicotine dependence, cigarettes, uncomplicated 09/12/2022    Cigarette smoker    Obesity, unspecified 05/25/2022    Class 1 obesity with body mass index (BMI) of 31.0 to 31.9 in adult    Old myocardial infarction 01/24/2018    History of myocardial infarction    Other acute postprocedural pain     Post-operative pain    Pain in left knee     Left knee pain, unspecified chronicity    Pain in right hip 11/04/2022    Hip pain, bilateral    Personal history of other diseases of the digestive system 01/24/2018    History of gastroesophageal reflux (GERD)    Personal history of other diseases of the musculoskeletal system and connective tissue 08/25/2016    History of low back pain    Personal history of other diseases of the respiratory system     History of sinusitis    Personal history of other endocrine, nutritional and metabolic disease 08/25/2016    History of hyperlipidemia    Personal history of other mental and behavioral  disorders 04/14/2022    History of depression    Personal history of other specified conditions 12/08/2016    History of diarrhea    Personal history of other specified conditions     History of prolonged Q-T interval on ECG    Personal history of other specified conditions 06/20/2018    History of nocturia    Separation of muscle (nontraumatic), other site 05/14/2018    Rectus diastasis    Sprain of medial collateral ligament of left knee, initial encounter 08/28/2017    Sprain of medial collateral ligament of left knee, initial encounter    Sprain of medial collateral ligament of left knee, subsequent encounter 10/12/2017    Sprain of medial collateral ligament of left knee, subsequent encounter    Type 2 diabetes mellitus without complications (Multi) 01/24/2018    Diabetes mellitus    Type 2 diabetes mellitus without complications (Multi) 01/24/2018    Diabetes mellitus    Umbilical hernia without obstruction or gangrene 05/14/2018    Umbilical hernia without obstruction and without gangrene      Past Surgical History:   Procedure Laterality Date    CARDIAC CATHETERIZATION N/A 2/6/2025    Procedure: Left Heart Cath, With LV;  Surgeon: Wil Bronson MD;  Location: [facility];  Service: Cardiovascular;  Laterality: N/A;    COLONOSCOPY  10/06/2016    Colonoscopy    CORONARY ANGIOPLASTY WITH STENT PLACEMENT  08/25/2016    Cath Stent Placement    OTHER SURGICAL HISTORY  05/25/2022    Oral surgery    SKIN CANCER EXCISION  01/17/2025    right forearm, right shoulder    TONSILLECTOMY  08/25/2016    Tonsillectomy       Relevant Labs:   Lab Results   Component Value Date    CREATININE 0.96 02/18/2025    EGFR 83 02/18/2025    INR 1.0 02/17/2025    PROTIME 11.4 02/17/2025       Planned Sedation/Anesthesia: Moderate    Directed physical examination:    General: Normal appearance, behavior, cognition and NAD  Heart: Normal Heart rate  Lungs: No increased work of breathing  Abdomen: soft      Current  Outpatient Medications:     aspirin 81 mg EC tablet, Take 1 tablet (81 mg) by mouth once daily., Disp: , Rfl:     clopidogrel (Plavix) 75 mg tablet, Take 1 tablet (75 mg) by mouth once daily., Disp: 30 tablet, Rfl: 1    ACETAMINOPHEN ORAL, Take 650 mg by mouth every 8 hours if needed for mild pain (1 - 3). Do not crush, chew, or split., Disp: , Rfl:     alcohol swabs (BD Alcohol Swabs) pads, medicated, Use twice daily while checking sugar., Disp: 200 each, Rfl: 1    alum-mag hydroxide-simeth (Mylanta) 200-200-20 mg/5 mL oral suspension, Take 30 mL by mouth 4 times a day as needed for indigestion or heartburn., Disp: 355 mL, Rfl: 0    amLODIPine (Norvasc) 5 mg tablet, Take 1 tablet (5 mg) by mouth once daily., Disp: 90 tablet, Rfl: 1    blood glucose control high,low (Accu-Chek Guide L1-L2 Ctrl Sol) solution, 1 bottle once daily as needed (use when need to un controls on glucose meter)., Disp: 1 each, Rfl: 1    blood-glucose meter (Accu-Chek Guide Glucose Meter) misc, Test twice daily, Disp: 1 each, Rfl: 0    buPROPion XL (Wellbutrin XL) 150 mg 24 hr tablet, Take 1 tablet (150 mg) by mouth once daily. Do not crush, chew, or split., Disp: 90 tablet, Rfl: 1    ezetimibe (Zetia) 10 mg tablet, Take 1 tablet (10 mg) by mouth once daily., Disp: 90 tablet, Rfl: 1    hydrOXYzine HCL (Atarax) 25 mg tablet, Take 1 tablet (25 mg) by mouth 2 times a day., Disp: 180 tablet, Rfl: 1    insulin glargine (Lantus U-100 Insulin) 100 unit/mL injection, Inject 32 Units under the skin once daily in the morning. Take as directed per insulin instructions., Disp: 10 mL, Rfl: 1    insulin glargine (Lantus) 100 unit/mL (3 mL) pen, Inject 32 Units under the skin once daily. Take as directed per insulin instructions., Disp: 29 mL, Rfl: 3    insulin glargine (Lantus) 100 unit/mL injection, Inject 32 Units under the skin once daily at bedtime. Take as directed per insulin instructions., Disp: 10 mL, Rfl: 1    insulin lispro 100 unit/mL injection,  Inject 15 Units under the skin 3 times a day before meals. Take as directed per insulin instructions., Disp: 13.5 mL, Rfl: 1    isosorbide mononitrate ER (Imdur) 30 mg 24 hr tablet, Take 1 tablet (30 mg) by mouth once daily. Do not crush or chew., Disp: 90 tablet, Rfl: 1    lancets (Accu-Chek Softclix Lancets) misc, Test twice daily, Disp: 200 each, Rfl: 0    lovastatin (Mevacor) 40 mg tablet, Take 1 tablet (40 mg) by mouth once daily at bedtime., Disp: 90 tablet, Rfl: 1    magnesium hydroxide (Milk of Magnesia) 400 mg/5 mL suspension, Take 30 mL by mouth once daily as needed for constipation., Disp: 360 mL, Rfl: 0    melatonin 5 mg tablet, Take 1 tablet (5 mg) by mouth as needed at bedtime for sleep., Disp: 30 tablet, Rfl: 0    nitroglycerin (Nitrostat) 0.4 mg SL tablet, Place 1 tablet (0.4 mg) under the tongue every 5 minutes if needed for chest pain. DO NOT EXCEED A TOTAL OF 3 DOSES IN 15 MINUTES, Disp: 25 tablet, Rfl: 2    omeprazole (PriLOSEC) 40 mg DR capsule, Take 1 capsule (40 mg) by mouth once daily in the morning. Take before meals. Do not crush or chew., Disp: 90 capsule, Rfl: 1    ondansetron (Zofran) 4 mg tablet, Take 1 tablet (4 mg) by mouth every 8 hours if needed for nausea., Disp: 20 tablet, Rfl: 0    polyethylene glycol (Glycolax, Miralax) 17 gram packet, Take 17 g by mouth once daily., Disp: 90 Unspecified, Rfl: 0    venlafaxine XR (Effexor-XR) 150 mg 24 hr capsule, Take 1 capsule (150 mg) by mouth once daily. Do not crush or chew., Disp: 90 capsule, Rfl: 1     Mallampati: II (hard and soft palate, upper portion of tonsils anduvula visible)    ASA Score: ASA 2 - Patient with mild systemic disease with no functional limitations    Benefits, risks and alternatives of procedure and planned sedation have been discussed with the patient and/or their representative. All questions answered and they agree to proceed.     Ioana Lieberman MD  PGY-5, Diagnostic Radiology  Contact via [employer]

## 2025-03-13 NOTE — Clinical Note
Mr. Worthington arrives to US [address] consented for liver biopsy with Stephen ESPINOZA. Labs, history, meds and allergies reviewed. Pt aware of no plan for pancreas biopsy today

## 2025-03-13 NOTE — DISCHARGE INSTRUCTIONS
Follow instructions on Liver Biopsy patient info sheet.    - Keep bandage clean and dry for 24 hrs, then may remove and shower. No soaking for 2-3 days until site is fully healed. Monitor for signs of infection (redness, swelling, pus-like drainage). No heavy lifting, pushing or pulling more than 10 lbs for 4 days.    You received moderate sedation:  - Do not drive a car, or operate any machinery or power tools of any kind.  - Do not drink any alcoholic drinks.  - Do not take any over the counter medications that may cause drowsiness.  - Do not make any important decisions or sign any legal documents.  - You need to have a responsible adult accompany you home.  - You may resume your normal diet.  - We strongly suggest that a responsible adult be with you for the rest of the day and also during the night. This is for your protection and safety.     For questions related to your procedure:  Please call [phone] between the hours of 7:00am-5:00pm Monday through Friday.  Please call [phone] after 5:00pm and on weekends and holidays.     In the event of an emergency call 911 or go to your nearest emergency room.

## 2025-03-18 ENCOUNTER — APPOINTMENT (OUTPATIENT)
Dept: PRIMARY CARE | Facility: CLINIC | Age: 75
End: 2025-03-18
Payer: MEDICARE

## 2025-03-19 LAB
LAB AP ASR DISCLAIMER: NORMAL
LABORATORY COMMENT REPORT: NORMAL
PATH REPORT.FINAL DX SPEC: NORMAL
PATH REPORT.GROSS SPEC: NORMAL
PATH REPORT.RELEVANT HX SPEC: NORMAL
PATH REPORT.TOTAL CANCER: NORMAL

## 2025-03-20 ENCOUNTER — HOSPITAL ENCOUNTER (EMERGENCY)
Facility: HOSPITAL | Age: 75
Discharge: ANOTHER HOSPITAL | End: 2025-03-20
Attending: STUDENT IN AN ORGANIZED HEALTH CARE EDUCATION/TRAINING PROGRAM
Payer: MEDICARE

## 2025-03-20 ENCOUNTER — HOSPITAL ENCOUNTER (OUTPATIENT)
Dept: CARDIOLOGY | Facility: HOSPITAL | Age: 75
Discharge: HOME | DRG: 435 | End: 2025-03-20
Payer: MEDICARE

## 2025-03-20 ENCOUNTER — APPOINTMENT (OUTPATIENT)
Dept: CARDIOLOGY | Facility: HOSPITAL | Age: 75
End: 2025-03-20
Payer: MEDICARE

## 2025-03-20 ENCOUNTER — HOSPITAL ENCOUNTER (INPATIENT)
Facility: HOSPITAL | Age: 75
LOS: 2 days | Discharge: HOME | DRG: 435 | End: 2025-03-22
Attending: INTERNAL MEDICINE | Admitting: INTERNAL MEDICINE
Payer: MEDICARE

## 2025-03-20 ENCOUNTER — APPOINTMENT (OUTPATIENT)
Dept: RADIOLOGY | Facility: HOSPITAL | Age: 75
End: 2025-03-20
Payer: MEDICARE

## 2025-03-20 VITALS
OXYGEN SATURATION: 97 % | DIASTOLIC BLOOD PRESSURE: 78 MMHG | HEIGHT: 70 IN | BODY MASS INDEX: 27.92 KG/M2 | SYSTOLIC BLOOD PRESSURE: 165 MMHG | HEART RATE: 72 BPM | WEIGHT: 195 LBS | RESPIRATION RATE: 16 BRPM

## 2025-03-20 DIAGNOSIS — M16.0 PRIMARY OSTEOARTHRITIS OF BOTH HIPS: ICD-10-CM

## 2025-03-20 DIAGNOSIS — E87.6 HYPOKALEMIA: ICD-10-CM

## 2025-03-20 DIAGNOSIS — K21.9 GASTROESOPHAGEAL REFLUX DISEASE WITHOUT ESOPHAGITIS: ICD-10-CM

## 2025-03-20 DIAGNOSIS — I21.4 NSTEMI (NON-ST ELEVATED MYOCARDIAL INFARCTION) (MULTI): Primary | ICD-10-CM

## 2025-03-20 DIAGNOSIS — K86.89 PANCREATIC MASS (HHS-HCC): ICD-10-CM

## 2025-03-20 DIAGNOSIS — R07.9 CHEST PAIN: Primary | ICD-10-CM

## 2025-03-20 DIAGNOSIS — R07.9 CHEST PAIN, UNSPECIFIED TYPE: ICD-10-CM

## 2025-03-20 DIAGNOSIS — I26.99 PULMONARY EMBOLISM, UNSPECIFIED CHRONICITY, UNSPECIFIED PULMONARY EMBOLISM TYPE, UNSPECIFIED WHETHER ACUTE COR PULMONALE PRESENT (MULTI): ICD-10-CM

## 2025-03-20 DIAGNOSIS — I21.4 NSTEMI (NON-ST ELEVATED MYOCARDIAL INFARCTION) (MULTI): ICD-10-CM

## 2025-03-20 DIAGNOSIS — I25.10 CORONARY ARTERY DISEASE WITHOUT ANGINA PECTORIS, UNSPECIFIED VESSEL OR LESION TYPE, UNSPECIFIED WHETHER NATIVE OR TRANSPLANTED HEART: ICD-10-CM

## 2025-03-20 LAB
ALBUMIN SERPL BCP-MCNC: 4.1 G/DL (ref 3.4–5)
ALP SERPL-CCNC: 81 U/L (ref 33–136)
ALT SERPL W P-5'-P-CCNC: 14 U/L (ref 10–52)
ANION GAP SERPL CALC-SCNC: 12 MMOL/L (ref 10–20)
APTT PPP: 27 SECONDS (ref 26–36)
AST SERPL W P-5'-P-CCNC: 13 U/L (ref 9–39)
ATRIAL RATE: 71 BPM
ATRIAL RATE: 73 BPM
BASOPHILS # BLD AUTO: 0.04 X10*3/UL (ref 0–0.1)
BASOPHILS NFR BLD AUTO: 0.5 %
BILIRUB SERPL-MCNC: 0.5 MG/DL (ref 0–1.2)
BNP SERPL-MCNC: 240 PG/ML (ref 0–99)
BUN SERPL-MCNC: 25 MG/DL (ref 6–23)
CALCIUM SERPL-MCNC: 9 MG/DL (ref 8.6–10.3)
CARDIAC TROPONIN I PNL SERPL HS: 275 NG/L (ref 0–20)
CARDIAC TROPONIN I PNL SERPL HS: 318 NG/L (ref 0–20)
CHLORIDE SERPL-SCNC: 99 MMOL/L (ref 98–107)
CO2 SERPL-SCNC: 26 MMOL/L (ref 21–32)
CREAT SERPL-MCNC: 0.97 MG/DL (ref 0.5–1.3)
EGFRCR SERPLBLD CKD-EPI 2021: 81 ML/MIN/1.73M*2
EOSINOPHIL # BLD AUTO: 0.17 X10*3/UL (ref 0–0.4)
EOSINOPHIL NFR BLD AUTO: 2.1 %
ERYTHROCYTE [DISTWIDTH] IN BLOOD BY AUTOMATED COUNT: 13.4 % (ref 11.5–14.5)
GLUCOSE SERPL-MCNC: 240 MG/DL (ref 74–99)
HCT VFR BLD AUTO: 37.3 % (ref 41–52)
HGB BLD-MCNC: 12.8 G/DL (ref 13.5–17.5)
IMM GRANULOCYTES # BLD AUTO: 0.06 X10*3/UL (ref 0–0.5)
IMM GRANULOCYTES NFR BLD AUTO: 0.7 % (ref 0–0.9)
INR PPP: 1.1 (ref 0.9–1.1)
LYMPHOCYTES # BLD AUTO: 1.9 X10*3/UL (ref 0.8–3)
LYMPHOCYTES NFR BLD AUTO: 23.5 %
MAGNESIUM SERPL-MCNC: 1.79 MG/DL (ref 1.6–2.4)
MCH RBC QN AUTO: 28.8 PG (ref 26–34)
MCHC RBC AUTO-ENTMCNC: 34.3 G/DL (ref 32–36)
MCV RBC AUTO: 84 FL (ref 80–100)
MONOCYTES # BLD AUTO: 0.73 X10*3/UL (ref 0.05–0.8)
MONOCYTES NFR BLD AUTO: 9 %
NEUTROPHILS # BLD AUTO: 5.2 X10*3/UL (ref 1.6–5.5)
NEUTROPHILS NFR BLD AUTO: 64.2 %
NRBC BLD-RTO: 0 /100 WBCS (ref 0–0)
P AXIS: 17 DEGREES
P AXIS: 77 DEGREES
P OFFSET: 89 MS
P ONSET: 20 MS
PLATELET # BLD AUTO: 147 X10*3/UL (ref 150–450)
POTASSIUM SERPL-SCNC: 4.3 MMOL/L (ref 3.5–5.3)
PR INTERVAL: 374 MS
PROT SERPL-MCNC: 6.9 G/DL (ref 6.4–8.2)
PROTHROMBIN TIME: 11.7 SECONDS (ref 9.8–12.4)
Q ONSET: 207 MS
Q ONSET: 208 MS
QRS COUNT: 11 BEATS
QRS COUNT: 13 BEATS
QRS DURATION: 122 MS
QRS DURATION: 122 MS
QT INTERVAL: 410 MS
QT INTERVAL: 416 MS
QTC CALCULATION(BAZETT): 426 MS
QTC CALCULATION(BAZETT): 458 MS
QTC FREDERICIA: 420 MS
QTC FREDERICIA: 444 MS
R AXIS: 263 DEGREES
R AXIS: 263 DEGREES
RBC # BLD AUTO: 4.44 X10*6/UL (ref 4.5–5.9)
SODIUM SERPL-SCNC: 133 MMOL/L (ref 136–145)
T AXIS: 128 DEGREES
T AXIS: 147 DEGREES
T OFFSET: 413 MS
T OFFSET: 415 MS
UFH PPP CHRO-ACNC: 0.4 IU/ML
UFH PPP CHRO-ACNC: 0.5 IU/ML
VENTRICULAR RATE: 65 BPM
VENTRICULAR RATE: 73 BPM
WBC # BLD AUTO: 8.1 X10*3/UL (ref 4.4–11.3)

## 2025-03-20 PROCEDURE — 1100000001 HC PRIVATE ROOM DAILY

## 2025-03-20 PROCEDURE — 96374 THER/PROPH/DIAG INJ IV PUSH: CPT | Mod: 59

## 2025-03-20 PROCEDURE — 85610 PROTHROMBIN TIME: CPT | Performed by: STUDENT IN AN ORGANIZED HEALTH CARE EDUCATION/TRAINING PROGRAM

## 2025-03-20 PROCEDURE — 93005 ELECTROCARDIOGRAM TRACING: CPT

## 2025-03-20 PROCEDURE — 2550000001 HC RX 255 CONTRASTS: Performed by: STUDENT IN AN ORGANIZED HEALTH CARE EDUCATION/TRAINING PROGRAM

## 2025-03-20 PROCEDURE — 82947 ASSAY GLUCOSE BLOOD QUANT: CPT

## 2025-03-20 PROCEDURE — 71275 CT ANGIOGRAPHY CHEST: CPT

## 2025-03-20 PROCEDURE — 2500000004 HC RX 250 GENERAL PHARMACY W/ HCPCS (ALT 636 FOR OP/ED): Performed by: STUDENT IN AN ORGANIZED HEALTH CARE EDUCATION/TRAINING PROGRAM

## 2025-03-20 PROCEDURE — 85520 HEPARIN ASSAY: CPT | Performed by: STUDENT IN AN ORGANIZED HEALTH CARE EDUCATION/TRAINING PROGRAM

## 2025-03-20 PROCEDURE — 80053 COMPREHEN METABOLIC PANEL: CPT | Performed by: STUDENT IN AN ORGANIZED HEALTH CARE EDUCATION/TRAINING PROGRAM

## 2025-03-20 PROCEDURE — 36415 COLL VENOUS BLD VENIPUNCTURE: CPT | Performed by: STUDENT IN AN ORGANIZED HEALTH CARE EDUCATION/TRAINING PROGRAM

## 2025-03-20 PROCEDURE — 93010 ELECTROCARDIOGRAM REPORT: CPT | Performed by: INTERNAL MEDICINE

## 2025-03-20 PROCEDURE — 83880 ASSAY OF NATRIURETIC PEPTIDE: CPT | Performed by: STUDENT IN AN ORGANIZED HEALTH CARE EDUCATION/TRAINING PROGRAM

## 2025-03-20 PROCEDURE — 83735 ASSAY OF MAGNESIUM: CPT | Performed by: STUDENT IN AN ORGANIZED HEALTH CARE EDUCATION/TRAINING PROGRAM

## 2025-03-20 PROCEDURE — 99285 EMERGENCY DEPT VISIT HI MDM: CPT | Mod: 25 | Performed by: STUDENT IN AN ORGANIZED HEALTH CARE EDUCATION/TRAINING PROGRAM

## 2025-03-20 PROCEDURE — 84484 ASSAY OF TROPONIN QUANT: CPT | Performed by: STUDENT IN AN ORGANIZED HEALTH CARE EDUCATION/TRAINING PROGRAM

## 2025-03-20 PROCEDURE — 1200000002 HC GENERAL ROOM WITH TELEMETRY DAILY

## 2025-03-20 PROCEDURE — 85730 THROMBOPLASTIN TIME PARTIAL: CPT | Performed by: STUDENT IN AN ORGANIZED HEALTH CARE EDUCATION/TRAINING PROGRAM

## 2025-03-20 PROCEDURE — 99291 CRITICAL CARE FIRST HOUR: CPT | Mod: 25

## 2025-03-20 PROCEDURE — 85025 COMPLETE CBC W/AUTO DIFF WBC: CPT | Performed by: STUDENT IN AN ORGANIZED HEALTH CARE EDUCATION/TRAINING PROGRAM

## 2025-03-20 RX ORDER — POLYETHYLENE GLYCOL 3350 17 G/17G
17 POWDER, FOR SOLUTION ORAL DAILY
Status: DISCONTINUED | OUTPATIENT
Start: 2025-03-21 | End: 2025-03-22 | Stop reason: HOSPADM

## 2025-03-20 RX ORDER — HEPARIN SODIUM 10000 [USP'U]/100ML
0-4000 INJECTION, SOLUTION INTRAVENOUS CONTINUOUS
Status: DISCONTINUED | OUTPATIENT
Start: 2025-03-21 | End: 2025-03-21

## 2025-03-20 RX ORDER — CLOPIDOGREL BISULFATE 75 MG/1
75 TABLET ORAL DAILY
Status: CANCELLED | OUTPATIENT
Start: 2025-03-21

## 2025-03-20 RX ORDER — HEPARIN SODIUM 10000 [USP'U]/100ML
0-4500 INJECTION, SOLUTION INTRAVENOUS CONTINUOUS
Status: DISCONTINUED | OUTPATIENT
Start: 2025-03-20 | End: 2025-03-20 | Stop reason: HOSPADM

## 2025-03-20 RX ADMIN — IOHEXOL 75 ML: 350 INJECTION, SOLUTION INTRAVENOUS at 10:18

## 2025-03-20 RX ADMIN — HEPARIN SODIUM 1600 UNITS/HR: 10000 INJECTION, SOLUTION INTRAVENOUS at 11:54

## 2025-03-20 SDOH — ECONOMIC STABILITY: FOOD INSECURITY: WITHIN THE PAST 12 MONTHS, THE FOOD YOU BOUGHT JUST DIDN'T LAST AND YOU DIDN'T HAVE MONEY TO GET MORE.: NEVER TRUE

## 2025-03-20 SDOH — SOCIAL STABILITY: SOCIAL INSECURITY: WITHIN THE LAST YEAR, HAVE YOU BEEN AFRAID OF YOUR PARTNER OR EX-PARTNER?: NO

## 2025-03-20 SDOH — ECONOMIC STABILITY: HOUSING INSECURITY: IN THE LAST 12 MONTHS, WAS THERE A TIME WHEN YOU WERE NOT ABLE TO PAY THE MORTGAGE OR RENT ON TIME?: NO

## 2025-03-20 SDOH — SOCIAL STABILITY: SOCIAL INSECURITY: ABUSE: ADULT

## 2025-03-20 SDOH — SOCIAL STABILITY: SOCIAL INSECURITY: HAVE YOU HAD ANY THOUGHTS OF HARMING ANYONE ELSE?: NO

## 2025-03-20 SDOH — ECONOMIC STABILITY: INCOME INSECURITY: IN THE PAST 12 MONTHS HAS THE ELECTRIC, GAS, OIL, OR WATER COMPANY THREATENED TO SHUT OFF SERVICES IN YOUR HOME?: NO

## 2025-03-20 SDOH — SOCIAL STABILITY: SOCIAL INSECURITY: ARE THERE ANY APPARENT SIGNS OF INJURIES/BEHAVIORS THAT COULD BE RELATED TO ABUSE/NEGLECT?: NO

## 2025-03-20 SDOH — SOCIAL STABILITY: SOCIAL INSECURITY: WERE YOU ABLE TO COMPLETE ALL THE BEHAVIORAL HEALTH SCREENINGS?: YES

## 2025-03-20 SDOH — SOCIAL STABILITY: SOCIAL INSECURITY: DO YOU FEEL ANYONE HAS EXPLOITED OR TAKEN ADVANTAGE OF YOU FINANCIALLY OR OF YOUR PERSONAL PROPERTY?: NO

## 2025-03-20 SDOH — ECONOMIC STABILITY: FOOD INSECURITY: WITHIN THE PAST 12 MONTHS, YOU WORRIED THAT YOUR FOOD WOULD RUN OUT BEFORE YOU GOT THE MONEY TO BUY MORE.: NEVER TRUE

## 2025-03-20 SDOH — SOCIAL STABILITY: SOCIAL INSECURITY: WITHIN THE LAST YEAR, HAVE YOU BEEN HUMILIATED OR EMOTIONALLY ABUSED IN OTHER WAYS BY YOUR PARTNER OR EX-PARTNER?: NO

## 2025-03-20 SDOH — SOCIAL STABILITY: SOCIAL INSECURITY: ARE YOU OR HAVE YOU BEEN THREATENED OR ABUSED PHYSICALLY, EMOTIONALLY, OR SEXUALLY BY ANYONE?: NO

## 2025-03-20 SDOH — SOCIAL STABILITY: SOCIAL INSECURITY: DO YOU FEEL UNSAFE GOING BACK TO THE PLACE WHERE YOU ARE LIVING?: NO

## 2025-03-20 SDOH — SOCIAL STABILITY: SOCIAL INSECURITY: HAVE YOU HAD THOUGHTS OF HARMING ANYONE ELSE?: NO

## 2025-03-20 SDOH — ECONOMIC STABILITY: TRANSPORTATION INSECURITY: IN THE PAST 12 MONTHS, HAS LACK OF TRANSPORTATION KEPT YOU FROM MEDICAL APPOINTMENTS OR FROM GETTING MEDICATIONS?: NO

## 2025-03-20 SDOH — SOCIAL STABILITY: SOCIAL INSECURITY: DOES ANYONE TRY TO KEEP YOU FROM HAVING/CONTACTING OTHER FRIENDS OR DOING THINGS OUTSIDE YOUR HOME?: NO

## 2025-03-20 SDOH — ECONOMIC STABILITY: HOUSING INSECURITY: IN THE PAST 12 MONTHS, HOW MANY TIMES HAVE YOU MOVED WHERE YOU WERE LIVING?: 0

## 2025-03-20 SDOH — ECONOMIC STABILITY: HOUSING INSECURITY: AT ANY TIME IN THE PAST 12 MONTHS, WERE YOU HOMELESS OR LIVING IN A SHELTER (INCLUDING NOW)?: NO

## 2025-03-20 SDOH — SOCIAL STABILITY: SOCIAL INSECURITY: HAS ANYONE EVER THREATENED TO HURT YOUR FAMILY OR YOUR PETS?: NO

## 2025-03-20 SDOH — ECONOMIC STABILITY: FOOD INSECURITY: HOW HARD IS IT FOR YOU TO PAY FOR THE VERY BASICS LIKE FOOD, HOUSING, MEDICAL CARE, AND HEATING?: NOT HARD AT ALL

## 2025-03-20 ASSESSMENT — ACTIVITIES OF DAILY LIVING (ADL)
LACK_OF_TRANSPORTATION: NO
GROOMING: INDEPENDENT
LACK_OF_TRANSPORTATION: NO
DRESSING YOURSELF: INDEPENDENT
JUDGMENT_ADEQUATE_SAFELY_COMPLETE_DAILY_ACTIVITIES: YES
BATHING: INDEPENDENT
TOILETING: INDEPENDENT
HEARING - RIGHT EAR: FUNCTIONAL
HEARING - LEFT EAR: FUNCTIONAL
ASSISTIVE_DEVICE: DENTURES UPPER
PATIENT'S MEMORY ADEQUATE TO SAFELY COMPLETE DAILY ACTIVITIES?: YES
ADEQUATE_TO_COMPLETE_ADL: YES
FEEDING YOURSELF: INDEPENDENT
WALKS IN HOME: INDEPENDENT

## 2025-03-20 ASSESSMENT — COLUMBIA-SUICIDE SEVERITY RATING SCALE - C-SSRS
6. HAVE YOU EVER DONE ANYTHING, STARTED TO DO ANYTHING, OR PREPARED TO DO ANYTHING TO END YOUR LIFE?: NO
1. IN THE PAST MONTH, HAVE YOU WISHED YOU WERE DEAD OR WISHED YOU COULD GO TO SLEEP AND NOT WAKE UP?: NO
2. HAVE YOU ACTUALLY HAD ANY THOUGHTS OF KILLING YOURSELF?: NO
6. HAVE YOU EVER DONE ANYTHING, STARTED TO DO ANYTHING, OR PREPARED TO DO ANYTHING TO END YOUR LIFE?: NO
2. HAVE YOU ACTUALLY HAD ANY THOUGHTS OF KILLING YOURSELF?: NO
1. IN THE PAST MONTH, HAVE YOU WISHED YOU WERE DEAD OR WISHED YOU COULD GO TO SLEEP AND NOT WAKE UP?: NO

## 2025-03-20 ASSESSMENT — LIFESTYLE VARIABLES
HOW MANY STANDARD DRINKS CONTAINING ALCOHOL DO YOU HAVE ON A TYPICAL DAY: PATIENT DOES NOT DRINK
HAVE PEOPLE ANNOYED YOU BY CRITICIZING YOUR DRINKING: NO
HOW OFTEN DO YOU HAVE A DRINK CONTAINING ALCOHOL: NEVER
EVER FELT BAD OR GUILTY ABOUT YOUR DRINKING: NO
HOW OFTEN DO YOU HAVE 6 OR MORE DRINKS ON ONE OCCASION: NEVER
AUDIT-C TOTAL SCORE: 0
SKIP TO QUESTIONS 9-10: 1
HAVE YOU EVER FELT YOU SHOULD CUT DOWN ON YOUR DRINKING: NO
EVER HAD A DRINK FIRST THING IN THE MORNING TO STEADY YOUR NERVES TO GET RID OF A HANGOVER: NO
AUDIT-C TOTAL SCORE: 0
TOTAL SCORE: 0

## 2025-03-20 ASSESSMENT — COGNITIVE AND FUNCTIONAL STATUS - GENERAL
DAILY ACTIVITIY SCORE: 24
MOBILITY SCORE: 24
PATIENT BASELINE BEDBOUND: NO

## 2025-03-20 ASSESSMENT — PAIN SCALES - GENERAL
PAINLEVEL_OUTOF10: 0 - NO PAIN
PAINLEVEL_OUTOF10: 8
PAINLEVEL_OUTOF10: 0 - NO PAIN

## 2025-03-20 ASSESSMENT — PAIN - FUNCTIONAL ASSESSMENT
PAIN_FUNCTIONAL_ASSESSMENT: 0-10

## 2025-03-20 ASSESSMENT — PATIENT HEALTH QUESTIONNAIRE - PHQ9
2. FEELING DOWN, DEPRESSED OR HOPELESS: NOT AT ALL
1. LITTLE INTEREST OR PLEASURE IN DOING THINGS: NOT AT ALL
SUM OF ALL RESPONSES TO PHQ9 QUESTIONS 1 & 2: 0

## 2025-03-20 ASSESSMENT — PAIN DESCRIPTION - DESCRIPTORS: DESCRIPTORS: SHARP;DULL

## 2025-03-20 NOTE — ED PROVIDER NOTES
HPI: The patient is a 75-year-old with history of coronary artery disease status post stent as well as diabetes hypertension COPD who recently had an NSTEMI and was found have triple-vessel disease and [place] [facility] [place] but this was deferred due to having a new pancreatic mass discovered that is concerning for malignancy who presents to the Emergency Department with a chief complaint of chest pain that started shortly after he woke from sleep.  Patient reports he was feeling well and woke up with sharp left-sided chest pain that was nonradiating and lasted for an hour.  Is not exertional.  Feels different than previous heart attack.  No diaphoresis shortness of breath nausea vomiting associated.  He reports he has biopsy on his liver performed this past Monday.  He reports that he felt better after he was given aspirin and route to the hospital with EMS.  No history of DVT or PE no swelling his legs no black or bloody stools. Patient was given full dose aspirin and route.     PAST MEDICAL HISTORY:  as per HPI  ALLERGIES:  as per HPI  MEDICATIONS:  as per HPI  FAMILY HISTORY: as per HPI  SURGICAL HISTORY: as per HPI  SOCIAL HISTORY: as per HPI     PHYSICAL EXAM:  VITAL SIGNS: Nursing notes reviewed.  GENERAL:  Alert and interactive  EYES:   Eyes track.  ENT:  Airway patent.  RESPIRATORY:  Nonlabored breathing.  Clear bilaterally.  CARDIOVASCULAR:  [Regular rate.] [Regular rhythm.]  Radial pulses equal bilaterally.  GASTROINTESTINAL:  No distension.  Nontender.  Negative Bright's.  MUSCULOSKELETAL:  No deformity.  No calf swelling or tenderness.  NEUROLOGICAL:  Awake.  SKIN:  Dry.        MEDICAL DECISION MAKING (MDM):     DIAGNOSTIC STUDIES  Labs: CBC, CMP, magnesium, troponin  Radiology: CTA pulmonary angiogram.     EKG 0951  Per my interpretation:  Electrocardiogram ECG  RATE:  [Normal]  RHYTHM: [Sinus]  AXIS: [Normal]  INTERVALS: Prolonged [place]  ST-T WAVE CHANGES: [Normal]  ABNORMALITIES/COMPARISON: Large  unchanged most recent EKG in February 2025.  First-degree AV block.    EKG 1133  Per my interpretation:  Electrocardiogram ECG  RATE:  [Normal]  RHYTHM: [Sinus]  AXIS: [Normal]  INTERVALS: Prolonged [place]  ST-T WAVE CHANGES: [Normal]  ABNORMALITIES/COMPARISON: Unchanged from earlier today.       REVIEW OF OLD RECORDS: Reviewed the DC summary from 2/18/2025    Critical Care Time  Authorized and Performed by: Julio Lake MD  Total critical care time: [32] minutes  Due to a high probability of clinically significant, life threatening deterioration, the patient required my highest level of preparedness to intervene emergently and I personally spent this critical care time directly and personally managing the patient. This critical care time included obtaining a history; examining the patient; pulse oximetry; ordering and review of studies; arranging urgent treatment with development of a management plan; evaluation of patient's response to treatment; frequent reassessment; and, discussions with other providers and patient/family.  This critical care time was performed to assess and manage the high probability of imminent, life-threatening deterioration that could result in multi-organ failure. It was exclusive of separately billable procedures and treating other patients and teaching time.  Please see MDM section and the rest of the note for further information on patient assessment and treatment.     SUMMARY:  The patient is admitted to the Emergency Department for evaluation of above. Complete history and physical examination was performed by me.  Patient presents with an episode of nonexertional chest pain with a history of coronary disease who is in need of CABG after recent NSTEMI as well as recent biopsy of his liver for a pancreatic mass concerning for malignancy with a lesion on the liver that is suspected to be a metastasis.  He is asymptomatic and well-appearing right now.  EKG not consistent occlusive MI.   Given his history of suspected malignancy, send him for CTA to rule out PE.  I am concerned that this could also be unstable angina given he has known coronary artery disease in need of treatment but, I am still concerned that starting him on heparin empirically could be dangerous given his recent biopsy.  Patient has already received aspirin and given that he is stable and well-appearing and asymptomatic, I will hold off on anticoagulation until I can get further imaging.    CTA pulmonary angiogram showed evidence of bilateral subsegmental PEs.  My suspicion that this is causing his pain is low given it seems more consistent with NSTEMI from his coronary artery disease.  I suspect that he likely is not having right heart strain given there is no CT evidence of this and his vitals are reassuring overall.  I did reach out to cardiology at [facility] and spoke with Dr. Singh who agreed to accept the patient for transfer for further evaluation for urgent CABG for NSTEMI as well as to ensure that all the necessary subspecialist have also evaluated him given he has likely active malignancy without having the availability of his biopsy results from Monday.  I reached out to interventional cardiology and spoke with Dr. Bell, who reports that we are allowed to heparinize the patient given that been more than 24 hours after his biopsy.  Patient was reevaluated and continues to be pain-free which is reassuring.  After a discussion with appropriate hospital providers and specialists, the patient was found to have needs of medical services and care unable to be provided at this facility.  I had a discussion with appropriate medical decision makers in which included but not limited to, reason for transfer, risks and benefits of transfer, and alternatives.  Questions were answered. Decision maker provided permission for transfer. Patient was accepted by Dr. Singh and will go to [facility].        DIAGNOSIS:    NSTEMI  PE     DISPOSITION:    1) transfer     Julio Lake MD  03/20/25 [id number]

## 2025-03-21 ENCOUNTER — HOSPITAL ENCOUNTER (OUTPATIENT)
Dept: CARDIOLOGY | Facility: HOSPITAL | Age: 75
Discharge: HOME | DRG: 435 | End: 2025-03-21
Payer: MEDICARE

## 2025-03-21 LAB
ABO GROUP (TYPE) IN BLOOD: NORMAL
ALBUMIN SERPL BCP-MCNC: 3.9 G/DL (ref 3.4–5)
ALP SERPL-CCNC: 85 U/L (ref 33–136)
ALT SERPL W P-5'-P-CCNC: 14 U/L (ref 10–52)
ANION GAP SERPL CALC-SCNC: 13 MMOL/L (ref 10–20)
ANION GAP SERPL CALC-SCNC: 15 MMOL/L (ref 10–20)
ANTIBODY SCREEN: NORMAL
AST SERPL W P-5'-P-CCNC: 15 U/L (ref 9–39)
BILIRUB DIRECT SERPL-MCNC: 0.1 MG/DL (ref 0–0.3)
BILIRUB SERPL-MCNC: 0.4 MG/DL (ref 0–1.2)
BUN SERPL-MCNC: 19 MG/DL (ref 6–23)
BUN SERPL-MCNC: 23 MG/DL (ref 6–23)
CALCIUM SERPL-MCNC: 9.1 MG/DL (ref 8.6–10.6)
CALCIUM SERPL-MCNC: 9.5 MG/DL (ref 8.6–10.6)
CARDIAC TROPONIN I PNL SERPL HS: 275 NG/L (ref 0–53)
CHLORIDE SERPL-SCNC: 100 MMOL/L (ref 98–107)
CHLORIDE SERPL-SCNC: 99 MMOL/L (ref 98–107)
CO2 SERPL-SCNC: 26 MMOL/L (ref 21–32)
CO2 SERPL-SCNC: 26 MMOL/L (ref 21–32)
CREAT SERPL-MCNC: 0.82 MG/DL (ref 0.5–1.3)
CREAT SERPL-MCNC: 1.02 MG/DL (ref 0.5–1.3)
EGFRCR SERPLBLD CKD-EPI 2021: 77 ML/MIN/1.73M*2
EGFRCR SERPLBLD CKD-EPI 2021: >90 ML/MIN/1.73M*2
ERYTHROCYTE [DISTWIDTH] IN BLOOD BY AUTOMATED COUNT: 13.8 % (ref 11.5–14.5)
GLUCOSE BLD MANUAL STRIP-MCNC: 107 MG/DL (ref 74–99)
GLUCOSE BLD MANUAL STRIP-MCNC: 268 MG/DL (ref 74–99)
GLUCOSE BLD MANUAL STRIP-MCNC: 283 MG/DL (ref 74–99)
GLUCOSE BLD MANUAL STRIP-MCNC: 345 MG/DL (ref 74–99)
GLUCOSE BLD MANUAL STRIP-MCNC: 381 MG/DL (ref 74–99)
GLUCOSE SERPL-MCNC: 195 MG/DL (ref 74–99)
GLUCOSE SERPL-MCNC: 251 MG/DL (ref 74–99)
HCT VFR BLD AUTO: 41.8 % (ref 41–52)
HGB BLD-MCNC: 13.9 G/DL (ref 13.5–17.5)
MAGNESIUM SERPL-MCNC: 1.75 MG/DL (ref 1.6–2.4)
MAGNESIUM SERPL-MCNC: 2.06 MG/DL (ref 1.6–2.4)
MCH RBC QN AUTO: 28 PG (ref 26–34)
MCHC RBC AUTO-ENTMCNC: 33.3 G/DL (ref 32–36)
MCV RBC AUTO: 84 FL (ref 80–100)
NRBC BLD-RTO: 0 /100 WBCS (ref 0–0)
PLATELET # BLD AUTO: 157 X10*3/UL (ref 150–450)
POTASSIUM SERPL-SCNC: 3.5 MMOL/L (ref 3.5–5.3)
POTASSIUM SERPL-SCNC: 4 MMOL/L (ref 3.5–5.3)
PROT SERPL-MCNC: 7 G/DL (ref 6.4–8.2)
RBC # BLD AUTO: 4.96 X10*6/UL (ref 4.5–5.9)
RH FACTOR (ANTIGEN D): NORMAL
SODIUM SERPL-SCNC: 134 MMOL/L (ref 136–145)
SODIUM SERPL-SCNC: 137 MMOL/L (ref 136–145)
UFH PPP CHRO-ACNC: 0.3 IU/ML
UFH PPP CHRO-ACNC: 0.5 IU/ML
WBC # BLD AUTO: 7.6 X10*3/UL (ref 4.4–11.3)

## 2025-03-21 PROCEDURE — 82947 ASSAY GLUCOSE BLOOD QUANT: CPT

## 2025-03-21 PROCEDURE — 2500000002 HC RX 250 W HCPCS SELF ADMINISTERED DRUGS (ALT 637 FOR MEDICARE OP, ALT 636 FOR OP/ED)

## 2025-03-21 PROCEDURE — 93010 ELECTROCARDIOGRAM REPORT: CPT | Performed by: INTERNAL MEDICINE

## 2025-03-21 PROCEDURE — 36415 COLL VENOUS BLD VENIPUNCTURE: CPT

## 2025-03-21 PROCEDURE — 2500000001 HC RX 250 WO HCPCS SELF ADMINISTERED DRUGS (ALT 637 FOR MEDICARE OP)

## 2025-03-21 PROCEDURE — 99223 1ST HOSP IP/OBS HIGH 75: CPT

## 2025-03-21 PROCEDURE — 99222 1ST HOSP IP/OBS MODERATE 55: CPT | Performed by: STUDENT IN AN ORGANIZED HEALTH CARE EDUCATION/TRAINING PROGRAM

## 2025-03-21 PROCEDURE — 84484 ASSAY OF TROPONIN QUANT: CPT

## 2025-03-21 PROCEDURE — 93005 ELECTROCARDIOGRAM TRACING: CPT

## 2025-03-21 PROCEDURE — 2500000004 HC RX 250 GENERAL PHARMACY W/ HCPCS (ALT 636 FOR OP/ED)

## 2025-03-21 PROCEDURE — 1200000002 HC GENERAL ROOM WITH TELEMETRY DAILY

## 2025-03-21 PROCEDURE — 80053 COMPREHEN METABOLIC PANEL: CPT

## 2025-03-21 PROCEDURE — 99223 1ST HOSP IP/OBS HIGH 75: CPT | Performed by: INTERNAL MEDICINE

## 2025-03-21 PROCEDURE — 85520 HEPARIN ASSAY: CPT

## 2025-03-21 PROCEDURE — 83735 ASSAY OF MAGNESIUM: CPT

## 2025-03-21 PROCEDURE — 86850 RBC ANTIBODY SCREEN: CPT

## 2025-03-21 PROCEDURE — 85027 COMPLETE CBC AUTOMATED: CPT

## 2025-03-21 PROCEDURE — 84075 ASSAY ALKALINE PHOSPHATASE: CPT

## 2025-03-21 PROCEDURE — 2500000005 HC RX 250 GENERAL PHARMACY W/O HCPCS

## 2025-03-21 RX ORDER — EZETIMIBE 10 MG/1
10 TABLET ORAL DAILY
Status: DISCONTINUED | OUTPATIENT
Start: 2025-03-21 | End: 2025-03-22 | Stop reason: HOSPADM

## 2025-03-21 RX ORDER — NITROGLYCERIN 0.4 MG/1
0.4 TABLET SUBLINGUAL EVERY 5 MIN PRN
Status: DISCONTINUED | OUTPATIENT
Start: 2025-03-21 | End: 2025-03-22 | Stop reason: HOSPADM

## 2025-03-21 RX ORDER — AMLODIPINE BESYLATE 5 MG/1
5 TABLET ORAL DAILY
Status: DISCONTINUED | OUTPATIENT
Start: 2025-03-21 | End: 2025-03-22 | Stop reason: HOSPADM

## 2025-03-21 RX ORDER — HEPARIN SODIUM 10000 [USP'U]/100ML
0-4500 INJECTION, SOLUTION INTRAVENOUS CONTINUOUS
Status: DISCONTINUED | OUTPATIENT
Start: 2025-03-21 | End: 2025-03-21

## 2025-03-21 RX ORDER — INSULIN GLARGINE 100 [IU]/ML
12 INJECTION, SOLUTION SUBCUTANEOUS NIGHTLY
Status: DISCONTINUED | OUTPATIENT
Start: 2025-03-21 | End: 2025-03-22 | Stop reason: HOSPADM

## 2025-03-21 RX ORDER — DEXTROSE 50 % IN WATER (D50W) INTRAVENOUS SYRINGE
12.5
Status: DISCONTINUED | OUTPATIENT
Start: 2025-03-20 | End: 2025-03-22 | Stop reason: HOSPADM

## 2025-03-21 RX ORDER — HYDROXYZINE HYDROCHLORIDE 25 MG/1
25 TABLET, FILM COATED ORAL 2 TIMES DAILY
Status: DISCONTINUED | OUTPATIENT
Start: 2025-03-21 | End: 2025-03-22 | Stop reason: HOSPADM

## 2025-03-21 RX ORDER — ACETAMINOPHEN 325 MG/1
650 TABLET ORAL EVERY 8 HOURS PRN
Status: DISCONTINUED | OUTPATIENT
Start: 2025-03-21 | End: 2025-03-22 | Stop reason: HOSPADM

## 2025-03-21 RX ORDER — CLOPIDOGREL BISULFATE 75 MG/1
75 TABLET ORAL DAILY
Status: DISCONTINUED | OUTPATIENT
Start: 2025-03-21 | End: 2025-03-22 | Stop reason: HOSPADM

## 2025-03-21 RX ORDER — METHOCARBAMOL 500 MG/1
500 TABLET, FILM COATED ORAL EVERY 8 HOURS PRN
Status: DISCONTINUED | OUTPATIENT
Start: 2025-03-21 | End: 2025-03-22 | Stop reason: HOSPADM

## 2025-03-21 RX ORDER — VENLAFAXINE HYDROCHLORIDE 150 MG/1
150 CAPSULE, EXTENDED RELEASE ORAL DAILY
Status: DISCONTINUED | OUTPATIENT
Start: 2025-03-21 | End: 2025-03-22 | Stop reason: HOSPADM

## 2025-03-21 RX ORDER — ASPIRIN 81 MG/1
81 TABLET ORAL DAILY
Status: DISCONTINUED | OUTPATIENT
Start: 2025-03-21 | End: 2025-03-21

## 2025-03-21 RX ORDER — ACETAMINOPHEN 500 MG
5 TABLET ORAL NIGHTLY PRN
Status: DISCONTINUED | OUTPATIENT
Start: 2025-03-21 | End: 2025-03-21

## 2025-03-21 RX ORDER — ENOXAPARIN SODIUM 100 MG/ML
1 INJECTION SUBCUTANEOUS EVERY 12 HOURS
Status: DISCONTINUED | OUTPATIENT
Start: 2025-03-21 | End: 2025-03-21

## 2025-03-21 RX ORDER — INSULIN LISPRO 100 [IU]/ML
0-15 INJECTION, SOLUTION INTRAVENOUS; SUBCUTANEOUS
Status: DISCONTINUED | OUTPATIENT
Start: 2025-03-21 | End: 2025-03-22 | Stop reason: HOSPADM

## 2025-03-21 RX ORDER — IPRATROPIUM BROMIDE AND ALBUTEROL SULFATE 2.5; .5 MG/3ML; MG/3ML
3 SOLUTION RESPIRATORY (INHALATION) EVERY 6 HOURS PRN
Status: DISCONTINUED | OUTPATIENT
Start: 2025-03-21 | End: 2025-03-22 | Stop reason: HOSPADM

## 2025-03-21 RX ORDER — ISOSORBIDE MONONITRATE 30 MG/1
30 TABLET, EXTENDED RELEASE ORAL DAILY
Status: DISCONTINUED | OUTPATIENT
Start: 2025-03-21 | End: 2025-03-22

## 2025-03-21 RX ORDER — ONDANSETRON 4 MG/1
4 TABLET, FILM COATED ORAL EVERY 8 HOURS PRN
Status: DISCONTINUED | OUTPATIENT
Start: 2025-03-21 | End: 2025-03-22 | Stop reason: HOSPADM

## 2025-03-21 RX ORDER — ACETAMINOPHEN 500 MG
5 TABLET ORAL NIGHTLY
Status: DISCONTINUED | OUTPATIENT
Start: 2025-03-21 | End: 2025-03-22 | Stop reason: HOSPADM

## 2025-03-21 RX ORDER — DEXTROSE 50 % IN WATER (D50W) INTRAVENOUS SYRINGE
25
Status: DISCONTINUED | OUTPATIENT
Start: 2025-03-20 | End: 2025-03-22 | Stop reason: HOSPADM

## 2025-03-21 RX ORDER — PANTOPRAZOLE SODIUM 40 MG/1
40 TABLET, DELAYED RELEASE ORAL
Status: DISCONTINUED | OUTPATIENT
Start: 2025-03-21 | End: 2025-03-22 | Stop reason: HOSPADM

## 2025-03-21 RX ORDER — BUPROPION HYDROCHLORIDE 150 MG/1
150 TABLET ORAL DAILY
Status: DISCONTINUED | OUTPATIENT
Start: 2025-03-21 | End: 2025-03-22 | Stop reason: HOSPADM

## 2025-03-21 RX ORDER — ALUMINUM HYDROXIDE, MAGNESIUM HYDROXIDE, AND SIMETHICONE 1200; 120; 1200 MG/30ML; MG/30ML; MG/30ML
30 SUSPENSION ORAL 4 TIMES DAILY PRN
Status: DISCONTINUED | OUTPATIENT
Start: 2025-03-21 | End: 2025-03-22 | Stop reason: HOSPADM

## 2025-03-21 RX ORDER — LIDOCAINE 560 MG/1
1 PATCH PERCUTANEOUS; TOPICAL; TRANSDERMAL EVERY 24 HOURS
Status: DISCONTINUED | OUTPATIENT
Start: 2025-03-21 | End: 2025-03-22 | Stop reason: HOSPADM

## 2025-03-21 RX ORDER — ADHESIVE BANDAGE
30 BANDAGE TOPICAL DAILY PRN
Status: DISCONTINUED | OUTPATIENT
Start: 2025-03-21 | End: 2025-03-22 | Stop reason: HOSPADM

## 2025-03-21 RX ORDER — LOVASTATIN 40 MG/1
40 TABLET ORAL NIGHTLY
Status: DISCONTINUED | OUTPATIENT
Start: 2025-03-21 | End: 2025-03-22

## 2025-03-21 RX ADMIN — APIXABAN 10 MG: 5 TABLET, FILM COATED ORAL at 21:03

## 2025-03-21 RX ADMIN — INSULIN GLARGINE 12 UNITS: 100 INJECTION, SOLUTION SUBCUTANEOUS at 01:06

## 2025-03-21 RX ADMIN — INSULIN GLARGINE 12 UNITS: 100 INJECTION, SOLUTION SUBCUTANEOUS at 21:03

## 2025-03-21 RX ADMIN — HYDROXYZINE HYDROCHLORIDE 25 MG: 25 TABLET, FILM COATED ORAL at 21:03

## 2025-03-21 RX ADMIN — EZETIMIBE 10 MG: 10 TABLET ORAL at 08:28

## 2025-03-21 RX ADMIN — ISOSORBIDE MONONITRATE 30 MG: 30 TABLET, EXTENDED RELEASE ORAL at 08:28

## 2025-03-21 RX ADMIN — LIDOCAINE PAIN RELIEF 1 PATCH: 560 PATCH TOPICAL at 11:47

## 2025-03-21 RX ADMIN — INSULIN LISPRO 12 UNITS: 100 INJECTION, SOLUTION INTRAVENOUS; SUBCUTANEOUS at 11:50

## 2025-03-21 RX ADMIN — AMLODIPINE BESYLATE 5 MG: 5 TABLET ORAL at 08:28

## 2025-03-21 RX ADMIN — HEPARIN SODIUM 1600 UNITS/HR: 10000 INJECTION, SOLUTION INTRAVENOUS at 01:06

## 2025-03-21 RX ADMIN — POLYETHYLENE GLYCOL 3350 17 G: 17 POWDER, FOR SOLUTION ORAL at 08:29

## 2025-03-21 RX ADMIN — INSULIN LISPRO 9 UNITS: 100 INJECTION, SOLUTION INTRAVENOUS; SUBCUTANEOUS at 08:32

## 2025-03-21 RX ADMIN — VENLAFAXINE HYDROCHLORIDE 150 MG: 150 CAPSULE, EXTENDED RELEASE ORAL at 08:28

## 2025-03-21 RX ADMIN — INSULIN LISPRO 15 UNITS: 100 INJECTION, SOLUTION INTRAVENOUS; SUBCUTANEOUS at 17:24

## 2025-03-21 RX ADMIN — BUPROPION HYDROCHLORIDE 150 MG: 150 TABLET, EXTENDED RELEASE ORAL at 08:28

## 2025-03-21 RX ADMIN — Medication 5 MG: at 21:04

## 2025-03-21 RX ADMIN — ENOXAPARIN SODIUM 90 MG: 100 INJECTION SUBCUTANEOUS at 11:47

## 2025-03-21 RX ADMIN — HYDROXYZINE HYDROCHLORIDE 25 MG: 25 TABLET, FILM COATED ORAL at 08:28

## 2025-03-21 RX ADMIN — ASPIRIN 81 MG: 81 TABLET, COATED ORAL at 08:28

## 2025-03-21 ASSESSMENT — COGNITIVE AND FUNCTIONAL STATUS - GENERAL
MOBILITY SCORE: 24
CLIMB 3 TO 5 STEPS WITH RAILING: A LITTLE
MOBILITY SCORE: 22
DAILY ACTIVITIY SCORE: 24
DAILY ACTIVITIY SCORE: 24
WALKING IN HOSPITAL ROOM: A LITTLE

## 2025-03-21 ASSESSMENT — PAIN - FUNCTIONAL ASSESSMENT
PAIN_FUNCTIONAL_ASSESSMENT: 0-10

## 2025-03-21 ASSESSMENT — PAIN SCALES - GENERAL
PAINLEVEL_OUTOF10: 0 - NO PAIN

## 2025-03-21 NOTE — H&P
History Of Present Illness  Feliciano Worthington is a 75 y.o. male presenting with significant past medical history of CAD (s/p PCI with RCA stent 2011), Mobitz type 1 AV block, HTN, HLD, IDDM-II, COPD, tobacco use disorder, GERD, depression & anxiety presenting as a transfer from [facility] with chest pain and hx of admission in February for NSTEMI with incidental pancreatic mass noted on ajit-operative CABG evaluation.      Past Medical History  He has a past medical history of Acute myocardial infarction, unspecified (01/24/2018), Acute myocardial infarction, unspecified (01/24/2018), BMI 29.0-29.9,adult (10/13/2023), Chronic obstructive pulmonary disease, unspecified (01/24/2018), Chronic obstructive pulmonary disease, unspecified (01/24/2018), Coronary angioplasty status, Encounter for immunization (11/04/2022), Encounter for screening for malignant neoplasm of colon (08/25/2016), Encounter for screening for malignant neoplasm of skin (04/14/2022), Nicotine dependence, cigarettes, uncomplicated (09/12/2022), Obesity, unspecified (05/25/2022), Old myocardial infarction (01/24/2018), Other acute postprocedural pain, Pain in left knee, Pain in right hip (11/04/2022), Personal history of other diseases of the digestive system (01/24/2018), Personal history of other diseases of the musculoskeletal system and connective tissue (08/25/2016), Personal history of other diseases of the respiratory system, Personal history of other endocrine, nutritional and metabolic disease (08/25/2016), Personal history of other mental and behavioral disorders (04/14/2022), Personal history of other specified conditions (12/08/2016), Personal history of other specified conditions, Personal history of other specified conditions (06/20/2018), Separation of muscle (nontraumatic), other site (05/14/2018), Sprain of medial collateral ligament of left knee, initial encounter (08/28/2017), Sprain of medial collateral ligament of left knee,  subsequent encounter (10/12/2017), Type 2 diabetes mellitus without complications (Multi) (01/24/2018), Type 2 diabetes mellitus without complications (Multi) (01/24/2018), and Umbilical hernia without obstruction or gangrene (05/14/2018).    Surgical History  He has a past surgical history that includes Coronary angioplasty with stent (08/25/2016); Tonsillectomy (08/25/2016); Colonoscopy (10/06/2016); Other surgical history (05/25/2022); Skin cancer excision (01/17/2025); and Cardiac catheterization (N/A, 2/6/2025).     Social History  He reports that he has quit smoking. His smoking use included cigarettes. He started smoking about 57 years ago. He has a 75 pack-year smoking history. He has been exposed to tobacco smoke. He has never used smokeless tobacco. He reports that he does not currently use alcohol. He reports that he does not use drugs.    Family History  Family History   Problem Relation Name Age of Onset    Coronary artery disease Mother      Diabetes Father      Coronary artery disease Father      Other (acute myocardial infarction) Father          Allergies  Tetracyclines, Atorvastatin, Empagliflozin, Pravastatin, Rosuvastatin, Simvastatin, and Statins-hmg-coa reductase inhibitors    Physical Exam  Constitutional:       General: He is not in acute distress.     Appearance: Normal appearance. He is not ill-appearing.   HENT:      Head: Normocephalic and atraumatic.      Mouth/Throat:      Mouth: Mucous membranes are dry.   Eyes:      Extraocular Movements: Extraocular movements intact.      Pupils: Pupils are equal, round, and reactive to light.   Cardiovascular:      Rate and Rhythm: Normal rate and regular rhythm.      Pulses: Normal pulses.      Heart sounds: Normal heart sounds. No murmur heard.  Pulmonary:      Effort: Pulmonary effort is normal. No respiratory distress.      Breath sounds: Normal breath sounds. No wheezing.   Abdominal:      General: Abdomen is flat. Bowel sounds are normal.  There is no distension.      Palpations: Abdomen is soft.      Tenderness: There is no abdominal tenderness.   Musculoskeletal:         General: Normal range of motion.      Cervical back: Normal range of motion and neck supple.   Skin:     General: Skin is warm and dry.   Neurological:      General: No focal deficit present.      Mental Status: He is alert and oriented to person, place, and time.   Psychiatric:         Mood and Affect: Mood normal.          Last Recorded Vitals  BP (!) 148/94   Pulse 82   Temp 36.2 °C (97.2 °F)   Resp 18   Wt 90.3 kg (199 lb 1.2 oz)   SpO2 97%     Relevant Results  Scheduled medications  amLODIPine, 5 mg, oral, Daily  aspirin, 81 mg, oral, Daily  buPROPion XL, 150 mg, oral, Daily  [Held by provider] clopidogrel, 75 mg, oral, Daily  ezetimibe, 10 mg, oral, Daily  hydrOXYzine HCL, 25 mg, oral, BID  insulin glargine, 12 Units, subcutaneous, Nightly  insulin lispro, 0-15 Units, subcutaneous, TID AC  isosorbide mononitrate ER, 30 mg, oral, Daily  lovastatin, 40 mg, oral, Nightly  pantoprazole, 40 mg, oral, Daily before breakfast  polyethylene glycol, 17 g, oral, Daily  venlafaxine XR, 150 mg, oral, Daily      Continuous medications  heparin, 0-4,500 Units/hr, Last Rate: 1,600 Units/hr (03/21/25 0106)      PRN medications  PRN medications: acetaminophen, alum-mag hydroxide-simeth, dextrose, dextrose, glucagon, glucagon, heparin, magnesium hydroxide, melatonin, nitroglycerin, ondansetron    Results for orders placed or performed during the hospital encounter of 03/20/25 (from the past 24 hours)   POCT GLUCOSE   Result Value Ref Range    POCT Glucose 107 (H) 74 - 99 mg/dL   Basic metabolic panel   Result Value Ref Range    Glucose 195 (H) 74 - 99 mg/dL    Sodium 134 (L) 136 - 145 mmol/L    Potassium 3.5 3.5 - 5.3 mmol/L    Chloride 99 98 - 107 mmol/L    Bicarbonate 26 21 - 32 mmol/L    Anion Gap 13 10 - 20 mmol/L    Urea Nitrogen 19 6 - 23 mg/dL    Creatinine 0.82 0.50 - 1.30 mg/dL     eGFR >90 >60 mL/min/1.73m*2    Calcium 9.1 8.6 - 10.6 mg/dL   Magnesium   Result Value Ref Range    Magnesium 1.75 1.60 - 2.40 mg/dL   Heparin Assay, UFH   Result Value Ref Range    Heparin Unfractionated 0.3 See Comment Below for Therapeutic Ranges IU/mL   Type and screen   Result Value Ref Range    ABO TYPE A     Rh TYPE POS     ANTIBODY SCREEN NEG      *Note: Due to a large number of results and/or encounters for the requested time period, some results have not been displayed. A complete set of results can be found in Results Review.     ECG 12 lead    Result Date: 3/20/2025  Sinus rhythm with 2nd degree AV block (Mobitz I) Right superior axis deviation Septal infarct , age undetermined Inferior infarct (cited on or before 08-FEB-2025) ST & T wave abnormality, consider lateral ischemia Abnormal ECG When compared with ECG of 20-MAR-2025 09:51, (unconfirmed) Sinus rhythm is now with 2nd degree AV block (Mobitz I) Septal infarct is now Present Confirmed by Crystal Motley ([id number]) on 3/20/2025 3:27:41 PM    ECG 12 lead    Result Date: 3/20/2025  Sinus rhythm with 1st degree AV block Right superior axis deviation Inferior infarct , age undetermined ST & T wave abnormality, consider lateral ischemia Abnormal ECG When compared with ECG of 08-FEB-2025 20:25, Sinus rhythm is no longer with 2nd degree AV block (Mobitz I) Questionable change in QRS duration Criteria for Anterior infarct are no longer Present See ED provider note for full interpretation and clinical correlation Confirmed by Crystal Motley ([id number]) on 3/20/2025 12:33:25 PM    CT angio chest for pulmonary embolism    Result Date: 3/20/2025  Several small pulmonary emboli in left lung as described with features suggesting chronic pulmonary emboli.   Lung fibrosis without overt stacked cystic spaces.   2 enlarging nodules in the right lower lobe and 1 enlarging nodule in left lower lobe. These may be inflammatory in nature given rather rapid change  compared to 02/06/2025. Consider short-term follow-up CT.   MACRO: None   Signed by: Joel Anderson 3/20/2025 10:43 AM Dictation workstation:   [id number]    US guided needle liver biopsy    Result Date: 3/17/2025  Status post ultrasound guided core needle biopsy of a right hepatic mass. Specimen(s) were sent to pathology.   I was present for and/or performed the critical portions of the procedure and immediately available throughout the entire procedure.   I personally reviewed the images/study and I agree with the resident findings as stated by Ioana Lieberman MD. This study was interpreted at [place], [place].   MACRO: None   Signed by: Glory Mitchell 3/17/2025 6:48 AM Dictation workstation:   [id number]       Assessment/Plan   Assessment & Plan  Chest pain    Feliciano Worthington is a 74 y.o. male with a significant past medical history of CAD (s/p PCI with RCA stent 2011), Mobitz type 1 AV block, HTN, HLD, IDDM-II, COPD, tobacco use disorder, GERD, depression & anxiety presenting as a transfer from [facility] s/p NSTEMI with chest pain after being admitted here last month for similar symptoms. He also was found to have an incidental pancreatic mass noted on ajit-operative CABG evaluation s/p MRCP revelaing 38 mm pancreatic tail mass with 15 mm liver lesion with c/f metastasis and and got a liver biopsy on 3/13.    #Chest pain with recurrent NSTEMI with triple-vessel CAD  #CABG Evaluation  #Hx of Mobitz type I  #Left lung PEs  ::Patient presented at [facility] where he had no EKG changes for ischemia but did have a troponin elevation from 275 to 318  ::Patient with evidence of 3 vessel disease from previous catheterization at [facility] with CABG recommended, with CABG evaluation completed but pending workup of cancer and pancreatic mass  ::Hx of prior stent to RCA in 2011  ::Patient was aspirin loaded in EMS, and was started on heparin gtt at [facility]  ::Chest pain resolved per  "patient after ASA load  ::CTA PE was done at [facility] with evidence of several small PE in the left lung noted  :: home meds: amlopidine 5, imdur 30  Plan:  - Continue aspirin 81 daily  - Continue heparin gtt due to PE  - Continue amlodipine 5, imdur 30  - Continue telemetry  - Nitroglycerin PRN   - EKG PRN     #Pancreatic tail mass  #Hepatic lesion  ::CT chest revealing indeterminate pancreatic tail mass  ::MRCP revealing 38 mm pancreatic tail mass and 15 mm hepatic lesion concerning for metastasis   :: Last LFTs wnl on 3/20/25  :: CA 19-9 <4 on 2/7/25  Plan:  - Follow-up liver biopsy from 3/13 and consult GI to guide planning and therapy for pancreatic mass    #Uncontrolled type 2 diabetes   :: last A1c of 14.7 - 2/6/25  :: [facility] Endocrinology recommendations of Lantus 32 BID and Novolog 15 TID, sliding scale  Plan:  - Lantus 12 units at bedtime  - SSI #3  - POCT ACHS Glucose checks  - Titrate insulin regimen  - Consider endocrinology consult if difficulty controlling BG     #Depression/anxiety  - Continue bupropion 150mg daily  - Hydroxyzine PRN  - CTM     #Pulmonary nodule  ::Pulmonary nodules-most appear chronic  :: new 8 mm right lower lobe, \"possibly infectious/inflammatory in nature but should have follow-up to document resolution  Plan:   - PCP follow up on discharge to monitor of nodules     #Hyponatremia  :: Patient persistently hyponatremic at OSH ~132  Plan:  -  Continue to monitor   -  Consider hyponatremia workup if worsening     #HLD  :: Takes lovastatin, ezetimbie at home  Plan:  - Continue home meds     #COPD ?  :: Patient used to smoke 1 to 1.5 ppd, but on history patient states he quit smoking 1-1.5 months ago  :: PFT's 2/7/25 reveal normal lung volumes, normal FEV/FVC, but decreased DLCO of 63  Plan:  - CTM  - Duonebs as needed     #Unintentional weight loss  :: patient reports 50 lbs weight loss over the course of the year  :: possibly secondary to poor glycemic control versus malignancy   :: " TSH wnl  :: Could be likely to cancerous etiology  Plan:  - Follow-up liver biopsy results  - Consider nutrition consult in the AM    F: PRN  E: K > 4, Mg > 2  N: NPO for any procedure - last food at 1AM  A: PIV  GI: pantoprazole 40 daily  O2: RA  DVT: heparin gtt  Abx: None  Pain: acetaminophen  Code: Full  NOK: Leah (Spouse) [phone]    William Mckeon MD  [facility] [place]/ Internal Medicine PGY-2

## 2025-03-21 NOTE — NURSING NOTE
Brief visit based on elevated A1c (14.7%) and elevated BG readings.  Currently Mr. Worthington takes 5 insulin injections/day.  He is independent with managing this regimen.  He verbalizes some understanding of the relationship between pancreas/liver and BG elevations.  He is very interested in CGM.  Will message team to place on 3/24.  Time spent:  15 mins.

## 2025-03-21 NOTE — CONSULTS
"Nutrition Initial Assessment:   Nutrition Assessment    Reason for Assessment: Admission nursing screening    Patient is a 75 y.o. male with PMH of CAD (s/p PCI with RCA stent 2011), Mobitz type 1 AV block, HTN, HLD, IDDM-II, COPD, tobacco use disorder, GERD, depression & anxiety presenting as a transfer from [facility] with chest pain and hx of admission in February for NSTEMI with incidental pancreatic mass noted on ajit-operative CABG evaluation. 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    Nutrition History:  Energy Intake: Good > 75 %  Food and Nutrient History: Pt reports he has a good appetite but has noticed wt loss. Reports usually has 1 full meal and a couple smaller meals throughout the day. Observed lunch tray and noted pt ate 100% of meal. Pt denies N/V/D/C, abdominal pain. Pt agreeable to trying Glucerna Shake during admission.       Anthropometrics:  Height: 177.8 cm (5' 10\")   Weight: 90.3 kg (199 lb 1.2 oz)   BMI (Calculated): 28.56  IBW/kg (Dietitian Calculated): 75.5 kg  Percent of IBW: 120 %       Weight History:   Wt Readings from Last 15 Encounters:   03/20/25 90.3 kg (199 lb 1.2 oz)   03/20/25 88.5 kg (195 lb)   03/13/25 88.5 kg (195 lb)   02/27/25 89.4 kg (197 lb)   02/14/25 85.5 kg (188 lb 8 oz)   02/12/25 87.2 kg (192 lb 3.9 oz)   01/29/25 85.5 kg (188 lb 6.4 oz)   10/15/24 90.3 kg (199 lb)   10/04/24 89.8 kg (198 lb)   09/03/24 87.9 kg (193 lb 12.8 oz)   05/07/24 92.5 kg (204 lb)   05/03/24 92.6 kg (204 lb 2 oz)   12/28/23 94.8 kg (209 lb)   12/20/23 94.8 kg (209 lb)   11/20/23 95.6 kg (210 lb 12.8 oz)       Weight Change %:  Weight History / % Weight Change: Pt endorses wt loss over the past year. No significant wt loss identified per chart review.    Nutrition Focused Physical Exam Findings:    Subcutaneous Fat Loss:   Orbital Fat Pads: Mild-Moderate (slight dark circles and slight " hollowing)  Buccal Fat Pads: Well nourished (full, rounded cheeks)  Muscle Wasting:  Temporalis: Mild-Moderate (slight depression)  Pectoralis (Clavicular Region): Well nourished (clavicle not visible)  Deltoid/Trapezius: Well nourished (rounded appearance at arm, shoulder, neck)  Edema:  Edema: none  Physical Findings:  Eyes: Negative  Skin: Negative  Teeth Findings: Impaired dentition    Nutrition Significant Labs:  CBC Trend:   Results from last 7 days   Lab Units 03/21/25  0821 03/20/25  1007   WBC AUTO x10*3/uL 7.6 8.1   RBC AUTO x10*6/uL 4.96 4.44*   HEMOGLOBIN g/dL 13.9 12.8*   HEMATOCRIT % 41.8 37.3*   MCV fL 84 84   PLATELETS AUTO x10*3/uL 157 147*    , BMP Trend:   Results from last 7 days   Lab Units 03/21/25  0821 03/21/25  0147 03/20/25  1007   GLUCOSE mg/dL 251* 195* 240*   CALCIUM mg/dL 9.5 9.1 9.0   SODIUM mmol/L 137 134* 133*   POTASSIUM mmol/L 4.0 3.5 4.3   CO2 mmol/L 26 26 26   CHLORIDE mmol/L 100 99 99   BUN mg/dL 23 19 25*   CREATININE mg/dL 1.02 0.82 0.97    , BG POCT trend:   Results from last 7 days   Lab Units 03/21/25  1146 03/21/25  0743 03/20/25  2358   POCT GLUCOSE mg/dL 345* 283* 107*    , Renal Lab Trend:   Results from last 7 days   Lab Units 03/21/25  0821 03/21/25  0147 03/20/25  1007   POTASSIUM mmol/L 4.0 3.5 4.3   SODIUM mmol/L 137 134* 133*   MAGNESIUM mg/dL 2.06 1.75 1.79   EGFR mL/min/1.73m*2 77 >90 81   BUN mg/dL 23 19 25*   CREATININE mg/dL 1.02 0.82 0.97        Nutrition Specific Medications:  Scheduled medications  amLODIPine, 5 mg, oral, Daily  buPROPion XL, 150 mg, oral, Daily  clopidogrel, 75 mg, oral, Daily  enoxaparin, 1 mg/kg, subcutaneous, q12h  ezetimibe, 10 mg, oral, Daily  hydrOXYzine HCL, 25 mg, oral, BID  insulin glargine, 12 Units, subcutaneous, Nightly  insulin lispro, 0-15 Units, subcutaneous, TID AC  isosorbide mononitrate ER, 30 mg, oral, Daily  lidocaine, 1 patch, transdermal, q24h  lovastatin, 40 mg, oral, Nightly  pantoprazole, 40 mg, oral, Daily before  breakfast  polyethylene glycol, 17 g, oral, Daily  venlafaxine XR, 150 mg, oral, Daily      Continuous medications     PRN medications  PRN medications: acetaminophen, alum-mag hydroxide-simeth, dextrose, dextrose, glucagon, glucagon, ipratropium-albuteroL, magnesium hydroxide, melatonin, nitroglycerin, ondansetron     I/O:   Last BM Date: 03/21/25;      Dietary Orders (From admission, onward)       Start     Ordered    03/21/25 1227  Adult diet Consistent Carb, 2-3 grams sodium; CCD 60 gm/meal  Diet effective now        Question Answer Comment   Diet type Consistent Carb    Diet type 2-3 grams sodium    Carb diet selection: CCD 60 gm/meal        03/21/25 1227    03/20/25 2332  May Participate in Room Service  ( ROOM SERVICE MAY PARTICIPATE)  Once        Question:  .  Answer:  Yes    03/20/25 2331                     Estimated Needs:   Total Energy Estimated Needs in 24 hours (kCal):  (9177-9265)  Method for Estimating Needs: SAJ=8979 using 90.3 kg  Total Protein Estimated Needs in 24 Hours (g):  (100-115)  Method for Estimating 24 Hour Protein Needs: 1.3-1.5 g/kg IBW  Total Fluid Estimated Needs in 24 Hours (mL):  (1mL/kcal or per team)           Nutrition Diagnosis   Malnutrition Diagnosis  Patient has Malnutrition Diagnosis: No    Nutrition Diagnosis  Patient has Nutrition Diagnosis: Yes  Diagnosis Status (1): New  Nutrition Diagnosis 1: Increased nutrient needs  Related to (1): increased metabolic demand  As Evidenced by (1): newly diagnosed moderately differentiated adenocarcinoma with metastasis to liver       Nutrition Interventions/Recommendations   Nutrition prescription for oral nutrition    Nutrition Recommendations:  Glucerna Shake once daily (220 kcal, 10 g protein).  Can continue with current diet.    Nutrition Interventions/Goals:   Interventions: Meals and snacks, Medical food supplement  Goal: meet >75% of estimated energy needs through meals and ONS      Education Documentation  Educated pt that  Glucphilipp Kbeede is lower in carbohydrates to help with BG control.            Nutrition Monitoring and Evaluation   Food/Nutrient Related History Monitoring  Monitoring and Evaluation Plan: Estimated Energy Intake, Intake / amount of food  Estimated Energy Intake: Energy intake greater or equal to 75% of estimated energy needs  Intake / Amount of food: Consumes at least 75% or more of meals/snacks/supplements    Anthropometric Measurements  Monitoring and Evaluation Plan: Body weight  Body Weight: Body weight - Maintain stable weight    Biochemical Data, Medical Tests and Procedures  Monitoring and Evaluation Plan: Electrolyte/renal panel, Glucose/endocrine profile  Electrolyte and Renal Panel: Electrolytes within normal limits  Glucose/Endocrine Profile: Glucose within normal limits ( mg/dL)              Time Spent (min): 60 minutes

## 2025-03-21 NOTE — CONSULTS
SUPPORTIVE AND PALLIATIVE ONCOLOGY CONSULT    SERVICE DATE: 3/21/2025    Updates and Recommendations (3/21/2025):  Start methocarbamol 500mg PO q8h PRN for muscle pain and/or spasms [L shoulder]    Discontinue melatonin PRN     Start melatonin 5mg PO once daily HS--scheduled per pt request    ASSESSMENT/PLAN:  Feliciano Worthington is a 75 y.o. 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 PMHx significant for CAD (s/p PCI with RCA stent 2011), Mobitz type 1 AV block, HTN, HLD, DM2, COPD, tobacco use disorder, GERD, depression, and anxiety. Admitted 3/20/2025 for further evaluation and management after recent NSTEMI with findings of triple vessel disease with plans for CABG but now deferred iso new pancreatic mass. Currently undergoing work-up of this mass. Supportive and Palliative Oncology is consulted for introduction to services and assistance with pain and symptom management.     Neoplasm Related Pain  At risk for pain 2/2 malignancy. Currently denies.   States he intermittently has L shoulder pain 2/2 sports injury when he was young and that it is sometimes exacerbated by laying in bed for long durations of time [such as in the hospital]  Pain Control: Well controlled  Home regimen: acetaminophen 8h PRN, ibuprofen PRN   Intolerances: None  Previously tried: gabapentin 300mg TID  Past 24h OME intake: 0 OME  Risk factors: Remote hx of ETOH and illicit drug use--quit in 1992  Reviewed: (3/21/25) Estimated Creatinine Clearance: 88 mL/min (by C-G formula based on SCr of 0.82 mg/dL).  Reviewed: LFTs WNL  Continue lidocaine 4% TD patch once daily   Continue acetaminophen 650mg PO q8h PRN for mild pain  Start methocarbamol 500mg PO q8h PRN for muscle pain and/or spasms [L shoulder]  Continue to monitor pain scores and administer PRN medications as appropriate  Continue/initiate nonpharmacologic pain management  strategies including ice/heat therapy, distraction techniques, deep breathing/relaxation techniques, calming music, and repositioning  Continue to monitor for signs of opioid efficacy (pain scores, improved functionality) and toxicity (pinpoint pupils, excess sedation/drowsiness/confusion, respiratory depression, etc.)    Nausea  At risk for nausea and vomiting related to location of malignancy and involved organs.  Home regimen: None  EKG reviewed from 3/20/25 and QTc 426.  PPI per primary  Continue Mylanta 30ml PO QID PRN for indigestion or heartburn  Continue ondansetron 4mg PO q8h PRN for n/v, first line    Constipation  At risk for constipation related to opioids, currently not constipated.  Usual bowel pattern: Every day   Home regimen: None  LBM: 3/21/25 per pt   Continue scheduled Miralax 17g PO once daily   Continue MOM 30ml PO once daily PRN for constipation  Goal to have BM without straining q48-72h, adjust regimen as needed  Encourage mobility as tolerated    Altered Mood  Hx chronic anxiety and depression.    Well controlled with home regimen.  Home regimen: bupropion XL 150mg once daily, hydroxyzine 25mg BID, venlafaxine XR 150mg once daily   Continue bupropion XL 150mg PO once daily   Continue hydroxyzine 25mg PO BID  Continue venlafaxine XR 150mg PO once daily     Sleeping Difficulty  Impaired sleep related to hospital environment and sometimes his DM2 causes him to wake up needing to urinate at night.  Home regimen: None  Discontinue melatonin PRN   Start melatonin 5mg PO once daily HS--scheduled per pt request    Disposition:  Please start the process of having prior authorization with meds to beds deliver medications to patient prior to discharge via [facility] pharmacy. Prescriptions will need to be sent 48-72 hours prior to discharge so that a prior authorization can be completed.     Discharge date pending resolution of acute hospital issues.   Our team will continue to monitor if pt qualifies for  an appointment with outpatient Supportive Oncology.    SIGNATURE: KHADIJAH Hess  PAGER/CONTACT:  Contact information:  Supportive and Palliative Oncology  Monday-Friday 8 AM-5 PM  Epic Secure chat or pager [phone].  After hours and weekends:  pager [phone]    ==========================================================================================================================  Inpatient consult to [facility] Adult Supportive Oncology  Consult performed by: KHADIJAH Hess  Consult ordered by: Terrance Luna MD PhD      PALLIATIVE MEDICINE OUTPATIENT PROVIDER: None  CURRENT ATTENDING PROVIDER: Terrance Luna MD PhD     Medical Oncologist: No care team member to display   Radiation Oncologist: No care team member to display  Primary Physician: Hussain Bernal  [phone]    REASON FOR CONSULT/CHIEF CONSULT COMPLAINT: Introduction to services, pain/symptom assessment    Subjective   HISTORY OF PRESENT ILLNESS: Feliciano Worthington is a 75 y.o. 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 PMHx significant for CAD (s/p PCI with RCA stent 2011), Mobitz type 1 AV block, HTN, HLD, DM2, COPD, tobacco use disorder, GERD, depression, and anxiety. Admitted 3/20/2025 for further evaluation and management after recent NSTEMI with findings of triple vessel disease with plans for CABG but now deferred iso new pancreatic mass. Currently undergoing work-up of this mass. Supportive and Palliative Oncology is consulted for assistance with pain management.     Pain Assessment:  Pt denies any current pain or discomfort.     Opioid Requirements  Past 24h opioid requirements:  (3/20-3/21, 0054-3169) None    Total 24h OME use: 0 OME    OARRS/PDMP reviewed, no aberrant behavior noted.    Symptom Assessment:  Pain: none  Headache: none  Dizziness: none  Lack of energy: none  Difficulty sleeping: a  "little  Worrying: none  Anxiety: none  Depressive symptoms/low mood: none  Pain in mouth/swallowing: none  Dry mouth: none  Taste changes: none  Shortness of breath: none  Lack of appetite: none   Nausea: none  Vomiting: none  Constipation: none  Diarrhea: none  Sore muscles: a little--L shoulder, chronic  Numbness or tingling in hands/feet/other: none  Weight loss: \"50lbs over the last year,\" per pt     ECOG Performance Status:  [x] 0 Fully active, able to carry on all pre-disease performance without restriction  [] 1 Restricted in physically strenuous activity but ambulatory and able to carry out work of a light or sedentary nature, e.g., light house work, office work  [] 2 Ambulatory and capable of all selfcare but unable to carry out any work activities; up and about more than 50% of waking hours  [] 3 Capable of only limited selfcare; confined to bed or chair more than 50% of waking hours  [] 4 Completely disabled; cannot carry on any selfcare; totally confined to bed or chair  [] 5 Dead     Information obtained from: chart review, interview of patient, and discussion with primary team  ______________________________________________________________________     Oncology History    No history exists.     Past Medical History:   Diagnosis Date    Acute myocardial infarction, unspecified 01/24/2018    Heart attack    Acute myocardial infarction, unspecified 01/24/2018    Heart attack    BMI 29.0-29.9,adult 10/13/2023    Chronic obstructive pulmonary disease, unspecified 01/24/2018    COPD, mild    Chronic obstructive pulmonary disease, unspecified 01/24/2018    COPD, mild    Coronary angioplasty status     Post PTCA    Encounter for immunization 11/04/2022    Encounter for immunization    Encounter for screening for malignant neoplasm of colon 08/25/2016    Encounter for colonoscopy due to history of colonic polyp    Encounter for screening for malignant neoplasm of skin 04/14/2022    Skin cancer screening    " Nicotine dependence, cigarettes, uncomplicated 09/12/2022    Cigarette smoker    Obesity, unspecified 05/25/2022    Class 1 obesity with body mass index (BMI) of 31.0 to 31.9 in adult    Old myocardial infarction 01/24/2018    History of myocardial infarction    Other acute postprocedural pain     Post-operative pain    Pain in left knee     Left knee pain, unspecified chronicity    Pain in right hip 11/04/2022    Hip pain, bilateral    Personal history of other diseases of the digestive system 01/24/2018    History of gastroesophageal reflux (GERD)    Personal history of other diseases of the musculoskeletal system and connective tissue 08/25/2016    History of low back pain    Personal history of other diseases of the respiratory system     History of sinusitis    Personal history of other endocrine, nutritional and metabolic disease 08/25/2016    History of hyperlipidemia    Personal history of other mental and behavioral disorders 04/14/2022    History of depression    Personal history of other specified conditions 12/08/2016    History of diarrhea    Personal history of other specified conditions     History of prolonged Q-T interval on ECG    Personal history of other specified conditions 06/20/2018    History of nocturia    Separation of muscle (nontraumatic), other site 05/14/2018    Rectus diastasis    Sprain of medial collateral ligament of left knee, initial encounter 08/28/2017    Sprain of medial collateral ligament of left knee, initial encounter    Sprain of medial collateral ligament of left knee, subsequent encounter 10/12/2017    Sprain of medial collateral ligament of left knee, subsequent encounter    Type 2 diabetes mellitus without complications (Multi) 01/24/2018    Diabetes mellitus    Type 2 diabetes mellitus without complications (Multi) 01/24/2018    Diabetes mellitus    Umbilical hernia without obstruction or gangrene 05/14/2018    Umbilical hernia without obstruction and without gangrene      Past Surgical History:   Procedure Laterality Date    CARDIAC CATHETERIZATION N/A 2/6/2025    Procedure: Left Heart Cath, With LV;  Surgeon: Wil Bronson MD;  Location: [facility];  Service: Cardiovascular;  Laterality: N/A;    COLONOSCOPY  10/06/2016    Colonoscopy    CORONARY ANGIOPLASTY WITH STENT PLACEMENT  08/25/2016    Cath Stent Placement    OTHER SURGICAL HISTORY  05/25/2022    Oral surgery    SKIN CANCER EXCISION  01/17/2025    right forearm, right shoulder    TONSILLECTOMY  08/25/2016    Tonsillectomy     Family History   Problem Relation Name Age of Onset    Coronary artery disease Mother      Diabetes Father      Coronary artery disease Father      Other (acute myocardial infarction) Father        SOCIAL HISTORY:   Social History:  reports that he has quit smoking. His smoking use included cigarettes. He started smoking about 57 years ago. He has a 75 pack-year smoking history. He has been exposed to tobacco smoke. He has never used smokeless tobacco. He reports that he does not currently use alcohol. He reports that he does not use drugs.   to wife, Leah  Has 2 dogs  No children  Used to work as a  and     REVIEW OF SYSTEMS:  Review of systems negative unless noted in HPI.     Objective     Lab Results   Component Value Date    WBC 8.1 03/20/2025    HGB 12.8 (L) 03/20/2025    HCT 37.3 (L) 03/20/2025    MCV 84 03/20/2025     (L) 03/20/2025      Lab Results   Component Value Date    GLUCOSE 195 (H) 03/21/2025    CALCIUM 9.1 03/21/2025     (L) 03/21/2025    K 3.5 03/21/2025    CO2 26 03/21/2025    CL 99 03/21/2025    BUN 19 03/21/2025    CREATININE 0.82 03/21/2025     Lab Results   Component Value Date    ALT 14 03/20/2025    AST 13 03/20/2025    ALKPHOS 81 03/20/2025    BILITOT 0.5 03/20/2025     Estimated Creatinine Clearance: 88 mL/min (by C-G formula based on SCr of 0.82 mg/dL).     Encounter Date: 03/20/25   ECG 12 lead   Result Value     Ventricular Rate 65    Atrial Rate 71    QRS Duration 122    QT Interval 410    QTC Calculation(Bazett) 426    P Axis 77    R Axis 263    T Axis 128    QRS Count 11    Q Onset 208    T Offset 413    QTC Fredericia 420    Narrative    Sinus rhythm with 2nd degree AV block (Mobitz I)  Right superior axis deviation  Septal infarct , age undetermined  Inferior infarct (cited on or before 08-FEB-2025)  ST & T wave abnormality, consider lateral ischemia  Abnormal ECG  When compared with ECG of 20-MAR-2025 09:51, (unconfirmed)  Sinus rhythm is now with 2nd degree AV block (Mobitz I)  Septal infarct is now Present  Confirmed by Crystal Motley ([id number]) on 3/20/2025 3:27:41 PM     Wt Readings from Last 5 Encounters:   03/20/25 90.3 kg (199 lb 1.2 oz)   03/20/25 88.5 kg (195 lb)   03/13/25 88.5 kg (195 lb)   02/27/25 89.4 kg (197 lb)   02/14/25 85.5 kg (188 lb 8 oz)     Current Outpatient Medications   Medication Instructions    ACETAMINOPHEN ORAL 650 mg, Every 8 hours PRN    alcohol swabs (BD Alcohol Swabs) pads, medicated Use twice daily while checking sugar.    alum-mag hydroxide-simeth (Mylanta) 200-200-20 mg/5 mL oral suspension 30 mL, oral, 4 times daily PRN    amLODIPine (NORVASC) 5 mg, oral, Daily    aspirin 81 mg, Daily    blood glucose control high,low (Accu-Chek Guide L1-L2 Ctrl Sol) solution 1 bottle, miscellaneous, Daily PRN    blood-glucose meter (Accu-Chek Guide Glucose Meter) misc Test twice daily    buPROPion XL (WELLBUTRIN XL) 150 mg, oral, Daily, Do not crush, chew, or split.    clopidogrel (PLAVIX) 75 mg, oral, Daily    ezetimibe (ZETIA) 10 mg, oral, Daily    hydrOXYzine HCL (ATARAX) 25 mg, oral, 2 times daily    insulin glargine (LANTUS) 32 Units, subcutaneous, Nightly, Take as directed per insulin instructions.    insulin glargine (LANTUS) 32 Units, subcutaneous, Daily, Take as directed per insulin instructions.    insulin lispro 15 Units, subcutaneous, 3 times daily before meals, Take as directed  per insulin instructions.    isosorbide mononitrate ER (IMDUR) 30 mg, oral, Daily, Do not crush or chew.    lancets (Accu-Chek Softclix Lancets) misc Test twice daily    Lantus U-100 Insulin 32 Units, subcutaneous, Every morning, Take as directed per insulin instructions.    lovastatin (MEVACOR) 40 mg, oral, Nightly    magnesium hydroxide (Milk of Magnesia) 400 mg/5 mL suspension 30 mL, oral, Daily PRN    nitroglycerin (NITROSTAT) 0.4 mg, sublingual, Every 5 min PRN, DO NOT EXCEED A TOTAL OF 3 DOSES IN 15 MINUTES    omeprazole (PRILOSEC) 40 mg, oral, Daily before breakfast, Do not crush or chew.    polyethylene glycol (GLYCOLAX, MIRALAX) 17 g, oral, Daily    venlafaxine XR (EFFEXOR-XR) 150 mg, oral, Daily, Do not crush or chew.     Scheduled medications   amLODIPine, 5 mg, oral, Daily  buPROPion XL, 150 mg, oral, Daily  clopidogrel, 75 mg, oral, Daily  enoxaparin, 1 mg/kg, subcutaneous, q12h  ezetimibe, 10 mg, oral, Daily  hydrOXYzine HCL, 25 mg, oral, BID  insulin glargine, 12 Units, subcutaneous, Nightly  insulin lispro, 0-15 Units, subcutaneous, TID AC  isosorbide mononitrate ER, 30 mg, oral, Daily  lidocaine, 1 patch, transdermal, Daily  lovastatin, 40 mg, oral, Nightly  pantoprazole, 40 mg, oral, Daily before breakfast  polyethylene glycol, 17 g, oral, Daily  venlafaxine XR, 150 mg, oral, Daily    Continuous medications     PRN medications  acetaminophen, 650 mg, q8h PRN  alum-mag hydroxide-simeth, 30 mL, 4x daily PRN  dextrose, 12.5 g, q15 min PRN  dextrose, 25 g, q15 min PRN  glucagon, 1 mg, q15 min PRN  glucagon, 1 mg, q15 min PRN  ipratropium-albuteroL, 3 mL, q6h PRN  magnesium hydroxide, 30 mL, Daily PRN  melatonin, 5 mg, Nightly PRN  nitroglycerin, 0.4 mg, q5 min PRN  ondansetron, 4 mg, q8h PRN    Allergies:   Allergies   Allergen Reactions    Tetracyclines Hives, Other, Unknown, Rash and Shortness of breath     Water blisters in mouth and genital region.    Water blister on tongue and penis     Atorvastatin Unknown     Cramping all over the body. Sore shoulders    Empagliflozin Unknown    Pravastatin Unknown    Rosuvastatin Unknown    Simvastatin Unknown    Statins-Hmg-Coa Reductase Inhibitors Unknown     PHYSICAL EXAMINATION:  Vital Signs:   Vital signs reviewed  Vitals:    03/21/25 0743   BP: (!) 152/91   Pulse: 90   Resp:    Temp: 36.4 °C (97.5 °F)   SpO2: 98%     Pain Score: 8    Physical Exam  Vitals reviewed.   Constitutional:       Comments: Alert, awake gentleman laying in bed. No signs of acute distress. Pleasant, cooperative, and participating in interview.     HENT:      Head:      Comments: Normocephalic, atraumatic.     Eyes:      Comments: Sclera clear, EOM intact, wearing glasses.    Pulmonary:      Comments: Symmetrical chest rise. Regular rate and depth of respirations. Room air.   Abdominal:      Comments: Abdomen obese, non distended, non tender, and soft.   Musculoskeletal:      Comments: Normal muscle strength and tone. HI x4. No visible extremity edema.   Skin:     Comments: No lesions, rash, or abrasions present on visible skin. Skin color appropriate for ethnicity.   Neurological:      Comments: A&Ox4, follows commands, no apparent sensory deficits.   Psychiatric:      Comments: Mood and behavior appropriate.       ==========================================================================================================================    PALLIATIVE CARE ENCOUNTER:    Introduction to Supportive and Palliative Oncology:  Spoke with patient at bedside.  Introduced the role and philosophy of Supportive and Palliative oncology in the evaluation and management of symptoms during cancer treatment.  Palliative care was introduced as a service for patients with serious illness to help with symptoms, assist with goals of care conversations, navigate complex decision making, improve quality of life for patients, and provide support both patients and families.    Supportive and Palliative  Oncology encounter:  Spoke with patient at bedside.  Emotional support provided.  Coordination of care: medication and symptom evaluation, introduction to services    Medical Decision Making/Goals of Care/Advance Care Planning:  (3/21/2025) Patient's current clinical condition, including diagnosis, prognosis, and management plan, and goals of care were discussed.   Life limiting disease: Metastatic malignancy  Family: Supportive, wife  Performance status: No limitations due to current disease process.   Joys/meaning/strength: [place], his dogs, his wife  Understanding of health: Demonstrates good prognostic understanding of disease process, understands plan for continued Oncology work up and new symptom recs.   Information: Appreciates full disclosure and timely updates  Goals: Cancer tx, if stable possible CABG later   Worries and fears now and future: None   Code status discussion: Confirmed Full Code status with pt     Advance Directives  Existence of Advance Directives: None  Decision maker: Surrogate decision maker is pt's wife, Leah ([phone])     Signature and billing:  Medical complexity was high level due to due to complexity of problems, extensive data review, and high risk of management/treatment.    I spent 75 minutes in the care of this patient which included chart review, interviewing patient/family, discussion with primary team, coordination of care, and documentation.    DATA   Diagnostic tests and information reviewed for today's visit: Conversation with primary team, Most recent labs and imaging results, Most recent EKG, Medications     Some elements copied from Emergency Medicine's note on 3/20/25, the elements have been updated and all reflect current decision making from today, 3/21/2025.    Plan of Care discussed with: Primary team, pt    Thank you for asking Supportive and Palliative Oncology to assist with care of this patient.  Recommendations will be communicated back to the  consulting service by way of shared electronic medical record/secure chat/email or face-to-face.   We will continue to follow.  Please contact us for additional questions or concerns.    SIGNATURE: CELIA Hess-NGUYEN  PAGER/CONTACT:  Contact information:  Supportive and Palliative Oncology  Monday-Friday 8 AM-5 PM  Epic Secure chat or pager [phone].  After hours and weekends:  pager [phone]

## 2025-03-21 NOTE — PROGRESS NOTES
"CARDIAC ICU PROGRESS NOTE    Feliciano Worthington/[id number]    Admit Date: 3/20/2025  Hospital Length of Stay: 1   ICU Length of Stay: Patient does not have an ICU stay during this admission.     Subjective   Spoke with patient on the floor this AM. Denies CP, SOB, abd pain, n/v/c/d. At the time, only knew that he had cancer in abdomen, did not know details. In later discussion with patient, informed patient of severity of diagnosis. Wife, sister, and friend at bedside. In a long discussion with the family and patient, transitioned to DNR/DNI ok for ICU. Patient understands that surgical option for surgery is currently not available. Their goal currently is palliative chemotherapy and to get home with medical management.          Objective    VITALS:   Visit Vitals  /72   Pulse 98   Temp 36.4 °C (97.5 °F)   Resp 18   Ht 1.778 m (5' 10\")   Wt 90.3 kg (199 lb 1.2 oz)   SpO2 97%   BMI 28.56 kg/m²   Smoking Status Former   BSA 2.11 m²       INTAKE/OUTPUT:  I/O last 3 completed shifts:  In: 360 (4 mL/kg) [P.O.:360]  Out: 100 (1.1 mL/kg) [Urine:100 (0 mL/kg/hr)]  Weight: 90.3 kg      Wt Readings from Last 5 Encounters:   03/20/25 90.3 kg (199 lb 1.2 oz)   03/20/25 88.5 kg (195 lb)   03/13/25 88.5 kg (195 lb)   02/27/25 89.4 kg (197 lb)   02/14/25 85.5 kg (188 lb 8 oz)       LABS:  CBC:  Recent Labs     03/21/25  0821 03/20/25  1007 02/18/25  0555 02/16/25  0538 02/15/25  0542 02/14/25  1520 02/10/25  0502 02/09/25  0536   WBC 7.6 8.1 8.1 7.6 7.6 7.9 6.4 6.0   HGB 13.9 12.8* 13.3* 13.3* 13.6 13.6 13.4* 13.6   HCT 41.8 37.3* 39.3* 39.6* 40.5* 38.0* 38.1* 38.7*    147* 146* 149* 158 163 134* 131*   MCV 84 84 86 85 85 82 83 83     CMP:  Recent Labs     03/21/25  0821 03/21/25  0147 03/20/25  1007 02/18/25  0555 02/17/25  0643 02/16/25  0538 02/15/25  0542 02/14/25  1520 02/10/25  0502 02/09/25  0536 02/08/25  0521 02/07/25  0639 02/06/25  0309    134* 133* 135* 134* 133* 133* 132* 131* 134* 132* 134* 132*   K 4.0 " 3.5 4.3 4.1 3.9 4.2 4.0 4.1 4.2 4.0 3.2* 3.7 3.9    99 99 98 99 99 97* 98 99 99 99 100 96*   CO2 26 26 26 28 29 24 24 24 24 25 26 25 25   ANIONGAP 15 13 12 13 10 14 16 14 12 14 10 13 15   BUN 23 19 25* 16 13 15 11 13 10 12 17 17 15   CREATININE 1.02 0.82 0.97 0.96 0.77 0.87 0.82 0.76 0.72 0.66 0.70 0.76 0.90   EGFR 77 >90 81 83 >90 >90 >90 >90 >90 >90 >90 >90 90   MG 2.06 1.75 1.79  --   --   --  2.02 1.82 1.83 1.92 1.62 1.78 1.86     Recent Labs     03/21/25  0821 03/20/25  1007 02/18/25  0555 02/17/25  0643 02/16/25  0538 02/15/25  0542 02/14/25  1520 02/11/25  0556 02/06/25  0309 02/05/25  2147 12/26/24  1241 10/17/23  1736 09/08/21  1302 07/27/21  2320   ALBUMIN 3.9 4.1 3.6 3.8 3.6 3.6 3.6 3.7   < > 4.0   < >  --    < > 4.2   ALT 14 14 19 19 19 17  --  16  --  12   < >  --    < > 22   AST 15 13 14 13 15 15  --  17  --  15   < >  --    < > 16   BILITOT 0.4 0.5 0.3 0.3 0.4 0.3  --  0.4  --  0.6   < >  --    < > 0.7   LIPASE  --   --   --   --   --   --   --   --   --   --   --  10  --  4*    < > = values in this interval not displayed.     COAG:   Recent Labs     03/20/25  1145 02/17/25  0643   INR 1.1 1.0     ABO:   Recent Labs     03/21/25  0147   ABO A       CARDIAC:   Recent Labs     03/21/25  0821 03/20/25  1143 03/20/25  1007 02/10/25  0502 02/06/25  0800 02/06/25  0309 02/05/25  2248 02/05/25  2147 10/17/23  1843 10/17/23  1736   TROPHS 275* 318* 275* 143* 2,450* 1,858* 353* 299*   < > 11   BNP  --   --  240*  --   --   --   --   --   --  10    < > = values in this interval not displayed.       Lab Results   Component Value Date    CKTOTAL 174 07/27/2021    TROPONINI <0.02 07/27/2021      Lab Results   Component Value Date    HGBA1C 14.7 (H) 02/06/2025      Lab Results   Component Value Date    CHOL 258 (H) 02/05/2025    CHOL 319 (H) 12/26/2024    CHOL 166 08/10/2023     Lab Results   Component Value Date    HDL 24.7 02/05/2025    HDL 32.8 12/26/2024    HDL 26.9 (A) 08/10/2023     Lab Results    Component Value Date    LDLCALC 172 (H) 02/05/2025    LDLCALC  12/26/2024      Comment:      The calculation of LDL and VLDL are inaccurate when the Triglycerides are greater than 400 mg/dL or when the patient is non-fasting. If LDL measurement is necessary contact the testing laboratory for an alternative LDL assay.                                  Near   Borderline      AGE      Desirable  Optimal    High     High     Very High     0-19 Y     0 - 109     ---    110-129   >/= 130     ----    20-24 Y     0 - 119     ---    120-159   >/= 160     ----      >24 Y     0 -  99   100-129  130-159   160-189     >/=190       Lab Results   Component Value Date    TRIG 309 (H) 02/05/2025    TRIG 468 (H) 12/26/2024    TRIG 332 (H) 08/10/2023       IMAGING:   ECG 12 lead    Result Date: 3/20/2025  Sinus rhythm with 2nd degree AV block (Mobitz I) Right superior axis deviation Septal infarct , age undetermined Inferior infarct (cited on or before 08-FEB-2025) ST & T wave abnormality, consider lateral ischemia Abnormal ECG When compared with ECG of 20-MAR-2025 09:51, (unconfirmed) Sinus rhythm is now with 2nd degree AV block (Mobitz I) Septal infarct is now Present Confirmed by Crystal Motley ([id number]) on 3/20/2025 3:27:41 PM    ECG 12 lead    Result Date: 3/20/2025  Sinus rhythm with 1st degree AV block Right superior axis deviation Inferior infarct , age undetermined ST & T wave abnormality, consider lateral ischemia Abnormal ECG When compared with ECG of 08-FEB-2025 20:25, Sinus rhythm is no longer with 2nd degree AV block (Mobitz I) Questionable change in QRS duration Criteria for Anterior infarct are no longer Present See ED provider note for full interpretation and clinical correlation Confirmed by Crystal Motley ([id number]) on 3/20/2025 12:33:25 PM    CT angio chest for pulmonary embolism    Result Date: 3/20/2025  Interpreted By:  Joel Anderson, STUDY: CT ANGIO CHEST FOR PULMONARY EMBOLISM;  3/20/2025 10:23 am    INDICATION: 74 y/o   M with  left-sided chest pain.   COMPARISON: 02/06/2025.   ACCESSION NUMBER(S): [id number]   ORDERING CLINICIAN: ALESSANDRO MUNOZ   TECHNIQUE: CT images of the chest obtained following the administration of  75 ml of IV contrast (Omnipaque 350) in the pulmonary arterial phase of contrast. Axial and coronal MIP images, as well as coronal sagittal reformatted images generated and reviewed.   FINDINGS: LIMITATIONS/LINES:   None.   PULMONARY ARTERIES:   Small filling defects are identified in left upper lobar artery, left lower lobar artery with extension to extension to couple of segmental branches. The filling defects are eccentric against the vessel wall or demonstrate linear morphology. These features suggest chronic emboli.   HEART:   Heart is within normal limits of size. No significant pericardial effusion.   MEDIASTINUM/MARIA INES:   Unremarkable.   PLEURA:   Unremarkable.   LUNG PARENCHYMA:   Subpleural reticulation in all lobes with subtle architectural distortion, consistent with fibrotic change. 210 mm nodules in right lower lobe have increased in volume since 02/06/2025 from 6 and 7.5 mm.. Additional couple of stable nodules in the right lung. 7.5 mm nodule in left lower lobe axial image 211, previously 4 mm.   BONES:   Unremarkable.   CHEST WALL/OTHER:   Unremarkable.       Several small pulmonary emboli in left lung as described with features suggesting chronic pulmonary emboli.   Lung fibrosis without overt stacked cystic spaces.   2 enlarging nodules in the right lower lobe and 1 enlarging nodule in left lower lobe. These may be inflammatory in nature given rather rapid change compared to 02/06/2025. Consider short-term follow-up CT.   MACRO: None   Signed by: Joel Anderson 3/20/2025 10:43 AM Dictation workstation:   [id number]      PHYSICAL EXAM:  Constitutional:       General: He is not in acute distress.     Appearance: Normal appearance. He is not ill-appearing.   HENT:      Head:  Normocephalic and atraumatic.      Mouth/Throat:      Mouth: Mucous membranes are dry.   Eyes:      Extraocular Movements: Extraocular movements intact.      Pupils: Pupils are equal, round, and reactive to light.   Cardiovascular:      Rate and Rhythm: Normal rate and regular rhythm.      Pulses: Normal pulses.      Heart sounds: Normal heart sounds. No murmur heard.  Pulmonary:      Effort: Pulmonary effort is normal. No respiratory distress.      Breath sounds: Normal breath sounds. No wheezing.   Abdominal:      General: Abdomen is flat. Bowel sounds are normal. There is no distension.      Palpations: Abdomen is soft.      Tenderness: There is no abdominal tenderness.   Musculoskeletal:         General: Normal range of motion.      Cervical back: Normal range of motion and neck supple.   Skin:     General: Skin is warm and dry.   Neurological:      General: No focal deficit present.      Mental Status: He is alert and oriented to person, place, and time.   Psychiatric:         Mood and Affect: Mood normal.          MEDICATIONS:   Scheduled medications  amLODIPine, 5 mg, oral, Daily  buPROPion XL, 150 mg, oral, Daily  clopidogrel, 75 mg, oral, Daily  enoxaparin, 1 mg/kg, subcutaneous, q12h  ezetimibe, 10 mg, oral, Daily  hydrOXYzine HCL, 25 mg, oral, BID  insulin glargine, 12 Units, subcutaneous, Nightly  insulin lispro, 0-15 Units, subcutaneous, TID AC  isosorbide mononitrate ER, 30 mg, oral, Daily  lidocaine, 1 patch, transdermal, q24h  lovastatin, 40 mg, oral, Nightly  pantoprazole, 40 mg, oral, Daily before breakfast  polyethylene glycol, 17 g, oral, Daily  venlafaxine XR, 150 mg, oral, Daily        Continuous medications       PRN medications  PRN medications: acetaminophen, alum-mag hydroxide-simeth, dextrose, dextrose, glucagon, glucagon, ipratropium-albuteroL, magnesium hydroxide, melatonin, nitroglycerin, ondansetron          Assessment/Plan     Felicinao Worthington is a 74 y.o. male with a significant past  medical history of CAD (s/p PCI with RCA stent 2011), Mobitz type 1 AV block, HTN, HLD, IDDM-II, COPD, tobacco use disorder, GERD, depression & anxiety presenting as a transfer from [facility] s/p NSTEMI with chest pain after being admitted here last month for similar symptoms.     During the recent admission, he was found to have an incidental pancreatic mass noted on ajit-operative CABG evaluation s/p MRCP revelaing 38 mm pancreatic tail mass with 15 mm liver lesion with c/f metastasis and and got a liver biopsy on 3/13. Biopsy confirming adenocarcinoma c/f metastasis from pancreatic mass. In addition, patient was found to have both acute/chromic PE on CT in addition to multiple large, enlarging lung nodules. CT surgery during last admission decided to forgo surgery 2/2 newly diagnosed concern for metastatic pancreatic cancer.     Plan to coordinate supportive oncology care and inform CT surgery of prognosis to confirm no surgical options.     Updates:  - DNR/DNI ok for ICU  - Main goal for family is palliative chemotherapy and to return home, understands that surgical intervention at this time is limited and the recommendation for medical management for CAD is optimal  - If ok from oncology, will transition to Apixiban and Plavix duel therapy. Currently on Lovenox therapeutic injections.  - Supportive onc consulted, appreciate recs     #Chest pain with recurrent NSTEMI with triple-vessel CAD  #CABG Evaluation  #Hx of Mobitz type I  #Left lung PEs  ::Patient presented at [facility] where he had no EKG changes for ischemia but did have a troponin elevation from 275 to 318  ::Patient with evidence of 3 vessel disease from previous catheterization at [facility] with CABG recommended, with CABG evaluation completed but pending workup of cancer and pancreatic mass  ::Hx of prior stent to RCA in 2011  ::Patient was aspirin loaded in EMS, and was started on heparin gtt at [facility]  ::Chest pain resolved per patient after ASA  "load  ::CTA PE was done at [place] with evidence of several small PE in the left lung noted  :: home meds: amlopidine 5, imdur 30  Plan:  - Continue plavix 75 mg daily  - Lovenox injection BID, plan to transition to apixiban with supportive oncology clearance  - Continue amlodipine 5, imdur 30  - Continue telemetry  - Nitroglycerin PRN   - EKG PRN     #Pancreatic tail mass  #Hepatic lesion  ::CT chest revealing indeterminate pancreatic tail mass  ::MRCP revealing 38 mm pancreatic tail mass and 15 mm hepatic lesion concerning for metastasis   :: Last LFTs wnl on 3/20/25  :: CA 19-9 <4 on 2/7/25  ::Liver biopsy c/f adenocarcinoma 2/2 metastatic pancreatic mass.  Plan:  - Supportive oncology consulted, appreciate recs     #Uncontrolled type 2 diabetes   :: last A1c of 14.7 - 2/6/25  :: [facility] End[facility] recommendations of Lantus 32 BID and Novolog 15 TID, sliding scale  Plan:  - Lantus 12 units at bedtime  - SSI #3  - POCT ACHS Glucose checks  - Titrate insulin regimen  - Consider endocrinology consult if difficulty controlling BG     #Depression/anxiety  - Continue bupropion 150mg daily  - Hydroxyzine PRN  - CTM     #Pulmonary nodule  ::Pulmonary nodules-most appear chronic  :: new 8 mm right lower lobe, \"possibly infectious/inflammatory in nature but should have follow-up to document resolution  Plan:   - PCP follow up on discharge to monitor of nodules     #Hyponatremia  :: Patient persistently hyponatremic at OSH ~132  Plan:  -  Continue to monitor   -  Consider hyponatremia workup if worsening     #HLD  :: Takes lovastatin, ezetimbie at home  Plan:  - Continue home meds     #COPD ?  :: Patient used to smoke 1 to 1.5 ppd, but on history patient states he quit smoking 1-1.5 months ago  :: PFT's 2/7/25 reveal normal lung volumes, normal FEV/FVC, but decreased DLCO of 63  Plan:  - CTM  - Duonebs as needed        F: PRN  E: K > 4, Mg > 2  N: NPO for any procedure - last food at 1AM  A: PIV  GI: pantoprazole 40 " daily  O2: RA  DVT: heparin gtt  Abx: None  Pain: acetaminophen  Code: Full  NOK: Leah (Spouse) [phone]      Faisal Oh,   PGY-1

## 2025-03-21 NOTE — HOSPITAL COURSE
Feliciano Worthington is a 74 y.o. male with a significant past medical history of CAD (s/p PCI with RCA stent 2011), Mobitz type 1 AV block, HTN, HLD, IDDM-II, COPD, tobacco use disorder, GERD, depression & anxiety presenting as a transfer from [facility] s/p NSTEMI with chest pain after being admitted in February with similar symptoms.      Mr. Worthington presented to [facility] ED on 2/6 with chest pain. Was found to have an NSTEMI, underwent cardiac catheterization with evidence of triple vessel disease. While undergoing ajit-operative workup for CABG, an incidental pancreatic mass was discovered. He underwent MRCP revelaing 38 mm pancreatic tail mass with 15 mm liver lesion concerning for malignancy. Underwent liver biopsy on 3/13. Biopsy confirmed adenocarcinoma concern for metastasis from pancreatic mass. CABG was deferred and he was discharged with outpatient cardiology follow up.     Presented again to [facility] on 3/20 with left sided chest pain, found to have bilateral subsegmental pulmonary embolisms on CT PE and NSTEMI. He ws transferred to [facility] for urgent CABG evaluation in the setting of active malignancy.     He was evaluated by Oncology while admitted who estimated his survival to be < 6 months without treatment, 8-10 months with chemotherapy. He was scheduled for outpatient follow up with medical oncology in [place]. A referral was also placed to genetic counseling for his new diagnosis of pancreatic cancer.     He was evaluated by Cardiac surgery during his admission - was not a surgical candidate due to his metastatic pancreatic cancer.     In addition, patient was found to have both acute/chromic PE on CT in addition to multiple large, enlarging lung nodules. He was discharged on Eliquis in addition to his home Plavix. He was instructed to stop taking Aspirin on discharge.

## 2025-03-21 NOTE — DISCHARGE INSTRUCTIONS
Dear Mr. Worthington,    You were hospitalized here at [facility] on 03/21/25 after presenting for a transfer due to an NSTEMI (mini heart attack). We discussed your case with both oncology and CT surgery, and it was determined that we will medically manage your heart symptoms and schedule outpatient palliative oncology for your cancer. We will have you on a blood thinner called apixiban and your anti platelet called plavix.  Taking both of these medications together puts you at increased risk for bleeding.  If you find that you have bleeding from any source that does not easily stop, please seek medical care.      You will also have as needed nitroglycerin if you experience chest pain again.     You will follow up with Oncology Outpatient:    Lizbeth Martin MD  [facility] [address] [place] [address] [phone]    Your follow-up appointments are listed below.  Referrals have been entered for your follow-up appointments.  You can schedule all follow-up appointments through the [employer] kash, or you can call the  central scheduling line at [phone].  Please be sure to bring your discharge paperwork to all follow-up appointments.    If at any time you start developing worsening shortness of breath or chest pain you should immediately return to the emergency room or call 911.    It was a pleasure taking care of you here at [facility]!    Sincerely,  Your  Medicine Team    4/1 - appointment with Cardiology  4/7 - appointment with Oncology    Outpatient Follow-Up  Future Appointments   Date Time Provider Department Center   4/1/2025  1:00 PM Roberto Haskins MD [id number] [place]   4/7/2025  1:00 PM Lizbeth Martin MD [id number] [facility]   4/30/2025 11:30 AM Hussain Bernal MD [id number] [place]

## 2025-03-21 NOTE — CONSULTS
Name: Feliciano Worthington  MRN: [id number]  Encounter Date: 3/21/2025  PCP: Hussain Bernal MD    Reason for consult: newly diagnosed pancreatic adenocarcinoma   Attending Provider: Dr. Luna    Hematology/ Oncology Consult Note      History of Present Illness   Feliciano Worthington is a 75 y.o. male PMHx CAD (s/p PCI with RCA stent 2011), Mobitz type 1 AV block, HTN, HLD, IDDM-II, COPD, tobacco use disorder, depression & anxiety presenting as a transfer from [facility] 3/20/25 with chest pain and hx of admission in February for NSTEMI with incidental pancreatic mass noted on ajit-operative CABG evaluation.     Oncology is consulted for newly diagnosed metastatic pancreatic adenocarcinoma to provide counseling and prognosis.     Patient presented this admission for chest pain. Found to have chronic appearing PE on imaging. Per discussion with primary team, recurrent chest pain more likely related to underlying CAD. He is undergoing evaluation for CABG vs PCI vs medical management for his triple vessel disease.     Patient reports chest pain has resolved at this time. He does endorse 50lb weight loss over 1 year. Reports good appetite. No abdominal pain or change in stools. He reports increased fatigue over the last 6 months. He spends less than half of the day in bed and is limited by feeling winded and lightheaded with exertion. He reports he is still able to help with some chores around the house like vacuuming and making the bed. He is interested in learning more about treatment options for his cancer.     Has long standing diabetes with neuropathy. A1c 14.7 2/6/25. On insulin.     Heme/Onc History    -Admitted 2/2025 for chest pain, NSTEMI, found to have triple vessel disease and underwent imaging work up for pre-CABG evaluation revealing the following:   -CT chest 2/7/25, showed 2.5 cm hypoattenuating lesion in the tail of the pancreas   -MRCP pancreas 2/10/25 with 38mm pancreatic tail mass, 15mm hepatic segment  VI hypoenhancing mass suspicious for metastasis. No definite lympadenopathy    -IR guided biopsy over liver mass 3/13/25 with moderately differentiated adenocarcinoma with mucin involving liver, most consistent with metastatic pancreatic adenocarcinoma     -CT PE 3/20/25 with 2 enlarging nodules in the RLL and LLL      Past Medical history     Past Medical History:   Diagnosis Date    Acute myocardial infarction, unspecified 01/24/2018    Heart attack    Acute myocardial infarction, unspecified 01/24/2018    Heart attack    BMI 29.0-29.9,adult 10/13/2023    Chronic obstructive pulmonary disease, unspecified 01/24/2018    COPD, mild    Chronic obstructive pulmonary disease, unspecified 01/24/2018    COPD, mild    Coronary angioplasty status     Post PTCA    Encounter for immunization 11/04/2022    Encounter for immunization    Encounter for screening for malignant neoplasm of colon 08/25/2016    Encounter for colonoscopy due to history of colonic polyp    Encounter for screening for malignant neoplasm of skin 04/14/2022    Skin cancer screening    Nicotine dependence, cigarettes, uncomplicated 09/12/2022    Cigarette smoker    Obesity, unspecified 05/25/2022    Class 1 obesity with body mass index (BMI) of 31.0 to 31.9 in adult    Old myocardial infarction 01/24/2018    History of myocardial infarction    Other acute postprocedural pain     Post-operative pain    Pain in left knee     Left knee pain, unspecified chronicity    Pain in right hip 11/04/2022    Hip pain, bilateral    Personal history of other diseases of the digestive system 01/24/2018    History of gastroesophageal reflux (GERD)    Personal history of other diseases of the musculoskeletal system and connective tissue 08/25/2016    History of low back pain    Personal history of other diseases of the respiratory system     History of sinusitis    Personal history of other endocrine, nutritional and metabolic disease 08/25/2016    History of  hyperlipidemia    Personal history of other mental and behavioral disorders 04/14/2022    History of depression    Personal history of other specified conditions 12/08/2016    History of diarrhea    Personal history of other specified conditions     History of prolonged Q-T interval on ECG    Personal history of other specified conditions 06/20/2018    History of nocturia    Separation of muscle (nontraumatic), other site 05/14/2018    Rectus diastasis    Sprain of medial collateral ligament of left knee, initial encounter 08/28/2017    Sprain of medial collateral ligament of left knee, initial encounter    Sprain of medial collateral ligament of left knee, subsequent encounter 10/12/2017    Sprain of medial collateral ligament of left knee, subsequent encounter    Type 2 diabetes mellitus without complications (Multi) 01/24/2018    Diabetes mellitus    Type 2 diabetes mellitus without complications (Multi) 01/24/2018    Diabetes mellitus    Umbilical hernia without obstruction or gangrene 05/14/2018    Umbilical hernia without obstruction and without gangrene         Past Surgical History     Past Surgical History:   Procedure Laterality Date    CARDIAC CATHETERIZATION N/A 2/6/2025    Procedure: Left Heart Cath, With LV;  Surgeon: Wil Bronson MD;  Location: [facility];  Service: Cardiovascular;  Laterality: N/A;    COLONOSCOPY  10/06/2016    Colonoscopy    CORONARY ANGIOPLASTY WITH STENT PLACEMENT  08/25/2016    Cath Stent Placement    OTHER SURGICAL HISTORY  05/25/2022    Oral surgery    SKIN CANCER EXCISION  01/17/2025    right forearm, right shoulder    TONSILLECTOMY  08/25/2016    Tonsillectomy         Family History      Family History   Problem Relation Name Age of Onset    Coronary artery disease Mother      Diabetes Father      Coronary artery disease Father      Other (acute myocardial infarction) Father           Social History     Social History     Socioeconomic History    Marital  status:    Tobacco Use    Smoking status: Former     Average packs/day: 1.5 packs/day for 50.0 years (75.0 ttl pk-yrs)     Types: Cigarettes     Start date: 1968     Passive exposure: Current    Smokeless tobacco: Never   Vaping Use    Vaping status: Never Used   Substance and Sexual Activity    Alcohol use: Not Currently     Comment: [id number]    Drug use: Never    Sexual activity: Defer     Social Drivers of Health     Financial Resource Strain: Low Risk  (3/20/2025)    Overall Financial Resource Strain (CARDIA)     Difficulty of Paying Living Expenses: Not hard at all   Recent Concern: Financial Resource Strain - Medium Risk (2/6/2025)    Overall Financial Resource Strain (CARDIA)     Difficulty of Paying Living Expenses: Somewhat hard   Food Insecurity: No Food Insecurity (3/20/2025)    Hunger Vital Sign     Worried About Running Out of Food in the Last Year: Never true     Ran Out of Food in the Last Year: Never true   Transportation Needs: No Transportation Needs (3/20/2025)    PRAPARE - Transportation     Lack of Transportation (Medical): No     Lack of Transportation (Non-Medical): No   Physical Activity: Sufficiently Active (3/29/2024)    [facility]    Exercise Vital Sign     Days of Exercise per Week: 4 days     Minutes of Exercise per Session: 150+ min   Social Connections: Unknown (10/8/2023)    Social Connection and Isolation Panel [NHANES]     Active Member of Clubs or Organizations: Yes     Marital Status:    Intimate Partner Violence: Not At Risk (3/20/2025)    Humiliation, Afraid, Rape, and Kick questionnaire     Fear of Current or Ex-Partner: No     Emotionally Abused: No     Physically Abused: No     Sexually Abused: No   Housing Stability: Low Risk  (3/20/2025)    Housing Stability Vital Sign     Unable to Pay for Housing in the Last Year: No     Number of Times Moved in the Last Year: 0     Homeless in the Last  "Year: No         Allergies     Allergies   Allergen Reactions    Tetracyclines Hives, Other, Unknown, Rash and Shortness of breath     Water blisters in mouth and genital region.    Water blister on tongue and penis    Atorvastatin Unknown     Cramping all over the body. Sore shoulders    Empagliflozin Unknown    Pravastatin Unknown    Rosuvastatin Unknown    Simvastatin Unknown    Statins-Hmg-Coa Reductase Inhibitors Unknown       Medications   amLODIPine, 5 mg, Daily  buPROPion XL, 150 mg, Daily  clopidogrel, 75 mg, Daily  enoxaparin, 1 mg/kg, q12h  ezetimibe, 10 mg, Daily  hydrOXYzine HCL, 25 mg, BID  insulin glargine, 12 Units, Nightly  insulin lispro, 0-15 Units, TID AC  isosorbide mononitrate ER, 30 mg, Daily  lidocaine, 1 patch, q24h  lovastatin, 40 mg, Nightly  pantoprazole, 40 mg, Daily before breakfast  polyethylene glycol, 17 g, Daily  venlafaxine XR, 150 mg, Daily         acetaminophen, 650 mg, q8h PRN  alum-mag hydroxide-simeth, 30 mL, 4x daily PRN  dextrose, 12.5 g, q15 min PRN  dextrose, 25 g, q15 min PRN  glucagon, 1 mg, q15 min PRN  glucagon, 1 mg, q15 min PRN  ipratropium-albuteroL, 3 mL, q6h PRN  magnesium hydroxide, 30 mL, Daily PRN  melatonin, 5 mg, Nightly PRN  nitroglycerin, 0.4 mg, q5 min PRN  ondansetron, 4 mg, q8h PRN        Review of Systems   Review of Systems   10 pt ROS reviewed and negative aside from above    Physical Exam   Blood pressure (!) 152/91, pulse 90, temperature 36.4 °C (97.5 °F), resp. rate 18, height 1.778 m (5' 10\"), weight 90.3 kg (199 lb 1.2 oz), SpO2 98%.    ECO    Gen: awake, alert, in no acute distress  HEENT: AT/NC, PEERL, EOMI, no LAD  CV: RRR   Pulm: CTAB   Abd: soft, NT/ND   Ext: no LE edema  Skin: warm and dry  Neuro: A&Ox4, moves all 4 extremities spontaneously     Labs     Results from last 7 days   Lab Units 25  0821 03/20/25  1007   WBC AUTO x10*3/uL 7.6 8.1   HEMOGLOBIN g/dL 13.9 12.8*   HEMATOCRIT % 41.8 37.3*   PLATELETS AUTO x10*3/uL 157 147* "       Results from last 7 days   Lab Units 03/21/25  0821 03/21/25  0147 03/20/25  1007   SODIUM mmol/L 137 134* 133*   POTASSIUM mmol/L 4.0 3.5 4.3   CHLORIDE mmol/L 100 99 99   CO2 mmol/L 26 26 26   BUN mg/dL 23 19 25*   CREATININE mg/dL 1.02 0.82 0.97   CALCIUM mg/dL 9.5 9.1 9.0   PROTEIN TOTAL g/dL 7.0  --  6.9   BILIRUBIN TOTAL mg/dL 0.4  --  0.5   ALK PHOS U/L 85  --  81   ALT U/L 14  --  14   AST U/L 15  --  13   GLUCOSE mg/dL 251* 195* 240*       Lab Results   Component Value Date    ALT 14 03/21/2025    AST 15 03/21/2025    ALKPHOS 85 03/21/2025    BILITOT 0.4 03/21/2025     CA 19-9 2/7/25 - < 4.00  Imaging     === 03/20/25 ===    CT ANGIO CHEST FOR PULMONARY EMBOLISM    - Impression -  Several small pulmonary emboli in left lung as described with  features suggesting chronic pulmonary emboli.    Lung fibrosis without overt stacked cystic spaces.    2 enlarging nodules in the right lower lobe and 1 enlarging nodule in  left lower lobe. These may be inflammatory in nature given rather  rapid change compared to 02/06/2025. Consider short-term follow-up CT.    MACRO:  None    Signed by: Joel Anderson 3/20/2025 10:43 AM  Dictation workstation:   [id number]      MRCP 2/10/25  IMPRESSION:  1. Severely motion degraded exam.  2. 38 mm pancreatic tail hypoenhancing mass suspicious for  adenocarcinoma.  3. 15 mm hepatic segment VI hypoenhancing mass suspicious for  metastasis.  4. No definite lymphadenopathy.    Pathology 3/13/25  FINAL DIAGNOSIS   A. LIVER BIOPSY RIGHT:   -Moderately differentiated adenocarcinoma with mucin involving liver, see note     Note: Immunohistochemical stains show that the tumor cells are positive for CK7 and focally positive for CK20. They are negative for CDX2 and TTF-1. Based on the morphologic findings, immunophenotype, and clinical history of a newly identified pancreatic mass, the findings are most consistent with metastatic pancreatic adenocarcinoma.     Assessment/Plan   Feliciano DOVER  Elin is a 75 y.o. male PMHx CAD (s/p PCI with RCA stent 2011), Mobitz type 1 AV block, HTN, HLD, IDDM-II, COPD, tobacco use disorder, depression & anxiety presenting as a transfer from [facility] 3/20/25 with chest pain and hx of admission in February for NSTEMI with incidental pancreatic mass noted on ajit-operative CABG evaluation.     Oncology is consulted for newly diagnosed metastatic pancreatic adenocarcinoma to provide counseling and prognosis as patient is being evaluated for treatment for his triple vessel CAD.     We reviewed the work up to date, diagnosis, and staging with the patient. We discussed pancreatic cancer is an aggressive cancer with estimated survival of <6 months without treatment and estimated 8-10 months with treatment (chemotherapy). His cardiac co-morbidities and diabetic neuropathy may also complicate his treatment options. He is understanding he will need to meet with a medical oncologist as an outpatient to further discuss specific chemotherapy regimens.     #Metastatic pancreatic adenocarcinoma with met to the liver   #Lung nodules  Recommendations:  -Place outpatient referral to medical oncology in [place]   -Place outpatient referral for genetic counseling for new diagnosis of pancreatic cancer. Discussed with the patient and he is agreeable   -No further inpatient work up or treatment needed from oncology stand point   -NGS can be requested as an outpatient     Thank you for this consult, we will sign off. Patient seen and discussed with attending physician, Dr. Strong.    Laya Yoo MD  Hematology-Oncology Fellow, PGY4  Oncology Consult Pager: [phone]

## 2025-03-21 NOTE — PROGRESS NOTES
03/21/25 0038   Discharge Planning   Living Arrangements Spouse/significant other;Family members   Support Systems Spouse/significant other;Family members;Friends/neighbors   Assistance Needed none   Type of Residence Private residence   Do you have animals or pets at home? Yes   Type of Animals or Pets 2 dogs & 1 cat   Home or Post Acute Services None   Expected Discharge Disposition Home     Transitional Care Coordination Progress Note:  Patient discussed during interdisciplinary rounds.   Team members present:   Plan per Medical/Surgical team: 75 y.o. male presenting with significant past medical history of CAD (s/p PCI with RCA stent 2011), Mobitz type 1 AV block, HTN, HLD, IDDM-II, COPD, tobacco use disorder, GERD, depression & anxiety presenting as a transfer from [facility] with chest pain and hx of admission in February for NSTEMI with incidental pancreatic mass noted on ajit-operative CABG evaluation.  Oncology and CT surg cons pending.  Payor: [facility] Medicare  Discharge disposition: TBD, home for now  Potential Barriers: n/a  ADOD:   TBD  Previous Home Care:  n/a  DME: [facility]  Pharmacy: [facility]: [facility]  PCP:  daysi teixeira  Dialysis:  n/a

## 2025-03-21 NOTE — CARE PLAN
The patient's goals for the shift include  to have well-managed blood sugars.    The clinical goals for the shift include patient will remain free of falls throughout shift

## 2025-03-21 NOTE — CARE PLAN
The patient's goals for the shift include  to obtain adequate rest overnight    The clinical goals for the shift include patient will remain hemodynamically stable overnight

## 2025-03-22 ENCOUNTER — PHARMACY VISIT (OUTPATIENT)
Dept: PHARMACY | Facility: CLINIC | Age: 75
End: 2025-03-22
Payer: COMMERCIAL

## 2025-03-22 VITALS
OXYGEN SATURATION: 95 % | RESPIRATION RATE: 17 BRPM | HEIGHT: 70 IN | HEART RATE: 85 BPM | WEIGHT: 199.08 LBS | SYSTOLIC BLOOD PRESSURE: 103 MMHG | TEMPERATURE: 97.2 F | BODY MASS INDEX: 28.5 KG/M2 | DIASTOLIC BLOOD PRESSURE: 57 MMHG

## 2025-03-22 LAB
ERYTHROCYTE [DISTWIDTH] IN BLOOD BY AUTOMATED COUNT: 13.5 % (ref 11.5–14.5)
GLUCOSE BLD MANUAL STRIP-MCNC: 289 MG/DL (ref 74–99)
GLUCOSE BLD MANUAL STRIP-MCNC: 319 MG/DL (ref 74–99)
GLUCOSE BLD MANUAL STRIP-MCNC: 495 MG/DL (ref 74–99)
HCT VFR BLD AUTO: 41.2 % (ref 41–52)
HGB BLD-MCNC: 13.8 G/DL (ref 13.5–17.5)
MCH RBC QN AUTO: 28.3 PG (ref 26–34)
MCHC RBC AUTO-ENTMCNC: 33.5 G/DL (ref 32–36)
MCV RBC AUTO: 85 FL (ref 80–100)
NRBC BLD-RTO: 0 /100 WBCS (ref 0–0)
PLATELET # BLD AUTO: 142 X10*3/UL (ref 150–450)
RBC # BLD AUTO: 4.87 X10*6/UL (ref 4.5–5.9)
WBC # BLD AUTO: 6.5 X10*3/UL (ref 4.4–11.3)

## 2025-03-22 PROCEDURE — 82947 ASSAY GLUCOSE BLOOD QUANT: CPT

## 2025-03-22 PROCEDURE — 2500000001 HC RX 250 WO HCPCS SELF ADMINISTERED DRUGS (ALT 637 FOR MEDICARE OP)

## 2025-03-22 PROCEDURE — 2500000002 HC RX 250 W HCPCS SELF ADMINISTERED DRUGS (ALT 637 FOR MEDICARE OP, ALT 636 FOR OP/ED)

## 2025-03-22 PROCEDURE — 85027 COMPLETE CBC AUTOMATED: CPT

## 2025-03-22 PROCEDURE — 36415 COLL VENOUS BLD VENIPUNCTURE: CPT

## 2025-03-22 PROCEDURE — 99239 HOSP IP/OBS DSCHRG MGMT >30: CPT

## 2025-03-22 PROCEDURE — RXMED WILLOW AMBULATORY MEDICATION CHARGE

## 2025-03-22 RX ORDER — ATORVASTATIN CALCIUM 10 MG/1
10 TABLET, FILM COATED ORAL NIGHTLY
Qty: 90 TABLET | Refills: 3 | Status: CANCELLED | OUTPATIENT
Start: 2025-03-22

## 2025-03-22 RX ORDER — METOPROLOL TARTRATE 25 MG/1
12.5 TABLET, FILM COATED ORAL 2 TIMES DAILY
Status: DISCONTINUED | OUTPATIENT
Start: 2025-03-22 | End: 2025-03-22

## 2025-03-22 RX ORDER — METOPROLOL TARTRATE 25 MG/1
12.5 TABLET, FILM COATED ORAL 2 TIMES DAILY
Qty: 30 TABLET | Refills: 11 | Status: CANCELLED | OUTPATIENT
Start: 2025-03-22 | End: 2026-03-22

## 2025-03-22 RX ORDER — ISOSORBIDE MONONITRATE 30 MG/1
60 TABLET, EXTENDED RELEASE ORAL DAILY
Status: DISCONTINUED | OUTPATIENT
Start: 2025-03-22 | End: 2025-03-22 | Stop reason: HOSPADM

## 2025-03-22 RX ORDER — CLOPIDOGREL BISULFATE 75 MG/1
75 TABLET ORAL DAILY
Qty: 30 TABLET | Refills: 1 | Status: SHIPPED | OUTPATIENT
Start: 2025-03-22 | End: 2025-05-21

## 2025-03-22 RX ORDER — ATORVASTATIN CALCIUM 10 MG/1
10 TABLET, FILM COATED ORAL NIGHTLY
Status: DISCONTINUED | OUTPATIENT
Start: 2025-03-22 | End: 2025-03-22 | Stop reason: HOSPADM

## 2025-03-22 RX ORDER — ISOSORBIDE MONONITRATE 60 MG/1
60 TABLET, EXTENDED RELEASE ORAL DAILY
Qty: 30 TABLET | Refills: 0 | Status: SHIPPED | OUTPATIENT
Start: 2025-03-22 | End: 2025-04-21

## 2025-03-22 RX ORDER — PANTOPRAZOLE SODIUM 40 MG/1
40 TABLET, DELAYED RELEASE ORAL
Qty: 30 TABLET | Refills: 1 | Status: SHIPPED | OUTPATIENT
Start: 2025-03-23 | End: 2025-05-22

## 2025-03-22 RX ORDER — ACETAMINOPHEN 325 MG/1
650 TABLET ORAL EVERY 8 HOURS PRN
Qty: 30 TABLET | Refills: 0 | Status: SHIPPED | OUTPATIENT
Start: 2025-03-22 | End: 2025-05-21

## 2025-03-22 RX ADMIN — BUPROPION HYDROCHLORIDE 150 MG: 150 TABLET, EXTENDED RELEASE ORAL at 09:29

## 2025-03-22 RX ADMIN — EZETIMIBE 10 MG: 10 TABLET ORAL at 09:30

## 2025-03-22 RX ADMIN — AMLODIPINE BESYLATE 5 MG: 5 TABLET ORAL at 09:30

## 2025-03-22 RX ADMIN — INSULIN LISPRO 9 UNITS: 100 INJECTION, SOLUTION INTRAVENOUS; SUBCUTANEOUS at 09:30

## 2025-03-22 RX ADMIN — CLOPIDOGREL BISULFATE 75 MG: 75 TABLET ORAL at 09:30

## 2025-03-22 RX ADMIN — APIXABAN 10 MG: 5 TABLET, FILM COATED ORAL at 09:29

## 2025-03-22 RX ADMIN — PANTOPRAZOLE SODIUM 40 MG: 40 TABLET, DELAYED RELEASE ORAL at 06:45

## 2025-03-22 RX ADMIN — HYDROXYZINE HYDROCHLORIDE 25 MG: 25 TABLET, FILM COATED ORAL at 09:30

## 2025-03-22 RX ADMIN — INSULIN LISPRO 15 UNITS: 100 INJECTION, SOLUTION INTRAVENOUS; SUBCUTANEOUS at 12:14

## 2025-03-22 RX ADMIN — ISOSORBIDE MONONITRATE 60 MG: 30 TABLET, EXTENDED RELEASE ORAL at 09:30

## 2025-03-22 RX ADMIN — VENLAFAXINE HYDROCHLORIDE 150 MG: 150 CAPSULE, EXTENDED RELEASE ORAL at 09:30

## 2025-03-22 ASSESSMENT — COGNITIVE AND FUNCTIONAL STATUS - GENERAL
CLIMB 3 TO 5 STEPS WITH RAILING: A LITTLE
MOBILITY SCORE: 22
WALKING IN HOSPITAL ROOM: A LITTLE
DAILY ACTIVITIY SCORE: 24

## 2025-03-22 ASSESSMENT — PAIN SCALES - GENERAL: PAINLEVEL_OUTOF10: 0 - NO PAIN

## 2025-03-22 NOTE — DISCHARGE SUMMARY
Discharge Diagnosis  Chest pain  NSTEMI  Metastatic Pancreatic Cancer    Issues Requiring Follow-Up    [ ] Follow up with outpatient Oncology for Metastatic Pancreatic Cancer - scheduled for 4/7  [ ] Follow up with Cardiology - scheduled on 4/1. Titrate anti-anginal medications as able if patient continues to experience chest pain  [ ] Follow up with medical genetics - referral placed, needs appointment scheduled  [ ] Follow up with PCP for blood glucose management. Hyperglycemia during hospitalization, A1C of 14.7    Discharge Meds     Medication List      START taking these medications     apixaban 5 mg tablet; Commonly known as: Eliquis; Take 2 tablets (10 mg)   by mouth 2 times a day for 7 days, THEN 1 tablet (5 mg) 2 times a day.;   Start taking on: March 22, 2025   pantoprazole 40 mg EC tablet; Commonly known as: ProtoNix; Take 1 tablet   (40 mg) by mouth once daily in the morning. Take before meals. Do not   crush, chew, or split.; Start taking on: March 23, 2025; Replaces:   omeprazole 40 mg DR capsule     CHANGE how you take these medications     acetaminophen 325 mg tablet; Commonly known as: Tylenol; Take 2 tablets   (650 mg) by mouth every 8 hours if needed for mild pain (1 - 3).; What   changed: medication strength, additional instructions   insulin glargine 100 unit/mL (3 mL) pen; Commonly known as: Lantus;   Inject 32 Units under the skin once daily. Take as directed per insulin   instructions.; What changed: Another medication with the same name was   removed. Continue taking this medication, and follow the directions you   see here.   isosorbide mononitrate ER 60 mg 24 hr tablet; Commonly known as: Imdur;   Take 1 tablet (60 mg) by mouth once daily. Do not crush or chew.; What   changed: medication strength, how much to take     CONTINUE taking these medications     Accu-Chek Guide L1-L2 Ctrl Sol solution; Generic drug: blood glucose   control high,low; 1 bottle once daily as needed (use when need  to un   controls on glucose meter).   alcohol swabs pads, medicated; Commonly known as: BD Alcohol Swabs; Use   twice daily while checking sugar.   alum-mag hydroxide-simeth 200-200-20 mg/5 mL oral suspension; Commonly   known as: Mylanta; Take 30 mL by mouth 4 times a day as needed for   indigestion or heartburn.   amLODIPine 5 mg tablet; Commonly known as: Norvasc; Take 1 tablet (5 mg)   by mouth once daily.   blood-glucose meter misc; Commonly known as: Accu-Chek Guide Glucose   Meter; Test twice daily   buPROPion  mg 24 hr tablet; Commonly known as: Wellbutrin XL; Take   1 tablet (150 mg) by mouth once daily. Do not crush, chew, or split.   clopidogrel 75 mg tablet; Commonly known as: Plavix; Take 1 tablet (75   mg) by mouth once daily.   ezetimibe 10 mg tablet; Commonly known as: Zetia; Take 1 tablet (10 mg)   by mouth once daily.   hydrOXYzine HCL 25 mg tablet; Commonly known as: Atarax; Take 1 tablet   (25 mg) by mouth 2 times a day.   insulin lispro 100 unit/mL injection; Inject 15 Units under the skin 3   times a day before meals. Take as directed per insulin instructions.   lancets misc; Commonly known as: Accu-Chek Softclix Lancets; Test twice   daily   lovastatin 40 mg tablet; Commonly known as: Mevacor; Take 1 tablet (40   mg) by mouth once daily at bedtime.   magnesium hydroxide 400 mg/5 mL suspension; Commonly known as: Milk of   Magnesia; Take 30 mL by mouth once daily as needed for constipation.   nitroglycerin 0.4 mg SL tablet; Commonly known as: Nitrostat; Place 1   tablet (0.4 mg) under the tongue every 5 minutes if needed for chest pain.   DO NOT EXCEED A TOTAL OF 3 DOSES IN 15 MINUTES   venlafaxine  mg 24 hr capsule; Commonly known as: Effexor-XR; Take   1 capsule (150 mg) by mouth once daily. Do not crush or chew.     STOP taking these medications     aspirin 81 mg EC tablet   melatonin 5 mg tablet   omeprazole 40 mg DR capsule; Commonly known as: PriLOSEC; Replaced by:    pantoprazole 40 mg EC tablet   ondansetron 4 mg tablet; Commonly known as: Zofran   polyethylene glycol 17 gram packet; Commonly known as: Glycolax, Miralax       Test Results Pending At Discharge  Pending Labs       No current pending labs.            Hospital Course  Feliciano Worthington is a 74 y.o. male with a significant past medical history of CAD (s/p PCI with RCA stent 2011), Mobitz type 1 AV block, HTN, HLD, IDDM-II, COPD, tobacco use disorder, GERD, depression & anxiety presenting as a transfer from [facility] s/p NSTEMI with chest pain after being admitted in February with similar symptoms.      Mr. Worthington presented to [facility] ED on 2/6 with chest pain. Was found to have an NSTEMI, underwent cardiac catheterization with evidence of triple vessel disease. While undergoing ajit-operative workup for CABG, an incidental pancreatic mass was discovered. He underwent MRCP revelaing 38 mm pancreatic tail mass with 15 mm liver lesion concerning for malignancy. Underwent liver biopsy on 3/13. Biopsy confirmed adenocarcinoma concern for metastasis from pancreatic mass. CABG was deferred and he was discharged with outpatient cardiology follow up.     Presented again to [facility] on 3/20 with left sided chest pain, found to have bilateral subsegmental pulmonary embolisms on CT PE and NSTEMI. He ws transferred to [facility] for urgent CABG evaluation in the setting of active malignancy.     He was evaluated by Oncology while admitted who estimated his survival to be < 6 months without treatment, 8-10 months with chemotherapy. He was scheduled for outpatient follow up with medical oncology in [place]. A referral was also placed to genetic counseling for his new diagnosis of pancreatic cancer.     He was evaluated by Cardiac surgery during his admission - was not a surgical candidate due to his metastatic pancreatic cancer.     In addition, patient was found to have both acute/chromic PE on CT in addition to multiple large,  enlarging lung nodules. He was discharged on Eliquis in addition to his home Plavix. He was instructed to stop taking Aspirin on discharge.    Pertinent Physical Exam At Time of Discharge    General: Patient is awake, alert, conversant. Not acutely distressed.   HEENT: Pupils are equal and round, no scleral icterus or conjunctivitis  Chest: Breathing appears comfortable on room air. Chest movement symmetric. Normal respiratory effort. No adventitious lung sounds on auscultation.   Cardiac: Normal rate, regular rhythm. Normal S1, S2. No murmur appreciated. Radial pulses 2+, pedal pulses 2+.   Volume: Mucous membranes moist. No lower extremity edema.   Abdomen: Bowel sounds are active. Abdomen is soft and non tender to palpation.   EXT: Upper and lower extremities are atraumatic in appearance without tenderness or deformity.   Skin: No lesions or rashes noted to exposed skin  Neuro: The patient is awake, alert and oriented to person, place, and time. Moving in room independently.   Psych: Appropriate mood and affect. Appropriate judgement and insight.     Outpatient Follow-Up  Future Appointments   Date Time Provider Department [place]   4/1/2025  1:00 PM Roberto Haskins MD [id number] [facility]   4/7/2025  1:00 PM Lizbeth Martin MD [id number] [facility]   4/30/2025 11:30 AM Hussain Bernal MD [id number] [facility]         Franchesca Espinoza MD

## 2025-03-22 NOTE — NURSING NOTE
I removed Pt IV and telemonitor. Tried to go over discharge paperwork with patient, he expressed not wanting me to go over them but that he did understand his discharge instructions. Pt choose to  meds from [facility] instead of waiting for meds to bed. Pt belongings were all packed up. Wife arrived at bedside, went down with pt via transport. Will be transported home via vehicle.     Ellyn Bo, RN  [phone] 3/22/25

## 2025-03-22 NOTE — CARE PLAN
The patient's goals for the shift include  free from injury     The clinical goals for the shift include patient will remain free of falls throughout shift    Problem: Fall/Injury  Goal: Not fall by end of shift  Outcome: Progressing  Goal: Be free from injury by end of the shift  Outcome: Progressing  Goal: Verbalize understanding of personal risk factors for fall in the hospital  Outcome: Progressing  Goal: Verbalize understanding of risk factor reduction measures to prevent injury from fall in the home  Outcome: Progressing  Goal: Use assistive devices by end of the shift  Outcome: Progressing  Goal: Pace activities to prevent fatigue by end of the shift  Outcome: Progressing

## 2025-03-24 ENCOUNTER — PATIENT OUTREACH (OUTPATIENT)
Dept: PRIMARY CARE | Facility: CLINIC | Age: 75
End: 2025-03-24
Payer: MEDICARE

## 2025-03-24 ENCOUNTER — TELEPHONE (OUTPATIENT)
Dept: GENETICS | Facility: CLINIC | Age: 75
End: 2025-03-24
Payer: MEDICARE

## 2025-03-24 LAB
ATRIAL RATE: 77 BPM
P AXIS: 54 DEGREES
P OFFSET: 96 MS
P ONSET: 30 MS
PR INTERVAL: 356 MS
Q ONSET: 208 MS
QRS COUNT: 13 BEATS
QRS DURATION: 122 MS
QT INTERVAL: 404 MS
QTC CALCULATION(BAZETT): 457 MS
QTC FREDERICIA: 439 MS
R AXIS: 226 DEGREES
T AXIS: 91 DEGREES
T OFFSET: 410 MS
VENTRICULAR RATE: 77 BPM

## 2025-03-24 NOTE — PROGRESS NOTES
Discharge Facility:  [facility]  Discharge Diagnosis:  CP  Admission Date:  3/20/25  Discharge Date: 3/22/25  PCP Appointment Date:  APR 30 2025 11:30 AM - Primary Care Established  [facility] - Hussain Bernal MD   Message sent for sooner appt    Specialist Appointment Date:   APR 1 2025 01:00 PM - Cardiology Hospital Discharge  [facility] - Roberto Haskins MD     APR 3  2025 10:00 AM - Genetics New Patient Visit  [facility] - JACE Elliott     APR 7 2025 01:00 PM - Hematology and Oncology New Patient  [facility] - Lizbeth Martin MD   Patient calling Dr Wiseman for appt  Hospital Encounter and Summary Linked: Yes  Admission (Discharged) with Terrance Luna MD PhD (03/20/2025)   Discharge Summary by Franchesca Espinoza MD (03/22/2025 12:17)   See discharge assessment below for further details     Wrap Up  Wrap Up Additional Comments: Patient denies CP and states he blood sugars have slighly improved. Reviewed an appt with Dr Haskins will send a message for Dr Bernal's office for sooner appt. Patient has his first oncology appt and genetics testing scheduled. Reviewed new medications and patient has an understanding of indications. REviewed bleeding risk. This CM gives contact information for non urgent matters (3/24/2025 10:53 AM)    Engagement  Call Start Time: 1044 (3/24/2025 10:53 AM)    Medications  Medications reviewed with patient/caregiver?: Yes (3/24/2025 10:53 AM)  Is the patient having any side effects they believe may be caused by any medication additions or changes?: No (3/24/2025 10:53 AM)  Prescription Comments: START taking:  apixaban (Eliquis)   Start taking on: March 22, 2025  pantoprazole (ProtoNix)   Start taking on (3/24/2025 10:53 AM)  Medication Comments: n/a (3/24/2025 10:53 AM)    Appointments  Does the patient have a primary care provider?: Yes (3/24/2025 10:53 AM)  Care Management  Interventions: -- (message will be sent for sooner appt) (3/24/2025 10:53 AM)  Care Management Interventions: Advised patient to keep appointment (Patientis calling Dr Wiseman for follow up) (3/24/2025 10:53 AM)  Follow Up Tasks: Appointments (3/24/2025 10:53 AM)    Self Management  What is the home health agency?: n/a (3/24/2025 10:53 AM)  What Durable Medical Equipment (DME) was ordered?: n/a (3/24/2025 10:53 AM)    Patient Teaching  Does the patient have access to their discharge instructions?: Yes (3/24/2025 10:53 AM)  Care Management Interventions: Reviewed instructions with patient (3/24/2025 10:53 AM)  What is the patient's perception of their health status since discharge?: Improving (3/24/2025 10:53 AM)  Is the patient/caregiver able to teach back the hierarchy of who to call/visit for symptoms/problems? PCP, Specialist, Home Health nurse, Urgent Care, ED, 911: Yes (3/24/2025 10:53 AM)

## 2025-03-25 NOTE — PROGRESS NOTES
History of Present Illness:  Feliciano Worthington is a 75 y.o. male with a personal history of cancer.  Feliciano Worthington was referred to the Cancer Genetics Clinic at [facility] by  No ref. provider found. Feliciano Worthington is interested in genetic testing to clarify their personal risk for cancer, as well as the risks to their family members.    Cancer Medical History:  Personal history of cancer? Yes   Type: pancreatic   Age at diagnosis: 75  Summary: Recently diagnosed metastatic pancreatic adenocarcinoma    History of other cancers: {YES/NO:85975}    Prior genetic testing? {YES/NO:48146}    Cancer screening history:  Colonoscopy? Yes  9/28/16: 4 polyps  Endoscopy? Yes, most recent 9/28/16    Dermatology? {Yes, most recent or No:09355}   Prostate?  Yes, with most recent 3/29/24 (0.79 ng/mL)  Other cancer screening? ***        Family history:  A 4-generation pedigree was obtained and was significant for the following:     Maternal ancestry is ***. Paternal ancestry is ***. There is {maternal\paternal\no known:52381} Ashkenazi Orthodoxy ancestry. Consanguinity was denied.       Discussion:  Feliciano Worthington is an 75 y.o. old male with a personal history of pancreatic cancer. Based on his personal history of exocrine pancreatic cancer, Feliciano Worthington meets NCCN criteria for testing of pancreatic cancer susceptibility genes, including BRCA1 and BRCA2. At the conclusion of our discussion today, Feliciano Worthington opted to proceed with genetic testing via the *** panel through ***, which analyzes *** genes associated with pancreatic and other types of cancer. Our discussion is summarized below.     We reviewed genes and chromosomes, and we explained the relationship between genes and cancer. We discussed that most cancers are not due to an inherited genetic susceptibility. However, in about 5-10% of cases, there is an inherited genetic mutation that can make a person more susceptible to developing certain types of cancer.  Within families with a genetic predisposition to cancer, we often see certain patterns, such as multiple family members with cancer and cancers occurring in multiple generations. In addition, earlier onset and/or bilateral cancers are suggestive of an inherited predisposition. There also tends to be a clustering of certain types of cancer in these families, such as breast cancer and pancreatic cancer.    Approximately 12% of pancreatic cancer is due to an inherited mutation. We discussed that there are no single-genes that are associated with only pancreatic cancer, but rather pancreatic cancer is an associated cancer with other hereditary cancer predisposition syndromes.     For example, hereditary Breast and Ovarian Cancer (HBOC), which is due to mutations in the BRCA1 and BRCA2 genes, presents with early-onset breast cancer and ovarian cancer and can also be associated with other cancers, such as pancreatic cancer. Approximately 2%-5% of unselected cases of pancreatic adenocarcinoma will have a BRCA1 or BRCA2 mutation. Having a genetic alteration, or a mutation, in one of these genes confers a 60-70% lifetime risk for breast cancer in women, which is significantly elevated compared to the general population risk for breast cancer of about 10-12%. In addition, BRCA1 and BRCA2 mutation carriers have up to a 58% lifetime risk for ovarian cancer, which is elevated over the 1-2% general population risk. Mutation carriers who have already been diagnosed with cancer have an increased risk to develop a second, contralateral breast cancer. Additionally, mutations in BRCA1 and BRCA2 have also been associated with an increased risk for male breast cancer, prostate cancer, and pancreatic cancer.    One main reason that Feliciano Worthington was referred to genetics to undergo genetic testing was to determine if heis a candidate for additional treatment options, such as PARP inhibitors. PARP inhibitors have shown remarkable  efficacy in the clinical management of several BRCA-mutated tumors.     Gene mutations in BRCA1 and BRCA2 are inherited in an autosomal dominant fashion. This means that if an individual has a mutation in either of these genes, their siblings and children have a 50% chance of also having the same gene mutation and a 50% chance of not having the gene mutation.     In addition to BRCA1 and BRCA2, several other genes have been associated with increased pancreatic cancer risk, including ANAYELI, CDKN2A, the Bryson syndrome genes, PALB2, STK11, and TP53. Some genes, like the BRCA1 and BRCA2 genes, are considered highly penetrant cancer predisposition genes, meaning a mutation in one of these genes confers a high risk of certain types of cancer.    Other genes, such as ANAYELI and PALB2, are considered “moderate risk” genes. While these genes have been associated with increased cancer risks compared to the general population, cancer risk may not be increased to the same extent as more highly penetrant genes. For some of the moderate risk genes, there may be limited information regarding the degree to which a mutation in the gene affects risk of different types of cancers. Additionally, for some moderate risk genes, the appropriate management for individuals with a mutation is not always clear. Our knowledge about the cancer risks associated with mutations in these moderate risk genes is always growing, and we will likely be able to provide more comprehensive information in the future.     We reviewed the three results we can get back:  1. Positive- Identified a change in a cancer gene that confers an increased cancer risk. We will discuss potential changes in management for him and his family based on the specific gene mutation found.  2. Negative- Clears him for the cancer predisposition syndrome we assessed, but cannot clear him for all cancer predisposition risks. he would continue to be screened based on his personal and  family history.  3. Variant of Uncertain Significance (VUS)- We discussed should an uncertain result come back that this would be treated like a negative result (i.e. no management recommendation will be made no familial variant testing) as the implications of this finding are currently unknown.    Feliciano Worthington was counseled about hereditary cancer susceptibility including cancer risks, options for increased screening and/or risk reduction, genetic testing, and the implications for other family members. We discussed that, in some cases, genetic testing may impact future treatment options. We discussed performing genetic testing in the context of a multi-gene panel test that looks at the BRCA1 and BRCA2, as well as other genes associated with hereditary cancer. Feliciano Worthington is interested in this approach. Testing was ordered today for Feliciano Worthington today via the ***panel through ***.     Results are typically available within 3-4 weeks, and Feliciano Worthington will return to the Cancer Genetics Clinic to discuss his testing results. At that time, we will make recommendations for both Feliciano Worthington and his family members in terms of cancer screening and/or cancer risk reduction options.       PLAN:  1. Feliciano Worthington elected to undergo genetic testing via a panel test that analyzes *** genes associated with hereditary cancer. Verbal consent for testing was obtained and a plan made to collect a sample. The sample will be sent to [facility] for analysis. Results are typically available in 3-4 weeks.    2. Feliciano Worthington will return to the Cancer Genetics Clinic in 3-4 weeks to discuss his test results.     3. We remain available to Feliciano Worthington or his family members at [phone] if any questions arise regarding information discussed at today's visit.      Adrianna Avila MS, [facility]  Licensed Genetic Counselor  [facility]  [phone]    Total time spent on day of encounter: *** minutes (*** minutes  with patient, *** minutes on pre/post patient care activities, including documentation).

## 2025-03-26 ENCOUNTER — TELEPHONE (OUTPATIENT)
Dept: PRIMARY CARE | Facility: CLINIC | Age: 75
End: 2025-03-26
Payer: MEDICARE

## 2025-03-26 NOTE — TELEPHONE ENCOUNTER
[facility] called the office today in regards to patients insulin glargine (Lantus) 100 unit/mL (3 mL) pen    Order Details  Dose: 32 Units Route: subcutaneous Frequency: Daily  Dispense Quantity: 29 mL Refills: 3   Note to Pharmacy: Please give pens per patient request       Sig: Inject 32 Units under the skin once daily. Take as directed per insulin instructions.    Patient advised [facility] that he is supposed to be taking it twice a day. Please advise

## 2025-03-26 NOTE — TELEPHONE ENCOUNTER
Call to Pt to verify follow-up appt with [facility] Oncologist  Dr. Martin [phone]. Office visit 4-2-2025 Dx: Pancreatic Mass.

## 2025-03-27 LAB
ATRIAL RATE: 91 BPM
P AXIS: 54 DEGREES
Q ONSET: 211 MS
QRS COUNT: 12 BEATS
QRS DURATION: 118 MS
QT INTERVAL: 420 MS
QTC CALCULATION(BAZETT): 456 MS
QTC FREDERICIA: 444 MS
R AXIS: 240 DEGREES
T AXIS: 84 DEGREES
T OFFSET: 421 MS
VENTRICULAR RATE: 71 BPM

## 2025-03-27 NOTE — DOCUMENTATION CLARIFICATION NOTE
"    PATIENT:               JAIME FERNANDEZ  ACCT #:                  [id number]  MRN:                       [id number]  :                       1950  ADMIT DATE:       3/20/2025 10:32 PM  DISCH DATE:        3/22/2025 2:38 PM  RESPONDING PROVIDER #:        [id number]          PROVIDER RESPONSE TEXT:    PE is acute on chronic    CDI QUERY TEXT:    Clarification    Instruction:  Based on your assessment of the patient and the clinical information, please provide the requested documentation by clicking on the appropriate radio button and enter any additional information if prompted.    Question: Please further clarify the acuity of PE    When answering this query, please exercise your independent professional judgment. The fact that a question is being asked, does not imply that any particular answer is desired or expected.    The patient's clinical indicators include:  Clinical Information: 74 y/o transferred form OSH for CAD and newly diagnosed pancreatic malignancy.    Clinical Indicators: 3/21 Cardiology Progress Note revealed \"During the recent admission, he was found to have an incidental pancreatic mass noted on ajit-operative CABG evaluation s/p MRCP revelaing 38 mm pancreatic tail mass with 15 mm liver lesion with c/f metastasis and and got a liver biopsy on 3/13. Biopsy confirming adenocarcinoma c/f metastasis from pancreatic mass. In addition, patient was found to have both acute/chromic PE on CT in addition to multiple large, enlarging lung nodules...-Left lung PEs...CTA PE was done at [place] with evidence of several small PE in the left lung noted...Plan:  - Continue plavix 75 mg daily  - Lovenox injection BID, plan to transition to apixiban with supportive oncology clearance  - Continue amlodipine 5, imdur 30  - Continue telemetry  - Nitroglycerin PRN  - EKG PRN\"    3/22 Discharge Summary revealed \"In addition, patient was found to have both acute/chromic PE on CT in addition to multiple large, enlarging " "lung nodules. He was discharged on Eliquis in addition to his home Plavix. He was instructed to stop taking Aspirin on discharge.\"    Treatment: Heparin drip 3/20-3/21. Lovenox 90 mg subcutaneous on 3/21. Eliquis 10 mg tablet 3/21 and 3/22.    Risk Factors: Metastatic pancreatic adenocarcinoma with met to the liver  Options provided:  -- PE is acute  -- PE is acute on chronic  -- PE is chronic  -- PE is a history of  -- Other - I will add my own diagnosis  -- Refer to Clinical Documentation Reviewer    Query created by: Sean Powell Jr on 3/27/2025 4:03 PM      Electronically signed by:  JULI LANGFORD MD 3/27/2025 5:56 PM          "

## 2025-03-28 NOTE — TELEPHONE ENCOUNTER
Attempted call to patient, line not picked up unable to leave a VM due to line requesting a remote access code. Will attempt call again later.

## 2025-03-28 NOTE — TELEPHONE ENCOUNTER
"Patients wife called the office today in regards to patient taking lantus twice a day. I advise patients wife of  message that states \"He saw Hans in hospital have him see hans in office \". Wife states that she will call  office  "

## 2025-03-31 ENCOUNTER — APPOINTMENT (OUTPATIENT)
Dept: PRIMARY CARE | Facility: CLINIC | Age: 75
End: 2025-03-31
Payer: MEDICARE

## 2025-04-01 ENCOUNTER — APPOINTMENT (OUTPATIENT)
Dept: CARDIOLOGY | Facility: CLINIC | Age: 75
End: 2025-04-01
Payer: MEDICARE

## 2025-04-02 ENCOUNTER — OFFICE VISIT (OUTPATIENT)
Dept: HEMATOLOGY/ONCOLOGY | Facility: CLINIC | Age: 75
End: 2025-04-02
Payer: MEDICARE

## 2025-04-02 VITALS
WEIGHT: 204.6 LBS | HEIGHT: 69 IN | DIASTOLIC BLOOD PRESSURE: 69 MMHG | HEART RATE: 90 BPM | BODY MASS INDEX: 30.3 KG/M2 | SYSTOLIC BLOOD PRESSURE: 117 MMHG | TEMPERATURE: 97.2 F | OXYGEN SATURATION: 98 % | RESPIRATION RATE: 16 BRPM

## 2025-04-02 DIAGNOSIS — K86.89 PANCREATIC MASS (HHS-HCC): ICD-10-CM

## 2025-04-02 PROCEDURE — 3046F HEMOGLOBIN A1C LEVEL >9.0%: CPT | Performed by: INTERNAL MEDICINE

## 2025-04-02 PROCEDURE — 3074F SYST BP LT 130 MM HG: CPT | Performed by: INTERNAL MEDICINE

## 2025-04-02 PROCEDURE — 3078F DIAST BP <80 MM HG: CPT | Performed by: INTERNAL MEDICINE

## 2025-04-02 PROCEDURE — 1111F DSCHRG MED/CURRENT MED MERGE: CPT | Performed by: INTERNAL MEDICINE

## 2025-04-02 PROCEDURE — 1126F AMNT PAIN NOTED NONE PRSNT: CPT | Performed by: INTERNAL MEDICINE

## 2025-04-02 PROCEDURE — 99215 OFFICE O/P EST HI 40 MIN: CPT | Performed by: INTERNAL MEDICINE

## 2025-04-02 PROCEDURE — 3050F LDL-C >= 130 MG/DL: CPT | Performed by: INTERNAL MEDICINE

## 2025-04-02 PROCEDURE — 1159F MED LIST DOCD IN RCRD: CPT | Performed by: INTERNAL MEDICINE

## 2025-04-02 ASSESSMENT — PAIN SCALES - GENERAL: PAINLEVEL_OUTOF10: 0-NO PAIN

## 2025-04-02 ASSESSMENT — ENCOUNTER SYMPTOMS
VOMITING: 0
SLEEP DISTURBANCE: 0
SHORTNESS OF BREATH: 0
FREQUENCY: 0
BLOOD IN STOOL: 0
APPETITE CHANGE: 0
ARTHRALGIAS: 1
NAUSEA: 0
WEAKNESS: 0
PALPITATIONS: 0
BRUISES/BLEEDS EASILY: 0
COUGH: 0
DYSURIA: 0
NUMBNESS: 1
MYALGIAS: 1
HEADACHES: 0
TROUBLE SWALLOWING: 0
UNEXPECTED WEIGHT CHANGE: 1
ADENOPATHY: 0
RHINORRHEA: 0
DIARRHEA: 0
FATIGUE: 1
NERVOUS/ANXIOUS: 0
ABDOMINAL PAIN: 0
LIGHT-HEADEDNESS: 0
CONSTIPATION: 0

## 2025-04-02 ASSESSMENT — PATIENT HEALTH QUESTIONNAIRE - PHQ9
SUM OF ALL RESPONSES TO PHQ9 QUESTIONS 1 AND 2: 0
2. FEELING DOWN, DEPRESSED OR HOPELESS: NOT AT ALL
1. LITTLE INTEREST OR PLEASURE IN DOING THINGS: NOT AT ALL

## 2025-04-02 ASSESSMENT — COLUMBIA-SUICIDE SEVERITY RATING SCALE - C-SSRS
1. IN THE PAST MONTH, HAVE YOU WISHED YOU WERE DEAD OR WISHED YOU COULD GO TO SLEEP AND NOT WAKE UP?: NO
2. HAVE YOU ACTUALLY HAD ANY THOUGHTS OF KILLING YOURSELF?: NO
6. HAVE YOU EVER DONE ANYTHING, STARTED TO DO ANYTHING, OR PREPARED TO DO ANYTHING TO END YOUR LIFE?: NO

## 2025-04-02 NOTE — PROGRESS NOTES
Patient ID:Feliciano Worthington is a 75 y.o. year old male patient with recently diagnosed metastatic pancreatic cancer to the liver.  He also has had recent myocardial infarction and pulmonary emboli.       Referring Physician: Terrance Luna MD PhD  [id number]  [place] [phone]  Primary Care Provider: Hussain Bernal MD    Chief Complaint  Chief Complaint   Patient presents with    Pancreatic mass          History of the Present Illness  He is a 75-year-old white male with recent myocardial infarction.  During that hospital stay he was found to have metastatic pancreatic cancer to his liver.  He also had pulmonary emboli.  He is here today with his wife and and his friend Delia.    Chronological History    3/16/2020.  Low dose CT screening for lung cancer showed multiple bilateral noncalcified pulmonary nodules for which annual screening was recommended.  There was mild upper lung predominant centrilobular emphysema and changes consistent with bronchiolitis.  Small hiatal hernia was seen with concentric long segment esophageal wall thickening as well as severe three-vessel coronary artery calcification with calcification throughout the aorta and aortic valve.    4/26/2021.  Low-dose CT for lung cancer screening showed stable multiple pulmonary nodules and multiple changes consistent with COPD emphysema and coronary artery calcifications.    7/28/2021.  CAT scan of the abdomen and pelvis showed moderate concentric wall thickening of the colon from the cecum to the splenic flexure.  Pancreas is normal.  Liver was also normal.    12/27/2022.  Normal perfusion on Lexiscan with left ventricular ejection fraction 63%.    4/3/2023.  Low-dose CT for lung cancer screening showed stable subcentimeter pulmonary nodules.    10/8/2023 through 10/10/2023.  Emergency rooms visit for persistent cough, shortness of breath without chest pressure.  He was treated with antibiotics and steroids for pneumonia and was  admitted to the hospital..  He was discharged on steroids and antibiotics.    10/17/2023.  Emergency department visit for lower chest discomfort which she described as pleurisy with shortness of breath and productive cough he had nausea but no vomiting.  CTA showed no significant interval change since previous examination.  He had changes of COPD and honeycombing in the bases suggestive of fibrosis.  He also had diffuse circumferential mucosal wall thickening of the esophagus and stable multiple nonspecific subcentimeter lung nodules.  He also had fatty liver low-attenuation lesions in the kidney right lower lobe mucous plugging and diffuse bronchiectasis.  Sugar was 281.  CBC was normal except for an elevation in white count 13.2.  He felt comfortable going home and did not want to be readmitted to the hospital and was discharged.    10/24/2023.  Followed up with primary care which time he complained of right upper extremity pain and swelling.  Venous Doppler was ordered which showed right brachial DVT and right basilic superficial venous thrombosis.  He was started on anticoagulation with Eliquis.    11/16/2023.  Saw [facility] neurosurgery and pain management physician for neck pain and back pain and difficulty walking at that time he is still smoking a pack of cigarettes a day.  Physical therapy was ordered.    3/29/2024.  Followed up with primary care at which time he complained of weight loss and change in voice.    5/3/2024.  New primary care physician.  Weight was down to 204 pounds from around 210      5/28/2024.  Low-dose CT for lung cancer screening showed stable pulmonary nodules and multiple other stable findings.    9/3/2024.  Saw Dr. Keith for unintentional weight loss and nausea.  He says that he was not compliant with his insulin dosage and he had uncontrolled diabetes with a hemoglobin A1c of 11.3.  He continued to smoke.  He has chronic hypoxia.  At that time his weight was 193 pounds.  A  series of tests were ordered.    1/29/2025.  Weight was down to 188 pounds.  A series of tests were ordered including a colonoscopy and a PSA.    2/5/2025.  Emergency department visit for chest pain for 36 hours.  He had taken multiple doses of nitroglycerin without much relief in his symptoms.  His sugar at that time was 438 and his troponin was 299.  He was trending upward.  Chest x-ray showed mild bilateral opacities and atelectasis versus pneumonia.  Findings were consistent with an NSTEMI and he was treated with a heparin drip and admitted to the hospital.  He was transferred to [facility] cardiac intensive care unit.    2/6/2025.  Left heart cath showed 70% stenosis in the distal left main, 80% stenosis in the proximal LAD, 90% stenosis in the proximal circumflex, 75% stenosis in the proximal ramus, 80% stenosis in the mid ramus, 80% stenosis in the mid RCA, 90% stenosis in the distal RCA and a left ventricular ejection fraction 55%.  Cardiac surgery was consulted for possible CABG he was continued on Zetia.  He had type I second-degree AV block so was not started on beta-blocker.    Spouse said that the patient had poor compliance and problems with short-term memory.  She is concerned about his unintentional 50 pound weight loss over the previous year.  CT scan was ordered.    2/6/2025.  Admitted to the hospital at [facility].  CT scan of the chest showed extensive vascular calcification with stable ectasia of the ascending aorta at 3.9 cm.  There is no thoracic lymphadenopathy.  There were areas of fibrosis and scarring in the lungs unchanged with  partially calcified nodules, most of which were subcentimeter and stable.  There was a new nodular opacity in the medial aspect of the right lower lobe which measured 8 mm.  In the upper abdomen there is a 2.2 cm cyst in the midpole left kidney.  There is also nonspecific prominence/thickening of the tail of pancreas measuring 2.5 cm.  This was a noncontrast study.   MRI was recommended.    2/7/2025.  Arterial Dopplers of the carotids showed less than 50% stenosis bilaterally    2/10/2025.  MRCP of the pancreas showed a 3.8 cm pancreatic tail hypoattenuating mass suspicious for adenocarcinoma and a 15 mm hepatic segment 6 hypoattenuating mass suspicious for metastasis without definite lymphadenopathy.    During the hospital he was seen by psychiatry and sertraline was discontinued.  He was continued on venlafaxine and then started on bupropion.    He also has some trouble swallowing and GI was consulted.  Further exploration of coronary artery bypass was delayed because of the new finding of metastatic pancreatic cancer.  Arrangements were made for him to be transferred to [facility].    2/14 through 2/18/2025.  Hospitalized at [facility].  There it was determined that his liver biopsy would be done as an outpatient with oncology referral afterwards.    2/27/2025.  Follow-up with primary care.    3/13/2025.  Liver biopsy done by interventional radiology  A. LIVER BIOPSY RIGHT:   -Moderately differentiated adenocarcinoma with mucin involving liver, see note     Note: Immunohistochemical stains show that the tumor cells are positive for CK7 and focally positive for CK20. They are negative for CDX2 and TTF-1. Based on the morphologic findings, immunophenotype, and clinical history of a newly identified pancreatic mass, the findings are most consistent with metastatic pancreatic adenocarcinoma.    3/20/2025 through 3/22/2025..  Admitted to the [facility] from emergency department visit for chest pain which she described as sharp and left-sided nonradiating lasting for an hour.  It is not exertional and felt different from his previous heart attack CT angiogram showed several small pulmonary emboli in the left lung with features suggesting that they are chronic.  Lung fibrosis was seen and 2 enlarging nodules were seen in the right lower lobe and 1 enlarging nodule in the left lower  lobe.  He also had NSTEMI and was transferred to [facility] for urgent evaluation for possible CABG.  He was evaluated by oncology during the admission.  Estimate his survival to be less than 6 months without treatment in 8 to 10 months with treatment.  Arrangements were made for him to follow-up in [place] and also to have genetic counseling for his new diagnosis of metastatic pancreatic cancer.  He was not felt to be a candidate for cardiac surgery.  He was discharged on Eliquis and Plavix and instructed to stop taking aspirin.    Interval Note  4/2/2025.  He is here with his wife and him and his friend Delia.  We discussed the recent multiple severe problems that he has.  We discussed these problems openly.  He knows that he cannot be cured of his metastatic pancreatic cancer but we could offer him treatment.  We talked about the benefits and side effects of treatment and that this was a very personal important decision for him to make as to whether or not he wanted to be treated with aggressive chemotherapy or wanted palliative care.  He already has plans for a trip to [place] with friends and family.  We have asked him to come back and see us after he returns to the area.  We talked about symptom control versus tumor directed therapy.  Multiple questions were answered to their satisfaction.  We introduced into the NCCN guidelines for patients.  He will come back and see us in 5 weeks after his trip and knows that he can call in the interim should he have any questions or concerns.    Comorbidities  Diabetes  History of obesity  Atherosclerosis  COPD  Depression  Hyperlipidemia  Hypertension  Osteoarthritis with cervical spine stenosis  Achalasia  Astigmatism  Cataracts  Chronic pain  Long-term history of smoking of about a pack and half cigarettes a day for 52 years  GERD  Multiple lung nodules  Sleep apnea  Vitamin D deficiency  Coronary artery disease with history of myocardial infarction status post stenting in  2007 and 2011  Skin cancers  BPH and penile prosthesis implant  Rotator cuff tendinopathy, hip pain, knee pain  Intermittent hyponatremia  Statin intolerance.  History of noncompliance    Monitoring Parameters  History and physical examinations, CAT scans PET scans MRIs    Review of Systems - Oncology   Review of Systems   Constitutional:  Positive for fatigue and unexpected weight change. Negative for appetite change.   HENT:  Negative for dental problem, mouth sores, rhinorrhea and trouble swallowing.    Eyes:  Negative for visual disturbance.   Respiratory:  Negative for cough and shortness of breath.    Cardiovascular:  Negative for palpitations and leg swelling.   Gastrointestinal:  Negative for abdominal pain, blood in stool, constipation, diarrhea, nausea and vomiting.   Endocrine: Negative for polyuria.   Genitourinary:  Negative for dysuria, frequency and urgency.   Musculoskeletal:  Positive for arthralgias and myalgias (shoulder pain).   Skin:  Negative for pallor and rash.   Neurological:  Positive for numbness. Negative for weakness, light-headedness and headaches.   Hematological:  Negative for adenopathy. Does not bruise/bleed easily.   Psychiatric/Behavioral:  Negative for self-injury, sleep disturbance and suicidal ideas. The patient is not nervous/anxious.         Past Medical History  Past Medical History:   Diagnosis Date    Acute myocardial infarction, unspecified 01/24/2018    Heart attack    Acute myocardial infarction, unspecified 01/24/2018    Heart attack    BMI 29.0-29.9,adult 10/13/2023    Chronic obstructive pulmonary disease, unspecified 01/24/2018    COPD, mild    Chronic obstructive pulmonary disease, unspecified 01/24/2018    COPD, mild    Coronary angioplasty status     Post PTCA    Encounter for immunization 11/04/2022    Encounter for immunization    Encounter for screening for malignant neoplasm of colon 08/25/2016    Encounter for colonoscopy due to history of colonic polyp     Encounter for screening for malignant neoplasm of skin 04/14/2022    Skin cancer screening    Nicotine dependence, cigarettes, uncomplicated 09/12/2022    Cigarette smoker    Obesity, unspecified 05/25/2022    Class 1 obesity with body mass index (BMI) of 31.0 to 31.9 in adult    Old myocardial infarction 01/24/2018    History of myocardial infarction    Other acute postprocedural pain     Post-operative pain    Pain in left knee     Left knee pain, unspecified chronicity    Pain in right hip 11/04/2022    Hip pain, bilateral    Personal history of other diseases of the digestive system 01/24/2018    History of gastroesophageal reflux (GERD)    Personal history of other diseases of the musculoskeletal system and connective tissue 08/25/2016    History of low back pain    Personal history of other diseases of the respiratory system     History of sinusitis    Personal history of other endocrine, nutritional and metabolic disease 08/25/2016    History of hyperlipidemia    Personal history of other mental and behavioral disorders 04/14/2022    History of depression    Personal history of other specified conditions 12/08/2016    History of diarrhea    Personal history of other specified conditions     History of prolonged Q-T interval on ECG    Personal history of other specified conditions 06/20/2018    History of nocturia    Separation of muscle (nontraumatic), other site 05/14/2018    Rectus diastasis    Sprain of medial collateral ligament of left knee, initial encounter 08/28/2017    Sprain of medial collateral ligament of left knee, initial encounter    Sprain of medial collateral ligament of left knee, subsequent encounter 10/12/2017    Sprain of medial collateral ligament of left knee, subsequent encounter    Type 2 diabetes mellitus without complications (Multi) 01/24/2018    Diabetes mellitus    Type 2 diabetes mellitus without complications (Multi) 01/24/2018    Diabetes mellitus    Umbilical hernia without  obstruction or gangrene 05/14/2018    Umbilical hernia without obstruction and without gangrene        Surgical History  Past Surgical History:   Procedure Laterality Date    CARDIAC CATHETERIZATION N/A 2/6/2025    Procedure: Left Heart Cath, With LV;  Surgeon: Wil Bronson MD;  Location: [facility];  Service: Cardiovascular;  Laterality: N/A;    COLONOSCOPY  10/06/2016    Colonoscopy    CORONARY ANGIOPLASTY WITH STENT PLACEMENT  08/25/2016    Cath Stent Placement    OTHER SURGICAL HISTORY  05/25/2022    Oral surgery    SKIN CANCER EXCISION  01/17/2025    right forearm, right shoulder    TONSILLECTOMY  08/25/2016    Tonsillectomy       Social History  Social History     Tobacco Use    Smoking status: Former     Average packs/day: 1.5 packs/day for 50.0 years (75.0 ttl pk-yrs)     Types: Cigarettes     Start date: 1968     Passive exposure: Current    Smokeless tobacco: Never   Vaping Use    Vaping status: Never Used   Substance Use Topics    Alcohol use: Not Currently     Comment: [id number]    Drug use: Never   Worked as a     Family History  family history includes Coronary artery disease in his father and mother; Diabetes in his father; acute myocardial infarction in his father.  The patient does not have any children.       Current Medications  Current Outpatient Medications   Medication Instructions    acetaminophen (TYLENOL) 650 mg, oral, Every 8 hours PRN    alcohol swabs (BD Alcohol Swabs) pads, medicated Use twice daily while checking sugar.    alum-mag hydroxide-simeth (Mylanta) 200-200-20 mg/5 mL oral suspension 30 mL, oral, 4 times daily PRN    amLODIPine (NORVASC) 5 mg, oral, Daily    apixaban (Eliquis) 5 mg tablet Take 2 tablets (10 mg) by mouth 2 times a day for 7 days, THEN 1 tablet (5 mg) 2 times a day.    blood glucose control high,low (Accu-Chek Guide L1-L2 Ctrl Sol) solution 1 bottle, miscellaneous, Daily PRN    blood-glucose meter (Accu-Chek Guide Glucose Meter) misc  "Test twice daily    buPROPion XL (WELLBUTRIN XL) 150 mg, oral, Daily, Do not crush, chew, or split.    clopidogrel (PLAVIX) 75 mg, oral, Daily    ezetimibe (ZETIA) 10 mg, oral, Daily    hydrOXYzine HCL (ATARAX) 25 mg, oral, 2 times daily    insulin glargine (LANTUS) 32 Units, subcutaneous, Daily, Take as directed per insulin instructions.    insulin lispro 15 Units, subcutaneous, 3 times daily before meals, Take as directed per insulin instructions.    isosorbide mononitrate ER (IMDUR) 60 mg, oral, Daily, Do not crush or chew.    lancets (Accu-Chek Softclix Lancets) misc Test twice daily    lovastatin (MEVACOR) 40 mg, oral, Nightly    magnesium hydroxide (Milk of Magnesia) 400 mg/5 mL suspension 30 mL, oral, Daily PRN    nitroglycerin (NITROSTAT) 0.4 mg, sublingual, Every 5 min PRN, DO NOT EXCEED A TOTAL OF 3 DOSES IN 15 MINUTES    pantoprazole (PROTONIX) 40 mg, oral, Daily before breakfast, Do not crush, chew, or split.    venlafaxine XR (EFFEXOR-XR) 150 mg, oral, Daily, Do not crush or chew.           OARRS Review  I have personally reviewed the OARRS report for Feliciano Worthington. I have considered the risks of abuse, dependence, addiction and diversion    Vital Signs  /69   Pulse 90   Temp 36.2 °C (97.2 °F) (Skin)   Resp 16   Ht (S) 1.757 m (5' 9.17\")   Wt 92.8 kg (204 lb 9.6 oz)   SpO2 98%   BMI 30.06 kg/m²      Physical Exam  Vitals and nursing note reviewed. Exam conducted with a chaperone present.   Constitutional:       General: He is not in acute distress.     Appearance: He is ill-appearing. He is not toxic-appearing.      Comments: He is a well-developed well-nourished white male who is in no acute distress.  He does not appear to be acutely or chronically ill.  He has many areas of sun damaged skin.  He is accompanied by his wife and his friend Delia.   HENT:      Head: Normocephalic and atraumatic.      Nose: Nose normal.      Mouth/Throat:      Mouth: Mucous membranes are moist.      " Pharynx: Oropharynx is clear. No oropharyngeal exudate.   Eyes:      General: No scleral icterus.     Extraocular Movements: Extraocular movements intact.      Conjunctiva/sclera: Conjunctivae normal.      Pupils: Pupils are equal, round, and reactive to light.   Neck:      Comments: There is no significant adenopathy about the head and neck region, supraclavicular axillary or inguinal regions bilaterally.  Cardiovascular:      Rate and Rhythm: Normal rate and regular rhythm.      Heart sounds: No murmur heard.  Pulmonary:      Effort: Pulmonary effort is normal. No respiratory distress.      Breath sounds: No wheezing, rhonchi or rales.   Abdominal:      General: There is no distension.      Palpations: Abdomen is soft. There is no mass.      Tenderness: There is abdominal tenderness (mild tenderness on right side). There is no guarding or rebound.   Musculoskeletal:         General: Normal range of motion.      Cervical back: Normal range of motion. No tenderness.      Right lower leg: No edema.      Left lower leg: No edema.   Lymphadenopathy:      Cervical: No cervical adenopathy.   Skin:     General: Skin is warm and dry.      Coloration: Skin is not jaundiced or pale.      Findings: No lesion (small scar on right forearm).      Comments: Multiple areas of sun damaged skin and a healed lesion on his right forearm from which she said a skin cancer had been resected.  There is still a bump there.     Neurological:      Mental Status: He is alert and oriented to person, place, and time. Mental status is at baseline.      Sensory: Sensory deficit present.      Motor: No weakness.      Gait: Gait normal.      Comments: Somewhat hard of hearing   Psychiatric:         Mood and Affect: Mood normal.         Behavior: Behavior normal.         Thought Content: Thought content normal.         Judgment: Judgment normal.      Comments: Appropriately saddened          Current Laboratory Data  Lab work was done in the hospital  which showed persistently elevated glucoses.  He had normal liver function tests.  His CA 19-9 was less than 4.  CBC was normal.      Recent Imaging  See narrative for recent imaging.    Cardiology, Vascular, and Other Imaging  No other imaging results found for the past 7 days        Pathology  Metastatic adenocarcinoma of the pancreas to the liver and possibly to the lung.      Performance Status:  Symptomatic; fully ambulatory    Assessment/Plan    1.  Metastatic adenocarcinoma of the pancreas to liver and possibly to lung.  The patient is to decide whether or not he wants primary tumor directed therapy or palliative care.  He will return to the office after his trip to [place] to discuss next steps.    2.  Multiple comorbidities.  These include but are not limited to:   Diabetes  History of obesity  Atherosclerosis  COPD  Depression  Hyperlipidemia  Hypertension  Osteoarthritis with cervical spine stenosis  Achalasia  Astigmatism  Cataracts  Chronic pain  Long-term history of smoking of about a pack and half cigarettes a day for 52 years  GERD  Multiple lung nodules  Sleep apnea  Vitamin D deficiency  Coronary artery disease with history of myocardial infarction status post stenting in 2007 and 2011  Skin cancers  BPH and penile prosthesis implant  Rotator cuff tendinopathy, hip pain, knee pain  Intermittent hyponatremia  Statin intolerance.  History of noncompliance    They know to call in the interim should he have any change in status, questions or concerns.      Lizbeth Martin MD

## 2025-04-02 NOTE — PATIENT INSTRUCTIONS
Reviewed labs and recent medical history.  Discussed his new diagnosis and whether he would like to take chemotherapy or consider palliative medicine.  Reviewed the NCCN Guidelines (National Comprehensive Cancer Network) and that these are the guidelines that we follow to treat our patients.  Reviewed that there are several drug combinations that are listed as preferred in his stage of the disease process, including Abraxane and Gemcitabine. Drug information provided.  He will consider whether he wants treatment and he will let us know. If he decides to go forward it will be at the end of the month after he returns from his trip to [place].  Return to see MD in 5 weeks. Can call Dr. Martin with any questions [phone] (Personal Cell phone number)

## 2025-04-02 NOTE — PROGRESS NOTES
Pt scored a 2/10 on his distress survey- has worries about his new dx  Pt is going to [place]- set up for rtc 5/7 1030- He will call us to change if needed because of his trip  Drug information given to pt on Gemzar and abraxane  Reviewed AVS with patient- patient verbalizes understanding

## 2025-04-03 ENCOUNTER — APPOINTMENT (OUTPATIENT)
Facility: CLINIC | Age: 75
End: 2025-04-03
Payer: MEDICARE

## 2025-04-07 ENCOUNTER — PATIENT OUTREACH (OUTPATIENT)
Dept: PRIMARY CARE | Facility: CLINIC | Age: 75
End: 2025-04-07
Payer: MEDICARE

## 2025-04-07 ENCOUNTER — APPOINTMENT (OUTPATIENT)
Dept: HEMATOLOGY/ONCOLOGY | Facility: CLINIC | Age: 75
End: 2025-04-07
Payer: MEDICARE

## 2025-04-15 ENCOUNTER — TELEPHONE (OUTPATIENT)
Dept: PRIMARY CARE | Facility: CLINIC | Age: 75
End: 2025-04-15
Payer: MEDICARE

## 2025-04-15 DIAGNOSIS — I10 PRIMARY HYPERTENSION: ICD-10-CM

## 2025-04-15 DIAGNOSIS — I25.10 CORONARY ARTERY DISEASE WITHOUT ANGINA PECTORIS, UNSPECIFIED VESSEL OR LESION TYPE, UNSPECIFIED WHETHER NATIVE OR TRANSPLANTED HEART: ICD-10-CM

## 2025-04-15 DIAGNOSIS — R07.9 CHEST PAIN, UNSPECIFIED TYPE: ICD-10-CM

## 2025-04-15 RX ORDER — AMLODIPINE BESYLATE 5 MG/1
5 TABLET ORAL DAILY
Qty: 90 TABLET | Refills: 1 | Status: SHIPPED | OUTPATIENT
Start: 2025-04-15

## 2025-04-15 RX ORDER — ISOSORBIDE MONONITRATE 60 MG/1
60 TABLET, EXTENDED RELEASE ORAL DAILY
Qty: 90 TABLET | Refills: 1 | Status: SHIPPED | OUTPATIENT
Start: 2025-04-15 | End: 2025-05-15

## 2025-04-15 NOTE — TELEPHONE ENCOUNTER
Patients wife called the office today requesting refills for medications that were rx in the hospital. Wife is requesting to have these refilled as they are leaving out of town next week. Okay to refill? Please advise    apixaban (Eliquis) 5 mg tablet :  Take 2 tablets (10 mg) by mouth 2 times a day for 7 days, THEN 1 tablet (5 mg) 2 times a day     amLODIPine (Norvasc) 5 mg tablet :  Take 1 tablet (5 mg) by mouth once daily.     isosorbide mononitrate ER (Imdur) 60 mg 24 hr tablet : Take 1 tablet (60 mg) by mouth once daily. Do not crush or chew     If approved they will like rxs to be sent to local pharmacy    Last OV: 02/27/2025    Pending OV: 04/30/2025

## 2025-04-28 DIAGNOSIS — I25.10 CORONARY ARTERY DISEASE, UNSPECIFIED VESSEL OR LESION TYPE, UNSPECIFIED WHETHER ANGINA PRESENT, UNSPECIFIED WHETHER NATIVE OR TRANSPLANTED HEART: ICD-10-CM

## 2025-04-29 RX ORDER — NITROGLYCERIN 0.4 MG/1
0.4 TABLET SUBLINGUAL EVERY 5 MIN PRN
Qty: 25 TABLET | Refills: 0 | Status: SHIPPED | OUTPATIENT
Start: 2025-04-29 | End: 2025-04-30

## 2025-04-30 ENCOUNTER — APPOINTMENT (OUTPATIENT)
Dept: PRIMARY CARE | Facility: CLINIC | Age: 75
End: 2025-04-30
Payer: MEDICARE

## 2025-04-30 VITALS
WEIGHT: 207.2 LBS | HEIGHT: 69 IN | BODY MASS INDEX: 30.69 KG/M2 | HEART RATE: 88 BPM | OXYGEN SATURATION: 98 % | DIASTOLIC BLOOD PRESSURE: 68 MMHG | TEMPERATURE: 99 F | SYSTOLIC BLOOD PRESSURE: 118 MMHG

## 2025-04-30 DIAGNOSIS — J44.9 CHRONIC OBSTRUCTIVE PULMONARY DISEASE, UNSPECIFIED COPD TYPE (MULTI): ICD-10-CM

## 2025-04-30 DIAGNOSIS — K86.89 PANCREATIC MASS (HHS-HCC): ICD-10-CM

## 2025-04-30 DIAGNOSIS — I25.10 CORONARY ARTERY DISEASE, UNSPECIFIED VESSEL OR LESION TYPE, UNSPECIFIED WHETHER ANGINA PRESENT, UNSPECIFIED WHETHER NATIVE OR TRANSPLANTED HEART: ICD-10-CM

## 2025-04-30 DIAGNOSIS — Z01.818 ENCOUNTER FOR OTHER PREPROCEDURAL EXAMINATION: ICD-10-CM

## 2025-04-30 DIAGNOSIS — I10 PRIMARY HYPERTENSION: ICD-10-CM

## 2025-04-30 DIAGNOSIS — Z79.4 TYPE 2 DIABETES MELLITUS WITH HYPERGLYCEMIA, WITH LONG-TERM CURRENT USE OF INSULIN: ICD-10-CM

## 2025-04-30 DIAGNOSIS — I21.4 NSTEMI (NON-ST ELEVATED MYOCARDIAL INFARCTION) (MULTI): ICD-10-CM

## 2025-04-30 DIAGNOSIS — F41.8 DEPRESSION WITH ANXIETY: ICD-10-CM

## 2025-04-30 DIAGNOSIS — C25.9 MALIGNANT NEOPLASM OF PANCREAS, UNSPECIFIED LOCATION OF MALIGNANCY (MULTI): ICD-10-CM

## 2025-04-30 DIAGNOSIS — F32.5 DEPRESSION, MAJOR, IN REMISSION (CMS-HCC): ICD-10-CM

## 2025-04-30 DIAGNOSIS — K21.9 GASTROESOPHAGEAL REFLUX DISEASE WITHOUT ESOPHAGITIS: ICD-10-CM

## 2025-04-30 DIAGNOSIS — E11.65 TYPE 2 DIABETES MELLITUS WITH HYPERGLYCEMIA, WITH LONG-TERM CURRENT USE OF INSULIN: ICD-10-CM

## 2025-04-30 DIAGNOSIS — I25.10 CORONARY ARTERY DISEASE WITHOUT ANGINA PECTORIS, UNSPECIFIED VESSEL OR LESION TYPE, UNSPECIFIED WHETHER NATIVE OR TRANSPLANTED HEART: ICD-10-CM

## 2025-04-30 DIAGNOSIS — B35.4 TINEA CORPORIS: ICD-10-CM

## 2025-04-30 DIAGNOSIS — Z09 HOSPITAL DISCHARGE FOLLOW-UP: Primary | ICD-10-CM

## 2025-04-30 DIAGNOSIS — I26.99 OTHER PULMONARY EMBOLISM WITHOUT ACUTE COR PULMONALE, UNSPECIFIED CHRONICITY (MULTI): ICD-10-CM

## 2025-04-30 PROCEDURE — 3074F SYST BP LT 130 MM HG: CPT | Performed by: FAMILY MEDICINE

## 2025-04-30 PROCEDURE — 3078F DIAST BP <80 MM HG: CPT | Performed by: FAMILY MEDICINE

## 2025-04-30 PROCEDURE — 3046F HEMOGLOBIN A1C LEVEL >9.0%: CPT | Performed by: FAMILY MEDICINE

## 2025-04-30 PROCEDURE — 1159F MED LIST DOCD IN RCRD: CPT | Performed by: FAMILY MEDICINE

## 2025-04-30 PROCEDURE — 1124F ACP DISCUSS-NO DSCNMKR DOCD: CPT | Performed by: FAMILY MEDICINE

## 2025-04-30 PROCEDURE — 3050F LDL-C >= 130 MG/DL: CPT | Performed by: FAMILY MEDICINE

## 2025-04-30 PROCEDURE — 99214 OFFICE O/P EST MOD 30 MIN: CPT | Performed by: FAMILY MEDICINE

## 2025-04-30 RX ORDER — NITROGLYCERIN 0.4 MG/1
0.4 TABLET SUBLINGUAL EVERY 5 MIN PRN
Qty: 25 TABLET | Refills: 0 | Status: SHIPPED | OUTPATIENT
Start: 2025-04-30

## 2025-04-30 RX ORDER — CLOPIDOGREL BISULFATE 75 MG/1
75 TABLET ORAL DAILY
Qty: 90 TABLET | Refills: 1 | Status: SHIPPED | OUTPATIENT
Start: 2025-04-30 | End: 2025-10-27

## 2025-04-30 RX ORDER — PEN NEEDLE, DIABETIC 31 GX5/16"
NEEDLE, DISPOSABLE MISCELLANEOUS
COMMUNITY
Start: 2025-02-18

## 2025-04-30 RX ORDER — PEN NEEDLE, DIABETIC 32GX 5/32"
NEEDLE, DISPOSABLE MISCELLANEOUS
COMMUNITY
Start: 2025-04-15

## 2025-04-30 RX ORDER — CLOTRIMAZOLE AND BETAMETHASONE DIPROPIONATE 10; .64 MG/G; MG/G
1 CREAM TOPICAL 2 TIMES DAILY
Qty: 15 G | Refills: 0 | Status: SHIPPED | OUTPATIENT
Start: 2025-04-30 | End: 2025-06-29

## 2025-04-30 RX ORDER — INSULIN LISPRO 100 [IU]/ML
INJECTION, SOLUTION INTRAVENOUS; SUBCUTANEOUS
COMMUNITY
Start: 2025-04-15

## 2025-04-30 RX ORDER — VENLAFAXINE HYDROCHLORIDE 75 MG/1
225 CAPSULE, EXTENDED RELEASE ORAL DAILY
Qty: 90 CAPSULE | Refills: 3 | Status: SHIPPED | OUTPATIENT
Start: 2025-04-30

## 2025-04-30 RX ORDER — PANTOPRAZOLE SODIUM 40 MG/1
40 TABLET, DELAYED RELEASE ORAL
Qty: 90 TABLET | Refills: 1 | Status: SHIPPED | OUTPATIENT
Start: 2025-04-30 | End: 2025-06-29

## 2025-04-30 RX ORDER — BUPROPION HYDROCHLORIDE 150 MG/1
150 TABLET ORAL 2 TIMES DAILY
Qty: 180 TABLET | Refills: 1 | Status: SHIPPED | OUTPATIENT
Start: 2025-04-30

## 2025-04-30 ASSESSMENT — PATIENT HEALTH QUESTIONNAIRE - PHQ9
2. FEELING DOWN, DEPRESSED OR HOPELESS: SEVERAL DAYS
1. LITTLE INTEREST OR PLEASURE IN DOING THINGS: SEVERAL DAYS
10. IF YOU CHECKED OFF ANY PROBLEMS, HOW DIFFICULT HAVE THESE PROBLEMS MADE IT FOR YOU TO DO YOUR WORK, TAKE CARE OF THINGS AT HOME, OR GET ALONG WITH OTHER PEOPLE: SOMEWHAT DIFFICULT
SUM OF ALL RESPONSES TO PHQ9 QUESTIONS 1 AND 2: 2

## 2025-04-30 NOTE — PROGRESS NOTES
"Subjective   Chief complaint: Feliciano Worthington is a 75 y.o. male who presents for Anxiety (Requesting medications for anxiety), Follow-up (Review- Eliquis/ Plavix. Patient wife states hospital canceled plavix so he has been taking Eliquis 5mg BID. Should he take both or be switched to Plavix), and Leg Injury (Right outer shin circular discoloration. Left lower inner open non healing sore).    HPI:  [facility]  Hospital follow-up.  Patient is had 2 different hospitalizations.  First was a presentation with chest pain found to have non-STEMI during the cardiac workup he was found to have a pancreatic mass.  He was referred downtown.  Underwent biopsy.  Pancreatic cancer.  Referred to hematology oncology.  Presented back to [place] with complaint of chest pain.  Non-STEMI.  Surgical not a surgical candidate due to the presence of pancreatic cancer not a surgical candidate for bypass grafting.  Also found to have pulmonary embolism.  Initiated on anticoagulation.  Discharged home.  He is on antiplatelet therapy as well as Eliquis.  Tolerating.  No complications.  No chest pain.  He is going to see his oncologist.  He does complain of worsening anxiety relative to this recent diagnosis.  I am going to increase his Wellbutrin.  Increase his Effexor.  Refer for cognitive behavioral therapy.  He continue hydroxyzine as needed.  His diabetes is uncontrolled.  He follows with endocrinology.  He adjusted his insulin therapy.  Daily blood sugar readings are improving.  He has not had any further episodes of chest pain.  Blood pressure is controlled.  Refills are provided.  He also complains of a rash.  Lower leg.  Of his right.  Circular.'s mild central clearing.  It is raised.  Will do a topical steroid antifungal.  Follow-up with short-term  Objective   /68 (BP Location: Right arm, Patient Position: Sitting)   Pulse 88   Temp 37.2 °C (99 °F) (Temporal)   Ht 1.753 m (5' 9\")   Wt 94 kg (207 lb 3.2 oz)   SpO2 98% Comment: RA  " BMI 30.60 kg/m²   Physical Exam  Review of Systems   I have reviewed and reconciled the medication list with the patient today. Current Medications[1]     Imaging:  Imaging  No results found.    Cardiology, Vascular, and Other Imaging  No other imaging results found for the past 7 days       Labs reviewed:    Lab Results   Component Value Date    WBC 6.5 03/22/2025    HGB 13.8 03/22/2025    HCT 41.2 03/22/2025     (L) 03/22/2025    CHOL 258 (H) 02/05/2025    TRIG 309 (H) 02/05/2025    HDL 24.7 02/05/2025    ALT 14 03/21/2025    AST 15 03/21/2025     03/21/2025    K 4.0 03/21/2025     03/21/2025    CREATININE 1.02 03/21/2025    BUN 23 03/21/2025    CO2 26 03/21/2025    TSH 2.23 02/07/2025    INR 1.1 03/20/2025    HGBA1C 14.7 (H) 02/06/2025       Assessment/Plan   Problem List Items Addressed This Visit           ICD-10-CM    Primary hypertension I10    Chronic obstructive pulmonary disease, unspecified J44.9    GERD (gastroesophageal reflux disease) K21.9    Relevant Medications    pantoprazole (ProtoNix) 40 mg EC tablet    Depression, major, 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    Relevant Medications    venlafaxine XR (Effexor-XR) 75 mg 24 hr capsule    CAD (coronary artery disease) I25.10    Relevant Medications    clopidogrel (Plavix) 75 mg tablet    Type 2 diabetes mellitus with hyperglycemia, with long-term current use of insulin E11.65, Z79.4    NSTEMI (non-ST elevated myocardial infarction) (Multi) I21.4    Relevant Medications    clopidogrel (Plavix) 75 mg tablet    Pancreatic mass ([facility]) K86.89     Other Visit Diagnoses         Codes      Hospital discharge follow-up    -  Primary Z09      Malignant neoplasm of pancreas, unspecified location of malignancy (Multi)     C25.9      Other pulmonary embolism without acute cor pulmonale, unspecified chronicity (Multi)     I26.99      Depression with anxiety     F41.8    Relevant Medications    venlafaxine XR (Effexor-XR) 75 mg 24 hr capsule    Other  "Relevant Orders    Referral to Psychology      Encounter for other preprocedural examination     Z01.818    Relevant Medications    buPROPion XL (Wellbutrin XL) 150 mg 24 hr tablet      Tinea corporis     B35.4    Relevant Medications    clotrimazole-betamethasone (Lotrisone) cream            Reinforced lifestyle modifications  Continue current medications as listed  Physical activity as tolerated and healthy diet encouraged  Maintain a healthy weight  Follow up in 4-6 weeks            [1]   Current Outpatient Medications:     acetaminophen (Tylenol) 325 mg tablet, Take 2 tablets (650 mg) by mouth every 8 hours if needed for mild pain (1 - 3)., Disp: 30 tablet, Rfl: 0    alcohol swabs (BD Alcohol Swabs) pads, medicated, Use twice daily while checking sugar., Disp: 200 each, Rfl: 1    alum-mag hydroxide-simeth (Mylanta) 200-200-20 mg/5 mL oral suspension, Take 30 mL by mouth 4 times a day as needed for indigestion or heartburn., Disp: 355 mL, Rfl: 0    amLODIPine (Norvasc) 5 mg tablet, Take 1 tablet (5 mg) by mouth once daily., Disp: 90 tablet, Rfl: 1    apixaban (Eliquis) 5 mg tablet, Take 2 tablets (10 mg) by mouth 2 times a day for 7 days, THEN 1 tablet (5 mg) 2 times a day., Disp: 180 tablet, Rfl: 1    BD Jessica 2nd Gen Pen Needle 32 gauge x 5/32\" needle, USE 1 PEN NEEDLE SIX TIMES DAILY, Disp: , Rfl:     BD Ultra-Fine Mini Pen Needle 31 gauge x 3/16\" needle, , Disp: , Rfl:     blood glucose control high,low (Accu-Chek Guide L1-L2 Ctrl Sol) solution, 1 bottle once daily as needed (use when need to un controls on glucose meter)., Disp: 1 each, Rfl: 1    blood-glucose meter (Accu-Chek Guide Glucose Meter) misc, Test twice daily, Disp: 1 each, Rfl: 0    ezetimibe (Zetia) 10 mg tablet, Take 1 tablet (10 mg) by mouth once daily., Disp: 90 tablet, Rfl: 1    HumaLOG KwikPen Insulin 100 unit/mL pen, INJECT 20 UNITS SUBCUTANEOUSLY WITH MEALS AND SLIDING SCALE 2/50 > 150 AS DIRECTED.  UNITS PER DAY, Disp: , Rfl:     " hydrOXYzine HCL (Atarax) 25 mg tablet, Take 1 tablet (25 mg) by mouth 2 times a day., Disp: 180 tablet, Rfl: 1    insulin glargine (Lantus) 100 unit/mL (3 mL) pen, Inject 32 Units under the skin once daily. Take as directed per insulin instructions., Disp: 29 mL, Rfl: 3    isosorbide mononitrate ER (Imdur) 60 mg 24 hr tablet, Take 1 tablet (60 mg) by mouth once daily. Do not crush or chew., Disp: 90 tablet, Rfl: 1    lancets (Accu-Chek Softclix Lancets) misc, Test twice daily, Disp: 200 each, Rfl: 0    lovastatin (Mevacor) 40 mg tablet, Take 1 tablet (40 mg) by mouth once daily at bedtime., Disp: 90 tablet, Rfl: 1    magnesium hydroxide (Milk of Magnesia) 400 mg/5 mL suspension, Take 30 mL by mouth once daily as needed for constipation., Disp: 360 mL, Rfl: 0    nitroglycerin (Nitrostat) 0.4 mg SL tablet, DISSOLVE ONE TABLET UNDER THE TONGUE EVERY 5 MINUTES AS NEEDED FOR CHEST PAIN.  DO NOT EXCEED A TOTAL OF 3 DOSES IN 15 MINUTES, Disp: 25 tablet, Rfl: 0    buPROPion XL (Wellbutrin XL) 150 mg 24 hr tablet, Take 1 tablet (150 mg) by mouth 2 times a day. Do not crush, chew, or split., Disp: 180 tablet, Rfl: 1    clopidogrel (Plavix) 75 mg tablet, Take 1 tablet (75 mg) by mouth once daily., Disp: 90 tablet, Rfl: 1    clotrimazole-betamethasone (Lotrisone) cream, Apply 1 Application topically 2 times a day., Disp: 15 g, Rfl: 0    insulin lispro 100 unit/mL injection, Inject 15 Units under the skin 3 times a day before meals. Take as directed per insulin instructions., Disp: 13.5 mL, Rfl: 1    pantoprazole (ProtoNix) 40 mg EC tablet, Take 1 tablet (40 mg) by mouth once daily in the morning. Take before meals. Do not crush, chew, or split., Disp: 90 tablet, Rfl: 1    venlafaxine XR (Effexor-XR) 75 mg 24 hr capsule, Take 3 capsules (225 mg) by mouth once daily. Do not crush or chew., Disp: 90 capsule, Rfl: 3

## 2025-05-06 ENCOUNTER — PATIENT OUTREACH (OUTPATIENT)
Dept: PRIMARY CARE | Facility: CLINIC | Age: 75
End: 2025-05-06
Payer: MEDICARE

## 2025-05-07 ENCOUNTER — OFFICE VISIT (OUTPATIENT)
Dept: HEMATOLOGY/ONCOLOGY | Facility: CLINIC | Age: 75
End: 2025-05-07
Payer: MEDICARE

## 2025-05-07 VITALS
RESPIRATION RATE: 16 BRPM | TEMPERATURE: 97.5 F | SYSTOLIC BLOOD PRESSURE: 129 MMHG | BODY MASS INDEX: 31.57 KG/M2 | HEART RATE: 84 BPM | WEIGHT: 213.8 LBS | DIASTOLIC BLOOD PRESSURE: 79 MMHG | OXYGEN SATURATION: 98 %

## 2025-05-07 DIAGNOSIS — K86.89 PANCREATIC MASS (HHS-HCC): ICD-10-CM

## 2025-05-07 DIAGNOSIS — C78.7 PANCREATIC CANCER METASTASIZED TO LIVER (MULTI): Primary | ICD-10-CM

## 2025-05-07 DIAGNOSIS — C25.9 PANCREATIC CANCER METASTASIZED TO LIVER (MULTI): Primary | ICD-10-CM

## 2025-05-07 LAB
ALBUMIN SERPL BCP-MCNC: 4 G/DL (ref 3.4–5)
ALP SERPL-CCNC: 97 U/L (ref 33–136)
ALT SERPL W P-5'-P-CCNC: 13 U/L (ref 10–52)
ANION GAP SERPL CALC-SCNC: 17 MMOL/L (ref 10–20)
AST SERPL W P-5'-P-CCNC: 12 U/L (ref 9–39)
BASOPHILS # BLD AUTO: 0.06 X10*3/UL (ref 0–0.1)
BASOPHILS NFR BLD AUTO: 0.8 %
BILIRUB SERPL-MCNC: 0.4 MG/DL (ref 0–1.2)
BUN SERPL-MCNC: 17 MG/DL (ref 6–23)
CALCIUM SERPL-MCNC: 9 MG/DL (ref 8.6–10.3)
CHLORIDE SERPL-SCNC: 99 MMOL/L (ref 98–107)
CO2 SERPL-SCNC: 22 MMOL/L (ref 21–32)
CREAT SERPL-MCNC: 0.89 MG/DL (ref 0.5–1.3)
EGFRCR SERPLBLD CKD-EPI 2021: 89 ML/MIN/1.73M*2
EOSINOPHIL # BLD AUTO: 0.1 X10*3/UL (ref 0–0.4)
EOSINOPHIL NFR BLD AUTO: 1.3 %
ERYTHROCYTE [DISTWIDTH] IN BLOOD BY AUTOMATED COUNT: 13.2 % (ref 11.5–14.5)
GLUCOSE SERPL-MCNC: 208 MG/DL (ref 74–99)
HCT VFR BLD AUTO: 39.2 % (ref 41–52)
HGB BLD-MCNC: 13.2 G/DL (ref 13.5–17.5)
IMM GRANULOCYTES # BLD AUTO: 0.04 X10*3/UL (ref 0–0.5)
IMM GRANULOCYTES NFR BLD AUTO: 0.5 % (ref 0–0.9)
LYMPHOCYTES # BLD AUTO: 1.94 X10*3/UL (ref 0.8–3)
LYMPHOCYTES NFR BLD AUTO: 25.6 %
MCH RBC QN AUTO: 28.6 PG (ref 26–34)
MCHC RBC AUTO-ENTMCNC: 33.7 G/DL (ref 32–36)
MCV RBC AUTO: 85 FL (ref 80–100)
MONOCYTES # BLD AUTO: 0.79 X10*3/UL (ref 0.05–0.8)
MONOCYTES NFR BLD AUTO: 10.4 %
NEUTROPHILS # BLD AUTO: 4.65 X10*3/UL (ref 1.6–5.5)
NEUTROPHILS NFR BLD AUTO: 61.4 %
NRBC BLD-RTO: 0 /100 WBCS (ref 0–0)
PLATELET # BLD AUTO: 178 X10*3/UL (ref 150–450)
POTASSIUM SERPL-SCNC: 4 MMOL/L (ref 3.5–5.3)
PROT SERPL-MCNC: 6.5 G/DL (ref 6.4–8.2)
RBC # BLD AUTO: 4.61 X10*6/UL (ref 4.5–5.9)
SODIUM SERPL-SCNC: 134 MMOL/L (ref 136–145)
WBC # BLD AUTO: 7.6 X10*3/UL (ref 4.4–11.3)

## 2025-05-07 PROCEDURE — 99215 OFFICE O/P EST HI 40 MIN: CPT | Performed by: INTERNAL MEDICINE

## 2025-05-07 PROCEDURE — 3078F DIAST BP <80 MM HG: CPT | Performed by: INTERNAL MEDICINE

## 2025-05-07 PROCEDURE — 3050F LDL-C >= 130 MG/DL: CPT | Performed by: INTERNAL MEDICINE

## 2025-05-07 PROCEDURE — 1159F MED LIST DOCD IN RCRD: CPT | Performed by: INTERNAL MEDICINE

## 2025-05-07 PROCEDURE — 1126F AMNT PAIN NOTED NONE PRSNT: CPT | Performed by: INTERNAL MEDICINE

## 2025-05-07 PROCEDURE — 3074F SYST BP LT 130 MM HG: CPT | Performed by: INTERNAL MEDICINE

## 2025-05-07 PROCEDURE — 3046F HEMOGLOBIN A1C LEVEL >9.0%: CPT | Performed by: INTERNAL MEDICINE

## 2025-05-07 PROCEDURE — 80053 COMPREHEN METABOLIC PANEL: CPT | Performed by: INTERNAL MEDICINE

## 2025-05-07 PROCEDURE — 36415 COLL VENOUS BLD VENIPUNCTURE: CPT

## 2025-05-07 PROCEDURE — 85025 COMPLETE CBC W/AUTO DIFF WBC: CPT | Performed by: INTERNAL MEDICINE

## 2025-05-07 ASSESSMENT — ENCOUNTER SYMPTOMS
TREMORS: 1
HEADACHES: 0
CONSTIPATION: 0
FREQUENCY: 0
TROUBLE SWALLOWING: 0
BACK PAIN: 1
BLOOD IN STOOL: 0
MYALGIAS: 1
ABDOMINAL PAIN: 0
ARTHRALGIAS: 1
PALPITATIONS: 0
NERVOUS/ANXIOUS: 0
SHORTNESS OF BREATH: 1
DYSURIA: 0
NUMBNESS: 1
DIARRHEA: 0
SLEEP DISTURBANCE: 0
FATIGUE: 1
VOMITING: 0
RHINORRHEA: 0
UNEXPECTED WEIGHT CHANGE: 1
ADENOPATHY: 0
WEAKNESS: 0
COUGH: 0
LIGHT-HEADEDNESS: 1
APPETITE CHANGE: 0
BRUISES/BLEEDS EASILY: 0
NAUSEA: 0

## 2025-05-07 ASSESSMENT — PAIN SCALES - GENERAL: PAINLEVEL_OUTOF10: 0-NO PAIN

## 2025-05-07 NOTE — PROGRESS NOTES
Labs drawn at clinic visit along with [facility] blood drawn and also sent for pathology  Ct scans 5/16 230 arrival- prep given- sent to [facility]  MD visit 5/21 1030  Reviewed AVS with patient- patient verbalizes understanding

## 2025-05-07 NOTE — PROGRESS NOTES
Patient ID:Feliciano Worthington is a 75 y.o. year old male patient with recently diagnosed metastatic pancreatic cancer to the liver.  He also has had recent myocardial infarction and pulmonary emboli.       Referring Physician: Lizbeth Martin MD  [address] [place] [address] [address]  [place] [address]  Primary Care Provider: Hussain Bernal MD    Chief Complaint  Chief Complaint   Patient presents with    Pancreatic mass          History of the Present Illness  He is a 75-year-old white male with recent myocardial infarction.  During that hospital stay he was found to have metastatic pancreatic cancer to his liver.  He also had pulmonary emboli.  He is here today with his wife and and his friend Delia.    Chronological History    3/16/2020.  Low dose CT screening for lung cancer showed multiple bilateral noncalcified pulmonary nodules for which annual screening was recommended.  There was mild upper lung predominant centrilobular emphysema and changes consistent with bronchiolitis.  Small hiatal hernia was seen with concentric long segment esophageal wall thickening as well as severe three-vessel coronary artery calcification with calcification throughout the aorta and aortic valve.    4/26/2021.  Low-dose CT for lung cancer screening showed stable multiple pulmonary nodules and multiple changes consistent with COPD emphysema and coronary artery calcifications.    7/28/2021.  CAT scan of the abdomen and pelvis showed moderate concentric wall thickening of the colon from the cecum to the splenic flexure.  Pancreas is normal.  Liver was also normal.    12/27/2022.  Normal perfusion on Lexiscan with left ventricular ejection fraction 63%.    4/3/2023.  Low-dose CT for lung cancer screening showed stable subcentimeter pulmonary nodules.    10/8/2023 through 10/10/2023.  Emergency rooms visit for persistent cough, shortness of breath without chest pressure.  He was treated with antibiotics and steroids for pneumonia and was  admitted to the hospital..  He was discharged on steroids and antibiotics.    10/17/2023.  Emergency department visit for lower chest discomfort which she described as pleurisy with shortness of breath and productive cough he had nausea but no vomiting.  CTA showed no significant interval change since previous examination.  He had changes of COPD and honeycombing in the bases suggestive of fibrosis.  He also had diffuse circumferential mucosal wall thickening of the esophagus and stable multiple nonspecific subcentimeter lung nodules.  He also had fatty liver low-attenuation lesions in the kidney right lower lobe mucous plugging and diffuse bronchiectasis.  Sugar was 281.  CBC was normal except for an elevation in white count 13.2.  He felt comfortable going home and did not want to be readmitted to the hospital and was discharged.    10/24/2023.  Followed up with primary care which time he complained of right upper extremity pain and swelling.  Venous Doppler was ordered which showed right brachial DVT and right basilic superficial venous thrombosis.  He was started on anticoagulation with Eliquis.    11/16/2023.  Saw [facility] neurosurgery and pain management physician for neck pain and back pain and difficulty walking at that time he is still smoking a pack of cigarettes a day.  Physical therapy was ordered.    3/29/2024.  Followed up with primary care at which time he complained of weight loss and change in voice.    5/3/2024.  New primary care physician.  Weight was down to 204 pounds from around 210      5/28/2024.  Low-dose CT for lung cancer screening showed stable pulmonary nodules and multiple other stable findings.    9/3/2024.  Saw Dr. Keith for unintentional weight loss and nausea.  He says that he was not compliant with his insulin dosage and he had uncontrolled diabetes with a hemoglobin A1c of 11.3.  He continued to smoke.  He has chronic hypoxia.  At that time his weight was 193 pounds.  A  series of tests were ordered.    1/29/2025.  Weight was down to 188 pounds.  A series of tests were ordered including a colonoscopy and a PSA.    2/5/2025.  Emergency department visit for chest pain for 36 hours.  He had taken multiple doses of nitroglycerin without much relief in his symptoms.  His sugar at that time was 438 and his troponin was 299.  He was trending upward.  Chest x-ray showed mild bilateral opacities and atelectasis versus pneumonia.  Findings were consistent with an NSTEMI and he was treated with a heparin drip and admitted to the hospital.  He was transferred to [facility] cardiac intensive care unit.    2/6/2025.  Left heart cath showed 70% stenosis in the distal left main, 80% stenosis in the proximal LAD, 90% stenosis in the proximal circumflex, 75% stenosis in the proximal ramus, 80% stenosis in the mid ramus, 80% stenosis in the mid RCA, 90% stenosis in the distal RCA and a left ventricular ejection fraction 55%.  Cardiac surgery was consulted for possible CABG he was continued on Zetia.  He had type I second-degree AV block so was not started on beta-blocker.    Spouse said that the patient had poor compliance and problems with short-term memory.  She is concerned about his unintentional 50 pound weight loss over the previous year.  CT scan was ordered.    2/6/2025.  Admitted to the hospital at [facility].  CT scan of the chest showed extensive vascular calcification with stable ectasia of the ascending aorta at 3.9 cm.  There is no thoracic lymphadenopathy.  There were areas of fibrosis and scarring in the lungs unchanged with  partially calcified nodules, most of which were subcentimeter and stable.  There was a new nodular opacity in the medial aspect of the right lower lobe which measured 8 mm.  In the upper abdomen there is a 2.2 cm cyst in the midpole left kidney.  There is also nonspecific prominence/thickening of the tail of pancreas measuring 2.5 cm.  This was a noncontrast study.   MRI was recommended.    2/7/2025.  Arterial Dopplers of the carotids showed less than 50% stenosis bilaterally    2/10/2025.  MRCP of the pancreas showed a 3.8 cm pancreatic tail hypoattenuating mass suspicious for adenocarcinoma and a 15 mm hepatic segment 6 hypoattenuating mass suspicious for metastasis without definite lymphadenopathy.    During the hospital he was seen by psychiatry and sertraline was discontinued.  He was continued on venlafaxine and then started on bupropion.    He also has some trouble swallowing and GI was consulted.  Further exploration of coronary artery bypass was delayed because of the new finding of metastatic pancreatic cancer.  Arrangements were made for him to be transferred to [facility].    2/14 through 2/18/2025.  Hospitalized at [facility].  There it was determined that his liver biopsy would be done as an outpatient with oncology referral afterwards.    2/27/2025.  Follow-up with primary care.    3/13/2025.  Liver biopsy done by interventional radiology  A. LIVER BIOPSY RIGHT:   -Moderately differentiated adenocarcinoma with mucin involving liver, see note     Note: Immunohistochemical stains show that the tumor cells are positive for CK7 and focally positive for CK20. They are negative for CDX2 and TTF-1. Based on the morphologic findings, immunophenotype, and clinical history of a newly identified pancreatic mass, the findings are most consistent with metastatic pancreatic adenocarcinoma.    3/20/2025 through 3/22/2025..  Admitted to the [facility] from emergency department visit for chest pain which she described as sharp and left-sided nonradiating lasting for an hour.  It is not exertional and felt different from his previous heart attack CT angiogram showed several small pulmonary emboli in the left lung with features suggesting that they are chronic.  Lung fibrosis was seen and 2 enlarging nodules were seen in the right lower lobe and 1 enlarging nodule in the left lower  lobe.  He also had NSTEMI and was transferred to [facility] for urgent evaluation for possible CABG.  He was evaluated by oncology during the admission.  Estimate his survival to be less than 6 months without treatment in 8 to 10 months with treatment.  Arrangements were made for him to follow-up in [place] and also to have genetic counseling for his new diagnosis of metastatic pancreatic cancer.  He was not felt to be a candidate for cardiac surgery.  He was discharged on Eliquis and Plavix and instructed to stop taking aspirin.    4/2/2025.  He is here with his wife and him and his friend Delia.  We discussed the recent multiple severe problems that he has.  We discussed these problems openly.  He knows that he cannot be cured of his metastatic pancreatic cancer but we could offer him treatment.  We talked about the benefits and side effects of treatment and that this was a very personal important decision for him to make as to whether or not he wanted to be treated with aggressive chemotherapy or wanted palliative care.  He already has plans for a trip to [place] with friends and family.  We have asked him to come back and see us after he returns to the area.  We talked about symptom control versus tumor directed therapy.  Multiple questions were answered to their satisfaction.  We introduced into the NCCN guidelines for patients.  He will come back and see us in 5 weeks after his trip and knows that he can call in the interim should he have any questions or concerns.    Interval Note  4/30/2025.  Saw his primary care physician Dr. Walters for anxiety.  He reviewed his to recent hospital stays and his biopsy which showed pancreatic cancer in his liver complicated by myocardial infarction and pulmonary embolus.  He increased his Wellbutrin to 150 mg twice a day and increased his Effexor to 225 mg daily and referred him for cognitive behavioral therapy.  He will continue hydroxyzine as needed.  His diabetes is  uncontrolled.  He was following with an endocrinologist.  His insulin was adjusted.  His daily glucose  readings were improving.  He had no further bouts of chest pain and he had no bleeding.  He complained of a rash on his lower leg on the right for which he was given a combination steroid/antifungal topical treatment.    5/7/2025.  He is here with his wife and friend.  He said that he had a wonderful time in [place].  His appetite had been good and he gained 9 pounds.  He has multiple complaints.    He said that his balance is off.  He has had no falls.  Friend says that he holds onto furniture, furniture surfing.  He did lose his balance and fell into a chair did not injure himself.  He notes that he is shaking.  We discussed that holding still is a matter of equal and opposed movements which may be compromised as he becomes weaker.  Supervised understanding.  He says that he is getting weaker.  He has been fairly sedentary.  I encouraged him to be physically active is much as possible.  He says occasionally he has trouble swallowing.  He says that he is not excited about having an EGD with dilatation.  He will let us know if it gets worse.  He said that he did have a bout of chest discomfort last week which that he thought might be gas but he did take nitroglycerin and it helped.  He also has had pain in her shoulders and he has had discomfort in his shoulders for a while.    We discussed the diagnosis of metastatic pancreatic cancer and the available treatments.  He is still thinking about whether or not he wants to do anything.  We discussed combination cytotoxic chemotherapy and offered him next generation sequencing to see if we could find a target.  He agreed to this.  We discussed that if we are going to do any treatment we should get new baseline studies.  He says that he has been more short of breath than usual.  He continues to smoke.  We discussed that this cannot be good for his lungs but he is not  interested in quitting.  We have also discussed getting blood work today for routine baseline such as CBC and CMP and he agrees to this.  We will have him come back in 2 weeks to follow-up.  He says that he is leaning away from taking chemotherapy.    Laboratory data today showed a sugar of 208 and a sodium of 134 and was otherwise within normal limits with a total protein of 6.5.  His CBC was normal except for mild decrease in hemoglobin at 13.2 previously 13.8.  Hematocrit is 39.2 previously was 41.2.  Platelets are normal white blood cell differential count is normal.    Comorbidities  Diabetes  History of obesity  Atherosclerosis  COPD  Depression  Hyperlipidemia  Hypertension  Osteoarthritis with cervical spine stenosis  Achalasia  Astigmatism  Cataracts  Chronic pain  Long-term history of smoking of about a pack and half cigarettes a day for 52 years  GERD  Multiple lung nodules  Sleep apnea  Vitamin D deficiency  Coronary artery disease with history of myocardial infarction status post stenting in 2007 and 2011  Skin cancers  BPH and penile prosthesis implant  Rotator cuff tendinopathy, hip pain, knee pain  Intermittent hyponatremia  Statin intolerance.  History of noncompliance    Monitoring Parameters  History and physical examinations, CAT scans PET scans MRIs    Review of Systems - Oncology   Review of Systems   Constitutional:  Positive for fatigue and unexpected weight change. Negative for appetite change.   HENT:  Negative for dental problem, mouth sores, rhinorrhea and trouble swallowing.    Eyes:  Negative for visual disturbance.   Respiratory:  Positive for shortness of breath. Negative for cough.    Cardiovascular:  Positive for chest pain (episode last week and he use nitroglycerin and it helped.). Negative for palpitations and leg swelling.   Gastrointestinal:  Negative for abdominal pain, blood in stool, constipation, diarrhea, nausea and vomiting.   Endocrine: Negative for polyuria.  "  Genitourinary:  Negative for dysuria, frequency and urgency.   Musculoskeletal:  Positive for arthralgias, back pain and myalgias (shoulder pain has increased).   Skin:  Negative for pallor and rash.   Neurological:  Positive for tremors (\"shakes\"), light-headedness (balance problem) and numbness. Negative for weakness and headaches.   Hematological:  Negative for adenopathy. Does not bruise/bleed easily.   Psychiatric/Behavioral:  Negative for self-injury, sleep disturbance and suicidal ideas. The patient is not nervous/anxious.         Past Medical History  Past Medical History:   Diagnosis Date    Acute myocardial infarction, unspecified 01/24/2018    Heart attack    Acute myocardial infarction, unspecified 01/24/2018    Heart attack    BMI 29.0-29.9,adult 10/13/2023    Chronic obstructive pulmonary disease, unspecified 01/24/2018    COPD, mild    Chronic obstructive pulmonary disease, unspecified 01/24/2018    COPD, mild    Coronary angioplasty status     Post PTCA    Encounter for immunization 11/04/2022    Encounter for immunization    Encounter for screening for malignant neoplasm of colon 08/25/2016    Encounter for colonoscopy due to history of colonic polyp    Encounter for screening for malignant neoplasm of skin 04/14/2022    Skin cancer screening    Nicotine dependence, cigarettes, uncomplicated 09/12/2022    Cigarette smoker    Obesity, unspecified 05/25/2022    Class 1 obesity with body mass index (BMI) of 31.0 to 31.9 in adult    Old myocardial infarction 01/24/2018    History of myocardial infarction    Other acute postprocedural pain     Post-operative pain    Pain in left knee     Left knee pain, unspecified chronicity    Pain in right hip 11/04/2022    Hip pain, bilateral    Personal history of other diseases of the digestive system 01/24/2018    History of gastroesophageal reflux (GERD)    Personal history of other diseases of the musculoskeletal system and connective tissue 08/25/2016    " History of low back pain    Personal history of other diseases of the respiratory system     History of sinusitis    Personal history of other endocrine, nutritional and metabolic disease 08/25/2016    History of hyperlipidemia    Personal history of other mental and behavioral disorders 04/14/2022    History of depression    Personal history of other specified conditions 12/08/2016    History of diarrhea    Personal history of other specified conditions     History of prolonged Q-T interval on ECG    Personal history of other specified conditions 06/20/2018    History of nocturia    Separation of muscle (nontraumatic), other site 05/14/2018    Rectus diastasis    Sprain of medial collateral ligament of left knee, initial encounter 08/28/2017    Sprain of medial collateral ligament of left knee, initial encounter    Sprain of medial collateral ligament of left knee, subsequent encounter 10/12/2017    Sprain of medial collateral ligament of left knee, subsequent encounter    Type 2 diabetes mellitus without complications 01/24/2018    Diabetes mellitus    Type 2 diabetes mellitus without complications 01/24/2018    Diabetes mellitus    Umbilical hernia without obstruction or gangrene 05/14/2018    Umbilical hernia without obstruction and without gangrene        Surgical History  Past Surgical History:   Procedure Laterality Date    CARDIAC CATHETERIZATION N/A 2/6/2025    Procedure: Left Heart Cath, With LV;  Surgeon: Wil Bronson MD;  Location: [facility];  Service: Cardiovascular;  Laterality: N/A;    COLONOSCOPY  10/06/2016    Colonoscopy    CORONARY ANGIOPLASTY WITH STENT PLACEMENT  08/25/2016    Cath Stent Placement    OTHER SURGICAL HISTORY  05/25/2022    Oral surgery    SKIN CANCER EXCISION  01/17/2025    right forearm, right shoulder    TONSILLECTOMY  08/25/2016    Tonsillectomy       Social History  Social History     Tobacco Use    Smoking status: Former     Average packs/day: 1.5  "packs/day for 50.0 years (75.0 ttl pk-yrs)     Types: Cigarettes     Start date: 1968     Passive exposure: Current    Smokeless tobacco: Never   Vaping Use    Vaping status: Never Used   Substance Use Topics    Alcohol use: Not Currently     Comment: [id number]    Drug use: Never   Worked as a     Family History  family history includes Coronary artery disease in his father and mother; Diabetes in his father; acute myocardial infarction in his father.  The patient does not have any children.       Current Medications  Current Outpatient Medications   Medication Instructions    acetaminophen (TYLENOL) 650 mg, oral, Every 8 hours PRN    alcohol swabs (BD Alcohol Swabs) pads, medicated Use twice daily while checking sugar.    alum-mag hydroxide-simeth (Mylanta) 200-200-20 mg/5 mL oral suspension 30 mL, oral, 4 times daily PRN    amLODIPine (NORVASC) 5 mg, oral, Daily    apixaban (Eliquis) 5 mg tablet Take 2 tablets (10 mg) by mouth 2 times a day for 7 days, THEN 1 tablet (5 mg) 2 times a day.    BD Jessiac 2nd Gen Pen Needle 32 gauge x 5/32\" needle USE 1 PEN NEEDLE SIX TIMES DAILY    BD Ultra-Fine Mini Pen Needle 31 gauge x 3/16\" needle     blood glucose control high,low (Accu-Chek Guide L1-L2 Ctrl Sol) solution 1 bottle, miscellaneous, Daily PRN    blood-glucose meter (Accu-Chek Guide Glucose Meter) misc Test twice daily    buPROPion XL (WELLBUTRIN XL) 150 mg, oral, 2 times daily, Do not crush, chew, or split.    clopidogrel (PLAVIX) 75 mg, oral, Daily    clotrimazole-betamethasone (Lotrisone) cream 1 Application, Topical, 2 times daily    ezetimibe (ZETIA) 10 mg, oral, Daily    HumaLOG KwikPen Insulin 100 unit/mL pen INJECT 20 UNITS SUBCUTANEOUSLY WITH MEALS AND SLIDING SCALE 2/50 > 150 AS DIRECTED.  UNITS PER DAY    hydrOXYzine HCL (ATARAX) 25 mg, oral, 2 times daily    insulin glargine (LANTUS) 32 Units, subcutaneous, Daily, Take as directed per insulin instructions.    insulin lispro 15 Units, " subcutaneous, 3 times daily before meals, Take as directed per insulin instructions.    isosorbide mononitrate ER (IMDUR) 60 mg, oral, Daily, Do not crush or chew.    lancets (Accu-Chek Softclix Lancets) misc Test twice daily    lovastatin (MEVACOR) 40 mg, oral, Nightly    magnesium hydroxide (Milk of Magnesia) 400 mg/5 mL suspension 30 mL, oral, Daily PRN    nitroglycerin (NITROSTAT) 0.4 mg, sublingual, Every 5 min PRN, DO NOT EXCEED A TOTAL OF 3 DOSES IN 15 MINUTES    pantoprazole (PROTONIX) 40 mg, oral, Daily before breakfast, Do not crush, chew, or split.    venlafaxine XR (EFFEXOR-XR) 225 mg, oral, Daily, Do not crush or chew.           OARRS Review  I have personally reviewed the OARRS report for Feliciano Worthington. I have considered the risks of abuse, dependence, addiction and diversion.  Review of this record shows that he had gabapentin a year ago.    Vital Signs  /79   Pulse 84   Temp 36.4 °C (97.5 °F) (Skin)   Resp 16   Wt 97 kg (213 lb 12.8 oz)   SpO2 98%   BMI 31.57 kg/m²      Physical Exam  Vitals and nursing note reviewed. Exam conducted with a chaperone present.   Constitutional:       General: He is not in acute distress.     Appearance: He is ill-appearing. He is not toxic-appearing.      Comments: He is a well-developed well-nourished white male who is in no acute distress.  He does not appear to be acutely or chronically ill.  He has many areas of sun damaged skin.  He is accompanied by his wife and his friend Delia.  He is depressed in his affect   HENT:      Head: Normocephalic and atraumatic.      Nose: Nose normal.      Mouth/Throat:      Mouth: Mucous membranes are moist.      Pharynx: Oropharynx is clear. No oropharyngeal exudate.      Comments: Multiple carious teeth, none of which per patient are painful  Eyes:      General: No scleral icterus.     Extraocular Movements: Extraocular movements intact.      Conjunctiva/sclera: Conjunctivae normal.      Pupils: Pupils are equal,  round, and reactive to light.   Neck:      Comments: There is no significant adenopathy about the head and neck region, supraclavicular axillary or inguinal regions bilaterally.  Cardiovascular:      Rate and Rhythm: Normal rate and regular rhythm.      Heart sounds: Murmur heard.   Pulmonary:      Effort: Pulmonary effort is normal. No respiratory distress.      Breath sounds: Rales (cleared wiyth cough) present. No wheezing or rhonchi.   Abdominal:      General: There is no distension.      Palpations: Abdomen is soft. There is no mass.      Tenderness: There is abdominal tenderness (mild tenderness on right side). There is no guarding or rebound.   Musculoskeletal:         General: Normal range of motion.      Cervical back: Normal range of motion. No tenderness.      Right lower leg: No edema.      Left lower leg: No edema.   Lymphadenopathy:      Cervical: No cervical adenopathy.   Skin:     General: Skin is warm and dry.      Coloration: Skin is not jaundiced or pale.      Findings: No lesion (small scar on right forearm).      Comments: Multiple areas of sun damaged skin and a healed lesion on his right forearm from which he said a skin cancer had been resected.  There is still a bump there.     Neurological:      Mental Status: He is alert and oriented to person, place, and time. Mental status is at baseline.      Sensory: Sensory deficit present.      Motor: No weakness.      Gait: Gait normal.      Comments: Somewhat hard of hearing   Psychiatric:         Mood and Affect: Mood normal.         Behavior: Behavior normal.         Thought Content: Thought content normal.         Judgment: Judgment normal.      Comments: Appropriately saddened          Current Laboratory Data  See narrative for laboratory data    Recent Imaging  See narrative for recent imaging.    Cardiology, Vascular, and Other Imaging  No other imaging results found for the past 7 days        Pathology  Metastatic adenocarcinoma of the  pancreas to the liver and possibly to the lung.      Performance Status:  Symptomatic; fully ambulatory    Assessment/Plan    1.  Metastatic adenocarcinoma of the pancreas to liver and possibly to lung.  The patient is to decide whether or not he wants primary tumor directed therapy or palliative care.  He will return to the office after his trip to [place] to discuss next steps.    He is back from [place].  We discussed the various options for treatment.  He is not sure what he wants to do.  He is leaning against chemotherapy.  We are going to look for a mutational target with next generation sequencing in blood.    2.  Multiple comorbidities.  These include but are not limited to:   Diabetes  History of obesity  Atherosclerosis  COPD  Depression  Hyperlipidemia  Hypertension  Osteoarthritis with cervical spine stenosis  Achalasia  Astigmatism  Cataracts  Chronic pain  Long-term history of smoking of about a pack and half cigarettes a day for 52 years  GERD  Multiple lung nodules  Sleep apnea  Vitamin D deficiency  Coronary artery disease with history of myocardial infarction status post stenting in 2007 and 2011  Skin cancers  BPH and penile prosthesis implant  Rotator cuff tendinopathy, hip pain, knee pain  Intermittent hyponatremia  Statin intolerance.  History of noncompliance    3.  Sugars out-of-control.  He has been working with primary care and endocrinology.    4.  COPD.  He has had increased shortness of breath, cough, mucus production.  Encouraged him to use Mucinex and keep hydrated.    5.  Increasing pain.  This is in his shoulders and also in his chest.  We are going to reevaluate him to see if there are signs of progressive disease that might inform him in his decision making.    We will see him again in 2 weeks time after his CT scans to discuss next steps.  They know to call in the interim should he have any change in status, questions or concerns.      Lizbeth Martin MD

## 2025-05-07 NOTE — PATIENT INSTRUCTIONS
Reviewed labs and recent medical history.  Discussed his cough and mucous expectoration. Encouraged him to use Mucinex (available over the counter) 2 times a day. Also asked him to increase his fluid intake.  Discussed his pain in his shoulders. Reviewed with him to let us know if the pain gets worse.  Discussed treatment options for him. Reviewed the use of chemotherapy and potentially immunotherapy. Discussed getting a blood biopsy. Order added. Will send on the tissue as well.  Reviewed need for a baseline CT scans to know where we are starting treatment. Orders added.  Will get other labs today too. Orders added.  Return to see MD in 2 weeks to follow up.

## 2025-05-16 ENCOUNTER — HOSPITAL ENCOUNTER (OUTPATIENT)
Dept: RADIOLOGY | Facility: HOSPITAL | Age: 75
Discharge: HOME | End: 2025-05-16
Payer: MEDICARE

## 2025-05-16 DIAGNOSIS — K86.89 PANCREATIC MASS (HHS-HCC): ICD-10-CM

## 2025-05-16 PROCEDURE — 2550000001 HC RX 255 CONTRASTS: Performed by: INTERNAL MEDICINE

## 2025-05-16 PROCEDURE — 71260 CT THORAX DX C+: CPT

## 2025-05-16 RX ADMIN — IOHEXOL 72 ML: 350 INJECTION, SOLUTION INTRAVENOUS at 14:55

## 2025-05-21 ENCOUNTER — OFFICE VISIT (OUTPATIENT)
Dept: HEMATOLOGY/ONCOLOGY | Facility: CLINIC | Age: 75
End: 2025-05-21
Payer: MEDICARE

## 2025-05-21 VITALS
BODY MASS INDEX: 31.75 KG/M2 | DIASTOLIC BLOOD PRESSURE: 73 MMHG | HEART RATE: 85 BPM | RESPIRATION RATE: 20 BRPM | WEIGHT: 215 LBS | SYSTOLIC BLOOD PRESSURE: 114 MMHG | OXYGEN SATURATION: 95 %

## 2025-05-21 DIAGNOSIS — K86.89 PANCREATIC MASS (HHS-HCC): ICD-10-CM

## 2025-05-21 PROCEDURE — 99214 OFFICE O/P EST MOD 30 MIN: CPT | Performed by: INTERNAL MEDICINE

## 2025-05-21 PROCEDURE — 3050F LDL-C >= 130 MG/DL: CPT | Performed by: INTERNAL MEDICINE

## 2025-05-21 PROCEDURE — 1126F AMNT PAIN NOTED NONE PRSNT: CPT | Performed by: INTERNAL MEDICINE

## 2025-05-21 PROCEDURE — 3046F HEMOGLOBIN A1C LEVEL >9.0%: CPT | Performed by: INTERNAL MEDICINE

## 2025-05-21 PROCEDURE — 1159F MED LIST DOCD IN RCRD: CPT | Performed by: INTERNAL MEDICINE

## 2025-05-21 PROCEDURE — 3074F SYST BP LT 130 MM HG: CPT | Performed by: INTERNAL MEDICINE

## 2025-05-21 PROCEDURE — 3078F DIAST BP <80 MM HG: CPT | Performed by: INTERNAL MEDICINE

## 2025-05-21 RX ORDER — ONDANSETRON 8 MG/1
8 TABLET, FILM COATED ORAL EVERY 8 HOURS PRN
Qty: 30 TABLET | Refills: 1 | Status: SHIPPED | OUTPATIENT
Start: 2025-05-21

## 2025-05-21 RX ORDER — PROCHLORPERAZINE MALEATE 10 MG
10 TABLET ORAL EVERY 6 HOURS PRN
Qty: 30 TABLET | Refills: 3 | Status: SHIPPED | OUTPATIENT
Start: 2025-05-21 | End: 2025-07-20

## 2025-05-21 ASSESSMENT — ENCOUNTER SYMPTOMS
HEADACHES: 0
TREMORS: 1
FREQUENCY: 0
LIGHT-HEADEDNESS: 1
APPETITE CHANGE: 0
MYALGIAS: 1
ADENOPATHY: 0
DYSURIA: 0
TROUBLE SWALLOWING: 0
ABDOMINAL PAIN: 0
ARTHRALGIAS: 1
BRUISES/BLEEDS EASILY: 0
UNEXPECTED WEIGHT CHANGE: 1
PALPITATIONS: 0
NAUSEA: 0
BACK PAIN: 1
RHINORRHEA: 0
COUGH: 0
DIARRHEA: 0
CONSTIPATION: 0
BLOOD IN STOOL: 0
SHORTNESS OF BREATH: 1
NUMBNESS: 1
SLEEP DISTURBANCE: 0
VOMITING: 0
NERVOUS/ANXIOUS: 0
FATIGUE: 1
WEAKNESS: 0

## 2025-05-21 ASSESSMENT — PAIN SCALES - GENERAL: PAINLEVEL_OUTOF10: 0-NO PAIN

## 2025-05-21 NOTE — PROGRESS NOTES
Patient ID:Feliciano Worthington is a 75 y.o. year old male patient with recently diagnosed metastatic pancreatic cancer to the liver.  He also has had recent myocardial infarction and pulmonary emboli.       Referring Physician: Lizbeth Martin MD  [address] [place] [address] [address]  [place] [address]  Primary Care Provider: Hussain Bernal MD    Chief Complaint  Chief Complaint   Patient presents with    Follow-up     Go over scans- no issues to report          History of the Present Illness  He is a 75-year-old white male with recent myocardial infarction.  During that hospital stay he was found to have metastatic pancreatic cancer to his liver.  He also had pulmonary emboli.  He is here today with his wife and and his friend Delia.    Chronological History    3/16/2020.  Low dose CT screening for lung cancer showed multiple bilateral noncalcified pulmonary nodules for which annual screening was recommended.  There was mild upper lung predominant centrilobular emphysema and changes consistent with bronchiolitis.  Small hiatal hernia was seen with concentric long segment esophageal wall thickening as well as severe three-vessel coronary artery calcification with calcification throughout the aorta and aortic valve.    4/26/2021.  Low-dose CT for lung cancer screening showed stable multiple pulmonary nodules and multiple changes consistent with COPD emphysema and coronary artery calcifications.    7/28/2021.  CAT scan of the abdomen and pelvis showed moderate concentric wall thickening of the colon from the cecum to the splenic flexure.  Pancreas is normal.  Liver was also normal.    12/27/2022.  Normal perfusion on Lexiscan with left ventricular ejection fraction 63%.    4/3/2023.  Low-dose CT for lung cancer screening showed stable subcentimeter pulmonary nodules.    10/8/2023 through 10/10/2023.  Emergency rooms visit for persistent cough, shortness of breath without chest pressure.  He was treated with antibiotics and  steroids for pneumonia and was admitted to the hospital..  He was discharged on steroids and antibiotics.    10/17/2023.  Emergency department visit for lower chest discomfort which she described as pleurisy with shortness of breath and productive cough he had nausea but no vomiting.  CTA showed no significant interval change since previous examination.  He had changes of COPD and honeycombing in the bases suggestive of fibrosis.  He also had diffuse circumferential mucosal wall thickening of the esophagus and stable multiple nonspecific subcentimeter lung nodules.  He also had fatty liver low-attenuation lesions in the kidney right lower lobe mucous plugging and diffuse bronchiectasis.  Sugar was 281.  CBC was normal except for an elevation in white count 13.2.  He felt comfortable going home and did not want to be readmitted to the hospital and was discharged.    10/24/2023.  Followed up with primary care which time he complained of right upper extremity pain and swelling.  Venous Doppler was ordered which showed right brachial DVT and right basilic superficial venous thrombosis.  He was started on anticoagulation with Eliquis.    11/16/2023.  Saw [facility] neurosurgery and pain management physician for neck pain and back pain and difficulty walking at that time he is still smoking a pack of cigarettes a day.  Physical therapy was ordered.    3/29/2024.  Followed up with primary care at which time he complained of weight loss and change in voice.    5/3/2024.  New primary care physician.  Weight was down to 204 pounds from around 210      5/28/2024.  Low-dose CT for lung cancer screening showed stable pulmonary nodules and multiple other stable findings.    9/3/2024.  Saw Dr. Keith for unintentional weight loss and nausea.  He says that he was not compliant with his insulin dosage and he had uncontrolled diabetes with a hemoglobin A1c of 11.3.  He continued to smoke.  He has chronic hypoxia.  At that time his  weight was 193 pounds.  A series of tests were ordered.    1/29/2025.  Weight was down to 188 pounds.  A series of tests were ordered including a colonoscopy and a PSA.    2/5/2025.  Emergency department visit for chest pain for 36 hours.  He had taken multiple doses of nitroglycerin without much relief in his symptoms.  His sugar at that time was 438 and his troponin was 299.  He was trending upward.  Chest x-ray showed mild bilateral opacities and atelectasis versus pneumonia.  Findings were consistent with an NSTEMI and he was treated with a heparin drip and admitted to the hospital.  He was transferred to [facility] cardiac intensive care unit.    2/6/2025.  Left heart cath showed 70% stenosis in the distal left main, 80% stenosis in the proximal LAD, 90% stenosis in the proximal circumflex, 75% stenosis in the proximal ramus, 80% stenosis in the mid ramus, 80% stenosis in the mid RCA, 90% stenosis in the distal RCA and a left ventricular ejection fraction 55%.  Cardiac surgery was consulted for possible CABG he was continued on Zetia.  He had type I second-degree AV block so was not started on beta-blocker.    Spouse said that the patient had poor compliance and problems with short-term memory.  She is concerned about his unintentional 50 pound weight loss over the previous year.  CT scan was ordered.    2/6/2025.  Admitted to the hospital at [facility].  CT scan of the chest showed extensive vascular calcification with stable ectasia of the ascending aorta at 3.9 cm.  There is no thoracic lymphadenopathy.  There were areas of fibrosis and scarring in the lungs unchanged with  partially calcified nodules, most of which were subcentimeter and stable.  There was a new nodular opacity in the medial aspect of the right lower lobe which measured 8 mm.  In the upper abdomen there is a 2.2 cm cyst in the midpole left kidney.  There is also nonspecific prominence/thickening of the tail of pancreas measuring 2.5 cm.  This  was a noncontrast study.  MRI was recommended.    2/7/2025.  Arterial Dopplers of the carotids showed less than 50% stenosis bilaterally    2/10/2025.  MRCP of the pancreas showed a 3.8 cm pancreatic tail hypoattenuating mass suspicious for adenocarcinoma and a 15 mm hepatic segment 6 hypoattenuating mass suspicious for metastasis without definite lymphadenopathy.    During the hospital he was seen by psychiatry and sertraline was discontinued.  He was continued on venlafaxine and then started on bupropion.    He also has some trouble swallowing and GI was consulted.  Further exploration of coronary artery bypass was delayed because of the new finding of metastatic pancreatic cancer.  Arrangements were made for him to be transferred to [facility].    2/14 through 2/18/2025.  Hospitalized at [facility].  There it was determined that his liver biopsy would be done as an outpatient with oncology referral afterwards.    2/27/2025.  Follow-up with primary care.    3/13/2025.  Liver biopsy done by interventional radiology  A. LIVER BIOPSY RIGHT:   -Moderately differentiated adenocarcinoma with mucin involving liver, see note     Note: Immunohistochemical stains show that the tumor cells are positive for CK7 and focally positive for CK20. They are negative for CDX2 and TTF-1. Based on the morphologic findings, immunophenotype, and clinical history of a newly identified pancreatic mass, the findings are most consistent with metastatic pancreatic adenocarcinoma.    3/20/2025 through 3/22/2025..  Admitted to the [facility] from emergency department visit for chest pain which she described as sharp and left-sided nonradiating lasting for an hour.  It is not exertional and felt different from his previous heart attack CT angiogram showed several small pulmonary emboli in the left lung with features suggesting that they are chronic.  Lung fibrosis was seen and 2 enlarging nodules were seen in the right lower lobe and 1 enlarging  nodule in the left lower lobe.  He also had NSTEMI and was transferred to [facility] for urgent evaluation for possible CABG.  He was evaluated by oncology during the admission.  Estimate his survival to be less than 6 months without treatment in 8 to 10 months with treatment.  Arrangements were made for him to follow-up in [place] and also to have genetic counseling for his new diagnosis of metastatic pancreatic cancer.  He was not felt to be a candidate for cardiac surgery.  He was discharged on Eliquis and Plavix and instructed to stop taking aspirin.    4/2/2025.  He is here with his wife and him and his friend Delia.  We discussed the recent multiple severe problems that he has.  We discussed these problems openly.  He knows that he cannot be cured of his metastatic pancreatic cancer but we could offer him treatment.  We talked about the benefits and side effects of treatment and that this was a very personal important decision for him to make as to whether or not he wanted to be treated with aggressive chemotherapy or wanted palliative care.  He already has plans for a trip to [place] with friends and family.  We have asked him to come back and see us after he returns to the area.  We talked about symptom control versus tumor directed therapy.  Multiple questions were answered to their satisfaction.  We introduced into the NCCN guidelines for patients.  He will come back and see us in 5 weeks after his trip and knows that he can call in the interim should he have any questions or concerns.    4/30/2025.  Saw his primary care physician Dr. Walters for anxiety.  He reviewed his to recent hospital stays and his biopsy which showed pancreatic cancer in his liver complicated by myocardial infarction and pulmonary embolus.  He increased his Wellbutrin to 150 mg twice a day and increased his Effexor to 225 mg daily and referred him for cognitive behavioral therapy.  He will continue hydroxyzine as needed.  His diabetes  is uncontrolled.  He was following with an endocrinologist.  His insulin was adjusted.  His daily glucose  readings were improving.  He had no further bouts of chest pain and he had no bleeding.  He complained of a rash on his lower leg on the right for which he was given a combination steroid/antifungal topical treatment.    5/7/2025.  He is here with his wife and friend.  He said that he had a wonderful time in [place].  His appetite had been good and he gained 9 pounds.  He has multiple complaints.    He said that his balance is off.  He has had no falls.  Friend says that he holds onto furniture, furniture surfing.  He did lose his balance and fell into a chair did not injure himself.  He notes that he is shaking.  We discussed that holding still is a matter of equal and opposed movements which may be compromised as he becomes weaker.  Supervised understanding.  He says that he is getting weaker.  He has been fairly sedentary.  I encouraged him to be physically active is much as possible.  He says occasionally he has trouble swallowing.  He says that he is not excited about having an EGD with dilatation.  He will let us know if it gets worse.  He said that he did have a bout of chest discomfort last week which that he thought might be gas but he did take nitroglycerin and it helped.  He also has had pain in her shoulders and he has had discomfort in his shoulders for a while.    We discussed the diagnosis of metastatic pancreatic cancer and the available treatments.  He is still thinking about whether or not he wants to do anything.  We discussed combination cytotoxic chemotherapy and offered him next generation sequencing to see if we could find a target.  He agreed to this.  We discussed that if we are going to do any treatment we should get new baseline studies.  He says that he has been more short of breath than usual.  He continues to smoke.  We discussed that this cannot be good for his lungs but he is not  interested in quitting.  We have also discussed getting blood work today for routine baseline such as CBC and CMP and he agrees to this.  We will have him come back in 2 weeks to follow-up.  He says that he is leaning away from taking chemotherapy.    Laboratory data today showed a sugar of 208 and a sodium of 134 and was otherwise within normal limits with a total protein of 6.5.  His CBC was normal except for mild decrease in hemoglobin at 13.2 previously 13.8.  Hematocrit is 39.2 previously was 41.2.  Platelets are normal white blood cell differential count is normal.    Interval Note  5/16/2025.  CAT scans of the chest abdomen and pelvis showed interval worsening of nodular enhancement of the right sided pleura and bilateral pulmonary nodules.  Subpleural reticulations are similar to the prior examination.  Chronic left-sided pulmonary emboli have slightly improved.  Moderate atherosclerotic change was again noted.  Heart was normal size without pericardial effusion.  Scattered mediastinal lymph nodes were unchanged but there was diffuse thickening of the distal esophagus consistent with esophagitis.  Multiple ill-defined hypoattenuating lesions throughout the liver persisted.  In the pancreas an ill-defined hypoattenuating lesion of the pancreatic body and tail measured 5.7 x 2.8 with circumferential involvement of the splenic artery and vein.  There is close proximity of this pancreatic lesion to the left adrenal gland.  Spleen was normal kidneys were normal.    5/21/2025.  He is here today with his wife and friend.  We reviewed the reports of his Scans and he knows that he has had progression in the lungs and in the liver and in the pancreas.  He did not want to do the images.  He has decided that he wants to go forward with Gemzar and Abraxane.  We reviewed the benefits and side effects of this medication regimen.  He understands that he will have multiple blood tests which will occur before his treatment at  [facility] and before we give him another treatment.  He understands that he will take 1 treatment at a time and if he does not want to take anymore we will certainly stop.  He and his wife and friend had time to ask questions which were answered to their satisfaction.  Treatment plan was entered for insurance approval.  The informed consent document was signed.  He will have a chemotherapy teaching class.  We will start him on his treatment as soon as it is approved.  Antinausea medicines were sent to his pharmacy.  We will see him the day of his treatment to make sure that he wants to go forward.    Comorbidities  Diabetes  History of obesity  Atherosclerosis  COPD  Depression  Hyperlipidemia  Hypertension  Osteoarthritis with cervical spine stenosis  Achalasia  Astigmatism  Cataracts  Chronic pain  Long-term history of smoking of about a pack and half cigarettes a day for 52 years  GERD  Multiple lung nodules  Sleep apnea  Vitamin D deficiency  Coronary artery disease with history of myocardial infarction status post stenting in 2007 and 2011  Skin cancers  BPH and penile prosthesis implant  Rotator cuff tendinopathy, hip pain, knee pain  Intermittent hyponatremia  Statin intolerance.  History of noncompliance    Monitoring Parameters  History and physical examinations, CAT scans PET scans MRIs    Review of Systems - Oncology   Review of Systems   Constitutional:  Positive for fatigue and unexpected weight change. Negative for appetite change.   HENT:  Negative for dental problem, mouth sores, rhinorrhea and trouble swallowing.    Eyes:  Negative for visual disturbance.   Respiratory:  Positive for shortness of breath. Negative for cough.    Cardiovascular:  Positive for chest pain (episode last week and he use nitroglycerin and it helped.). Negative for palpitations and leg swelling.   Gastrointestinal:  Negative for abdominal pain, blood in stool, constipation, diarrhea, nausea and vomiting.   Endocrine: Negative  "for polyuria.   Genitourinary:  Negative for dysuria, frequency and urgency.   Musculoskeletal:  Positive for arthralgias, back pain and myalgias (shoulder pain has increased).   Skin:  Negative for pallor and rash.   Neurological:  Positive for tremors (\"shakes\"), light-headedness (balance problem) and numbness. Negative for weakness and headaches.   Hematological:  Negative for adenopathy. Does not bruise/bleed easily.   Psychiatric/Behavioral:  Negative for self-injury, sleep disturbance and suicidal ideas. The patient is not nervous/anxious.         Past Medical History  Past Medical History:   Diagnosis Date    Acute myocardial infarction, unspecified 01/24/2018    Heart attack    Acute myocardial infarction, unspecified 01/24/2018    Heart attack    BMI 29.0-29.9,adult 10/13/2023    Chronic obstructive pulmonary disease, unspecified 01/24/2018    COPD, mild    Chronic obstructive pulmonary disease, unspecified 01/24/2018    COPD, mild    Coronary angioplasty status     Post PTCA    Encounter for immunization 11/04/2022    Encounter for immunization    Encounter for screening for malignant neoplasm of colon 08/25/2016    Encounter for colonoscopy due to history of colonic polyp    Encounter for screening for malignant neoplasm of skin 04/14/2022    Skin cancer screening    Nicotine dependence, cigarettes, uncomplicated 09/12/2022    Cigarette smoker    Obesity, unspecified 05/25/2022    Class 1 obesity with body mass index (BMI) of 31.0 to 31.9 in adult    Old myocardial infarction 01/24/2018    History of myocardial infarction    Other acute postprocedural pain     Post-operative pain    Pain in left knee     Left knee pain, unspecified chronicity    Pain in right hip 11/04/2022    Hip pain, bilateral    Personal history of other diseases of the digestive system 01/24/2018    History of gastroesophageal reflux (GERD)    Personal history of other diseases of the musculoskeletal system and connective tissue " 08/25/2016    History of low back pain    Personal history of other diseases of the respiratory system     History of sinusitis    Personal history of other endocrine, nutritional and metabolic disease 08/25/2016    History of hyperlipidemia    Personal history of other mental and behavioral disorders 04/14/2022    History of depression    Personal history of other specified conditions 12/08/2016    History of diarrhea    Personal history of other specified conditions     History of prolonged Q-T interval on ECG    Personal history of other specified conditions 06/20/2018    History of nocturia    Separation of muscle (nontraumatic), other site 05/14/2018    Rectus diastasis    Sprain of medial collateral ligament of left knee, initial encounter 08/28/2017    Sprain of medial collateral ligament of left knee, initial encounter    Sprain of medial collateral ligament of left knee, subsequent encounter 10/12/2017    Sprain of medial collateral ligament of left knee, subsequent encounter    Type 2 diabetes mellitus without complications 01/24/2018    Diabetes mellitus    Type 2 diabetes mellitus without complications 01/24/2018    Diabetes mellitus    Umbilical hernia without obstruction or gangrene 05/14/2018    Umbilical hernia without obstruction and without gangrene        Surgical History  Past Surgical History:   Procedure Laterality Date    CARDIAC CATHETERIZATION N/A 2/6/2025    Procedure: Left Heart Cath, With LV;  Surgeon: Wil Bronson MD;  Location: [facility];  Service: Cardiovascular;  Laterality: N/A;    COLONOSCOPY  10/06/2016    Colonoscopy    CORONARY ANGIOPLASTY WITH STENT PLACEMENT  08/25/2016    Cath Stent Placement    OTHER SURGICAL HISTORY  05/25/2022    Oral surgery    SKIN CANCER EXCISION  01/17/2025    right forearm, right shoulder    TONSILLECTOMY  08/25/2016    Tonsillectomy       Social History  Social History     Tobacco Use    Smoking status: Former     Average  "packs/day: 1.5 packs/day for 50.0 years (75.0 ttl pk-yrs)     Types: Cigarettes     Start date: 1968     Passive exposure: Current    Smokeless tobacco: Never   Vaping Use    Vaping status: Never Used   Substance Use Topics    Alcohol use: Not Currently     Comment: [id number]    Drug use: Never   Worked as a     Family History  family history includes Coronary artery disease in his father and mother; Diabetes in his father; acute myocardial infarction in his father.  The patient does not have any children.       Current Medications  Current Outpatient Medications   Medication Instructions    acetaminophen (TYLENOL) 650 mg, oral, Every 8 hours PRN    alcohol swabs (BD Alcohol Swabs) pads, medicated Use twice daily while checking sugar.    alum-mag hydroxide-simeth (Mylanta) 200-200-20 mg/5 mL oral suspension 30 mL, oral, 4 times daily PRN    amLODIPine (NORVASC) 5 mg, oral, Daily    apixaban (Eliquis) 5 mg tablet Take 2 tablets (10 mg) by mouth 2 times a day for 7 days, THEN 1 tablet (5 mg) 2 times a day.    BD Jessica 2nd Gen Pen Needle 32 gauge x 5/32\" needle USE 1 PEN NEEDLE SIX TIMES DAILY    BD Ultra-Fine Mini Pen Needle 31 gauge x 3/16\" needle     blood glucose control high,low (Accu-Chek Guide L1-L2 Ctrl Sol) solution 1 bottle, miscellaneous, Daily PRN    blood-glucose meter (Accu-Chek Guide Glucose Meter) misc Test twice daily    buPROPion XL (WELLBUTRIN XL) 150 mg, oral, 2 times daily, Do not crush, chew, or split.    clopidogrel (PLAVIX) 75 mg, oral, Daily    clotrimazole-betamethasone (Lotrisone) cream 1 Application, Topical, 2 times daily    ezetimibe (ZETIA) 10 mg, oral, Daily    HumaLOG KwikPen Insulin 100 unit/mL pen INJECT 20 UNITS SUBCUTANEOUSLY WITH MEALS AND SLIDING SCALE 2/50 > 150 AS DIRECTED.  UNITS PER DAY    hydrOXYzine HCL (ATARAX) 25 mg, oral, 2 times daily    insulin glargine (LANTUS) 32 Units, subcutaneous, Daily, Take as directed per insulin instructions.    insulin lispro 15 " Units, subcutaneous, 3 times daily before meals, Take as directed per insulin instructions.    isosorbide mononitrate ER (IMDUR) 60 mg, oral, Daily, Do not crush or chew.    lancets (Accu-Chek Softclix Lancets) misc Test twice daily    lovastatin (MEVACOR) 40 mg, oral, Nightly    magnesium hydroxide (Milk of Magnesia) 400 mg/5 mL suspension 30 mL, oral, Daily PRN    nitroglycerin (NITROSTAT) 0.4 mg, sublingual, Every 5 min PRN, DO NOT EXCEED A TOTAL OF 3 DOSES IN 15 MINUTES    pantoprazole (PROTONIX) 40 mg, oral, Daily before breakfast, Do not crush, chew, or split.    venlafaxine XR (EFFEXOR-XR) 225 mg, oral, Daily, Do not crush or chew.           OARRS Review  I have personally reviewed the OARRS report for Feliciano Worthington. I have considered the risks of abuse, dependence, addiction and diversion.  Review of this record shows that he had gabapentin a year ago.    Vital Signs  /73   Pulse 85   Resp 20   Wt 97.5 kg (215 lb)   SpO2 95%   BMI 31.75 kg/m²      Physical Exam  Vitals and nursing note reviewed. Exam conducted with a chaperone present.   Constitutional:       General: He is not in acute distress.     Appearance: He is ill-appearing. He is not toxic-appearing.      Comments: He is a well-developed well-nourished white male who is in no acute distress.  He does not appear to be acutely or chronically ill.  He has many areas of sun damaged skin.  He is accompanied by his wife and his friend Delia.  He is depressed in his affect   HENT:      Head: Normocephalic and atraumatic.      Nose: Nose normal.      Mouth/Throat:      Mouth: Mucous membranes are moist.      Pharynx: Oropharynx is clear. No oropharyngeal exudate.      Comments: Multiple carious teeth, none of which per patient are painful  Eyes:      General: No scleral icterus.     Extraocular Movements: Extraocular movements intact.      Conjunctiva/sclera: Conjunctivae normal.      Pupils: Pupils are equal, round, and reactive to light.    Neck:      Comments: There is no significant adenopathy about the head and neck region, supraclavicular axillary or inguinal regions bilaterally.  Cardiovascular:      Rate and Rhythm: Normal rate and regular rhythm.      Heart sounds: Murmur heard.   Pulmonary:      Effort: Pulmonary effort is normal. No respiratory distress.      Breath sounds: Rales (cleared wiyth cough) present. No wheezing or rhonchi.   Abdominal:      General: There is no distension.      Palpations: Abdomen is soft. There is no mass.      Tenderness: There is abdominal tenderness (mild tenderness on right side). There is no guarding or rebound.   Musculoskeletal:         General: Normal range of motion.      Cervical back: Normal range of motion. No tenderness.      Right lower leg: No edema.      Left lower leg: No edema.   Lymphadenopathy:      Cervical: No cervical adenopathy.   Skin:     General: Skin is warm and dry.      Coloration: Skin is not jaundiced or pale.      Findings: No lesion (small scar on right forearm).      Comments: Multiple areas of sun damaged skin and a healed lesion on his right forearm from which he said a skin cancer had been resected.  There is still a bump there.     Neurological:      Mental Status: He is alert and oriented to person, place, and time. Mental status is at baseline.      Sensory: Sensory deficit present.      Motor: No weakness.      Gait: Gait normal.      Comments: Somewhat hard of hearing   Psychiatric:         Mood and Affect: Mood normal.         Behavior: Behavior normal.         Thought Content: Thought content normal.         Judgment: Judgment normal.      Comments: Appropriately saddened          Current Laboratory Data  See narrative for laboratory data    Recent Imaging  See narrative for recent imaging.    Cardiology, Vascular, and Other Imaging  No other imaging results found for the past 7 days        Pathology  Metastatic adenocarcinoma of the pancreas to the liver and to the  lung.      Performance Status:  Symptomatic; fully ambulatory    Assessment/Plan    1.  Metastatic adenocarcinoma of the pancreas to liver and possibly to lung.  The patient is to decide whether or not he wants primary tumor directed therapy or palliative care.  He will return to the office after his trip to [place] to discuss next steps.    He is back from [place].  We discussed the various options for treatment.  He is not sure what he wants to do.  He is leaning against chemotherapy.  We are going to look for a mutational target with next generation sequencing in blood.    CAT scan showed progressive disease in the liver and pancreas.  Patient wants to go forward with therapy.  We discussed the regimen in detail as well as how cytotoxic chemotherapy works and we have gone through the benefits and side effects.  He has signed the informed consent document.  We await insurance approval and will start as soon as it has been given.  Antiemetics are sent to his pharmacy.  He knows that he can change his mind at any time.    2.  Multiple comorbidities.  These include but are not limited to:   Diabetes  History of obesity  Atherosclerosis  COPD  Depression  Hyperlipidemia  Hypertension  Osteoarthritis with cervical spine stenosis  Achalasia  Astigmatism  Cataracts  Chronic pain  Long-term history of smoking of about a pack and half cigarettes a day for 52 years  GERD  Multiple lung nodules  Sleep apnea  Vitamin D deficiency  Coronary artery disease with history of myocardial infarction status post stenting in 2007 and 2011  Skin cancers  BPH and penile prosthesis implant  Rotator cuff tendinopathy, hip pain, knee pain  Intermittent hyponatremia  Statin intolerance.  History of noncompliance    3.  Sugars out-of-control.  He has been working with primary care and endocrinology.    4.  COPD.  He has had increased shortness of breath, cough, mucus production.  Encouraged him to use Mucinex and keep hydrated.    5.   Increasing pain.  This is in his shoulders and also in his chest.  We are going to reevaluate him to see if there are signs of progressive disease that might inform him in his decision making.    We will see him as soon as his insurance company approves his treatment.  They know to call in the interim should he have any change in status, questions or concerns.      Lizbeth Martin MD

## 2025-05-21 NOTE — PROGRESS NOTES
Pt went outside to smoke= AVS discussed with wife  Med sheets given on both chemo drugs  Instructed on anti nausea meds- and written out for her. Rxs sent to pts pharmacy  5/29 130 Labs and chemo class  5/30 1000 MD           1100 first chemo cycle  Reviewed AVS with patient- patient verbalizes understanding

## 2025-05-21 NOTE — PATIENT INSTRUCTIONS
Reviewed labs and recent medical history.  Discussed the results of his scans that demonstrate his disease is worsening. Discussed his treatment options including chemotherapy.   Reviewed chemotherapy information about Abraxane and Gemcitabine. Information provided. Treatment plan added to his chart. Consents signed.  Reviewed with him that he will need a chemotherapy class to review the infusion center and his medication.  Antinausea medications sent to his pharmacy.  Will plan to treat him in the next week after his class and after insurance approval. See MD the day of his treatment.

## 2025-05-29 ENCOUNTER — INFUSION (OUTPATIENT)
Dept: HEMATOLOGY/ONCOLOGY | Facility: CLINIC | Age: 75
End: 2025-05-29
Payer: MEDICARE

## 2025-05-29 VITALS
WEIGHT: 210.54 LBS | SYSTOLIC BLOOD PRESSURE: 123 MMHG | BODY MASS INDEX: 31.09 KG/M2 | DIASTOLIC BLOOD PRESSURE: 75 MMHG | HEART RATE: 86 BPM | RESPIRATION RATE: 18 BRPM | TEMPERATURE: 97.2 F | OXYGEN SATURATION: 97 %

## 2025-05-29 DIAGNOSIS — C78.7 PANCREATIC CANCER METASTASIZED TO LIVER (MULTI): Primary | ICD-10-CM

## 2025-05-29 DIAGNOSIS — C25.9 PANCREATIC CANCER METASTASIZED TO LIVER (MULTI): ICD-10-CM

## 2025-05-29 DIAGNOSIS — C25.9 PANCREATIC CANCER METASTASIZED TO LIVER (MULTI): Primary | ICD-10-CM

## 2025-05-29 DIAGNOSIS — K86.89 PANCREATIC MASS (HHS-HCC): ICD-10-CM

## 2025-05-29 DIAGNOSIS — C78.7 PANCREATIC CANCER METASTASIZED TO LIVER (MULTI): ICD-10-CM

## 2025-05-29 LAB
ALBUMIN SERPL BCP-MCNC: 4.1 G/DL (ref 3.4–5)
ALP SERPL-CCNC: 104 U/L (ref 33–136)
ALT SERPL W P-5'-P-CCNC: 14 U/L (ref 10–52)
ANION GAP SERPL CALC-SCNC: 13 MMOL/L (ref 10–20)
AST SERPL W P-5'-P-CCNC: 14 U/L (ref 9–39)
BASOPHILS # BLD AUTO: 0.08 X10*3/UL (ref 0–0.1)
BASOPHILS NFR BLD AUTO: 0.9 %
BILIRUB SERPL-MCNC: 0.5 MG/DL (ref 0–1.2)
BUN SERPL-MCNC: 15 MG/DL (ref 6–23)
CALCIUM SERPL-MCNC: 9 MG/DL (ref 8.6–10.3)
CHLORIDE SERPL-SCNC: 102 MMOL/L (ref 98–107)
CO2 SERPL-SCNC: 26 MMOL/L (ref 21–32)
CREAT SERPL-MCNC: 0.96 MG/DL (ref 0.5–1.3)
EGFRCR SERPLBLD CKD-EPI 2021: 82 ML/MIN/1.73M*2
EOSINOPHIL # BLD AUTO: 0.15 X10*3/UL (ref 0–0.4)
EOSINOPHIL NFR BLD AUTO: 1.7 %
ERYTHROCYTE [DISTWIDTH] IN BLOOD BY AUTOMATED COUNT: 12.7 % (ref 11.5–14.5)
GLUCOSE SERPL-MCNC: 154 MG/DL (ref 74–99)
HCT VFR BLD AUTO: 44.2 % (ref 41–52)
HGB BLD-MCNC: 14.7 G/DL (ref 13.5–17.5)
IMM GRANULOCYTES # BLD AUTO: 0.06 X10*3/UL (ref 0–0.5)
IMM GRANULOCYTES NFR BLD AUTO: 0.7 % (ref 0–0.9)
LYMPHOCYTES # BLD AUTO: 2.42 X10*3/UL (ref 0.8–3)
LYMPHOCYTES NFR BLD AUTO: 26.9 %
MCH RBC QN AUTO: 28.1 PG (ref 26–34)
MCHC RBC AUTO-ENTMCNC: 33.3 G/DL (ref 32–36)
MCV RBC AUTO: 85 FL (ref 80–100)
MONOCYTES # BLD AUTO: 0.78 X10*3/UL (ref 0.05–0.8)
MONOCYTES NFR BLD AUTO: 8.7 %
NEUTROPHILS # BLD AUTO: 5.49 X10*3/UL (ref 1.6–5.5)
NEUTROPHILS NFR BLD AUTO: 61.1 %
NRBC BLD-RTO: 0 /100 WBCS (ref 0–0)
PLATELET # BLD AUTO: 192 X10*3/UL (ref 150–450)
POTASSIUM SERPL-SCNC: 4.5 MMOL/L (ref 3.5–5.3)
PROT SERPL-MCNC: 6.8 G/DL (ref 6.4–8.2)
RBC # BLD AUTO: 5.23 X10*6/UL (ref 4.5–5.9)
SODIUM SERPL-SCNC: 136 MMOL/L (ref 136–145)
WBC # BLD AUTO: 9 X10*3/UL (ref 4.4–11.3)

## 2025-05-29 PROCEDURE — 80053 COMPREHEN METABOLIC PANEL: CPT

## 2025-05-29 PROCEDURE — 85025 COMPLETE CBC W/AUTO DIFF WBC: CPT

## 2025-05-29 PROCEDURE — 36415 COLL VENOUS BLD VENIPUNCTURE: CPT

## 2025-05-29 RX ORDER — HEPARIN SODIUM,PORCINE/PF 10 UNIT/ML
50 SYRINGE (ML) INTRAVENOUS AS NEEDED
Status: CANCELLED | OUTPATIENT
Start: 2025-05-29

## 2025-05-29 RX ORDER — HEPARIN 100 UNIT/ML
500 SYRINGE INTRAVENOUS AS NEEDED
Status: CANCELLED | OUTPATIENT
Start: 2025-05-29

## 2025-05-29 ASSESSMENT — PAIN SCALES - GENERAL: PAINLEVEL_OUTOF10: 0-NO PAIN

## 2025-05-30 ENCOUNTER — OFFICE VISIT (OUTPATIENT)
Dept: HEMATOLOGY/ONCOLOGY | Facility: CLINIC | Age: 75
End: 2025-05-30
Payer: MEDICARE

## 2025-05-30 ENCOUNTER — INFUSION (OUTPATIENT)
Dept: HEMATOLOGY/ONCOLOGY | Facility: CLINIC | Age: 75
End: 2025-05-30
Payer: MEDICARE

## 2025-05-30 VITALS
SYSTOLIC BLOOD PRESSURE: 135 MMHG | BODY MASS INDEX: 31.66 KG/M2 | OXYGEN SATURATION: 98 % | DIASTOLIC BLOOD PRESSURE: 79 MMHG | WEIGHT: 214.4 LBS | TEMPERATURE: 97.3 F | RESPIRATION RATE: 16 BRPM | HEART RATE: 82 BPM

## 2025-05-30 DIAGNOSIS — C78.7 PANCREATIC CANCER METASTASIZED TO LIVER (MULTI): ICD-10-CM

## 2025-05-30 DIAGNOSIS — C25.9 PANCREATIC CANCER METASTASIZED TO LIVER (MULTI): ICD-10-CM

## 2025-05-30 DIAGNOSIS — C25.9 PANCREATIC CANCER METASTASIZED TO LIVER (MULTI): Primary | ICD-10-CM

## 2025-05-30 DIAGNOSIS — K86.89 PANCREATIC MASS (HHS-HCC): ICD-10-CM

## 2025-05-30 DIAGNOSIS — C78.7 PANCREATIC CANCER METASTASIZED TO LIVER (MULTI): Primary | ICD-10-CM

## 2025-05-30 PROCEDURE — 3046F HEMOGLOBIN A1C LEVEL >9.0%: CPT | Performed by: INTERNAL MEDICINE

## 2025-05-30 PROCEDURE — 99213 OFFICE O/P EST LOW 20 MIN: CPT | Performed by: INTERNAL MEDICINE

## 2025-05-30 PROCEDURE — 3078F DIAST BP <80 MM HG: CPT | Performed by: INTERNAL MEDICINE

## 2025-05-30 PROCEDURE — 96417 CHEMO IV INFUS EACH ADDL SEQ: CPT

## 2025-05-30 PROCEDURE — 3075F SYST BP GE 130 - 139MM HG: CPT | Performed by: INTERNAL MEDICINE

## 2025-05-30 PROCEDURE — 2500000004 HC RX 250 GENERAL PHARMACY W/ HCPCS (ALT 636 FOR OP/ED): Performed by: INTERNAL MEDICINE

## 2025-05-30 PROCEDURE — 96375 TX/PRO/DX INJ NEW DRUG ADDON: CPT

## 2025-05-30 PROCEDURE — 3050F LDL-C >= 130 MG/DL: CPT | Performed by: INTERNAL MEDICINE

## 2025-05-30 PROCEDURE — 1125F AMNT PAIN NOTED PAIN PRSNT: CPT | Performed by: INTERNAL MEDICINE

## 2025-05-30 PROCEDURE — 96413 CHEMO IV INFUSION 1 HR: CPT

## 2025-05-30 PROCEDURE — 1159F MED LIST DOCD IN RCRD: CPT | Performed by: INTERNAL MEDICINE

## 2025-05-30 RX ORDER — ALBUTEROL SULFATE 0.83 MG/ML
3 SOLUTION RESPIRATORY (INHALATION) AS NEEDED
Status: CANCELLED | OUTPATIENT
Start: 2025-05-30

## 2025-05-30 RX ORDER — ONDANSETRON HYDROCHLORIDE 2 MG/ML
8 INJECTION, SOLUTION INTRAVENOUS ONCE
OUTPATIENT
Start: 2025-06-13

## 2025-05-30 RX ORDER — ONDANSETRON HYDROCHLORIDE 2 MG/ML
8 INJECTION, SOLUTION INTRAVENOUS ONCE
Status: CANCELLED | OUTPATIENT
Start: 2025-05-30

## 2025-05-30 RX ORDER — ALBUTEROL SULFATE 0.83 MG/ML
3 SOLUTION RESPIRATORY (INHALATION) AS NEEDED
OUTPATIENT
Start: 2025-06-13

## 2025-05-30 RX ORDER — PROCHLORPERAZINE EDISYLATE 5 MG/ML
10 INJECTION INTRAMUSCULAR; INTRAVENOUS EVERY 6 HOURS PRN
OUTPATIENT
Start: 2025-06-13

## 2025-05-30 RX ORDER — FAMOTIDINE 10 MG/ML
20 INJECTION, SOLUTION INTRAVENOUS ONCE AS NEEDED
OUTPATIENT
Start: 2025-06-13

## 2025-05-30 RX ORDER — PROCHLORPERAZINE EDISYLATE 5 MG/ML
10 INJECTION INTRAMUSCULAR; INTRAVENOUS EVERY 6 HOURS PRN
Status: CANCELLED | OUTPATIENT
Start: 2025-05-30

## 2025-05-30 RX ORDER — EPINEPHRINE 0.3 MG/.3ML
0.3 INJECTION SUBCUTANEOUS EVERY 5 MIN PRN
OUTPATIENT
Start: 2025-06-13

## 2025-05-30 RX ORDER — DIPHENHYDRAMINE HYDROCHLORIDE 50 MG/ML
50 INJECTION, SOLUTION INTRAMUSCULAR; INTRAVENOUS AS NEEDED
Status: CANCELLED | OUTPATIENT
Start: 2025-05-30

## 2025-05-30 RX ORDER — PROCHLORPERAZINE MALEATE 5 MG
10 TABLET ORAL EVERY 6 HOURS PRN
Status: CANCELLED | OUTPATIENT
Start: 2025-05-30

## 2025-05-30 RX ORDER — DIPHENHYDRAMINE HYDROCHLORIDE 50 MG/ML
50 INJECTION, SOLUTION INTRAMUSCULAR; INTRAVENOUS AS NEEDED
OUTPATIENT
Start: 2025-06-13

## 2025-05-30 RX ORDER — HEPARIN SODIUM,PORCINE/PF 10 UNIT/ML
50 SYRINGE (ML) INTRAVENOUS AS NEEDED
OUTPATIENT
Start: 2025-05-30

## 2025-05-30 RX ORDER — ONDANSETRON HYDROCHLORIDE 2 MG/ML
8 INJECTION, SOLUTION INTRAVENOUS ONCE
Status: COMPLETED | OUTPATIENT
Start: 2025-05-30 | End: 2025-05-30

## 2025-05-30 RX ORDER — FAMOTIDINE 10 MG/ML
20 INJECTION, SOLUTION INTRAVENOUS ONCE AS NEEDED
Status: CANCELLED | OUTPATIENT
Start: 2025-05-30

## 2025-05-30 RX ORDER — HEPARIN 100 UNIT/ML
500 SYRINGE INTRAVENOUS AS NEEDED
OUTPATIENT
Start: 2025-05-30

## 2025-05-30 RX ORDER — PROCHLORPERAZINE MALEATE 5 MG
10 TABLET ORAL EVERY 6 HOURS PRN
OUTPATIENT
Start: 2025-06-13

## 2025-05-30 RX ORDER — EPINEPHRINE 0.3 MG/.3ML
0.3 INJECTION SUBCUTANEOUS EVERY 5 MIN PRN
Status: CANCELLED | OUTPATIENT
Start: 2025-05-30

## 2025-05-30 RX ADMIN — PACLITAXEL 275 MG: 100 INJECTION, POWDER, LYOPHILIZED, FOR SUSPENSION INTRAVENOUS at 11:41

## 2025-05-30 RX ADMIN — GEMCITABINE 2181.2 MG: 38 INJECTION, SOLUTION INTRAVENOUS at 12:16

## 2025-05-30 RX ADMIN — ONDANSETRON 8 MG: 2 INJECTION INTRAMUSCULAR; INTRAVENOUS at 11:05

## 2025-05-30 ASSESSMENT — ENCOUNTER SYMPTOMS
BACK PAIN: 1
TROUBLE SWALLOWING: 0
BRUISES/BLEEDS EASILY: 0
NERVOUS/ANXIOUS: 0
NAUSEA: 0
DIARRHEA: 0
ARTHRALGIAS: 1
FREQUENCY: 0
LIGHT-HEADEDNESS: 1
COUGH: 0
DYSURIA: 0
BLOOD IN STOOL: 0
HEADACHES: 0
FATIGUE: 1
VOMITING: 0
CONSTIPATION: 0
NUMBNESS: 1
WEAKNESS: 0
ADENOPATHY: 0
SLEEP DISTURBANCE: 0
SHORTNESS OF BREATH: 1
UNEXPECTED WEIGHT CHANGE: 1
ABDOMINAL PAIN: 0
RHINORRHEA: 0
PALPITATIONS: 0
TREMORS: 1
APPETITE CHANGE: 0
MYALGIAS: 1

## 2025-05-30 ASSESSMENT — PAIN SCALES - GENERAL: PAINLEVEL_OUTOF10: 3

## 2025-05-30 NOTE — PROGRESS NOTES
Pt getting first tx today- instructed to call Dr Martin cell # if any issues  Labs 6/12 130 at infusion  6/13 845 MD visit          930 chemo to follow C2  Reviewed AVS with patient- patient verbalizes understanding

## 2025-05-30 NOTE — PATIENT INSTRUCTIONS
Reviewed labs and recent medical history.  Reviewed his questions/concerns regarding starting his treatment.  Okay to treat today, 5/30/25  Encouraged him to call with questions or concerns. Dr. Martin 's cell phone number ([phone])  Return to see MD in 2 weeks for his next treatment.

## 2025-05-30 NOTE — PROGRESS NOTES
Patient ID:Feliciano Worthington is a 75 y.o. year old male patient with recently diagnosed metastatic pancreatic cancer to the liver.  He also has had recent myocardial infarction and pulmonary emboli.       Referring Physician: Lizbeth Martin MD  [address] [place] [address] [address]  [place] [address]  Primary Care Provider: Hussain Bernal MD    Chief Complaint  Chief Complaint   Patient presents with    Pancreatic mass          History of the Present Illness  He is a 75-year-old white male with recent myocardial infarction.  During that hospital stay he was found to have metastatic pancreatic cancer to his liver.  He also had pulmonary emboli.  He is here today with his wife and and his friend Delia.    Chronological History    3/16/2020.  Low dose CT screening for lung cancer showed multiple bilateral noncalcified pulmonary nodules for which annual screening was recommended.  There was mild upper lung predominant centrilobular emphysema and changes consistent with bronchiolitis.  Small hiatal hernia was seen with concentric long segment esophageal wall thickening as well as severe three-vessel coronary artery calcification with calcification throughout the aorta and aortic valve.    4/26/2021.  Low-dose CT for lung cancer screening showed stable multiple pulmonary nodules and multiple changes consistent with COPD emphysema and coronary artery calcifications.    7/28/2021.  CAT scan of the abdomen and pelvis showed moderate concentric wall thickening of the colon from the cecum to the splenic flexure.  Pancreas is normal.  Liver was also normal.    12/27/2022.  Normal perfusion on Lexiscan with left ventricular ejection fraction 63%.    4/3/2023.  Low-dose CT for lung cancer screening showed stable subcentimeter pulmonary nodules.    10/8/2023 through 10/10/2023.  Emergency rooms visit for persistent cough, shortness of breath without chest pressure.  He was treated with antibiotics and steroids for pneumonia and was  admitted to the hospital..  He was discharged on steroids and antibiotics.    10/17/2023.  Emergency department visit for lower chest discomfort which she described as pleurisy with shortness of breath and productive cough he had nausea but no vomiting.  CTA showed no significant interval change since previous examination.  He had changes of COPD and honeycombing in the bases suggestive of fibrosis.  He also had diffuse circumferential mucosal wall thickening of the esophagus and stable multiple nonspecific subcentimeter lung nodules.  He also had fatty liver low-attenuation lesions in the kidney right lower lobe mucous plugging and diffuse bronchiectasis.  Sugar was 281.  CBC was normal except for an elevation in white count 13.2.  He felt comfortable going home and did not want to be readmitted to the hospital and was discharged.    10/24/2023.  Followed up with primary care which time he complained of right upper extremity pain and swelling.  Venous Doppler was ordered which showed right brachial DVT and right basilic superficial venous thrombosis.  He was started on anticoagulation with Eliquis.    11/16/2023.  Saw [facility] neurosurgery and pain management physician for neck pain and back pain and difficulty walking at that time he is still smoking a pack of cigarettes a day.  Physical therapy was ordered.    3/29/2024.  Followed up with primary care at which time he complained of weight loss and change in voice.    5/3/2024.  New primary care physician.  Weight was down to 204 pounds from around 210      5/28/2024.  Low-dose CT for lung cancer screening showed stable pulmonary nodules and multiple other stable findings.    9/3/2024.  Saw Dr. Keith for unintentional weight loss and nausea.  He says that he was not compliant with his insulin dosage and he had uncontrolled diabetes with a hemoglobin A1c of 11.3.  He continued to smoke.  He has chronic hypoxia.  At that time his weight was 193 pounds.  A  series of tests were ordered.    1/29/2025.  Weight was down to 188 pounds.  A series of tests were ordered including a colonoscopy and a PSA.    2/5/2025.  Emergency department visit for chest pain for 36 hours.  He had taken multiple doses of nitroglycerin without much relief in his symptoms.  His sugar at that time was 438 and his troponin was 299.  He was trending upward.  Chest x-ray showed mild bilateral opacities and atelectasis versus pneumonia.  Findings were consistent with an NSTEMI and he was treated with a heparin drip and admitted to the hospital.  He was transferred to [facility] cardiac intensive care unit.    2/6/2025.  Left heart cath showed 70% stenosis in the distal left main, 80% stenosis in the proximal LAD, 90% stenosis in the proximal circumflex, 75% stenosis in the proximal ramus, 80% stenosis in the mid ramus, 80% stenosis in the mid RCA, 90% stenosis in the distal RCA and a left ventricular ejection fraction 55%.  Cardiac surgery was consulted for possible CABG he was continued on Zetia.  He had type I second-degree AV block so was not started on beta-blocker.    Spouse said that the patient had poor compliance and problems with short-term memory.  She is concerned about his unintentional 50 pound weight loss over the previous year.  CT scan was ordered.    2/6/2025.  Admitted to the hospital at [facility].  CT scan of the chest showed extensive vascular calcification with stable ectasia of the ascending aorta at 3.9 cm.  There is no thoracic lymphadenopathy.  There were areas of fibrosis and scarring in the lungs unchanged with  partially calcified nodules, most of which were subcentimeter and stable.  There was a new nodular opacity in the medial aspect of the right lower lobe which measured 8 mm.  In the upper abdomen there is a 2.2 cm cyst in the midpole left kidney.  There is also nonspecific prominence/thickening of the tail of pancreas measuring 2.5 cm.  This was a noncontrast study.   MRI was recommended.    2/7/2025.  Arterial Dopplers of the carotids showed less than 50% stenosis bilaterally    2/10/2025.  MRCP of the pancreas showed a 3.8 cm pancreatic tail hypoattenuating mass suspicious for adenocarcinoma and a 15 mm hepatic segment 6 hypoattenuating mass suspicious for metastasis without definite lymphadenopathy.    During the hospital he was seen by psychiatry and sertraline was discontinued.  He was continued on venlafaxine and then started on bupropion.    He also has some trouble swallowing and GI was consulted.  Further exploration of coronary artery bypass was delayed because of the new finding of metastatic pancreatic cancer.  Arrangements were made for him to be transferred to [facility].    2/14 through 2/18/2025.  Hospitalized at [facility].  There it was determined that his liver biopsy would be done as an outpatient with oncology referral afterwards.    2/27/2025.  Follow-up with primary care.    3/13/2025.  Liver biopsy done by interventional radiology  A. LIVER BIOPSY RIGHT:   -Moderately differentiated adenocarcinoma with mucin involving liver, see note     Note: Immunohistochemical stains show that the tumor cells are positive for CK7 and focally positive for CK20. They are negative for CDX2 and TTF-1. Based on the morphologic findings, immunophenotype, and clinical history of a newly identified pancreatic mass, the findings are most consistent with metastatic pancreatic adenocarcinoma.    3/20/2025 through 3/22/2025..  Admitted to the [facility] from emergency department visit for chest pain which she described as sharp and left-sided nonradiating lasting for an hour.  It is not exertional and felt different from his previous heart attack CT angiogram showed several small pulmonary emboli in the left lung with features suggesting that they are chronic.  Lung fibrosis was seen and 2 enlarging nodules were seen in the right lower lobe and 1 enlarging nodule in the left lower  lobe.  [facility] also had [facility]  He was evaluated by oncology during the admission.  Estimate his survival to be less than 6 months without treatment in 8 to 10 months with treatment.  Arrangements were made for him to follow-up in [place] and also to have genetic counseling for his new diagnosis of metastatic pancreatic cancer.  He was not felt to be a candidate for cardiac surgery.  He was discharged on Eliquis and Plavix and instructed to stop taking aspirin.    4/2/2025.  He is here with his wife and him and his friend Delia.  We discussed the recent multiple severe problems that he has.  We discussed these problems openly.  He knows that he cannot be cured of his metastatic pancreatic cancer but we could offer him treatment.  We talked about the benefits and side effects of treatment and that this was a very personal important decision for him to make as to whether or not he wanted to be treated with aggressive chemotherapy or wanted palliative care.  He already has plans for a trip to [place] with friends and family.  We have asked him to come back and see us after he returns to the area.  We talked about symptom control versus tumor directed therapy.  Multiple questions were answered to their satisfaction.  We introduced into the NCCN guidelines for patients.  He will come back and see us in 5 weeks after his trip and knows that he can call in the interim should he have any questions or concerns.    4/30/2025.  Saw his primary care physician Dr. Walters for anxiety.  He reviewed his to recent hospital stays and his biopsy which showed pancreatic cancer in his liver complicated by myocardial infarction and pulmonary embolus.  He increased his Wellbutrin to 150 mg twice a day and increased his Effexor to 225 mg daily and referred him for cognitive behavioral therapy.  He will continue hydroxyzine as needed.  His diabetes is uncontrolled.  He was  following with an endocrinologist.  His insulin was adjusted.  His daily glucose  readings were improving.  He had no further bouts of chest pain and he had no bleeding.  He complained of a rash on his lower leg on the right for which he was given a combination steroid/antifungal topical treatment.    5/7/2025.  He is here with his wife and friend.  He said that he had a wonderful time in [place].  His appetite had been good and he gained 9 pounds.  He has multiple complaints.    He said that his balance is off.  He has had no falls.  Friend says that he holds onto furniture, furniture surfing.  He did lose his balance and fell into a chair did not injure himself.  He notes that he is shaking.  We discussed that holding still is a matter of equal and opposed movements which may be compromised as he becomes weaker.  Supervised understanding.  He says that he is getting weaker.  He has been fairly sedentary.  I encouraged him to be physically active is much as possible.  He says occasionally he has trouble swallowing.  He says that he is not excited about having an EGD with dilatation.  He will let us know if it gets worse.  He said that he did have a bout of chest discomfort last week which that he thought might be gas but he did take nitroglycerin and it helped.  He also has had pain in her shoulders and he has had discomfort in his shoulders for a while.    We discussed the diagnosis of metastatic pancreatic cancer and the available treatments.  He is still thinking about whether or not he wants to do anything.  We discussed combination cytotoxic chemotherapy and offered him next generation sequencing to see if we could find a target.  He agreed to this.  We discussed that if we are going to do any treatment we should get new baseline studies.  He says that he has been more short of breath than usual.  He continues to smoke.  We discussed that this cannot be good for his lungs but he is not interested in quitting.   We have also discussed getting blood work today for routine baseline such as CBC and CMP and he agrees to this.  We will have him come back in 2 weeks to follow-up.  He says that he is leaning away from taking chemotherapy.    Laboratory data today showed a sugar of 208 and a sodium of 134 and was otherwise within normal limits with a total protein of 6.5.  His CBC was normal except for mild decrease in hemoglobin at 13.2 previously 13.8.  Hematocrit is 39.2 previously was 41.2.  Platelets are normal white blood cell differential count is normal.    5/16/2025.  CAT scans of the chest abdomen and pelvis showed interval worsening of nodular enhancement of the right sided pleura and bilateral pulmonary nodules.  Subpleural reticulations are similar to the prior examination.  Chronic left-sided pulmonary emboli have slightly improved.  Moderate atherosclerotic change was again noted.  Heart was normal size without pericardial effusion.  Scattered mediastinal lymph nodes were unchanged but there was diffuse thickening of the distal esophagus consistent with esophagitis.  Multiple ill-defined hypoattenuating lesions throughout the liver persisted.  In the pancreas an ill-defined hypoattenuating lesion of the pancreatic body and tail measured 5.7 x 2.8 with circumferential involvement of the splenic artery and vein.  There is close proximity of this pancreatic lesion to the left adrenal gland.  Spleen was normal kidneys were normal.    5/21/2025.  He is here today with his wife and friend.  We reviewed the reports of his Scans and he knows that he has had progression in the lungs and in the liver and in the pancreas.  He did not want to do the images.  He has decided that he wants to go forward with Gemzar and Abraxane.  We reviewed the benefits and side effects of this medication regimen.  He understands that he will have multiple blood tests which will occur before his treatment at [facility] and before we give him another  treatment.  He understands that he will take 1 treatment at a time and if he does not want to take anymore we will certainly stop.  He and his wife and friend had time to ask questions which were answered to their satisfaction.  Treatment plan was entered for insurance approval.  The informed consent document was signed.  He will have a chemotherapy teaching class.  We will start him on his treatment as soon as it is approved.  Antinausea medicines were sent to his pharmacy.  We will see him the day of his treatment to make sure that he wants to go forward.    Interval Note  5/29/2025.  Laboratory data showed normal serum chemistries with the exception of a sugar 154.  CBC was normal.    5/30/2025.  He is here to begin his treatment.  He has no further questions.  He has lost a pound since last time I saw him.  He has had his chemotherapy teaching teaching class.  Will go ahead with his treatment today.  He has his antiemetics at home.  We reviewed that he should call if he has any questions concerns or change in his status.  We will see him again in 2 weeks.    Comorbidities  Diabetes  History of obesity  Atherosclerosis  COPD  Depression  Hyperlipidemia  Hypertension  Osteoarthritis with cervical spine stenosis  Achalasia  Astigmatism  Cataracts  Chronic pain  Long-term history of smoking of about a pack and half cigarettes a day for 52 years  GERD  Multiple lung nodules  Sleep apnea  Vitamin D deficiency  Coronary artery disease with history of myocardial infarction status post stenting in 2007 and 2011  Skin cancers  BPH and penile prosthesis implant  Rotator cuff tendinopathy, hip pain, knee pain  Intermittent hyponatremia  Statin intolerance.  History of noncompliance    Monitoring Parameters  History and physical examinations, CAT scans PET scans MRIs    Review of Systems - Oncology   Review of Systems   Constitutional:  Positive for fatigue and unexpected weight change. Negative for appetite change.   HENT:   "Negative for dental problem, mouth sores, rhinorrhea and trouble swallowing.    Eyes:  Negative for visual disturbance.   Respiratory:  Positive for shortness of breath. Negative for cough.    Cardiovascular:  Positive for chest pain (episode last week and he use nitroglycerin and it helped.). Negative for palpitations and leg swelling.   Gastrointestinal:  Negative for abdominal pain, blood in stool, constipation, diarrhea, nausea and vomiting.   Endocrine: Negative for polyuria.   Genitourinary:  Negative for dysuria, frequency and urgency.   Musculoskeletal:  Positive for arthralgias, back pain and myalgias (shoulder pain has increased).   Skin:  Negative for pallor and rash.   Neurological:  Positive for tremors (\"shakes\"), light-headedness (balance problem) and numbness. Negative for weakness and headaches.   Hematological:  Negative for adenopathy. Does not bruise/bleed easily.   Psychiatric/Behavioral:  Negative for self-injury, sleep disturbance and suicidal ideas. The patient is not nervous/anxious.         Past Medical History  Past Medical History:   Diagnosis Date    Acute myocardial infarction, unspecified 01/24/2018    Heart attack    Acute myocardial infarction, unspecified 01/24/2018    Heart attack    BMI 29.0-29.9,adult 10/13/2023    Chronic obstructive pulmonary disease, unspecified 01/24/2018    COPD, mild    Chronic obstructive pulmonary disease, unspecified 01/24/2018    COPD, mild    Coronary angioplasty status     Post PTCA    Encounter for immunization 11/04/2022    Encounter for immunization    Encounter for screening for malignant neoplasm of colon 08/25/2016    Encounter for colonoscopy due to history of colonic polyp    Encounter for screening for malignant neoplasm of skin 04/14/2022    Skin cancer screening    Nicotine dependence, cigarettes, uncomplicated 09/12/2022    Cigarette smoker    Obesity, unspecified 05/25/2022    Class 1 obesity with body mass index (BMI) of 31.0 to 31.9 in " adult    Old myocardial infarction 01/24/2018    History of myocardial infarction    Other acute postprocedural pain     Post-operative pain    Pain in left knee     Left knee pain, unspecified chronicity    Pain in right hip 11/04/2022    Hip pain, bilateral    Personal history of other diseases of the digestive system 01/24/2018    History of gastroesophageal reflux (GERD)    Personal history of other diseases of the musculoskeletal system and connective tissue 08/25/2016    History of low back pain    Personal history of other diseases of the respiratory system     History of sinusitis    Personal history of other endocrine, nutritional and metabolic disease 08/25/2016    History of hyperlipidemia    Personal history of other mental and behavioral disorders 04/14/2022    History of depression    Personal history of other specified conditions 12/08/2016    History of diarrhea    Personal history of other specified conditions     History of prolonged Q-T interval on ECG    Personal history of other specified conditions 06/20/2018    History of nocturia    Separation of muscle (nontraumatic), other site 05/14/2018    Rectus diastasis    Sprain of medial collateral ligament of left knee, initial encounter 08/28/2017    Sprain of medial collateral ligament of left knee, initial encounter    Sprain of medial collateral ligament of left knee, subsequent encounter 10/12/2017    Sprain of medial collateral ligament of left knee, subsequent encounter    Type 2 diabetes mellitus without complications 01/24/2018    Diabetes mellitus    Type 2 diabetes mellitus without complications 01/24/2018    Diabetes mellitus    Umbilical hernia without obstruction or gangrene 05/14/2018    Umbilical hernia without obstruction and without gangrene        Surgical History  Past Surgical History:   Procedure Laterality Date    CARDIAC CATHETERIZATION N/A 2/6/2025    Procedure: Left Heart Cath, With LV;  Surgeon: Wil Bronson,  "MD;  Location: [facility];  Service: Cardiovascular;  Laterality: N/A;    COLONOSCOPY  10/06/2016    Colonoscopy    CORONARY ANGIOPLASTY WITH STENT PLACEMENT  08/25/2016    Cath Stent Placement    OTHER SURGICAL HISTORY  05/25/2022    Oral surgery    SKIN CANCER EXCISION  01/17/2025    right forearm, right shoulder    TONSILLECTOMY  08/25/2016    Tonsillectomy       Social History  Social History     Tobacco Use    Smoking status: Former     Average packs/day: 1.5 packs/day for 50.0 years (75.0 ttl pk-yrs)     Types: Cigarettes     Start date: 1968     Passive exposure: Current    Smokeless tobacco: Never   Vaping Use    Vaping status: Never Used   Substance Use Topics    Alcohol use: Not Currently     Comment: [id number]    Drug use: Never   Worked as a     Family History  family history includes Coronary artery disease in his father and mother; Diabetes in his father; acute myocardial infarction in his father.  The patient does not have any children.       Current Medications  Current Outpatient Medications   Medication Instructions    alcohol swabs (BD Alcohol Swabs) pads, medicated Use twice daily while checking sugar.    alum-mag hydroxide-simeth (Mylanta) 200-200-20 mg/5 mL oral suspension 30 mL, oral, 4 times daily PRN    amLODIPine (NORVASC) 5 mg, oral, Daily    apixaban (Eliquis) 5 mg tablet Take 2 tablets (10 mg) by mouth 2 times a day for 7 days, THEN 1 tablet (5 mg) 2 times a day.    BD Jessica 2nd Gen Pen Needle 32 gauge x 5/32\" needle USE 1 PEN NEEDLE SIX TIMES DAILY    BD Ultra-Fine Mini Pen Needle 31 gauge x 3/16\" needle     blood glucose control high,low (Accu-Chek Guide L1-L2 Ctrl Sol) solution 1 bottle, miscellaneous, Daily PRN    blood-glucose meter (Accu-Chek Guide Glucose Meter) misc Test twice daily    buPROPion XL (WELLBUTRIN XL) 150 mg, oral, 2 times daily, Do not crush, chew, or split.    clopidogrel (PLAVIX) 75 mg, oral, Daily    clotrimazole-betamethasone (Lotrisone) cream 1 " Application, Topical, 2 times daily    ezetimibe (ZETIA) 10 mg, oral, Daily    HumaLOG KwikPen Insulin 100 unit/mL pen INJECT 20 UNITS SUBCUTANEOUSLY WITH MEALS AND SLIDING SCALE 2/50 > 150 AS DIRECTED.  UNITS PER DAY    hydrOXYzine HCL (ATARAX) 25 mg, oral, 2 times daily    insulin glargine (LANTUS) 32 Units, subcutaneous, Daily, Take as directed per insulin instructions.    insulin lispro 15 Units, subcutaneous, 3 times daily before meals, Take as directed per insulin instructions.    isosorbide mononitrate ER (IMDUR) 60 mg, oral, Daily, Do not crush or chew.    lancets (Accu-Chek Softclix Lancets) misc Test twice daily    lovastatin (MEVACOR) 40 mg, oral, Nightly    magnesium hydroxide (Milk of Magnesia) 400 mg/5 mL suspension 30 mL, oral, Daily PRN    nitroglycerin (NITROSTAT) 0.4 mg, sublingual, Every 5 min PRN, DO NOT EXCEED A TOTAL OF 3 DOSES IN 15 MINUTES    ondansetron (ZOFRAN) 8 mg, oral, Every 8 hours PRN    pantoprazole (PROTONIX) 40 mg, oral, Daily before breakfast, Do not crush, chew, or split.    prochlorperazine (COMPAZINE) 10 mg, oral, Every 6 hours PRN    venlafaxine XR (EFFEXOR-XR) 225 mg, oral, Daily, Do not crush or chew.           OARRS Review  I have personally reviewed the OARRS report for Feliciano Worthington. I have considered the risks of abuse, dependence, addiction and diversion.  Review of this record shows that he had gabapentin a year ago.    Vital Signs  /79   Pulse 82   Temp 36.3 °C (97.3 °F) (Skin)   Resp 16   Wt 97.3 kg (214 lb 6.4 oz)   SpO2 98%   BMI 31.66 kg/m²      Physical Exam  Vitals and nursing note reviewed. Exam conducted with a chaperone present.   Constitutional:       General: He is not in acute distress.     Appearance: He is ill-appearing. He is not toxic-appearing.      Comments: He is a well-developed well-nourished white male who is in no acute distress.  He does not appear to be acutely or chronically ill.  He has many areas of sun damaged skin.   He is accompanied by his wife and his friend Delia.  He is depressed in his affect   HENT:      Head: Normocephalic and atraumatic.      Nose: Nose normal.      Mouth/Throat:      Mouth: Mucous membranes are moist.      Pharynx: Oropharynx is clear. No oropharyngeal exudate.      Comments: Multiple carious teeth, none of which per patient are painful  Eyes:      General: No scleral icterus.     Extraocular Movements: Extraocular movements intact.      Conjunctiva/sclera: Conjunctivae normal.      Pupils: Pupils are equal, round, and reactive to light.   Neck:      Comments: There is no significant adenopathy about the head and neck region, supraclavicular axillary or inguinal regions bilaterally.  Cardiovascular:      Rate and Rhythm: Normal rate and regular rhythm.      Heart sounds: Murmur heard.   Pulmonary:      Effort: Pulmonary effort is normal. No respiratory distress.      Breath sounds: Rales (cleared wiyth cough) present. No wheezing or rhonchi.   Abdominal:      General: There is no distension.      Palpations: Abdomen is soft. There is no mass.      Tenderness: There is abdominal tenderness (mild tenderness on right side). There is no guarding or rebound.   Musculoskeletal:         General: Normal range of motion.      Cervical back: Normal range of motion. No tenderness.      Right lower leg: No edema.      Left lower leg: No edema.   Lymphadenopathy:      Cervical: No cervical adenopathy.   Skin:     General: Skin is warm and dry.      Coloration: Skin is not jaundiced or pale.      Findings: No lesion (small scar on right forearm).      Comments: Multiple areas of sun damaged skin and a healed lesion on his right forearm from which he said a skin cancer had been resected.  There is still a bump there.     Neurological:      Mental Status: He is alert and oriented to person, place, and time. Mental status is at baseline.      Sensory: Sensory deficit present.      Motor: No weakness.      Gait: Gait  normal.      Comments: Somewhat hard of hearing   Psychiatric:         Mood and Affect: Mood normal.         Behavior: Behavior normal.         Thought Content: Thought content normal.         Judgment: Judgment normal.      Comments: Appropriately saddened        Current Laboratory Data  See narrative for laboratory data    Recent Imaging  See narrative for recent imaging.    Cardiology, Vascular, and Other Imaging  No other imaging results found for the past 7 days      Pathology  Metastatic adenocarcinoma of the pancreas to the liver and to the lung.    Performance Status:  Symptomatic; fully ambulatory    Assessment/Plan    1.  Metastatic adenocarcinoma of the pancreas to liver and possibly to lung.  The patient is to decide whether or not he wants primary tumor directed therapy or palliative care.  He will return to the office after his trip to [place] to discuss next steps.    He is back from [place].  We discussed the various options for treatment.  He is not sure what he wants to do.  He is leaning against chemotherapy.  We are going to look for a mutational target with next generation sequencing in blood.    CAT scan showed progressive disease in the liver and pancreas.  Patient wants to go forward with therapy.  We discussed the regimen in detail as well as how cytotoxic chemotherapy works and we have gone through the benefits and side effects.  He has signed the informed consent document.  He has had his chemotherapy teaching and antiemetics are available for him at home.  He knows to call if he has any problems with side effects or change in his status.  Will see him again in 2 weeks.    2.  Multiple comorbidities.  These include but are not limited to:   Diabetes  History of obesity  Atherosclerosis  COPD  Depression  Hyperlipidemia  Hypertension  Osteoarthritis with cervical spine stenosis  Achalasia  Astigmatism  Cataracts  Chronic pain  Long-term history of smoking of about a pack and half cigarettes  a day for 52 years  GERD  Multiple lung nodules  Sleep apnea  Vitamin D deficiency  Coronary artery disease with history of myocardial infarction status post stenting in 2007 and 2011  Skin cancers  BPH and penile prosthesis implant  Rotator cuff tendinopathy, hip pain, knee pain  Intermittent hyponatremia  Statin intolerance.  History of noncompliance    3.  Sugars out-of-control.  He has been working with primary care and endocrinology.  They are much better now.    4.  COPD.  He has had increased shortness of breath, cough, mucus production.  Encouraged him to use Mucinex and keep hydrated.    5.  Increasing pain.  This is in his shoulders and also in his chest.  We are going to reevaluate him to see if there are signs of progressive disease that might inform him in his decision making.    He will have his first treatment today.  Will see him again in 2 weeks.  They know to call in the interim should he have any change in status, questions or concerns.      Lizbeth Martin MD

## 2025-06-06 ENCOUNTER — TELEPHONE (OUTPATIENT)
Dept: PRIMARY CARE | Facility: CLINIC | Age: 75
End: 2025-06-06
Payer: MEDICARE

## 2025-06-06 NOTE — TELEPHONE ENCOUNTER
Last OV: 4-  Pending OV: 6-    Received call from [facility] [phone]    Pt Dx: COPD not on RX long lasting maintenance inhaler recommended by guidelines for treating COPD/Prevention.    Fax sent to [facility] office Document with Guidelines

## 2025-06-09 ENCOUNTER — TELEPHONE (OUTPATIENT)
Dept: PRIMARY CARE | Facility: CLINIC | Age: 75
End: 2025-06-09
Payer: MEDICARE

## 2025-06-09 DIAGNOSIS — J44.9 CHRONIC OBSTRUCTIVE PULMONARY DISEASE, UNSPECIFIED COPD TYPE (MULTI): Primary | ICD-10-CM

## 2025-06-09 NOTE — TELEPHONE ENCOUNTER
"Received fax from [facility] stating \" According to our records, your patient has a diagnosis of COPD but does not have a prescription for a long acting beta agonist and/ or long acting muscarinic antagonist medication. Recommended medication:   >LABA Striverdi Respimat 2 puffs daily  >LAMA Spiriva Respimat 2 puffs daily  > Spiriva Handihaler 1 cap 2 puffs daily  >LABA/LAMA Stiolto Respimat 2 puffs daily  Tasked to provider  "

## 2025-06-12 ENCOUNTER — INFUSION (OUTPATIENT)
Dept: HEMATOLOGY/ONCOLOGY | Facility: CLINIC | Age: 75
End: 2025-06-12
Payer: MEDICARE

## 2025-06-12 DIAGNOSIS — B05.9 MEASLES: ICD-10-CM

## 2025-06-12 DIAGNOSIS — C78.7 PANCREATIC CANCER METASTASIZED TO LIVER (MULTI): ICD-10-CM

## 2025-06-12 DIAGNOSIS — Z11.59 MEASLES SCREENING: ICD-10-CM

## 2025-06-12 DIAGNOSIS — C25.9 PANCREATIC CANCER METASTASIZED TO LIVER (MULTI): ICD-10-CM

## 2025-06-12 LAB
ALBUMIN SERPL BCP-MCNC: 3.5 G/DL (ref 3.4–5)
ALP SERPL-CCNC: 114 U/L (ref 33–136)
ALT SERPL W P-5'-P-CCNC: 55 U/L (ref 10–52)
ANION GAP SERPL CALC-SCNC: 11 MMOL/L (ref 10–20)
AST SERPL W P-5'-P-CCNC: 29 U/L (ref 9–39)
BASOPHILS # BLD MANUAL: 0 X10*3/UL (ref 0–0.1)
BASOPHILS NFR BLD MANUAL: 0 %
BILIRUB SERPL-MCNC: 0.3 MG/DL (ref 0–1.2)
BUN SERPL-MCNC: 13 MG/DL (ref 6–23)
CALCIUM SERPL-MCNC: 8.7 MG/DL (ref 8.6–10.3)
CHLORIDE SERPL-SCNC: 99 MMOL/L (ref 98–107)
CO2 SERPL-SCNC: 27 MMOL/L (ref 21–32)
CREAT SERPL-MCNC: 0.88 MG/DL (ref 0.5–1.3)
EGFRCR SERPLBLD CKD-EPI 2021: 90 ML/MIN/1.73M*2
EOSINOPHIL # BLD MANUAL: 0.41 X10*3/UL (ref 0–0.4)
EOSINOPHIL NFR BLD MANUAL: 3 %
ERYTHROCYTE [DISTWIDTH] IN BLOOD BY AUTOMATED COUNT: 13 % (ref 11.5–14.5)
GLUCOSE SERPL-MCNC: 62 MG/DL (ref 74–99)
HCT VFR BLD AUTO: 36.4 % (ref 41–52)
HGB BLD-MCNC: 12 G/DL (ref 13.5–17.5)
IMM GRANULOCYTES # BLD AUTO: 0.83 X10*3/UL (ref 0–0.5)
IMM GRANULOCYTES NFR BLD AUTO: 6.2 % (ref 0–0.9)
LYMPHOCYTES # BLD MANUAL: 3.78 X10*3/UL (ref 0.8–3)
LYMPHOCYTES NFR BLD MANUAL: 28 %
MCH RBC QN AUTO: 27.5 PG (ref 26–34)
MCHC RBC AUTO-ENTMCNC: 33 G/DL (ref 32–36)
MCV RBC AUTO: 83 FL (ref 80–100)
METAMYELOCYTES # BLD MANUAL: 0.14 X10*3/UL
METAMYELOCYTES NFR BLD MANUAL: 1 %
MONOCYTES # BLD MANUAL: 0.68 X10*3/UL (ref 0.05–0.8)
MONOCYTES NFR BLD MANUAL: 5 %
MYELOCYTES # BLD MANUAL: 0.14 X10*3/UL
MYELOCYTES NFR BLD MANUAL: 1 %
NEUTROPHILS # BLD MANUAL: 8.37 X10*3/UL (ref 1.6–5.5)
NEUTS BAND # BLD MANUAL: 0.54 X10*3/UL (ref 0–0.5)
NEUTS BAND NFR BLD MANUAL: 4 %
NEUTS SEG # BLD MANUAL: 7.83 X10*3/UL (ref 1.6–5)
NEUTS SEG NFR BLD MANUAL: 58 %
NRBC BLD-RTO: 0 /100 WBCS (ref 0–0)
PLATELET # BLD AUTO: 298 X10*3/UL (ref 150–450)
POTASSIUM SERPL-SCNC: 4.1 MMOL/L (ref 3.5–5.3)
PROT SERPL-MCNC: 6.2 G/DL (ref 6.4–8.2)
RBC # BLD AUTO: 4.37 X10*6/UL (ref 4.5–5.9)
RBC MORPH BLD: ABNORMAL
SODIUM SERPL-SCNC: 133 MMOL/L (ref 136–145)
TOTAL CELLS COUNTED BLD: 100
WBC # BLD AUTO: 13.5 X10*3/UL (ref 4.4–11.3)

## 2025-06-12 PROCEDURE — 80053 COMPREHEN METABOLIC PANEL: CPT

## 2025-06-12 PROCEDURE — 36415 COLL VENOUS BLD VENIPUNCTURE: CPT

## 2025-06-12 PROCEDURE — 85027 COMPLETE CBC AUTOMATED: CPT

## 2025-06-12 PROCEDURE — 85007 BL SMEAR W/DIFF WBC COUNT: CPT

## 2025-06-13 ENCOUNTER — INFUSION (OUTPATIENT)
Dept: HEMATOLOGY/ONCOLOGY | Facility: CLINIC | Age: 75
End: 2025-06-13
Payer: MEDICARE

## 2025-06-13 ENCOUNTER — OFFICE VISIT (OUTPATIENT)
Dept: HEMATOLOGY/ONCOLOGY | Facility: CLINIC | Age: 75
End: 2025-06-13
Payer: MEDICARE

## 2025-06-13 VITALS
TEMPERATURE: 96.3 F | WEIGHT: 211.4 LBS | DIASTOLIC BLOOD PRESSURE: 70 MMHG | SYSTOLIC BLOOD PRESSURE: 124 MMHG | BODY MASS INDEX: 31.22 KG/M2 | OXYGEN SATURATION: 99 % | HEART RATE: 75 BPM | RESPIRATION RATE: 16 BRPM

## 2025-06-13 VITALS — RESPIRATION RATE: 16 BRPM | SYSTOLIC BLOOD PRESSURE: 125 MMHG | HEART RATE: 69 BPM | DIASTOLIC BLOOD PRESSURE: 68 MMHG

## 2025-06-13 DIAGNOSIS — C25.9 PANCREATIC CANCER METASTASIZED TO LIVER (MULTI): ICD-10-CM

## 2025-06-13 DIAGNOSIS — C78.7 PANCREATIC CANCER METASTASIZED TO LIVER (MULTI): ICD-10-CM

## 2025-06-13 DIAGNOSIS — E11.65 TYPE 2 DIABETES MELLITUS WITH HYPERGLYCEMIA, WITH LONG-TERM CURRENT USE OF INSULIN: ICD-10-CM

## 2025-06-13 DIAGNOSIS — Z79.4 TYPE 2 DIABETES MELLITUS WITH HYPERGLYCEMIA, WITH LONG-TERM CURRENT USE OF INSULIN: ICD-10-CM

## 2025-06-13 PROCEDURE — 1159F MED LIST DOCD IN RCRD: CPT | Performed by: INTERNAL MEDICINE

## 2025-06-13 PROCEDURE — 3046F HEMOGLOBIN A1C LEVEL >9.0%: CPT | Performed by: INTERNAL MEDICINE

## 2025-06-13 PROCEDURE — 96375 TX/PRO/DX INJ NEW DRUG ADDON: CPT

## 2025-06-13 PROCEDURE — 99214 OFFICE O/P EST MOD 30 MIN: CPT | Mod: 25 | Performed by: INTERNAL MEDICINE

## 2025-06-13 PROCEDURE — 3078F DIAST BP <80 MM HG: CPT | Performed by: INTERNAL MEDICINE

## 2025-06-13 PROCEDURE — 3050F LDL-C >= 130 MG/DL: CPT | Performed by: INTERNAL MEDICINE

## 2025-06-13 PROCEDURE — 3074F SYST BP LT 130 MM HG: CPT | Performed by: INTERNAL MEDICINE

## 2025-06-13 PROCEDURE — 1126F AMNT PAIN NOTED NONE PRSNT: CPT | Performed by: INTERNAL MEDICINE

## 2025-06-13 PROCEDURE — 99214 OFFICE O/P EST MOD 30 MIN: CPT | Performed by: INTERNAL MEDICINE

## 2025-06-13 PROCEDURE — 96417 CHEMO IV INFUS EACH ADDL SEQ: CPT

## 2025-06-13 PROCEDURE — 2500000004 HC RX 250 GENERAL PHARMACY W/ HCPCS (ALT 636 FOR OP/ED): Performed by: INTERNAL MEDICINE

## 2025-06-13 PROCEDURE — 96413 CHEMO IV INFUSION 1 HR: CPT

## 2025-06-13 RX ORDER — ONDANSETRON HYDROCHLORIDE 2 MG/ML
8 INJECTION, SOLUTION INTRAVENOUS ONCE
Status: COMPLETED | OUTPATIENT
Start: 2025-06-13 | End: 2025-06-13

## 2025-06-13 RX ORDER — INSULIN GLARGINE 100 [IU]/ML
20 INJECTION, SOLUTION SUBCUTANEOUS 2 TIMES DAILY
Qty: 29 ML | Refills: 3 | Status: SHIPPED | OUTPATIENT
Start: 2025-06-13

## 2025-06-13 RX ORDER — HEPARIN SODIUM,PORCINE/PF 10 UNIT/ML
50 SYRINGE (ML) INTRAVENOUS AS NEEDED
OUTPATIENT
Start: 2025-06-13

## 2025-06-13 RX ORDER — HEPARIN 100 UNIT/ML
500 SYRINGE INTRAVENOUS AS NEEDED
OUTPATIENT
Start: 2025-06-13

## 2025-06-13 RX ADMIN — GEMCITABINE 2181.2 MG: 38 INJECTION, SOLUTION INTRAVENOUS at 11:57

## 2025-06-13 RX ADMIN — PACLITAXEL 275 MG: 100 INJECTION, POWDER, LYOPHILIZED, FOR SUSPENSION INTRAVENOUS at 11:24

## 2025-06-13 RX ADMIN — ONDANSETRON 8 MG: 2 INJECTION, SOLUTION INTRAMUSCULAR; INTRAVENOUS at 10:03

## 2025-06-13 ASSESSMENT — ENCOUNTER SYMPTOMS
ADENOPATHY: 0
UNEXPECTED WEIGHT CHANGE: 0
LIGHT-HEADEDNESS: 1
MYALGIAS: 1
SLEEP DISTURBANCE: 0
TROUBLE SWALLOWING: 0
HEADACHES: 0
BRUISES/BLEEDS EASILY: 0
WEAKNESS: 0
ABDOMINAL PAIN: 0
SHORTNESS OF BREATH: 1
RHINORRHEA: 0
BLOOD IN STOOL: 0
VOMITING: 0
NUMBNESS: 1
DYSURIA: 0
ARTHRALGIAS: 1
DIARRHEA: 0
APPETITE CHANGE: 0
FREQUENCY: 0
BACK PAIN: 1
NERVOUS/ANXIOUS: 0
NAUSEA: 0
TREMORS: 1
COUGH: 0
CONSTIPATION: 0
PALPITATIONS: 0
FATIGUE: 1

## 2025-06-13 ASSESSMENT — PAIN SCALES - GENERAL: PAINLEVEL_OUTOF10: 0-NO PAIN

## 2025-06-13 NOTE — PROGRESS NOTES
Patient ID:Feliciano Worthington is a 75 y.o. year old male patient with recently diagnosed metastatic pancreatic cancer to the liver.  He also has had recent myocardial infarction and pulmonary emboli.       Referring Physician: Lizbeth Martin MD  [address] [place] [address] [address]  [place] [address]  Primary Care Provider: Hussain Bernal MD    Chief Complaint  Chief Complaint   Patient presents with    Pancreatic cancer metastasized to liver          History of the Present Illness  He is a 75-year-old white male with recent myocardial infarction.  During that hospital stay he was found to have metastatic pancreatic cancer to his liver.  He also had pulmonary emboli.  He is here today with his wife and and his friend Delia.    Chronological History    3/16/2020.  Low dose CT screening for lung cancer showed multiple bilateral noncalcified pulmonary nodules for which annual screening was recommended.  There was mild upper lung predominant centrilobular emphysema and changes consistent with bronchiolitis.  Small hiatal hernia was seen with concentric long segment esophageal wall thickening as well as severe three-vessel coronary artery calcification with calcification throughout the aorta and aortic valve.    4/26/2021.  Low-dose CT for lung cancer screening showed stable multiple pulmonary nodules and multiple changes consistent with COPD emphysema and coronary artery calcifications.    7/28/2021.  CAT scan of the abdomen and pelvis showed moderate concentric wall thickening of the colon from the cecum to the splenic flexure.  Pancreas is normal.  Liver was also normal.    12/27/2022.  Normal perfusion on Lexiscan with left ventricular ejection fraction 63%.    4/3/2023.  Low-dose CT for lung cancer screening showed stable subcentimeter pulmonary nodules.    10/8/2023 through 10/10/2023.  Emergency rooms visit for persistent cough, shortness of breath without chest pressure.  He was treated with antibiotics and steroids for  pneumonia and was admitted to the hospital..  He was discharged on steroids and antibiotics.    10/17/2023.  Emergency department visit for lower chest discomfort which she described as pleurisy with shortness of breath and productive cough he had nausea but no vomiting.  CTA showed no significant interval change since previous examination.  He had changes of COPD and honeycombing in the bases suggestive of fibrosis.  He also had diffuse circumferential mucosal wall thickening of the esophagus and stable multiple nonspecific subcentimeter lung nodules.  He also had fatty liver low-attenuation lesions in the kidney right lower lobe mucous plugging and diffuse bronchiectasis.  Sugar was 281.  CBC was normal except for an elevation in white count 13.2.  He felt comfortable going home and did not want to be readmitted to the hospital and was discharged.    10/24/2023.  Followed up with primary care which time he complained of right upper extremity pain and swelling.  Venous Doppler was ordered which showed right brachial DVT and right basilic superficial venous thrombosis.  He was started on anticoagulation with Eliquis.    11/16/2023.  Saw [facility] neurosurgery and pain management physician for neck pain and back pain and difficulty walking at that time he is still smoking a pack of cigarettes a day.  Physical therapy was ordered.    3/29/2024.  Followed up with primary care at which time he complained of weight loss and change in voice.    5/3/2024.  New primary care physician.  Weight was down to 204 pounds from around 210      5/28/2024.  Low-dose CT for lung cancer screening showed stable pulmonary nodules and multiple other stable findings.    9/3/2024.  Saw Dr. Keith for unintentional weight loss and nausea.  He says that he was not compliant with his insulin dosage and he had uncontrolled diabetes with a hemoglobin A1c of 11.3.  He continued to smoke.  He has chronic hypoxia.  At that time his weight was  193 pounds.  A series of tests were ordered.    1/29/2025.  Weight was down to 188 pounds.  A series of tests were ordered including a colonoscopy and a PSA.    2/5/2025.  Emergency department visit for chest pain for 36 hours.  He had taken multiple doses of nitroglycerin without much relief in his symptoms.  His sugar at that time was 438 and his troponin was 299.  He was trending upward.  Chest x-ray showed mild bilateral opacities and atelectasis versus pneumonia.  Findings were consistent with an NSTEMI and he was treated with a heparin drip and admitted to the hospital.  He was transferred to [facility] cardiac intensive care unit.    2/6/2025.  Left heart cath showed 70% stenosis in the distal left main, 80% stenosis in the proximal LAD, 90% stenosis in the proximal circumflex, 75% stenosis in the proximal ramus, 80% stenosis in the mid ramus, 80% stenosis in the mid RCA, 90% stenosis in the distal RCA and a left ventricular ejection fraction 55%.  Cardiac surgery was consulted for possible CABG he was continued on Zetia.  He had type I second-degree AV block so was not started on beta-blocker.    Spouse said that the patient had poor compliance and problems with short-term memory.  She is concerned about his unintentional 50 pound weight loss over the previous year.  CT scan was ordered.    2/6/2025.  Admitted to the hospital at [facility].  CT scan of the chest showed extensive vascular calcification with stable ectasia of the ascending aorta at 3.9 cm.  There is no thoracic lymphadenopathy.  There were areas of fibrosis and scarring in the lungs unchanged with  partially calcified nodules, most of which were subcentimeter and stable.  There was a new nodular opacity in the medial aspect of the right lower lobe which measured 8 mm.  In the upper abdomen there is a 2.2 cm cyst in the midpole left kidney.  There is also nonspecific prominence/thickening of the tail of pancreas measuring 2.5 cm.  This was a  noncontrast study.  MRI was recommended.    2/7/2025.  Arterial Dopplers of the carotids showed less than 50% stenosis bilaterally    2/10/2025.  MRCP of the pancreas showed a 3.8 cm pancreatic tail hypoattenuating mass suspicious for adenocarcinoma and a 15 mm hepatic segment 6 hypoattenuating mass suspicious for metastasis without definite lymphadenopathy.    During the hospital he was seen by psychiatry and sertraline was discontinued.  He was continued on venlafaxine and then started on bupropion.    He also has some trouble swallowing and GI was consulted.  Further exploration of coronary artery bypass was delayed because of the new finding of metastatic pancreatic cancer.  Arrangements were made for him to be transferred to [facility].    2/14 through 2/18/2025.  Hospitalized at [facility].  There it was determined that his liver biopsy would be done as an outpatient with oncology referral afterwards.    2/27/2025.  Follow-up with primary care.    3/13/2025.  Liver biopsy done by interventional radiology  A. LIVER BIOPSY RIGHT:   -Moderately differentiated adenocarcinoma with mucin involving liver, see note     Note: Immunohistochemical stains show that the tumor cells are positive for CK7 and focally positive for CK20. They are negative for CDX2 and TTF-1. Based on the morphologic findings, immunophenotype, and clinical history of a newly identified pancreatic mass, the findings are most consistent with metastatic pancreatic adenocarcinoma.    3/20/2025 through 3/22/2025..  Admitted to the [facility] from emergency department visit for chest pain which she described as sharp and left-sided nonradiating lasting for an hour.  It is not exertional and felt different from his previous heart attack CT angiogram showed several small pulmonary emboli in the left lung with features suggesting that they are chronic.  Lung fibrosis was seen and 2 enlarging nodules were seen in the right lower lobe and 1 enlarging  nodule in the left lower lobe.  He also had NSTEMI and was transferred to [facility] for urgent evaluation for possible CABG.  He was evaluated by oncology during the admission.  Estimate his survival to be less than 6 months without treatment in 8 to 10 months with treatment.  Arrangements were made for him to follow-up in [place] and also to have genetic counseling for his new diagnosis of metastatic pancreatic cancer.  He was not felt to be a candidate for cardiac surgery.  He was discharged on Eliquis and Plavix and instructed to stop taking aspirin.    4/2/2025.  He is here with his wife and him and his friend Delia.  We discussed the recent multiple severe problems that he has.  We discussed these problems openly.  He knows that he cannot be cured of his metastatic pancreatic cancer but we could offer him treatment.  We talked about the benefits and side effects of treatment and that this was a very personal important decision for him to make as to whether or not he wanted to be treated with aggressive chemotherapy or wanted palliative care.  He already has plans for a trip to [place] with friends and family.  We have asked him to come back and see us after he returns to the area.  We talked about symptom control versus tumor directed therapy.  Multiple questions were answered to their satisfaction.  We introduced into the NCCN guidelines for patients.  He will come back and see us in 5 weeks after his trip and knows that he can call in the interim should he have any questions or concerns.    4/30/2025.  Saw his primary care physician Dr. Walters for anxiety.  He reviewed his to recent hospital stays and his biopsy which showed pancreatic cancer in his liver complicated by myocardial infarction and pulmonary embolus.  He increased his Wellbutrin to 150 mg twice a day and increased his Effexor to 225 mg daily and referred him for cognitive behavioral therapy.  He will continue hydroxyzine as needed.  His diabetes  is uncontrolled.  He was following with an endocrinologist.  His insulin was adjusted.  His daily glucose  readings were improving.  He had no further bouts of chest pain and he had no bleeding.  He complained of a rash on his lower leg on the right for which he was given a combination steroid/antifungal topical treatment.    5/7/2025.  He is here with his wife and friend.  He said that he had a wonderful time in [place].  His appetite had been good and he gained 9 pounds.  He has multiple complaints.    He said that his balance is off.  He has had no falls.  Friend says that he holds onto furniture, furniture surfing.  He did lose his balance and fell into a chair did not injure himself.  He notes that he is shaking.  We discussed that holding still is a matter of equal and opposed movements which may be compromised as he becomes weaker.  Supervised understanding.  He says that he is getting weaker.  He has been fairly sedentary.  I encouraged him to be physically active is much as possible.  He says occasionally he has trouble swallowing.  He says that he is not excited about having an EGD with dilatation.  He will let us know if it gets worse.  He said that he did have a bout of chest discomfort last week which that he thought might be gas but he did take nitroglycerin and it helped.  He also has had pain in her shoulders and he has had discomfort in his shoulders for a while.    We discussed the diagnosis of metastatic pancreatic cancer and the available treatments.  He is still thinking about whether or not he wants to do anything.  We discussed combination cytotoxic chemotherapy and offered him next generation sequencing to see if we could find a target.  He agreed to this.  We discussed that if we are going to do any treatment we should get new baseline studies.  He says that he has been more short of breath than usual.  He continues to smoke.  We discussed that this cannot be good for his lungs but he is not  interested in quitting.  We have also discussed getting blood work today for routine baseline such as CBC and CMP and he agrees to this.  We will have him come back in 2 weeks to follow-up.  He says that he is leaning away from taking chemotherapy.    Laboratory data today showed a sugar of 208 and a sodium of 134 and was otherwise within normal limits with a total protein of 6.5.  His CBC was normal except for mild decrease in hemoglobin at 13.2 previously 13.8.  Hematocrit is 39.2 previously was 41.2.  Platelets are normal white blood cell differential count is normal.    5/16/2025.  CAT scans of the chest abdomen and pelvis showed interval worsening of nodular enhancement of the right sided pleura and bilateral pulmonary nodules.  Subpleural reticulations are similar to the prior examination.  Chronic left-sided pulmonary emboli have slightly improved.  Moderate atherosclerotic change was again noted.  Heart was normal size without pericardial effusion.  Scattered mediastinal lymph nodes were unchanged but there was diffuse thickening of the distal esophagus consistent with esophagitis.  Multiple ill-defined hypoattenuating lesions throughout the liver persisted.  In the pancreas an ill-defined hypoattenuating lesion of the pancreatic body and tail measured 5.7 x 2.8 with circumferential involvement of the splenic artery and vein.  There is close proximity of this pancreatic lesion to the left adrenal gland.  Spleen was normal kidneys were normal.    5/21/2025.  He is here today with his wife and friend.  We reviewed the reports of his Scans and he knows that he has had progression in the lungs and in the liver and in the pancreas.  He did not want to do the images.  He has decided that he wants to go forward with Gemzar and Abraxane.  We reviewed the benefits and side effects of this medication regimen.  He understands that he will have multiple blood tests which will occur before his treatment at [facility] and  before we give him another treatment.  He understands that he will take 1 treatment at a time and if he does not want to take anymore we will certainly stop.  He and his wife and friend had time to ask questions which were answered to their satisfaction.  Treatment plan was entered for insurance approval.  The informed consent document was signed.  He will have a chemotherapy teaching class.  We will start him on his treatment as soon as it is approved.  Antinausea medicines were sent to his pharmacy.  We will see him the day of his treatment to make sure that he wants to go forward.      5/29/2025.  Laboratory data showed normal serum chemistries with the exception of a sugar 154.  CBC was normal.    5/30/2025.  He is here to begin his treatment.  He has no further questions.  He has lost a pound since last time I saw him.  He has had his chemotherapy teaching teaching class.  Will go ahead with his treatment today.  He has his antiemetics at home.  We reviewed that he should call if he has any questions concerns or change in his status.  We will see him again in 2 weeks.    Interval Note  6/12/2025.  Laboratory data showed a sugar of 62 with a sodium of 133.  ALT was elevated at 55 and had been 14.  Bilirubin was normal.  Total protein was lower at 6.2 down from 6.8.  CBC showed a white count of 13.5 hemoglobin 12 hematocrit 36.4 and a platelet count 298,000 with 58 polys 28 lymphs 5 monos 3 eosinophils 1 metamyelocyte 1 myelocyte.    6/13/2025.  He is here today for his second infusion of chemotherapy.  He did well and had no problems with infection or bleeding.  He did have some irregularities with his stool.  He had nausea and vomiting.  I went over his antiemetic regimen again.  He had diarrhea and did not take Imodium and did not call for help.  They now have Imodium on hand and I recommended that they take it to keep the diarrhea under control.  He currently weighs 211 pounds.  On his last visit he weighed  215.  I encouraged him to be more mobile.  He has spent most of this time horizontal and was concerned about dizziness.  I told him that he will have more difficulty walking and not being stable if he continues this habit.  He verbalized understanding.  We know that he he and his wife are having trouble coping.  They had been AA members in the past and I suggested that they might want to seek this support again.  He verbalized that he does want to go ahead with his treatment today.  We will see him again in 2 weeks.      Comorbidities  Diabetes  History of obesity  Atherosclerosis  COPD  Depression  Hyperlipidemia  Hypertension  Osteoarthritis with cervical spine stenosis  Achalasia  Astigmatism  Cataracts  Chronic pain  Long-term history of smoking of about a pack and half cigarettes a day for 52 years  GERD  Multiple lung nodules  Sleep apnea  Vitamin D deficiency  Coronary artery disease with history of myocardial infarction status post stenting in 2007 and 2011  Skin cancers  BPH and penile prosthesis implant  Rotator cuff tendinopathy, hip pain, knee pain  Intermittent hyponatremia  Statin intolerance.  History of noncompliance    Monitoring Parameters  History and physical examinations, CAT scans PET scans MRIs    Review of Systems - Oncology   Review of Systems   Constitutional:  Positive for fatigue. Negative for appetite change and unexpected weight change (has lost 3lbs since last).   HENT:  Negative for dental problem, mouth sores, rhinorrhea and trouble swallowing.    Eyes:  Negative for visual disturbance.   Respiratory:  Positive for shortness of breath. Negative for cough.    Cardiovascular:  Positive for chest pain (episode last week and he use nitroglycerin and it helped.). Negative for palpitations and leg swelling.   Gastrointestinal:  Negative for abdominal pain, blood in stool, constipation, diarrhea, nausea and vomiting.   Endocrine: Negative for polyuria.   Genitourinary:  Negative for  "dysuria, frequency and urgency.   Musculoskeletal:  Positive for arthralgias, back pain and myalgias (shoulder pain has increased).   Skin:  Negative for pallor and rash.   Neurological:  Positive for tremors (\"shakes\"), light-headedness (balance problem) and numbness. Negative for weakness and headaches.   Hematological:  Negative for adenopathy. Does not bruise/bleed easily.   Psychiatric/Behavioral:  Negative for self-injury, sleep disturbance and suicidal ideas. The patient is not nervous/anxious.         Past Medical History  Past Medical History:   Diagnosis Date    Acute myocardial infarction, unspecified 01/24/2018    Heart attack    Acute myocardial infarction, unspecified 01/24/2018    Heart attack    BMI 29.0-29.9,adult 10/13/2023    Chronic obstructive pulmonary disease, unspecified 01/24/2018    COPD, mild    Chronic obstructive pulmonary disease, unspecified 01/24/2018    COPD, mild    Coronary angioplasty status     Post PTCA    Encounter for immunization 11/04/2022    Encounter for immunization    Encounter for screening for malignant neoplasm of colon 08/25/2016    Encounter for colonoscopy due to history of colonic polyp    Encounter for screening for malignant neoplasm of skin 04/14/2022    Skin cancer screening    Nicotine dependence, cigarettes, uncomplicated 09/12/2022    Cigarette smoker    Obesity, unspecified 05/25/2022    Class 1 obesity with body mass index (BMI) of 31.0 to 31.9 in adult    Old myocardial infarction 01/24/2018    History of myocardial infarction    Other acute postprocedural pain     Post-operative pain    Pain in left knee     Left knee pain, unspecified chronicity    Pain in right hip 11/04/2022    Hip pain, bilateral    Personal history of other diseases of the digestive system 01/24/2018    History of gastroesophageal reflux (GERD)    Personal history of other diseases of the musculoskeletal system and connective tissue 08/25/2016    History of low back pain    " Personal history of other diseases of the respiratory system     History of sinusitis    Personal history of other endocrine, nutritional and metabolic disease 08/25/2016    History of hyperlipidemia    Personal history of other mental and behavioral disorders 04/14/2022    History of depression    Personal history of other specified conditions 12/08/2016    History of diarrhea    Personal history of other specified conditions     History of prolonged Q-T interval on ECG    Personal history of other specified conditions 06/20/2018    History of nocturia    Separation of muscle (nontraumatic), other site 05/14/2018    Rectus diastasis    Sprain of medial collateral ligament of left knee, initial encounter 08/28/2017    Sprain of medial collateral ligament of left knee, initial encounter    Sprain of medial collateral ligament of left knee, subsequent encounter 10/12/2017    Sprain of medial collateral ligament of left knee, subsequent encounter    Type 2 diabetes mellitus without complications 01/24/2018    Diabetes mellitus    Type 2 diabetes mellitus without complications 01/24/2018    Diabetes mellitus    Umbilical hernia without obstruction or gangrene 05/14/2018    Umbilical hernia without obstruction and without gangrene        Surgical History  Past Surgical History:   Procedure Laterality Date    CARDIAC CATHETERIZATION N/A 2/6/2025    Procedure: Left Heart Cath, With LV;  Surgeon: Wil Bronson MD;  Location: [facility];  Service: Cardiovascular;  Laterality: N/A;    COLONOSCOPY  10/06/2016    Colonoscopy    CORONARY ANGIOPLASTY WITH STENT PLACEMENT  08/25/2016    Cath Stent Placement    OTHER SURGICAL HISTORY  05/25/2022    Oral surgery    SKIN CANCER EXCISION  01/17/2025    right forearm, right shoulder    TONSILLECTOMY  08/25/2016    Tonsillectomy       Social History  Social History     Tobacco Use    Smoking status: Former     Average packs/day: 1.5 packs/day for 50.0 years (75.0  "ttl pk-yrs)     Types: Cigarettes     Start date: 1968     Passive exposure: Current    Smokeless tobacco: Never   Vaping Use    Vaping status: Never Used   Substance Use Topics    Alcohol use: Not Currently     Comment: [id number]    Drug use: Never   Worked as a     Family History  family history includes Coronary artery disease in his father and mother; Diabetes in his father; acute myocardial infarction in his father.  The patient does not have any children.       Current Medications  Current Outpatient Medications   Medication Instructions    alcohol swabs (BD Alcohol Swabs) pads, medicated Use twice daily while checking sugar.    alum-mag hydroxide-simeth (Mylanta) 200-200-20 mg/5 mL oral suspension 30 mL, oral, 4 times daily PRN    amLODIPine (NORVASC) 5 mg, oral, Daily    apixaban (Eliquis) 5 mg tablet Take 2 tablets (10 mg) by mouth 2 times a day for 7 days, THEN 1 tablet (5 mg) 2 times a day.    BD Jessica 2nd Gen Pen Needle 32 gauge x 5/32\" needle USE 1 PEN NEEDLE SIX TIMES DAILY    BD Ultra-Fine Mini Pen Needle 31 gauge x 3/16\" needle     blood glucose control high,low (Accu-Chek Guide L1-L2 Ctrl Sol) solution 1 bottle, miscellaneous, Daily PRN    blood-glucose meter (Accu-Chek Guide Glucose Meter) misc Test twice daily    buPROPion XL (WELLBUTRIN XL) 150 mg, oral, 2 times daily, Do not crush, chew, or split.    clopidogrel (PLAVIX) 75 mg, oral, Daily    clotrimazole-betamethasone (Lotrisone) cream 1 Application, Topical, 2 times daily    ezetimibe (ZETIA) 10 mg, oral, Daily    HumaLOG KwikPen Insulin 100 unit/mL pen INJECT 20 UNITS SUBCUTANEOUSLY WITH MEALS AND SLIDING SCALE 2/50 > 150 AS DIRECTED.  UNITS PER DAY    hydrOXYzine HCL (ATARAX) 25 mg, oral, 2 times daily    insulin glargine (LANTUS) 32 Units, subcutaneous, Daily, Take as directed per insulin instructions.    insulin lispro 15 Units, subcutaneous, 3 times daily before meals, Take as directed per insulin instructions.    isosorbide " mononitrate ER (IMDUR) 60 mg, oral, Daily, Do not crush or chew.    lancets (Accu-Chek Softclix Lancets) misc Test twice daily    lovastatin (MEVACOR) 40 mg, oral, Nightly    magnesium hydroxide (Milk of Magnesia) 400 mg/5 mL suspension 30 mL, oral, Daily PRN    nitroglycerin (NITROSTAT) 0.4 mg, sublingual, Every 5 min PRN, DO NOT EXCEED A TOTAL OF 3 DOSES IN 15 MINUTES    ondansetron (ZOFRAN) 8 mg, oral, Every 8 hours PRN    pantoprazole (PROTONIX) 40 mg, oral, Daily before breakfast, Do not crush, chew, or split.    prochlorperazine (COMPAZINE) 10 mg, oral, Every 6 hours PRN    venlafaxine XR (EFFEXOR-XR) 225 mg, oral, Daily, Do not crush or chew.         OARRS Review  I have personally reviewed the OARRS report for Feliciano Worthington. I have considered the risks of abuse, dependence, addiction and diversion.  Review of this record shows that he had gabapentin a year ago.    Vital Signs  /70   Pulse 75   Temp 35.7 °C (96.3 °F) (Skin)   Resp 16   Wt 95.9 kg (211 lb 6.4 oz)   SpO2 99%   BMI 31.22 kg/m²      Physical Exam  Vitals and nursing note reviewed. Exam conducted with a chaperone present.   Constitutional:       General: He is not in acute distress.     Appearance: He is ill-appearing. He is not toxic-appearing.      Comments: He is a well-developed well-nourished white male who is in no acute distress.  He does not appear to be acutely ill.  He does seem to be somewhat chronically ill.  He has many areas of sun damaged skin.  He is accompanied by his wife.  He is depressed in his affect   HENT:      Head: Normocephalic and atraumatic.      Nose: Nose normal.      Mouth/Throat:      Mouth: Mucous membranes are moist.      Pharynx: Oropharynx is clear. No oropharyngeal exudate.      Comments: Multiple carious teeth, none of which per patient are painful  Eyes:      General: No scleral icterus.     Extraocular Movements: Extraocular movements intact.      Conjunctiva/sclera: Conjunctivae normal.       Pupils: Pupils are equal, round, and reactive to light.   Neck:      Comments: There is no significant adenopathy about the head and neck region, supraclavicular axillary or inguinal regions bilaterally.  Cardiovascular:      Rate and Rhythm: Normal rate and regular rhythm.      Heart sounds: Murmur heard.   Pulmonary:      Effort: Pulmonary effort is normal. No respiratory distress.      Breath sounds: Rales (cleared with cough) present. No wheezing or rhonchi.   Abdominal:      General: There is no distension.      Palpations: Abdomen is soft. There is no mass.      Tenderness: There is abdominal tenderness (mild tenderness on right side). There is no guarding or rebound.   Musculoskeletal:         General: Normal range of motion.      Cervical back: Normal range of motion. No tenderness.      Right lower leg: No edema.      Left lower leg: No edema.   Lymphadenopathy:      Cervical: No cervical adenopathy.   Skin:     General: Skin is warm and dry.      Coloration: Skin is not jaundiced or pale.      Findings: No lesion (small scar on right forearm).      Comments: Multiple areas of sun damaged skin and a healed lesion on his right forearm from which he said a skin cancer had been resected.  There is still a bump there.     Neurological:      Mental Status: He is alert and oriented to person, place, and time. Mental status is at baseline.      Sensory: Sensory deficit present.      Motor: No weakness.      Gait: Gait normal.      Comments: Somewhat hard of hearing   Psychiatric:         Mood and Affect: Mood normal.         Behavior: Behavior normal.         Thought Content: Thought content normal.         Judgment: Judgment normal.      Comments: Appropriately saddened        Current Laboratory Data  See narrative for laboratory data    Recent Imaging  See narrative for recent imaging.    Cardiology, Vascular, and Other Imaging  No other imaging results found for the past 7 days      Pathology  Metastatic  adenocarcinoma of the pancreas to the liver and to the lung.    Performance Status:  Symptomatic; fully ambulatory    Assessment/Plan    1.  Metastatic adenocarcinoma of the pancreas to liver and possibly to lung.  The patient is to decide whether or not he wants primary tumor directed therapy or palliative care.    He is back from [place].  We discussed the various options for treatment.  He is not sure what he wants to do.  He is leaning against chemotherapy.  We are going to look for a mutational target with next generation sequencing in blood.    CAT scan showed progressive disease in the liver and pancreas.  Patient wants to go forward with therapy.  We discussed the regimen in detail as well as how cytotoxic chemotherapy works and we have gone through the benefits and side effects.  He has signed the informed consent document.    He has tolerated his first treatment well.  He is ready to go ahead with his second treatment.  I reinforced that he takes 1 treatment at a time and he can decide whether or not he wants to continue.    2.  Multiple comorbidities.  These include but are not limited to:   Diabetes  History of obesity  Atherosclerosis  COPD  Depression  Hyperlipidemia  Hypertension  Osteoarthritis with cervical spine stenosis  Achalasia  Astigmatism  Cataracts  Chronic pain  Long-term history of smoking of about a pack and half cigarettes a day for 52 years  GERD  Multiple lung nodules  Sleep apnea  Vitamin D deficiency  Coronary artery disease with history of myocardial infarction status post stenting in 2007 and 2011  Skin cancers  BPH and penile prosthesis implant  Rotator cuff tendinopathy, hip pain, knee pain  Intermittent hyponatremia  Statin intolerance.  History of noncompliance    3.  Sugars out-of-control.  He has been working with primary care and endocrinology.  They are much better now.    4.  COPD.  He has had increased shortness of breath, cough, mucus production.  Encouraged him to use  Mucinex and keep hydrated.    5.  Increasing pain.  This is in his shoulders and also in his chest.  We are going to reevaluate him to see if there are signs of progressive disease that might inform him in his decision making.    6.  Psychosocial toxicity.  He is having difficulty coping with this major illness.  He has been a member of AA in the past.  I suggested he and his wife might want to reach out to this community.  We will also involve our  Ashlee.    He will have his second treatment today.  Will see him again in 2 weeks.  They know to call in the interim should he have any change in status, questions or concerns.      Lizbeth Martin MD

## 2025-06-13 NOTE — PATIENT INSTRUCTIONS
Reviewed labs and recent medical history.  Discussed his symptoms from his last treatment and that he will use his imodium and antinausea medications.  Encouraged him to stay mobile and stay active. He will keep moving.  He will continue to monitor his sugars. He will check with his primary provider about a monitoring system.  Discussed his coping skills and we will have our  contact him about options.  Okay for his treatment today, 6/13/25.  Encouraged him to call with any questions or concerns.  Return to see MD in 2 weeks for his next treatment.

## 2025-06-13 NOTE — PROGRESS NOTES
Pt ok for treatment today.  Pt will return for PLD on 6/26 and follow up with Dr. Martin on 6/27 with treatment to follow. Pt given AVS and voices understanding.  Pt with Nutrition score today of 16, refusing visit with Dietician.

## 2025-06-18 ENCOUNTER — APPOINTMENT (OUTPATIENT)
Dept: PRIMARY CARE | Facility: CLINIC | Age: 75
End: 2025-06-18
Payer: MEDICARE

## 2025-06-18 ENCOUNTER — DOCUMENTATION (OUTPATIENT)
Dept: HOME HEALTH SERVICES | Facility: HOME HEALTH | Age: 75
End: 2025-06-18

## 2025-06-18 ENCOUNTER — HOME HEALTH ADMISSION (OUTPATIENT)
Dept: HOME HEALTH SERVICES | Facility: HOME HEALTH | Age: 75
End: 2025-06-18
Payer: MEDICARE

## 2025-06-18 DIAGNOSIS — I25.10 CORONARY ARTERY DISEASE, UNSPECIFIED VESSEL OR LESION TYPE, UNSPECIFIED WHETHER ANGINA PRESENT, UNSPECIFIED WHETHER NATIVE OR TRANSPLANTED HEART: ICD-10-CM

## 2025-06-18 DIAGNOSIS — F32.5 DEPRESSION, MAJOR, IN REMISSION: ICD-10-CM

## 2025-06-18 DIAGNOSIS — K21.9 GASTROESOPHAGEAL REFLUX DISEASE WITHOUT ESOPHAGITIS: ICD-10-CM

## 2025-06-18 DIAGNOSIS — C25.9 MALIGNANT NEOPLASM OF PANCREAS, UNSPECIFIED LOCATION OF MALIGNANCY (MULTI): ICD-10-CM

## 2025-06-18 DIAGNOSIS — J44.9 CHRONIC OBSTRUCTIVE PULMONARY DISEASE, UNSPECIFIED COPD TYPE (MULTI): Primary | ICD-10-CM

## 2025-06-18 DIAGNOSIS — I26.99 OTHER PULMONARY EMBOLISM WITHOUT ACUTE COR PULMONALE, UNSPECIFIED CHRONICITY (MULTI): ICD-10-CM

## 2025-06-18 DIAGNOSIS — I10 PRIMARY HYPERTENSION: ICD-10-CM

## 2025-06-18 DIAGNOSIS — Z79.4 TYPE 2 DIABETES MELLITUS WITH HYPERGLYCEMIA, WITH LONG-TERM CURRENT USE OF INSULIN: ICD-10-CM

## 2025-06-18 DIAGNOSIS — E11.65 TYPE 2 DIABETES MELLITUS WITH HYPERGLYCEMIA, WITH LONG-TERM CURRENT USE OF INSULIN: ICD-10-CM

## 2025-06-18 DIAGNOSIS — R26.81 UNSTEADY GAIT WHEN WALKING: ICD-10-CM

## 2025-06-18 DIAGNOSIS — I25.10 CORONARY ARTERY DISEASE WITHOUT ANGINA PECTORIS, UNSPECIFIED VESSEL OR LESION TYPE, UNSPECIFIED WHETHER NATIVE OR TRANSPLANTED HEART: ICD-10-CM

## 2025-06-18 PROCEDURE — 99214 OFFICE O/P EST MOD 30 MIN: CPT | Performed by: FAMILY MEDICINE

## 2025-06-18 PROCEDURE — 1159F MED LIST DOCD IN RCRD: CPT | Performed by: FAMILY MEDICINE

## 2025-06-18 PROCEDURE — 3050F LDL-C >= 130 MG/DL: CPT | Performed by: FAMILY MEDICINE

## 2025-06-18 PROCEDURE — 3046F HEMOGLOBIN A1C LEVEL >9.0%: CPT | Performed by: FAMILY MEDICINE

## 2025-06-18 RX ORDER — INSULIN LISPRO 100 [IU]/ML
INJECTION, SOLUTION INTRAVENOUS; SUBCUTANEOUS
Status: CANCELLED
Start: 2025-06-18

## 2025-06-18 RX ORDER — NITROGLYCERIN 0.4 MG/1
0.4 TABLET SUBLINGUAL EVERY 5 MIN PRN
Qty: 25 TABLET | Refills: 3 | Status: SHIPPED | OUTPATIENT
Start: 2025-06-18 | End: 2026-06-18

## 2025-06-18 ASSESSMENT — ENCOUNTER SYMPTOMS
NAUSEA: 0
HEADACHES: 0
LOSS OF SENSATION IN FEET: 0
CONSTIPATION: 0
WHEEZING: 0
DEPRESSION: 1
FEVER: 0
SHORTNESS OF BREATH: 0
MYALGIAS: 0
FLANK PAIN: 0
SORE THROAT: 0
NERVOUS/ANXIOUS: 0
HEMATURIA: 0
DYSURIA: 0
OCCASIONAL FEELINGS OF UNSTEADINESS: 1
FATIGUE: 0
PALPITATIONS: 0
LIGHT-HEADEDNESS: 0
JOINT SWELLING: 0
COUGH: 0
DIARRHEA: 0
ARTHRALGIAS: 0
AGITATION: 0
UNEXPECTED WEIGHT CHANGE: 0
ABDOMINAL PAIN: 0

## 2025-06-18 ASSESSMENT — PATIENT HEALTH QUESTIONNAIRE - PHQ9
SUM OF ALL RESPONSES TO PHQ9 QUESTIONS 1 AND 2: 2
1. LITTLE INTEREST OR PLEASURE IN DOING THINGS: SEVERAL DAYS
2. FEELING DOWN, DEPRESSED OR HOPELESS: SEVERAL DAYS

## 2025-06-18 NOTE — PROGRESS NOTES
Subjective   Chief complaint: Feliciano Worthington is a 75 y.o. male who presents for Follow-up (Patient has completed 2/3 Chemo treatments).    HPI:  HPI    Virtual.  Video.  Chronic disease management.  Pancreatic cancer.  Oncology follow-up was noted.  He is on chemotherapy.  Tolerating.  2 rounds.  History of diabetes.  Was previously uncontrolled.  Significantly improved control as of recent.  Blood sugars around 100.  Laboratory assessment reviewed.  Glucose at the time was 62.  He is on insulin therapy.  He has upcoming appointment with his endocrinologist.  Does not need any refills.  Mood has been stable.  Coronary disease has been stable.  The other day had an episode of chest pain.  Was relieved with nitroglycerin.  No recurrence.  Needs refill on nitroglycerin.  COPD has been stable no worsening of chest tight wheeze cough short of breath.  He is tolerating anticoagulation therapy.  No complications.  On examination he is ambulatory he is alert he is polite he is cooperative he is breathing comfortably he is warm and well-perfused no acute focal neurologic deficits.  However he is having difficulty with generalized weakness.  Debility.  Difficulty with unsteady gait.  Notably when he stands.  I would proceed with home health care consultation for PT OT evaluation and treatment.  Otherwise we will plan on seeing her back in 3 months.  Continue current medical therapy in the meantime.  I spent 17 minutes with the office visit  Virtual or Telephone Consent    An interactive audio and video telecommunication system which permits real time communications between the patient (at the originating site) and provider (at the distant site) was utilized to provide this telehealth service.   Verbal consent was requested and obtained from Feliciano Worthington on this date, 06/18/25 for a telehealth visit and the patient's location was confirmed at the time of the visit.   Objective   There were no vitals taken for this  visit.  Physical Exam  Review of Systems   Constitutional:  Negative for fatigue, fever and unexpected weight change.   HENT:  Negative for congestion and sore throat.    Respiratory:  Negative for cough, shortness of breath and wheezing.    Cardiovascular:  Negative for chest pain, palpitations and leg swelling.   Gastrointestinal:  Negative for abdominal pain, constipation, diarrhea and nausea.   Genitourinary:  Negative for dysuria, flank pain and hematuria.   Musculoskeletal:  Negative for arthralgias, joint swelling and myalgias.   Skin:  Negative for rash.   Neurological:  Negative for syncope, light-headedness and headaches.   Psychiatric/Behavioral:  Negative for agitation. The patient is not nervous/anxious.       I have reviewed and reconciled the medication list with the patient today. Current Medications[1]     Imaging:  Imaging  No results found.    Cardiology, Vascular, and Other Imaging  No other imaging results found for the past 7 days       Labs reviewed:    Lab Results   Component Value Date    WBC 13.5 (H) 06/12/2025    HGB 12.0 (L) 06/12/2025    HCT 36.4 (L) 06/12/2025     06/12/2025    CHOL 258 (H) 02/05/2025    TRIG 309 (H) 02/05/2025    HDL 24.7 02/05/2025    ALT 55 (H) 06/12/2025    AST 29 06/12/2025     (L) 06/12/2025    K 4.1 06/12/2025    CL 99 06/12/2025    CREATININE 0.88 06/12/2025    BUN 13 06/12/2025    CO2 27 06/12/2025    TSH 2.23 02/07/2025    INR 1.1 03/20/2025    HGBA1C 14.7 (H) 02/06/2025       Assessment/Plan   Problem List Items Addressed This Visit           ICD-10-CM    Primary hypertension I10    Chronic obstructive pulmonary disease, unspecified - Primary J44.9    Relevant Orders    Referral to Home Health    GERD (gastroesophageal reflux disease) K21.9    Depression, major, in remission F32.5    CAD (coronary artery disease) I25.10    Relevant Medications    nitroglycerin (Nitrostat) 0.4 mg SL tablet    Other Relevant Orders    Referral to Home Health     "Type 2 diabetes mellitus with hyperglycemia, with long-term current use of insulin E11.65, Z79.4    Relevant Orders    Referral to Home Health     Other Visit Diagnoses         Codes      Malignant neoplasm of pancreas, unspecified location of malignancy (Multi)     C25.9    Relevant Orders    Referral to Home Health      Other pulmonary embolism without acute cor pulmonale, unspecified chronicity (Multi)     I26.99    Relevant Orders    Referral to Home Health      Unsteady gait when walking     R26.81    Relevant Orders    Referral to Home Health        17 min    Reinforced lifestyle modifications  Continue current medications as listed  Physical activity as tolerated and healthy diet encouraged  Maintain a healthy weight  Follow up in 3mo          [1]   Current Outpatient Medications:     alcohol swabs (BD Alcohol Swabs) pads, medicated, Use twice daily while checking sugar., Disp: 200 each, Rfl: 1    alum-mag hydroxide-simeth (Mylanta) 200-200-20 mg/5 mL oral suspension, Take 30 mL by mouth 4 times a day as needed for indigestion or heartburn., Disp: 355 mL, Rfl: 0    amLODIPine (Norvasc) 5 mg tablet, Take 1 tablet (5 mg) by mouth once daily., Disp: 90 tablet, Rfl: 1    BD Jessica 2nd Gen Pen Needle 32 gauge x 5/32\" needle, USE 1 PEN NEEDLE SIX TIMES DAILY, Disp: , Rfl:     BD Ultra-Fine Mini Pen Needle 31 gauge x 3/16\" needle, , Disp: , Rfl:     blood glucose control high,low (Accu-Chek Guide L1-L2 Ctrl Sol) solution, 1 bottle once daily as needed (use when need to un controls on glucose meter)., Disp: 1 each, Rfl: 1    blood-glucose meter (Accu-Chek Guide Glucose Meter) misc, Test twice daily, Disp: 1 each, Rfl: 0    buPROPion XL (Wellbutrin XL) 150 mg 24 hr tablet, Take 1 tablet (150 mg) by mouth 2 times a day. Do not crush, chew, or split., Disp: 180 tablet, Rfl: 1    clopidogrel (Plavix) 75 mg tablet, Take 1 tablet (75 mg) by mouth once daily., Disp: 90 tablet, Rfl: 1    clotrimazole-betamethasone (Lotrisone) " cream, Apply 1 Application topically 2 times a day., Disp: 15 g, Rfl: 0    ezetimibe (Zetia) 10 mg tablet, Take 1 tablet (10 mg) by mouth once daily., Disp: 90 tablet, Rfl: 1    HumaLOG KwikPen Insulin 100 unit/mL pen, INJECT 20 UNITS SUBCUTANEOUSLY WITH MEALS AND SLIDING SCALE 2/50 > 150 AS DIRECTED.  UNITS PER DAY, Disp: , Rfl:     hydrOXYzine HCL (Atarax) 25 mg tablet, Take 1 tablet (25 mg) by mouth 2 times a day., Disp: 180 tablet, Rfl: 1    insulin glargine (Lantus) 100 unit/mL (3 mL) pen, Inject 20 Units under the skin 2 times a day. Take as directed per insulin instructions., Disp: 29 mL, Rfl: 3    lancets (Accu-Chek Softclix Lancets) misc, Test twice daily, Disp: 200 each, Rfl: 0    lovastatin (Mevacor) 40 mg tablet, Take 1 tablet (40 mg) by mouth once daily at bedtime., Disp: 90 tablet, Rfl: 1    magnesium hydroxide (Milk of Magnesia) 400 mg/5 mL suspension, Take 30 mL by mouth once daily as needed for constipation., Disp: 360 mL, Rfl: 0    ondansetron (Zofran) 8 mg tablet, Take 1 tablet (8 mg) by mouth every 8 hours if needed for nausea., Disp: 30 tablet, Rfl: 1    pantoprazole (ProtoNix) 40 mg EC tablet, Take 1 tablet (40 mg) by mouth once daily in the morning. Take before meals. Do not crush, chew, or split., Disp: 90 tablet, Rfl: 1    prochlorperazine (Compazine) 10 mg tablet, Take 1 tablet (10 mg) by mouth every 6 hours if needed for nausea., Disp: 30 tablet, Rfl: 3    venlafaxine XR (Effexor-XR) 75 mg 24 hr capsule, Take 3 capsules (225 mg) by mouth once daily. Do not crush or chew., Disp: 90 capsule, Rfl: 3    apixaban (Eliquis) 5 mg tablet, Take 2 tablets (10 mg) by mouth 2 times a day for 7 days, THEN 1 tablet (5 mg) 2 times a day., Disp: 180 tablet, Rfl: 1    insulin lispro 100 unit/mL injection, Inject 15 Units under the skin 3 times a day before meals. Take as directed per insulin instructions., Disp: 13.5 mL, Rfl: 1    isosorbide mononitrate ER (Imdur) 60 mg 24 hr tablet, Take 1 tablet  (60 mg) by mouth once daily. Do not crush or chew., Disp: 90 tablet, Rfl: 1    nitroglycerin (Nitrostat) 0.4 mg SL tablet, Place 1 tablet (0.4 mg) under the tongue every 5 minutes if needed for chest pain. DO NOT EXCEED A TOTAL OF 3 DOSES IN 15 MINUTES, Disp: 25 tablet, Rfl: 3    Current Facility-Administered Medications:     tiotropium-olodateroL (Stiolto Respimat) 2.5-2.5 mcg/actuation inhaler 2 Inhalation, 2 Inhalation, inhalation, Once, Hussain Bernal MD

## 2025-06-18 NOTE — HH CARE COORDINATION
Home Care received a Referral for Physical Therapy and Occupational Therapy. We have processed the referral for a Start of Care on 06-19-25, 24-48 HOURS.     If you have any questions or concerns, please feel free to contact us at [phone]. Follow the prompts, enter your five digit zip code, and you will be directed to your care team on [facility] 1.

## 2025-06-20 ENCOUNTER — SOCIAL WORK (OUTPATIENT)
Dept: HEMATOLOGY/ONCOLOGY | Facility: CLINIC | Age: 75
End: 2025-06-20
Payer: MEDICARE

## 2025-06-20 NOTE — PROGRESS NOTES
"This  received a referral for possible assistance.     The SW called the pt's home and spoke to his wife, Leah.     The pt's wife reports that PT and OT are starting this weekend on  and Monday for the pt. She hopes that this will help his stability and walking abilities.     The pt's wife reports that the pt is \"very angry\" and has a hard time getting him out of bed. She reports he has a wheelchair and a walker. She reports the wheelchair works better for him currently.   *She reports the last time he fell she had to call the EMS to come pick him back up.     The pt's wife report they live alone, have no children, but does have a good friend who assist them with transportation and support.     The pt's wife reports that the pt does not currently drink, and they both attended AA until Covid happened. The wife reports they attended local AA meetings in their area.     The SW asked the wife if they are connected to the local [facility] for additional supports. The wife reports they are not. The SW will put a referral in for the couple to the: [facility] Office on [phone]. The SW called and left a message with the , Page.     This SW also gave the wife her phone number: [phone] if she or the pt should need to talk for emotional support.   "

## 2025-06-22 ENCOUNTER — HOME CARE VISIT (OUTPATIENT)
Dept: HOME HEALTH SERVICES | Facility: HOME HEALTH | Age: 75
End: 2025-06-22
Payer: MEDICARE

## 2025-06-22 PROCEDURE — G0151 HHCP-SERV OF PT,EA 15 MIN: HCPCS | Mod: HHH

## 2025-06-22 PROCEDURE — 169592 NO-PAY CLAIM PROCEDURE

## 2025-06-22 SDOH — HEALTH STABILITY: PHYSICAL HEALTH: EXERCISE TYPE: SKILLED THERAPEUTIC EXERCISES

## 2025-06-22 ASSESSMENT — ACTIVITIES OF DAILY LIVING (ADL)
AMBULATION ASSISTANCE ON FLAT SURFACES: 1
AMBULATION ASSISTANCE: 1
CURRENT_FUNCTION: MAXIMUM ASSIST
OASIS_M1830: 03
CURRENT_FUNCTION: ONE PERSON
AMBULATION ASSISTANCE: ONE PERSON
PHYSICAL_TRANSFERS_DEVICES: BODY SUPPORT
AMBULATION ASSISTANCE: MAXIMUM ASSIST
ENTERING_EXITING_HOME: ONE PERSON
AMBULATION ASSISTANCE: STAND BY ASSIST
PHYSICAL TRANSFERS ASSESSED: 1

## 2025-06-22 ASSESSMENT — ENCOUNTER SYMPTOMS
DENIES PAIN: 1
HYPERTENSION: 1
PERSON REPORTING PAIN: PATIENT

## 2025-06-23 ENCOUNTER — HOME CARE VISIT (OUTPATIENT)
Dept: HOME HEALTH SERVICES | Facility: HOME HEALTH | Age: 75
End: 2025-06-23
Payer: MEDICARE

## 2025-06-23 PROCEDURE — G0152 HHCP-SERV OF OT,EA 15 MIN: HCPCS | Mod: HHH

## 2025-06-23 ASSESSMENT — ACTIVITIES OF DAILY LIVING (ADL)
BATHING_CURRENT_FUNCTION: MODERATE ASSIST
DRESSING_LB_CURRENT_FUNCTION: MODERATE ASSIST
BATHING ASSESSED: 1

## 2025-06-23 ASSESSMENT — ENCOUNTER SYMPTOMS
DENIES PAIN: 1
PERSON REPORTING PAIN: PATIENT

## 2025-06-23 NOTE — HOME HEALTH
GOALS: PATIENT WILL DEMONSTRATE IMPROVED ENDURANCE, STRENGTH, MOBILITY, AND BALANCE.  SUBJECTIVE: THE PATIENT REPORTED NO PAIN. PATIENT REPORTED HE HAD MULTIPLE FALLS RECENTLY. THE RECENT FALL IN THE BATHROOM WHERE HE TRIPPED AND FELL IN THE BUTTOCK. PATIENT REPORTED NO INJURIES DUE TO THE FALL. PATIENT REPORTED DIFFICULTY WITH AMBULATION.  PRIOR LEVEL OF FUNCTION: MAX ASSIST NEEDED FOR TRANSFERS AND AMBULATION.  OBJECTIVE: MANUAL MUSCLE TESTING WAS PERFORMED TO DETERMINE BOTH CORE AND LE STRENGTH. PATIENT DEMONSTRATED 2-/5 MUSCLE STRENGTH TO CORE AND TIM LE'S. DEMONSTRATED DIFFICULTY WITH TRANSFERS FROM STS AND GAIT ABNORMALITIES ALONG WITH BALANCE IMPAIRMENT.  THERAPEUTIC ACTIVITIES: MMT, MOBILITY, AND GAIT ASSESSMENT COMPLETED  ASSESSMENT: DEMONSTRATED GROSSLY REDUCED STRENGTH AND ENDURANCE SECONDARY TO ASSOCIATED COMORBID CONDITIONS. PATIENT, PRESENTLY, IS A FALL RISK, CURRENTLY AMBULATING WITH A WALKER. FALL PRECAUTIONS DISCUSSED.  PLAN: CONSIDER COMORBID FACTORS WHEN IMPLEMENTING POC. CONTINUE WITH GENERAL ENDURANCE AND STRENGTH TRAINING UE'S AND LE'S, GAIT TRAINING ACTIVITIES, BALANCE AND PROPRIOCEPTIVE TRAINING, AND FALL PREVENTION STRATEGIES AS PER PT POC. PROGRESS AS TOLERATED.  PLAN FOR NEXT VISIT: SKILLED EXERCISES, BALANCE TRAINING, STRENGTH AND ENDURANCE TRAINING, TRANSFER TRAINING.  REHAB POTENTIALS FOR STATED GOALS: GOOD  PATIENT STATED GOAL: ABLE TO WALK WITHOUT DIFFICULTY.

## 2025-06-25 ENCOUNTER — HOME CARE VISIT (OUTPATIENT)
Dept: HOME HEALTH SERVICES | Facility: HOME HEALTH | Age: 75
End: 2025-06-25
Payer: MEDICARE

## 2025-06-25 PROCEDURE — G0152 HHCP-SERV OF OT,EA 15 MIN: HCPCS | Mod: HHH

## 2025-06-25 ASSESSMENT — ACTIVITIES OF DAILY LIVING (ADL)
DRESSING_LB_CURRENT_FUNCTION: MODERATE ASSIST
BATHING_CURRENT_FUNCTION: MODERATE ASSIST
BATHING ASSESSED: 1

## 2025-06-25 ASSESSMENT — ENCOUNTER SYMPTOMS
PERSON REPORTING PAIN: PATIENT
DENIES PAIN: 1

## 2025-06-26 ENCOUNTER — INFUSION (OUTPATIENT)
Dept: HEMATOLOGY/ONCOLOGY | Facility: CLINIC | Age: 75
End: 2025-06-26
Payer: MEDICARE

## 2025-06-26 VITALS
TEMPERATURE: 97.3 F | DIASTOLIC BLOOD PRESSURE: 69 MMHG | SYSTOLIC BLOOD PRESSURE: 131 MMHG | RESPIRATION RATE: 18 BRPM | HEART RATE: 88 BPM | OXYGEN SATURATION: 98 %

## 2025-06-26 DIAGNOSIS — C25.9 PANCREATIC CANCER METASTASIZED TO LIVER (MULTI): ICD-10-CM

## 2025-06-26 DIAGNOSIS — C78.7 PANCREATIC CANCER METASTASIZED TO LIVER (MULTI): ICD-10-CM

## 2025-06-26 LAB
ALBUMIN SERPL BCP-MCNC: 3.5 G/DL (ref 3.4–5)
ALP SERPL-CCNC: 150 U/L (ref 33–136)
ALT SERPL W P-5'-P-CCNC: 51 U/L (ref 10–52)
ANION GAP SERPL CALC-SCNC: 12 MMOL/L (ref 10–20)
AST SERPL W P-5'-P-CCNC: 25 U/L (ref 9–39)
BASOPHILS # BLD MANUAL: 0 X10*3/UL (ref 0–0.1)
BASOPHILS NFR BLD MANUAL: 0 %
BILIRUB SERPL-MCNC: 0.3 MG/DL (ref 0–1.2)
BUN SERPL-MCNC: 13 MG/DL (ref 6–23)
CALCIUM SERPL-MCNC: 8.4 MG/DL (ref 8.6–10.3)
CHLORIDE SERPL-SCNC: 102 MMOL/L (ref 98–107)
CO2 SERPL-SCNC: 25 MMOL/L (ref 21–32)
CREAT SERPL-MCNC: 0.89 MG/DL (ref 0.5–1.3)
EGFRCR SERPLBLD CKD-EPI 2021: 89 ML/MIN/1.73M*2
EOSINOPHIL # BLD MANUAL: 0.09 X10*3/UL (ref 0–0.4)
EOSINOPHIL NFR BLD MANUAL: 1 %
ERYTHROCYTE [DISTWIDTH] IN BLOOD BY AUTOMATED COUNT: 13.8 % (ref 11.5–14.5)
GLUCOSE SERPL-MCNC: 52 MG/DL (ref 74–99)
HCT VFR BLD AUTO: 33.2 % (ref 41–52)
HGB BLD-MCNC: 11.1 G/DL (ref 13.5–17.5)
IMM GRANULOCYTES # BLD AUTO: 0.52 X10*3/UL (ref 0–0.5)
IMM GRANULOCYTES NFR BLD AUTO: 5.8 % (ref 0–0.9)
LYMPHOCYTES # BLD MANUAL: 1.6 X10*3/UL (ref 0.8–3)
LYMPHOCYTES NFR BLD MANUAL: 18 %
MCH RBC QN AUTO: 27.5 PG (ref 26–34)
MCHC RBC AUTO-ENTMCNC: 33.4 G/DL (ref 32–36)
MCV RBC AUTO: 82 FL (ref 80–100)
METAMYELOCYTES # BLD MANUAL: 0.18 X10*3/UL
METAMYELOCYTES NFR BLD MANUAL: 2 %
MONOCYTES # BLD MANUAL: 0.53 X10*3/UL (ref 0.05–0.8)
MONOCYTES NFR BLD MANUAL: 6 %
MYELOCYTES # BLD MANUAL: 0.18 X10*3/UL
MYELOCYTES NFR BLD MANUAL: 2 %
NEUTROPHILS # BLD MANUAL: 6.24 X10*3/UL (ref 1.6–5.5)
NEUTS BAND # BLD MANUAL: 0.45 X10*3/UL (ref 0–0.5)
NEUTS BAND NFR BLD MANUAL: 5 %
NEUTS SEG # BLD MANUAL: 5.79 X10*3/UL (ref 1.6–5)
NEUTS SEG NFR BLD MANUAL: 65 %
NRBC BLD-RTO: 0.2 /100 WBCS (ref 0–0)
PLATELET # BLD AUTO: 153 X10*3/UL (ref 150–450)
POTASSIUM SERPL-SCNC: 4 MMOL/L (ref 3.5–5.3)
PROT SERPL-MCNC: 6.1 G/DL (ref 6.4–8.2)
RBC # BLD AUTO: 4.04 X10*6/UL (ref 4.5–5.9)
RBC MORPH BLD: ABNORMAL
SODIUM SERPL-SCNC: 135 MMOL/L (ref 136–145)
TOTAL CELLS COUNTED BLD: 100
VARIANT LYMPHS # BLD MANUAL: 0.09 X10*3/UL (ref 0–0.3)
VARIANT LYMPHS NFR BLD: 1 %
WBC # BLD AUTO: 8.9 X10*3/UL (ref 4.4–11.3)

## 2025-06-26 PROCEDURE — 85007 BL SMEAR W/DIFF WBC COUNT: CPT

## 2025-06-26 PROCEDURE — 80053 COMPREHEN METABOLIC PANEL: CPT

## 2025-06-26 PROCEDURE — 85027 COMPLETE CBC AUTOMATED: CPT

## 2025-06-26 PROCEDURE — 36591 DRAW BLOOD OFF VENOUS DEVICE: CPT

## 2025-06-26 RX ORDER — HEPARIN 100 UNIT/ML
SYRINGE INTRAVENOUS
Status: DISCONTINUED
Start: 2025-06-26 | End: 2025-06-26 | Stop reason: WASHOUT

## 2025-06-26 RX ORDER — HEPARIN 100 UNIT/ML
500 SYRINGE INTRAVENOUS AS NEEDED
Status: DISCONTINUED | OUTPATIENT
Start: 2025-06-26 | End: 2025-06-26 | Stop reason: HOSPADM

## 2025-06-26 RX ORDER — HEPARIN 100 UNIT/ML
500 SYRINGE INTRAVENOUS AS NEEDED
OUTPATIENT
Start: 2025-06-26

## 2025-06-26 RX ORDER — HEPARIN SODIUM,PORCINE/PF 10 UNIT/ML
50 SYRINGE (ML) INTRAVENOUS AS NEEDED
OUTPATIENT
Start: 2025-06-26

## 2025-06-26 ASSESSMENT — PAIN SCALES - GENERAL: PAINLEVEL_OUTOF10: 6

## 2025-06-27 ENCOUNTER — APPOINTMENT (OUTPATIENT)
Dept: HEMATOLOGY/ONCOLOGY | Facility: CLINIC | Age: 75
End: 2025-06-27
Payer: MEDICARE

## 2025-06-27 ENCOUNTER — OFFICE VISIT (OUTPATIENT)
Dept: HEMATOLOGY/ONCOLOGY | Facility: CLINIC | Age: 75
End: 2025-06-27
Payer: MEDICARE

## 2025-06-27 VITALS
WEIGHT: 206.6 LBS | TEMPERATURE: 96.6 F | DIASTOLIC BLOOD PRESSURE: 69 MMHG | OXYGEN SATURATION: 98 % | RESPIRATION RATE: 16 BRPM | SYSTOLIC BLOOD PRESSURE: 107 MMHG | BODY MASS INDEX: 30.51 KG/M2 | HEART RATE: 84 BPM

## 2025-06-27 DIAGNOSIS — C78.7 PANCREATIC CANCER METASTASIZED TO LIVER (MULTI): ICD-10-CM

## 2025-06-27 DIAGNOSIS — C25.9 PANCREATIC CANCER METASTASIZED TO LIVER (MULTI): ICD-10-CM

## 2025-06-27 PROCEDURE — 3050F LDL-C >= 130 MG/DL: CPT | Performed by: INTERNAL MEDICINE

## 2025-06-27 PROCEDURE — 1159F MED LIST DOCD IN RCRD: CPT | Performed by: INTERNAL MEDICINE

## 2025-06-27 PROCEDURE — 3074F SYST BP LT 130 MM HG: CPT | Performed by: INTERNAL MEDICINE

## 2025-06-27 PROCEDURE — 3078F DIAST BP <80 MM HG: CPT | Performed by: INTERNAL MEDICINE

## 2025-06-27 PROCEDURE — 3046F HEMOGLOBIN A1C LEVEL >9.0%: CPT | Performed by: INTERNAL MEDICINE

## 2025-06-27 PROCEDURE — 99213 OFFICE O/P EST LOW 20 MIN: CPT | Performed by: INTERNAL MEDICINE

## 2025-06-27 RX ORDER — TRAMADOL HYDROCHLORIDE 50 MG/1
50 TABLET, FILM COATED ORAL EVERY 6 HOURS PRN
Qty: 60 TABLET | Refills: 0 | Status: SHIPPED | OUTPATIENT
Start: 2025-06-27 | End: 2025-08-26

## 2025-06-27 ASSESSMENT — ENCOUNTER SYMPTOMS
CONSTIPATION: 0
DYSURIA: 0
APPETITE CHANGE: 0
ABDOMINAL PAIN: 0
WEAKNESS: 0
NERVOUS/ANXIOUS: 0
NAUSEA: 0
BLOOD IN STOOL: 0
BRUISES/BLEEDS EASILY: 0
SHORTNESS OF BREATH: 1
VOMITING: 0
DIARRHEA: 0
SLEEP DISTURBANCE: 0
RHINORRHEA: 0
HEADACHES: 0
UNEXPECTED WEIGHT CHANGE: 0
MYALGIAS: 1
BACK PAIN: 1
FREQUENCY: 0
ARTHRALGIAS: 1
PALPITATIONS: 0
ADENOPATHY: 0
LIGHT-HEADEDNESS: 1
TROUBLE SWALLOWING: 0
COUGH: 0
NUMBNESS: 1
FATIGUE: 1
TREMORS: 1

## 2025-06-27 NOTE — PROGRESS NOTES
No tx today  Labs at infusion 7/8 1000  7/9 1130 MD- possible chemo to follow  Reviewed AVS with patient- patient verbalizes understanding

## 2025-06-27 NOTE — PATIENT INSTRUCTIONS
Reviewed labs and recent medical history.  Discussed his recent fall and noted his weakness.   Reviewed his sugars and encouraged him to keep his sugars up. Keep eating.  Discussed his bowel movements and encouraged him to stay regular by using the senokot or pericolace as directed.  Discussed his pain and noted he is taking full amounts of over the counter pain medications. Will send in a script for tramadol to his pharmacy.  Will plan to hold his treatment today.  Return to see MD on July 9th for follow up and possible treatment.

## 2025-06-27 NOTE — PROGRESS NOTES
Patient ID:Feliciano Worthington is a 75 y.o. year old male patient with recently diagnosed metastatic pancreatic cancer to the liver.  He also has had recent myocardial infarction and pulmonary emboli.       Referring Physician: Lizbeth Martin MD  [address] [place] [address] [address]  [place] [address]  Primary Care Provider: Hussain Bernal MD    Chief Complaint  Chief Complaint   Patient presents with    Pancreatic Cancer          History of the Present Illness  He is a 75-year-old white male with recent myocardial infarction.  During that hospital stay he was found to have metastatic pancreatic cancer to his liver.  He also had pulmonary emboli.  He is here today with his wife and and his friend Delia.    Chronological History    3/16/2020.  Low dose CT screening for lung cancer showed multiple bilateral noncalcified pulmonary nodules for which annual screening was recommended.  There was mild upper lung predominant centrilobular emphysema and changes consistent with bronchiolitis.  Small hiatal hernia was seen with concentric long segment esophageal wall thickening as well as severe three-vessel coronary artery calcification with calcification throughout the aorta and aortic valve.    4/26/2021.  Low-dose CT for lung cancer screening showed stable multiple pulmonary nodules and multiple changes consistent with COPD emphysema and coronary artery calcifications.    7/28/2021.  CAT scan of the abdomen and pelvis showed moderate concentric wall thickening of the colon from the cecum to the splenic flexure.  Pancreas is normal.  Liver was also normal.    12/27/2022.  Normal perfusion on Lexiscan with left ventricular ejection fraction 63%.    4/3/2023.  Low-dose CT for lung cancer screening showed stable subcentimeter pulmonary nodules.    10/8/2023 through 10/10/2023.  Emergency rooms visit for persistent cough, shortness of breath without chest pressure.  He was treated with antibiotics and steroids for pneumonia and was  admitted to the hospital..  He was discharged on steroids and antibiotics.    10/17/2023.  Emergency department visit for lower chest discomfort which she described as pleurisy with shortness of breath and productive cough he had nausea but no vomiting.  CTA showed no significant interval change since previous examination.  He had changes of COPD and honeycombing in the bases suggestive of fibrosis.  He also had diffuse circumferential mucosal wall thickening of the esophagus and stable multiple nonspecific subcentimeter lung nodules.  He also had fatty liver low-attenuation lesions in the kidney right lower lobe mucous plugging and diffuse bronchiectasis.  Sugar was 281.  CBC was normal except for an elevation in white count 13.2.  He felt comfortable going home and did not want to be readmitted to the hospital and was discharged.    10/24/2023.  Followed up with primary care which time he complained of right upper extremity pain and swelling.  Venous Doppler was ordered which showed right brachial DVT and right basilic superficial venous thrombosis.  He was started on anticoagulation with Eliquis.    11/16/2023.  Saw [facility] neurosurgery and pain management physician for neck pain and back pain and difficulty walking at that time he is still smoking a pack of cigarettes a day.  Physical therapy was ordered.    3/29/2024.  Followed up with primary care at which time he complained of weight loss and change in voice.    5/3/2024.  New primary care physician.  Weight was down to 204 pounds from around 210      5/28/2024.  Low-dose CT for lung cancer screening showed stable pulmonary nodules and multiple other stable findings.    9/3/2024.  Saw Dr. Keith for unintentional weight loss and nausea.  He says that he was not compliant with his insulin dosage and he had uncontrolled diabetes with a hemoglobin A1c of 11.3.  He continued to smoke.  He has chronic hypoxia.  At that time his weight was 193 pounds.  A  series of tests were ordered.    1/29/2025.  Weight was down to 188 pounds.  A series of tests were ordered including a colonoscopy and a PSA.    2/5/2025.  Emergency department visit for chest pain for 36 hours.  He had taken multiple doses of nitroglycerin without much relief in his symptoms.  His sugar at that time was 438 and his troponin was 299.  He was trending upward.  Chest x-ray showed mild bilateral opacities and atelectasis versus pneumonia.  Findings were consistent with an NSTEMI and he was treated with a heparin drip and admitted to the hospital.  He was transferred to [facility] cardiac intensive care unit.    2/6/2025.  Left heart cath showed 70% stenosis in the distal left main, 80% stenosis in the proximal LAD, 90% stenosis in the proximal circumflex, 75% stenosis in the proximal ramus, 80% stenosis in the mid ramus, 80% stenosis in the mid RCA, 90% stenosis in the distal RCA and a left ventricular ejection fraction 55%.  Cardiac surgery was consulted for possible CABG he was continued on Zetia.  He had type I second-degree AV block so was not started on beta-blocker.    Spouse said that the patient had poor compliance and problems with short-term memory.  She is concerned about his unintentional 50 pound weight loss over the previous year.  CT scan was ordered.    2/6/2025.  Admitted to the hospital at [facility].  CT scan of the chest showed extensive vascular calcification with stable ectasia of the ascending aorta at 3.9 cm.  There is no thoracic lymphadenopathy.  There were areas of fibrosis and scarring in the lungs unchanged with  partially calcified nodules, most of which were subcentimeter and stable.  There was a new nodular opacity in the medial aspect of the right lower lobe which measured 8 mm.  In the upper abdomen there is a 2.2 cm cyst in the midpole left kidney.  There is also nonspecific prominence/thickening of the tail of pancreas measuring 2.5 cm.  This was a noncontrast study.   MRI was recommended.    2/7/2025.  Arterial Dopplers of the carotids showed less than 50% stenosis bilaterally    2/10/2025.  MRCP of the pancreas showed a 3.8 cm pancreatic tail hypoattenuating mass suspicious for adenocarcinoma and a 15 mm hepatic segment 6 hypoattenuating mass suspicious for metastasis without definite lymphadenopathy.    During the hospital he was seen by psychiatry and sertraline was discontinued.  He was continued on venlafaxine and then started on bupropion.    He also has some trouble swallowing and GI was consulted.  Further exploration of coronary artery bypass was delayed because of the new finding of metastatic pancreatic cancer.  Arrangements were made for him to be transferred to [facility].    2/14 through 2/18/2025.  Hospitalized at [facility].  There it was determined that his liver biopsy would be done as an outpatient with oncology referral afterwards.    2/27/2025.  Follow-up with primary care.    3/13/2025.  Liver biopsy done by interventional radiology  A. LIVER BIOPSY RIGHT:   -Moderately differentiated adenocarcinoma with mucin involving liver, see note     Note: Immunohistochemical stains show that the tumor cells are positive for CK7 and focally positive for CK20. They are negative for CDX2 and TTF-1. Based on the morphologic findings, immunophenotype, and clinical history of a newly identified pancreatic mass, the findings are most consistent with metastatic pancreatic adenocarcinoma.    3/20/2025 through 3/22/2025..  Admitted to the [facility] from emergency department visit for chest pain which she described as sharp and left-sided nonradiating lasting for an hour.  It is not exertional and felt different from his previous heart attack CT angiogram showed several small pulmonary emboli in the left lung with features suggesting that they are chronic.  Lung fibrosis was seen and 2 enlarging nodules were seen in the right lower lobe and 1 enlarging nodule in the left lower  lobe.  He also had NSTEMI and was transferred to [facility] for urgent evaluation for possible CABG.  He was evaluated by oncology during the admission.  Estimate his survival to be less than 6 months without treatment in 8 to 10 months with treatment.  Arrangements were made for him to follow-up in [place] and also to have genetic counseling for his new diagnosis of metastatic pancreatic cancer.  He was not felt to be a candidate for cardiac surgery.  He was discharged on Eliquis and Plavix and instructed to stop taking aspirin.    4/2/2025.  He is here with his wife and him and his friend Delia.  We discussed the recent multiple severe problems that he has.  We discussed these problems openly.  He knows that he cannot be cured of his metastatic pancreatic cancer but we could offer him treatment.  We talked about the benefits and side effects of treatment and that this was a very personal important decision for him to make as to whether or not he wanted to be treated with aggressive chemotherapy or wanted palliative care.  He already has plans for a trip to [place] with friends and family.  We have asked him to come back and see us after he returns to the area.  We talked about symptom control versus tumor directed therapy.  Multiple questions were answered to their satisfaction.  We introduced into the NCCN guidelines for patients.  He will come back and see us in 5 weeks after his trip and knows that he can call in the interim should he have any questions or concerns.    4/30/2025.  Saw his primary care physician Dr. Walters for anxiety.  He reviewed his to recent hospital stays and his biopsy which showed pancreatic cancer in his liver complicated by myocardial infarction and pulmonary embolus.  He increased his Wellbutrin to 150 mg twice a day and increased his Effexor to 225 mg daily and referred him for cognitive behavioral therapy.  He will continue hydroxyzine as needed.  His diabetes is uncontrolled.  He was  following with an endocrinologist.  His insulin was adjusted.  His daily glucose  readings were improving.  He had no further bouts of chest pain and he had no bleeding.  He complained of a rash on his lower leg on the right for which he was given a combination steroid/antifungal topical treatment.    5/7/2025.  He is here with his wife and friend.  He said that he had a wonderful time in [place].  His appetite had been good and he gained 9 pounds.  He has multiple complaints.    He said that his balance is off.  He has had no falls.  Friend says that he holds onto furniture, furniture surfing.  He did lose his balance and fell into a chair did not injure himself.  He notes that he is shaking.  We discussed that holding still is a matter of equal and opposed movements which may be compromised as he becomes weaker.  Supervised understanding.  He says that he is getting weaker.  He has been fairly sedentary.  I encouraged him to be physically active is much as possible.  He says occasionally he has trouble swallowing.  He says that he is not excited about having an EGD with dilatation.  He will let us know if it gets worse.  He said that he did have a bout of chest discomfort last week which that he thought might be gas but he did take nitroglycerin and it helped.  He also has had pain in her shoulders and he has had discomfort in his shoulders for a while.    We discussed the diagnosis of metastatic pancreatic cancer and the available treatments.  He is still thinking about whether or not he wants to do anything.  We discussed combination cytotoxic chemotherapy and offered him next generation sequencing to see if we could find a target.  He agreed to this.  We discussed that if we are going to do any treatment we should get new baseline studies.  He says that he has been more short of breath than usual.  He continues to smoke.  We discussed that this cannot be good for his lungs but he is not interested in quitting.   We have also discussed getting blood work today for routine baseline such as CBC and CMP and he agrees to this.  We will have him come back in 2 weeks to follow-up.  He says that he is leaning away from taking chemotherapy.    Laboratory data today showed a sugar of 208 and a sodium of 134 and was otherwise within normal limits with a total protein of 6.5.  His CBC was normal except for mild decrease in hemoglobin at 13.2 previously 13.8.  Hematocrit is 39.2 previously was 41.2.  Platelets are normal white blood cell differential count is normal.    5/16/2025.  CAT scans of the chest abdomen and pelvis showed interval worsening of nodular enhancement of the right sided pleura and bilateral pulmonary nodules.  Subpleural reticulations are similar to the prior examination.  Chronic left-sided pulmonary emboli have slightly improved.  Moderate atherosclerotic change was again noted.  Heart was normal size without pericardial effusion.  Scattered mediastinal lymph nodes were unchanged but there was diffuse thickening of the distal esophagus consistent with esophagitis.  Multiple ill-defined hypoattenuating lesions throughout the liver persisted.  In the pancreas an ill-defined hypoattenuating lesion of the pancreatic body and tail measured 5.7 x 2.8 with circumferential involvement of the splenic artery and vein.  There is close proximity of this pancreatic lesion to the left adrenal gland.  Spleen was normal kidneys were normal.    5/21/2025.  He is here today with his wife and friend.  We reviewed the reports of his Scans and he knows that he has had progression in the lungs and in the liver and in the pancreas.  He did not want to do the images.  He has decided that he wants to go forward with Gemzar and Abraxane.  We reviewed the benefits and side effects of this medication regimen.  He understands that he will have multiple blood tests which will occur before his treatment at [facility] and before we give him another  treatment.  He understands that he will take 1 treatment at a time and if he does not want to take anymore we will certainly stop.  He and his wife and friend had time to ask questions which were answered to their satisfaction.  Treatment plan was entered for insurance approval.  The informed consent document was signed.  He will have a chemotherapy teaching class.  We will start him on his treatment as soon as it is approved.  Antinausea medicines were sent to his pharmacy.  We will see him the day of his treatment to make sure that he wants to go forward.      5/29/2025.  Laboratory data showed normal serum chemistries with the exception of a sugar 154.  CBC was normal.    5/30/2025.  He is here to begin his treatment.  He has no further questions.  He has lost a pound since last time I saw him.  He has had his chemotherapy teaching teaching class.  Will go ahead with his treatment today.  He has his antiemetics at home.  We reviewed that he should call if he has any questions concerns or change in his status.  We will see him again in 2 weeks.    6/12/2025.  Laboratory data showed a sugar of 62 with a sodium of 133.  ALT was elevated at 55 and had been 14.  Bilirubin was normal.  Total protein was lower at 6.2 down from 6.8.  CBC showed a white count of 13.5 hemoglobin 12 hematocrit 36.4 and a platelet count 298,000 with 58 polys 28 lymphs 5 monos 3 eosinophils 1 metamyelocyte 1 myelocyte.    6/13/2025.  He is here today for his second infusion of chemotherapy.  He did well and had no problems with infection or bleeding.  He did have some irregularities with his stool.  He had nausea and vomiting.  I went over his antiemetic regimen again.  He had diarrhea and did not take Imodium and did not call for help.  They now have Imodium on hand and I recommended that they take it to keep the diarrhea under control.  He currently weighs 211 pounds.  On his last visit he weighed 215.  I encouraged him to be more mobile.   "He has spent most of this time horizontal and was concerned about dizziness.  I told him that he will have more difficulty walking and not being stable if he continues this habit.  He verbalized understanding.  We know that he he and his wife are having trouble coping.  They had been AA members in the past and I suggested that they might want to seek this support again.  He verbalized that he does want to go ahead with his treatment today.  We will see him again in 2 weeks.      Interval Note  6/18/2025.  Telemedicine visit with his primary care physician Dr. Hussain Haji who noted that his sugars were much better controlled.  He did complain of occasional chest pains relieved by nitroglycerin which was refilled.  COPD was stable and he was tolerating anticoagulation.  Patient was noted to have generalized weakness and debility and difficulty with gait.  OT and PT were ordered for home.  He said that he would see him again virtually in 3 months.    6/20/2025.  Contacted by Lexii Pryor from [employer].  Discussion with the wife showed that the patient was \"very angry\".  She had a hard time getting him out of bed.  I had a wheelchair and a walker at home and he was using the wheelchair.  Last time he fell they had to call EMS to pick him up.  They were given contact for the area on aging office.    6/22/2025.  OT and PT started at home.  The patient had had a recent fall in the bathroom at which time he injured his right leg his right arm and his buttocks.  His muscle strength was 2 out of 5.  Fall risk precautions were discussed.    6/26/2025.  Laboratory data showed a sugar of 52 and a sodium of 135.  Calcium was 8.4 and alkaline phosphatase had risen to 150.  Transaminases and bilirubin are normal.  Total protein was 6.1.  CBC showed white count 8.9 with hemoglobin 11.1 hematocrit 33.2 and a platelet count of 153,000.  White blood cell differential showed 65% polys, 18 lymphs, 6 monos 1 eosinophil with " an absolute neutrophil count of 5.79.    6/27/2025.  He is here today to consider further treatment.  We reviewed that his strength is ebbing and that he is now mostly in the wheelchair.  We discussed that chemotherapy might be making his strength worse.  He has always been invested in quality of life.  We said that we were going to hold his chemotherapy today and concentrate on rehabilitation is much as possible.  Encouraged him to eat good protein and keep hydrated.  We also encouraged him not to skip any meals and to avoid low blood sugars.  He has had substantial pain so we started him on tramadol today.  I encouraged him to keep his bowels moving with Paris-Colace or Senokot or other medications that have worked for him in the past.  Wife has been encouraged to call us if this is not effective.  We will see him again on July 9 for follow-up and possible treatment.    Comorbidities  Diabetes  History of obesity  Atherosclerosis  COPD  Depression  Hyperlipidemia  Hypertension  Osteoarthritis with cervical spine stenosis  Achalasia  Astigmatism  Cataracts  Chronic pain  Long-term history of smoking of about a pack and half cigarettes a day for 52 years  GERD  Multiple lung nodules  Sleep apnea  Vitamin D deficiency  Coronary artery disease with history of myocardial infarction status post stenting in 2007 and 2011  Skin cancers  BPH and penile prosthesis implant  Rotator cuff tendinopathy, hip pain, knee pain  Intermittent hyponatremia  Statin intolerance.  History of noncompliance    Monitoring Parameters  History and physical examinations, CAT scans PET scans MRIs    Review of Systems - Oncology   Review of Systems   Constitutional:  Positive for fatigue. Negative for appetite change and unexpected weight change (has lost 3lbs since last).   HENT:  Negative for dental problem, mouth sores, rhinorrhea and trouble swallowing.    Eyes:  Negative for visual disturbance.   Respiratory:  Positive for shortness of  "breath. Negative for cough.    Cardiovascular:  Positive for chest pain (episode last week and he use nitroglycerin and it helped.). Negative for palpitations and leg swelling.   Gastrointestinal:  Negative for abdominal pain, blood in stool, constipation, diarrhea, nausea and vomiting.   Endocrine: Negative for polyuria.   Genitourinary:  Negative for dysuria, frequency and urgency.   Musculoskeletal:  Positive for arthralgias, back pain and myalgias (shoulder pain has increased).   Skin:  Negative for pallor and rash.   Neurological:  Positive for tremors (\"shakes\"), light-headedness (balance problem) and numbness. Negative for weakness and headaches.   Hematological:  Negative for adenopathy. Does not bruise/bleed easily.   Psychiatric/Behavioral:  Negative for self-injury, sleep disturbance and suicidal ideas. The patient is not nervous/anxious.         Past Medical History  Past Medical History:   Diagnosis Date    Acute myocardial infarction, unspecified 01/24/2018    Heart attack    Acute myocardial infarction, unspecified 01/24/2018    Heart attack    BMI 29.0-29.9,adult 10/13/2023    Chronic obstructive pulmonary disease, unspecified 01/24/2018    COPD, mild    Chronic obstructive pulmonary disease, unspecified 01/24/2018    COPD, mild    Coronary angioplasty status     Post PTCA    Encounter for immunization 11/04/2022    Encounter for immunization    Encounter for screening for malignant neoplasm of colon 08/25/2016    Encounter for colonoscopy due to history of colonic polyp    Encounter for screening for malignant neoplasm of skin 04/14/2022    Skin cancer screening    Nicotine dependence, cigarettes, uncomplicated 09/12/2022    Cigarette smoker    Obesity, unspecified 05/25/2022    Class 1 obesity with body mass index (BMI) of 31.0 to 31.9 in adult    Old myocardial infarction 01/24/2018    History of myocardial infarction    Other acute postprocedural pain     Post-operative pain    Pain in left knee  "    Left knee pain, unspecified chronicity    Pain in right hip 11/04/2022    Hip pain, bilateral    Personal history of other diseases of the digestive system 01/24/2018    History of gastroesophageal reflux (GERD)    Personal history of other diseases of the musculoskeletal system and connective tissue 08/25/2016    History of low back pain    Personal history of other diseases of the respiratory system     History of sinusitis    Personal history of other endocrine, nutritional and metabolic disease 08/25/2016    History of hyperlipidemia    Personal history of other mental and behavioral disorders 04/14/2022    History of depression    Personal history of other specified conditions 12/08/2016    History of diarrhea    Personal history of other specified conditions     History of prolonged Q-T interval on ECG    Personal history of other specified conditions 06/20/2018    History of nocturia    Separation of muscle (nontraumatic), other site 05/14/2018    Rectus diastasis    Sprain of medial collateral ligament of left knee, initial encounter 08/28/2017    Sprain of medial collateral ligament of left knee, initial encounter    Sprain of medial collateral ligament of left knee, subsequent encounter 10/12/2017    Sprain of medial collateral ligament of left knee, subsequent encounter    Type 2 diabetes mellitus without complications 01/24/2018    Diabetes mellitus    Type 2 diabetes mellitus without complications 01/24/2018    Diabetes mellitus    Umbilical hernia without obstruction or gangrene 05/14/2018    Umbilical hernia without obstruction and without gangrene        Surgical History  Past Surgical History:   Procedure Laterality Date    CARDIAC CATHETERIZATION N/A 2/6/2025    Procedure: Left Heart Cath, With LV;  Surgeon: Wil Bronson MD;  Location: [facility];  Service: Cardiovascular;  Laterality: N/A;    COLONOSCOPY  10/06/2016    Colonoscopy    CORONARY ANGIOPLASTY WITH STENT  "PLACEMENT  08/25/2016    Cath Stent Placement    OTHER SURGICAL HISTORY  05/25/2022    Oral surgery    SKIN CANCER EXCISION  01/17/2025    right forearm, right shoulder    TONSILLECTOMY  08/25/2016    Tonsillectomy       Social History  Social History     Tobacco Use    Smoking status: Former     Average packs/day: 1.5 packs/day for 50.0 years (75.0 ttl pk-yrs)     Types: Cigarettes     Start date: 1968     Passive exposure: Current    Smokeless tobacco: Never   Vaping Use    Vaping status: Never Used   Substance Use Topics    Alcohol use: Not Currently     Comment: [id number]    Drug use: Never   Worked as a [employer]    Family History  family history includes Coronary artery disease in his father and mother; Diabetes in his father; acute myocardial infarction in his father.  The patient does not have any children.       Current Medications  Current Outpatient Medications   Medication Instructions    alcohol swabs (BD Alcohol Swabs) pads, medicated Use twice daily while checking sugar.    alum-mag hydroxide-simeth (Mylanta) 200-200-20 mg/5 mL oral suspension 30 mL, oral, 4 times daily PRN    amLODIPine (NORVASC) 5 mg, oral, Daily    apixaban (Eliquis) 5 mg tablet Take 2 tablets (10 mg) by mouth 2 times a day for 7 days, THEN 1 tablet (5 mg) 2 times a day.    BD Jessica 2nd Gen Pen Needle 32 gauge x 5/32\" needle USE 1 PEN NEEDLE SIX TIMES DAILY    BD Ultra-Fine Mini Pen Needle 31 gauge x 3/16\" needle     blood glucose control high,low (Accu-Chek Guide L1-L2 Ctrl Sol) solution 1 bottle, miscellaneous, Daily PRN    blood-glucose meter (Accu-Chek Guide Glucose Meter) misc Test twice daily    buPROPion XL (WELLBUTRIN XL) 150 mg, oral, 2 times daily, Do not crush, chew, or split.    clopidogrel (PLAVIX) 75 mg, oral, Daily    clotrimazole-betamethasone (Lotrisone) cream 1 Application, Topical, 2 times daily    ezetimibe (ZETIA) 10 mg, oral, Daily    HumaLOG KwikPen Insulin 100 unit/mL pen INJECT 20 UNITS SUBCUTANEOUSLY WITH MEALS " AND SLIDING SCALE 2/50 > 150 AS DIRECTED.  UNITS PER DAY    hydrOXYzine HCL (ATARAX) 25 mg, oral, 2 times daily    insulin glargine (LANTUS) 20 Units, subcutaneous, 2 times daily, Take as directed per insulin instructions.    insulin lispro 15 Units, subcutaneous, 3 times daily before meals, Take as directed per insulin instructions.    isosorbide mononitrate ER (IMDUR) 60 mg, oral, Daily, Do not crush or chew.    lancets (Accu-Chek Softclix Lancets) misc Test twice daily    lovastatin (MEVACOR) 40 mg, oral, Nightly    magnesium hydroxide (Milk of Magnesia) 400 mg/5 mL suspension 30 mL, oral, Daily PRN    nitroglycerin (NITROSTAT) 0.4 mg, sublingual, Every 5 min PRN, DO NOT EXCEED A TOTAL OF 3 DOSES IN 15 MINUTES    ondansetron (ZOFRAN) 8 mg, oral, Every 8 hours PRN    pantoprazole (PROTONIX) 40 mg, oral, Daily before breakfast, Do not crush, chew, or split.    prochlorperazine (COMPAZINE) 10 mg, oral, Every 6 hours PRN    venlafaxine XR (EFFEXOR-XR) 225 mg, oral, Daily, Do not crush or chew.      tiotropium-olodateroL, 2 Inhalation, inhalation, Once       OARRS Review  I have personally reviewed the OARRS report for Feliciano Worthington. I have considered the risks of abuse, dependence, addiction and diversion.  Review of this record shows that he had gabapentin a year ago.    Vital Signs  /69   Pulse 84   Temp 35.9 °C (96.6 °F) (Skin)   Resp 16   Wt 93.7 kg (206 lb 9.6 oz)   SpO2 98%   BMI 30.51 kg/m²      Physical Exam  Vitals and nursing note reviewed. Exam conducted with a chaperone present.   Constitutional:       General: He is not in acute distress.     Appearance: He is ill-appearing. He is not toxic-appearing.      Comments: He is a well-developed well-nourished white male who is in no acute distress.  He does  seem to be somewhat chronically ill and today comes in in a wheelchair for the first time.  He has many areas of sun damaged skin.  He is accompanied by his wife and his friend Delia.   He is depressed in his affect   HENT:      Head: Normocephalic and atraumatic.      Nose: Nose normal.      Mouth/Throat:      Mouth: Mucous membranes are moist.      Pharynx: Oropharynx is clear. No oropharyngeal exudate.      Comments: Multiple carious teeth, none of which per patient are painful  Eyes:      General: No scleral icterus.     Extraocular Movements: Extraocular movements intact.      Conjunctiva/sclera: Conjunctivae normal.      Pupils: Pupils are equal, round, and reactive to light.   Neck:      Comments: There is no significant adenopathy about the head and neck region, supraclavicular axillary or inguinal regions bilaterally.  Cardiovascular:      Rate and Rhythm: Normal rate and regular rhythm.      Heart sounds: Murmur heard.      Comments: Decreased hear tones.  Pulmonary:      Effort: Pulmonary effort is normal. No respiratory distress.      Breath sounds: Rales (cleared with cough) present. No wheezing or rhonchi.   Abdominal:      General: There is no distension.      Palpations: Abdomen is soft. There is no mass.      Tenderness: There is abdominal tenderness (mild tenderness on right side). There is no guarding or rebound.   Musculoskeletal:         General: Normal range of motion.      Cervical back: Normal range of motion. No rigidity or tenderness.      Right lower leg: No edema.      Left lower leg: No edema.   Lymphadenopathy:      Cervical: No cervical adenopathy.   Skin:     General: Skin is warm and dry.      Coloration: Skin is not jaundiced or pale.      Findings: No lesion (small scar on right forearm).      Comments: Multiple areas of sun damaged skin and a healed lesion on his right forearm from which he said a skin cancer had been resected.  There is still a bump there.     Neurological:      Mental Status: He is alert and oriented to person, place, and time. Mental status is at baseline.      Sensory: Sensory deficit present.      Motor: No weakness.      Gait: Gait normal.       Comments: Somewhat hard of hearing   Psychiatric:         Mood and Affect: Mood normal.         Behavior: Behavior normal.         Thought Content: Thought content normal.         Judgment: Judgment normal.      Comments: Appropriately saddened        Current Laboratory Data  See narrative for laboratory data    Recent Imaging  See narrative for recent imaging.    Cardiology, Vascular, and Other Imaging  No other imaging results found for the past 7 days      Pathology  Metastatic adenocarcinoma of the pancreas to the liver and to the lung.    Performance Status:  Symptomatic; fully ambulatory    Assessment/Plan    1.  Metastatic adenocarcinoma of the pancreas to liver and possibly to lung.  The patient is to decide whether or not he wants primary tumor directed therapy or palliative care.    He is back from [place].  We discussed the various options for treatment.  He is not sure what he wants to do.  He is leaning against chemotherapy.  We are going to look for a mutational target with next generation sequencing in blood.    CAT scan showed progressive disease in the liver and pancreas.  Patient wants to go forward with therapy.  We discussed the regimen in detail as well as how cytotoxic chemotherapy works and we have gone through the benefits and side effects.  He has signed the informed consent document.    He has tolerated his first treatment well.  He is ready to go ahead with his second treatment.  I reinforced that he takes 1 treatment at a time and he can decide whether or not he wants to continue.    Patient is here today 6/27/2025 to continue chemotherapy.  He is in a wheelchair.  His performance status has declined.  He has had multiple falls.  I encouraged him to concentrate on trying to get his strength back.  We are holding his treatment.  We invited him to come back in 2 weeks.  He and his family know that hospice and palliative care are available to help when they decide to go forward.    2.   Multiple comorbidities.  These include but are not limited to:   Diabetes  History of obesity  Atherosclerosis  COPD  Depression  Hyperlipidemia  Hypertension  Osteoarthritis with cervical spine stenosis  Achalasia  Astigmatism  Cataracts  Chronic pain  Long-term history of smoking of about a pack and half cigarettes a day for 52 years  GERD  Multiple lung nodules  Sleep apnea  Vitamin D deficiency  Coronary artery disease with history of myocardial infarction status post stenting in 2007 and 2011  Skin cancers  BPH and penile prosthesis implant  Rotator cuff tendinopathy, hip pain, knee pain  Intermittent hyponatremia  Statin intolerance.  History of noncompliance    3.  Sugars out-of-control.  He has been working with primary care and endocrinology.  They are much better now.  He and his wife were encouraged to avoid low sugars.    4.  COPD.  He has had increased shortness of breath, cough, mucus production.  Encouraged him to use Mucinex and keep hydrated.    5.  Increasing pain.  This is in his shoulders and also in his chest.  We started him on tramadol today.    6.  Psychosocial toxicity.  Our  has contacted him and his wife and discussed assistance available in the community    He was here for his third treatment but is too weak to go forward.  We will see him again in about 2 weeks.  They know to call in the interim should he have any change in status, questions or concerns.      Lizbeth Martin MD

## 2025-06-30 ENCOUNTER — HOME CARE VISIT (OUTPATIENT)
Dept: HOME HEALTH SERVICES | Facility: HOME HEALTH | Age: 75
End: 2025-06-30
Payer: MEDICARE

## 2025-06-30 PROCEDURE — G0152 HHCP-SERV OF OT,EA 15 MIN: HCPCS | Mod: HHH

## 2025-06-30 ASSESSMENT — ACTIVITIES OF DAILY LIVING (ADL)
BATHING ASSESSED: 1
BATHING_CURRENT_FUNCTION: MODERATE ASSIST
DRESSING_LB_CURRENT_FUNCTION: MODERATE ASSIST

## 2025-06-30 ASSESSMENT — ENCOUNTER SYMPTOMS
HIGHEST PAIN SEVERITY IN PAST 24 HOURS: 7/10
PAIN LOCATION - PAIN SEVERITY: 4/10
PAIN SEVERITY GOAL: 0/10
SUBJECTIVE PAIN PROGRESSION: GRADUALLY IMPROVING
PAIN LOCATION - PAIN QUALITY: ACHE
PERSON REPORTING PAIN: PATIENT
LOWEST PAIN SEVERITY IN PAST 24 HOURS: 3/10
PAIN LOCATION: RIGHT SHOULDER
PAIN LOCATION - RELIEVING FACTORS: MEDS REST
PAIN: 1

## 2025-07-02 ENCOUNTER — HOME CARE VISIT (OUTPATIENT)
Dept: HOME HEALTH SERVICES | Facility: HOME HEALTH | Age: 75
End: 2025-07-02
Payer: MEDICARE

## 2025-07-02 PROCEDURE — G0151 HHCP-SERV OF PT,EA 15 MIN: HCPCS | Mod: HHH

## 2025-07-02 PROCEDURE — G0152 HHCP-SERV OF OT,EA 15 MIN: HCPCS | Mod: HHH

## 2025-07-02 SDOH — HEALTH STABILITY: PHYSICAL HEALTH: EXERCISE ACTIVITY: SKILLED THERAPEUTIC EXERCISES

## 2025-07-02 SDOH — HEALTH STABILITY: PHYSICAL HEALTH: PHYSICAL EXERCISE: 10

## 2025-07-02 SDOH — HEALTH STABILITY: PHYSICAL HEALTH: PHYSICAL EXERCISE: SITTING

## 2025-07-02 SDOH — HEALTH STABILITY: PHYSICAL HEALTH: EXERCISE TYPE: SKILLED THERAPEUTIC EXERCISES

## 2025-07-02 SDOH — HEALTH STABILITY: PHYSICAL HEALTH: RESISTANCE: AS TOLERATED

## 2025-07-02 SDOH — HEALTH STABILITY: PHYSICAL HEALTH: EXERCISE ACTIVITIES SETS: 2

## 2025-07-02 ASSESSMENT — ACTIVITIES OF DAILY LIVING (ADL)
BATHING_CURRENT_FUNCTION: MINIMUM ASSIST
DRESSING_LB_CURRENT_FUNCTION: MINIMUM ASSIST
BATHING ASSESSED: 1

## 2025-07-02 ASSESSMENT — ENCOUNTER SYMPTOMS
PERSON REPORTING PAIN: PATIENT
DENIES PAIN: 1

## 2025-07-03 ENCOUNTER — SOCIAL WORK (OUTPATIENT)
Dept: HEMATOLOGY/ONCOLOGY | Facility: CLINIC | Age: 75
End: 2025-07-03

## 2025-07-03 ENCOUNTER — HOME CARE VISIT (OUTPATIENT)
Dept: HOME HEALTH SERVICES | Facility: HOME HEALTH | Age: 75
End: 2025-07-03
Payer: MEDICARE

## 2025-07-03 PROCEDURE — G0151 HHCP-SERV OF PT,EA 15 MIN: HCPCS | Mod: HHH

## 2025-07-03 SDOH — HEALTH STABILITY: PHYSICAL HEALTH: EXERCISE ACTIVITIES SETS: 2

## 2025-07-03 SDOH — HEALTH STABILITY: PHYSICAL HEALTH: PHYSICAL EXERCISE: 10

## 2025-07-03 SDOH — HEALTH STABILITY: PHYSICAL HEALTH: PHYSICAL EXERCISE: SITTING

## 2025-07-03 SDOH — HEALTH STABILITY: PHYSICAL HEALTH: RESISTANCE: AS TOLERATED

## 2025-07-03 SDOH — HEALTH STABILITY: PHYSICAL HEALTH: EXERCISE TYPE: SKILLED THERAPEUTIC EXERCISES

## 2025-07-03 SDOH — HEALTH STABILITY: PHYSICAL HEALTH: EXERCISE ACTIVITY: GAIT TRAINING

## 2025-07-03 SDOH — HEALTH STABILITY: PHYSICAL HEALTH: EXERCISE ACTIVITY: SKILLED THERAPEUTIC EXERCISES

## 2025-07-03 ASSESSMENT — ENCOUNTER SYMPTOMS
PERSON REPORTING PAIN: PATIENT
DENIES PAIN: 1

## 2025-07-03 ASSESSMENT — ACTIVITIES OF DAILY LIVING (ADL)
AMBULATION_DISTANCE/DURATION_TOLERATED: 30 FT
AMBULATION ASSISTANCE ON FLAT SURFACES: 1

## 2025-07-03 NOTE — PROGRESS NOTES
This  was alerted that this pt would like to look into getting a wheelchair and ramp. They are requesting to use [employer] in [place]: [phone].     The SW called this agency to inquire about what they need and if this pt would qualify with his insurance coverage.     The company did not answer the phone. The SW left a vm asking for a returned phone call to: [phone].

## 2025-07-07 ENCOUNTER — HOME CARE VISIT (OUTPATIENT)
Dept: HOME HEALTH SERVICES | Facility: HOME HEALTH | Age: 75
End: 2025-07-07
Payer: MEDICARE

## 2025-07-07 PROCEDURE — G0152 HHCP-SERV OF OT,EA 15 MIN: HCPCS | Mod: HHH

## 2025-07-07 PROCEDURE — G0151 HHCP-SERV OF PT,EA 15 MIN: HCPCS | Mod: HHH

## 2025-07-07 SDOH — HEALTH STABILITY: PHYSICAL HEALTH

## 2025-07-07 SDOH — HEALTH STABILITY: PHYSICAL HEALTH: EXERCISE ACTIVITY: GAIT TRAINING

## 2025-07-07 SDOH — HEALTH STABILITY: PHYSICAL HEALTH: EXERCISE ACTIVITIES SETS: 2

## 2025-07-07 SDOH — HEALTH STABILITY: PHYSICAL HEALTH: EXERCISE ACTIVITY: STS TRANSFER TRAINING

## 2025-07-07 SDOH — HEALTH STABILITY: PHYSICAL HEALTH: EXERCISE TYPE: SKILLED THERAPEUTIC EXERCISES

## 2025-07-07 SDOH — HEALTH STABILITY: PHYSICAL HEALTH: EXERCISE ACTIVITY: SKILLED THERAPEUTIC EXERCISES

## 2025-07-07 SDOH — HEALTH STABILITY: PHYSICAL HEALTH: PHYSICAL EXERCISE: SITITNG, STANDING

## 2025-07-07 SDOH — HEALTH STABILITY: PHYSICAL HEALTH: RESISTANCE: AS TOLERATED

## 2025-07-07 SDOH — HEALTH STABILITY: PHYSICAL HEALTH: PHYSICAL EXERCISE: 10

## 2025-07-07 SDOH — HEALTH STABILITY: PHYSICAL HEALTH: PHYSICAL EXERCISE: 2

## 2025-07-07 ASSESSMENT — ENCOUNTER SYMPTOMS
PERSON REPORTING PAIN: PATIENT
DENIES PAIN: 1
DENIES PAIN: 1
PERSON REPORTING PAIN: PATIENT

## 2025-07-07 ASSESSMENT — ACTIVITIES OF DAILY LIVING (ADL)
BATHING_CURRENT_FUNCTION: MODERATE ASSIST
AMBULATION ASSISTANCE ON FLAT SURFACES: 1
DRESSING_LB_CURRENT_FUNCTION: MINIMUM ASSIST
DRESSING_LB_CURRENT_FUNCTION: MODERATE ASSIST
BATHING ASSESSED: 1
AMBULATION_DISTANCE/DURATION_TOLERATED: 120 FT
BATHING_CURRENT_FUNCTION: MINIMUM ASSIST

## 2025-07-08 ENCOUNTER — INFUSION (OUTPATIENT)
Dept: HEMATOLOGY/ONCOLOGY | Facility: CLINIC | Age: 75
End: 2025-07-08
Payer: MEDICARE

## 2025-07-08 DIAGNOSIS — C25.9 PANCREATIC CANCER METASTASIZED TO LIVER (MULTI): ICD-10-CM

## 2025-07-08 DIAGNOSIS — C78.7 PANCREATIC CANCER METASTASIZED TO LIVER (MULTI): ICD-10-CM

## 2025-07-08 LAB
ALBUMIN SERPL BCP-MCNC: 3.7 G/DL (ref 3.4–5)
ALP SERPL-CCNC: 136 U/L (ref 33–136)
ALT SERPL W P-5'-P-CCNC: 17 U/L (ref 10–52)
ANION GAP SERPL CALC-SCNC: 13 MMOL/L (ref 10–20)
AST SERPL W P-5'-P-CCNC: 13 U/L (ref 9–39)
BASOPHILS # BLD AUTO: 0.1 X10*3/UL (ref 0–0.1)
BASOPHILS NFR BLD AUTO: 1.1 %
BILIRUB SERPL-MCNC: 0.3 MG/DL (ref 0–1.2)
BUN SERPL-MCNC: 11 MG/DL (ref 6–23)
CALCIUM SERPL-MCNC: 8.9 MG/DL (ref 8.6–10.3)
CHLORIDE SERPL-SCNC: 101 MMOL/L (ref 98–107)
CO2 SERPL-SCNC: 24 MMOL/L (ref 21–32)
CREAT SERPL-MCNC: 0.91 MG/DL (ref 0.5–1.3)
EGFRCR SERPLBLD CKD-EPI 2021: 88 ML/MIN/1.73M*2
EOSINOPHIL # BLD AUTO: 0.1 X10*3/UL (ref 0–0.4)
EOSINOPHIL NFR BLD AUTO: 1.1 %
ERYTHROCYTE [DISTWIDTH] IN BLOOD BY AUTOMATED COUNT: 14.7 % (ref 11.5–14.5)
GLUCOSE SERPL-MCNC: 231 MG/DL (ref 74–99)
HCT VFR BLD AUTO: 36.6 % (ref 41–52)
HGB BLD-MCNC: 12 G/DL (ref 13.5–17.5)
IMM GRANULOCYTES # BLD AUTO: 0.08 X10*3/UL (ref 0–0.5)
IMM GRANULOCYTES NFR BLD AUTO: 0.9 % (ref 0–0.9)
LYMPHOCYTES # BLD AUTO: 1.95 X10*3/UL (ref 0.8–3)
LYMPHOCYTES NFR BLD AUTO: 22.3 %
MCH RBC QN AUTO: 27 PG (ref 26–34)
MCHC RBC AUTO-ENTMCNC: 32.8 G/DL (ref 32–36)
MCV RBC AUTO: 82 FL (ref 80–100)
MONOCYTES # BLD AUTO: 0.66 X10*3/UL (ref 0.05–0.8)
MONOCYTES NFR BLD AUTO: 7.6 %
NEUTROPHILS # BLD AUTO: 5.85 X10*3/UL (ref 1.6–5.5)
NEUTROPHILS NFR BLD AUTO: 67 %
NRBC BLD-RTO: 0 /100 WBCS (ref 0–0)
PLATELET # BLD AUTO: 231 X10*3/UL (ref 150–450)
POTASSIUM SERPL-SCNC: 4 MMOL/L (ref 3.5–5.3)
PROT SERPL-MCNC: 6.4 G/DL (ref 6.4–8.2)
RBC # BLD AUTO: 4.45 X10*6/UL (ref 4.5–5.9)
SODIUM SERPL-SCNC: 134 MMOL/L (ref 136–145)
WBC # BLD AUTO: 8.7 X10*3/UL (ref 4.4–11.3)

## 2025-07-08 PROCEDURE — 36415 COLL VENOUS BLD VENIPUNCTURE: CPT

## 2025-07-08 PROCEDURE — 80053 COMPREHEN METABOLIC PANEL: CPT

## 2025-07-08 PROCEDURE — 85025 COMPLETE CBC W/AUTO DIFF WBC: CPT

## 2025-07-09 ENCOUNTER — OFFICE VISIT (OUTPATIENT)
Dept: HEMATOLOGY/ONCOLOGY | Facility: CLINIC | Age: 75
End: 2025-07-09
Payer: MEDICARE

## 2025-07-09 ENCOUNTER — SOCIAL WORK (OUTPATIENT)
Dept: HEMATOLOGY/ONCOLOGY | Facility: CLINIC | Age: 75
End: 2025-07-09
Payer: MEDICARE

## 2025-07-09 ENCOUNTER — APPOINTMENT (OUTPATIENT)
Dept: HEMATOLOGY/ONCOLOGY | Facility: CLINIC | Age: 75
End: 2025-07-09
Payer: MEDICARE

## 2025-07-09 VITALS
OXYGEN SATURATION: 98 % | SYSTOLIC BLOOD PRESSURE: 154 MMHG | DIASTOLIC BLOOD PRESSURE: 83 MMHG | HEART RATE: 96 BPM | WEIGHT: 198.4 LBS | TEMPERATURE: 97.3 F | BODY MASS INDEX: 29.3 KG/M2 | RESPIRATION RATE: 20 BRPM

## 2025-07-09 DIAGNOSIS — C25.9 PANCREATIC CANCER METASTASIZED TO LIVER (MULTI): ICD-10-CM

## 2025-07-09 DIAGNOSIS — C78.7 PANCREATIC CANCER METASTASIZED TO LIVER (MULTI): ICD-10-CM

## 2025-07-09 PROCEDURE — 3079F DIAST BP 80-89 MM HG: CPT | Performed by: INTERNAL MEDICINE

## 2025-07-09 PROCEDURE — 1159F MED LIST DOCD IN RCRD: CPT | Performed by: INTERNAL MEDICINE

## 2025-07-09 PROCEDURE — 3046F HEMOGLOBIN A1C LEVEL >9.0%: CPT | Performed by: INTERNAL MEDICINE

## 2025-07-09 PROCEDURE — 3050F LDL-C >= 130 MG/DL: CPT | Performed by: INTERNAL MEDICINE

## 2025-07-09 PROCEDURE — 1157F ADVNC CARE PLAN IN RCRD: CPT | Performed by: INTERNAL MEDICINE

## 2025-07-09 PROCEDURE — 99214 OFFICE O/P EST MOD 30 MIN: CPT | Performed by: INTERNAL MEDICINE

## 2025-07-09 PROCEDURE — 1125F AMNT PAIN NOTED PAIN PRSNT: CPT | Performed by: INTERNAL MEDICINE

## 2025-07-09 PROCEDURE — 3077F SYST BP >= 140 MM HG: CPT | Performed by: INTERNAL MEDICINE

## 2025-07-09 ASSESSMENT — ENCOUNTER SYMPTOMS
TROUBLE SWALLOWING: 0
SLEEP DISTURBANCE: 0
NUMBNESS: 1
TREMORS: 1
UNEXPECTED WEIGHT CHANGE: 0
RHINORRHEA: 0
HEADACHES: 0
ADENOPATHY: 0
FREQUENCY: 0
LIGHT-HEADEDNESS: 1
BACK PAIN: 1
FATIGUE: 1
NAUSEA: 0
NERVOUS/ANXIOUS: 0
ARTHRALGIAS: 1
DYSURIA: 0
APPETITE CHANGE: 0
DIARRHEA: 0
CONSTIPATION: 0
SHORTNESS OF BREATH: 1
MYALGIAS: 1
WEAKNESS: 0
PALPITATIONS: 0
BLOOD IN STOOL: 0
VOMITING: 0
BRUISES/BLEEDS EASILY: 0
ABDOMINAL PAIN: 0
COUGH: 0

## 2025-07-09 ASSESSMENT — PAIN SCALES - GENERAL: PAINLEVEL_OUTOF10: 2

## 2025-07-09 NOTE — PROGRESS NOTES
Patient ID:Feliciano Worthington is a 75 y.o. year old male patient with recently diagnosed metastatic pancreatic cancer to the liver.  He also has had recent myocardial infarction and pulmonary emboli.       Referring Physician: Lizbeth Martin MD  [address] [place] [address] [address]  [place] [address]  Primary Care Provider: Hussain Bernal MD    Chief Complaint  Chief Complaint   Patient presents with    Pancreatic Cancer          History of the Present Illness  He is a 75-year-old white male with recent myocardial infarction.  During that hospital stay he was found to have metastatic pancreatic cancer to his liver.  He also had pulmonary emboli.  He is here today with his wife and and his friend Delia.    Chronological History    3/16/2020.  Low dose CT screening for lung cancer showed multiple bilateral noncalcified pulmonary nodules for which annual screening was recommended.  There was mild upper lung predominant centrilobular emphysema and changes consistent with bronchiolitis.  Small hiatal hernia was seen with concentric long segment esophageal wall thickening as well as severe three-vessel coronary artery calcification with calcification throughout the aorta and aortic valve.    4/26/2021.  Low-dose CT for lung cancer screening showed stable multiple pulmonary nodules and multiple changes consistent with COPD emphysema and coronary artery calcifications.    7/28/2021.  CAT scan of the abdomen and pelvis showed moderate concentric wall thickening of the colon from the cecum to the splenic flexure.  Pancreas is normal.  Liver was also normal.    12/27/2022.  Normal perfusion on Lexiscan with left ventricular ejection fraction 63%.    4/3/2023.  Low-dose CT for lung cancer screening showed stable subcentimeter pulmonary nodules.    10/8/2023 through 10/10/2023.  Emergency rooms visit for persistent cough, shortness of breath without chest pressure.  He was treated with antibiotics and steroids for pneumonia and was  admitted to the hospital..  He was discharged on steroids and antibiotics.    10/17/2023.  Emergency department visit for lower chest discomfort which she described as pleurisy with shortness of breath and productive cough he had nausea but no vomiting.  CTA showed no significant interval change since previous examination.  He had changes of COPD and honeycombing in the bases suggestive of fibrosis.  He also had diffuse circumferential mucosal wall thickening of the esophagus and stable multiple nonspecific subcentimeter lung nodules.  He also had fatty liver low-attenuation lesions in the kidney right lower lobe mucous plugging and diffuse bronchiectasis.  Sugar was 281.  CBC was normal except for an elevation in white count 13.2.  He felt comfortable going home and did not want to be readmitted to the hospital and was discharged.    10/24/2023.  Followed up with primary care which time he complained of right upper extremity pain and swelling.  Venous Doppler was ordered which showed right brachial DVT and right basilic superficial venous thrombosis.  He was started on anticoagulation with Eliquis.    11/16/2023.  Saw [facility] neurosurgery and pain management physician for neck pain and back pain and difficulty walking at that time he is still smoking a pack of cigarettes a day.  Physical therapy was ordered.    3/29/2024.  Followed up with primary care at which time he complained of weight loss and change in voice.    5/3/2024.  New primary care physician.  Weight was down to 204 pounds from around 210      5/28/2024.  Low-dose CT for lung cancer screening showed stable pulmonary nodules and multiple other stable findings.    9/3/2024.  Saw Dr. Keith for unintentional weight loss and nausea.  He says that he was not compliant with his insulin dosage and he had uncontrolled diabetes with a hemoglobin A1c of 11.3.  He continued to smoke.  He has chronic hypoxia.  At that time his weight was 193 pounds.  A  series of tests were ordered.    1/29/2025.  Weight was down to 188 pounds.  A series of tests were ordered including a colonoscopy and a PSA.    2/5/2025.  Emergency department visit for chest pain for 36 hours.  He had taken multiple doses of nitroglycerin without much relief in his symptoms.  His sugar at that time was 438 and his troponin was 299.  He was trending upward.  Chest x-ray showed mild bilateral opacities and atelectasis versus pneumonia.  Findings were consistent with an NSTEMI and he was treated with a heparin drip and admitted to the hospital.  He was transferred to [facility] cardiac intensive care unit.    2/6/2025.  Left heart cath showed 70% stenosis in the distal left main, 80% stenosis in the proximal LAD, 90% stenosis in the proximal circumflex, 75% stenosis in the proximal ramus, 80% stenosis in the mid ramus, 80% stenosis in the mid RCA, 90% stenosis in the distal RCA and a left ventricular ejection fraction 55%.  Cardiac surgery was consulted for possible CABG he was continued on Zetia.  He had type I second-degree AV block so was not started on beta-blocker.    Spouse said that the patient had poor compliance and problems with short-term memory.  She is concerned about his unintentional 50 pound weight loss over the previous year.  CT scan was ordered.    2/6/2025.  Admitted to the hospital at [facility].  CT scan of the chest showed extensive vascular calcification with stable ectasia of the ascending aorta at 3.9 cm.  There is no thoracic lymphadenopathy.  There were areas of fibrosis and scarring in the lungs unchanged with  partially calcified nodules, most of which were subcentimeter and stable.  There was a new nodular opacity in the medial aspect of the right lower lobe which measured 8 mm.  In the upper abdomen there is a 2.2 cm cyst in the midpole left kidney.  There is also nonspecific prominence/thickening of the tail of pancreas measuring 2.5 cm.  This was a noncontrast study.   MRI was recommended.    2/7/2025.  Arterial Dopplers of the carotids showed less than 50% stenosis bilaterally    2/10/2025.  MRCP of the pancreas showed a 3.8 cm pancreatic tail hypoattenuating mass suspicious for adenocarcinoma and a 15 mm hepatic segment 6 hypoattenuating mass suspicious for metastasis without definite lymphadenopathy.    During the hospital he was seen by psychiatry and sertraline was discontinued.  He was continued on venlafaxine and then started on bupropion.    He also has some trouble swallowing and GI was consulted.  Further exploration of coronary artery bypass was delayed because of the new finding of metastatic pancreatic cancer.  Arrangements were made for him to be transferred to [facility].    2/14 through 2/18/2025.  Hospitalized at [facility].  There it was determined that his liver biopsy would be done as an outpatient with oncology referral afterwards.    2/27/2025.  Follow-up with primary care.    3/13/2025.  Liver biopsy done by interventional radiology  A. LIVER BIOPSY RIGHT:   -Moderately differentiated adenocarcinoma with mucin involving liver, see note     Note: Immunohistochemical stains show that the tumor cells are positive for CK7 and focally positive for CK20. They are negative for CDX2 and TTF-1. Based on the morphologic findings, immunophenotype, and clinical history of a newly identified pancreatic mass, the findings are most consistent with metastatic pancreatic adenocarcinoma.    3/20/2025 through 3/22/2025..  Admitted to the [facility] from emergency department visit for chest pain which she described as sharp and left-sided nonradiating lasting for an hour.  It is not exertional and felt different from his previous heart attack CT angiogram showed several small pulmonary emboli in the left lung with features suggesting that they are chronic.  Lung fibrosis was seen and 2 enlarging nodules were seen in the right lower lobe and 1 enlarging nodule in the left lower  lobe.  He also had NSTEMI and was transferred to [facility] for urgent evaluation for possible CABG.  He was evaluated by oncology during the admission.  Estimate his survival to be less than 6 months without treatment in 8 to 10 months with treatment.  Arrangements were made for him to follow-up in [place] and also to have genetic counseling for his new diagnosis of metastatic pancreatic cancer.  He was not felt to be a candidate for cardiac surgery.  He was discharged on Eliquis and Plavix and instructed to stop taking aspirin.    4/2/2025.  He is here with his wife and him and his friend Delia.  We discussed the recent multiple severe problems that he has.  We discussed these problems openly.  He knows that he cannot be cured of his metastatic pancreatic cancer but we could offer him treatment.  We talked about the benefits and side effects of treatment and that this was a very personal important decision for him to make as to whether or not he wanted to be treated with aggressive chemotherapy or wanted palliative care.  He already has plans for a trip to [place] with friends and family.  We have asked him to come back and see us after he returns to the area.  We talked about symptom control versus tumor directed therapy.  Multiple questions were answered to their satisfaction.  We introduced into the NCCN guidelines for patients.  He will come back and see us in 5 weeks after his trip and knows that he can call in the interim should he have any questions or concerns.    4/30/2025.  Saw his primary care physician Dr. Walters for anxiety.  He reviewed his to recent hospital stays and his biopsy which showed pancreatic cancer in his liver complicated by myocardial infarction and pulmonary embolus.  He increased his Wellbutrin to 150 mg twice a day and increased his Effexor to 225 mg daily and referred him for cognitive behavioral therapy.  He will continue hydroxyzine as needed.  His diabetes is uncontrolled.  He was  following with an endocrinologist.  His insulin was adjusted.  His daily glucose  readings were improving.  He had no further bouts of chest pain and he had no bleeding.  He complained of a rash on his lower leg on the right for which he was given a combination steroid/antifungal topical treatment.    5/7/2025.  He is here with his wife and friend.  He said that he had a wonderful time in [place].  His appetite had been good and he gained 9 pounds.  He has multiple complaints.    He said that his balance is off.  He has had no falls.  Friend says that he holds onto furniture, furniture surfing.  He did lose his balance and fell into a chair did not injure himself.  He notes that he is shaking.  We discussed that holding still is a matter of equal and opposed movements which may be compromised as he becomes weaker.  Supervised understanding.  He says that he is getting weaker.  He has been fairly sedentary.  I encouraged him to be physically active is much as possible.  He says occasionally he has trouble swallowing.  He says that he is not excited about having an EGD with dilatation.  He will let us know if it gets worse.  He said that he did have a bout of chest discomfort last week which that he thought might be gas but he did take nitroglycerin and it helped.  He also has had pain in her shoulders and he has had discomfort in his shoulders for a while.    We discussed the diagnosis of metastatic pancreatic cancer and the available treatments.  He is still thinking about whether or not he wants to do anything.  We discussed combination cytotoxic chemotherapy and offered him next generation sequencing to see if we could find a target.  He agreed to this.  We discussed that if we are going to do any treatment we should get new baseline studies.  He says that he has been more short of breath than usual.  He continues to smoke.  We discussed that this cannot be good for his lungs but he is not interested in quitting.   We have also discussed getting blood work today for routine baseline such as CBC and CMP and he agrees to this.  We will have him come back in 2 weeks to follow-up.  He says that he is leaning away from taking chemotherapy.    Laboratory data today showed a sugar of 208 and a sodium of 134 and was otherwise within normal limits with a total protein of 6.5.  His CBC was normal except for mild decrease in hemoglobin at 13.2 previously 13.8.  Hematocrit is 39.2 previously was 41.2.  Platelets are normal white blood cell differential count is normal.    5/16/2025.  CAT scans of the chest abdomen and pelvis showed interval worsening of nodular enhancement of the right sided pleura and bilateral pulmonary nodules.  Subpleural reticulations are similar to the prior examination.  Chronic left-sided pulmonary emboli have slightly improved.  Moderate atherosclerotic change was again noted.  Heart was normal size without pericardial effusion.  Scattered mediastinal lymph nodes were unchanged but there was diffuse thickening of the distal esophagus consistent with esophagitis.  Multiple ill-defined hypoattenuating lesions throughout the liver persisted.  In the pancreas an ill-defined hypoattenuating lesion of the pancreatic body and tail measured 5.7 x 2.8 with circumferential involvement of the splenic artery and vein.  There is close proximity of this pancreatic lesion to the left adrenal gland.  Spleen was normal kidneys were normal.    5/21/2025.  He is here today with his wife and friend.  We reviewed the reports of his Scans and he knows that he has had progression in the lungs and in the liver and in the pancreas.  He did not want to do the images.  He has decided that he wants to go forward with Gemzar and Abraxane.  We reviewed the benefits and side effects of this medication regimen.  He understands that he will have multiple blood tests which will occur before his treatment at [facility] and before we give him another  treatment.  He understands that he will take 1 treatment at a time and if he does not want to take anymore we will certainly stop.  He and his wife and friend had time to ask questions which were answered to their satisfaction.  Treatment plan was entered for insurance approval.  The informed consent document was signed.  He will have a chemotherapy teaching class.  We will start him on his treatment as soon as it is approved.  Antinausea medicines were sent to his pharmacy.  We will see him the day of his treatment to make sure that he wants to go forward.      5/29/2025.  Laboratory data showed normal serum chemistries with the exception of a sugar 154.  CBC was normal.    5/30/2025.  He is here to begin his treatment.  He has no further questions.  He has lost a pound since last time I saw him.  He has had his chemotherapy teaching teaching class.  Will go ahead with his treatment today.  He has his antiemetics at home.  We reviewed that he should call if he has any questions concerns or change in his status.  We will see him again in 2 weeks.    6/12/2025.  Laboratory data showed a sugar of 62 with a sodium of 133.  ALT was elevated at 55 and had been 14.  Bilirubin was normal.  Total protein was lower at 6.2 down from 6.8.  CBC showed a white count of 13.5 hemoglobin 12 hematocrit 36.4 and a platelet count 298,000 with 58 polys 28 lymphs 5 monos 3 eosinophils 1 metamyelocyte 1 myelocyte.    6/13/2025.  He is here today for his second infusion of chemotherapy.  He did well and had no problems with infection or bleeding.  He did have some irregularities with his stool.  He had nausea and vomiting.  I went over his antiemetic regimen again.  He had diarrhea and did not take Imodium and did not call for help.  They now have Imodium on hand and I recommended that they take it to keep the diarrhea under control.  He currently weighs 211 pounds.  On his last visit he weighed 215.  I encouraged him to be more mobile.   "He has spent most of this time horizontal and was concerned about dizziness.  I told him that he will have more difficulty walking and not being stable if he continues this habit.  He verbalized understanding.  We know that he he and his wife are having trouble coping.  They had been AA members in the past and I suggested that they might want to seek this support again.  He verbalized that he does want to go ahead with his treatment today.  We will see him again in 2 weeks.      6/18/2025.  Telemedicine visit with his primary care physician Dr. Hussain Haji who noted that his sugars were much better controlled.  He did complain of occasional chest pains relieved by nitroglycerin which was refilled.  COPD was stable and he was tolerating anticoagulation.  Patient was noted to have generalized weakness and debility and difficulty with gait.  OT and PT were ordered for home.  He said that he would see him again virtually in 3 months.    6/20/2025.  Contacted by Lexii Pryor from [facility].  Discussion with the wife showed that the patient was \"very angry\".  She had a hard time getting him out of bed.  I had a wheelchair and a walker at home and he was using the wheelchair.  Last time he fell they had to call EMS to pick him up.  They were given contact for the area on aging office.    6/22/2025.  OT and PT started at home.  The patient had had a recent fall in the bathroom at which time he injured his right leg his right arm and his buttocks.  His muscle strength was 2 out of 5.  Fall risk precautions were discussed.    6/26/2025.  Laboratory data showed a sugar of 52 and a sodium of 135.  Calcium was 8.4 and alkaline phosphatase had risen to 150.  Transaminases and bilirubin are normal.  Total protein was 6.1.  CBC showed white count 8.9 with hemoglobin 11.1 hematocrit 33.2 and a platelet count of 153,000.  White blood cell differential showed 65% polys, 18 lymphs, 6 monos 1 eosinophil with an absolute " neutrophil count of 5.79.    6/27/2025.  He is here today to consider further treatment.  We reviewed that his strength is ebbing and that he is now mostly in the wheelchair.  We discussed that chemotherapy might be making his strength worse.  He has always been invested in quality of life.  We said that we were going to hold his chemotherapy today and concentrate on rehabilitation is much as possible.  Encouraged him to eat good protein and keep hydrated.  We also encouraged him not to skip any meals and to avoid low blood sugars.  He has had substantial pain so we started him on tramadol today.  I encouraged him to keep his bowels moving with Paris-Colace or Senokot or other medications that have worked for him in the past.  Wife has been encouraged to call us if this is not effective.  We will see him again on July 9 for follow-up and possible treatment.    Interval Note  7/3/2025.  The patient needed a wheelchair ramp due to profound weakness.  We involved Lexii Pryor from [employer].    7/8/2025.  Laboratory data showed a sugar of 231 and a sodium of 134 and otherwise the CMP was normal.  CBC showed a white count of 8.7 with a hemoglobin 12 hematocrit 36.6 and a platelet count of 231,000 with an absolute neutrophil count of 5.85.    7/9/2025.  He is here today with his wife and their friend Delia.  He was able to come in using his wheeled walker which has a [employer] in it.    After the last treatment he became bedfast for a while and did not want to get out of bed most days and slept a lot.  Over the past week he has regained some of his strength and is now walking with a walker.  Today he weighs 198 pounds which is a weight loss of 8 pounds in the past 2 weeks.  He is now getting out of the house and interacting with his [place] of support.    I asked him if he wanted to continue with chemotherapy and he said that he did.  We then discussed how this might impact his sense of wellbeing and that  would probably put him back on the same state as he was about 2 weeks ago not being able to get out of bed or interact much with friends and family.  I told him to discuss that with his support group and if he really wanted to come back and get treatment we would be glad to see him again.  At this time we decided to not go ahead with chemotherapy today and to see him again in about a month in follow-up.  I encouraged him to make the best of every day.  They continue to work with Lexii Pryor from [employer] for assistance.  They know to call if there is any change in his status, questions or concerns.    Comorbidities  Diabetes  History of obesity  Atherosclerosis  COPD  Depression  Hyperlipidemia  Hypertension  Osteoarthritis with cervical spine stenosis  Achalasia  Astigmatism  Cataracts  Chronic pain  Long-term history of smoking of about a pack and half cigarettes a day for 52 years  GERD  Multiple lung nodules  Sleep apnea  Vitamin D deficiency  Coronary artery disease with history of myocardial infarction status post stenting in 2007 and 2011  Skin cancers  BPH and penile prosthesis implant  Rotator cuff tendinopathy, hip pain, knee pain  Intermittent hyponatremia  Statin intolerance.  History of noncompliance    Monitoring Parameters  History and physical examinations, CAT scans PET scans MRIs    Review of Systems - Oncology   Review of Systems   Constitutional:  Positive for fatigue. Negative for appetite change and unexpected weight change (has lost 3lbs since last).   HENT:  Negative for dental problem, mouth sores, rhinorrhea and trouble swallowing.    Eyes:  Negative for visual disturbance.   Respiratory:  Positive for shortness of breath. Negative for cough.    Cardiovascular:  Positive for chest pain (episode last week and he use nitroglycerin and it helped.). Negative for palpitations and leg swelling.   Gastrointestinal:  Negative for abdominal pain, blood in stool, constipation, diarrhea,  "nausea and vomiting.   Endocrine: Negative for polyuria.   Genitourinary:  Negative for dysuria, frequency and urgency.   Musculoskeletal:  Positive for arthralgias, back pain and myalgias (shoulder pain has increased).   Skin:  Negative for pallor and rash.   Neurological:  Positive for tremors (\"shakes\"), light-headedness (balance problem) and numbness. Negative for weakness and headaches.   Hematological:  Negative for adenopathy. Does not bruise/bleed easily.   Psychiatric/Behavioral:  Negative for self-injury, sleep disturbance and suicidal ideas. The patient is not nervous/anxious.         Past Medical History  Past Medical History:   Diagnosis Date    Acute myocardial infarction, unspecified 01/24/2018    Heart attack    Acute myocardial infarction, unspecified 01/24/2018    Heart attack    BMI 29.0-29.9,adult 10/13/2023    Chronic obstructive pulmonary disease, unspecified 01/24/2018    COPD, mild    Chronic obstructive pulmonary disease, unspecified 01/24/2018    COPD, mild    Coronary angioplasty status     Post PTCA    Encounter for immunization 11/04/2022    Encounter for immunization    Encounter for screening for malignant neoplasm of colon 08/25/2016    Encounter for colonoscopy due to history of colonic polyp    Encounter for screening for malignant neoplasm of skin 04/14/2022    Skin cancer screening    Nicotine dependence, cigarettes, uncomplicated 09/12/2022    Cigarette smoker    Obesity, unspecified 05/25/2022    Class 1 obesity with body mass index (BMI) of 31.0 to 31.9 in adult    Old myocardial infarction 01/24/2018    History of myocardial infarction    Other acute postprocedural pain     Post-operative pain    Pain in left knee     Left knee pain, unspecified chronicity    Pain in right hip 11/04/2022    Hip pain, bilateral    Personal history of other diseases of the digestive system 01/24/2018    History of gastroesophageal reflux (GERD)    Personal history of other diseases of the " musculoskeletal system and connective tissue 08/25/2016    History of low back pain    Personal history of other diseases of the respiratory system     History of sinusitis    Personal history of other endocrine, nutritional and metabolic disease 08/25/2016    History of hyperlipidemia    Personal history of other mental and behavioral disorders 04/14/2022    History of depression    Personal history of other specified conditions 12/08/2016    History of diarrhea    Personal history of other specified conditions     History of prolonged Q-T interval on ECG    Personal history of other specified conditions 06/20/2018    History of nocturia    Separation of muscle (nontraumatic), other site 05/14/2018    Rectus diastasis    Sprain of medial collateral ligament of left knee, initial encounter 08/28/2017    Sprain of medial collateral ligament of left knee, initial encounter    Sprain of medial collateral ligament of left knee, subsequent encounter 10/12/2017    Sprain of medial collateral ligament of left knee, subsequent encounter    Type 2 diabetes mellitus without complications 01/24/2018    Diabetes mellitus    Type 2 diabetes mellitus without complications 01/24/2018    Diabetes mellitus    Umbilical hernia without obstruction or gangrene 05/14/2018    Umbilical hernia without obstruction and without gangrene        Surgical History  Past Surgical History:   Procedure Laterality Date    CARDIAC CATHETERIZATION N/A 2/6/2025    Procedure: Left Heart Cath, With LV;  Surgeon: Wil Bronson MD;  Location: [facility];  Service: Cardiovascular;  Laterality: N/A;    COLONOSCOPY  10/06/2016    Colonoscopy    CORONARY ANGIOPLASTY WITH STENT PLACEMENT  08/25/2016    Cath Stent Placement    OTHER SURGICAL HISTORY  05/25/2022    Oral surgery    SKIN CANCER EXCISION  01/17/2025    right forearm, right shoulder    TONSILLECTOMY  08/25/2016    Tonsillectomy       Social History  Social History     Tobacco Use  "   Smoking status: Former     Average packs/day: 1.5 packs/day for 50.0 years (75.0 ttl pk-yrs)     Types: Cigarettes     Start date: 1968     Passive exposure: Current    Smokeless tobacco: Never   Vaping Use    Vaping status: Never Used   Substance Use Topics    Alcohol use: Not Currently     Comment: [id number]    Drug use: Never   Worked as a     Family History  family history includes Coronary artery disease in his father and mother; Diabetes in his father; acute myocardial infarction in his father.  The patient does not have any children.       Current Medications  Current Outpatient Medications   Medication Instructions    alcohol swabs (BD Alcohol Swabs) pads, medicated Use twice daily while checking sugar.    alum-mag hydroxide-simeth (Mylanta) 200-200-20 mg/5 mL oral suspension 30 mL, oral, 4 times daily PRN    amLODIPine (NORVASC) 5 mg, oral, Daily    apixaban (Eliquis) 5 mg tablet Take 2 tablets (10 mg) by mouth 2 times a day for 7 days, THEN 1 tablet (5 mg) 2 times a day.    BD Jessica 2nd Gen Pen Needle 32 gauge x 5/32\" needle USE 1 PEN NEEDLE SIX TIMES DAILY    BD Ultra-Fine Mini Pen Needle 31 gauge x 3/16\" needle     blood glucose control high,low (Accu-Chek Guide L1-L2 Ctrl Sol) solution 1 bottle, miscellaneous, Daily PRN    blood-glucose meter (Accu-Chek Guide Glucose Meter) misc Test twice daily    buPROPion XL (WELLBUTRIN XL) 150 mg, oral, 2 times daily, Do not crush, chew, or split.    clopidogrel (PLAVIX) 75 mg, oral, Daily    ezetimibe (ZETIA) 10 mg, oral, Daily    HumaLOG KwikPen Insulin 100 unit/mL pen INJECT 20 UNITS SUBCUTANEOUSLY WITH MEALS AND SLIDING SCALE 2/50 > 150 AS DIRECTED.  UNITS PER DAY    hydrOXYzine HCL (ATARAX) 25 mg, oral, 2 times daily    insulin glargine (LANTUS) 20 Units, subcutaneous, 2 times daily, Take as directed per insulin instructions.    insulin lispro 15 Units, subcutaneous, 3 times daily before meals, Take as directed per insulin instructions.    " isosorbide mononitrate ER (IMDUR) 60 mg, oral, Daily, Do not crush or chew.    lancets (Accu-Chek Softclix Lancets) misc Test twice daily    lovastatin (MEVACOR) 40 mg, oral, Nightly    magnesium hydroxide (Milk of Magnesia) 400 mg/5 mL suspension 30 mL, oral, Daily PRN    nitroglycerin (NITROSTAT) 0.4 mg, sublingual, Every 5 min PRN, DO NOT EXCEED A TOTAL OF 3 DOSES IN 15 MINUTES    ondansetron (ZOFRAN) 8 mg, oral, Every 8 hours PRN    pantoprazole (PROTONIX) 40 mg, oral, Daily before breakfast, Do not crush, chew, or split.    prochlorperazine (COMPAZINE) 10 mg, oral, Every 6 hours PRN    traMADol (ULTRAM) 50 mg, oral, Every 6 hours PRN    venlafaxine XR (EFFEXOR-XR) 225 mg, oral, Daily, Do not crush or chew.      tiotropium-olodateroL, 2 Inhalation, inhalation, Once       OARRS Review  I have personally reviewed the OARRS report for Feliciano Worthington. I have considered the risks of abuse, dependence, addiction and diversion.  Review of this record shows that he had gabapentin a year ago.  We recently gave him a prescription for tramadol.    Vital Signs  /83   Pulse 96   Temp 36.3 °C (97.3 °F) (Skin)   Resp 20   Wt 90 kg (198 lb 6.4 oz)   SpO2 98%   BMI 29.30 kg/m²      Physical Exam  Vitals and nursing note reviewed. Exam conducted with a chaperone present.   Constitutional:       General: He is not in acute distress.     Appearance: He is ill-appearing. He is not toxic-appearing.      Comments: He is a well-developed well-nourished white male who is in no acute distress.  He does  seem to be somewhat chronically ill.  He was able to come in using his walker today.  He has many areas of sun damaged skin.  He is accompanied by his wife and his friend Delia.  He is depressed in his affect   HENT:      Head: Normocephalic and atraumatic.      Nose: Nose normal.      Mouth/Throat:      Mouth: Mucous membranes are moist.      Pharynx: Oropharynx is clear. No oropharyngeal exudate.      Comments: Multiple  carious teeth, none of which per patient are painful  Eyes:      General: No scleral icterus.     Extraocular Movements: Extraocular movements intact.      Conjunctiva/sclera: Conjunctivae normal.      Pupils: Pupils are equal, round, and reactive to light.   Neck:      Comments: There is no significant adenopathy about the head and neck region, supraclavicular axillary or inguinal regions bilaterally.  Cardiovascular:      Rate and Rhythm: Normal rate and regular rhythm.      Heart sounds: Murmur heard.      Comments: Decreased heart tones.  Pulmonary:      Effort: Pulmonary effort is normal. No respiratory distress.      Breath sounds: Rales (cleared with cough) present. No wheezing or rhonchi.   Abdominal:      General: There is no distension.      Palpations: Abdomen is soft. There is no mass.      Tenderness: There is abdominal tenderness (mild tenderness on right side). There is no guarding or rebound.   Musculoskeletal:         General: Normal range of motion.      Cervical back: Normal range of motion. No rigidity or tenderness.      Right lower leg: No edema.      Left lower leg: No edema.   Lymphadenopathy:      Cervical: No cervical adenopathy.   Skin:     General: Skin is warm and dry.      Coloration: Skin is not jaundiced or pale.      Findings: No lesion (small scar on right forearm).      Comments: Multiple areas of sun damaged skin and a healed lesion on his right forearm from which he said a skin cancer had been resected.  There is still a bump there.  He also had many areas of scrapes and scabs.   Neurological:      Mental Status: He is alert and oriented to person, place, and time. Mental status is at baseline.      Cranial Nerves: Cranial nerve deficit present.      Sensory: Sensory deficit present.      Motor: No weakness.      Gait: Gait normal.      Comments: Somewhat hard of hearing   Psychiatric:         Mood and Affect: Mood normal.         Behavior: Behavior normal.         Thought  Content: Thought content normal.         Judgment: Judgment normal.      Comments: Appropriately saddened, but stronger than he was last visit.        Current Laboratory Data  See narrative for laboratory data    Recent Imaging  See narrative for recent imaging.    Cardiology, Vascular, and Other Imaging  No other imaging results found for the past 7 days      Pathology  Metastatic adenocarcinoma of the pancreas to the liver and to the lung.    Performance Status:  Symptomatic; fully ambulatory    Assessment/Plan    1.  Metastatic adenocarcinoma of the pancreas to liver and possibly to lung.  The patient is to decide whether or not he wants primary tumor directed therapy or palliative care.    He is back from [place].  We discussed the various options for treatment.  He is not sure what he wants to do.  He is leaning against chemotherapy.  We are going to look for a mutational target with next generation sequencing in blood.    CAT scan showed progressive disease in the liver and pancreas.  Patient wants to go forward with therapy.  We discussed the regimen in detail as well as how cytotoxic chemotherapy works and we have gone through the benefits and side effects.  He has signed the informed consent document.    He has tolerated his first treatment well.  He is ready to go ahead with his second treatment.  I reinforced that he takes 1 treatment at a time and he can decide whether or not he wants to continue.    Patient is here today 6/27/2025 to continue chemotherapy.  He is in a wheelchair.  His performance status has declined.  He has had multiple falls.  I encouraged him to concentrate on trying to get his strength back.  We are holding his treatment.  We invited him to come back in 2 weeks.  He and his family know that hospice and palliative care are available to help when they decide to go forward.    7/9/2025.  After much discussion we decided not to go forward with chemotherapy today.  He will discuss this  decision with his friends and family.  He knows that he can come back and discuss it more if he decides to go forward with treatment.  We will see him again in a month or sooner as his symptoms dictate.    2.  Multiple comorbidities.  These include but are not limited to:   Diabetes  History of obesity  Atherosclerosis  COPD  Depression  Hyperlipidemia  Hypertension  Osteoarthritis with cervical spine stenosis  Achalasia  Astigmatism  Cataracts  Chronic pain  Long-term history of smoking of about a pack and half cigarettes a day for 52 years  GERD  Multiple lung nodules  Sleep apnea  Vitamin D deficiency  Coronary artery disease with history of myocardial infarction status post stenting in 2007 and 2011  Skin cancers  BPH and penile prosthesis implant  Rotator cuff tendinopathy, hip pain, knee pain  Intermittent hyponatremia  Statin intolerance.  History of noncompliance    3.  Sugars out-of-control.  He has been working with primary care and endocrinology.  They are much better now.  He and his wife were encouraged to avoid low sugars.    4.  COPD.  He has had increased shortness of breath, cough, mucus production.  Encouraged him to use Mucinex and keep hydrated.    5.  Increasing pain.  This is in his shoulders and also in his chest.  We started him on tramadol today.    6.  Psychosocial toxicity.  Our  has contacted him and his wife and discussed assistance available in the community.    7.  Mobility issues.  The beneficiary has a mobility limitation that significantly impairs his/her ability to  participate in one or more mobility-related activities of daily living (MRADLs) such as toileting, feeding, dressing, grooming, and bathing in customary locations in the home.  He needs a new wheelchair in order to be more mobile within and outside the home going to his needed appointments.    We will see him again in about a month.  They know to call in the interim should he have any change in status,  questions or concerns.      Lizbeth Martin MD

## 2025-07-09 NOTE — PROGRESS NOTES
W/C Rx given to CARMITA Bar- she is working on coverage  No tx today- pt deciding if he wants any in the future  Rtc in 1 month 8/6 1pm MD- juliana flush/ lab? After OV  LSW signed healthcare POA with pt- scanned into chart  Reviewed AVS with patient- patient verbalizes understanding

## 2025-07-09 NOTE — PATIENT INSTRUCTIONS
Reviewed labs and recent medical history.  Discussed his weakness and his mild recovery from the last treatment. He is using the walker and the wheelchair to help with movement.  Will hold his chemotherapy for now. He will consider whether this is an option for him in the future.  He will meet with the  regarding paperwork.  Return to see MD in 1 month for follow up.

## 2025-07-09 NOTE — PROGRESS NOTES
This  met with this pt, his wife, and their friend.     The SW was able to fax in the order for a new wheelchair, along with the most recent doctor's note, prescription for the chair, to: [facility], out of [facility]. *This is the only agency that works with his health insurance [employer].     A fax confirmation was received and the SW will have the paperwork scanned into the pt's chart.     The couple also asked the SW about completing a Healthcare POA. The SW printed this paperwork, and assisted the couple in completing this.   *This SW and their friend that accompanied them both signed as the 2 witness signatures.   *This SW will have this scanned into the pt's chart as well.     The couple was appreciative of the assistance.

## 2025-07-10 ENCOUNTER — HOME CARE VISIT (OUTPATIENT)
Dept: HOME HEALTH SERVICES | Facility: HOME HEALTH | Age: 75
End: 2025-07-10
Payer: MEDICARE

## 2025-07-10 PROCEDURE — G0151 HHCP-SERV OF PT,EA 15 MIN: HCPCS | Mod: HHH

## 2025-07-10 PROCEDURE — G0152 HHCP-SERV OF OT,EA 15 MIN: HCPCS | Mod: HHH

## 2025-07-10 SDOH — HEALTH STABILITY: PHYSICAL HEALTH

## 2025-07-10 SDOH — HEALTH STABILITY: PHYSICAL HEALTH: EXERCISE ACTIVITY: ISOMETRIC EXERCISES

## 2025-07-10 SDOH — HEALTH STABILITY: PHYSICAL HEALTH: PHYSICAL EXERCISE: SITTING

## 2025-07-10 SDOH — HEALTH STABILITY: PHYSICAL HEALTH: EXERCISE ACTIVITIES SETS: 1

## 2025-07-10 SDOH — HEALTH STABILITY: PHYSICAL HEALTH: EXERCISE TYPE: SKILLED THERAPEUTIC EXERCISES

## 2025-07-10 SDOH — HEALTH STABILITY: PHYSICAL HEALTH: PHYSICAL EXERCISE: 10

## 2025-07-10 SDOH — HEALTH STABILITY: PHYSICAL HEALTH: EXERCISE ACTIVITY: GAIT TRAINING

## 2025-07-10 SDOH — HEALTH STABILITY: PHYSICAL HEALTH: EXERCISE ACTIVITY: SKILLED THERAPEUTIC EXERCISES

## 2025-07-10 SDOH — HEALTH STABILITY: PHYSICAL HEALTH: EXERCISE ACTIVITIES SETS: 2

## 2025-07-10 SDOH — HEALTH STABILITY: PHYSICAL HEALTH: RESISTANCE: AS TOLERATED

## 2025-07-10 ASSESSMENT — ACTIVITIES OF DAILY LIVING (ADL)
DRESSING_LB_CURRENT_FUNCTION: MINIMUM ASSIST
AMBULATION ASSISTANCE ON FLAT SURFACES: 1
BATHING_CURRENT_FUNCTION: MINIMUM ASSIST
BATHING_CURRENT_FUNCTION: MODERATE ASSIST
DRESSING_LB_CURRENT_FUNCTION: MODERATE ASSIST
BATHING ASSESSED: 1
AMBULATION_DISTANCE/DURATION_TOLERATED: 100 FT

## 2025-07-10 ASSESSMENT — ENCOUNTER SYMPTOMS
PERSON REPORTING PAIN: PATIENT
DENIES PAIN: 1

## 2025-07-14 ENCOUNTER — HOME CARE VISIT (OUTPATIENT)
Dept: HOME HEALTH SERVICES | Facility: HOME HEALTH | Age: 75
End: 2025-07-14
Payer: MEDICARE

## 2025-07-14 PROCEDURE — G0152 HHCP-SERV OF OT,EA 15 MIN: HCPCS | Mod: HHH

## 2025-07-14 ASSESSMENT — ACTIVITIES OF DAILY LIVING (ADL)
BATHING_CURRENT_FUNCTION: MINIMUM ASSIST
BATHING ASSESSED: 1
DRESSING_LB_CURRENT_FUNCTION: MINIMUM ASSIST

## 2025-07-14 ASSESSMENT — ENCOUNTER SYMPTOMS
PAIN LOCATION - RELIEVING FACTORS: MEDS REST
PAIN LOCATION: RIGHT SHOULDER
HIGHEST PAIN SEVERITY IN PAST 24 HOURS: 7/10
PAIN: 1
PAIN LOCATION - PAIN SEVERITY: 2/10
SUBJECTIVE PAIN PROGRESSION: GRADUALLY IMPROVING
PERSON REPORTING PAIN: PATIENT
PAIN LOCATION - PAIN QUALITY: ACHE
LOWEST PAIN SEVERITY IN PAST 24 HOURS: 0/10
PAIN SEVERITY GOAL: 0/10

## 2025-07-16 ENCOUNTER — HOME CARE VISIT (OUTPATIENT)
Dept: HOME HEALTH SERVICES | Facility: HOME HEALTH | Age: 75
End: 2025-07-16
Payer: MEDICARE

## 2025-07-16 ENCOUNTER — TELEPHONE (OUTPATIENT)
Dept: PRIMARY CARE | Facility: CLINIC | Age: 75
End: 2025-07-16
Payer: MEDICARE

## 2025-07-16 DIAGNOSIS — Z51.5 END OF LIFE CARE: Primary | ICD-10-CM

## 2025-07-16 DIAGNOSIS — Z91.81 HISTORY OF FALL: ICD-10-CM

## 2025-07-16 DIAGNOSIS — R53.1 GENERALIZED WEAKNESS: Primary | ICD-10-CM

## 2025-07-16 PROCEDURE — G0152 HHCP-SERV OF OT,EA 15 MIN: HCPCS | Mod: HHH

## 2025-07-16 ASSESSMENT — ACTIVITIES OF DAILY LIVING (ADL)
BATHING ASSESSED: 1
DRESSING_LB_CURRENT_FUNCTION: MINIMUM ASSIST
BATHING_CURRENT_FUNCTION: MINIMUM ASSIST

## 2025-07-16 ASSESSMENT — ENCOUNTER SYMPTOMS
DENIES PAIN: 1
ANGER WITHIN DEFINED LIMITS: 1
PERSON REPORTING PAIN: PATIENT
AGGRESSION WITHIN DEFINED LIMITS: 1
SLEEP QUALITY: ADEQUATE

## 2025-07-17 ENCOUNTER — HOME CARE VISIT (OUTPATIENT)
Dept: HOME HEALTH SERVICES | Facility: HOME HEALTH | Age: 75
End: 2025-07-17
Payer: MEDICARE

## 2025-07-17 PROCEDURE — G0151 HHCP-SERV OF PT,EA 15 MIN: HCPCS | Mod: HHH

## 2025-07-17 SDOH — HEALTH STABILITY: PHYSICAL HEALTH: EXERCISE ACTIVITIES SETS: 2

## 2025-07-17 SDOH — HEALTH STABILITY: PHYSICAL HEALTH: EXERCISE ACTIVITY: ISOMETRIC EXERCISES

## 2025-07-17 SDOH — HEALTH STABILITY: PHYSICAL HEALTH: PHYSICAL EXERCISE: 10

## 2025-07-17 SDOH — HEALTH STABILITY: PHYSICAL HEALTH: EXERCISE ACTIVITIES SETS: 1

## 2025-07-17 SDOH — HEALTH STABILITY: PHYSICAL HEALTH

## 2025-07-17 SDOH — HEALTH STABILITY: PHYSICAL HEALTH: EXERCISE ACTIVITY: SKILLED THERAPEUTIC EXERCISES

## 2025-07-17 SDOH — HEALTH STABILITY: PHYSICAL HEALTH: EXERCISE ACTIVITY: GAIT TRAINING

## 2025-07-17 SDOH — HEALTH STABILITY: PHYSICAL HEALTH: PHYSICAL EXERCISE: SITTING, STANDING

## 2025-07-17 SDOH — HEALTH STABILITY: PHYSICAL HEALTH: RESISTANCE: AS TOLERATED

## 2025-07-17 SDOH — HEALTH STABILITY: PHYSICAL HEALTH: EXERCISE TYPE: SKILLED THERAPEUTIC EXERCISES

## 2025-07-17 ASSESSMENT — ACTIVITIES OF DAILY LIVING (ADL)
AMBULATION_DISTANCE/DURATION_TOLERATED: 80 FT
AMBULATION ASSISTANCE ON FLAT SURFACES: 1

## 2025-07-17 ASSESSMENT — ENCOUNTER SYMPTOMS
PERSON REPORTING PAIN: PATIENT
DENIES PAIN: 1

## 2025-07-17 NOTE — TELEPHONE ENCOUNTER
Attempted to call wife Leah to see where she wanted these referrals to be sent to.   No answer and VM full.  Will call back later.  
I have placed these orders on his chart.  Please let me know if I need to order anything differently.  
Patients wife called office today requesting a wheelchair and a referral to start hospice. Hussain Bernal MD is out.   
I examined patient and agree with note.  Abdominal pain and tenderness improved.  Assessment:  Gastritis secondary to NSAID meds.  Continue PPI.  No acute surgical abdomen.  Will Follow up PRN

## 2025-07-18 ENCOUNTER — HOME CARE VISIT (OUTPATIENT)
Dept: HOME HEALTH SERVICES | Facility: HOME HEALTH | Age: 75
End: 2025-07-18
Payer: MEDICARE

## 2025-07-18 PROCEDURE — G0151 HHCP-SERV OF PT,EA 15 MIN: HCPCS | Mod: HHH

## 2025-07-18 SDOH — HEALTH STABILITY: PHYSICAL HEALTH: PHYSICAL EXERCISE: 15

## 2025-07-18 SDOH — HEALTH STABILITY: PHYSICAL HEALTH: EXERCISE ACTIVITY: SKILLED THERAEPEUTIC EXERCISES

## 2025-07-18 SDOH — HEALTH STABILITY: PHYSICAL HEALTH: RESISTANCE: AS TOLERATED

## 2025-07-18 SDOH — HEALTH STABILITY: PHYSICAL HEALTH: EXERCISE ACTIVITY: GAIT TRAINING

## 2025-07-18 SDOH — HEALTH STABILITY: PHYSICAL HEALTH: EXERCISE ACTIVITY: ISOMETRIC EXERCISES

## 2025-07-18 SDOH — HEALTH STABILITY: PHYSICAL HEALTH: EXERCISE TYPE: SKILLED THERAEPEUTIC EXERCISES

## 2025-07-18 SDOH — HEALTH STABILITY: PHYSICAL HEALTH

## 2025-07-18 SDOH — HEALTH STABILITY: PHYSICAL HEALTH: PHYSICAL EXERCISE: SITTING

## 2025-07-18 SDOH — HEALTH STABILITY: PHYSICAL HEALTH: EXERCISE ACTIVITIES SETS: 1

## 2025-07-18 SDOH — HEALTH STABILITY: PHYSICAL HEALTH: EXERCISE ACTIVITIES SETS: 2

## 2025-07-18 ASSESSMENT — ACTIVITIES OF DAILY LIVING (ADL)
AMBULATION_DISTANCE/DURATION_TOLERATED: 100 FT
AMBULATION ASSISTANCE ON FLAT SURFACES: 1

## 2025-07-18 ASSESSMENT — ENCOUNTER SYMPTOMS
PERSON REPORTING PAIN: PATIENT
DENIES PAIN: 1

## 2025-07-23 ENCOUNTER — HOME CARE VISIT (OUTPATIENT)
Dept: HOME HEALTH SERVICES | Facility: HOME HEALTH | Age: 75
End: 2025-07-23
Payer: MEDICARE

## 2025-07-24 ENCOUNTER — APPOINTMENT (OUTPATIENT)
Dept: CARDIOLOGY | Facility: HOSPITAL | Age: 75
End: 2025-07-24
Payer: MEDICARE

## 2025-07-24 ENCOUNTER — HOSPITAL ENCOUNTER (EMERGENCY)
Facility: HOSPITAL | Age: 75
Discharge: HOME | End: 2025-07-24
Attending: EMERGENCY MEDICINE
Payer: MEDICARE

## 2025-07-24 ENCOUNTER — APPOINTMENT (OUTPATIENT)
Dept: RADIOLOGY | Facility: HOSPITAL | Age: 75
End: 2025-07-24
Payer: MEDICARE

## 2025-07-24 ENCOUNTER — HOME CARE VISIT (OUTPATIENT)
Dept: HOME HEALTH SERVICES | Facility: HOME HEALTH | Age: 75
End: 2025-07-24
Payer: MEDICARE

## 2025-07-24 VITALS
HEART RATE: 86 BPM | TEMPERATURE: 96.8 F | BODY MASS INDEX: 27.92 KG/M2 | OXYGEN SATURATION: 97 % | WEIGHT: 195 LBS | SYSTOLIC BLOOD PRESSURE: 156 MMHG | HEIGHT: 70 IN | DIASTOLIC BLOOD PRESSURE: 70 MMHG | RESPIRATION RATE: 20 BRPM

## 2025-07-24 DIAGNOSIS — D69.6 THROMBOCYTOPENIA: ICD-10-CM

## 2025-07-24 DIAGNOSIS — R79.89 ELEVATED TROPONIN: ICD-10-CM

## 2025-07-24 DIAGNOSIS — D64.9 ANEMIA, UNSPECIFIED TYPE: ICD-10-CM

## 2025-07-24 DIAGNOSIS — S20.212A CHEST WALL CONTUSION, LEFT, INITIAL ENCOUNTER: Primary | ICD-10-CM

## 2025-07-24 LAB
ABO GROUP (TYPE) IN BLOOD: NORMAL
ALBUMIN SERPL BCP-MCNC: 3.7 G/DL (ref 3.4–5)
ALP SERPL-CCNC: 108 U/L (ref 33–136)
ALT SERPL W P-5'-P-CCNC: 11 U/L (ref 10–52)
ANION GAP SERPL CALC-SCNC: 11 MMOL/L (ref 10–20)
ANTIBODY SCREEN: NORMAL
AST SERPL W P-5'-P-CCNC: 10 U/L (ref 9–39)
ATRIAL RATE: 91 BPM
BASOPHILS # BLD AUTO: 0.06 X10*3/UL (ref 0–0.1)
BASOPHILS NFR BLD AUTO: 0.7 %
BILIRUB SERPL-MCNC: 0.5 MG/DL (ref 0–1.2)
BUN SERPL-MCNC: 12 MG/DL (ref 6–23)
CALCIUM SERPL-MCNC: 8.7 MG/DL (ref 8.6–10.3)
CARDIAC TROPONIN I PNL SERPL HS: 24 NG/L (ref 0–20)
CARDIAC TROPONIN I PNL SERPL HS: 27 NG/L (ref 0–20)
CHLORIDE SERPL-SCNC: 100 MMOL/L (ref 98–107)
CO2 SERPL-SCNC: 25 MMOL/L (ref 21–32)
CREAT SERPL-MCNC: 0.88 MG/DL (ref 0.5–1.3)
EGFRCR SERPLBLD CKD-EPI 2021: 90 ML/MIN/1.73M*2
EOSINOPHIL # BLD AUTO: 0.14 X10*3/UL (ref 0–0.4)
EOSINOPHIL NFR BLD AUTO: 1.7 %
ERYTHROCYTE [DISTWIDTH] IN BLOOD BY AUTOMATED COUNT: 14.6 % (ref 11.5–14.5)
GLUCOSE SERPL-MCNC: 276 MG/DL (ref 74–99)
HCT VFR BLD AUTO: 36.9 % (ref 41–52)
HGB BLD-MCNC: 12.2 G/DL (ref 13.5–17.5)
IMM GRANULOCYTES # BLD AUTO: 0.06 X10*3/UL (ref 0–0.5)
IMM GRANULOCYTES NFR BLD AUTO: 0.7 % (ref 0–0.9)
INR PPP: 1.1 (ref 0.9–1.1)
LACTATE SERPL-SCNC: 1.7 MMOL/L (ref 0.4–2)
LYMPHOCYTES # BLD AUTO: 2.03 X10*3/UL (ref 0.8–3)
LYMPHOCYTES NFR BLD AUTO: 24.2 %
MCH RBC QN AUTO: 26.7 PG (ref 26–34)
MCHC RBC AUTO-ENTMCNC: 33.1 G/DL (ref 32–36)
MCV RBC AUTO: 81 FL (ref 80–100)
MONOCYTES # BLD AUTO: 0.82 X10*3/UL (ref 0.05–0.8)
MONOCYTES NFR BLD AUTO: 9.8 %
NEUTROPHILS # BLD AUTO: 5.28 X10*3/UL (ref 1.6–5.5)
NEUTROPHILS NFR BLD AUTO: 62.9 %
NRBC BLD-RTO: 0 /100 WBCS (ref 0–0)
P AXIS: 48 DEGREES
P OFFSET: 111 MS
P ONSET: 52 MS
PLATELET # BLD AUTO: 130 X10*3/UL (ref 150–450)
POTASSIUM SERPL-SCNC: 4 MMOL/L (ref 3.5–5.3)
PR INTERVAL: 310 MS
PROT SERPL-MCNC: 7.2 G/DL (ref 6.4–8.2)
PROTHROMBIN TIME: 11.7 SECONDS (ref 9.8–12.4)
Q ONSET: 207 MS
QRS COUNT: 15 BEATS
QRS DURATION: 124 MS
QT INTERVAL: 412 MS
QTC CALCULATION(BAZETT): 506 MS
QTC FREDERICIA: 473 MS
R AXIS: 252 DEGREES
RBC # BLD AUTO: 4.57 X10*6/UL (ref 4.5–5.9)
RH FACTOR (ANTIGEN D): NORMAL
SODIUM SERPL-SCNC: 132 MMOL/L (ref 136–145)
T AXIS: 65 DEGREES
T OFFSET: 413 MS
VENTRICULAR RATE: 91 BPM
WBC # BLD AUTO: 8.4 X10*3/UL (ref 4.4–11.3)

## 2025-07-24 PROCEDURE — 93005 ELECTROCARDIOGRAM TRACING: CPT

## 2025-07-24 PROCEDURE — 36415 COLL VENOUS BLD VENIPUNCTURE: CPT | Performed by: EMERGENCY MEDICINE

## 2025-07-24 PROCEDURE — 70486 CT MAXILLOFACIAL W/O DYE: CPT

## 2025-07-24 PROCEDURE — 84484 ASSAY OF TROPONIN QUANT: CPT | Performed by: EMERGENCY MEDICINE

## 2025-07-24 PROCEDURE — 99285 EMERGENCY DEPT VISIT HI MDM: CPT | Mod: 25 | Performed by: EMERGENCY MEDICINE

## 2025-07-24 PROCEDURE — 2500000004 HC RX 250 GENERAL PHARMACY W/ HCPCS (ALT 636 FOR OP/ED): Mod: JW | Performed by: EMERGENCY MEDICINE

## 2025-07-24 PROCEDURE — 72125 CT NECK SPINE W/O DYE: CPT | Performed by: RADIOLOGY

## 2025-07-24 PROCEDURE — 70450 CT HEAD/BRAIN W/O DYE: CPT | Performed by: RADIOLOGY

## 2025-07-24 PROCEDURE — 96374 THER/PROPH/DIAG INJ IV PUSH: CPT | Mod: 59

## 2025-07-24 PROCEDURE — 2500000001 HC RX 250 WO HCPCS SELF ADMINISTERED DRUGS (ALT 637 FOR MEDICARE OP): Performed by: EMERGENCY MEDICINE

## 2025-07-24 PROCEDURE — 70486 CT MAXILLOFACIAL W/O DYE: CPT | Performed by: RADIOLOGY

## 2025-07-24 PROCEDURE — 96361 HYDRATE IV INFUSION ADD-ON: CPT

## 2025-07-24 PROCEDURE — 72128 CT CHEST SPINE W/O DYE: CPT | Performed by: RADIOLOGY

## 2025-07-24 PROCEDURE — 70450 CT HEAD/BRAIN W/O DYE: CPT

## 2025-07-24 PROCEDURE — 76377 3D RENDER W/INTRP POSTPROCES: CPT

## 2025-07-24 PROCEDURE — 2550000001 HC RX 255 CONTRASTS: Performed by: EMERGENCY MEDICINE

## 2025-07-24 PROCEDURE — 84075 ASSAY ALKALINE PHOSPHATASE: CPT | Performed by: EMERGENCY MEDICINE

## 2025-07-24 PROCEDURE — 72125 CT NECK SPINE W/O DYE: CPT

## 2025-07-24 PROCEDURE — 85025 COMPLETE CBC W/AUTO DIFF WBC: CPT | Performed by: EMERGENCY MEDICINE

## 2025-07-24 PROCEDURE — 83605 ASSAY OF LACTIC ACID: CPT | Performed by: EMERGENCY MEDICINE

## 2025-07-24 PROCEDURE — 85610 PROTHROMBIN TIME: CPT | Performed by: EMERGENCY MEDICINE

## 2025-07-24 PROCEDURE — 74177 CT ABD & PELVIS W/CONTRAST: CPT

## 2025-07-24 PROCEDURE — 74177 CT ABD & PELVIS W/CONTRAST: CPT | Performed by: RADIOLOGY

## 2025-07-24 PROCEDURE — 76376 3D RENDER W/INTRP POSTPROCES: CPT | Performed by: RADIOLOGY

## 2025-07-24 PROCEDURE — 72131 CT LUMBAR SPINE W/O DYE: CPT | Performed by: RADIOLOGY

## 2025-07-24 PROCEDURE — 86900 BLOOD TYPING SEROLOGIC ABO: CPT | Performed by: EMERGENCY MEDICINE

## 2025-07-24 PROCEDURE — 71260 CT THORAX DX C+: CPT | Performed by: RADIOLOGY

## 2025-07-24 PROCEDURE — 99285 EMERGENCY DEPT VISIT HI MDM: CPT | Mod: 25

## 2025-07-24 RX ORDER — ACETAMINOPHEN 325 MG/1
975 TABLET ORAL ONCE
Status: COMPLETED | OUTPATIENT
Start: 2025-07-24 | End: 2025-07-24

## 2025-07-24 RX ORDER — KETOROLAC TROMETHAMINE 30 MG/ML
15 INJECTION, SOLUTION INTRAMUSCULAR; INTRAVENOUS ONCE
Status: COMPLETED | OUTPATIENT
Start: 2025-07-24 | End: 2025-07-24

## 2025-07-24 RX ORDER — HYDROCODONE BITARTRATE AND ACETAMINOPHEN 5; 325 MG/1; MG/1
1 TABLET ORAL EVERY 4 HOURS PRN
Qty: 18 TABLET | Refills: 0 | Status: SHIPPED | OUTPATIENT
Start: 2025-07-24 | End: 2025-07-27

## 2025-07-24 RX ADMIN — IOHEXOL 100 ML: 350 INJECTION, SOLUTION INTRAVENOUS at 12:32

## 2025-07-24 RX ADMIN — ACETAMINOPHEN 975 MG: 325 TABLET ORAL at 16:06

## 2025-07-24 RX ADMIN — KETOROLAC TROMETHAMINE 15 MG: 30 INJECTION, SOLUTION INTRAMUSCULAR at 16:06

## 2025-07-24 RX ADMIN — SODIUM CHLORIDE 1000 ML: 0.9 INJECTION, SOLUTION INTRAVENOUS at 12:01

## 2025-07-24 ASSESSMENT — LIFESTYLE VARIABLES
EVER FELT BAD OR GUILTY ABOUT YOUR DRINKING: NO
HAVE PEOPLE ANNOYED YOU BY CRITICIZING YOUR DRINKING: NO
TOTAL SCORE: 0
HAVE YOU EVER FELT YOU SHOULD CUT DOWN ON YOUR DRINKING: NO
EVER HAD A DRINK FIRST THING IN THE MORNING TO STEADY YOUR NERVES TO GET RID OF A HANGOVER: NO

## 2025-07-24 ASSESSMENT — PAIN DESCRIPTION - LOCATION
LOCATION: SHOULDER
LOCATION: GENERALIZED

## 2025-07-24 ASSESSMENT — PAIN DESCRIPTION - DESCRIPTORS
DESCRIPTORS: ACHING
DESCRIPTORS: ACHING

## 2025-07-24 ASSESSMENT — PAIN DESCRIPTION - ORIENTATION: ORIENTATION: LEFT

## 2025-07-24 ASSESSMENT — PAIN DESCRIPTION - PAIN TYPE: TYPE: ACUTE PAIN

## 2025-07-24 ASSESSMENT — PAIN - FUNCTIONAL ASSESSMENT
PAIN_FUNCTIONAL_ASSESSMENT: 0-10
PAIN_FUNCTIONAL_ASSESSMENT: 0-10

## 2025-07-24 ASSESSMENT — PAIN SCALES - GENERAL
PAINLEVEL_OUTOF10: 10 - WORST POSSIBLE PAIN
PAINLEVEL_OUTOF10: 9

## 2025-07-24 NOTE — ED PROVIDER NOTES
"HPI   Chief Complaint   Patient presents with    Fall     Paitent fell from walking with walker on Monday and came in today due to increased generalized pain         History provided by:  Patient and spouse    Chief Complaint   Patient presents with    Fall     Paitent fell from walking with walker on Monday and came in today due to increased generalized pain       History of Present Illness:  Feliciano Worthington is a 75 y.o. male presents with left upper chest pain after fall on Monday.  Patient does take Eliquis.  No loss of consciousness.  He did hit his head and face.  He also hit multiple other parts of his body.  No loss of motor or sensory function.  No loss of bladder or bowel function.  No abdominal pain.  No back or flank pain.  Patient denies any lightheadedness or dizziness.      PMFSH:   As per HPI, otherwise nurses notes reviewed in EMR.    Past Medical History: Medical History[1]   Past Surgical History: Surgical History[2]   Family History: Family History[3]   Social History:  Social History[4]  Allergies: Allergies[5]  Current Outpatient Medications   Medication Instructions    alcohol swabs (BD Alcohol Swabs) pads, medicated Use twice daily while checking sugar.    alum-mag hydroxide-simeth (Mylanta) 200-200-20 mg/5 mL oral suspension 30 mL, oral, 4 times daily PRN    amLODIPine (NORVASC) 5 mg, oral, Daily    apixaban (Eliquis) 5 mg tablet Take 2 tablets (10 mg) by mouth 2 times a day for 7 days, THEN 1 tablet (5 mg) 2 times a day.    BD Jessica 2nd Gen Pen Needle 32 gauge x 5/32\" needle USE 1 PEN NEEDLE SIX TIMES DAILY    BD Ultra-Fine Mini Pen Needle 31 gauge x 3/16\" needle     blood glucose control high,low (Accu-Chek Guide L1-L2 Ctrl Sol) solution 1 bottle, miscellaneous, Daily PRN    blood-glucose meter (Accu-Chek Guide Glucose Meter) misc Test twice daily    buPROPion XL (WELLBUTRIN XL) 150 mg, oral, 2 times daily, Do not crush, chew, or split.    clopidogrel (PLAVIX) 75 mg, oral, Daily    ezetimibe " (ZETIA) 10 mg, oral, Daily    HumaLOG KwikPen Insulin 100 unit/mL pen INJECT 20 UNITS SUBCUTANEOUSLY WITH MEALS AND SLIDING SCALE 2/50 > 150 AS DIRECTED.  UNITS PER DAY    HYDROcodone-acetaminophen (Norco) 5-325 mg tablet 1 tablet, oral, Every 4 hours PRN    hydrOXYzine HCL (ATARAX) 25 mg, oral, 2 times daily    insulin glargine (LANTUS) 20 Units, subcutaneous, 2 times daily, Take as directed per insulin instructions.    insulin lispro 15 Units, subcutaneous, 3 times daily before meals, Take as directed per insulin instructions.    isosorbide mononitrate ER (IMDUR) 60 mg, oral, Daily, Do not crush or chew.    lancets (Accu-Chek Softclix Lancets) misc Test twice daily    lovastatin (MEVACOR) 40 mg, oral, Nightly    magnesium hydroxide (Milk of Magnesia) 400 mg/5 mL suspension 30 mL, oral, Daily PRN    nitroglycerin (NITROSTAT) 0.4 mg, sublingual, Every 5 min PRN, DO NOT EXCEED A TOTAL OF 3 DOSES IN 15 MINUTES    ondansetron (ZOFRAN) 8 mg, oral, Every 8 hours PRN    pantoprazole (PROTONIX) 40 mg, oral, Daily before breakfast, Do not crush, chew, or split.    traMADol (ULTRAM) 50 mg, oral, Every 6 hours PRN    venlafaxine XR (EFFEXOR-XR) 225 mg, oral, Daily, Do not crush or chew.              Patient History   Medical History[6]  Surgical History[7]  Family History[8]  Social History[9]    Physical Exam   ED Triage Vitals   Temp Pulse Resp BP   -- -- -- --      SpO2 Temp src Heart Rate Source Patient Position   -- -- -- --      BP Location FiO2 (%)     -- --       Physical Exam  Physical Exam:    ED Triage Vitals   Temp Pulse Resp BP   -- -- -- --      SpO2 Temp src Heart Rate Source Patient Position   -- -- -- --      BP Location FiO2 (%)     -- --         Patient Vitals for the past 24 hrs:   BP Temp Temp src Pulse Resp SpO2 Height Weight   07/24/25 1515 134/78 -- -- 78 20 99 % -- --   07/24/25 1415 (!) 180/92 -- -- 78 20 98 % -- --   07/24/25 1315 154/84 -- -- 80 18 98 % -- --   07/24/25 1245 149/70 -- -- 82  "18 97 % -- --   07/24/25 1200 128/71 -- -- 88 18 95 % -- --   07/24/25 1145 146/81 36 °C (96.8 °F) Temporal 88 18 99 % 1.778 m (5' 10\") 88.5 kg (195 lb)   07/24/25 1139 -- -- -- -- -- -- 1.753 m (5' 9\") 89.8 kg (198 lb)       Constitutional: Vital signs per nursing notes.  Well developed, well nourished.  Mild acute distress.    Psychiatric: alert and oriented to person, place, and time; no abnormalities of mood or affect; memory intact  Eyes: PERRL; conjunctivae and lids normal; EOMI  ENT: otoscopic exam of external canal and TM´s normal; nasal mucosa, turbinates, and septum normal; mouth, tongue, and pharynx normal; pharynx without edema, exudate, or injection  Neck: neck supple, no meningismus; trachea midline without deviation; no lymphadenopathy; no thyromegaly; carotid pulses even bilaterally  Chest: no masses, no discharge; except tenderness palpation to the left upper chest with no crepitus  Respiratory: normal respiratory effort and excursion; no rales, rhonchi, or wheezes; equal air entry  Cardiovascular: regular rate and rhythm; no murmurs, rubs or gallops; symmetric pulses; no edema; normal capillary refill; distal pulses present  Neurological: normal speech; CN II-XII grossly intact; normal motor and sensory function; no nystagmus  GI: no masses, tenderness, rebound or guarding; no palpable, pulsatile mass; no organomegaly; no hernia; normal bowel sounds; (-) Bright´s sign; (-) McBurney´s sign; (-) CVA tenderness  Lymphatic: no adenopathy of neck  Musculoskeletal: normal gait and station; normal digits and nails; no gross tendon or ligament injury; normal to palpation; normal strength/tone; neurovascular status intact; (-) Sasha´s sign; (-) straight leg raise; no palpable cords; calf circumference equal bilaterally  Skin: normal to inspection; normal to palpation; no rash; except ecchymoses to the left upper cheek; multiple abrasions just to the bilateral arms and legs  GCS: 15      ED Course & MDM "   Diagnoses as of 07/24/25 1552   Chest wall contusion, left, initial encounter   Elevated troponin   Anemia, unspecified type   Thrombocytopenia                 No data recorded                                 Medical Decision Making  Medical Decision Making:    EKG: My interpretation of EKG is sinus rhythm at 91 bpm with first-degree AV block, right bundle branch block and nonspecific ST-T changes with a prolonged corrected QT interval of 506 ms             My interpretation of second EKG is sinus rhythm at 79 bpm with first-degree AV block, right axis deviation, nonspecific interventricular block and nonspecific ST-T changes    Labs:   Labs Reviewed   CBC WITH AUTO DIFFERENTIAL - Abnormal       Result Value    WBC 8.4      nRBC 0.0      RBC 4.57      Hemoglobin 12.2 (*)     Hematocrit 36.9 (*)     MCV 81      MCH 26.7      MCHC 33.1      RDW 14.6 (*)     Platelets 130 (*)     Neutrophils % 62.9      Immature Granulocytes %, Automated 0.7      Lymphocytes % 24.2      Monocytes % 9.8      Eosinophils % 1.7      Basophils % 0.7      Neutrophils Absolute 5.28      Immature Granulocytes Absolute, Automated 0.06      Lymphocytes Absolute 2.03      Monocytes Absolute 0.82 (*)     Eosinophils Absolute 0.14      Basophils Absolute 0.06     COMPREHENSIVE METABOLIC PANEL - Abnormal    Glucose 276 (*)     Sodium 132 (*)     Potassium 4.0      Chloride 100      Bicarbonate 25      Anion Gap 11      Urea Nitrogen 12      Creatinine 0.88      eGFR 90      Calcium 8.7      Albumin 3.7      Alkaline Phosphatase 108      Total Protein 7.2      AST 10      Bilirubin, Total 0.5      ALT 11     TROPONIN I, HIGH SENSITIVITY - Abnormal    Troponin I, High Sensitivity 24 (*)     Narrative:     Less than 99th percentile of normal range cutoff-  Female and children under 18 years old <14 ng/L; Male <21 ng/L: Negative  Repeat testing should be performed if clinically indicated.     Female and children under 18 years old 14-50 ng/L; Male  21-50 ng/L:  Consistent with possible cardiac damage and possible increased clinical   risk. Serial measurements may help to assess extent of myocardial damage.     >50 ng/L: Consistent with cardiac damage, increased clinical risk and  myocardial infarction. Serial measurements may help assess extent of   myocardial damage.      NOTE: Children less than 1 year old may have higher baseline troponin   levels and results should be interpreted in conjunction with the overall   clinical context.     NOTE: Troponin I testing is performed using a different   testing methodology at [facility] than at other   system hospitals. Direct result comparisons should only   be made within the same method.   TROPONIN I, HIGH SENSITIVITY - Abnormal    Troponin I, High Sensitivity 27 (*)     Narrative:     Less than 99th percentile of normal range cutoff-  Female and children under 18 years old <14 ng/L; Male <21 ng/L: Negative  Repeat testing should be performed if clinically indicated.     Female and children under 18 years old 14-50 ng/L; Male 21-50 ng/L:  Consistent with possible cardiac damage and possible increased clinical   risk. Serial measurements may help to assess extent of myocardial damage.     >50 ng/L: Consistent with cardiac damage, increased clinical risk and  myocardial infarction. Serial measurements may help assess extent of   myocardial damage.      NOTE: Children less than 1 year old may have higher baseline troponin   levels and results should be interpreted in conjunction with the overall   clinical context.     NOTE: Troponin I testing is performed using a different   testing methodology at [facility] than at other   [facility]. Direct result comparisons should only   be made within the same method.   LACTATE - Normal    Lactate 1.7      Narrative:     Venipuncture immediately after or during the administration of Metamizole may lead to falsely low results. Testing should be  performed immediately prior to Metamizole dosing.   PROTIME-INR - Normal    Protime 11.7      INR 1.1     TYPE AND SCREEN    ABO TYPE A      Rh TYPE POS      ANTIBODY SCREEN NEG         Diagnostic Imaging:   CT chest abdomen pelvis w IV contrast   Final Result   No acute traumatic abnormality in the chest, abdomen, or pelvis.        Similar size of pulmonary nodules. Right-sided liver lesion stable,   lesion on the left appears slightly smaller.        Stable invasive pancreatic tail mass.        Distal esophageal thickening, correlate clinically for esophagitis.        Signed by: Sofia Keller 7/24/2025 1:21 PM   Dictation workstation:   [id number]      CT lumbar spine retrospective reconstruction protocol   Final Result   No evidence for an acute fracture or subluxation of the lumbar spine.        Degenerative changes.        Signed by: Sofia Keller 7/24/2025 1:33 PM   Dictation workstation:   [id number]      CT thoracic spine retrospective reconstruction protocol   Final Result   No evidence for an acute fracture or subluxation of the thoracic   spine.        Signed by: Sofia Keller 7/24/2025 1:24 PM   Dictation workstation:   [id number]      CT head W O contrast trauma protocol   Final Result   No acute intracranial abnormality. Consider follow-up with MRI as   warranted.        Nasal bone fracture.        Signed by: Sofia Keller 7/24/2025 12:55 PM   Dictation workstation:   [id number]      CT cervical spine wo IV contrast   Final Result   No evidence for an acute fracture or subluxation of the cervical   spine.        Multilevel degenerative changes in the spine with neural foraminal   narrowing.        Signed by: Sofia Keller 7/24/2025 1:02 PM   Dictation workstation:   [id number]      CT maxillofacial bones wo IV contrast   Final Result   No acute intracranial abnormality. Consider follow-up with MRI as   warranted.        Nasal bone fracture.        Signed by: Sofia Keller  7/24/2025 12:55 PM   Dictation workstation:   [id number]      CT 3D reconstruction   Final Result   No acute intracranial abnormality. Consider follow-up with MRI as   warranted.        Nasal bone fracture.        Signed by: Sofia Keller 7/24/2025 12:55 PM   Dictation workstation:   [id number]          ED Medication Administration:   Medications   diphth,pertus(acell),tetanus (BoostRIX) 2.5-8-5 Lf-mcg-Lf/0.5mL vaccine 0.5 mL (0.5 mL intramuscular Not Given 7/24/25 1201)   sodium chloride 0.9 % bolus 1,000 mL (1,000 mL intravenous New Bag 7/24/25 1201)   iohexol (OMNIPaque) 350 mg iodine/mL solution 100 mL (100 mL intravenous Given 7/24/25 1232)       ED Course:     Feliciano Worthington is a 75 y.o. male presents with fall.    Differential Diagnoses Considered:  Multisystem trauma      Patient presents with trauma.    CT brain to evaluate for evidence of intracranial hemorrhage/injury/skull fracture, CT C-spine, with CT T-spine and L-spine recons, to evaluate for evidence of C, T, L-spine fracture/dislocation/injury, CT abdomen/pelvis/chest to evaluate for evidence of intra-abdominal/intra-thoracic injury including pulmonary contusion/PTX/renal injury/colon injury/liver injury/spleen injury.    Lab work to evaluate for evidence of severe anemia or electrolyte abnormality (including hypokalemia, hyperkalemia, hypernatremia, hyponatremia, hyperglycemia, hypoglycemia), or thrombocytopenia.  Lactate to evaluate for acidosis.              Diagnoses as of 07/24/25 1552   Chest wall contusion, left, initial encounter   Elevated troponin   Anemia, unspecified type   Thrombocytopenia       Abnormal Labs Reviewed   CBC WITH AUTO DIFFERENTIAL - Abnormal; Notable for the following components:       Result Value    Hemoglobin 12.2 (*)     Hematocrit 36.9 (*)     RDW 14.6 (*)     Platelets 130 (*)     Monocytes Absolute 0.82 (*)     All other components within normal limits   COMPREHENSIVE METABOLIC PANEL - Abnormal; Notable  for the following components:    Glucose 276 (*)     Sodium 132 (*)     All other components within normal limits   TROPONIN I, HIGH SENSITIVITY - Abnormal; Notable for the following components:    Troponin I, High Sensitivity 24 (*)     All other components within normal limits    Narrative:     Less than 99th percentile of normal range cutoff-  Female and children under 18 years old <14 ng/L; Male <21 ng/L: Negative  Repeat testing should be performed if clinically indicated.     Female and children under 18 years old 14-50 ng/L; Male 21-50 ng/L:  Consistent with possible cardiac damage and possible increased clinical   risk. Serial measurements may help to assess extent of myocardial damage.     >50 ng/L: Consistent with cardiac damage, increased clinical risk and  myocardial infarction. Serial measurements may help assess extent of   myocardial damage.      NOTE: Children less than 1 year old may have higher baseline troponin   levels and results should be interpreted in conjunction with the overall   clinical context.     NOTE: Troponin I testing is performed using a different   testing methodology at [facility] than at other   [facility]. Direct result comparisons should only   be made within the same method.   TROPONIN I, HIGH SENSITIVITY - Abnormal; Notable for the following components:    Troponin I, High Sensitivity 27 (*)     All other components within normal limits    Narrative:     Less than 99th percentile of normal range cutoff-  Female and children under 18 years old <14 ng/L; Male <21 ng/L: Negative  Repeat testing should be performed if clinically indicated.     Female and children under 18 years old 14-50 ng/L; Male 21-50 ng/L:  Consistent with possible cardiac damage and possible increased clinical   risk. Serial measurements may help to assess extent of myocardial damage.     >50 ng/L: Consistent with cardiac damage, increased clinical risk and  myocardial infarction. Serial  measurements may help assess extent of   myocardial damage.      NOTE: Children less than 1 year old may have higher baseline troponin   levels and results should be interpreted in conjunction with the overall   clinical context.     NOTE: Troponin I testing is performed using a different   testing methodology at [facility] than at other   [facility] hospitals. Direct result comparisons should only   be made within the same method.       /78 (07/24/25 1515)    Temp      Pulse 78 (07/24/25 1515)   Resp 20 (07/24/25 1515)    SpO2 99 % (07/24/25 1515)          Diagnostic evaluation was completed.  Troponin was elevated at 24.  Repeat troponin was slightly elevated at 27.  Metabolic panel shows an elevated glucose of 276.  Sodium is slightly low at 132.  Potassium is in the normal range.  Renal and liver function are in the normal range.  Lactate is in the normal range so do not suspect sepsis.  INR is 1.1.  CBC shows normal white blood cell count mild anemia with a hemoglobin of 12.2.  Platelets are low at 130.  CT of the chest abdomen pelvis shows no acute traumatic abnormality in the chest, abdomen or pelvis.  Similar size of pulmonary nodules.  Right sided liver lesion stable, lesion on the left appears slightly smaller.  Stable invasive pancreatic tail mass.  Distal esophageal thickening.  CT of the lumbar spine shows no evidence for acute fracture or subluxation.  Degenerative changes.  CT of the thoracic spine shows no evidence for an acute fracture or subluxation of the thoracic spine.  CT of the head shows no acute intracranial abnormality.  CT of the cervical spine shows no evidence for acute fracture or subluxation of the cervical spine.  CT of the face shows no acute intracranial abnormality.    Re-evaluation: Repeat evaluation at 3:35 PM shows the patient is feeling much better.  He wishes to go home.  Vital signs are stable.    Patient was treated with IV normal saline.    Medications  administered improved the patient's condition.    No urgent or emergent conditions were identified.  I suspect that the patient likely suffered a chest strain when falling on Monday.  While he does have mildly elevated troponins I do not suspect this represents acute coronary syndrome.  However I will recommend outpatient follow-up with cardiology.    I discussed the results and discharge plan with the patient and/or family/friend if present.  I emphasized the importance of follow up with the physician I referred them to in the timeframe recommended.  I explained reasons for the patient to return to the Emergency Department.  Questions were addressed.  The patient and/or family/friend expressed understanding.      Patient was instructed to have follow up with primary doctor or specialist within 1-3 days.      Shared decision making made with patient, and/or family, who agrees with plan.      Escalation of care considered:     I considered hospitalization.  However, given the improvement in symptoms and reassuring workup, patient is appropriate for discharge and outpatient care. The risk of hospitalization outweighs the benefits. The patient is resting comfortably, well hydrated, tolerating PO, normal neuro exam, lungs CTA, ab soft NTND, not requiring supplemental O2/supportive care/IV medications or IVF.  Ambulating at baseline with walker.    I do not suspect life threatening, emergent or urgent condition, currently.  Shared decision made with patient and/or family who agrees with plan.    Prescription Medication Consideration/Given:   ED Prescriptions       Medication Sig Dispense Start Date End Date Auth. Provider    HYDROcodone-acetaminophen (Norco) 5-325 mg tablet Take 1 tablet by mouth every 4 hours if needed for severe pain (7 - 10) for up to 3 days. 18 tablet 7/24/2025 7/27/2025 Kevin ONOFRE MD            Diagnosis:   1. Chest wall contusion, left, initial encounter    2. Elevated troponin    3.  Anemia, unspecified type    4. Thrombocytopenia                    Procedure  Procedures         [1]   Past Medical History:  Diagnosis Date    Acute myocardial infarction, unspecified 01/24/2018    Heart attack    Acute myocardial infarction, unspecified 01/24/2018    Heart attack    BMI 29.0-29.9,adult 10/13/2023    Chronic obstructive pulmonary disease, unspecified 01/24/2018    COPD, mild    Chronic obstructive pulmonary disease, unspecified 01/24/2018    COPD, mild    Coronary angioplasty status     Post PTCA    Encounter for immunization 11/04/2022    Encounter for immunization    Encounter for screening for malignant neoplasm of colon 08/25/2016    Encounter for colonoscopy due to history of colonic polyp    Encounter for screening for malignant neoplasm of skin 04/14/2022    Skin cancer screening    Nicotine dependence, cigarettes, uncomplicated 09/12/2022    Cigarette smoker    Obesity, unspecified 05/25/2022    Class 1 obesity with body mass index (BMI) of 31.0 to 31.9 in adult    Old myocardial infarction 01/24/2018    History of myocardial infarction    Other acute postprocedural pain     Post-operative pain    Pain in left knee     Left knee pain, unspecified chronicity    Pain in right hip 11/04/2022    Hip pain, bilateral    Personal history of other diseases of the digestive system 01/24/2018    History of gastroesophageal reflux (GERD)    Personal history of other diseases of the musculoskeletal system and connective tissue 08/25/2016    History of low back pain    Personal history of other diseases of the respiratory system     History of sinusitis    Personal history of other endocrine, nutritional and metabolic disease 08/25/2016    History of hyperlipidemia    Personal history of other mental and behavioral disorders 04/14/2022    History of depression    Personal history of other specified conditions 12/08/2016    History of diarrhea    Personal history of other specified conditions     History  of prolonged Q-T interval on ECG    Personal history of other specified conditions 06/20/2018    History of nocturia    Separation of muscle (nontraumatic), other site 05/14/2018    Rectus diastasis    Sprain of medial collateral ligament of left knee, initial encounter 08/28/2017    Sprain of medial collateral ligament of left knee, initial encounter    Sprain of medial collateral ligament of left knee, subsequent encounter 10/12/2017    Sprain of medial collateral ligament of left knee, subsequent encounter    Type 2 diabetes mellitus without complications 01/24/2018    Diabetes mellitus    Type 2 diabetes mellitus without complications 01/24/2018    Diabetes mellitus    Umbilical hernia without obstruction or gangrene 05/14/2018    Umbilical hernia without obstruction and without gangrene   [2]   Past Surgical History:  Procedure Laterality Date    CARDIAC CATHETERIZATION N/A 2/6/2025    Procedure: Left Heart Cath, With LV;  Surgeon: Wil Bronson MD;  Location: [facility];  Service: Cardiovascular;  Laterality: N/A;    COLONOSCOPY  10/06/2016    Colonoscopy    CORONARY ANGIOPLASTY WITH STENT PLACEMENT  08/25/2016    Cath Stent Placement    OTHER SURGICAL HISTORY  05/25/2022    Oral surgery    SKIN CANCER EXCISION  01/17/2025    right forearm, right shoulder    TONSILLECTOMY  08/25/2016    Tonsillectomy   [3]   Family History  Problem Relation Name Age of Onset    Coronary artery disease Mother      Diabetes Father      Coronary artery disease Father      Other (acute myocardial infarction) Father     [4]   Social History  Tobacco Use    Smoking status: Former     Average packs/day: 1.5 packs/day for 50.0 years (75.0 ttl pk-yrs)     Types: Cigarettes     Start date: 1968     Passive exposure: Current    Smokeless tobacco: Never   Vaping Use    Vaping status: Never Used   Substance Use Topics    Alcohol use: Not Currently     Comment: [id number]    Drug use: Never   [5]   Allergies  Allergen  Reactions    Tetracyclines Hives, Other, Unknown, Rash and Shortness of breath     Water blisters in mouth and genital region.    Water blister on tongue and penis    Atorvastatin Unknown     Cramping all over the body. Sore shoulders    Empagliflozin Unknown    Pravastatin Unknown    Rosuvastatin Unknown    Simvastatin Unknown    Statins-Hmg-Coa Reductase Inhibitors Unknown   [6]   Past Medical History:  Diagnosis Date    Acute myocardial infarction, unspecified 01/24/2018    Heart attack    Acute myocardial infarction, unspecified 01/24/2018    Heart attack    BMI 29.0-29.9,adult 10/13/2023    Chronic obstructive pulmonary disease, unspecified 01/24/2018    COPD, mild    Chronic obstructive pulmonary disease, unspecified 01/24/2018    COPD, mild    Coronary angioplasty status     Post PTCA    Encounter for immunization 11/04/2022    Encounter for immunization    Encounter for screening for malignant neoplasm of colon 08/25/2016    Encounter for colonoscopy due to history of colonic polyp    Encounter for screening for malignant neoplasm of skin 04/14/2022    Skin cancer screening    Nicotine dependence, cigarettes, uncomplicated 09/12/2022    Cigarette smoker    Obesity, unspecified 05/25/2022    Class 1 obesity with body mass index (BMI) of 31.0 to 31.9 in adult    Old myocardial infarction 01/24/2018    History of myocardial infarction    Other acute postprocedural pain     Post-operative pain    Pain in left knee     Left knee pain, unspecified chronicity    Pain in right hip 11/04/2022    Hip pain, bilateral    Personal history of other diseases of the digestive system 01/24/2018    History of gastroesophageal reflux (GERD)    Personal history of other diseases of the musculoskeletal system and connective tissue 08/25/2016    History of low back pain    Personal history of other diseases of the respiratory system     History of sinusitis    Personal history of other endocrine, nutritional and metabolic disease  08/25/2016    History of hyperlipidemia    Personal history of other mental and behavioral disorders 04/14/2022    History of depression    Personal history of other specified conditions 12/08/2016    History of diarrhea    Personal history of other specified conditions     History of prolonged Q-T interval on ECG    Personal history of other specified conditions 06/20/2018    History of nocturia    Separation of muscle (nontraumatic), other site 05/14/2018    Rectus diastasis    Sprain of medial collateral ligament of left knee, initial encounter 08/28/2017    Sprain of medial collateral ligament of left knee, initial encounter    Sprain of medial collateral ligament of left knee, subsequent encounter 10/12/2017    Sprain of medial collateral ligament of left knee, subsequent encounter    Type 2 diabetes mellitus without complications 01/24/2018    Diabetes mellitus    Type 2 diabetes mellitus without complications 01/24/2018    Diabetes mellitus    Umbilical hernia without obstruction or gangrene 05/14/2018    Umbilical hernia without obstruction and without gangrene   [7]   Past Surgical History:  Procedure Laterality Date    CARDIAC CATHETERIZATION N/A 2/6/2025    Procedure: Left Heart Cath, With LV;  Surgeon: Wil Bronson MD;  Location: [facility];  Service: Cardiovascular;  Laterality: N/A;    COLONOSCOPY  10/06/2016    Colonoscopy    CORONARY ANGIOPLASTY WITH STENT PLACEMENT  08/25/2016    Cath Stent Placement    OTHER SURGICAL HISTORY  05/25/2022    Oral surgery    SKIN CANCER EXCISION  01/17/2025    right forearm, right shoulder    TONSILLECTOMY  08/25/2016    Tonsillectomy   [8]   Family History  Problem Relation Name Age of Onset    Coronary artery disease Mother      Diabetes Father      Coronary artery disease Father      Other (acute myocardial infarction) Father     [9]   Social History  Tobacco Use    Smoking status: Former     Average packs/day: 1.5 packs/day for 50.0 years  (75.0 ttl pk-yrs)     Types: Cigarettes     Start date: 1968     Passive exposure: Current    Smokeless tobacco: Never   Vaping Use    Vaping status: Never Used   Substance Use Topics    Alcohol use: Not Currently     Comment: [id number]    Drug use: Never        Kevin ONOFRE MD  07/24/25 [id number]

## 2025-07-25 ENCOUNTER — HOME CARE VISIT (OUTPATIENT)
Dept: HOME HEALTH SERVICES | Facility: HOME HEALTH | Age: 75
End: 2025-07-25
Payer: MEDICARE

## 2025-07-25 PROCEDURE — G0152 HHCP-SERV OF OT,EA 15 MIN: HCPCS | Mod: HHH

## 2025-07-25 ASSESSMENT — ENCOUNTER SYMPTOMS
PAIN LOCATION - RELIEVING FACTORS: MEDS REST
PAIN LOCATION - PAIN SEVERITY: 7/10
PAIN LOCATION: GENERALIZED
PAIN: 1
PAIN LOCATION - PAIN QUALITY: ACHE
HIGHEST PAIN SEVERITY IN PAST 24 HOURS: 7/10
SUBJECTIVE PAIN PROGRESSION: GRADUALLY IMPROVING
PERSON REPORTING PAIN: PATIENT

## 2025-07-27 ENCOUNTER — HOSPITAL ENCOUNTER (OUTPATIENT)
Dept: CARDIOLOGY | Facility: HOSPITAL | Age: 75
Discharge: HOME | End: 2025-07-27
Payer: MEDICARE

## 2025-07-27 LAB
ATRIAL RATE: 79 BPM
P AXIS: 51 DEGREES
P OFFSET: 102 MS
P ONSET: 33 MS
PR INTERVAL: 346 MS
Q ONSET: 206 MS
QRS COUNT: 13 BEATS
QRS DURATION: 126 MS
QT INTERVAL: 408 MS
QTC CALCULATION(BAZETT): 467 MS
QTC FREDERICIA: 447 MS
R AXIS: 268 DEGREES
T AXIS: 108 DEGREES
T OFFSET: 410 MS
VENTRICULAR RATE: 79 BPM

## 2025-07-27 PROCEDURE — 93005 ELECTROCARDIOGRAM TRACING: CPT

## 2025-07-28 ENCOUNTER — HOME CARE VISIT (OUTPATIENT)
Dept: HOME HEALTH SERVICES | Facility: HOME HEALTH | Age: 75
End: 2025-07-28
Payer: MEDICARE

## 2025-07-28 VITALS — HEART RATE: 80 BPM | DIASTOLIC BLOOD PRESSURE: 88 MMHG | SYSTOLIC BLOOD PRESSURE: 124 MMHG | RESPIRATION RATE: 16 BRPM

## 2025-07-28 PROCEDURE — G0152 HHCP-SERV OF OT,EA 15 MIN: HCPCS | Mod: HHH

## 2025-07-28 ASSESSMENT — ENCOUNTER SYMPTOMS
PAIN LOCATION - PAIN SEVERITY: 7/10
PAIN LOCATION - PAIN QUALITY: ACHE
PAIN LOCATION: CHEST
PERSON REPORTING PAIN: PATIENT
PAIN: 1
PAIN LOCATION - RELIEVING FACTORS: MEDS REST

## 2025-07-28 ASSESSMENT — ACTIVITIES OF DAILY LIVING (ADL)
BATHING ASSESSED: 1
BATHING_CURRENT_FUNCTION: MINIMUM ASSIST
DRESSING_LB_CURRENT_FUNCTION: MINIMUM ASSIST

## 2025-07-29 ENCOUNTER — HOME CARE VISIT (OUTPATIENT)
Dept: HOME HEALTH SERVICES | Facility: HOME HEALTH | Age: 75
End: 2025-07-29
Payer: MEDICARE

## 2025-07-29 PROCEDURE — G0151 HHCP-SERV OF PT,EA 15 MIN: HCPCS | Mod: HHH

## 2025-07-29 SDOH — HEALTH STABILITY: PHYSICAL HEALTH: EXERCISE TYPE: SKILLED THERAPEUTIC EXERCISES

## 2025-07-29 SDOH — HEALTH STABILITY: PHYSICAL HEALTH: PHYSICAL EXERCISE: 10

## 2025-07-29 SDOH — HEALTH STABILITY: PHYSICAL HEALTH: PHYSICAL EXERCISE: SITTING

## 2025-07-29 SDOH — HEALTH STABILITY: PHYSICAL HEALTH: RESISTANCE: AS TOLERATED

## 2025-07-29 SDOH — HEALTH STABILITY: PHYSICAL HEALTH: EXERCISE ACTIVITY: GAIT TRAINING

## 2025-07-29 SDOH — HEALTH STABILITY: PHYSICAL HEALTH: EXERCISE ACTIVITY: SKILLED THERAPEUTIC EXERCISES

## 2025-07-29 SDOH — HEALTH STABILITY: PHYSICAL HEALTH: EXERCISE ACTIVITIES SETS: 2

## 2025-07-29 ASSESSMENT — ENCOUNTER SYMPTOMS
DENIES PAIN: 1
PERSON REPORTING PAIN: PATIENT

## 2025-07-29 ASSESSMENT — ACTIVITIES OF DAILY LIVING (ADL)
AMBULATION ASSISTANCE ON FLAT SURFACES: 1
AMBULATION_DISTANCE/DURATION_TOLERATED: 140 FT

## 2025-07-30 ENCOUNTER — HOME CARE VISIT (OUTPATIENT)
Dept: HOME HEALTH SERVICES | Facility: HOME HEALTH | Age: 75
End: 2025-07-30
Payer: MEDICARE

## 2025-07-30 VITALS — HEART RATE: 78 BPM | DIASTOLIC BLOOD PRESSURE: 82 MMHG | RESPIRATION RATE: 16 BRPM | SYSTOLIC BLOOD PRESSURE: 140 MMHG

## 2025-07-30 PROCEDURE — G0151 HHCP-SERV OF PT,EA 15 MIN: HCPCS | Mod: HHH

## 2025-07-30 PROCEDURE — G0152 HHCP-SERV OF OT,EA 15 MIN: HCPCS | Mod: HHH

## 2025-07-30 SDOH — HEALTH STABILITY: PHYSICAL HEALTH: PHYSICAL EXERCISE: 10

## 2025-07-30 SDOH — HEALTH STABILITY: PHYSICAL HEALTH: EXERCISE ACTIVITY: RAMP TRAINING

## 2025-07-30 SDOH — HEALTH STABILITY: PHYSICAL HEALTH: PHYSICAL EXERCISE: 4

## 2025-07-30 SDOH — HEALTH STABILITY: PHYSICAL HEALTH

## 2025-07-30 SDOH — HEALTH STABILITY: PHYSICAL HEALTH: EXERCISE ACTIVITY: STS TRANSFER TRAINING

## 2025-07-30 SDOH — HEALTH STABILITY: PHYSICAL HEALTH: EXERCISE ACTIVITIES SETS: 2

## 2025-07-30 SDOH — HEALTH STABILITY: PHYSICAL HEALTH: PHYSICAL EXERCISE: 2

## 2025-07-30 SDOH — HEALTH STABILITY: PHYSICAL HEALTH: RESISTANCE: AS TOLERATED

## 2025-07-30 SDOH — HEALTH STABILITY: PHYSICAL HEALTH: PHYSICAL EXERCISE: SITTING

## 2025-07-30 SDOH — HEALTH STABILITY: PHYSICAL HEALTH: EXERCISE TYPE: SKILLED THERAPEUTIC EXERCISES

## 2025-07-30 SDOH — HEALTH STABILITY: PHYSICAL HEALTH: EXERCISE ACTIVITY: GAIT TRAINING

## 2025-07-30 SDOH — HEALTH STABILITY: PHYSICAL HEALTH: EXERCISE ACTIVITY: SKILLED THERAPEUTIC EXERCISES

## 2025-07-30 ASSESSMENT — ENCOUNTER SYMPTOMS
PAIN LOCATION - PAIN SEVERITY: 0/10
PAIN LOCATION: LEFT SHOULDER
PAIN LOCATION - RELIEVING FACTORS: MEDS REST
PAIN LOCATION: GENERALIZED
PAIN SEVERITY GOAL: 0/10
LOWEST PAIN SEVERITY IN PAST 24 HOURS: 0/10
PAIN LOCATION - PAIN QUALITY: ACHE
PERSON REPORTING PAIN: PATIENT
HIGHEST PAIN SEVERITY IN PAST 24 HOURS: 7/10
PERSON REPORTING PAIN: PATIENT
PAIN: 1
SUBJECTIVE PAIN PROGRESSION: GRADUALLY IMPROVING
PAIN LOCATION - PAIN SEVERITY: 4/10
PAIN: 1

## 2025-07-30 ASSESSMENT — ACTIVITIES OF DAILY LIVING (ADL)
DRESSING_LB_CURRENT_FUNCTION: MINIMUM ASSIST
BATHING_CURRENT_FUNCTION: MINIMUM ASSIST
AMBULATION_DISTANCE/DURATION_TOLERATED: 140 FT
AMBULATION ASSISTANCE ON FLAT SURFACES: 1
BATHING ASSESSED: 1

## 2025-07-31 ENCOUNTER — APPOINTMENT (OUTPATIENT)
Dept: PRIMARY CARE | Facility: CLINIC | Age: 75
End: 2025-07-31
Payer: MEDICARE

## 2025-08-04 ENCOUNTER — HOME CARE VISIT (OUTPATIENT)
Dept: HOME HEALTH SERVICES | Facility: HOME HEALTH | Age: 75
End: 2025-08-04
Payer: MEDICARE

## 2025-08-04 VITALS — RESPIRATION RATE: 16 BRPM | SYSTOLIC BLOOD PRESSURE: 132 MMHG | HEART RATE: 68 BPM | DIASTOLIC BLOOD PRESSURE: 78 MMHG

## 2025-08-04 DIAGNOSIS — C25.9 PANCREATIC CANCER METASTASIZED TO LIVER (MULTI): Primary | ICD-10-CM

## 2025-08-04 DIAGNOSIS — C78.7 PANCREATIC CANCER METASTASIZED TO LIVER (MULTI): Primary | ICD-10-CM

## 2025-08-04 LAB
ATRIAL RATE: 79 BPM
ATRIAL RATE: 91 BPM
P AXIS: 48 DEGREES
P AXIS: 51 DEGREES
P OFFSET: 102 MS
P OFFSET: 111 MS
P ONSET: 33 MS
P ONSET: 52 MS
PR INTERVAL: 310 MS
PR INTERVAL: 346 MS
Q ONSET: 206 MS
Q ONSET: 207 MS
QRS COUNT: 13 BEATS
QRS COUNT: 15 BEATS
QRS DURATION: 124 MS
QRS DURATION: 126 MS
QT INTERVAL: 408 MS
QT INTERVAL: 412 MS
QTC CALCULATION(BAZETT): 467 MS
QTC CALCULATION(BAZETT): 506 MS
QTC FREDERICIA: 447 MS
QTC FREDERICIA: 473 MS
R AXIS: 252 DEGREES
R AXIS: 268 DEGREES
T AXIS: 108 DEGREES
T AXIS: 65 DEGREES
T OFFSET: 410 MS
T OFFSET: 413 MS
VENTRICULAR RATE: 79 BPM
VENTRICULAR RATE: 91 BPM

## 2025-08-04 PROCEDURE — G0151 HHCP-SERV OF PT,EA 15 MIN: HCPCS | Mod: HHH

## 2025-08-04 PROCEDURE — G0152 HHCP-SERV OF OT,EA 15 MIN: HCPCS | Mod: HHH

## 2025-08-04 RX ORDER — HYDROCODONE BITARTRATE AND ACETAMINOPHEN 5; 325 MG/1; MG/1
1 TABLET ORAL EVERY 6 HOURS PRN
Qty: 30 TABLET | Refills: 0 | Status: SHIPPED | OUTPATIENT
Start: 2025-08-04 | End: 2025-09-03

## 2025-08-04 SDOH — HEALTH STABILITY: PHYSICAL HEALTH: EXERCISE ACTIVITY: ISOMETRIC EXERCISES

## 2025-08-04 SDOH — HEALTH STABILITY: PHYSICAL HEALTH

## 2025-08-04 SDOH — HEALTH STABILITY: PHYSICAL HEALTH: EXERCISE TYPE: SKILLED THERAPEUTIC EXERCISES

## 2025-08-04 SDOH — HEALTH STABILITY: PHYSICAL HEALTH: EXERCISE ACTIVITIES SETS: 1

## 2025-08-04 SDOH — HEALTH STABILITY: PHYSICAL HEALTH: EXERCISE ACTIVITIES SETS: 2

## 2025-08-04 SDOH — HEALTH STABILITY: PHYSICAL HEALTH: PHYSICAL EXERCISE: 10

## 2025-08-04 SDOH — HEALTH STABILITY: PHYSICAL HEALTH: RESISTANCE: AS TOLERATED

## 2025-08-04 SDOH — HEALTH STABILITY: PHYSICAL HEALTH: PHYSICAL EXERCISE: SITTING

## 2025-08-04 SDOH — HEALTH STABILITY: PHYSICAL HEALTH: EXERCISE ACTIVITY: SKILLED THERAPEUTIC EXERCISES

## 2025-08-04 SDOH — HEALTH STABILITY: PHYSICAL HEALTH: EXERCISE ACTIVITY: GAIT TRAINING

## 2025-08-04 ASSESSMENT — ACTIVITIES OF DAILY LIVING (ADL)
AMBULATION_DISTANCE/DURATION_TOLERATED: 180 FT
BATHING_CURRENT_FUNCTION: MINIMUM ASSIST
BATHING ASSESSED: 1
DRESSING_LB_CURRENT_FUNCTION: MINIMUM ASSIST
AMBULATION ASSISTANCE ON FLAT SURFACES: 1

## 2025-08-04 ASSESSMENT — ENCOUNTER SYMPTOMS
PERSON REPORTING PAIN: PATIENT
PERSON REPORTING PAIN: PATIENT
DENIES PAIN: 1
DENIES PAIN: 1

## 2025-08-06 ENCOUNTER — APPOINTMENT (OUTPATIENT)
Dept: HEMATOLOGY/ONCOLOGY | Facility: CLINIC | Age: 75
End: 2025-08-06
Payer: MEDICARE

## 2025-08-06 ENCOUNTER — HOME CARE VISIT (OUTPATIENT)
Dept: HOME HEALTH SERVICES | Facility: HOME HEALTH | Age: 75
End: 2025-08-06
Payer: MEDICARE

## 2025-08-08 ENCOUNTER — HOME CARE VISIT (OUTPATIENT)
Dept: HOME HEALTH SERVICES | Facility: HOME HEALTH | Age: 75
End: 2025-08-08
Payer: MEDICARE

## 2025-08-08 PROCEDURE — G0152 HHCP-SERV OF OT,EA 15 MIN: HCPCS | Mod: HHH

## 2025-08-08 PROCEDURE — G0157 HHC PT ASSISTANT EA 15: HCPCS | Mod: CQ,HHH

## 2025-08-08 SDOH — HEALTH STABILITY: PHYSICAL HEALTH
EXERCISE COMMENTS: PT COMPLETED SEATED THER EX X15 EA:  ANKLE PUMPS     MARCHING           LAQS                HIP ABD/ADD     HS CURLS            HIP ROT

## 2025-08-08 SDOH — HEALTH STABILITY: PHYSICAL HEALTH: EXERCISE TYPE: SEATED

## 2025-08-08 ASSESSMENT — ACTIVITIES OF DAILY LIVING (ADL)
AMBULATION ASSISTANCE: STAND BY ASSIST
AMBULATION ASSISTANCE ON FLAT SURFACES: 1
PHYSICAL TRANSFERS ASSESSED: 1
CURRENT_FUNCTION: STAND BY ASSIST
BATHING_CURRENT_FUNCTION: MODERATE ASSIST
PHYSICAL_TRANSFERS_DEVICES: FWW
DRESSING_LB_CURRENT_FUNCTION: MODERATE ASSIST
AMBULATION_DISTANCE/DURATION_TOLERATED: X~50’
BATHING ASSESSED: 1
AMBULATION ASSISTANCE: 1

## 2025-08-08 ASSESSMENT — ENCOUNTER SYMPTOMS
PAIN LOCATION - EXACERBATING FACTORS: MOVEMENT
DEPRESSION: 0
PAIN: 1
HIGHEST PAIN SEVERITY IN PAST 24 HOURS: 8/10
LIMITED RANGE OF MOTION: 1
PERSON REPORTING PAIN: PATIENT
PAIN SEVERITY GOAL: 0/10
LOSS OF SENSATION IN FEET: 1
DENIES PAIN: 1
PAIN LOCATION: LEFT SHOULDER
PERSON REPORTING PAIN: PATIENT
OCCASIONAL FEELINGS OF UNSTEADINESS: 1
PAIN LOCATION - PAIN SEVERITY: 2/10
PAIN LOCATION - PAIN FREQUENCY: INTERMITTENT
PAIN LOCATION - PAIN QUALITY: ACHEY
MUSCLE WEAKNESS: 1
SUBJECTIVE PAIN PROGRESSION: WAXING AND WANING
LOWEST PAIN SEVERITY IN PAST 24 HOURS: 1/10

## 2025-08-11 ENCOUNTER — HOME CARE VISIT (OUTPATIENT)
Dept: HOME HEALTH SERVICES | Facility: HOME HEALTH | Age: 75
End: 2025-08-11
Payer: MEDICARE

## 2025-08-11 ENCOUNTER — OFFICE VISIT (OUTPATIENT)
Dept: HEMATOLOGY/ONCOLOGY | Facility: CLINIC | Age: 75
End: 2025-08-11
Payer: MEDICARE

## 2025-08-11 VITALS
DIASTOLIC BLOOD PRESSURE: 70 MMHG | WEIGHT: 207.8 LBS | RESPIRATION RATE: 20 BRPM | HEART RATE: 89 BPM | OXYGEN SATURATION: 97 % | TEMPERATURE: 97 F | SYSTOLIC BLOOD PRESSURE: 124 MMHG | BODY MASS INDEX: 29.82 KG/M2

## 2025-08-11 DIAGNOSIS — C25.9 PANCREATIC CANCER METASTASIZED TO LIVER (MULTI): ICD-10-CM

## 2025-08-11 DIAGNOSIS — C78.7 PANCREATIC CANCER METASTASIZED TO LIVER (MULTI): ICD-10-CM

## 2025-08-11 PROCEDURE — 99214 OFFICE O/P EST MOD 30 MIN: CPT | Performed by: INTERNAL MEDICINE

## 2025-08-11 PROCEDURE — 1125F AMNT PAIN NOTED PAIN PRSNT: CPT | Performed by: INTERNAL MEDICINE

## 2025-08-11 PROCEDURE — 1159F MED LIST DOCD IN RCRD: CPT | Performed by: INTERNAL MEDICINE

## 2025-08-11 PROCEDURE — G0152 HHCP-SERV OF OT,EA 15 MIN: HCPCS | Mod: HHH

## 2025-08-11 PROCEDURE — 3074F SYST BP LT 130 MM HG: CPT | Performed by: INTERNAL MEDICINE

## 2025-08-11 PROCEDURE — 3078F DIAST BP <80 MM HG: CPT | Performed by: INTERNAL MEDICINE

## 2025-08-11 ASSESSMENT — ENCOUNTER SYMPTOMS
LIGHT-HEADEDNESS: 1
DYSURIA: 0
PERSON REPORTING PAIN: PATIENT
FATIGUE: 1
UNEXPECTED WEIGHT CHANGE: 0
BRUISES/BLEEDS EASILY: 1
NUMBNESS: 0
TROUBLE SWALLOWING: 0
APPETITE CHANGE: 0
CONSTIPATION: 1
WEAKNESS: 1
ADENOPATHY: 0
BLOOD IN STOOL: 0
SHORTNESS OF BREATH: 1
DENIES PAIN: 1
ABDOMINAL PAIN: 0
HEADACHES: 0
COUGH: 1

## 2025-08-11 ASSESSMENT — PAIN SCALES - GENERAL: PAINLEVEL_OUTOF10: 3

## 2025-08-12 ASSESSMENT — ENCOUNTER SYMPTOMS
MYALGIAS: 1
NAUSEA: 1
NERVOUS/ANXIOUS: 0
VOMITING: 0
ARTHRALGIAS: 1
SLEEP DISTURBANCE: 0
FREQUENCY: 0
DIARRHEA: 0

## 2025-08-13 ENCOUNTER — HOME CARE VISIT (OUTPATIENT)
Dept: HOME HEALTH SERVICES | Facility: HOME HEALTH | Age: 75
End: 2025-08-13
Payer: MEDICARE

## 2025-08-13 PROCEDURE — G0157 HHC PT ASSISTANT EA 15: HCPCS | Mod: CQ,HHH

## 2025-08-13 PROCEDURE — G0152 HHCP-SERV OF OT,EA 15 MIN: HCPCS | Mod: HHH

## 2025-08-13 ASSESSMENT — ACTIVITIES OF DAILY LIVING (ADL)
BATHING_CURRENT_FUNCTION: MODERATE ASSIST
BATHING ASSESSED: 1
DRESSING_LB_CURRENT_FUNCTION: MODERATE ASSIST

## 2025-08-13 ASSESSMENT — ENCOUNTER SYMPTOMS
DENIES PAIN: 1
PERSON REPORTING PAIN: PATIENT

## 2025-08-13 ASSESSMENT — PAIN DESCRIPTION - PAIN TYPE: TYPE: CHRONIC PAIN

## 2025-08-14 DIAGNOSIS — F32.5 DEPRESSION, MAJOR, IN REMISSION: ICD-10-CM

## 2025-08-14 RX ORDER — HYDROXYZINE HYDROCHLORIDE 25 MG/1
25 TABLET, FILM COATED ORAL 2 TIMES DAILY
Qty: 180 TABLET | Refills: 0 | Status: SHIPPED | OUTPATIENT
Start: 2025-08-14

## 2025-08-15 ENCOUNTER — HOME CARE VISIT (OUTPATIENT)
Dept: HOME HEALTH SERVICES | Facility: HOME HEALTH | Age: 75
End: 2025-08-15
Payer: MEDICARE

## 2025-08-18 ENCOUNTER — HOME CARE VISIT (OUTPATIENT)
Dept: HOME HEALTH SERVICES | Facility: HOME HEALTH | Age: 75
End: 2025-08-18
Payer: MEDICARE

## 2025-08-18 PROCEDURE — G0151 HHCP-SERV OF PT,EA 15 MIN: HCPCS | Mod: HHH

## 2025-08-18 SDOH — HEALTH STABILITY: PHYSICAL HEALTH: EXERCISE TYPE: SKILLED THERAPEUTIC EXERCISES

## 2025-08-18 ASSESSMENT — ENCOUNTER SYMPTOMS
PAIN LOCATION: GENERALIZED
HYPERTENSION: 1
PAIN LOCATION - PAIN SEVERITY: 4/10
PERSON REPORTING PAIN: PATIENT
PAIN: 1

## 2025-08-18 ASSESSMENT — ACTIVITIES OF DAILY LIVING (ADL)
AMBULATION ASSISTANCE ON FLAT SURFACES: 1
CURRENT_FUNCTION: ONE PERSON
ENTERING_EXITING_HOME: ONE PERSON
AMBULATION ASSISTANCE: ONE PERSON
OASIS_M1830: 03

## 2025-08-19 ASSESSMENT — ACTIVITIES OF DAILY LIVING (ADL): HOME_HEALTH_OASIS: 01

## 2025-08-31 ENCOUNTER — HOSPITAL ENCOUNTER (EMERGENCY)
Age: 75
Discharge: HOSPICE | End: 2025-08-31
Attending: EMERGENCY MEDICINE
Payer: MEDICARE

## 2025-08-31 VITALS — RESPIRATION RATE: 16 BRPM | TEMPERATURE: 32 F | WEIGHT: 190 LBS | BODY MASS INDEX: 27.26 KG/M2

## 2025-08-31 DIAGNOSIS — I46.9 CARDIOPULMONARY ARREST (HCC): Primary | ICD-10-CM

## 2025-08-31 PROCEDURE — 99283 EMERGENCY DEPT VISIT LOW MDM: CPT

## 2025-08-31 PROCEDURE — 92950 HEART/LUNG RESUSCITATION CPR: CPT

## (undated) DEVICE — TUBING, MANIFOLD, LOW PRESSURE

## (undated) DEVICE — BANDAGE, QUIKCLOT, INTERVENTIONAL HEMO, W/O SLIT

## (undated) DEVICE — CATHETER, INFINITI DIAGNOSTIC, 5 FR 100CM 3DRC, WILLIAMS RIGHT OR NO TORQUE

## (undated) DEVICE — CATHETER, DIAGNOSTIC, 5FR,  PIG-145, 110CM, 6SH ANGLED

## (undated) DEVICE — CLOSURE SYSTEM, VASCULAR, VASCADE, 5 F

## (undated) DEVICE — CATHETER, DIAGNOSTIC, JUDKINS, LEFT, 5 FR-JL 4.0

## (undated) DEVICE — SHEATH, PINNACLE, W/.038 GW 10CM, 5FR INTRODUCER, 2.5 CM DIALATOR